# Patient Record
Sex: FEMALE | Race: WHITE | NOT HISPANIC OR LATINO | Employment: OTHER | URBAN - METROPOLITAN AREA
[De-identification: names, ages, dates, MRNs, and addresses within clinical notes are randomized per-mention and may not be internally consistent; named-entity substitution may affect disease eponyms.]

---

## 2017-01-04 ENCOUNTER — OFFICE VISIT (OUTPATIENT)
Dept: ENDOCRINOLOGY | Facility: CLINIC | Age: 77
End: 2017-01-04

## 2017-01-04 VITALS
DIASTOLIC BLOOD PRESSURE: 64 MMHG | HEART RATE: 79 BPM | WEIGHT: 172.4 LBS | BODY MASS INDEX: 27.71 KG/M2 | HEIGHT: 66 IN | OXYGEN SATURATION: 99 % | SYSTOLIC BLOOD PRESSURE: 118 MMHG

## 2017-01-04 DIAGNOSIS — E11.42 DIABETIC PERIPHERAL NEUROPATHY (HCC): ICD-10-CM

## 2017-01-04 DIAGNOSIS — E11.8 TYPE 2 DIABETES MELLITUS WITH COMPLICATION, WITH LONG-TERM CURRENT USE OF INSULIN (HCC): Primary | ICD-10-CM

## 2017-01-04 DIAGNOSIS — Z79.4 TYPE 2 DIABETES MELLITUS WITH COMPLICATION, WITH LONG-TERM CURRENT USE OF INSULIN (HCC): Primary | ICD-10-CM

## 2017-01-04 LAB — GLUCOSE BLDC GLUCOMTR-MCNC: 255 MG/DL (ref 70–130)

## 2017-01-04 PROCEDURE — 99214 OFFICE O/P EST MOD 30 MIN: CPT | Performed by: INTERNAL MEDICINE

## 2017-01-04 PROCEDURE — 82947 ASSAY GLUCOSE BLOOD QUANT: CPT | Performed by: INTERNAL MEDICINE

## 2017-01-04 NOTE — MR AVS SNAPSHOT
Narcisa SIDDIQI Yadiel   1/4/2017 10:45 AM   Office Visit    Dept Phone:  [phone]   Encounter #:  [id number]    Provider:  Jennifer Richards MD   Department:  [facility] INTERNAL MEDICINE AND ENDOCRINOLOGY                Your Full Care Plan              Your Updated Medication List          This list is accurate as of: 1/4/17 10:51 AM.  Always use your most recent med list.                ASPIRIN LOW DOSE 81 MG tablet   Generic drug:  aspirin       cefuroxime 250 MG tablet   Commonly known as:  CEFTIN   Take 1 tablet by mouth 2 (two) times a day.       cilostazol 100 MG tablet   Commonly known as:  PLETAL       clopidogrel 75 MG tablet   Commonly known as:  PLAVIX       CRESTOR 20 MG tablet   Generic drug:  rosuvastatin   TAKE ONE TABLET BY MOUTH ONCE A DAY       [facility] VITAMIN B-12 1000 MCG tablet   Generic drug:  cyanocobalamin       diazePAM 5 MG tablet   Commonly known as:  VALIUM       diphenoxylate-atropine 2.5-0.025 MG per tablet   Commonly known as:  LOMOTIL   Take 1 tablet by mouth Daily.       doxycycline 100 MG capsule   Commonly known as:  VIBRAMYCIN       gabapentin 600 MG tablet   Commonly known as:  NEURONTIN       glucose blood test strip   Commonly known as:  ACCU-CHEK JOVITA PLUS   1 each by Other route 3 (three) times a day.       HUMALOG KWIKPEN 100 UNIT/ML solution pen-injector   Generic drug:  Insulin Lispro       LEVEMIR FLEXTOUCH 100 UNIT/ML injection   Generic drug:  insulin detemir       meclizine 25 MG tablet   Commonly known as:  ANTIVERT   Take 1 tablet by mouth 3 (Three) Times a Day As Needed for dizziness.       metoprolol tartrate 25 MG tablet   Commonly known as:  LOPRESSOR       ondansetron ODT 8 MG disintegrating tablet   Commonly known as:  ZOFRAN-ODT   Take 1 tablet by mouth every 8 (eight) hours as needed for nausea.       pantoprazole 40 MG EC tablet   Commonly known as:  PROTONIX   Take 1 tablet by mouth 2 (Two) Times a Day.       saccharomyces boulardii 250 MG capsule   Commonly known as:  FLORASTOR   Take 1 capsule by mouth 2 (two) times a day.       sucralfate 1 GM/10ML suspension   Commonly known as:  CARAFATE   Take 10 mL by mouth every 6 (six) hours.       traZODone 50 MG tablet   Commonly known as:  DESYREL   Take 1 tablet by mouth every night.       valsartan 320 MG tablet   Commonly known as:  DIOVAN   Take 1 tablet by mouth daily.               We Performed the Following     POC Glucose Fingerstick       You Were Diagnosed With        Codes Comments    Type 2 diabetes mellitus with complication, with long-term current use of insulin    -  Primary ICD-10-CM: E11.8, Z79.4  ICD-9-CM: 250.90, V58.67       Instructions     None    Patient Instructions History      Upcoming Appointments     Visit Type Date Time Department    FOLLOW UP 2017 10:45 AM [facility]    FOLLOW UP 2017 10:30 AM [facility]    FOLLOW UP 3/14/2017  9:45 AM [facility]    FOLLOW UP 2017 10:00 AM [facility]    SUBSEQUENT MEDICARE WELLNESS 2017 11:00 AM [facility]      [employer] Signup     [employer] allows you to send messages to your doctor, view your test results, renew your prescriptions, schedule appointments, and more. To sign up, go to [employer] and click on the Sign Up Now link in the New User? box. Enter your [employer] Activation Code exactly as it appears below along with the last four digits of your Social Security Number and your Date of Birth () to complete the sign-up process. If you do not sign up before the expiration date, you must request a new code.    [employer] Activation Code: [email or web address]  Expires: 2017 10:51 AM    If you have questions, you can email [email or web address] or call [phone] to talk to our [employer] staff. Remember, [employer] is NOT to be used for urgent needs. For medical emergencies, dial 911.               Other Info from Your Visit           Your  "Appointments     Feb 02, 2017 10:30 AM EST   Follow Up with Mariia Del Real DO   [facility] INTERNAL MEDICINE AND ENDOCRINOLOGY [place] (--)    [address]  [facility] [address]   [phone]           Arrive 15 minutes prior to appointment.            Mar 14, 2017  9:45 AM EDT   Follow Up with Filemon Mayberry MD   [facility] CARDIOLOGY (--)    [address]  [facility] [address]   [phone]           Arrive 15 minutes prior to appointment.            Apr 26, 2017 10:00 AM EDT   Follow Up with Jennifer Richards MD   [facility] INTERNAL MEDICINE AND ENDOCRINOLOGY (--)    [address]  [facility] [address]   [phone]           Arrive 15 minutes prior to appointment.            Jul 05, 2017 11:00 AM EDT   Subsequent Medicare Wellness with Mariia Del Real DO   [facility] INTERNAL MEDICINE AND ENDOCRINOLOGY [place] (--)    [address]  [facility] [address]   [phone]              Allergies     Codeine        Reason for Visit     Diabetes Follow Up Former P sirena Holliday  BS flucuate up & down.  No meter or log book presented.  BS In Office is 255      Vital Signs     Blood Pressure Pulse Height Weight Oxygen Saturation Body Mass Index    118/64 79 66\" (167.6 cm) 172 lb 6.4 oz (78.2 kg) 99% 27.83 kg/m2    Smoking Status                   Never Smoker           Problems and Diagnoses Noted     Type 2 diabetes mellitus with complication, with long-term current use of insulin      Results     POC Glucose Fingerstick      Component Value Standard Range & Units    Glucose 255 70 - 130 mg/dL                    "

## 2017-01-04 NOTE — PROGRESS NOTES
Chief Complaint   Patient presents with   • Diabetes     Follow Up Former P tof Debo Holliday        HPI:   Narcisa Cotto is a 76 y.o.female who returns to Endocrine Clinic for f/u evaluation of Type 2 diabetes. Last clinic visit 8/30/2016 with Ms. DeboNHAN Zuleta who is no longer with our practice. Her history is as follows:    Interim Events: no new medical issues. Reports gabapentin not working well for her peripheral neuropathy. Higher doses cannot be used due to her CKD.    1) Type 2 diabetes with associated complications of peripheral neuropathy, CKD, PAD:  - diagnosed in 2000    Current DM Medications:  - Levemir 70 units qhs  - Humalog 20 units TID ac meals  - no correction factor    Glucometer/ BG log Review: pt forgot meter for review    DM Health Maintenance:  Ophtho: Last visit - due 2017, no retinopathy per pt  Podiatry: Last visit - no recent visit  Monofilament: has peripheral neuropathy, see below  Lipids: (11/2016) TChol 164, , HDL 35, LDL 93 - on crestor 20 mg daily  Urine Microalb/Cr ratio: CKD stage 4, on ace-I  TSH: 1.620 (11/2016)  Aspirin: 81 mg daily    Review of Systems   Constitutional: Positive for fatigue.        Weight stable   HENT: Negative.    Eyes: Negative.  Negative for visual disturbance.   Respiratory: Negative.    Cardiovascular: Positive for leg swelling. Negative for chest pain.   Gastrointestinal: Negative.  Negative for constipation and diarrhea.   Endocrine: Negative for cold intolerance, polydipsia and polyuria.   Genitourinary: Negative.  Enuresis: with physical exertion.   Musculoskeletal: Positive for arthralgias (knees & hands). Negative for back pain, joint swelling and myalgias.   Skin: Negative.  Negative for rash and wound.   Neurological: Positive for numbness (pain, Bilat  feet). Negative for headaches.   Hematological: Negative.    Psychiatric/Behavioral: Negative.      The following portions of the patient's history were reviewed and updated as  "appropriate: allergies, current medications, past family history, past medical history, past social history, past surgical history and problem list.    Visit Vitals   • /64   • Pulse 79   • Ht 66\" (167.6 cm)   • Wt 172 lb 6.4 oz (78.2 kg)   • SpO2 99%   • BMI 27.83 kg/m2     Physical Exam   Constitutional: She is oriented to person, place, and time. She appears well-developed. No distress.   HENT:   Head: Normocephalic.   Mouth/Throat: Oropharynx is clear and moist.   Eyes: Conjunctivae and EOM are normal. Pupils are equal, round, and reactive to light.   Neck: No tracheal deviation present. No thyromegaly present.   No palpable thyroid nodules     Cardiovascular: Normal rate, regular rhythm and normal heart sounds.    No murmur heard.  Pulmonary/Chest: Effort normal and breath sounds normal. No respiratory distress.   Abdominal: Soft. Bowel sounds are normal.   No scarring at insulin injection sites   Lymphadenopathy:     She has no cervical adenopathy.   Neurological: She is alert and oriented to person, place, and time. No cranial nerve deficit.   Skin: Skin is warm and dry. She is not diaphoretic. No erythema.   Psychiatric: She has a normal mood and affect. Her behavior is normal.   Vitals reviewed.    LABS/IMAGING:   Results for orders placed or performed in visit on 01/04/17   POC Glucose Fingerstick   Result Value Ref Range    Glucose 255 (A) 70 - 130 mg/dL     Lab Results   Component Value Date    HGBA1C 7.3 12/21/2016     ASSESSMENT/PLAN:  1)  Type 2 diabetes with associated complications of peripheral neuropathy, CKD, PAD: controlled  - discussed with patient that glycemic targets are generally set somewhat higher (eg, <8 percent) for older adult patients and those with comorbidities. Therefore a goal A1C% of 7-8% is reasonable for Ms. Cotto.  - no meter for review  - continue Levemir 70 units qhs  - continue Humalog 20 units TID ac meals  - start correction factor of 2 units:50>150  - written " instructions given to patient    2) diabetic peripheral neuropathy:  - Reports gabapentin not working well for her peripheral neuropathy. Higher doses cannot be used due to her CKD.  - will try Lyrica 75 mcg BID in place of gabapentin    RTC 3 months    Counseling was given to patient for the following topics: instructions for management and risks and benefits of treatment options . Total time of the encounter was 30 minutes and 25 minutes was spent counseling.

## 2017-01-06 PROBLEM — E11.42 DIABETIC PERIPHERAL NEUROPATHY (HCC): Status: ACTIVE | Noted: 2017-01-06

## 2017-01-06 RX ORDER — PREGABALIN 75 MG/1
75 CAPSULE ORAL 2 TIMES DAILY
Qty: 60 CAPSULE | Refills: 5 | Status: SHIPPED | OUTPATIENT
Start: 2017-01-06 | End: 2017-01-09 | Stop reason: SDUPTHER

## 2017-01-09 DIAGNOSIS — E11.42 DIABETIC PERIPHERAL NEUROPATHY (HCC): ICD-10-CM

## 2017-01-09 RX ORDER — PREGABALIN 75 MG/1
75 CAPSULE ORAL 2 TIMES DAILY
Qty: 60 CAPSULE | Refills: 5 | OUTPATIENT
Start: 2017-01-09 | End: 2017-04-26

## 2017-01-12 RX ORDER — BLOOD SUGAR DIAGNOSTIC
STRIP MISCELLANEOUS
Qty: 100 EACH | Refills: 4 | Status: SHIPPED | OUTPATIENT
Start: 2017-01-12 | End: 2017-03-14

## 2017-01-20 ENCOUNTER — TRANSCRIBE ORDERS (OUTPATIENT)
Dept: LAB | Facility: HOSPITAL | Age: 77
End: 2017-01-20

## 2017-01-26 ENCOUNTER — TRANSCRIBE ORDERS (OUTPATIENT)
Dept: ADMINISTRATIVE | Facility: HOSPITAL | Age: 77
End: 2017-01-26

## 2017-01-26 DIAGNOSIS — N18.4 CKD (CHRONIC KIDNEY DISEASE), STAGE IV (HCC): Primary | ICD-10-CM

## 2017-01-31 ENCOUNTER — HOSPITAL ENCOUNTER (OUTPATIENT)
Dept: CT IMAGING | Facility: HOSPITAL | Age: 77
Discharge: HOME | End: 2017-01-31
Attending: INTERNAL MEDICINE

## 2017-02-02 ENCOUNTER — OFFICE VISIT (OUTPATIENT)
Dept: INTERNAL MEDICINE | Facility: CLINIC | Age: 77
End: 2017-02-02

## 2017-02-02 VITALS
SYSTOLIC BLOOD PRESSURE: 150 MMHG | DIASTOLIC BLOOD PRESSURE: 70 MMHG | OXYGEN SATURATION: 98 % | BODY MASS INDEX: 28.34 KG/M2 | WEIGHT: 175.6 LBS | HEART RATE: 82 BPM

## 2017-02-02 DIAGNOSIS — R21 RASH: ICD-10-CM

## 2017-02-02 DIAGNOSIS — N18.30 CHRONIC KIDNEY DISEASE, STAGE 3 (MODERATE): ICD-10-CM

## 2017-02-02 DIAGNOSIS — G47.00 INSOMNIA, UNSPECIFIED TYPE: Primary | ICD-10-CM

## 2017-02-02 DIAGNOSIS — I10 ESSENTIAL HYPERTENSION: ICD-10-CM

## 2017-02-02 DIAGNOSIS — K21.9 GASTROESOPHAGEAL REFLUX DISEASE, ESOPHAGITIS PRESENCE NOT SPECIFIED: ICD-10-CM

## 2017-02-02 DIAGNOSIS — E78.5 HYPERLIPIDEMIA, UNSPECIFIED HYPERLIPIDEMIA TYPE: ICD-10-CM

## 2017-02-02 LAB
ALBUMIN SERPL-MCNC: 4.4 G/DL (ref 3.2–4.8)
ALBUMIN/GLOB SERPL: 1.6 G/DL (ref 1.5–2.5)
ALP SERPL-CCNC: 74 U/L (ref 25–100)
ALT SERPL W P-5'-P-CCNC: 15 U/L (ref 7–40)
ANION GAP SERPL CALCULATED.3IONS-SCNC: 8 MMOL/L (ref 3–11)
ARTICHOKE IGE QN: 166 MG/DL (ref 0–130)
AST SERPL-CCNC: 17 U/L (ref 0–33)
BILIRUB SERPL-MCNC: 0.3 MG/DL (ref 0.3–1.2)
BUN BLD-MCNC: 24 MG/DL (ref 9–23)
BUN/CREAT SERPL: 12 (ref 7–25)
CALCIUM SPEC-SCNC: 10.4 MG/DL (ref 8.7–10.4)
CHLORIDE SERPL-SCNC: 103 MMOL/L (ref 99–109)
CHOLEST SERPL-MCNC: 260 MG/DL (ref 0–200)
CO2 SERPL-SCNC: 28 MMOL/L (ref 20–31)
CREAT BLD-MCNC: 2 MG/DL (ref 0.6–1.3)
GFR SERPL CREATININE-BSD FRML MDRD: 24 ML/MIN/1.73
GLOBULIN UR ELPH-MCNC: 2.7 GM/DL
GLUCOSE BLD-MCNC: 196 MG/DL (ref 70–100)
HDLC SERPL-MCNC: 46 MG/DL (ref 40–60)
POTASSIUM BLD-SCNC: 4.5 MMOL/L (ref 3.5–5.5)
PROT SERPL-MCNC: 7.1 G/DL (ref 5.7–8.2)
SODIUM BLD-SCNC: 139 MMOL/L (ref 132–146)
TRIGL SERPL-MCNC: 195 MG/DL (ref 0–150)

## 2017-02-02 PROCEDURE — 99214 OFFICE O/P EST MOD 30 MIN: CPT | Performed by: INTERNAL MEDICINE

## 2017-02-02 PROCEDURE — 90471 IMMUNIZATION ADMIN: CPT | Performed by: INTERNAL MEDICINE

## 2017-02-02 PROCEDURE — 90662 IIV NO PRSV INCREASED AG IM: CPT | Performed by: INTERNAL MEDICINE

## 2017-02-02 PROCEDURE — 80053 COMPREHEN METABOLIC PANEL: CPT | Performed by: INTERNAL MEDICINE

## 2017-02-02 PROCEDURE — 90472 IMMUNIZATION ADMIN EACH ADD: CPT | Performed by: INTERNAL MEDICINE

## 2017-02-02 PROCEDURE — 80061 LIPID PANEL: CPT | Performed by: INTERNAL MEDICINE

## 2017-02-02 PROCEDURE — 90714 TD VACC NO PRESV 7 YRS+ IM: CPT | Performed by: INTERNAL MEDICINE

## 2017-02-02 RX ORDER — TRAZODONE HYDROCHLORIDE 50 MG/1
50 TABLET ORAL NIGHTLY
Qty: 30 TABLET | Refills: 0 | Status: SHIPPED | OUTPATIENT
Start: 2017-02-02 | End: 2018-06-04

## 2017-02-02 NOTE — PROGRESS NOTES
Subjective   Narcisa Cotto is a 76 y.o. female.   Chief Complaint   Patient presents with   • Chronic Kidney Disease   • Hypertension   • Hyperlipidemia   • VITAMIN B12 DEF       Chronic Kidney Disease   This is a chronic problem. The current episode started more than 1 year ago. Associated symptoms include a rash. Pertinent negatives include no abdominal pain, arthralgias, chest pain, chills, congestion, coughing, diaphoresis, fatigue, fever, headaches, myalgias, nausea, neck pain, numbness, sore throat, swollen glands, urinary symptoms, vertigo, visual change or vomiting. The treatment provided mild relief.   Hypertension   This is a chronic problem. The current episode started more than 1 year ago. The problem is controlled. Pertinent negatives include no chest pain, headaches, neck pain, palpitations or shortness of breath. Risk factors for coronary artery disease include diabetes mellitus and dyslipidemia.   Hyperlipidemia   This is a chronic problem. The current episode started more than 1 year ago. The problem is controlled. Pertinent negatives include no chest pain, myalgias or shortness of breath. Risk factors for coronary artery disease include diabetes mellitus, dyslipidemia and hypertension.   Heartburn   She reports no abdominal pain, no chest pain, no coughing, no nausea, no sore throat or no wheezing. This is a chronic problem. The current episode started more than 1 year ago. Pertinent negatives include no fatigue.   Insomnia   This is a chronic problem. The current episode started more than 1 year ago. Associated symptoms include a rash. Pertinent negatives include no abdominal pain, arthralgias, chest pain, chills, congestion, coughing, diaphoresis, fatigue, fever, headaches, myalgias, nausea, neck pain, numbness, sore throat, swollen glands, urinary symptoms, vertigo, visual change or vomiting. The treatment provided significant relief.    Rash on back x 3 months and getting worst. Itching  with it. No pain.  No new products. Used OTC cream withot relief.  The following portions of the patient's history were reviewed and updated as appropriate: allergies, current medications, past family history, past medical history, past social history, past surgical history and problem list.    Review of Systems   Constitutional: Negative for activity change, appetite change, chills, diaphoresis, fatigue, fever and unexpected weight change.   HENT: Negative for congestion, ear discharge, ear pain, mouth sores, nosebleeds, sinus pressure, sneezing and sore throat.    Eyes: Negative for pain, discharge and itching.   Respiratory: Negative for cough, chest tightness, shortness of breath and wheezing.    Cardiovascular: Negative for chest pain, palpitations and leg swelling.   Gastrointestinal: Negative for abdominal pain, constipation, diarrhea, nausea and vomiting.   Endocrine: Negative for cold intolerance, heat intolerance, polydipsia and polyphagia.   Genitourinary: Negative for dysuria, flank pain, frequency, hematuria and urgency.   Musculoskeletal: Negative for arthralgias, back pain, gait problem, myalgias, neck pain and neck stiffness.   Skin: Positive for rash. Negative for color change and pallor.   Neurological: Negative for vertigo, seizures, speech difficulty, numbness and headaches.   Psychiatric/Behavioral: Negative for agitation, confusion, decreased concentration and sleep disturbance. The patient has insomnia. The patient is not nervous/anxious.        Objective   Physical Exam   Constitutional: She is oriented to person, place, and time. She appears well-developed.   HENT:   Head: Normocephalic.   Right Ear: External ear normal.   Left Ear: External ear normal.   Nose: Nose normal.   Mouth/Throat: Oropharynx is clear and moist.   Eyes: Conjunctivae are normal. Pupils are equal, round, and reactive to light.   Neck: No JVD present. No thyromegaly present.   Cardiovascular: Normal rate, regular rhythm  and normal heart sounds.  Exam reveals no friction rub.    No murmur heard.  Pulmonary/Chest: Effort normal and breath sounds normal. No respiratory distress. She has no wheezes. She has no rales.   Abdominal: Soft. Bowel sounds are normal. She exhibits no distension. There is no tenderness. There is no guarding.   Musculoskeletal: She exhibits no edema or tenderness.   Lymphadenopathy:     She has no cervical adenopathy.   Neurological: She is alert and oriented to person, place, and time. She has normal reflexes. She displays normal reflexes. No cranial nerve deficit.   Skin: No rash noted.        Psychiatric: She has a normal mood and affect. Her behavior is normal.   Nursing note and vitals reviewed.      Assessment/Plan   Narcisa was seen today for chronic kidney disease, hypertension, hyperlipidemia and vitamin b12 def.    Diagnoses and all orders for this visit:    Insomnia, unspecified type  -     traZODone (DESYREL) 50 MG tablet; Take 1 tablet by mouth Every Night.  Stable with trazadone that was refilled today  Essential hypertension  -     Comprehensive Metabolic Panel  -     Lipid Panel  stable  Hyperlipidemia, unspecified hyperlipidemia type  -     Comprehensive Metabolic Panel  -     Lipid Panel  stable  Gastroesophageal reflux disease, esophagitis presence not specified  Stable. Recent change from protonix to zantac to protect her kidney  Chronic kidney disease, stage 3 (moderate)  Per pt last cr 1.9 at nephro office  Rash  -     Ambulatory Referral to Dermatology  Triamcinolone prescribed below.  Other orders  -     triamcinolone (KENALOG) 0.1 % ointment; Apply  topically 2 (Two) Times a Day.    Flu vac and td  given today. Td given for area on shoulder. Small scratch next to dry patch and not sure what came into contact with.

## 2017-02-21 RX ORDER — CILOSTAZOL 100 MG/1
TABLET ORAL
Qty: 60 TABLET | Refills: 1 | Status: SHIPPED | OUTPATIENT
Start: 2017-02-21 | End: 2017-03-14

## 2017-02-21 RX ORDER — GABAPENTIN 600 MG/1
TABLET ORAL
Qty: 90 TABLET | Refills: 0 | Status: SHIPPED | OUTPATIENT
Start: 2017-02-21 | End: 2017-04-03 | Stop reason: SDUPTHER

## 2017-02-21 RX ORDER — CLOPIDOGREL BISULFATE 75 MG/1
TABLET ORAL
Qty: 30 TABLET | Refills: 1 | Status: SHIPPED | OUTPATIENT
Start: 2017-02-21 | End: 2017-03-14 | Stop reason: SDUPTHER

## 2017-03-14 ENCOUNTER — OFFICE VISIT (OUTPATIENT)
Dept: CARDIOLOGY | Facility: CLINIC | Age: 77
End: 2017-03-14

## 2017-03-14 VITALS
BODY MASS INDEX: 28.99 KG/M2 | HEART RATE: 80 BPM | DIASTOLIC BLOOD PRESSURE: 60 MMHG | SYSTOLIC BLOOD PRESSURE: 124 MMHG | WEIGHT: 174 LBS | HEIGHT: 65 IN

## 2017-03-14 DIAGNOSIS — I10 ESSENTIAL HYPERTENSION: ICD-10-CM

## 2017-03-14 DIAGNOSIS — E78.5 HYPERLIPIDEMIA LDL GOAL <100: ICD-10-CM

## 2017-03-14 DIAGNOSIS — I73.9 PERIPHERAL ARTERIAL DISEASE (HCC): Primary | ICD-10-CM

## 2017-03-14 DIAGNOSIS — N18.4 CHRONIC KIDNEY DISEASE, STAGE IV (SEVERE) (HCC): ICD-10-CM

## 2017-03-14 DIAGNOSIS — R07.89 ATYPICAL CHEST PAIN: ICD-10-CM

## 2017-03-14 PROCEDURE — 99214 OFFICE O/P EST MOD 30 MIN: CPT | Performed by: INTERNAL MEDICINE

## 2017-03-14 RX ORDER — ROSUVASTATIN CALCIUM 20 MG/1
20 TABLET, COATED ORAL NIGHTLY
Qty: 90 TABLET | Refills: 3 | Status: SHIPPED | OUTPATIENT
Start: 2017-03-14 | End: 2017-10-12 | Stop reason: SDUPTHER

## 2017-03-14 RX ORDER — CLOPIDOGREL BISULFATE 75 MG/1
75 TABLET ORAL DAILY
Qty: 90 TABLET | Refills: 3 | Status: SHIPPED | OUTPATIENT
Start: 2017-03-14 | End: 2018-03-09

## 2017-03-14 NOTE — ASSESSMENT & PLAN NOTE
Hypertension is Controlled.  Continue current treatment regimen.  Blood pressure will be reassessed at the next regular appointment.

## 2017-03-14 NOTE — ASSESSMENT & PLAN NOTE
Patient has [place] class III claudication symptoms.  At this point, I think her symptoms are acceptable to her.  I would like to defer any further testing given her advanced chronic kidney disease.  I have offered her the opportunity to stop Pletal.  If her symptoms do not worsen, she may remain off of this.  She should remain on aspirin, clopidogrel, and statin therapy however.

## 2017-03-14 NOTE — PROGRESS NOTES
Encounter Date:03/14/2017    Patient ID: Narcisa Cotto is a 76 y.o. female who resides in [place], [place].    CC/Reason for visit:  Follow-up (htn)          Narcisa Cotto returns today as a follow up for peripheral arterial disease, hypertension and hyperlipidemia. Since last visit, patient has been feeling well from a cardiac standpoint. Patient notes a recent episode of chest pain that worsened to the point where she thought she was having a heart attack and was admitted to the hospital.  She was found to have gastroparesis, a urinary tract infection, and was dehydrated.  Since I saw her last, she also underwent a cholecystectomy and subsequently has been feeling much better with regards to chest pain.  She does K claudication symptoms with walking.  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  She states she feels as good as she's felt in 2 years.    Review of Systems   Constitution: Negative for weakness and malaise/fatigue.   Eyes: Negative for vision loss in left eye and vision loss in right eye.   Cardiovascular: Negative for chest pain, dyspnea on exertion, near-syncope, orthopnea, palpitations, paroxysmal nocturnal dyspnea and syncope.   Musculoskeletal: Negative for myalgias.   Neurological: Negative for brief paralysis, excessive daytime sleepiness, focal weakness, numbness and paresthesias.   All other systems reviewed and are negative.      The patient's past medical, social, and family history reviewed in the patient's electronic medical record.    Allergies  Codeine    Outpatient Prescriptions Marked as Taking for the 3/14/17 encounter (Office Visit) with Filemon Mayberry MD   Medication Sig Dispense Refill   • aspirin (ASPIRIN LOW DOSE) 81 MG tablet Take 1 tablet by mouth daily.     • clopidogrel (PLAVIX) 75 MG tablet Take 1 tablet by mouth Daily for 360 days. 90 tablet 3   • cyanocobalamin ([employer] VITAMIN B-12) 1000 MCG tablet Take 1 tablet by mouth daily.     •  "diphenoxylate-atropine (LOMOTIL) 2.5-0.025 MG per tablet Take 1 tablet by mouth Daily. 30 tablet 2   • gabapentin (NEURONTIN) 600 MG tablet TAKE ONE TABLET BY MOUTH THREE TIMES A DAY 90 tablet 0   • insulin detemir (LEVEMIR FLEXTOUCH) 100 UNIT/ML injection Inject 70 Units under the skin Every Night.     • Insulin Lispro, Human, (HUMALOG KWIKPEN) 100 UNIT/ML solution pen-injector Inject 20 Units under the skin 3 (Three) Times a Day.     • meclizine (ANTIVERT) 25 MG tablet Take 1 tablet by mouth 3 (Three) Times a Day As Needed for dizziness. 30 tablet 1   • metoprolol tartrate (LOPRESSOR) 25 MG tablet TAKE ONE TABLET BY MOUTH TWICE A DAY 60 tablet 1   • ondansetron ODT (ZOFRAN-ODT) 8 MG disintegrating tablet Take 1 tablet by mouth every 8 (eight) hours as needed for nausea. 30 tablet 0   • pantoprazole (PROTONIX) 40 MG EC tablet Take 1 tablet by mouth 2 (Two) Times a Day. 60 tablet 2   • pregabalin (LYRICA) 75 MG capsule Take 1 capsule by mouth 2 (Two) Times a Day. 60 capsule 5   • rosuvastatin (CRESTOR) 20 MG tablet Take 1 tablet by mouth Every Night for 360 days. 90 tablet 3   • saccharomyces boulardii (FLORASTOR) 250 MG capsule Take 1 capsule by mouth 2 (two) times a day. 20 capsule 0   • sucralfate (CARAFATE) 1 GM/10ML suspension Take 10 mL by mouth every 6 (six) hours. 120 g 0   • traZODone (DESYREL) 50 MG tablet Take 1 tablet by mouth Every Night. 30 tablet 0   • triamcinolone (KENALOG) 0.1 % ointment Apply  topically 2 (Two) Times a Day. 30 g 0   • valsartan (DIOVAN) 320 MG tablet Take 1 tablet by mouth daily. 30 tablet 3   • [DISCONTINUED] cilostazol (PLETAL) 100 MG tablet TAKE ONE TABLET BY MOUTH TWICE A DAY 60 tablet 1   • [DISCONTINUED] clopidogrel (PLAVIX) 75 MG tablet TAKE ONE TABLET BY MOUTH DAILY 30 tablet 1   • [DISCONTINUED] CRESTOR 20 MG tablet TAKE ONE TABLET BY MOUTH ONCE A DAY 30 tablet 5         Blood pressure 124/60, pulse 80, height 65\" (165.1 cm), weight 174 lb (78.9 kg).  Body mass index is " 28.96 kg/(m^2).    Physical Exam   Constitutional: She is oriented to person, place, and time. She appears well-developed and well-nourished.   HENT:   Head: Normocephalic and atraumatic.   Eyes: Pupils are equal, round, and reactive to light. No scleral icterus.   Neck: No JVD present. Carotid bruit is not present. No thyromegaly present.   Cardiovascular: Normal rate, regular rhythm, S1 normal and S2 normal.  Exam reveals no gallop.    No murmur heard.  Pulmonary/Chest: Effort normal and breath sounds normal.   Abdominal: Soft. There is no tenderness.   Neurological: She is alert and oriented to person, place, and time.   Skin: Skin is warm and dry. No cyanosis. Nails show no clubbing.   Psychiatric: She has a normal mood and affect. Her behavior is normal.       Data Review:     Lab Results   Component Value Date    CHOL 260 (H) 02/02/2017    TRIG 195 (H) 02/02/2017    HDL 46 02/02/2017    LDLDIRECT 166 (H) 02/02/2017    AST 17 02/02/2017    ALT 15 02/02/2017     Lab Results   Component Value Date    GLUCOSE 196 (H) 02/02/2017    BUN 24 (H) 02/02/2017    CREATININE 2.00 (H) 02/02/2017    EGFRIFNONA 24 (L) 02/02/2017    EGFRIFAFRI 26 (L) 11/04/2016    BCR 12.0 02/02/2017    K 4.5 02/02/2017    CO2 28.0 02/02/2017    CALCIUM 10.4 02/02/2017    PROTENTOTREF 6.5 11/04/2016    ALBUMIN 4.40 02/02/2017    LABIL2 1.6 02/02/2017    AST 17 02/02/2017    ALT 15 02/02/2017     Lab Results   Component Value Date    WBC 7.76 01/10/2017    HGB 10.7 (L) 01/10/2017    HCT 32.2 (L) 01/10/2017    MCV 86.3 01/10/2017     01/10/2017     Lab Results   Component Value Date    HGBA1C 7.3 12/21/2016     Lab Results   Component Value Date    TSH 1.620 11/02/2016       Procedures         Problem List Items Addressed This Visit        Cardiovascular and Mediastinum    Essential hypertension    Current Assessment & Plan     Hypertension is Controlled.  Continue current treatment regimen.  Blood pressure will be reassessed at the next  regular appointment.         Hyperlipidemia LDL goal <100    Overview     · High intensity statin therapy is recommended given the presence of peripheral arterial disease and diabetes         Current Assessment & Plan     · Continue Crestor         Relevant Medications    rosuvastatin (CRESTOR) 20 MG tablet    Peripheral arterial disease - Primary    Overview     · Bilateral [place] class III claudication symptoms.  · BONNIE (08/22/2013):  At rest right 0.98, left 0.85.  After 2 minutes of exercise right 0.51, left 0.68.  · Venous duplex LE (7/27/2016): All veins visiualized appear patent with distal augmentation bilaterally.         Current Assessment & Plan     Patient has [place] class III claudication symptoms.  At this point, I think her symptoms are acceptable to her.  I would like to defer any further testing given her advanced chronic kidney disease.  I have offered her the opportunity to stop Pletal.  If her symptoms do not worsen, she may remain off of this.  She should remain on aspirin, clopidogrel, and statin therapy however.         Relevant Medications    clopidogrel (PLAVIX) 75 MG tablet       Nervous and Auditory    Atypical chest pain    Overview     · Chest wall pain following left shingles infection.   · Pharmacologic MPS (11/02/2012): no ischemia or scar. LVEF 72%.   · Echocardiogram (06/13/2013): LVEF 55% to 60%. Trace AI. Mild LV diastolic dysfunction.  · Symptoms improved following cholecystectomy.            Genitourinary    Chronic kidney disease, stage IV (severe)               · Discontinue Pletal.  If claudication symptoms worsen, she may resume taking  · Patient counseled on repeated walking until leg discomfort  · Follow up in 12 months, or sooner as needed.    Filemon Mayberry MD  3/14/2017    Scribed for Filemon Mayberry MD by Chalo Osullivan. 3/14/2017  10:01 AM    I, Filemon Mayberry MD, personally performed the services described in this  documentation as scribed by the above named individual in my presence, and it is both accurate and complete.  3/14/2017  10:01 AM

## 2017-04-03 RX ORDER — GABAPENTIN 600 MG/1
TABLET ORAL
Qty: 90 TABLET | Refills: 2 | Status: SHIPPED | OUTPATIENT
Start: 2017-04-03 | End: 2017-04-26 | Stop reason: SDUPTHER

## 2017-04-03 RX ORDER — DIPHENOXYLATE HYDROCHLORIDE AND ATROPINE SULFATE 2.5; .025 MG/1; MG/1
TABLET ORAL
Qty: 30 TABLET | Refills: 1 | OUTPATIENT
Start: 2017-04-03

## 2017-04-03 RX ORDER — MECLIZINE HYDROCHLORIDE 25 MG/1
TABLET ORAL
Qty: 30 TABLET | Refills: 1 | Status: SHIPPED | OUTPATIENT
Start: 2017-04-03 | End: 2017-06-22 | Stop reason: SDUPTHER

## 2017-04-26 ENCOUNTER — OFFICE VISIT (OUTPATIENT)
Dept: ENDOCRINOLOGY | Facility: CLINIC | Age: 77
End: 2017-04-26

## 2017-04-26 VITALS
DIASTOLIC BLOOD PRESSURE: 64 MMHG | BODY MASS INDEX: 29.11 KG/M2 | OXYGEN SATURATION: 98 % | HEART RATE: 72 BPM | SYSTOLIC BLOOD PRESSURE: 120 MMHG | HEIGHT: 65 IN | WEIGHT: 174.7 LBS

## 2017-04-26 DIAGNOSIS — E11.42 DIABETIC PERIPHERAL NEUROPATHY (HCC): ICD-10-CM

## 2017-04-26 DIAGNOSIS — Z79.4 TYPE 2 DIABETES MELLITUS WITH COMPLICATION, WITH LONG-TERM CURRENT USE OF INSULIN (HCC): Primary | ICD-10-CM

## 2017-04-26 DIAGNOSIS — E11.8 TYPE 2 DIABETES MELLITUS WITH COMPLICATION, WITH LONG-TERM CURRENT USE OF INSULIN (HCC): Primary | ICD-10-CM

## 2017-04-26 LAB
GLUCOSE BLDC GLUCOMTR-MCNC: 168 MG/DL (ref 70–130)
HBA1C MFR BLD: 8 %

## 2017-04-26 PROCEDURE — 82947 ASSAY GLUCOSE BLOOD QUANT: CPT | Performed by: INTERNAL MEDICINE

## 2017-04-26 PROCEDURE — 83036 HEMOGLOBIN GLYCOSYLATED A1C: CPT | Performed by: INTERNAL MEDICINE

## 2017-04-26 PROCEDURE — 99213 OFFICE O/P EST LOW 20 MIN: CPT | Performed by: INTERNAL MEDICINE

## 2017-04-26 RX ORDER — GABAPENTIN 600 MG/1
600 TABLET ORAL 2 TIMES DAILY
Qty: 1 TABLET | Refills: 0 | COMMUNITY
Start: 2017-04-26 | End: 2017-08-29 | Stop reason: SDUPTHER

## 2017-05-01 ENCOUNTER — TELEPHONE (OUTPATIENT)
Dept: INTERNAL MEDICINE | Facility: CLINIC | Age: 77
End: 2017-05-01

## 2017-05-09 ENCOUNTER — TELEPHONE (OUTPATIENT)
Dept: ENDOCRINOLOGY | Facility: CLINIC | Age: 77
End: 2017-05-09

## 2017-06-22 RX ORDER — MECLIZINE HYDROCHLORIDE 25 MG/1
TABLET ORAL
Qty: 30 TABLET | Refills: 0 | Status: SHIPPED | OUTPATIENT
Start: 2017-06-22 | End: 2017-08-22 | Stop reason: SDUPTHER

## 2017-08-22 RX ORDER — MECLIZINE HYDROCHLORIDE 25 MG/1
TABLET ORAL
Qty: 30 TABLET | Refills: 0 | Status: SHIPPED | OUTPATIENT
Start: 2017-08-22 | End: 2017-10-09 | Stop reason: SDUPTHER

## 2017-08-22 RX ORDER — PANTOPRAZOLE SODIUM 40 MG/1
TABLET, DELAYED RELEASE ORAL
Qty: 60 TABLET | Refills: 0 | Status: SHIPPED | OUTPATIENT
Start: 2017-08-22 | End: 2017-10-12 | Stop reason: SDUPTHER

## 2017-08-25 DIAGNOSIS — E11.42 DIABETIC PERIPHERAL NEUROPATHY (HCC): ICD-10-CM

## 2017-08-28 RX ORDER — GABAPENTIN 600 MG/1
600 TABLET ORAL 2 TIMES DAILY
Qty: 1 TABLET | Refills: 0 | Status: CANCELLED | COMMUNITY
Start: 2017-08-28

## 2017-08-28 NOTE — TELEPHONE ENCOUNTER
Looked on last note from Susie and said pt was increased by PCP to tid, but pt says she only takes it bid

## 2017-08-29 ENCOUNTER — OFFICE VISIT (OUTPATIENT)
Dept: INTERNAL MEDICINE | Facility: CLINIC | Age: 77
End: 2017-08-29

## 2017-08-29 VITALS
BODY MASS INDEX: 28.99 KG/M2 | OXYGEN SATURATION: 99 % | HEART RATE: 76 BPM | DIASTOLIC BLOOD PRESSURE: 70 MMHG | HEIGHT: 65 IN | RESPIRATION RATE: 16 BRPM | WEIGHT: 174 LBS | SYSTOLIC BLOOD PRESSURE: 122 MMHG

## 2017-08-29 DIAGNOSIS — N39.0 ACUTE UTI: Primary | ICD-10-CM

## 2017-08-29 DIAGNOSIS — R53.1 WEAK: ICD-10-CM

## 2017-08-29 DIAGNOSIS — E11.42 DIABETIC PERIPHERAL NEUROPATHY (HCC): ICD-10-CM

## 2017-08-29 LAB
ALBUMIN SERPL-MCNC: 4.7 G/DL (ref 3.2–4.8)
ALBUMIN/GLOB SERPL: 1.6 G/DL (ref 1.5–2.5)
ALP SERPL-CCNC: 96 U/L (ref 25–100)
ALT SERPL-CCNC: 20 U/L (ref 7–40)
AST SERPL-CCNC: 18 U/L (ref 0–33)
BASOPHILS # BLD AUTO: 0.02 10*3/MM3 (ref 0–0.2)
BASOPHILS NFR BLD AUTO: 0.3 % (ref 0–1)
BILIRUB BLD-MCNC: ABNORMAL MG/DL
BILIRUB SERPL-MCNC: 0.6 MG/DL (ref 0.3–1.2)
BUN SERPL-MCNC: 41 MG/DL (ref 9–23)
BUN/CREAT SERPL: 18.6 (ref 7–25)
CALCIUM SERPL-MCNC: 10 MG/DL (ref 8.7–10.4)
CHLORIDE SERPL-SCNC: 104 MMOL/L (ref 99–109)
CLARITY, POC: CLEAR
CO2 SERPL-SCNC: 29 MMOL/L (ref 20–31)
COLOR UR: ABNORMAL
CREAT SERPL-MCNC: 2.2 MG/DL (ref 0.6–1.3)
EOSINOPHIL # BLD AUTO: 0.09 10*3/MM3 (ref 0–0.3)
EOSINOPHIL NFR BLD AUTO: 1.2 % (ref 0–3)
ERYTHROCYTE [DISTWIDTH] IN BLOOD BY AUTOMATED COUNT: 14.6 % (ref 11.3–14.5)
GLOBULIN SER CALC-MCNC: 2.9 GM/DL
GLUCOSE SERPL-MCNC: 192 MG/DL (ref 70–100)
GLUCOSE UR STRIP-MCNC: NEGATIVE MG/DL
HCT VFR BLD AUTO: 40 % (ref 34.5–44)
HGB BLD-MCNC: 12.7 G/DL (ref 11.5–15.5)
IMM GRANULOCYTES # BLD: 0.02 10*3/MM3 (ref 0–0.03)
IMM GRANULOCYTES NFR BLD: 0.3 % (ref 0–0.6)
KETONES UR QL: NEGATIVE
LEUKOCYTE EST, POC: ABNORMAL
LYMPHOCYTES # BLD AUTO: 0.7 10*3/MM3 (ref 0.6–4.8)
LYMPHOCYTES NFR BLD AUTO: 9 % (ref 24–44)
MCH RBC QN AUTO: 27.1 PG (ref 27–31)
MCHC RBC AUTO-ENTMCNC: 31.8 G/DL (ref 32–36)
MCV RBC AUTO: 85.5 FL (ref 80–99)
MONOCYTES # BLD AUTO: 0.38 10*3/MM3 (ref 0–1)
MONOCYTES NFR BLD AUTO: 4.9 % (ref 0–12)
NEUTROPHILS # BLD AUTO: 6.54 10*3/MM3 (ref 1.5–8.3)
NEUTROPHILS NFR BLD AUTO: 84.3 % (ref 41–71)
NITRITE UR-MCNC: NEGATIVE MG/ML
PH UR: 5 [PH] (ref 5–8)
PLATELET # BLD AUTO: 194 10*3/MM3 (ref 150–450)
POTASSIUM SERPL-SCNC: 5.1 MMOL/L (ref 3.5–5.5)
PROT SERPL-MCNC: 7.6 G/DL (ref 5.7–8.2)
PROT UR STRIP-MCNC: ABNORMAL MG/DL
RBC # BLD AUTO: 4.68 10*6/MM3 (ref 3.89–5.14)
RBC # UR STRIP: NEGATIVE /UL
SODIUM SERPL-SCNC: 141 MMOL/L (ref 132–146)
SP GR UR: 1.02 (ref 1–1.03)
TSH SERPL DL<=0.005 MIU/L-ACNC: 1.72 MIU/ML (ref 0.35–5.35)
UROBILINOGEN UR QL: NORMAL
WBC # BLD AUTO: 7.75 10*3/MM3 (ref 3.5–10.8)

## 2017-08-29 PROCEDURE — 81003 URINALYSIS AUTO W/O SCOPE: CPT | Performed by: INTERNAL MEDICINE

## 2017-08-29 PROCEDURE — 99214 OFFICE O/P EST MOD 30 MIN: CPT | Performed by: INTERNAL MEDICINE

## 2017-08-29 RX ORDER — NITROFURANTOIN 25; 75 MG/1; MG/1
100 CAPSULE ORAL 2 TIMES DAILY
Qty: 14 CAPSULE | Refills: 0 | Status: SHIPPED | OUTPATIENT
Start: 2017-08-29 | End: 2017-10-09

## 2017-08-29 RX ORDER — ONDANSETRON 8 MG/1
8 TABLET, ORALLY DISINTEGRATING ORAL EVERY 8 HOURS PRN
Qty: 30 TABLET | Refills: 0 | Status: SHIPPED | OUTPATIENT
Start: 2017-08-29 | End: 2018-03-14 | Stop reason: SDUPTHER

## 2017-08-29 RX ORDER — AMLODIPINE BESYLATE 2.5 MG/1
2.5 TABLET ORAL DAILY
COMMUNITY
End: 2018-05-01 | Stop reason: DRUGHIGH

## 2017-08-29 RX ORDER — RANITIDINE HCL 75 MG
75 TABLET ORAL 2 TIMES DAILY
COMMUNITY
End: 2018-11-08

## 2017-08-29 RX ORDER — GABAPENTIN 600 MG/1
600 TABLET ORAL 2 TIMES DAILY
Qty: 60 TABLET | Refills: 0 | Status: SHIPPED | OUTPATIENT
Start: 2017-08-29 | End: 2017-09-26 | Stop reason: SDUPTHER

## 2017-08-29 NOTE — TELEPHONE ENCOUNTER
Patient has an appointment tomorrow.  Per Dr. Richards  SHE WILL fill all RX when patient is here and can fill out proper paperwork, and does NOT have to make 2 trips to  controlled.

## 2017-08-29 NOTE — PROGRESS NOTES
Subjective   Narcisa Cotto is a 77 y.o. female.   Chief Complaint   Patient presents with   • Fatigue     Pt has been feeling weak for 1 week, getting worse every day. Feeling dehydrated.       History of Present Illness   Started feeling nauseated, weak, and fatigue since last Thursday.  No burning with urination. Some urinary frequency. No diarrhea. No CP. No blood in stool.  When goes to stand up feels weak.    Blood sugar this am 167. Her BS last night was 170 and they held her Lantus. Having night sweats.  The following portions of the patient's history were reviewed and updated as appropriate: allergies, current medications, past family history, past medical history, past social history, past surgical history and problem list.    Review of Systems   Constitutional: Positive for fatigue. Negative for activity change, appetite change, chills, diaphoresis, fever and unexpected weight change.   HENT: Negative for congestion, ear discharge, ear pain, mouth sores, nosebleeds, sinus pressure, sneezing and sore throat.    Eyes: Negative for pain, discharge and itching.   Respiratory: Negative for cough, chest tightness, shortness of breath and wheezing.    Cardiovascular: Negative for chest pain, palpitations and leg swelling.   Gastrointestinal: Positive for nausea. Negative for abdominal pain, constipation, diarrhea and vomiting.   Endocrine: Negative for cold intolerance, heat intolerance, polydipsia and polyphagia.   Genitourinary: Positive for frequency. Negative for dysuria, flank pain, hematuria and urgency.   Musculoskeletal: Negative for arthralgias, back pain, gait problem, myalgias, neck pain and neck stiffness.   Skin: Negative for color change, pallor and rash.   Neurological: Positive for weakness. Negative for seizures, speech difficulty, numbness and headaches.   Psychiatric/Behavioral: Negative for agitation, confusion, decreased concentration and sleep disturbance. The patient is not  "nervous/anxious.      /70  Pulse 76  Resp 16  Ht 65\" (165.1 cm)  Wt 174 lb (78.9 kg)  SpO2 99%  BMI 28.96 kg/m2    Objective   Physical Exam   Constitutional: She is oriented to person, place, and time.   obese   HENT:   Head: Normocephalic.   Right Ear: External ear normal.   Left Ear: External ear normal.   Nose: Nose normal.   Mouth/Throat: Oropharynx is clear and moist.   Eyes: Conjunctivae are normal. Pupils are equal, round, and reactive to light.   Neck: No JVD present. No thyromegaly present.   Cardiovascular: Normal rate, regular rhythm and normal heart sounds.  Exam reveals no friction rub.    No murmur heard.  Pulmonary/Chest: Effort normal and breath sounds normal. No respiratory distress. She has no wheezes. She has no rales.   Abdominal: Soft. Bowel sounds are normal. She exhibits no distension. There is no tenderness. There is no guarding.   Musculoskeletal: She exhibits no edema or tenderness.   Lymphadenopathy:     She has no cervical adenopathy.   Neurological: She is oriented to person, place, and time. She displays normal reflexes. No cranial nerve deficit.   Skin: No rash noted.   Psychiatric: Her behavior is normal.   Nursing note and vitals reviewed.      Assessment/Plan   Narcisa was seen today for fatigue.    Diagnoses and all orders for this visit:    Acute UTI  -     POCT urinalysis dipstick, automated  -     Urine Culture  -     Comprehensive Metabolic Panel  -     CBC & Differential  -     TSH  -     nitrofurantoin, macrocrystal-monohydrate, (MACROBID) 100 MG capsule; Take 1 capsule by mouth 2 (Two) Times a Day.  Bactrim not used secondary to ckd stage iv related to her uncontrolled diabetes  Weak  -     Comprehensive Metabolic Panel  -     CBC & Differential  -     TSH  50% of visit spent counseling  Keep f/u. If sxs worsen or no improvement in 48 hrs to the ER  Refilled gabapentin because she is completely out. Sees Dr. Richards tomorrow and will need refills from her. I did " write a 1 month supply for today only.  The patient has read and signed the [facility] Controlled Substance Contract.  I will continue to see patient for regular follow up appointments.  They are well controlled on their medication.  RADHA is updated every 3 months. The patient is aware of the potential for addiction and dependence.    The patient has read and signed the [facility] Controlled Substance Contract.  I will continue to see patient for regular follow up appointments.  They are well controlled on their medication.  RADHA is updated every 3 months. The patient is aware of the potential for addiction. The patient has read and signed the [facility] Controlled Substance Contract.  I will continue to see patient for regular follow up appointments.  They are well controlled on their medication.  RADHA is updated every 3 months. The patient is aware of the potential for addiction and dependence.on and dependence.

## 2017-08-30 ENCOUNTER — TELEPHONE (OUTPATIENT)
Dept: INTERNAL MEDICINE | Facility: CLINIC | Age: 77
End: 2017-08-30

## 2017-08-31 ENCOUNTER — TELEPHONE (OUTPATIENT)
Dept: INTERNAL MEDICINE | Facility: CLINIC | Age: 77
End: 2017-08-31

## 2017-08-31 LAB
BACTERIA UR CULT: NORMAL
BACTERIA UR CULT: NORMAL

## 2017-09-07 ENCOUNTER — TELEPHONE (OUTPATIENT)
Dept: INTERNAL MEDICINE | Facility: CLINIC | Age: 77
End: 2017-09-07

## 2017-09-07 NOTE — TELEPHONE ENCOUNTER
DA THE DAUGHTER OF JOHN PAUL CALLED AND SAID HER MOM STILL ISNT FEELING MUCH BETTER FROM UTI.  DA IS WONDERING IF SHE NEEDS ADDITIONAL ANTIBIOTIC.  MRS GARCIA USES [facility] AT [facility]

## 2017-09-22 ENCOUNTER — TELEPHONE (OUTPATIENT)
Dept: INTERNAL MEDICINE | Facility: CLINIC | Age: 77
End: 2017-09-22

## 2017-09-26 DIAGNOSIS — E11.42 DIABETIC PERIPHERAL NEUROPATHY (HCC): ICD-10-CM

## 2017-09-27 DIAGNOSIS — E11.42 DIABETIC PERIPHERAL NEUROPATHY (HCC): ICD-10-CM

## 2017-09-27 RX ORDER — GABAPENTIN 600 MG/1
600 TABLET ORAL 2 TIMES DAILY
Qty: 60 TABLET | Refills: 5 | OUTPATIENT
Start: 2017-09-27 | End: 2018-04-13 | Stop reason: SDUPTHER

## 2017-09-27 RX ORDER — GABAPENTIN 600 MG/1
600 TABLET ORAL 2 TIMES DAILY
Qty: 60 TABLET | Refills: 5 | Status: SHIPPED | OUTPATIENT
Start: 2017-09-27 | End: 2017-09-27 | Stop reason: SDUPTHER

## 2017-10-09 ENCOUNTER — OFFICE VISIT (OUTPATIENT)
Dept: INTERNAL MEDICINE | Facility: CLINIC | Age: 77
End: 2017-10-09

## 2017-10-09 VITALS
BODY MASS INDEX: 29.05 KG/M2 | DIASTOLIC BLOOD PRESSURE: 80 MMHG | WEIGHT: 174.6 LBS | OXYGEN SATURATION: 98 % | HEART RATE: 71 BPM | SYSTOLIC BLOOD PRESSURE: 120 MMHG

## 2017-10-09 DIAGNOSIS — Z12.83 SKIN EXAM, SCREENING FOR CANCER: Primary | ICD-10-CM

## 2017-10-09 PROCEDURE — 99212 OFFICE O/P EST SF 10 MIN: CPT | Performed by: INTERNAL MEDICINE

## 2017-10-09 RX ORDER — MECLIZINE HYDROCHLORIDE 25 MG/1
25 TABLET ORAL 3 TIMES DAILY PRN
Qty: 30 TABLET | Refills: 0 | Status: SHIPPED | OUTPATIENT
Start: 2017-10-09 | End: 2017-11-10 | Stop reason: SDUPTHER

## 2017-10-09 NOTE — PROGRESS NOTES
Subjective   Narcisa Cotto is a 77 y.o. female.     Chief Complaint   Patient presents with   • forms       History of Present Illness   Here for me  To complete form for her to have relative live with her to help her out .  Wants to see derm for full skin eval.  The following portions of the patient's history were reviewed and updated as appropriate: allergies, current medications, past family history, past medical history, past social history, past surgical history and problem list.    Review of Systems   Constitutional: Negative for activity change, appetite change, chills, diaphoresis, fatigue, fever and unexpected weight change.   HENT: Negative for congestion, ear discharge, ear pain, mouth sores, nosebleeds, sinus pressure, sneezing and sore throat.    Eyes: Negative for pain, discharge and itching.   Respiratory: Negative for cough, chest tightness, shortness of breath and wheezing.    Cardiovascular: Negative for chest pain, palpitations and leg swelling.   Gastrointestinal: Negative for abdominal pain, constipation, diarrhea, nausea and vomiting.   Endocrine: Negative for cold intolerance, heat intolerance, polydipsia and polyphagia.   Genitourinary: Negative for dysuria, flank pain, frequency, hematuria and urgency.   Musculoskeletal: Negative for arthralgias, back pain, gait problem, myalgias, neck pain and neck stiffness.   Skin: Negative for color change, pallor and rash.   Neurological: Negative for seizures, speech difficulty, numbness and headaches.   Psychiatric/Behavioral: Negative for agitation, confusion, decreased concentration and sleep disturbance. The patient is not nervous/anxious.      /80  Pulse 71  Wt 174 lb 9.6 oz (79.2 kg)  SpO2 98%  BMI 29.05 kg/m2    Objective   Physical Exam   Constitutional: She is oriented to person, place, and time. She appears well-developed.   Neck: No JVD present. No thyromegaly present.   Cardiovascular: Normal rate, regular rhythm and normal  heart sounds.  Exam reveals no friction rub.    No murmur heard.  Pulmonary/Chest: Effort normal and breath sounds normal. No respiratory distress. She has no wheezes. She has no rales.   Abdominal: Soft. Bowel sounds are normal. She exhibits no distension. There is no tenderness. There is no guarding.   Musculoskeletal: She exhibits no edema or tenderness.   Lymphadenopathy:     She has no cervical adenopathy.   Neurological: She is oriented to person, place, and time. She displays normal reflexes. No cranial nerve deficit.   Skin: No rash noted.   Nursing note and vitals reviewed.      Assessment/Plan   Narcisa was seen today for forms.    Diagnoses and all orders for this visit:    Skin exam, screening for cancer  -     Ambulatory Referral to Dermatology  Wants to see derm for full skin eval  Other orders  -     meclizine (ANTIVERT) 25 MG tablet; Take 1 tablet by mouth 3 (Three) Times a Day As Needed for dizziness.      Form   completed in office today.

## 2017-10-12 DIAGNOSIS — E78.5 HYPERLIPIDEMIA LDL GOAL <100: ICD-10-CM

## 2017-10-12 RX ORDER — ROSUVASTATIN CALCIUM 20 MG/1
TABLET, COATED ORAL
Qty: 30 TABLET | Refills: 4 | Status: SHIPPED | OUTPATIENT
Start: 2017-10-12 | End: 2018-07-10 | Stop reason: SDUPTHER

## 2017-10-12 RX ORDER — PANTOPRAZOLE SODIUM 40 MG/1
TABLET, DELAYED RELEASE ORAL
Qty: 60 TABLET | Refills: 0 | Status: SHIPPED | OUTPATIENT
Start: 2017-10-12 | End: 2017-11-10 | Stop reason: SDUPTHER

## 2017-10-13 ENCOUNTER — TELEPHONE (OUTPATIENT)
Dept: INTERNAL MEDICINE | Facility: CLINIC | Age: 77
End: 2017-10-13

## 2017-10-13 DIAGNOSIS — Z79.4 TYPE 2 DIABETES MELLITUS WITH COMPLICATION, WITH LONG-TERM CURRENT USE OF INSULIN (HCC): Primary | ICD-10-CM

## 2017-10-13 DIAGNOSIS — E11.8 TYPE 2 DIABETES MELLITUS WITH COMPLICATION, WITH LONG-TERM CURRENT USE OF INSULIN (HCC): Primary | ICD-10-CM

## 2017-10-13 NOTE — TELEPHONE ENCOUNTER
PT CALLED WANTING TO DISCUSS HER DIABETIC STRIPS AND METER.     PLEASE CONTACT PT  BACK -[phone]

## 2017-11-10 RX ORDER — PANTOPRAZOLE SODIUM 40 MG/1
TABLET, DELAYED RELEASE ORAL
Qty: 60 TABLET | Refills: 0 | Status: SHIPPED | OUTPATIENT
Start: 2017-11-10 | End: 2018-03-14 | Stop reason: SDUPTHER

## 2017-11-10 RX ORDER — MECLIZINE HYDROCHLORIDE 25 MG/1
TABLET ORAL
Qty: 30 TABLET | Refills: 0 | Status: SHIPPED | OUTPATIENT
Start: 2017-11-10 | End: 2017-12-27 | Stop reason: SDUPTHER

## 2017-11-28 ENCOUNTER — OFFICE VISIT (OUTPATIENT)
Dept: INTERNAL MEDICINE | Facility: CLINIC | Age: 77
End: 2017-11-28

## 2017-11-28 VITALS
HEART RATE: 75 BPM | DIASTOLIC BLOOD PRESSURE: 78 MMHG | WEIGHT: 172 LBS | BODY MASS INDEX: 28.66 KG/M2 | OXYGEN SATURATION: 98 % | HEIGHT: 65 IN | SYSTOLIC BLOOD PRESSURE: 124 MMHG

## 2017-11-28 DIAGNOSIS — N39.0 ACUTE UTI: Primary | ICD-10-CM

## 2017-11-28 DIAGNOSIS — J40 BRONCHITIS: ICD-10-CM

## 2017-11-28 PROCEDURE — 99213 OFFICE O/P EST LOW 20 MIN: CPT | Performed by: NURSE PRACTITIONER

## 2017-11-28 RX ORDER — NITROFURANTOIN 25; 75 MG/1; MG/1
100 CAPSULE ORAL 2 TIMES DAILY
Qty: 14 CAPSULE | Refills: 0 | Status: SHIPPED | OUTPATIENT
Start: 2017-11-28 | End: 2017-12-05

## 2017-11-28 RX ORDER — BENZONATATE 200 MG/1
200 CAPSULE ORAL 3 TIMES DAILY PRN
Qty: 15 CAPSULE | Refills: 0 | Status: SHIPPED | OUTPATIENT
Start: 2017-11-28 | End: 2018-01-19

## 2017-11-28 NOTE — PROGRESS NOTES
Chief Complaint   Patient presents with   • Urinary Tract Infection     Burning and itching, urgency.    • Cough     pt states that she has been fighting a could for a couple of weeks and when she coughs it hurts her ribs.        HPI  Narcisa Cotto is a 77 y.o. female who presents certain that has UTI, has had burning with urination and some itching for about 2 weeks, urgency.  Has been pre-occupied with both twin daughters in hospital (one had stroke, other MI with stent placement)  Also has had persistent cough, + occasional wheezing, and sometimes short of air with exertion for 2 weeks.Feels like it is getting a little better.  Blood sugars have been a little high      PMSFH  The following portions of the patient's history were reviewed and updated as appropriate: allergies, current medications, past family history, past medical history, past social history, past surgical history and problem list.    Past Medical History:   Diagnosis Date   • Arthritis    • Back pain    • Chest pain    • Chronic diastolic congestive heart failure    • Chronic kidney disease, stage III (moderate)    • Diabetes mellitus    • Dyslipidemia    • Esophagus disorder 2016   • GERD (gastroesophageal reflux disease)    • History of cataract    • History of hypercalcemia    • History of hyperparathyroidism    • History of hypoparathyroidism    • Hyperlipidemia    • Hypertension    • Lower extremity edema    • Peripheral arterial disease    • Postherpetic neuralgia    • Venous insufficiency      Past Surgical History:   Procedure Laterality Date   • CATARACT EXTRACTION     • CHOLECYSTECTOMY     • HYSTERECTOMY     • OTHER SURGICAL HISTORY      Hospitalized July 2014 for bladder obstruction status post stent placement and removal by Dr. Hi.   • TONSILLECTOMY       Patient Active Problem List    Diagnosis   • Diabetic peripheral neuropathy [E11.42]   • Sleep apnea [G47.30]   • Atypical chest pain [R07.89]     Overview Note:     · Chest  wall pain following left shingles infection.   · Pharmacologic MPS (11/02/2012): no ischemia or scar. LVEF 72%.   · Echocardiogram (06/13/2013): LVEF 55% to 60%. Trace AI. Mild LV diastolic dysfunction.  · Symptoms improved following cholecystectomy.     • Shortness of breath [R06.02]     Overview Note:     · Echo (8/31/2016): EF > 70%. Moderate thickening of the noncoronary cusp of the aortic valve.     • Peripheral arterial disease [I73.9]     Overview Note:     · Bilateral Carter class III claudication symptoms.  · BONNIE (08/22/2013):  At rest right 0.98, left 0.85.  After 2 minutes of exercise right 0.51, left 0.68.  · Venous duplex LE (7/27/2016): All veins visiualized appear patent with distal augmentation bilaterally.     • Lower extremity edema [R60.0]   • Venous insufficiency [I87.2]   • Type 2 diabetes mellitus with complication, with long-term current use of insulin [E11.8, Z79.4]   • Diabetic neuropathy [E11.40]   • Mild obesity [E66.9]   • Anemia [D64.9]   • Gait instability [R26.81]   • Osteoarthritis [M19.90]   • Vitamin B12 deficiency [E53.8]   • Postherpetic neuralgia [B02.29]   • Diabetic gastroparesis [E11.43, K31.84]   • Gastroesophageal reflux disease [K21.9]     Overview Note:     Impression: 12/16/2015 - stable  Impression: 06/11/2015 - stable  Impression: 03/04/2014 - stable;      • Chronic kidney disease, stage IV (severe) [N18.4]   • Hyperlipidemia LDL goal <100 [E78.5]     Overview Note:     · High intensity statin therapy is recommended given the presence of peripheral arterial disease and diabetes     • Essential hypertension [I10]   • Vitamin D deficiency [E55.9]     Overview Note:     Impression: 06/11/2015 - check levle;        Social History   Substance Use Topics   • Smoking status: Never Smoker   • Smokeless tobacco: Never Used   • Alcohol use No     Current Outpatient Prescriptions on File Prior to Visit   Medication Sig Dispense Refill   • ACCU-CHEK JOVITA test strip 1 each by  Other route 4 (Four) Times a Day Before Meals & at Bedtime. 150 each 5   • amLODIPine (NORVASC) 2.5 MG tablet Take 2.5 mg by mouth Daily.     • aspirin (ASPIRIN LOW DOSE) 81 MG tablet Take 1 tablet by mouth daily.     • clopidogrel (PLAVIX) 75 MG tablet Take 1 tablet by mouth Daily for 360 days. 90 tablet 3   • cyanocobalamin ([employer] VITAMIN B-12) 1000 MCG tablet Take 1 tablet by mouth daily.     • diphenoxylate-atropine (LOMOTIL) 2.5-0.025 MG per tablet Take 1 tablet by mouth Daily. 30 tablet 2   • gabapentin (NEURONTIN) 600 MG tablet Take 1 tablet by mouth 2 (Two) Times a Day. 60 tablet 5   • Insulin Glargine (TOUJEO SOLOSTAR) 300 UNIT/ML solution pen-injector Inject 70 Units under the skin every night at bedtime. 6 pen 11   • Insulin Lispro, Human, (HUMALOG KWIKPEN) 100 UNIT/ML solution pen-injector Inject 20 Units under the skin 3 (Three) Times a Day.     • meclizine (ANTIVERT) 25 MG tablet TAKE ONE TABLET BY MOUTH THREE TIMES A DAY AS NEEDED FOR DIZZINESS . 30 tablet 0   • metoprolol tartrate (LOPRESSOR) 25 MG tablet TAKE ONE TABLET BY MOUTH TWICE A DAY 60 tablet 11   • ondansetron ODT (ZOFRAN-ODT) 8 MG disintegrating tablet Take 1 tablet by mouth Every 8 (Eight) Hours As Needed for Nausea. 30 tablet 0   • pantoprazole (PROTONIX) 40 MG EC tablet TAKE ONE TABLET BY MOUTH TWICE A DAY **MUST CALL MD FOR APPOINTMENT FOR MORE REFILLS 60 tablet 0   • raNITIdine (ZANTAC) 75 MG tablet Take 75 mg by mouth 2 (Two) Times a Day.     • rosuvastatin (CRESTOR) 20 MG tablet TAKE ONE TABLET BY MOUTH DAILY 30 tablet 4   • traZODone (DESYREL) 50 MG tablet Take 1 tablet by mouth Every Night. 30 tablet 0     No current facility-administered medications on file prior to visit.          Review of Systems   Constitutional: Positive for fatigue. Negative for fever.   HENT: Negative for congestion, sinus pain and sore throat.    Respiratory: Positive for cough, shortness of breath (with exertion) and wheezing.    Gastrointestinal: Positive  "for diarrhea (this morning.) and vomiting (one time, 2 days ago).   Genitourinary: Positive for dysuria, frequency and urgency. Negative for vaginal discharge.   Neurological: Positive for headaches.           Objective   Blood pressure 124/78, pulse 75, height 65\" (165.1 cm), weight 172 lb (78 kg), SpO2 98 %.  Body mass index is 28.62 kg/(m^2).      Physical Exam   Constitutional: She is oriented to person, place, and time. She appears well-developed and well-nourished. She does not appear ill. No distress.   HENT:   Head: Normocephalic and atraumatic.   Right Ear: Tympanic membrane normal.   Left Ear: Tympanic membrane normal.   Mouth/Throat: Oropharynx is clear and moist and mucous membranes are normal.   Neck: Normal range of motion. Neck supple.   Cardiovascular: Normal rate, regular rhythm and normal heart sounds.    Pulmonary/Chest: Effort normal and breath sounds normal.   Abdominal: Soft. She exhibits no distension and no mass. There is tenderness in the suprapubic area. There is no guarding and no CVA tenderness.   Lymphadenopathy:     She has no cervical adenopathy.   Neurological: She is alert and oriented to person, place, and time.   Skin: Skin is warm and dry. No rash noted.   Psychiatric: She has a normal mood and affect. Her speech is normal and behavior is normal. Judgment and thought content normal. Cognition and memory are normal.             ASSESSMENT/PLAN  1. Acute UTI     Urine sample unable to be obtained- patient missed hat.    - nitrofurantoin, macrocrystal-monohydrate, (MACROBID) 100 MG capsule; Take 1 capsule by mouth 2 (Two) Times a Day for 7 days.  Dispense: 14 capsule; Refill: 0    2. Bronchitis    - benzonatate (TESSALON) 200 MG capsule; Take 1 capsule by mouth 3 (Three) Times a Day As Needed for Cough.  Dispense: 15 capsule; Refill: 0        Plan of care reviewed with patient at the conclusion of today's visit. Education was provided in regards to diagnosis, management and any " prescribed or recommended OTC medications.  Patient verbalizes Understanding of and agreement with management plan.      FOLLOW-UP  Prn or as scheduled    Future Appointments  Date Time Provider Department Center   1/15/2018 12:00 PM MD GALEN [facility] None   3/20/2018 10:00 AM NHAN Ochoa [facility] None   3/28/2018 10:00 AM MD GALEN [facility] None         Electronically signed by:  NHAN Hill  11/28/2017

## 2017-12-27 RX ORDER — MECLIZINE HYDROCHLORIDE 25 MG/1
TABLET ORAL
Qty: 30 TABLET | Refills: 0 | Status: SHIPPED | OUTPATIENT
Start: 2017-12-27 | End: 2018-02-26 | Stop reason: SDUPTHER

## 2018-01-15 ENCOUNTER — OFFICE VISIT (OUTPATIENT)
Dept: ENDOCRINOLOGY | Facility: CLINIC | Age: 78
End: 2018-01-15

## 2018-01-15 VITALS
WEIGHT: 175.4 LBS | BODY MASS INDEX: 29.22 KG/M2 | OXYGEN SATURATION: 93 % | HEART RATE: 68 BPM | HEIGHT: 65 IN | SYSTOLIC BLOOD PRESSURE: 130 MMHG | DIASTOLIC BLOOD PRESSURE: 80 MMHG

## 2018-01-15 DIAGNOSIS — IMO0002 UNCONTROLLED TYPE 2 DIABETES MELLITUS WITH COMPLICATION, WITH LONG-TERM CURRENT USE OF INSULIN: Primary | ICD-10-CM

## 2018-01-15 LAB
GLUCOSE BLDC GLUCOMTR-MCNC: 172 MG/DL (ref 70–130)
HBA1C MFR BLD: 8.9 %

## 2018-01-15 PROCEDURE — 83036 HEMOGLOBIN GLYCOSYLATED A1C: CPT | Performed by: INTERNAL MEDICINE

## 2018-01-15 PROCEDURE — 82947 ASSAY GLUCOSE BLOOD QUANT: CPT | Performed by: INTERNAL MEDICINE

## 2018-01-15 PROCEDURE — 99214 OFFICE O/P EST MOD 30 MIN: CPT | Performed by: INTERNAL MEDICINE

## 2018-01-15 RX ORDER — CONTAINER,EMPTY
1 EACH MISCELLANEOUS DAILY
Qty: 1 EACH | Refills: 0 | Status: SHIPPED | OUTPATIENT
Start: 2018-01-15 | End: 2019-06-21

## 2018-01-15 RX ORDER — NAPROXEN SODIUM 220 MG
1 TABLET ORAL 2 TIMES DAILY WITH MEALS
Qty: 200 EACH | Refills: 3 | Status: SHIPPED | OUTPATIENT
Start: 2018-01-15 | End: 2018-06-05 | Stop reason: SDUPTHER

## 2018-01-15 NOTE — PROGRESS NOTES
Chief Complaint   Patient presents with   • Diabetes     F/u for type 2 diabetes, testing 3x qd. C/o fluctuations in bs readings       HPI:   Narcisa Cotto is a 77 y.o.female who [facility] for f/u evaluation of Type 2 diabetes. Last clinic visit 04/26/2017. Her history is as follows:    Interim Events:  - pt's  passed away recently. Pt reports not taking care of her diabetes due to grief. Missed her 08/2017, 11/2017 appts  - reports more food excursions  - Reports her insulin is too expensive and she cannot afford, especially when  There is gap in coverage    1) Type 2 diabetes with associated complications of peripheral neuropathy, CKD, PAD:  - diagnosed in 2000    Current DM Medications:  - Levemir 70 units qhs  - Humalog 20 units TID ac meals  - Humalog correction factor: 2 units : 50 > 150    Glucometer/ BG log Review: Mainly only checking in the morning  Majority of AM BGs <150  Few midday BGs in the 200's - 300's    DM Health Maintenance:  Ophtho: Last visit - (3/2017), With Dr. Chicas, [facility], no retinopathy per pt  Podiatry: Last visit - no recent visit  Monofilament / foot exam: due  Lipids: (01/2018) TChol 155, , HDL 36, LDL 74 - on crestor 20 mg daily  Urine Microalb/Cr ratio: CKD stage 4, on ace-I  TSH: 1.718 (09/2017)  Aspirin: 81 mg daily    2) diabetic peripheral neuropathy:  - pt did not start Lyrica due to cost  - On Gabapentin 600 mg BID - renal dosing    Review of Systems   Constitutional: Positive for fatigue.        Weight stable   HENT: Negative.    Eyes: Negative.  Negative for visual disturbance.   Respiratory: Negative.    Cardiovascular: Positive for leg swelling. Negative for chest pain.   Gastrointestinal: Negative.  Negative for constipation and diarrhea.   Endocrine: Negative for cold intolerance, polydipsia and polyuria.        See HPI   Genitourinary:        Stress urinary incontinence   Musculoskeletal: Positive for arthralgias (knees  "& hands). Negative for back pain, joint swelling and myalgias.   Skin: Negative.  Negative for rash and wound.   Neurological: Positive for numbness (pain, Bilat  feet). Negative for headaches.   Hematological: Negative.    Psychiatric/Behavioral: Negative.      The following portions of the patient's history were reviewed and updated as appropriate: allergies, current medications, past family history, past medical history, past social history, past surgical history and problem list.    /80  Pulse 68  Ht 165.1 cm (65\")  Wt 79.6 kg (175 lb 6.4 oz)  SpO2 93%  BMI 29.19 kg/m2  Physical Exam   Constitutional: She is oriented to person, place, and time. She appears well-developed. No distress.   HENT:   Head: Normocephalic.   Mouth/Throat: Oropharynx is clear and moist.   Eyes: Conjunctivae and EOM are normal. Pupils are equal, round, and reactive to light.   Neck: No tracheal deviation present. No thyromegaly present.   No palpable thyroid nodules     Cardiovascular: Normal rate, regular rhythm and normal heart sounds.    No murmur heard.  Pulmonary/Chest: Effort normal and breath sounds normal. No respiratory distress.   Abdominal: Soft. Bowel sounds are normal.   No scarring at insulin injection sites   Lymphadenopathy:     She has no cervical adenopathy.   Neurological: She is alert and oriented to person, place, and time. No cranial nerve deficit.   Skin: Skin is warm and dry. She is not diaphoretic. No erythema.   Psychiatric: She has a normal mood and affect. Her behavior is normal.   Vitals reviewed.    LABS/IMAGING:   Results for orders placed or performed in visit on 01/15/18   POC Glucose Fingerstick   Result Value Ref Range    Glucose 172 (A) 70 - 130 mg/dL   POC Glycosylated Hemoglobin (Hb A1C)   Result Value Ref Range    Hemoglobin A1C 8.9 %     Lab Results   Component Value Date    HGBA1C 8.9 01/15/2018     Lab Results   Component Value Date    GLUCOSE 196 (H) 02/02/2017    BUN 26 (H) 01/19/2018 "    CREATININE 1.60 (H) 01/19/2018    EGFRIFNONA 31 (L) 01/19/2018    EGFRIFAFRI 38 (L) 01/19/2018    BCR 16.3 01/19/2018    K 4.7 01/19/2018    CO2 26.0 01/19/2018    CALCIUM 9.6 01/19/2018    PROTENTOTREF 7.1 01/19/2018    ALBUMIN 4.50 01/19/2018    LABIL2 1.7 01/19/2018    AST 22 01/19/2018    ALT 25 01/19/2018     Lab Results   Component Value Date    WBC 7.75 08/29/2017    HGB 12.7 08/29/2017    HCT 40.0 08/29/2017    MCV 85.5 08/29/2017     08/29/2017       ASSESSMENT/PLAN:  1)  Type 2 diabetes with associated complications of peripheral neuropathy, CKD, PAD: uncontrolled, slight hyperglycemia   - discussed with patient that glycemic targets are generally set somewhat higher (eg, <8 percent) for older adult patients and those with comorbidities. Therefore a goal A1C% of 7-8% is reasonable for Ms. Cotto.  - cost of her current analog insulins is not affordable  - discussed option of OTC Novolin 70/30 which she can buy at [employer]. Pt would like to try this regimen  - Start Novolin 70/30, 70 units AC BID. (advised pt to take as 35  + 35 units for better absorption)   - written instructions reviewed with patient. Pt to call me with BGs in 2 weeks    2) diabetic peripheral neuropathy  - pt on 600 mg BID: renal dosing    RTC 3-4 months    .Counseling was given to patient for the following topics:  instructions for management and risks and benefits of treatment options, see details in assessment/plan. Total face to face time of the encounter was 30 minutes and 25 minutes was spent counseling.

## 2018-01-19 ENCOUNTER — OFFICE VISIT (OUTPATIENT)
Dept: INTERNAL MEDICINE | Facility: CLINIC | Age: 78
End: 2018-01-19

## 2018-01-19 VITALS
OXYGEN SATURATION: 99 % | WEIGHT: 175 LBS | HEART RATE: 75 BPM | DIASTOLIC BLOOD PRESSURE: 70 MMHG | BODY MASS INDEX: 29.16 KG/M2 | SYSTOLIC BLOOD PRESSURE: 126 MMHG | HEIGHT: 65 IN

## 2018-01-19 DIAGNOSIS — N18.4 CHRONIC KIDNEY DISEASE, STAGE IV (SEVERE) (HCC): ICD-10-CM

## 2018-01-19 DIAGNOSIS — E78.5 HYPERLIPIDEMIA LDL GOAL <100: Primary | ICD-10-CM

## 2018-01-19 DIAGNOSIS — K21.9 GASTROESOPHAGEAL REFLUX DISEASE WITHOUT ESOPHAGITIS: ICD-10-CM

## 2018-01-19 DIAGNOSIS — I10 ESSENTIAL HYPERTENSION: ICD-10-CM

## 2018-01-19 DIAGNOSIS — N39.0 ACUTE UTI: ICD-10-CM

## 2018-01-19 LAB
BILIRUB BLD-MCNC: NEGATIVE MG/DL
CLARITY, POC: CLEAR
COLOR UR: ABNORMAL
GLUCOSE UR STRIP-MCNC: ABNORMAL MG/DL
KETONES UR QL: NEGATIVE
LEUKOCYTE EST, POC: ABNORMAL
NITRITE UR-MCNC: NEGATIVE MG/ML
PH UR: 5 [PH] (ref 5–8)
POC CREATININE URINE: ABNORMAL
POC MICROALBUMIN URINE: ABNORMAL
PROT UR STRIP-MCNC: ABNORMAL MG/DL
RBC # UR STRIP: NEGATIVE /UL
SP GR UR: 1.02 (ref 1–1.03)
UROBILINOGEN UR QL: NORMAL

## 2018-01-19 PROCEDURE — 90662 IIV NO PRSV INCREASED AG IM: CPT | Performed by: INTERNAL MEDICINE

## 2018-01-19 PROCEDURE — G0008 ADMIN INFLUENZA VIRUS VAC: HCPCS | Performed by: INTERNAL MEDICINE

## 2018-01-19 PROCEDURE — 90732 PPSV23 VACC 2 YRS+ SUBQ/IM: CPT | Performed by: INTERNAL MEDICINE

## 2018-01-19 PROCEDURE — 99214 OFFICE O/P EST MOD 30 MIN: CPT | Performed by: INTERNAL MEDICINE

## 2018-01-19 PROCEDURE — G0009 ADMIN PNEUMOCOCCAL VACCINE: HCPCS | Performed by: INTERNAL MEDICINE

## 2018-01-19 PROCEDURE — 82570 ASSAY OF URINE CREATININE: CPT | Performed by: INTERNAL MEDICINE

## 2018-01-19 PROCEDURE — 82044 UR ALBUMIN SEMIQUANTITATIVE: CPT | Performed by: INTERNAL MEDICINE

## 2018-01-19 PROCEDURE — 81003 URINALYSIS AUTO W/O SCOPE: CPT | Performed by: INTERNAL MEDICINE

## 2018-01-19 RX ORDER — NITROFURANTOIN 25; 75 MG/1; MG/1
100 CAPSULE ORAL EVERY 12 HOURS SCHEDULED
Qty: 14 CAPSULE | Refills: 0 | Status: SHIPPED | OUTPATIENT
Start: 2018-01-19 | End: 2018-03-14

## 2018-01-19 NOTE — PROGRESS NOTES
Subjective   Narcisa Cotto is a 77 y.o. female.   Chief Complaint   Patient presents with   • Difficulty Urinating     Itching, and irritation. sx started 1 week ago.        Difficulty Urinating   This is a new problem. The current episode started in the past 7 days. Pertinent negatives include no abdominal pain, arthralgias, chest pain, chills, congestion, coughing, diaphoresis, fatigue, fever, headaches, myalgias, nausea, neck pain, numbness, rash, sore throat or vomiting.   Hyperlipidemia   This is a chronic problem. The current episode started more than 1 year ago. Pertinent negatives include no chest pain, myalgias or shortness of breath.   Hypertension   This is a chronic problem. The current episode started more than 1 year ago. Pertinent negatives include no chest pain, headaches, neck pain, palpitations or shortness of breath.   Heartburn   She reports no abdominal pain, no chest pain, no coughing, no nausea, no sore throat or no wheezing. This is a chronic problem. The current episode started more than 1 year ago. Pertinent negatives include no fatigue.        The following portions of the patient's history were reviewed and updated as appropriate: allergies, current medications, past family history, past medical history, past social history, past surgical history and problem list.    Review of Systems   Constitutional: Negative for activity change, appetite change, chills, diaphoresis, fatigue, fever and unexpected weight change.   HENT: Negative for congestion, ear discharge, ear pain, mouth sores, nosebleeds, sinus pressure, sneezing and sore throat.    Eyes: Negative for pain, discharge and itching.   Respiratory: Negative for cough, chest tightness, shortness of breath and wheezing.    Cardiovascular: Negative for chest pain, palpitations and leg swelling.   Gastrointestinal: Negative for abdominal pain, constipation, diarrhea, nausea and vomiting.   Endocrine: Negative for cold intolerance, heat  "intolerance, polydipsia and polyphagia.   Genitourinary: Positive for difficulty urinating. Negative for dysuria, flank pain, frequency, hematuria and urgency.   Musculoskeletal: Negative for arthralgias, back pain, gait problem, myalgias, neck pain and neck stiffness.   Skin: Negative for color change, pallor and rash.   Neurological: Negative for seizures, speech difficulty, numbness and headaches.   Psychiatric/Behavioral: Negative for agitation, confusion, decreased concentration and sleep disturbance. The patient is not nervous/anxious.      /70  Pulse 75  Ht 165.1 cm (65\")  Wt 79.4 kg (175 lb)  SpO2 99%  BMI 29.12 kg/m2    Objective   Physical Exam   Constitutional: She is oriented to person, place, and time. She appears well-developed.   HENT:   Head: Normocephalic.   Right Ear: External ear normal.   Left Ear: External ear normal.   Nose: Nose normal.   Mouth/Throat: Oropharynx is clear and moist.   Eyes: Conjunctivae are normal. Pupils are equal, round, and reactive to light.   Neck: No JVD present. No thyromegaly present.   Cardiovascular: Normal rate, regular rhythm and normal heart sounds.  Exam reveals no friction rub.    No murmur heard.  Pulmonary/Chest: Effort normal and breath sounds normal. No respiratory distress. She has no wheezes. She has no rales.   Abdominal: Soft. Bowel sounds are normal. She exhibits no distension. There is no tenderness. There is no guarding.   Musculoskeletal: She exhibits no edema or tenderness.   Lymphadenopathy:     She has no cervical adenopathy.   Neurological: She is oriented to person, place, and time. She displays normal reflexes. No cranial nerve deficit.   Skin: No rash noted.   Psychiatric: Her behavior is normal.   Nursing note and vitals reviewed.      Assessment/Plan   Narcisa was seen today for difficulty urinating.    Diagnoses and all orders for this visit:    Hyperlipidemia LDL goal <100  -     Comprehensive Metabolic Panel  -     Lipid " Panel    Essential hypertension  -     Comprehensive Metabolic Panel  -     Lipid Panel  -     POC Microalbumin    Gastroesophageal reflux disease without esophagitis  stable  Chronic kidney disease, stage IV (severe)  Following with neph  Acute UTI  -     Urine Culture - Urine, Urine, Clean Catch  -     nitrofurantoin, macrocrystal-monohydrate, (MACROBID) 100 MG capsule; Take 1 capsule by mouth Every 12 (Twelve) Hours.    would avoid Bactrim secondary to he kidney function    Flu vac today.

## 2018-01-21 LAB
ALBUMIN SERPL-MCNC: 4.5 G/DL (ref 3.2–4.8)
ALBUMIN/GLOB SERPL: 1.7 G/DL (ref 1.5–2.5)
ALP SERPL-CCNC: 84 U/L (ref 25–100)
ALT SERPL-CCNC: 25 U/L (ref 7–40)
AST SERPL-CCNC: 22 U/L (ref 0–33)
BACTERIA UR CULT: NORMAL
BACTERIA UR CULT: NORMAL
BILIRUB SERPL-MCNC: 0.3 MG/DL (ref 0.3–1.2)
BUN SERPL-MCNC: 26 MG/DL (ref 9–23)
BUN/CREAT SERPL: 16.3 (ref 7–25)
CALCIUM SERPL-MCNC: 9.6 MG/DL (ref 8.7–10.4)
CHLORIDE SERPL-SCNC: 101 MMOL/L (ref 99–109)
CHOLEST SERPL-MCNC: 155 MG/DL (ref 0–200)
CO2 SERPL-SCNC: 26 MMOL/L (ref 20–31)
CREAT SERPL-MCNC: 1.6 MG/DL (ref 0.6–1.3)
GLOBULIN SER CALC-MCNC: 2.6 GM/DL
GLUCOSE SERPL-MCNC: 189 MG/DL (ref 70–100)
HDLC SERPL-MCNC: 36 MG/DL (ref 40–60)
LDLC SERPL CALC-MCNC: 74 MG/DL (ref 0–100)
POTASSIUM SERPL-SCNC: 4.7 MMOL/L (ref 3.5–5.5)
PROT SERPL-MCNC: 7.1 G/DL (ref 5.7–8.2)
SODIUM SERPL-SCNC: 136 MMOL/L (ref 132–146)
TRIGL SERPL-MCNC: 226 MG/DL (ref 0–150)
VLDLC SERPL CALC-MCNC: 45.2 MG/DL

## 2018-02-05 ENCOUNTER — TELEPHONE (OUTPATIENT)
Dept: INTERNAL MEDICINE | Facility: CLINIC | Age: 78
End: 2018-02-05

## 2018-02-05 NOTE — TELEPHONE ENCOUNTER
Pt was seen by  on 1/19 for difficulty urinating and having itching and irritation, she was put on macrobid, please advise

## 2018-02-05 NOTE — TELEPHONE ENCOUNTER
PATIENT CALLED TO REPORT THAT THE MEDICINE PRESCRIBED FOR ITCHING IS NOT WORKING.    CALL BACK [phone]

## 2018-02-06 ENCOUNTER — OFFICE VISIT (OUTPATIENT)
Dept: INTERNAL MEDICINE | Facility: CLINIC | Age: 78
End: 2018-02-06

## 2018-02-06 VITALS
OXYGEN SATURATION: 98 % | DIASTOLIC BLOOD PRESSURE: 62 MMHG | BODY MASS INDEX: 29.62 KG/M2 | SYSTOLIC BLOOD PRESSURE: 130 MMHG | WEIGHT: 178 LBS | HEART RATE: 73 BPM

## 2018-02-06 DIAGNOSIS — N76.0 ACUTE VAGINITIS: Primary | ICD-10-CM

## 2018-02-06 PROCEDURE — 99213 OFFICE O/P EST LOW 20 MIN: CPT | Performed by: PHYSICIAN ASSISTANT

## 2018-02-06 NOTE — PROGRESS NOTES
"Chief Complaint   Patient presents with   • Vaginal itching on and off for several months       Subjective   Narcisa Cotto is a 77 y.o. female.       History of Present Illness     Pt has been seen twice in the last month for vaginal itching and burning. Was treated with macrobid twice. Recent urine culture grew urogenital michael. Feels like a yeast infection. No burning with urination, no hematuria. Completed 2 rounds of macrobid without difficulty.        Current Outpatient Prescriptions:   •  ACCU-CHEK JOVITA test strip, 1 each by Other route 4 (Four) Times a Day Before Meals & at Bedtime., Disp: 150 each, Rfl: 5  •  amLODIPine (NORVASC) 2.5 MG tablet, Take 2.5 mg by mouth Daily., Disp: , Rfl:   •  aspirin (ASPIRIN LOW DOSE) 81 MG tablet, Take 1 tablet by mouth daily., Disp: , Rfl:   •  clopidogrel (PLAVIX) 75 MG tablet, Take 1 tablet by mouth Daily for 360 days., Disp: 90 tablet, Rfl: 3  •  cyanocobalamin ([employer]S VITAMIN B-12) 1000 MCG tablet, Take 1 tablet by mouth daily., Disp: , Rfl:   •  diphenoxylate-atropine (LOMOTIL) 2.5-0.025 MG per tablet, Take 1 tablet by mouth Daily., Disp: 30 tablet, Rfl: 2  •  gabapentin (NEURONTIN) 600 MG tablet, Take 1 tablet by mouth 2 (Two) Times a Day., Disp: 60 tablet, Rfl: 5  •  insulin NPH-insulin regular (NOVOLIN 70/30) (70-30) 100 UNIT/ML injection, 70 units before breakfast and 70 units before supper, Disp: 40 mL, Rfl: 11  •  Insulin Syringe 30G X 5/16\" 1 ML misc, 1 each 2 (Two) Times a Day With Meals., Disp: 200 each, Rfl: 3  •  meclizine (ANTIVERT) 25 MG tablet, TAKE ONE TABLET BY MOUTH THREE TIMES A DAY AS NEEDED FOR DIZZINESS ., Disp: 30 tablet, Rfl: 0  •  metoprolol tartrate (LOPRESSOR) 25 MG tablet, TAKE ONE TABLET BY MOUTH TWICE A DAY, Disp: 60 tablet, Rfl: 11  •  nitrofurantoin, macrocrystal-monohydrate, (MACROBID) 100 MG capsule, Take 1 capsule by mouth Every 12 (Twelve) Hours., Disp: 14 capsule, Rfl: 0  •  ondansetron ODT (ZOFRAN-ODT) 8 MG disintegrating tablet, " Take 1 tablet by mouth Every 8 (Eight) Hours As Needed for Nausea., Disp: 30 tablet, Rfl: 0  •  pantoprazole (PROTONIX) 40 MG EC tablet, TAKE ONE TABLET BY MOUTH TWICE A DAY **MUST CALL MD FOR APPOINTMENT FOR MORE REFILLS, Disp: 60 tablet, Rfl: 0  •  raNITIdine (ZANTAC) 75 MG tablet, Take 75 mg by mouth 2 (Two) Times a Day., Disp: , Rfl:   •  rosuvastatin (CRESTOR) 20 MG tablet, TAKE ONE TABLET BY MOUTH DAILY, Disp: 30 tablet, Rfl: 4  •  sharps container, 1 each Daily., Disp: 1 each, Rfl: 0  •  traZODone (DESYREL) 50 MG tablet, Take 1 tablet by mouth Every Night., Disp: 30 tablet, Rfl: 0  •  terconazole (TERAZOL 3) 0.8 % vaginal cream, Insert 1 applicator into the vagina Every Night for 3 days., Disp: 20 g, Rfl: 0     PMFSH  The following portions of the patient's history were reviewed and updated as appropriate: allergies, current medications, past family history, past medical history, past social history, past surgical history and problem list.    Review of Systems   Constitutional: Negative for unexpected weight change.   HENT: Negative.    Eyes: Negative for pain and visual disturbance.   Respiratory: Negative for chest tightness and shortness of breath.    Cardiovascular: Negative for chest pain and palpitations.   Gastrointestinal: Negative for abdominal pain and blood in stool.   Endocrine: Negative.    Genitourinary: Positive for vaginal pain. Negative for dyspareunia, dysuria, frequency, hematuria, menstrual problem, pelvic pain and vaginal discharge.   Musculoskeletal: Negative for joint swelling.   Skin: Negative for color change, rash and wound.   Allergic/Immunologic: Negative.    Neurological: Negative for dizziness and syncope.   Hematological: Negative for adenopathy.   Psychiatric/Behavioral: Negative.    All other systems reviewed and are negative.      Objective   /62  Pulse 73  Wt 80.7 kg (178 lb)  SpO2 98%  BMI 29.62 kg/m2    Physical Exam   Constitutional: She is oriented to person,  place, and time. She appears well-developed and well-nourished.   HENT:   Head: Normocephalic and atraumatic.   Right Ear: External ear normal.   Left Ear: External ear normal.   Eyes: Conjunctivae are normal.   Cardiovascular: Normal rate.    Pulmonary/Chest: Effort normal.   Genitourinary: There is no rash, tenderness, lesion or injury on the right labia. There is no rash, tenderness, lesion or injury on the left labia. There is erythema in the vagina. No signs of injury around the vagina. No vaginal discharge found.   Neurological: She is alert and oriented to person, place, and time.   Skin: Skin is warm and dry.   Psychiatric: She has a normal mood and affect. Her behavior is normal. Judgment and thought content normal.            ASSESSMENT/PLAN    Problem List Items Addressed This Visit     None      Visit Diagnoses     Acute vaginitis    -  Primary    Send vaginosis panel. Treat for vaginal candidiasis with terconazole d/t decreased CrCl. Further recs based on results. Discussed vaginal estrogen if negative.    Relevant Medications    terconazole (TERAZOL 3) 0.8 % vaginal cream    Other Relevant Orders    NuSwab VG+ - Swab, Vagina               Return if symptoms worsen or fail to improve.

## 2018-02-10 LAB
A VAGINAE DNA VAG QL NAA+PROBE: ABNORMAL SCORE
BVAB2 DNA VAG QL NAA+PROBE: ABNORMAL SCORE
C ALBICANS DNA VAG QL NAA+PROBE: NEGATIVE
C GLABRATA DNA VAG QL NAA+PROBE: POSITIVE
C TRACH RRNA SPEC QL NAA+PROBE: NEGATIVE
MEGA1 DNA VAG QL NAA+PROBE: ABNORMAL SCORE
N GONORRHOEA RRNA SPEC QL NAA+PROBE: NEGATIVE
T VAGINALIS RRNA SPEC QL NAA+PROBE: NEGATIVE

## 2018-02-12 ENCOUNTER — TELEPHONE (OUTPATIENT)
Dept: INTERNAL MEDICINE | Facility: CLINIC | Age: 78
End: 2018-02-12

## 2018-02-12 DIAGNOSIS — N76.0 BACTERIAL VAGINOSIS: Primary | ICD-10-CM

## 2018-02-12 DIAGNOSIS — B96.89 BACTERIAL VAGINOSIS: Primary | ICD-10-CM

## 2018-02-12 RX ORDER — METRONIDAZOLE 7.5 MG/G
GEL VAGINAL
Qty: 70 G | Refills: 0 | Status: SHIPPED | OUTPATIENT
Start: 2018-02-12 | End: 2018-02-17

## 2018-02-12 NOTE — PROGRESS NOTES
Please let her know that her vaginosis panel was positive for yeast and borderline for bacterial vaginosis. If her symptoms have not improved with treatment of the yeast, I can send something in to treat the bacterial vaginosis as well. If symptoms have resolved, no need to treat d/t borderline level of test.

## 2018-02-12 NOTE — TELEPHONE ENCOUNTER
----- Message from Italia ASHBY MA sent at 2/12/2018  1:47 PM EST -----  Called and informed pt, per pt she is still symptomatic and would like med sent in to her pharmacy.

## 2018-02-12 NOTE — TELEPHONE ENCOUNTER
Pt still symptomatic after treatment of yeast infection. Will treat for bacterial vaginosis as well based on indeterminate results of vaginitis panel. She should call back if symptoms continue.

## 2018-02-26 RX ORDER — MECLIZINE HYDROCHLORIDE 25 MG/1
TABLET ORAL
Qty: 30 TABLET | Refills: 0 | Status: SHIPPED | OUTPATIENT
Start: 2018-02-26 | End: 2018-05-21 | Stop reason: SDUPTHER

## 2018-03-02 ENCOUNTER — TELEPHONE (OUTPATIENT)
Dept: INTERNAL MEDICINE | Facility: CLINIC | Age: 78
End: 2018-03-02

## 2018-03-02 NOTE — TELEPHONE ENCOUNTER
PT SAW RANGEL 02/12/2018 FOR A UTI. THE MEDICATION SHE PRESCRIBED HAS NOT WORKED. PT HAS REQUESTED A STRONGER ANTIBIOTIC.

## 2018-03-14 ENCOUNTER — OFFICE VISIT (OUTPATIENT)
Dept: INTERNAL MEDICINE | Facility: CLINIC | Age: 78
End: 2018-03-14

## 2018-03-14 VITALS
WEIGHT: 176.7 LBS | OXYGEN SATURATION: 98 % | HEART RATE: 81 BPM | DIASTOLIC BLOOD PRESSURE: 80 MMHG | SYSTOLIC BLOOD PRESSURE: 150 MMHG | BODY MASS INDEX: 29.4 KG/M2

## 2018-03-14 DIAGNOSIS — R30.0 DYSURIA: Primary | ICD-10-CM

## 2018-03-14 DIAGNOSIS — Z12.4 PAP SMEAR FOR CERVICAL CANCER SCREENING: ICD-10-CM

## 2018-03-14 DIAGNOSIS — B37.31 VAGINAL CANDIDA: ICD-10-CM

## 2018-03-14 PROCEDURE — 99214 OFFICE O/P EST MOD 30 MIN: CPT | Performed by: INTERNAL MEDICINE

## 2018-03-14 RX ORDER — ONDANSETRON 8 MG/1
8 TABLET, ORALLY DISINTEGRATING ORAL EVERY 8 HOURS PRN
Qty: 30 TABLET | Refills: 0 | Status: SHIPPED | OUTPATIENT
Start: 2018-03-14 | End: 2018-09-12 | Stop reason: SDUPTHER

## 2018-03-14 RX ORDER — PANTOPRAZOLE SODIUM 40 MG/1
40 TABLET, DELAYED RELEASE ORAL 2 TIMES DAILY
Qty: 60 TABLET | Refills: 3 | Status: SHIPPED | OUTPATIENT
Start: 2018-03-14 | End: 2018-05-21 | Stop reason: SDUPTHER

## 2018-03-14 NOTE — PROGRESS NOTES
Subjective   Narcisa Cotto is a 77 y.o. female.     History of Present Illness   Seen in ER in  [place] last Thurs for burning with urination and vaginally itching. WBC normal at 5. UA showed 500 leuk no nitrates, but no culture sent.  Cr was 1.6. They gave her diflucan dose unknown for 1 day with 1 refill. Took 1 dose and did not help.  Prior had been seen by our PA Kati Escobar on 2/6/2018 for month of vaginal itching. Given Terazol 0.8% x 3 days. Vaginal panel sent and showed Candidia Glabrata and borderline BV. She added metronidazole gel on 2/12.   Here today because itching not getting any better.     The following portions of the patient's history were reviewed and updated as appropriate: allergies, current medications, past family history, past medical history, past social history, past surgical history and problem list.    Review of Systems   Constitutional: Negative for activity change, appetite change, chills, diaphoresis, fatigue, fever and unexpected weight change.   HENT: Negative for congestion, ear discharge, ear pain, mouth sores, nosebleeds, sinus pressure, sneezing and sore throat.    Eyes: Negative for pain, discharge and itching.   Respiratory: Negative for cough, chest tightness, shortness of breath and wheezing.    Cardiovascular: Negative for chest pain, palpitations and leg swelling.   Gastrointestinal: Negative for abdominal pain, constipation, diarrhea, nausea and vomiting.   Endocrine: Negative for cold intolerance, heat intolerance, polydipsia and polyphagia.   Genitourinary: Positive for dysuria and vaginal discharge. Negative for flank pain, frequency, hematuria and urgency.        Vaginal itching   Musculoskeletal: Negative for arthralgias, back pain, gait problem, myalgias, neck pain and neck stiffness.   Skin: Negative for color change, pallor and rash.   Neurological: Negative for seizures, speech difficulty, numbness and headaches.   Psychiatric/Behavioral: Negative for  agitation, confusion, decreased concentration and sleep disturbance. The patient is not nervous/anxious.      /80   Pulse 81   Wt 80.2 kg (176 lb 11.2 oz)   SpO2 98%   BMI 29.40 kg/m²     Objective   Physical Exam   Constitutional: She appears well-developed.   HENT:   Head: Normocephalic.   Right Ear: External ear normal.   Left Ear: External ear normal.   Nose: Nose normal.   Mouth/Throat: Oropharynx is clear and moist.   Eyes: Conjunctivae are normal. Pupils are equal, round, and reactive to light.   Neck: No JVD present. No thyromegaly present.   Cardiovascular: Normal rate, regular rhythm and normal heart sounds.  Exam reveals no friction rub.    No murmur heard.  Pulmonary/Chest: Effort normal and breath sounds normal. No respiratory distress. She has no wheezes. She has no rales.   Abdominal: Soft. Bowel sounds are normal. She exhibits no distension. There is no tenderness. There is no guarding.   Genitourinary: Vaginal discharge (small amount white discharge) found.   Musculoskeletal: She exhibits no edema or tenderness.   Lymphadenopathy:     She has no cervical adenopathy.   Neurological: She displays normal reflexes. No cranial nerve deficit.   Skin: No rash noted.   Psychiatric: Her behavior is normal.   Nursing note and vitals reviewed.      Assessment/Plan   Diagnoses and all orders for this visit:    Dysuria  -     Urine Culture - Urine, Urine, Clean Catch    Vaginal candida  -     Ambulatory Referral to Infectious Disease  -     Blood Culture - Blood,  -     Blood Culture - Blood,  Sent in Terazol 7 intravaginally x 7 days.  Pap smear for cervical cancer screening  Awaiting pap. Nuswab done today  Other orders  -     pantoprazole (PROTONIX) 40 MG EC tablet; Take 1 tablet by mouth 2 (Two) Times a Day.  -     terconazole (TERAZOL 7) 0.4 % vaginal cream; Insert 5g intravaginally daily for 7 days  -     ondansetron ODT (ZOFRAN-ODT) 8 MG disintegrating tablet; Take 1 tablet by mouth Every 8  (Eight) Hours As Needed for Nausea.    50% of visit spent counseling

## 2018-03-16 ENCOUNTER — TELEPHONE (OUTPATIENT)
Dept: INTERNAL MEDICINE | Facility: CLINIC | Age: 78
End: 2018-03-16

## 2018-03-16 LAB
A VAGINAE DNA VAG QL NAA+PROBE: NORMAL SCORE
BVAB2 DNA VAG QL NAA+PROBE: NORMAL SCORE
C ALBICANS DNA VAG QL NAA+PROBE: NEGATIVE
C GLABRATA DNA VAG QL NAA+PROBE: NEGATIVE
C TRACH RRNA SPEC QL NAA+PROBE: NEGATIVE
MEGA1 DNA VAG QL NAA+PROBE: NORMAL SCORE
N GONORRHOEA RRNA SPEC QL NAA+PROBE: NEGATIVE
T VAGINALIS RRNA SPEC QL NAA+PROBE: NEGATIVE

## 2018-03-16 NOTE — TELEPHONE ENCOUNTER
Sharon calling to check on the results of the labs her mother had done to check for a uti.  She is just concerned heading into the weekend and not having any medication given to treat her mom.  Please call Shanna back at [phone]

## 2018-03-18 LAB
BACTERIA UR CULT: ABNORMAL
OTHER ANTIBIOTIC SUSC ISLT: ABNORMAL

## 2018-03-19 ENCOUNTER — TELEPHONE (OUTPATIENT)
Dept: INTERNAL MEDICINE | Facility: CLINIC | Age: 78
End: 2018-03-19

## 2018-03-19 RX ORDER — NITROFURANTOIN 25; 75 MG/1; MG/1
100 CAPSULE ORAL EVERY 12 HOURS SCHEDULED
Qty: 7 CAPSULE | Refills: 0 | Status: SHIPPED | OUTPATIENT
Start: 2018-03-19 | End: 2018-03-26

## 2018-03-19 NOTE — TELEPHONE ENCOUNTER
Spoke with pt and let her know her urine was growing out e coli and she needs to start on macrobid and stop the yeast cream

## 2018-03-19 NOTE — TELEPHONE ENCOUNTER
----- Message from Mariia Del Real DO sent at 3/19/2018  7:53 AM EDT -----    Call her daughter. There are some E coli growing. Start Macrobid  100mg po q 12 x 7 days. Start yeast cream Terazol.  Yeast is negative.

## 2018-03-20 ENCOUNTER — TELEPHONE (OUTPATIENT)
Dept: INTERNAL MEDICINE | Facility: CLINIC | Age: 78
End: 2018-03-20

## 2018-03-20 LAB
BACTERIA BLD CULT: NORMAL
BACTERIA BLD CULT: NORMAL

## 2018-03-20 NOTE — TELEPHONE ENCOUNTER
PLEASE CALL DA  [phone]  SHE WANTED TO KNOW IF SHE CAN MAKE THE APPT WITH INFECTIOUS DISEASE NOW   THAT SHE WAS DIAGNOSED WITH E COLI NOT SURE IF SHE NEEDS TO HAVE A NEW ORDER FOR THIS  PLEASE CALL DA

## 2018-03-20 NOTE — TELEPHONE ENCOUNTER
Let Shanna know that the yeast was cured and bacteria was a small amount and should be taken care of by the antibiotic

## 2018-03-20 NOTE — TELEPHONE ENCOUNTER
She was referred to ID for vaginal candida, they sent back that they do not treat for this and that she needs to go to gyn, but yeast was negative and she grew out ecohollis, please advise

## 2018-03-20 NOTE — TELEPHONE ENCOUNTER
Let her know that yeast was cured. Bacteria is small amount and should respond to the med. Are her blood culture back.

## 2018-03-27 ENCOUNTER — OFFICE VISIT (OUTPATIENT)
Dept: CARDIOLOGY | Facility: CLINIC | Age: 78
End: 2018-03-27

## 2018-03-27 ENCOUNTER — TELEPHONE (OUTPATIENT)
Dept: INTERNAL MEDICINE | Facility: CLINIC | Age: 78
End: 2018-03-27

## 2018-03-27 VITALS
HEIGHT: 66 IN | HEART RATE: 77 BPM | DIASTOLIC BLOOD PRESSURE: 70 MMHG | WEIGHT: 170 LBS | BODY MASS INDEX: 27.32 KG/M2 | SYSTOLIC BLOOD PRESSURE: 148 MMHG

## 2018-03-27 DIAGNOSIS — IMO0002 UNCONTROLLED TYPE 2 DIABETES MELLITUS WITH COMPLICATION, WITH LONG-TERM CURRENT USE OF INSULIN: ICD-10-CM

## 2018-03-27 DIAGNOSIS — I10 ESSENTIAL HYPERTENSION: ICD-10-CM

## 2018-03-27 DIAGNOSIS — I20.0 UNSTABLE ANGINA (HCC): Primary | ICD-10-CM

## 2018-03-27 DIAGNOSIS — E78.5 HYPERLIPIDEMIA LDL GOAL <100: ICD-10-CM

## 2018-03-27 DIAGNOSIS — I73.9 PERIPHERAL ARTERIAL DISEASE (HCC): ICD-10-CM

## 2018-03-27 PROBLEM — I20.9 ANGINA PECTORIS (HCC): Status: ACTIVE | Noted: 2018-03-27

## 2018-03-27 PROCEDURE — 99214 OFFICE O/P EST MOD 30 MIN: CPT | Performed by: INTERNAL MEDICINE

## 2018-03-27 NOTE — PROGRESS NOTES
Encounter Date:03/27/2018    Patient ID: Narcisa Cotto is a 77 y.o. female who resides in [place], [place].    CC/Reason for visit:  Hyperlipidemia LDL goal <100            Narcisa Cotto returns to my office today in follow up of her hypertension, peripheral arterial disease, and hyperlipidemia. She patient reports having midsternal chest discomfort. This discomfort is been ongoing for about a year but over the last several weeks has been more prominent and frequent. She originally attributed it to depression after losing her  in June. However, over the last several weeks she questions whether this may be cardiac in nature. It is associated with shortness of breath. It is nearly constant in nature presently. Her ambulation is limited by back and leg pain. She states that if she walks from our office to the parking garage she develops severe bilateral leg pain with the right equal to the left. Her diabetes remains uncontrolled, but improved from historic standards. She still [facility] regarding her chronic kidney disease.    Review of Systems   Constitution: Positive for weight gain. Negative for weakness and malaise/fatigue.   HENT: Positive for ear pain.    Eyes: Positive for blurred vision. Negative for vision loss in left eye and vision loss in right eye.   Cardiovascular: Positive for chest pain. Negative for dyspnea on exertion, near-syncope, orthopnea, palpitations, paroxysmal nocturnal dyspnea and syncope.   Respiratory: Positive for snoring.    Skin: Positive for dry skin.   Musculoskeletal: Positive for back pain and muscle cramps. Negative for myalgias.   Genitourinary: Positive for bladder incontinence.   Neurological: Positive for loss of balance. Negative for brief paralysis, excessive daytime sleepiness, focal weakness, numbness and paresthesias.   All other systems reviewed and are negative.      The patient's past medical, social, family history and ROS reviewed in the  "patient's electronic medical record.    Allergies  Codeine    Outpatient Prescriptions Marked as Taking for the 3/27/18 encounter (Office Visit) with NHAN Ochoa   Medication Sig Dispense Refill   • ACCU-CHEK JOVITA test strip 1 each by Other route 4 (Four) Times a Day Before Meals & at Bedtime. 150 each 5   • amLODIPine (NORVASC) 2.5 MG tablet Take 2.5 mg by mouth Daily.     • aspirin (ASPIRIN LOW DOSE) 81 MG tablet Take 1 tablet by mouth daily.     • cyanocobalamin ([employer] VITAMIN B-12) 1000 MCG tablet Take 1 tablet by mouth daily.     • diphenoxylate-atropine (LOMOTIL) 2.5-0.025 MG per tablet Take 1 tablet by mouth Daily. 30 tablet 2   • gabapentin (NEURONTIN) 600 MG tablet Take 1 tablet by mouth 2 (Two) Times a Day. 60 tablet 5   • insulin NPH-insulin regular (NOVOLIN 70/30) (70-30) 100 UNIT/ML injection 70 units before breakfast and 70 units before supper 40 mL 11   • Insulin Syringe 30G X 5/16\" 1 ML misc 1 each 2 (Two) Times a Day With Meals. 200 each 3   • meclizine (ANTIVERT) 25 MG tablet TAKE ONE TABLET BY MOUTH THREE TIMES A DAY AS NEEDED FOR DIZZINESS . 30 tablet 0   • metoprolol tartrate (LOPRESSOR) 25 MG tablet TAKE ONE TABLET BY MOUTH TWICE A DAY 60 tablet 11   • ondansetron ODT (ZOFRAN-ODT) 8 MG disintegrating tablet Take 1 tablet by mouth Every 8 (Eight) Hours As Needed for Nausea. 30 tablet 0   • pantoprazole (PROTONIX) 40 MG EC tablet Take 1 tablet by mouth 2 (Two) Times a Day. 60 tablet 3   • raNITIdine (ZANTAC) 75 MG tablet Take 75 mg by mouth 2 (Two) Times a Day.     • rosuvastatin (CRESTOR) 20 MG tablet TAKE ONE TABLET BY MOUTH DAILY 30 tablet 4   • sharps container 1 each Daily. 1 each 0   • terconazole (TERAZOL 7) 0.4 % vaginal cream Insert 5g intravaginally daily for 7 days 45 g 0   • traZODone (DESYREL) 50 MG tablet Take 1 tablet by mouth Every Night. 30 tablet 0         Blood pressure 148/70, pulse 77, height 167.6 cm (66\"), weight 77.1 kg (170 lb).  Body mass index is 27.44 " kg/m².    Physical Exam   Constitutional: She is oriented to person, place, and time. She appears well-developed and well-nourished.   HENT:   Head: Normocephalic and atraumatic.   Eyes: Pupils are equal, round, and reactive to light. No scleral icterus.   Neck: No JVD present. Carotid bruit is not present. No thyromegaly present.   Cardiovascular: Normal rate, regular rhythm, S1 normal and S2 normal.  Exam reveals no gallop.    No murmur heard.  Pulmonary/Chest: Effort normal and breath sounds normal.   Abdominal: Soft. There is no hepatosplenomegaly. There is no tenderness.   Neurological: She is alert and oriented to person, place, and time.   Skin: Skin is warm and dry. No cyanosis. Nails show no clubbing.   Psychiatric: She has a normal mood and affect. Her behavior is normal.       Data Review:     Lab Results   Component Value Date    CHOL 260 (H) 02/02/2017    TRIG 226 (H) 01/19/2018    HDL 36 (L) 01/19/2018    AST 22 01/19/2018    ALT 25 01/19/2018     Lab Results   Component Value Date    GLUCOSE 196 (H) 02/02/2017    BUN 26 (H) 01/19/2018    CREATININE 1.60 (H) 01/19/2018    EGFRIFNONA 31 (L) 01/19/2018    EGFRIFAFRI 38 (L) 01/19/2018    BCR 16.3 01/19/2018    K 4.7 01/19/2018    CO2 26.0 01/19/2018    CALCIUM 9.6 01/19/2018    PROTENTOTREF 7.1 01/19/2018    ALBUMIN 4.50 01/19/2018    LABIL2 1.7 01/19/2018    AST 22 01/19/2018    ALT 25 01/19/2018     Lab Results   Component Value Date    WBC 7.75 08/29/2017    HGB 12.7 08/29/2017    HCT 40.0 08/29/2017    MCV 85.5 08/29/2017     08/29/2017     Lab Results   Component Value Date    TSH 1.718 08/29/2017     Procedures       Problem List Items Addressed This Visit        Cardiovascular and Mediastinum    Unstable angina - Primary    Relevant Orders    Adult Transthoracic Echo Complete W/ Cont if Necessary Per Protocol    Stress Test With Myocardial Perfusion One Day    Essential hypertension    Hyperlipidemia LDL goal <100    Overview     · High  intensity statin therapy is recommended given the presence of peripheral arterial disease and diabetes         Peripheral arterial disease    Overview     · Bilateral [place] class III claudication symptoms.  · BONNIE (08/22/2013):  At rest right 0.98, left 0.85.  After 2 minutes of exercise right 0.51, left 0.68.  · Venous duplex LE (7/27/2016): All veins visiualized appear patent with distal augmentation bilaterally.         Relevant Orders    Doppler Ankle Brachial Index Multi Level CAR       Endocrine    Uncontrolled type 2 diabetes mellitus with complication, with long-term current use of insulin      Other Visit Diagnoses    None.       77-year-old female with multiple risk factors for obstructive coronary artery disease who presents with symptoms concerning for unstable angina. The patient has chronic kidney disease complicating matters. I recommend the patient undergo pharmacologic nuclear stress testing and an echocardiogram in the next several weeks. I also recommend exercise BONNIE testing for her claudication symptoms. We will follow-up with her after testing to discuss results.       · Echo  · Pharmacologic nuclear stress test  · Exercise ABIs    Filemon Mayberry IV, MD  3/27/2018

## 2018-04-09 ENCOUNTER — TELEPHONE (OUTPATIENT)
Dept: INTERNAL MEDICINE | Facility: CLINIC | Age: 78
End: 2018-04-09

## 2018-04-09 NOTE — TELEPHONE ENCOUNTER
RENETTA,  MS GARCIA CALLED TODAY AND WANTED TO LET YOU KNOW THAT SHE IS STILL EXPERIENCING VAGINAL ITCHING CONTINUING FROM SEVERAL WEEKS AGO.    CALL BACK [phone]

## 2018-04-13 DIAGNOSIS — E11.42 DIABETIC PERIPHERAL NEUROPATHY (HCC): ICD-10-CM

## 2018-04-14 RX ORDER — GABAPENTIN 600 MG/1
600 TABLET ORAL 2 TIMES DAILY
Qty: 60 TABLET | Refills: 5 | Status: SHIPPED | OUTPATIENT
Start: 2018-04-14 | End: 2018-10-10 | Stop reason: SDUPTHER

## 2018-04-14 NOTE — TELEPHONE ENCOUNTER
Okay to fill 600 mg BID, disp 60, refill 5. Please print out CSA and Cesar and put in my folder to sign. Thanks! MD

## 2018-04-16 NOTE — TELEPHONE ENCOUNTER
Cesar updated and LILIA prepared for pt to sign, placed both in your folder, don't have the green script.

## 2018-04-23 ENCOUNTER — HOSPITAL ENCOUNTER (OUTPATIENT)
Dept: CARDIOLOGY | Facility: HOSPITAL | Age: 78
Discharge: HOME | End: 2018-04-23
Attending: INTERNAL MEDICINE

## 2018-04-23 ENCOUNTER — HOSPITAL ENCOUNTER (OUTPATIENT)
Dept: CARDIOLOGY | Facility: HOSPITAL | Age: 78
Discharge: HOME | End: 2018-04-23
Attending: INTERNAL MEDICINE | Admitting: INTERNAL MEDICINE

## 2018-04-23 DIAGNOSIS — I20.0 UNSTABLE ANGINA (HCC): ICD-10-CM

## 2018-04-23 DIAGNOSIS — I73.9 PERIPHERAL ARTERIAL DISEASE (HCC): ICD-10-CM

## 2018-04-23 LAB
BH CV ECHO MEAS - AO ROOT AREA (BSA CORRECTED): 1.4
BH CV ECHO MEAS - AO ROOT AREA: 5.2 CM^2
BH CV ECHO MEAS - AO ROOT DIAM: 2.6 CM
BH CV ECHO MEAS - BSA(HAYCOCK): 1.9 M^2
BH CV ECHO MEAS - BSA: 1.9 M^2
BH CV ECHO MEAS - BZI_BMI: 27.4 KILOGRAMS/M^2
BH CV ECHO MEAS - BZI_METRIC_HEIGHT: 167.6 CM
BH CV ECHO MEAS - BZI_METRIC_WEIGHT: 77.1 KG
BH CV ECHO MEAS - CONTRAST EF (2CH): 45.9 ML/M^2
BH CV ECHO MEAS - CONTRAST EF 4CH: 61.1 ML/M^2
BH CV ECHO MEAS - EDV(CUBED): 52.1 ML
BH CV ECHO MEAS - EDV(MOD-SP2): 61 ML
BH CV ECHO MEAS - EDV(MOD-SP4): 72 ML
BH CV ECHO MEAS - EDV(TEICH): 59.4 ML
BH CV ECHO MEAS - EF(CUBED): 73.7 %
BH CV ECHO MEAS - EF(MOD-SP2): 45.9 %
BH CV ECHO MEAS - EF(MOD-SP4): 61.1 %
BH CV ECHO MEAS - EF(TEICH): 66.4 %
BH CV ECHO MEAS - ESV(CUBED): 13.7 ML
BH CV ECHO MEAS - ESV(MOD-SP2): 33 ML
BH CV ECHO MEAS - ESV(MOD-SP4): 28 ML
BH CV ECHO MEAS - ESV(TEICH): 20 ML
BH CV ECHO MEAS - FS: 36 %
BH CV ECHO MEAS - IVS/LVPW: 0.98
BH CV ECHO MEAS - IVSD: 1.3 CM
BH CV ECHO MEAS - LA DIMENSION: 3 CM
BH CV ECHO MEAS - LA/AO: 1.2
BH CV ECHO MEAS - LAT PEAK E' VEL: 5.6 CM/SEC
BH CV ECHO MEAS - LV DIASTOLIC VOL/BSA (35-75): 38.6 ML/M^2
BH CV ECHO MEAS - LV MASS(C)D: 163.7 GRAMS
BH CV ECHO MEAS - LV MASS(C)DI: 87.7 GRAMS/M^2
BH CV ECHO MEAS - LV MAX PG: 4.1 MMHG
BH CV ECHO MEAS - LV MEAN PG: 2 MMHG
BH CV ECHO MEAS - LV SYSTOLIC VOL/BSA (12-30): 15 ML/M^2
BH CV ECHO MEAS - LV V1 MAX: 101.2 CM/SEC
BH CV ECHO MEAS - LV V1 MEAN: 65.9 CM/SEC
BH CV ECHO MEAS - LV V1 VTI: 24.5 CM
BH CV ECHO MEAS - LVIDD: 3.7 CM
BH CV ECHO MEAS - LVIDS: 2.4 CM
BH CV ECHO MEAS - LVLD AP2: 6.8 CM
BH CV ECHO MEAS - LVLD AP4: 7.9 CM
BH CV ECHO MEAS - LVLS AP2: 6.3 CM
BH CV ECHO MEAS - LVLS AP4: 6.4 CM
BH CV ECHO MEAS - LVOT AREA (M): 2.5 CM^2
BH CV ECHO MEAS - LVOT AREA: 2.5 CM^2
BH CV ECHO MEAS - LVOT DIAM: 1.8 CM
BH CV ECHO MEAS - LVPWD: 1.3 CM
BH CV ECHO MEAS - MED PEAK E' VEL: 4.97 CM/SEC
BH CV ECHO MEAS - MV A MAX VEL: 159.4 CM/SEC
BH CV ECHO MEAS - MV E MAX VEL: 94.3 CM/SEC
BH CV ECHO MEAS - MV E/A: 0.59
BH CV ECHO MEAS - PA ACC SLOPE: 869.1 CM/SEC^2
BH CV ECHO MEAS - PA ACC TIME: 0.11 SEC
BH CV ECHO MEAS - PA PR(ACCEL): 31.5 MMHG
BH CV ECHO MEAS - RVDD: 1.8 CM
BH CV ECHO MEAS - SI(CUBED): 20.6 ML/M^2
BH CV ECHO MEAS - SI(LVOT): 33 ML/M^2
BH CV ECHO MEAS - SI(MOD-SP2): 15 ML/M^2
BH CV ECHO MEAS - SI(MOD-SP4): 23.6 ML/M^2
BH CV ECHO MEAS - SI(TEICH): 21.1 ML/M^2
BH CV ECHO MEAS - SV(CUBED): 38.4 ML
BH CV ECHO MEAS - SV(LVOT): 61.7 ML
BH CV ECHO MEAS - SV(MOD-SP2): 28 ML
BH CV ECHO MEAS - SV(MOD-SP4): 44 ML
BH CV ECHO MEAS - SV(TEICH): 39.4 ML
BH CV ECHO MEAS - TAPSE (>1.6): 1.7 CM2
BH CV LOWER ARTERIAL LEFT ABI RATIO: 1.08
BH CV LOWER ARTERIAL LEFT DORSALIS PEDIS SYS MAX: 190 MMHG
BH CV LOWER ARTERIAL LEFT POST TIBIAL SYS MAX: 123 MMHG
BH CV LOWER ARTERIAL LEFT TBI RATIO: 0.78
BH CV LOWER ARTERIAL RIGHT ABI RATIO: 0.93
BH CV LOWER ARTERIAL RIGHT DORSALIS PEDIS SYS MAX: 138 MMHG
BH CV LOWER ARTERIAL RIGHT POST TIBIAL SYS MAX: 164 MMHG
BH CV LOWER ARTERIAL RIGHT TBI RATIO: 0.59
BH CV STRESS BP STAGE 2: NORMAL
BH CV STRESS BP STAGE 4: NORMAL
BH CV STRESS COMMENTS STAGE 1: NORMAL
BH CV STRESS DOSE REGADENOSON STAGE 1: 0.4
BH CV STRESS DURATION MIN STAGE 1: 0
BH CV STRESS DURATION MIN STAGE 2: 1
BH CV STRESS DURATION MIN STAGE 3: 1
BH CV STRESS DURATION MIN STAGE 4: 1
BH CV STRESS DURATION SEC STAGE 1: 10
BH CV STRESS DURATION SEC STAGE 2: 0
BH CV STRESS HR STAGE 1: 86
BH CV STRESS HR STAGE 2: 89
BH CV STRESS HR STAGE 3: 88
BH CV STRESS HR STAGE 4: 86
BH CV STRESS PROTOCOL 1: NORMAL
BH CV STRESS RECOVERY BP: NORMAL MMHG
BH CV STRESS RECOVERY HR: 85 BPM
BH CV STRESS STAGE 1: 1
BH CV STRESS STAGE 2: 2
BH CV STRESS STAGE 3: 3
BH CV STRESS STAGE 4: 4
BH CV VAS BP LEFT ARM: NORMAL MMHG
BH CV XLRA - RV BASE: 2.8 CM
BH CV XLRA - RV LENGTH: 6.6 CM
BH CV XLRA - RV MID: 2.6 CM
BH CV XLRA - TDI S': 12.5 CM/SEC
LV EF 2D ECHO EST: 60 %
LV EF NUC BP: 0 %
MAXIMAL PREDICTED HEART RATE: 143 BPM
MAXIMAL PREDICTED HEART RATE: 143 BPM
PERCENT MAX PREDICTED HR: 65.03 %
STRESS BASELINE BP: NORMAL MMHG
STRESS BASELINE HR: 92 BPM
STRESS PERCENT HR: 77 %
STRESS POST PEAK BP: NORMAL MMHG
STRESS POST PEAK HR: 93 BPM
STRESS TARGET HR: 122 BPM
STRESS TARGET HR: 122 BPM
UPPER ARTERIAL LEFT ARM BRACHIAL SYS MAX: 173 MMHG
UPPER ARTERIAL RIGHT ARM BRACHIAL SYS MAX: 176 MMHG

## 2018-04-23 PROCEDURE — 93923 UPR/LXTR ART STDY 3+ LVLS: CPT | Performed by: INTERNAL MEDICINE

## 2018-04-23 PROCEDURE — 93017 CV STRESS TEST TRACING ONLY: CPT

## 2018-04-23 PROCEDURE — 93306 TTE W/DOPPLER COMPLETE: CPT | Performed by: INTERNAL MEDICINE

## 2018-04-23 PROCEDURE — 0 TECHNETIUM SESTAMIBI: Performed by: INTERNAL MEDICINE

## 2018-04-23 PROCEDURE — 25010000002 REGADENOSON 0.4 MG/5ML SOLUTION: Performed by: INTERNAL MEDICINE

## 2018-04-23 PROCEDURE — 93923 UPR/LXTR ART STDY 3+ LVLS: CPT

## 2018-04-23 PROCEDURE — 78452 HT MUSCLE IMAGE SPECT MULT: CPT | Performed by: INTERNAL MEDICINE

## 2018-04-23 PROCEDURE — 93306 TTE W/DOPPLER COMPLETE: CPT

## 2018-04-23 PROCEDURE — A9500 TC99M SESTAMIBI: HCPCS | Performed by: INTERNAL MEDICINE

## 2018-04-23 PROCEDURE — 78452 HT MUSCLE IMAGE SPECT MULT: CPT

## 2018-04-23 PROCEDURE — 93018 CV STRESS TEST I&R ONLY: CPT | Performed by: INTERNAL MEDICINE

## 2018-04-23 RX ADMIN — REGADENOSON 0.4 MG: 0.08 INJECTION, SOLUTION INTRAVENOUS at 13:09

## 2018-04-23 RX ADMIN — TECHNETIUM TC 99M SESTAMIBI 1 DOSE: 1 INJECTION INTRAVENOUS at 11:30

## 2018-04-23 RX ADMIN — TECHNETIUM TC 99M SESTAMIBI 1 DOSE: 1 INJECTION INTRAVENOUS at 13:05

## 2018-04-24 NOTE — PROGRESS NOTES
Can you let her know that her testing all looked pretty good.  Normal heart function.  No evidence of severe blockages in legs or heart.

## 2018-05-01 ENCOUNTER — OFFICE VISIT (OUTPATIENT)
Dept: CARDIOLOGY | Facility: CLINIC | Age: 78
End: 2018-05-01

## 2018-05-01 VITALS
HEART RATE: 76 BPM | SYSTOLIC BLOOD PRESSURE: 150 MMHG | HEIGHT: 66 IN | DIASTOLIC BLOOD PRESSURE: 66 MMHG | BODY MASS INDEX: 28.61 KG/M2 | WEIGHT: 178 LBS

## 2018-05-01 DIAGNOSIS — R06.02 SHORTNESS OF BREATH: ICD-10-CM

## 2018-05-01 DIAGNOSIS — I10 ESSENTIAL HYPERTENSION: Primary | ICD-10-CM

## 2018-05-01 DIAGNOSIS — R07.89 ATYPICAL CHEST PAIN: ICD-10-CM

## 2018-05-01 DIAGNOSIS — I73.9 PERIPHERAL ARTERIAL DISEASE (HCC): ICD-10-CM

## 2018-05-01 DIAGNOSIS — E78.5 HYPERLIPIDEMIA LDL GOAL <100: Chronic | ICD-10-CM

## 2018-05-01 PROCEDURE — 99214 OFFICE O/P EST MOD 30 MIN: CPT | Performed by: NURSE PRACTITIONER

## 2018-05-01 RX ORDER — AMLODIPINE BESYLATE 5 MG/1
5 TABLET ORAL DAILY
Qty: 30 TABLET | Refills: 11 | Status: SHIPPED | OUTPATIENT
Start: 2018-05-01 | End: 2018-07-10 | Stop reason: SDUPTHER

## 2018-05-01 NOTE — PROGRESS NOTES
Encounter Date:05/01/2018    Patient ID: Narcisa Cotto is a 77 y.o. female who resides in [place], [place]    CC/Reason for visit:  Hypertension            Narcisa Cotto returns today for follow-up for hypertension, peripheral arterial disease, hyperlipidemia and atypical chest pain.  At her last visit the patient had reports of intermittent chest discomfort that was not necessarily associated with exertional activities.  She underwent an echocardiogram and myocardial perfusion study.  Her echo showed normal valvular structure and a normal LVEF.  Her stress test showed no evidence of ischemia but there was transient dilation of her left ventricle that could have suggested multivessel disease.  The patient reports since her last visit her chest pain symptoms have actually subsided.  Her breathing has also improved.  She still has complaints of bilateral leg pain particularly with walking however her recent BONNIE studies did not suggest any peripheral arterial disease.  She is taking daily statin therapy and tolerating without any reports myalgias.  She is on a low-dose 81 mg daily aspirin.  Her blood pressure is marginally elevated at today's visit with a systolic of 150.  Actually over the last 2 visits her systolic has been marginally elevated in the 140s to 150 range.    Review of Systems   Constitution: Positive for weakness.   Eyes: Positive for blurred vision.   Endocrine: Positive for polyuria.   Skin: Positive for dry skin and itching.   Musculoskeletal: Positive for back pain and muscle cramps.   Gastrointestinal: Positive for flatus.   Genitourinary: Positive for bladder incontinence.       The patient's past medical, social, family history and ROS reviewed in the patient's electronic medical record.    Allergies  Codeine    Outpatient Prescriptions Marked as Taking for the 5/1/18 encounter (Office Visit) with NHAN Ochoa   Medication Sig Dispense Refill   • ACCU-CHEK JOVITA test strip 1  "each by Other route 4 (Four) Times a Day Before Meals & at Bedtime. 150 each 5   • aspirin (ASPIRIN LOW DOSE) 81 MG tablet Take 1 tablet by mouth daily.     • cyanocobalamin ([employer] VITAMIN B-12) 1000 MCG tablet Take 1 tablet by mouth daily.     • diphenoxylate-atropine (LOMOTIL) 2.5-0.025 MG per tablet Take 1 tablet by mouth Daily. 30 tablet 2   • gabapentin (NEURONTIN) 600 MG tablet Take 1 tablet by mouth 2 (Two) Times a Day. 60 tablet 5   • insulin NPH-insulin regular (NOVOLIN 70/30) (70-30) 100 UNIT/ML injection 70 units before breakfast and 70 units before supper 40 mL 11   • Insulin Syringe 30G X 5/16\" 1 ML misc 1 each 2 (Two) Times a Day With Meals. 200 each 3   • meclizine (ANTIVERT) 25 MG tablet TAKE ONE TABLET BY MOUTH THREE TIMES A DAY AS NEEDED FOR DIZZINESS . 30 tablet 0   • metoprolol tartrate (LOPRESSOR) 25 MG tablet TAKE ONE TABLET BY MOUTH TWICE A DAY 60 tablet 11   • ondansetron ODT (ZOFRAN-ODT) 8 MG disintegrating tablet Take 1 tablet by mouth Every 8 (Eight) Hours As Needed for Nausea. 30 tablet 0   • pantoprazole (PROTONIX) 40 MG EC tablet Take 1 tablet by mouth 2 (Two) Times a Day. 60 tablet 3   • raNITIdine (ZANTAC) 75 MG tablet Take 75 mg by mouth 2 (Two) Times a Day.     • rosuvastatin (CRESTOR) 20 MG tablet TAKE ONE TABLET BY MOUTH DAILY 30 tablet 4   • sharps container 1 each Daily. 1 each 0   • terconazole (TERAZOL 7) 0.4 % vaginal cream Insert 5g intravaginally daily for 7 days 45 g 0   • traZODone (DESYREL) 50 MG tablet Take 1 tablet by mouth Every Night. 30 tablet 0   • [DISCONTINUED] amLODIPine (NORVASC) 2.5 MG tablet Take 2.5 mg by mouth Daily.           Blood pressure 150/66, pulse 76, height 167.6 cm (66\"), weight 80.7 kg (178 lb).  Body mass index is 28.73 kg/m².    Physical Exam   Constitutional: She is oriented to person, place, and time. She appears well-developed and well-nourished.   HENT:   Head: Normocephalic and atraumatic.   Eyes: Pupils are equal, round, and reactive to " light. No scleral icterus.   Neck: No JVD present. Carotid bruit is not present. No thyromegaly present.   Cardiovascular: Normal rate, regular rhythm, S1 normal and S2 normal.  Exam reveals no gallop.    No murmur heard.  Pulmonary/Chest: Effort normal and breath sounds normal.   Abdominal: Soft. There is no hepatosplenomegaly. There is no tenderness.   Neurological: She is alert and oriented to person, place, and time.   Skin: Skin is warm and dry. No cyanosis. Nails show no clubbing.   Psychiatric: She has a normal mood and affect. Her behavior is normal.       Data Review:   Procedures       Problem List Items Addressed This Visit        Cardiovascular and Mediastinum    Hyperlipidemia LDL goal <100 (Chronic)    Overview     · High intensity statin therapy is recommended given the presence of peripheral arterial disease and diabetes         Current Assessment & Plan     · Continue Crestor 20 mg daily.         Essential hypertension - Primary    Current Assessment & Plan     · Hypertension is controlled  · Continue amlodipine 2.5 mg daily  · Continue metoprolol tartrate 25 mg twice a day         Relevant Medications    amLODIPine (NORVASC) 5 MG tablet    Peripheral arterial disease    Overview     · Bilateral [place] class III claudication symptoms.  · BONNIE (08/22/2013):  At rest right 0.98, left 0.85.  After 2 minutes of exercise right 0.51, left 0.68.  · Venous duplex LE (7/27/2016): All veins visiualized appear patent with distal augmentation bilaterally.  · BONNIE Study (04/23/2018): study normalNormal left BONNIE at 1.08. Low normal right BONNIE at 0.93         Current Assessment & Plan     · Continue aspirin 81 mg daily  · BONNIE studies within normal limits            Respiratory    Shortness of breath    Overview     · Echo (8/31/2016): EF > 70%. Moderate thickening of the noncoronary cusp of the aortic valve.  · Echo (04/23/2018): LVEF = 60%. Grade I diastolic dysfunction.   Cardiac valves are functionally  normal.            Nervous and Auditory    Atypical chest pain    Overview     · Echo (8/31/2016): EF > 70%. Moderate thickening of the noncoronary cusp of the aortic valve.  · Echo (04/23/2018): LVEF = 60%. Grade I diastolic dysfunction.   Cardiac valves are functionally normal.  · MPS (04/23/2018): No focal areas of ischemia or infarct. There is quantitative transient ischemic dilatation of the left ventricle which raises the possibility of multivessel ischemia. Coronary artery calcification noted on CT attenuation correction images. No EF calculated due to patient having a TENS unit.         Current Assessment & Plan     · Continue aspirin 81 mg daily  · Continue metoprolol titrate 25 mg twice a day  · Increase amlodipine 5 mg daily  · Follow-up in 2 months for reassessment of symptoms.  If still experiencing anginal symptoms consider proceeding with cardiac catheterization           Other Visit Diagnoses    None.         The patient's chest pain symptoms seem to have subsided in her breathing is improved.  I we will increase her amlodipine to 5 mg daily for better blood pressure control.  Her bilateral leg discomfort could be a peripheral neuropathy since her BONNIE studies were within normal limits.  We will continue her current medications and schedule her to come back in 2 months for re-evaluation of her symptoms.  If the patient begins to experience further chest pain symptoms could consider proceeding with a cardiac catheterization.     · Increase amlodipine 5 mg daily for better blood pressure control  · Follow-up PCP for bilateral leg discomfort as could be peripheral neuropathy.  No peripheral arterial disease with a normal ABIs  · Follow-up in 2 months for reassessment of symptoms.  If patient experiences ongoing chest pain could consider proceeding with cardiac catheterization.    Ruth Parikh, APRN  5/1/2018

## 2018-05-01 NOTE — ASSESSMENT & PLAN NOTE
· Continue aspirin 81 mg daily  · Continue metoprolol titrate 25 mg twice a day  · Increase amlodipine 5 mg daily  · Follow-up in 2 months for reassessment of symptoms.  If still experiencing anginal symptoms consider proceeding with cardiac catheterization

## 2018-05-01 NOTE — ASSESSMENT & PLAN NOTE
· Hypertension is controlled  · Continue amlodipine 2.5 mg daily  · Continue metoprolol tartrate 25 mg twice a day

## 2018-05-21 ENCOUNTER — OFFICE VISIT (OUTPATIENT)
Dept: INTERNAL MEDICINE | Facility: CLINIC | Age: 78
End: 2018-05-21

## 2018-05-21 VITALS
OXYGEN SATURATION: 97 % | BODY MASS INDEX: 28.75 KG/M2 | DIASTOLIC BLOOD PRESSURE: 80 MMHG | SYSTOLIC BLOOD PRESSURE: 140 MMHG | HEART RATE: 85 BPM | WEIGHT: 178.1 LBS

## 2018-05-21 DIAGNOSIS — I10 ESSENTIAL HYPERTENSION: Primary | ICD-10-CM

## 2018-05-21 DIAGNOSIS — N18.30 STAGE 3 CHRONIC KIDNEY DISEASE (HCC): ICD-10-CM

## 2018-05-21 DIAGNOSIS — G47.09 OTHER INSOMNIA: ICD-10-CM

## 2018-05-21 DIAGNOSIS — E55.9 VITAMIN D DEFICIENCY: ICD-10-CM

## 2018-05-21 DIAGNOSIS — E78.5 HYPERLIPIDEMIA LDL GOAL <100: Chronic | ICD-10-CM

## 2018-05-21 LAB
ALBUMIN SERPL-MCNC: 4.4 G/DL (ref 3.2–4.8)
ALBUMIN/GLOB SERPL: 1.6 G/DL (ref 1.5–2.5)
ALP SERPL-CCNC: 99 U/L (ref 25–100)
ALT SERPL-CCNC: 23 U/L (ref 7–40)
AST SERPL-CCNC: 15 U/L (ref 0–33)
BILIRUB SERPL-MCNC: 0.4 MG/DL (ref 0.3–1.2)
BUN SERPL-MCNC: 25 MG/DL (ref 9–23)
BUN/CREAT SERPL: 14.7 (ref 7–25)
CALCIUM SERPL-MCNC: 9.5 MG/DL (ref 8.7–10.4)
CHLORIDE SERPL-SCNC: 99 MMOL/L (ref 99–109)
CHOLEST SERPL-MCNC: 146 MG/DL (ref 0–200)
CO2 SERPL-SCNC: 26 MMOL/L (ref 20–31)
CREAT SERPL-MCNC: 1.7 MG/DL (ref 0.6–1.3)
GFR SERPLBLD CREATININE-BSD FMLA CKD-EPI: 29 ML/MIN/1.73
GFR SERPLBLD CREATININE-BSD FMLA CKD-EPI: 35 ML/MIN/1.73
GLOBULIN SER CALC-MCNC: 2.7 GM/DL
GLUCOSE SERPL-MCNC: 309 MG/DL (ref 70–100)
HDLC SERPL-MCNC: 37 MG/DL (ref 40–60)
LDLC SERPL CALC-MCNC: 60 MG/DL (ref 0–100)
POTASSIUM SERPL-SCNC: 5 MMOL/L (ref 3.5–5.5)
PROT SERPL-MCNC: 7.1 G/DL (ref 5.7–8.2)
SODIUM SERPL-SCNC: 135 MMOL/L (ref 132–146)
TRIGL SERPL-MCNC: 244 MG/DL (ref 0–150)
VLDLC SERPL CALC-MCNC: 48.8 MG/DL

## 2018-05-21 PROCEDURE — 99214 OFFICE O/P EST MOD 30 MIN: CPT | Performed by: INTERNAL MEDICINE

## 2018-05-21 RX ORDER — PANTOPRAZOLE SODIUM 40 MG/1
40 TABLET, DELAYED RELEASE ORAL 2 TIMES DAILY
Qty: 60 TABLET | Refills: 3 | Status: SHIPPED | OUTPATIENT
Start: 2018-05-21 | End: 2018-10-05 | Stop reason: SDUPTHER

## 2018-05-21 RX ORDER — MECLIZINE HYDROCHLORIDE 25 MG/1
25 TABLET ORAL 3 TIMES DAILY PRN
Qty: 30 TABLET | Refills: 0 | Status: SHIPPED | OUTPATIENT
Start: 2018-05-21 | End: 2018-07-16 | Stop reason: SDUPTHER

## 2018-05-21 NOTE — PROGRESS NOTES
Subjective   Narcisa Cotto is a 77 y.o. female.   Chief Complaint   Patient presents with   • Chronic Kidney Disease   • Heartburn   • Hyperlipidemia   • Hypertension       Chronic Kidney Disease   This is a chronic problem. The current episode started more than 1 year ago. Associated symptoms include a rash. Pertinent negatives include no abdominal pain, arthralgias, chest pain, chills, congestion, coughing, diaphoresis, fatigue, fever, headaches, myalgias, nausea, neck pain, numbness, sore throat, swollen glands, urinary symptoms, vertigo, visual change or vomiting. The treatment provided mild relief.   Hypertension   This is a chronic problem. The current episode started more than 1 year ago. The problem is controlled. Pertinent negatives include no chest pain, headaches, neck pain, palpitations or shortness of breath. Risk factors for coronary artery disease include diabetes mellitus and dyslipidemia.   Hyperlipidemia   This is a chronic problem. The current episode started more than 1 year ago. The problem is controlled. Pertinent negatives include no chest pain, myalgias or shortness of breath. Risk factors for coronary artery disease include diabetes mellitus, dyslipidemia and hypertension.   Heartburn   She reports no abdominal pain, no chest pain, no coughing, no nausea, no sore throat or no wheezing. This is a chronic problem. The current episode started more than 1 year ago. Pertinent negatives include no fatigue.   Insomnia   This is a chronic problem. The current episode started more than 1 year ago. Associated symptoms include a rash. Pertinent negatives include no abdominal pain, arthralgias, chest pain, chills, congestion, coughing, diaphoresis, fatigue, fever, headaches, myalgias, nausea, neck pain, numbness, sore throat, swollen glands, urinary symptoms, vertigo, visual change or vomiting. The treatment provided significant relief.    Rash on back x 3 months and getting worst. Itching with it.  No pain.  No new products. Used OTC cream withot relief.  The following portions of the patient's history were reviewed and updated as appropriate: allergies, current medications, past family history, past medical history, past social history, past surgical history and problem list.    Review of Systems   Constitutional: Negative for activity change, appetite change, chills, diaphoresis, fatigue, fever and unexpected weight change.   HENT: Negative for congestion, ear discharge, ear pain, mouth sores, nosebleeds, sinus pressure, sneezing and sore throat.    Eyes: Negative for pain, discharge and itching.   Respiratory: Negative for cough, chest tightness, shortness of breath and wheezing.    Cardiovascular: Negative for chest pain, palpitations and leg swelling.   Gastrointestinal: Negative for abdominal pain, constipation, diarrhea, nausea and vomiting.   Endocrine: Negative for cold intolerance, heat intolerance, polydipsia and polyphagia.   Genitourinary: Negative for dysuria, flank pain, frequency, hematuria and urgency.   Musculoskeletal: Negative for arthralgias, back pain, gait problem, myalgias, neck pain and neck stiffness.   Skin: Positive for rash. Negative for color change and pallor.   Neurological: Negative for vertigo, seizures, speech difficulty, numbness and headaches.   Psychiatric/Behavioral: Negative for agitation, confusion, decreased concentration and sleep disturbance. The patient has insomnia. The patient is not nervous/anxious.        Objective   Physical Exam   Constitutional: She is oriented to person, place, and time. She appears well-developed.   HENT:   Head: Normocephalic.   Right Ear: External ear normal.   Left Ear: External ear normal.   Nose: Nose normal.   Mouth/Throat: Oropharynx is clear and moist.   Eyes: Conjunctivae are normal. Pupils are equal, round, and reactive to light.   Neck: No JVD present. No thyromegaly present.   Cardiovascular: Normal rate, regular rhythm and  normal heart sounds.  Exam reveals no friction rub.    No murmur heard.  Pulmonary/Chest: Effort normal and breath sounds normal. No respiratory distress. She has no wheezes. She has no rales.   Abdominal: Soft. Bowel sounds are normal. She exhibits no distension. There is no tenderness. There is no guarding.   Musculoskeletal: She exhibits no edema or tenderness.   Lymphadenopathy:     She has no cervical adenopathy.   Neurological: She is alert and oriented to person, place, and time. She has normal reflexes. She displays normal reflexes. No cranial nerve deficit.   Skin: No rash noted.        Psychiatric: She has a normal mood and affect. Her behavior is normal.   Nursing note and vitals reviewed.      Assessment/Plan   Narcisa was seen today for chronic kidney disease, hypertension, hyperlipidemia and vitamin b12 def.    Diagnoses and all orders for this visit:    Insomnia, unspecified type  -     traZODone (DESYREL) 50 MG tablet; Take 1 tablet by mouth Every Night.  Stable with trazadone that was refilled today  Essential hypertension  -     Comprehensive Metabolic Panel  -     Lipid Panel  stable  Hyperlipidemia, unspecified hyperlipidemia type  -     Comprehensive Metabolic Panel  -     Lipid Panel  stable  Gastroesophageal reflux disease, esophagitis presence not specified  Stable. Recent change from protonix to zantac to protect her kidney  Chronic kidney disease, stage 3 (moderate)  Labs today. Still following with nephro.

## 2018-06-04 ENCOUNTER — TELEPHONE (OUTPATIENT)
Dept: INTERNAL MEDICINE | Facility: CLINIC | Age: 78
End: 2018-06-04

## 2018-06-04 ENCOUNTER — OFFICE VISIT (OUTPATIENT)
Dept: ENDOCRINOLOGY | Facility: CLINIC | Age: 78
End: 2018-06-04

## 2018-06-04 VITALS
OXYGEN SATURATION: 100 % | DIASTOLIC BLOOD PRESSURE: 62 MMHG | WEIGHT: 173.9 LBS | HEIGHT: 66 IN | SYSTOLIC BLOOD PRESSURE: 130 MMHG | HEART RATE: 70 BPM | BODY MASS INDEX: 27.95 KG/M2

## 2018-06-04 DIAGNOSIS — IMO0002 UNCONTROLLED TYPE 2 DIABETES MELLITUS WITH COMPLICATION, WITH LONG-TERM CURRENT USE OF INSULIN: Primary | ICD-10-CM

## 2018-06-04 DIAGNOSIS — E11.42 DIABETIC PERIPHERAL NEUROPATHY (HCC): ICD-10-CM

## 2018-06-04 LAB
GLUCOSE BLDC GLUCOMTR-MCNC: 133 MG/DL (ref 70–130)
HBA1C MFR BLD: 9.9 %

## 2018-06-04 PROCEDURE — 83036 HEMOGLOBIN GLYCOSYLATED A1C: CPT | Performed by: INTERNAL MEDICINE

## 2018-06-04 PROCEDURE — 99214 OFFICE O/P EST MOD 30 MIN: CPT | Performed by: INTERNAL MEDICINE

## 2018-06-04 PROCEDURE — 82947 ASSAY GLUCOSE BLOOD QUANT: CPT | Performed by: INTERNAL MEDICINE

## 2018-06-04 RX ORDER — CLOPIDOGREL BISULFATE 75 MG/1
75 TABLET ORAL DAILY
COMMUNITY
End: 2018-07-10 | Stop reason: SDUPTHER

## 2018-06-04 NOTE — PROGRESS NOTES
Chief Complaint   Patient presents with   • Type 2 DM     follow-up       HPI:   Narcisa Cotto is a 77 y.o.female who [facility] f/u evaluation of Type 2 diabetes. Last clinic visit 01/15/2018. Her history is as follows:    Interim Events:  - able to afford premixed insulin  - now living with her daughter  - forgot her meter today  - reports forgetting her second dose of insulin often    1) Type 2 diabetes with associated complications of peripheral neuropathy, CKD, PAD, retinopathy:  - diagnosed in 2000  - was on basal-bolus regimen with Levemir and Humalog. Changed to Novolin 70/30 in (01/2018) due to high cost of the analogue insulins.     Current DM Medications:  - Novolin 70/30: 70 units AC BID: - reports forgetting her second dose of insulin often    Glucometer/ BG log Review:   - forgot her meter today    DM Health Maintenance:  Ophtho: Last visit - (5/2018), With Dr. Chicas, [facility], + retinopathy, getting injections both eyes  Podiatry: Last visit - no recent visit  Monofilament / foot exam: (06/2018)  Lipids: (05/2018) TChol 146, , HDL 37, LDL 60 - on crestor 20 mg daily  Urine Microalb/Cr ratio: CKD stage 4, on ace-I  TSH: 1.718 (08/2017)  Aspirin: 81 mg daily    2) diabetic peripheral neuropathy:  - pt did not start Lyrica due to cost  - On Gabapentin 600 mg BID - renal dosing    Review of Systems   Constitutional: Positive for fatigue.        Weight stable   HENT: Negative.    Eyes: Negative.  Negative for visual disturbance.   Respiratory: Negative.    Cardiovascular: Positive for leg swelling. Negative for chest pain.   Gastrointestinal: Negative.  Negative for constipation and diarrhea.   Endocrine: Negative for cold intolerance, polydipsia and polyuria.        See HPI   Genitourinary:        Stress urinary incontinence   Musculoskeletal: Positive for arthralgias (knees & hands). Negative for back pain, joint swelling and myalgias.   Skin: Negative.  Negative  "for rash and wound.   Neurological: Positive for numbness (pain, Bilat  feet). Negative for headaches.   Hematological: Negative.    Psychiatric/Behavioral: Negative.      The following portions of the patient's history were reviewed and updated as appropriate: allergies, current medications, past family history, past medical history, past social history, past surgical history and problem list.    /62   Pulse 70   Ht 167.6 cm (66\")   Wt 78.9 kg (173 lb 14.4 oz)   SpO2 100%   BMI 28.07 kg/m²   Physical Exam   Constitutional: She is oriented to person, place, and time. She appears well-developed. No distress.   HENT:   Head: Normocephalic.   Mouth/Throat: Oropharynx is clear and moist.   Eyes: Conjunctivae and EOM are normal. Pupils are equal, round, and reactive to light.   Neck: No tracheal deviation present. No thyromegaly present.   No palpable thyroid nodules     Cardiovascular: Normal rate, regular rhythm and normal heart sounds.    No murmur heard.  Pulmonary/Chest: Effort normal and breath sounds normal. No respiratory distress.   Abdominal: Soft. Bowel sounds are normal.   No scarring at insulin injection sites   Lymphadenopathy:     She has no cervical adenopathy.   Neurological: She is alert and oriented to person, place, and time. No cranial nerve deficit.   Skin: Skin is warm and dry. She is not diaphoretic. No erythema.   Psychiatric: She has a normal mood and affect. Her behavior is normal.   Vitals reviewed.    LABS/IMAGING:   Results for orders placed or performed in visit on 06/04/18   POC Glycosylated Hemoglobin (Hb A1C)   Result Value Ref Range    Hemoglobin A1C 9.9 %   POC Glucose Fingerstick   Result Value Ref Range    Glucose 133 (A) 70 - 130 mg/dL     Lab Results   Component Value Date    GLUCOSE 196 (H) 02/02/2017    BUN 25 (H) 05/21/2018    CREATININE 1.70 (H) 05/21/2018    EGFRIFNONA 29 (L) 05/21/2018    EGFRIFAFRI 35 (L) 05/21/2018    BCR 14.7 05/21/2018    K 5.0 05/21/2018    " CO2 26.0 05/21/2018    CALCIUM 9.5 05/21/2018    PROTENTOTREF 7.1 05/21/2018    ALBUMIN 4.40 05/21/2018    LABIL2 1.6 05/21/2018    AST 15 05/21/2018    ALT 23 05/21/2018     Lab Results   Component Value Date    WBC 7.75 08/29/2017    HGB 12.7 08/29/2017    HCT 40.0 08/29/2017    MCV 85.5 08/29/2017     08/29/2017       ASSESSMENT/PLAN:  1)  Type 2 diabetes with associated complications of peripheral neuropathy, CKD, PAD, retinopathy: uncontrolled, A1C% 9.9 today. Pt reports missing her evening dose of insulin often contributing to he hyperglycemia. Also no FSBG data today to safely adjust her insulin doses.    - discussed with patient that glycemic targets are generally set somewhat higher (eg, <8 percent) for older adult patients and those with comorbidities. Therefore a goal A1C% of 7-8% is reasonable for Ms. Cotto.    - Continue Novolin 70/30, 70 units AC BID. (advised pt to take as 35  + 35 units for better absorption)   - written instructions reviewed with patient. Pt to call me with BGs in 2 weeks    2) diabetic peripheral neuropathy  - pt on 600 mg BID: renal dosing, refilled Rx today    RTC 3 months    .Counseling was given to patient for the following topics:  instructions for management and importance of treatment compliance, see details in assessment/plan. Total face to face time of the encounter was 30 minutes and 25 minutes was spent counseling.

## 2018-06-04 NOTE — TELEPHONE ENCOUNTER
DR. HUDDLESTON WE NEED YOU TO ADVISE US ON WHERE YOU WANT US TO SCHEDULE THIS APPOINTMENT WITH YOU IN 3 MONTHS. YOU ARE COMPLETELY BOOKED UP IN 3 MONTHS. THE CHECK OUT NOTE SAID TO OVER BOOK. WE NEED TO SEE WHERE YOU WANT US TO OVER BOOK THIS PATIENT AT ON YOUR SCHEDULE.  SHE NEEDS THE TIME TO BE AROUND 10AM -10:30. PATIENTS NUMBER [id number]

## 2018-06-05 DIAGNOSIS — IMO0002 UNCONTROLLED TYPE 2 DIABETES MELLITUS WITH COMPLICATION, WITH LONG-TERM CURRENT USE OF INSULIN: ICD-10-CM

## 2018-06-05 RX ORDER — NAPROXEN SODIUM 220 MG
1 TABLET ORAL 2 TIMES DAILY WITH MEALS
Qty: 200 EACH | Refills: 3 | Status: SHIPPED | OUTPATIENT
Start: 2018-06-05 | End: 2018-09-19 | Stop reason: SDUPTHER

## 2018-07-10 ENCOUNTER — OFFICE VISIT (OUTPATIENT)
Dept: CARDIOLOGY | Facility: CLINIC | Age: 78
End: 2018-07-10

## 2018-07-10 VITALS
WEIGHT: 181.4 LBS | BODY MASS INDEX: 29.15 KG/M2 | SYSTOLIC BLOOD PRESSURE: 120 MMHG | DIASTOLIC BLOOD PRESSURE: 64 MMHG | HEART RATE: 58 BPM | HEIGHT: 66 IN

## 2018-07-10 DIAGNOSIS — I10 ESSENTIAL HYPERTENSION: ICD-10-CM

## 2018-07-10 DIAGNOSIS — I25.119 CORONARY ARTERY DISEASE INVOLVING NATIVE CORONARY ARTERY OF NATIVE HEART WITH ANGINA PECTORIS (HCC): Primary | ICD-10-CM

## 2018-07-10 DIAGNOSIS — E78.5 HYPERLIPIDEMIA LDL GOAL <70: ICD-10-CM

## 2018-07-10 DIAGNOSIS — I73.9 PERIPHERAL ARTERIAL DISEASE (HCC): ICD-10-CM

## 2018-07-10 PROCEDURE — 99214 OFFICE O/P EST MOD 30 MIN: CPT | Performed by: INTERNAL MEDICINE

## 2018-07-10 RX ORDER — ROSUVASTATIN CALCIUM 20 MG/1
20 TABLET, COATED ORAL DAILY
Qty: 90 TABLET | Refills: 3
Start: 2018-07-10 | End: 2018-07-30 | Stop reason: SDUPTHER

## 2018-07-10 RX ORDER — CLOPIDOGREL BISULFATE 75 MG/1
75 TABLET ORAL DAILY
Qty: 90 TABLET | Refills: 3
Start: 2018-07-10 | End: 2018-07-30 | Stop reason: SDUPTHER

## 2018-07-10 RX ORDER — AMLODIPINE BESYLATE 5 MG/1
5 TABLET ORAL DAILY
Qty: 90 TABLET | Refills: 3
Start: 2018-07-10 | End: 2018-09-24 | Stop reason: SDUPTHER

## 2018-07-10 RX ORDER — MELATONIN
1000 DAILY
COMMUNITY

## 2018-07-10 NOTE — PROGRESS NOTES
Encounter Date:07/10/2018    Patient ID: Narcisa Cotto is a 78 y.o. female who resides in [place], [place].    CC/Reason for visit:  Unstable angina; Peripheral arterial disease; and Hypertension            Narcisa Cotto returns to my office today in follow up of her coronary artery disease, peripheral arterial disease, and cardiac risk factors. She denies chest pain or discomfort. She states that she has been struggling with a non-productive cough. Her main issue currently is orthopedic issues with her back.    Review of Systems   Constitution: Positive for malaise/fatigue. Negative for weakness.   Eyes: Positive for blurred vision. Negative for vision loss in left eye and vision loss in right eye.   Cardiovascular: Negative for chest pain, dyspnea on exertion, near-syncope, orthopnea, palpitations, paroxysmal nocturnal dyspnea and syncope.   Respiratory: Positive for cough and shortness of breath.    Skin: Positive for dry skin and itching.   Musculoskeletal: Positive for back pain. Negative for myalgias.   Gastrointestinal: Positive for heartburn.   Genitourinary: Positive for bladder incontinence.   Neurological: Positive for numbness. Negative for brief paralysis, excessive daytime sleepiness, focal weakness and paresthesias.   All other systems reviewed and are negative.      The patient's past medical, social, family history and ROS reviewed in the patient's electronic medical record.    Allergies  Codeine    Outpatient Prescriptions Marked as Taking for the 7/10/18 encounter (Office Visit) with Filemon Mayberry IV, MD   Medication Sig Dispense Refill   • ACCU-CHEK JOVITA test strip 1 each by Other route 4 (Four) Times a Day Before Meals & at Bedtime. 150 each 5   • amLODIPine (NORVASC) 5 MG tablet Take 1 tablet by mouth Daily for 360 days. 90 tablet 3   • aspirin (ASPIRIN LOW DOSE) 81 MG tablet Take 1 tablet by mouth Daily for 360 days. 90 tablet 3   • cholecalciferol (VITAMIN D3) 1000 units  "tablet Take 1,000 Units by mouth Daily.     • clopidogrel (PLAVIX) 75 MG tablet Take 1 tablet by mouth Daily for 360 days. 90 tablet 3   • cyanocobalamin ([employer] VITAMIN B-12) 1000 MCG tablet Take 1 tablet by mouth daily.     • diphenoxylate-atropine (LOMOTIL) 2.5-0.025 MG per tablet Take 1 tablet by mouth Daily. 30 tablet 2   • gabapentin (NEURONTIN) 600 MG tablet Take 1 tablet by mouth 2 (Two) Times a Day. 60 tablet 5   • insulin NPH-insulin regular (NOVOLIN 70/30) (70-30) 100 UNIT/ML injection 70 units before breakfast and 70 units before supper 40 mL 11   • Insulin Syringe 30G X 5/16\" 1 ML misc 1 each 2 (Two) Times a Day With Meals. 200 each 3   • meclizine (ANTIVERT) 25 MG tablet Take 1 tablet by mouth 3 (Three) Times a Day As Needed for dizziness. 30 tablet 0   • metoprolol tartrate (LOPRESSOR) 25 MG tablet Take 1 tablet by mouth 2 (Two) Times a Day for 360 days. 180 tablet 3   • ondansetron ODT (ZOFRAN-ODT) 8 MG disintegrating tablet Take 1 tablet by mouth Every 8 (Eight) Hours As Needed for Nausea. 30 tablet 0   • pantoprazole (PROTONIX) 40 MG EC tablet Take 1 tablet by mouth 2 (Two) Times a Day. 60 tablet 3   • raNITIdine (ZANTAC) 75 MG tablet Take 75 mg by mouth 2 (Two) Times a Day.     • rosuvastatin (CRESTOR) 20 MG tablet Take 1 tablet by mouth Daily for 360 days. 90 tablet 3   • sharps container 1 each Daily. 1 each 0   • [DISCONTINUED] amLODIPine (NORVASC) 5 MG tablet Take 1 tablet by mouth Daily. 30 tablet 11   • [DISCONTINUED] aspirin (ASPIRIN LOW DOSE) 81 MG tablet Take 1 tablet by mouth daily.     • [DISCONTINUED] clopidogrel (PLAVIX) 75 MG tablet Take 75 mg by mouth Daily.     • [DISCONTINUED] metoprolol tartrate (LOPRESSOR) 25 MG tablet TAKE ONE TABLET BY MOUTH TWICE A DAY 60 tablet 11   • [DISCONTINUED] rosuvastatin (CRESTOR) 20 MG tablet TAKE ONE TABLET BY MOUTH DAILY 30 tablet 4         Blood pressure 120/64, pulse 58, height 167.6 cm (66\"), weight 82.3 kg (181 lb 6.4 oz).  Body mass index is " 29.28 kg/m².  There were no vitals filed for this visit.    Physical Exam   Constitutional: She is oriented to person, place, and time. She appears well-developed and well-nourished.   HENT:   Head: Normocephalic and atraumatic.   Eyes: Pupils are equal, round, and reactive to light. No scleral icterus.   Neck: No JVD present. Carotid bruit is not present. No thyromegaly present.   Cardiovascular: Normal rate, regular rhythm, S1 normal and S2 normal.  Exam reveals no gallop.    No murmur heard.  Pulmonary/Chest: Effort normal and breath sounds normal.   Abdominal: Soft. There is no hepatosplenomegaly. There is no tenderness.   Neurological: She is alert and oriented to person, place, and time.   Skin: Skin is warm and dry. No cyanosis. Nails show no clubbing.   Psychiatric: She has a normal mood and affect. Her behavior is normal.       Data Review:     Procedures    Lab Results   Component Value Date    CHOL 260 (H) 02/02/2017    TRIG 244 (H) 05/21/2018    HDL 37 (L) 05/21/2018    LDL 60 05/21/2018    AST 15 05/21/2018    ALT 23 05/21/2018     Lab Results   Component Value Date    HGBA1C 9.9 06/04/2018     Lab Results   Component Value Date    GLUCOSE 196 (H) 02/02/2017    BUN 25 (H) 05/21/2018    CREATININE 1.70 (H) 05/21/2018    EGFRIFNONA 29 (L) 05/21/2018    EGFRIFAFRI 35 (L) 05/21/2018    BCR 14.7 05/21/2018    K 5.0 05/21/2018    CO2 26.0 05/21/2018    CALCIUM 9.5 05/21/2018    PROTENTOTREF 7.1 05/21/2018    ALBUMIN 4.40 05/21/2018    LABIL2 1.6 05/21/2018    AST 15 05/21/2018    ALT 23 05/21/2018            Problem List Items Addressed This Visit        Cardiovascular and Mediastinum    Coronary artery disease involving native coronary artery of native heart with angina pectoris (CMS/HCC) - Primary    Overview     · Echo (8/31/2016): EF > 70%. Moderate thickening of the noncoronary cusp of the aortic valve.  · Echo (04/23/2018): LVEF 60%. Grade I diastolic dysfunction.   Cardiac valves are functionally  normal.  · Nuclear stress test (04/23/2018): No focal areas of ischemia or infarct. TID present. Coronary artery calcification noted on CT attenuation correction images.          Current Assessment & Plan     · Presently asymptomatic for angina  · Defer cardiac catheterization at this time  · Continue low-dose aspirin, beta blocker, high intensity statin therapy         Relevant Medications    metoprolol tartrate (LOPRESSOR) 25 MG tablet    clopidogrel (PLAVIX) 75 MG tablet    aspirin (ASPIRIN LOW DOSE) 81 MG tablet    amLODIPine (NORVASC) 5 MG tablet    Essential hypertension    Current Assessment & Plan     · Controlled  · Continue present medical therapy         Relevant Medications    metoprolol tartrate (LOPRESSOR) 25 MG tablet    amLODIPine (NORVASC) 5 MG tablet    Hyperlipidemia LDL goal <70    Overview     · High intensity statin therapy is recommended given the presence of peripheral arterial disease and diabetes         Current Assessment & Plan     · Continue Crestor 20 mg daily.         Relevant Medications    rosuvastatin (CRESTOR) 20 MG tablet    Peripheral arterial disease (CMS/[facility])    Overview     · BONNIE (08/22/2013):  At rest right 0.98, left 0.85.  After 2 minutes of exercise right 0.51, left 0.68.  · Venous duplex LE (7/27/2016): All veins visiualized appear patent with distal augmentation bilaterally.  · BONNIE (04/23/2018): study normal Normal left BONNIE at 1.08. Low normal right BONNIE at 0.93         Relevant Medications    clopidogrel (PLAVIX) 75 MG tablet               · Continue present medical therapy  · Defer cardiac catheterization for now given asymptomatic status  · Follow-up with me in 6 months    Filemon Mayberry IV, MD  7/10/2018     Scribed for Filemon Mayberry IV, MD by Chalo Osullivan. 7/10/2018  1:01 PM

## 2018-07-10 NOTE — ASSESSMENT & PLAN NOTE
· Presently asymptomatic for angina  · Defer cardiac catheterization at this time  · Continue low-dose aspirin, beta blocker, high intensity statin therapy

## 2018-07-16 RX ORDER — MECLIZINE HYDROCHLORIDE 25 MG/1
TABLET ORAL
Qty: 30 TABLET | Refills: 0 | Status: SHIPPED | OUTPATIENT
Start: 2018-07-16 | End: 2018-09-12 | Stop reason: SDUPTHER

## 2018-07-30 DIAGNOSIS — E78.5 HYPERLIPIDEMIA LDL GOAL <70: ICD-10-CM

## 2018-07-30 DIAGNOSIS — I25.119 CORONARY ARTERY DISEASE INVOLVING NATIVE CORONARY ARTERY OF NATIVE HEART WITH ANGINA PECTORIS (HCC): ICD-10-CM

## 2018-07-30 DIAGNOSIS — I10 ESSENTIAL HYPERTENSION: ICD-10-CM

## 2018-07-30 DIAGNOSIS — I73.9 PERIPHERAL ARTERIAL DISEASE (HCC): ICD-10-CM

## 2018-07-30 RX ORDER — ROSUVASTATIN CALCIUM 20 MG/1
20 TABLET, COATED ORAL DAILY
Qty: 90 TABLET | Refills: 3 | Status: SHIPPED | OUTPATIENT
Start: 2018-07-30 | End: 2018-09-24 | Stop reason: SDUPTHER

## 2018-07-30 RX ORDER — CLOPIDOGREL BISULFATE 75 MG/1
75 TABLET ORAL DAILY
Qty: 90 TABLET | Refills: 3
Start: 2018-07-30 | End: 2018-09-20 | Stop reason: SDUPTHER

## 2018-08-28 ENCOUNTER — TRANSCRIBE ORDERS (OUTPATIENT)
Dept: ADMINISTRATIVE | Facility: HOSPITAL | Age: 78
End: 2018-08-28

## 2018-08-28 DIAGNOSIS — M54.5 LOW BACK PAIN, UNSPECIFIED BACK PAIN LATERALITY, UNSPECIFIED CHRONICITY, WITH SCIATICA PRESENCE UNSPECIFIED: Primary | ICD-10-CM

## 2018-09-06 ENCOUNTER — HOSPITAL ENCOUNTER (OUTPATIENT)
Dept: GENERAL RADIOLOGY | Facility: HOSPITAL | Age: 78
Discharge: HOME | End: 2018-09-06
Attending: ORTHOPAEDIC SURGERY | Admitting: ORTHOPAEDIC SURGERY

## 2018-09-06 ENCOUNTER — HOSPITAL ENCOUNTER (OUTPATIENT)
Dept: CT IMAGING | Facility: HOSPITAL | Age: 78
Discharge: HOME | End: 2018-09-06

## 2018-09-06 VITALS
BODY MASS INDEX: 28.93 KG/M2 | RESPIRATION RATE: 18 BRPM | OXYGEN SATURATION: 96 % | TEMPERATURE: 97.6 F | HEART RATE: 73 BPM | DIASTOLIC BLOOD PRESSURE: 67 MMHG | HEIGHT: 66 IN | SYSTOLIC BLOOD PRESSURE: 146 MMHG | WEIGHT: 180 LBS

## 2018-09-06 DIAGNOSIS — M54.5 LOW BACK PAIN, UNSPECIFIED BACK PAIN LATERALITY, UNSPECIFIED CHRONICITY, WITH SCIATICA PRESENCE UNSPECIFIED: ICD-10-CM

## 2018-09-06 LAB — GLUCOSE BLDC GLUCOMTR-MCNC: 78 MG/DL (ref 70–130)

## 2018-09-06 PROCEDURE — 72240 MYELOGRAPHY NECK SPINE: CPT

## 2018-09-06 PROCEDURE — 72132 CT LUMBAR SPINE W/DYE: CPT

## 2018-09-06 PROCEDURE — 62304 MYELOGRAPHY LUMBAR INJECTION: CPT

## 2018-09-06 PROCEDURE — 0 IOPAMIDOL 41 % SOLUTION: Performed by: ORTHOPAEDIC SURGERY

## 2018-09-06 PROCEDURE — 82962 GLUCOSE BLOOD TEST: CPT

## 2018-09-06 RX ORDER — LIDOCAINE HYDROCHLORIDE 10 MG/ML
10 INJECTION, SOLUTION INFILTRATION; PERINEURAL ONCE
Status: COMPLETED | OUTPATIENT
Start: 2018-09-06 | End: 2018-09-06

## 2018-09-06 RX ADMIN — LIDOCAINE HYDROCHLORIDE 10 ML: 10 INJECTION, SOLUTION INFILTRATION; PERINEURAL at 11:12

## 2018-09-06 RX ADMIN — IOPAMIDOL 20 ML: 408 INJECTION, SOLUTION INTRATHECAL at 11:12

## 2018-09-06 NOTE — NURSING NOTE
Provided printed and oral discharge instructions. Verbalizes understanding. Discharged per wheelchair to front entrance with friend driving.

## 2018-09-06 NOTE — NURSING NOTE
Returned to room. Tolerated procedure well. No complaints. HOB elev, provided lunch tray and liquids.

## 2018-09-06 NOTE — DISCHARGE INSTR - ACTIVITY
Rest quietly at home today. You may lie in bed, on a couch, or in a recliner in the reclined position.  Tomorrow, you may resume light activity as tolerated, shower, and remove the band aid.

## 2018-09-06 NOTE — POST-PROCEDURE NOTE
Radiology Procedure    Pre-procedure: procedure, risks discussed with patient. Patient indicated understanding and consented to procedure     Procedure Performed: lumbar myelogram     IV Sedation and/or Anesthesia:  No    Complications: none    Preliminary Findings: pending    Specimen Removed: none    Estimated Blood Loss:  0ml    Post-Procedure Diagnosis: pending    Post-Procedure Plan: ct L spine, encourage fluids, bed rest x 2 hours    Standard Discharge Instructions Given:yes     HEMALATHA Braga  09/06/18  11:09 AM

## 2018-09-07 ENCOUNTER — TELEPHONE (OUTPATIENT)
Dept: INFUSION THERAPY | Facility: HOSPITAL | Age: 78
End: 2018-09-07

## 2018-09-12 RX ORDER — MECLIZINE HYDROCHLORIDE 25 MG/1
TABLET ORAL
Qty: 30 TABLET | Refills: 0 | Status: SHIPPED | OUTPATIENT
Start: 2018-09-12 | End: 2018-10-05 | Stop reason: SDUPTHER

## 2018-09-12 RX ORDER — ONDANSETRON 8 MG/1
TABLET, ORALLY DISINTEGRATING ORAL
Qty: 30 TABLET | Refills: 0 | Status: SHIPPED | OUTPATIENT
Start: 2018-09-12 | End: 2018-10-05 | Stop reason: SDUPTHER

## 2018-09-19 ENCOUNTER — TELEPHONE (OUTPATIENT)
Dept: CARDIOLOGY | Facility: CLINIC | Age: 78
End: 2018-09-19

## 2018-09-19 ENCOUNTER — TELEPHONE (OUTPATIENT)
Dept: INTERNAL MEDICINE | Facility: CLINIC | Age: 78
End: 2018-09-19

## 2018-09-19 ENCOUNTER — OFFICE VISIT (OUTPATIENT)
Dept: ENDOCRINOLOGY | Facility: CLINIC | Age: 78
End: 2018-09-19

## 2018-09-19 ENCOUNTER — OFFICE VISIT (OUTPATIENT)
Dept: INTERNAL MEDICINE | Facility: CLINIC | Age: 78
End: 2018-09-19

## 2018-09-19 VITALS
BODY MASS INDEX: 28.77 KG/M2 | SYSTOLIC BLOOD PRESSURE: 126 MMHG | HEIGHT: 66 IN | HEART RATE: 66 BPM | OXYGEN SATURATION: 96 % | DIASTOLIC BLOOD PRESSURE: 68 MMHG | WEIGHT: 179 LBS

## 2018-09-19 VITALS
HEART RATE: 66 BPM | SYSTOLIC BLOOD PRESSURE: 126 MMHG | BODY MASS INDEX: 28.77 KG/M2 | DIASTOLIC BLOOD PRESSURE: 68 MMHG | OXYGEN SATURATION: 96 % | HEIGHT: 66 IN | WEIGHT: 179 LBS

## 2018-09-19 DIAGNOSIS — R35.0 URINARY FREQUENCY: ICD-10-CM

## 2018-09-19 DIAGNOSIS — E78.5 HYPERLIPIDEMIA LDL GOAL <70: ICD-10-CM

## 2018-09-19 DIAGNOSIS — M48.061 SPINAL STENOSIS OF LUMBAR REGION, UNSPECIFIED WHETHER NEUROGENIC CLAUDICATION PRESENT: ICD-10-CM

## 2018-09-19 DIAGNOSIS — I73.9 PERIPHERAL ARTERIAL DISEASE (HCC): ICD-10-CM

## 2018-09-19 DIAGNOSIS — I10 ESSENTIAL HYPERTENSION: Primary | ICD-10-CM

## 2018-09-19 DIAGNOSIS — N39.0 ACUTE UTI: ICD-10-CM

## 2018-09-19 DIAGNOSIS — N30.00 ACUTE CYSTITIS WITHOUT HEMATURIA: ICD-10-CM

## 2018-09-19 DIAGNOSIS — F51.01 PRIMARY INSOMNIA: ICD-10-CM

## 2018-09-19 DIAGNOSIS — K21.9 GASTROESOPHAGEAL REFLUX DISEASE, ESOPHAGITIS PRESENCE NOT SPECIFIED: ICD-10-CM

## 2018-09-19 DIAGNOSIS — I25.119 CORONARY ARTERY DISEASE INVOLVING NATIVE CORONARY ARTERY OF NATIVE HEART WITH ANGINA PECTORIS (HCC): ICD-10-CM

## 2018-09-19 DIAGNOSIS — IMO0002 UNCONTROLLED TYPE 2 DIABETES MELLITUS WITH COMPLICATION, WITH LONG-TERM CURRENT USE OF INSULIN: Primary | ICD-10-CM

## 2018-09-19 DIAGNOSIS — N18.30 STAGE 3 CHRONIC KIDNEY DISEASE (HCC): ICD-10-CM

## 2018-09-19 LAB
ALBUMIN SERPL-MCNC: 4.66 G/DL (ref 3.2–4.8)
ALBUMIN/GLOB SERPL: 2.1 G/DL (ref 1.5–2.5)
ALP SERPL-CCNC: 80 U/L (ref 25–100)
ALT SERPL-CCNC: 27 U/L (ref 7–40)
AST SERPL-CCNC: 20 U/L (ref 0–33)
BILIRUB BLD-MCNC: ABNORMAL MG/DL
BILIRUB SERPL-MCNC: 0.3 MG/DL (ref 0.3–1.2)
BUN SERPL-MCNC: 26 MG/DL (ref 9–23)
BUN/CREAT SERPL: 13.7 (ref 7–25)
CALCIUM SERPL-MCNC: 10.2 MG/DL (ref 8.7–10.4)
CHLORIDE SERPL-SCNC: 104 MMOL/L (ref 99–109)
CHOLEST SERPL-MCNC: 147 MG/DL (ref 0–200)
CLARITY, POC: CLEAR
CO2 SERPL-SCNC: 28 MMOL/L (ref 20–31)
COLOR UR: YELLOW
CREAT SERPL-MCNC: 1.9 MG/DL (ref 0.6–1.3)
GLOBULIN SER CALC-MCNC: 2.2 GM/DL
GLUCOSE BLDC GLUCOMTR-MCNC: 50 MG/DL (ref 70–130)
GLUCOSE BLDC GLUCOMTR-MCNC: 69 MG/DL (ref 70–130)
GLUCOSE BLDC GLUCOMTR-MCNC: 93 MG/DL (ref 70–130)
GLUCOSE SERPL-MCNC: 130 MG/DL (ref 70–100)
GLUCOSE UR STRIP-MCNC: NEGATIVE MG/DL
HBA1C MFR BLD: 9.5 %
HDLC SERPL-MCNC: 40 MG/DL (ref 40–60)
KETONES UR QL: NEGATIVE
LDLC SERPL CALC-MCNC: 60 MG/DL (ref 0–100)
LEUKOCYTE EST, POC: ABNORMAL
NITRITE UR-MCNC: NEGATIVE MG/ML
PH UR: 5 [PH] (ref 5–8)
POTASSIUM SERPL-SCNC: 4.4 MMOL/L (ref 3.5–5.5)
PROT SERPL-MCNC: 6.9 G/DL (ref 5.7–8.2)
PROT UR STRIP-MCNC: ABNORMAL MG/DL
RBC # UR STRIP: NEGATIVE /UL
SODIUM SERPL-SCNC: 143 MMOL/L (ref 132–146)
SP GR UR: 1.01 (ref 1–1.03)
TRIGL SERPL-MCNC: 233 MG/DL (ref 0–150)
UROBILINOGEN UR QL: ABNORMAL
VLDLC SERPL CALC-MCNC: 46.6 MG/DL

## 2018-09-19 PROCEDURE — 81002 URINALYSIS NONAUTO W/O SCOPE: CPT | Performed by: INTERNAL MEDICINE

## 2018-09-19 PROCEDURE — 83036 HEMOGLOBIN GLYCOSYLATED A1C: CPT | Performed by: INTERNAL MEDICINE

## 2018-09-19 PROCEDURE — 99214 OFFICE O/P EST MOD 30 MIN: CPT | Performed by: INTERNAL MEDICINE

## 2018-09-19 PROCEDURE — 82947 ASSAY GLUCOSE BLOOD QUANT: CPT | Performed by: INTERNAL MEDICINE

## 2018-09-19 RX ORDER — NITROFURANTOIN 25; 75 MG/1; MG/1
100 CAPSULE ORAL 2 TIMES DAILY
Qty: 14 CAPSULE | Refills: 0 | Status: SHIPPED | OUTPATIENT
Start: 2018-09-19 | End: 2018-11-08

## 2018-09-19 RX ORDER — NAPROXEN SODIUM 220 MG
1 TABLET ORAL
Qty: 300 EACH | Refills: 3 | Status: SHIPPED | OUTPATIENT
Start: 2018-09-19 | End: 2019-03-13 | Stop reason: SDUPTHER

## 2018-09-19 NOTE — PROGRESS NOTES
.gary  Subjective   Narcisa Cotto is a 78 y.o. female.   Chief Complaint   Patient presents with   • Hypertension     4 mo f/u       Chronic Kidney Disease   This is a chronic problem. The current episode started more than 1 year ago. Associated symptoms include fatigue. Pertinent negatives include no abdominal pain, arthralgias, chest pain, chills, congestion, coughing, diaphoresis, fever, headaches, myalgias, nausea, neck pain, numbness, rash, sore throat, swollen glands, urinary symptoms, vertigo, visual change or vomiting. The treatment provided mild relief.   Heartburn   She reports no abdominal pain, no chest pain, no coughing, no nausea, no sore throat or no wheezing. This is a chronic problem. The current episode started more than 1 year ago. Associated symptoms include fatigue.   Hyperlipidemia   This is a chronic problem. The current episode started more than 1 year ago. The problem is controlled. Pertinent negatives include no chest pain, myalgias or shortness of breath. Risk factors for coronary artery disease include diabetes mellitus, dyslipidemia and hypertension.   Hypertension   This is a chronic problem. The current episode started more than 1 year ago. The problem is controlled. Pertinent negatives include no chest pain, headaches, neck pain, palpitations or shortness of breath. Risk factors for coronary artery disease include diabetes mellitus and dyslipidemia.   Insomnia   This is a chronic problem. The current episode started more than 1 year ago. Associated symptoms include fatigue. Pertinent negatives include no abdominal pain, arthralgias, chest pain, chills, congestion, coughing, diaphoresis, fever, headaches, myalgias, nausea, neck pain, numbness, rash, sore throat, swollen glands, urinary symptoms, vertigo, visual change or vomiting. The treatment provided significant relief.    Painful ruination x 2 days. No fever or chills. No flank pain.  The following portions of the patient's  "history were reviewed and updated as appropriate: allergies, current medications, past family history, past medical history, past social history, past surgical history and problem list.    Review of Systems   Constitutional: Positive for activity change and fatigue. Negative for appetite change, chills, diaphoresis, fever and unexpected weight change.   HENT: Negative for congestion, ear discharge, ear pain, mouth sores, nosebleeds, sinus pressure, sneezing and sore throat.    Eyes: Negative for pain, discharge and itching.   Respiratory: Negative for cough, chest tightness, shortness of breath and wheezing.    Cardiovascular: Negative for chest pain, palpitations and leg swelling.   Gastrointestinal: Negative for abdominal pain, constipation, diarrhea, nausea and vomiting.   Endocrine: Negative for cold intolerance, heat intolerance, polydipsia and polyphagia.   Genitourinary: Positive for dysuria. Negative for flank pain, frequency, hematuria and urgency.   Musculoskeletal: Positive for back pain. Negative for arthralgias, gait problem, myalgias, neck pain and neck stiffness.   Skin: Negative for color change, pallor and rash.   Neurological: Negative for vertigo, seizures, speech difficulty, numbness and headaches.   Psychiatric/Behavioral: Negative for agitation, confusion, decreased concentration and sleep disturbance. The patient has insomnia. The patient is not nervous/anxious.      /68   Pulse 66   Ht 167.6 cm (65.98\")   Wt 81.2 kg (179 lb)   SpO2 96%   BMI 28.91 kg/m²     Objective   Physical Exam   Constitutional: She is oriented to person, place, and time. She appears well-developed.   HENT:   Head: Normocephalic.   Right Ear: External ear normal.   Left Ear: External ear normal.   Nose: Nose normal.   Mouth/Throat: Oropharynx is clear and moist.   Eyes: Pupils are equal, round, and reactive to light. Conjunctivae are normal.   Neck: No JVD present. No thyromegaly present.   Cardiovascular: " Normal rate, regular rhythm and normal heart sounds.  Exam reveals no friction rub.    No murmur heard.  Pulmonary/Chest: Effort normal and breath sounds normal. No respiratory distress. She has no wheezes. She has no rales.   Abdominal: Soft. Bowel sounds are normal. She exhibits no distension. There is no tenderness. There is no guarding.   Musculoskeletal: She exhibits no edema or tenderness.   Lymphadenopathy:     She has no cervical adenopathy.   Neurological: She is alert and oriented to person, place, and time. She has normal reflexes. She displays normal reflexes. No cranial nerve deficit.   Skin: No rash noted.   Psychiatric: She has a normal mood and affect. Her behavior is normal.   Nursing note and vitals reviewed.      Assessment/Plan   Narcisa was seen today for chronic kidney disease, hypertension, hyperlipidemia and vitamin b12 def.    Diagnoses and all orders for this visit:    Insomnia, unspecified type  -     traZODone (DESYREL) 50 MG tablet; Take 1 tablet by mouth Every Night.  Stable with trazadone that was refilled today  Essential hypertension  -     Comprehensive Metabolic Panel  -     Lipid Panel  stable  Hyperlipidemia, unspecified hyperlipidemia type  -     Comprehensive Metabolic Panel  -     Lipid Panel  stable  Gastroesophageal reflux disease, esophagitis presence not specified  Stable. Recent change from protonix to zantac to protect her kidney  Chronic kidney disease, stage 3 (moderate)  Labs today. Still following with nephro.      Spinal Stenosis  Need cardiol for preop with Dr. Mayberry

## 2018-09-19 NOTE — TELEPHONE ENCOUNTER
Called pt to let her know UA was positive for a UTI. Medication has been sent to the for UTI please advise pt to pick-up medication. Pt didn't answer lvm.

## 2018-09-19 NOTE — TELEPHONE ENCOUNTER
Patient requesting cardiac clearance for upcoming back surgery with Dr. Hylton. Last seen 7/10/2018. OK to clear and hold Plavix 5 days?

## 2018-09-20 DIAGNOSIS — I25.119 CORONARY ARTERY DISEASE INVOLVING NATIVE CORONARY ARTERY OF NATIVE HEART WITH ANGINA PECTORIS (HCC): ICD-10-CM

## 2018-09-20 DIAGNOSIS — I73.9 PERIPHERAL ARTERIAL DISEASE (HCC): ICD-10-CM

## 2018-09-20 RX ORDER — CLOPIDOGREL BISULFATE 75 MG/1
75 TABLET ORAL DAILY
Qty: 90 TABLET | Refills: 3 | Status: SHIPPED | OUTPATIENT
Start: 2018-09-20 | End: 2018-09-24 | Stop reason: SDUPTHER

## 2018-09-24 DIAGNOSIS — I25.119 CORONARY ARTERY DISEASE INVOLVING NATIVE CORONARY ARTERY OF NATIVE HEART WITH ANGINA PECTORIS (HCC): ICD-10-CM

## 2018-09-24 DIAGNOSIS — I73.9 PERIPHERAL ARTERIAL DISEASE (HCC): ICD-10-CM

## 2018-09-24 DIAGNOSIS — E78.5 HYPERLIPIDEMIA LDL GOAL <70: ICD-10-CM

## 2018-09-24 DIAGNOSIS — I10 ESSENTIAL HYPERTENSION: ICD-10-CM

## 2018-09-24 LAB
BACTERIA UR CULT: ABNORMAL
BACTERIA UR CULT: ABNORMAL
OTHER ANTIBIOTIC SUSC ISLT: ABNORMAL

## 2018-09-24 RX ORDER — CLOPIDOGREL BISULFATE 75 MG/1
75 TABLET ORAL DAILY
Qty: 90 TABLET | Refills: 3 | Status: SHIPPED | OUTPATIENT
Start: 2018-09-24 | End: 2018-10-03 | Stop reason: SDUPTHER

## 2018-09-24 RX ORDER — ROSUVASTATIN CALCIUM 20 MG/1
20 TABLET, COATED ORAL DAILY
Qty: 90 TABLET | Refills: 3 | Status: SHIPPED | OUTPATIENT
Start: 2018-09-24 | End: 2018-10-11 | Stop reason: SDUPTHER

## 2018-09-24 RX ORDER — AMLODIPINE BESYLATE 5 MG/1
5 TABLET ORAL DAILY
Qty: 90 TABLET | Refills: 3 | Status: SHIPPED | OUTPATIENT
Start: 2018-09-24 | End: 2018-10-03 | Stop reason: SDUPTHER

## 2018-09-26 ENCOUNTER — TELEPHONE (OUTPATIENT)
Dept: INTERNAL MEDICINE | Facility: CLINIC | Age: 78
End: 2018-09-26

## 2018-09-26 NOTE — TELEPHONE ENCOUNTER
PATIENT SAW DR. BRENNAN ON 9/21/18. SHE IS HAVING BACK SURGERY AND THEY ARE NEEDING A LETTER TO CLEAR PATIENT FOR SURGERY. PATIENT WOULD LIKE US TO FAX LETTER TO DR. MEJIA'S OFFICE FAX NUMBER [phone] OFFICE NUMBER [phone]. PATIENTS NUMBER [phone]

## 2018-09-26 NOTE — TELEPHONE ENCOUNTER
Called and informed pt, pt will come in on Friday at 12 ok per [place] can you pls put pt on schedule.

## 2018-09-26 NOTE — TELEPHONE ENCOUNTER
Called and informed pt, per pt she already saw cardiology last week for the preop and they sent their clearance letter in but they are still needing a clearance letter from pt's pcp.

## 2018-09-28 ENCOUNTER — OFFICE VISIT (OUTPATIENT)
Dept: INTERNAL MEDICINE | Facility: CLINIC | Age: 78
End: 2018-09-28

## 2018-09-28 VITALS
HEIGHT: 66 IN | DIASTOLIC BLOOD PRESSURE: 72 MMHG | HEART RATE: 69 BPM | BODY MASS INDEX: 28.77 KG/M2 | SYSTOLIC BLOOD PRESSURE: 128 MMHG | WEIGHT: 179 LBS | OXYGEN SATURATION: 96 %

## 2018-09-28 DIAGNOSIS — IMO0002 UNCONTROLLED TYPE 2 DIABETES MELLITUS WITH COMPLICATION, WITH LONG-TERM CURRENT USE OF INSULIN: ICD-10-CM

## 2018-09-28 DIAGNOSIS — E78.5 HYPERLIPIDEMIA LDL GOAL <70: ICD-10-CM

## 2018-09-28 DIAGNOSIS — N18.30 STAGE 3 CHRONIC KIDNEY DISEASE (HCC): ICD-10-CM

## 2018-09-28 DIAGNOSIS — I10 ESSENTIAL HYPERTENSION: ICD-10-CM

## 2018-09-28 DIAGNOSIS — K21.9 GASTROESOPHAGEAL REFLUX DISEASE, ESOPHAGITIS PRESENCE NOT SPECIFIED: ICD-10-CM

## 2018-09-28 DIAGNOSIS — I25.119 CORONARY ARTERY DISEASE INVOLVING NATIVE CORONARY ARTERY OF NATIVE HEART WITH ANGINA PECTORIS (HCC): Primary | ICD-10-CM

## 2018-09-28 PROCEDURE — 99214 OFFICE O/P EST MOD 30 MIN: CPT | Performed by: INTERNAL MEDICINE

## 2018-10-03 DIAGNOSIS — I25.119 CORONARY ARTERY DISEASE INVOLVING NATIVE CORONARY ARTERY OF NATIVE HEART WITH ANGINA PECTORIS (HCC): ICD-10-CM

## 2018-10-03 DIAGNOSIS — I73.9 PERIPHERAL ARTERIAL DISEASE (HCC): ICD-10-CM

## 2018-10-03 DIAGNOSIS — I10 ESSENTIAL HYPERTENSION: ICD-10-CM

## 2018-10-03 RX ORDER — CLOPIDOGREL BISULFATE 75 MG/1
75 TABLET ORAL DAILY
Qty: 90 TABLET | Refills: 3 | Status: ON HOLD | OUTPATIENT
Start: 2018-10-03 | End: 2018-11-17 | Stop reason: SDUPTHER

## 2018-10-03 RX ORDER — BLOOD-GLUCOSE METER
EACH MISCELLANEOUS
Refills: 0 | COMMUNITY
Start: 2018-09-20 | End: 2019-06-21

## 2018-10-03 RX ORDER — AMLODIPINE BESYLATE 5 MG/1
5 TABLET ORAL DAILY
Qty: 90 TABLET | Refills: 3 | Status: SHIPPED | OUTPATIENT
Start: 2018-10-03 | End: 2019-03-29 | Stop reason: SDUPTHER

## 2018-10-05 RX ORDER — MECLIZINE HYDROCHLORIDE 25 MG/1
25 TABLET ORAL 3 TIMES DAILY PRN
Qty: 30 TABLET | Refills: 0 | Status: SHIPPED | OUTPATIENT
Start: 2018-10-05 | End: 2018-10-10 | Stop reason: SDUPTHER

## 2018-10-05 RX ORDER — PANTOPRAZOLE SODIUM 40 MG/1
40 TABLET, DELAYED RELEASE ORAL 2 TIMES DAILY
Qty: 60 TABLET | Refills: 3 | Status: SHIPPED | OUTPATIENT
Start: 2018-10-05 | End: 2018-10-10 | Stop reason: SDUPTHER

## 2018-10-05 RX ORDER — ONDANSETRON 8 MG/1
8 TABLET, ORALLY DISINTEGRATING ORAL EVERY 8 HOURS PRN
Qty: 30 TABLET | Refills: 0 | Status: SHIPPED | OUTPATIENT
Start: 2018-10-05 | End: 2018-10-10 | Stop reason: SDUPTHER

## 2018-10-10 ENCOUNTER — TELEPHONE (OUTPATIENT)
Dept: INTERNAL MEDICINE | Facility: CLINIC | Age: 78
End: 2018-10-10

## 2018-10-10 DIAGNOSIS — IMO0002 UNCONTROLLED TYPE 2 DIABETES MELLITUS WITH COMPLICATION, WITH LONG-TERM CURRENT USE OF INSULIN: Primary | ICD-10-CM

## 2018-10-10 DIAGNOSIS — E11.42 DIABETIC PERIPHERAL NEUROPATHY (HCC): ICD-10-CM

## 2018-10-10 RX ORDER — PANTOPRAZOLE SODIUM 40 MG/1
40 TABLET, DELAYED RELEASE ORAL 2 TIMES DAILY
Qty: 180 TABLET | Refills: 0 | Status: SHIPPED | OUTPATIENT
Start: 2018-10-10 | End: 2018-10-12 | Stop reason: SDUPTHER

## 2018-10-10 RX ORDER — GABAPENTIN 600 MG/1
600 TABLET ORAL 2 TIMES DAILY
Qty: 180 TABLET | Refills: 1 | Status: SHIPPED | OUTPATIENT
Start: 2018-10-10 | End: 2019-03-13 | Stop reason: SDUPTHER

## 2018-10-10 RX ORDER — ONDANSETRON 8 MG/1
8 TABLET, ORALLY DISINTEGRATING ORAL EVERY 8 HOURS PRN
Qty: 90 TABLET | Refills: 0 | Status: SHIPPED | OUTPATIENT
Start: 2018-10-10 | End: 2018-10-12 | Stop reason: SDUPTHER

## 2018-10-10 RX ORDER — MECLIZINE HYDROCHLORIDE 25 MG/1
25 TABLET ORAL 3 TIMES DAILY PRN
Qty: 90 TABLET | Refills: 0 | Status: SHIPPED | OUTPATIENT
Start: 2018-10-10 | End: 2018-10-12 | Stop reason: SDUPTHER

## 2018-10-10 NOTE — TELEPHONE ENCOUNTER
ANDREW WITH [facility] CALLED... PATIENTS MEDICATIONS: PANTOPRAZOLE, MECLIZINE, AND ONDANSETRON NEED TO BE SENT TO [facility]. IT IS LESS COSTLY FOR THE PATIENT FOR MEDICATIONS TO BE SENT HERE THAN [facility]. PLEASE RE-SEND MEDICATIONS TO CORRECT PHARMACY. THANKS.

## 2018-10-11 ENCOUNTER — TELEPHONE (OUTPATIENT)
Dept: INTERNAL MEDICINE | Facility: CLINIC | Age: 78
End: 2018-10-11

## 2018-10-11 DIAGNOSIS — E78.5 HYPERLIPIDEMIA LDL GOAL <70: ICD-10-CM

## 2018-10-11 RX ORDER — ROSUVASTATIN CALCIUM 20 MG/1
20 TABLET, COATED ORAL DAILY
Qty: 90 TABLET | Refills: 3 | Status: SHIPPED | OUTPATIENT
Start: 2018-10-11 | End: 2019-03-29 | Stop reason: SDUPTHER

## 2018-10-11 NOTE — TELEPHONE ENCOUNTER
PTS DAUGHTER CALLED TO SEE IF DR HUDDLESTON HAS WRITTEN THE LETTER FOR DR BELL TO CLEAR HER SURGERY    PLEASE CALL DAUGHTER BACK [phone]

## 2018-10-12 DIAGNOSIS — IMO0002 UNCONTROLLED TYPE 2 DIABETES MELLITUS WITH COMPLICATION, WITH LONG-TERM CURRENT USE OF INSULIN: Primary | ICD-10-CM

## 2018-10-12 RX ORDER — LANCETS
1 EACH MISCELLANEOUS
Qty: 300 EACH | Refills: 3 | Status: SHIPPED | OUTPATIENT
Start: 2018-10-12 | End: 2019-06-21

## 2018-10-12 RX ORDER — MECLIZINE HYDROCHLORIDE 25 MG/1
25 TABLET ORAL 3 TIMES DAILY PRN
Qty: 270 TABLET | Refills: 1 | Status: SHIPPED | OUTPATIENT
Start: 2018-10-12 | End: 2019-03-29 | Stop reason: SDUPTHER

## 2018-10-12 RX ORDER — ONDANSETRON 8 MG/1
8 TABLET, ORALLY DISINTEGRATING ORAL EVERY 8 HOURS PRN
Qty: 180 TABLET | Refills: 1 | Status: SHIPPED | OUTPATIENT
Start: 2018-10-12 | End: 2019-11-06 | Stop reason: SDUPTHER

## 2018-10-12 RX ORDER — BLOOD GLUCOSE CONTROL HIGH,LOW
1 EACH MISCELLANEOUS TAKE AS DIRECTED
Qty: 1 EACH | Refills: 3 | Status: SHIPPED | OUTPATIENT
Start: 2018-10-12 | End: 2019-06-21

## 2018-10-12 RX ORDER — PANTOPRAZOLE SODIUM 40 MG/1
40 TABLET, DELAYED RELEASE ORAL 2 TIMES DAILY
Qty: 180 TABLET | Refills: 1 | Status: SHIPPED | OUTPATIENT
Start: 2018-10-12 | End: 2019-03-29 | Stop reason: SDUPTHER

## 2018-10-12 NOTE — TELEPHONE ENCOUNTER
Ran Chiang,     I spoke to patient's daughter. Pt's surgeon is waiting on a clearance letter from her PCP. They have clearance letter from cardiology. I addended my clinic note discussing her BG being at goal now prior to surgery.    Thanks,   Jennifer

## 2018-11-08 ENCOUNTER — APPOINTMENT (OUTPATIENT)
Dept: PREADMISSION TESTING | Facility: HOSPITAL | Age: 78
End: 2018-11-08

## 2018-11-08 VITALS — WEIGHT: 180.78 LBS | BODY MASS INDEX: 29.05 KG/M2 | HEIGHT: 66 IN

## 2018-11-08 LAB
DEPRECATED RDW RBC AUTO: 47.5 FL (ref 37–54)
ERYTHROCYTE [DISTWIDTH] IN BLOOD BY AUTOMATED COUNT: 14.8 % (ref 11.3–14.5)
HCT VFR BLD AUTO: 38.8 % (ref 34.5–44)
HGB BLD-MCNC: 12.3 G/DL (ref 11.5–15.5)
MCH RBC QN AUTO: 27.7 PG (ref 27–31)
MCHC RBC AUTO-ENTMCNC: 31.7 G/DL (ref 32–36)
MCV RBC AUTO: 87.4 FL (ref 80–99)
PLATELET # BLD AUTO: 188 10*3/MM3 (ref 150–450)
PMV BLD AUTO: 10.3 FL (ref 6–12)
POTASSIUM BLD-SCNC: 4.6 MMOL/L (ref 3.5–5.5)
RBC # BLD AUTO: 4.44 10*6/MM3 (ref 3.89–5.14)
WBC NRBC COR # BLD: 6.84 10*3/MM3 (ref 3.5–10.8)

## 2018-11-08 PROCEDURE — 36415 COLL VENOUS BLD VENIPUNCTURE: CPT

## 2018-11-08 PROCEDURE — 93010 ELECTROCARDIOGRAM REPORT: CPT | Performed by: INTERNAL MEDICINE

## 2018-11-08 PROCEDURE — 85027 COMPLETE CBC AUTOMATED: CPT | Performed by: ANESTHESIOLOGY

## 2018-11-08 PROCEDURE — 93005 ELECTROCARDIOGRAM TRACING: CPT

## 2018-11-08 PROCEDURE — 84132 ASSAY OF SERUM POTASSIUM: CPT | Performed by: ANESTHESIOLOGY

## 2018-11-08 NOTE — PAT
Patient to apply Chlorhexadine wipes  to surgical area (as instructed) the night before procedure and the AM of procedure. Wipes provided.    Patient instructed to drink 20 ounces (or until full) of Gatorade or 20 ounces of G2 (if diabetic) and complete 3 hours before your surgery start time. (NO RED Gatorade or G2)    Patient verbalized understanding.    Cardiac Clearance per Dr. Mayberry on chart.

## 2018-11-08 NOTE — DISCHARGE INSTRUCTIONS

## 2018-11-15 ENCOUNTER — ANESTHESIA (OUTPATIENT)
Dept: PERIOP | Facility: HOSPITAL | Age: 78
End: 2018-11-15

## 2018-11-15 ENCOUNTER — APPOINTMENT (OUTPATIENT)
Dept: GENERAL RADIOLOGY | Facility: HOSPITAL | Age: 78
End: 2018-11-15

## 2018-11-15 ENCOUNTER — HOSPITAL ENCOUNTER (OUTPATIENT)
Facility: HOSPITAL | Age: 78
Discharge: HOME | End: 2018-11-17
Attending: ORTHOPAEDIC SURGERY | Admitting: ORTHOPAEDIC SURGERY

## 2018-11-15 ENCOUNTER — ANESTHESIA EVENT (OUTPATIENT)
Dept: PERIOP | Facility: HOSPITAL | Age: 78
End: 2018-11-15

## 2018-11-15 DIAGNOSIS — I73.9 PERIPHERAL ARTERIAL DISEASE (HCC): ICD-10-CM

## 2018-11-15 DIAGNOSIS — Z74.09 IMPAIRED FUNCTIONAL MOBILITY, BALANCE, GAIT, AND ENDURANCE: Primary | ICD-10-CM

## 2018-11-15 DIAGNOSIS — I25.119 CORONARY ARTERY DISEASE INVOLVING NATIVE CORONARY ARTERY OF NATIVE HEART WITH ANGINA PECTORIS (HCC): ICD-10-CM

## 2018-11-15 PROBLEM — M48.061 LUMBAR STENOSIS: Status: ACTIVE | Noted: 2018-11-15

## 2018-11-15 PROBLEM — Z98.890 S/P LAMINECTOMY: Status: ACTIVE | Noted: 2018-11-15

## 2018-11-15 LAB
GLUCOSE BLDC GLUCOMTR-MCNC: 120 MG/DL (ref 70–130)
GLUCOSE BLDC GLUCOMTR-MCNC: 189 MG/DL (ref 70–130)
GLUCOSE BLDC GLUCOMTR-MCNC: 219 MG/DL (ref 70–130)
GLUCOSE BLDC GLUCOMTR-MCNC: 426 MG/DL (ref 70–130)
GLUCOSE BLDC GLUCOMTR-MCNC: 454 MG/DL (ref 70–130)
POTASSIUM BLD-SCNC: 4.2 MMOL/L (ref 3.5–5.5)

## 2018-11-15 PROCEDURE — 25010000002 FENTANYL CITRATE (PF) 100 MCG/2ML SOLUTION: Performed by: NURSE ANESTHETIST, CERTIFIED REGISTERED

## 2018-11-15 PROCEDURE — 72020 X-RAY EXAM OF SPINE 1 VIEW: CPT

## 2018-11-15 PROCEDURE — 63710000001 INSULIN DETEMIR PER 5 UNITS: Performed by: NURSE PRACTITIONER

## 2018-11-15 PROCEDURE — 25010000003 CEFAZOLIN IN DEXTROSE 2-4 GM/100ML-% SOLUTION: Performed by: ORTHOPAEDIC SURGERY

## 2018-11-15 PROCEDURE — 25810000003 SODIUM CHLORIDE 0.9 % WITH KCL 20 MEQ 20-0.9 MEQ/L-% SOLUTION: Performed by: ORTHOPAEDIC SURGERY

## 2018-11-15 PROCEDURE — 25010000002 DEXAMETHASONE PER 1 MG: Performed by: NURSE ANESTHETIST, CERTIFIED REGISTERED

## 2018-11-15 PROCEDURE — 97116 GAIT TRAINING THERAPY: CPT

## 2018-11-15 PROCEDURE — 84132 ASSAY OF SERUM POTASSIUM: CPT | Performed by: ORTHOPAEDIC SURGERY

## 2018-11-15 PROCEDURE — 63710000001 INSULIN LISPRO (HUMAN) PER 5 UNITS: Performed by: NURSE PRACTITIONER

## 2018-11-15 PROCEDURE — G8979 MOBILITY GOAL STATUS: HCPCS

## 2018-11-15 PROCEDURE — G8978 MOBILITY CURRENT STATUS: HCPCS

## 2018-11-15 PROCEDURE — 63710000001 INSULIN LISPRO (HUMAN) PER 5 UNITS

## 2018-11-15 PROCEDURE — 97161 PT EVAL LOW COMPLEX 20 MIN: CPT

## 2018-11-15 PROCEDURE — 82962 GLUCOSE BLOOD TEST: CPT

## 2018-11-15 PROCEDURE — 25010000002 PROPOFOL 1000 MG/ML EMULSION: Performed by: NURSE ANESTHETIST, CERTIFIED REGISTERED

## 2018-11-15 PROCEDURE — 63710000001 INSULIN DETEMIR PER 5 UNITS: Performed by: INTERNAL MEDICINE

## 2018-11-15 PROCEDURE — 25010000002 PROPOFOL 10 MG/ML EMULSION: Performed by: NURSE ANESTHETIST, CERTIFIED REGISTERED

## 2018-11-15 RX ORDER — MAGNESIUM HYDROXIDE 1200 MG/15ML
LIQUID ORAL AS NEEDED
Status: DISCONTINUED | OUTPATIENT
Start: 2018-11-15 | End: 2018-11-15 | Stop reason: HOSPADM

## 2018-11-15 RX ORDER — SODIUM CHLORIDE 0.9 % (FLUSH) 0.9 %
3 SYRINGE (ML) INJECTION EVERY 12 HOURS SCHEDULED
Status: DISCONTINUED | OUTPATIENT
Start: 2018-11-15 | End: 2018-11-15 | Stop reason: HOSPADM

## 2018-11-15 RX ORDER — SODIUM CHLORIDE, SODIUM LACTATE, POTASSIUM CHLORIDE, CALCIUM CHLORIDE 600; 310; 30; 20 MG/100ML; MG/100ML; MG/100ML; MG/100ML
9 INJECTION, SOLUTION INTRAVENOUS CONTINUOUS
Status: DISCONTINUED | OUTPATIENT
Start: 2018-11-15 | End: 2018-11-17 | Stop reason: HOSPADM

## 2018-11-15 RX ORDER — DEXAMETHASONE SODIUM PHOSPHATE 4 MG/ML
INJECTION, SOLUTION INTRA-ARTICULAR; INTRALESIONAL; INTRAMUSCULAR; INTRAVENOUS; SOFT TISSUE AS NEEDED
Status: DISCONTINUED | OUTPATIENT
Start: 2018-11-15 | End: 2018-11-15 | Stop reason: SURG

## 2018-11-15 RX ORDER — MORPHINE SULFATE 2 MG/ML
1 INJECTION, SOLUTION INTRAMUSCULAR; INTRAVENOUS EVERY 4 HOURS PRN
Status: DISCONTINUED | OUTPATIENT
Start: 2018-11-15 | End: 2018-11-17 | Stop reason: HOSPADM

## 2018-11-15 RX ORDER — GABAPENTIN 300 MG/1
600 CAPSULE ORAL EVERY 12 HOURS SCHEDULED
Status: DISCONTINUED | OUTPATIENT
Start: 2018-11-15 | End: 2018-11-17 | Stop reason: HOSPADM

## 2018-11-15 RX ORDER — CEFAZOLIN SODIUM 2 G/100ML
2 INJECTION, SOLUTION INTRAVENOUS EVERY 8 HOURS
Status: COMPLETED | OUTPATIENT
Start: 2018-11-15 | End: 2018-11-16

## 2018-11-15 RX ORDER — HYDROMORPHONE HCL 110MG/55ML
2 PATIENT CONTROLLED ANALGESIA SYRINGE INTRAVENOUS EVERY 4 HOURS PRN
Status: DISCONTINUED | OUTPATIENT
Start: 2018-11-15 | End: 2018-11-17 | Stop reason: HOSPADM

## 2018-11-15 RX ORDER — BISACODYL 10 MG
10 SUPPOSITORY, RECTAL RECTAL DAILY PRN
Status: DISCONTINUED | OUTPATIENT
Start: 2018-11-15 | End: 2018-11-17 | Stop reason: HOSPADM

## 2018-11-15 RX ORDER — PREGABALIN 75 MG/1
75 CAPSULE ORAL ONCE
Status: COMPLETED | OUTPATIENT
Start: 2018-11-15 | End: 2018-11-15

## 2018-11-15 RX ORDER — SODIUM CHLORIDE 0.9 % (FLUSH) 0.9 %
3 SYRINGE (ML) INJECTION EVERY 12 HOURS SCHEDULED
Status: DISCONTINUED | OUTPATIENT
Start: 2018-11-15 | End: 2018-11-17 | Stop reason: HOSPADM

## 2018-11-15 RX ORDER — FAMOTIDINE 10 MG/ML
20 INJECTION, SOLUTION INTRAVENOUS ONCE
Status: DISCONTINUED | OUTPATIENT
Start: 2018-11-15 | End: 2018-11-15

## 2018-11-15 RX ORDER — FAMOTIDINE 20 MG/1
20 TABLET, FILM COATED ORAL ONCE
Status: COMPLETED | OUTPATIENT
Start: 2018-11-15 | End: 2018-11-15

## 2018-11-15 RX ORDER — PROPOFOL 10 MG/ML
VIAL (ML) INTRAVENOUS AS NEEDED
Status: DISCONTINUED | OUTPATIENT
Start: 2018-11-15 | End: 2018-11-15 | Stop reason: SURG

## 2018-11-15 RX ORDER — FENTANYL CITRATE 50 UG/ML
25 INJECTION, SOLUTION INTRAMUSCULAR; INTRAVENOUS
Status: DISCONTINUED | OUTPATIENT
Start: 2018-11-15 | End: 2018-11-15 | Stop reason: HOSPADM

## 2018-11-15 RX ORDER — DEXTROSE MONOHYDRATE 25 G/50ML
25 INJECTION, SOLUTION INTRAVENOUS
Status: DISCONTINUED | OUTPATIENT
Start: 2018-11-15 | End: 2018-11-17 | Stop reason: HOSPADM

## 2018-11-15 RX ORDER — ROSUVASTATIN CALCIUM 20 MG/1
20 TABLET, COATED ORAL DAILY
Status: DISCONTINUED | OUTPATIENT
Start: 2018-11-15 | End: 2018-11-17 | Stop reason: HOSPADM

## 2018-11-15 RX ORDER — SODIUM CHLORIDE AND POTASSIUM CHLORIDE 150; 900 MG/100ML; MG/100ML
100 INJECTION, SOLUTION INTRAVENOUS CONTINUOUS
Status: DISCONTINUED | OUTPATIENT
Start: 2018-11-15 | End: 2018-11-17 | Stop reason: HOSPADM

## 2018-11-15 RX ORDER — ONDANSETRON 2 MG/ML
4 INJECTION INTRAMUSCULAR; INTRAVENOUS EVERY 6 HOURS PRN
Status: DISCONTINUED | OUTPATIENT
Start: 2018-11-15 | End: 2018-11-17 | Stop reason: HOSPADM

## 2018-11-15 RX ORDER — FAMOTIDINE 10 MG/ML
20 INJECTION, SOLUTION INTRAVENOUS EVERY 12 HOURS SCHEDULED
Status: DISCONTINUED | OUTPATIENT
Start: 2018-11-15 | End: 2018-11-17 | Stop reason: HOSPADM

## 2018-11-15 RX ORDER — SODIUM CHLORIDE 0.9 % (FLUSH) 0.9 %
3-10 SYRINGE (ML) INJECTION AS NEEDED
Status: DISCONTINUED | OUTPATIENT
Start: 2018-11-15 | End: 2018-11-17 | Stop reason: HOSPADM

## 2018-11-15 RX ORDER — ACETAMINOPHEN 325 MG/1
650 TABLET ORAL EVERY 4 HOURS PRN
Status: DISCONTINUED | OUTPATIENT
Start: 2018-11-15 | End: 2018-11-17 | Stop reason: HOSPADM

## 2018-11-15 RX ORDER — ZOLPIDEM TARTRATE 5 MG/1
5 TABLET ORAL NIGHTLY PRN
Status: DISCONTINUED | OUTPATIENT
Start: 2018-11-15 | End: 2018-11-17 | Stop reason: HOSPADM

## 2018-11-15 RX ORDER — LABETALOL HYDROCHLORIDE 5 MG/ML
10 INJECTION, SOLUTION INTRAVENOUS EVERY 4 HOURS PRN
Status: DISCONTINUED | OUTPATIENT
Start: 2018-11-15 | End: 2018-11-17 | Stop reason: HOSPADM

## 2018-11-15 RX ORDER — FAMOTIDINE 20 MG/1
20 TABLET, FILM COATED ORAL EVERY 12 HOURS SCHEDULED
Status: DISCONTINUED | OUTPATIENT
Start: 2018-11-15 | End: 2018-11-17 | Stop reason: HOSPADM

## 2018-11-15 RX ORDER — SODIUM CHLORIDE 0.9 % (FLUSH) 0.9 %
3-10 SYRINGE (ML) INJECTION AS NEEDED
Status: DISCONTINUED | OUTPATIENT
Start: 2018-11-15 | End: 2018-11-15 | Stop reason: HOSPADM

## 2018-11-15 RX ORDER — BUPIVACAINE HYDROCHLORIDE AND EPINEPHRINE 2.5; 5 MG/ML; UG/ML
INJECTION, SOLUTION EPIDURAL; INFILTRATION; INTRACAUDAL; PERINEURAL AS NEEDED
Status: DISCONTINUED | OUTPATIENT
Start: 2018-11-15 | End: 2018-11-15 | Stop reason: HOSPADM

## 2018-11-15 RX ORDER — CELECOXIB 200 MG/1
200 CAPSULE ORAL ONCE
Status: COMPLETED | OUTPATIENT
Start: 2018-11-15 | End: 2018-11-15

## 2018-11-15 RX ORDER — LIDOCAINE HYDROCHLORIDE 10 MG/ML
0.5 INJECTION, SOLUTION EPIDURAL; INFILTRATION; INTRACAUDAL; PERINEURAL ONCE AS NEEDED
Status: COMPLETED | OUTPATIENT
Start: 2018-11-15 | End: 2018-11-15

## 2018-11-15 RX ORDER — BISACODYL 5 MG/1
5 TABLET, DELAYED RELEASE ORAL DAILY PRN
Status: DISCONTINUED | OUTPATIENT
Start: 2018-11-15 | End: 2018-11-17 | Stop reason: HOSPADM

## 2018-11-15 RX ORDER — LIDOCAINE HYDROCHLORIDE 10 MG/ML
INJECTION, SOLUTION INFILTRATION; PERINEURAL AS NEEDED
Status: DISCONTINUED | OUTPATIENT
Start: 2018-11-15 | End: 2018-11-15 | Stop reason: SURG

## 2018-11-15 RX ORDER — OXYCODONE HCL 10 MG/1
10 TABLET, FILM COATED, EXTENDED RELEASE ORAL ONCE
Status: COMPLETED | OUTPATIENT
Start: 2018-11-15 | End: 2018-11-15

## 2018-11-15 RX ORDER — ACETAMINOPHEN 500 MG
1000 TABLET ORAL ONCE
Status: COMPLETED | OUTPATIENT
Start: 2018-11-15 | End: 2018-11-15

## 2018-11-15 RX ORDER — NICOTINE POLACRILEX 4 MG
15 LOZENGE BUCCAL
Status: DISCONTINUED | OUTPATIENT
Start: 2018-11-15 | End: 2018-11-17 | Stop reason: HOSPADM

## 2018-11-15 RX ORDER — CEFAZOLIN SODIUM 2 G/100ML
2 INJECTION, SOLUTION INTRAVENOUS ONCE
Status: COMPLETED | OUTPATIENT
Start: 2018-11-15 | End: 2018-11-15

## 2018-11-15 RX ORDER — NALOXONE HCL 0.4 MG/ML
0.4 VIAL (ML) INJECTION
Status: DISCONTINUED | OUTPATIENT
Start: 2018-11-15 | End: 2018-11-17 | Stop reason: HOSPADM

## 2018-11-15 RX ORDER — PANTOPRAZOLE SODIUM 40 MG/1
40 TABLET, DELAYED RELEASE ORAL 2 TIMES DAILY
Status: DISCONTINUED | OUTPATIENT
Start: 2018-11-15 | End: 2018-11-17 | Stop reason: HOSPADM

## 2018-11-15 RX ORDER — HYDROCODONE BITARTRATE AND ACETAMINOPHEN 7.5; 325 MG/1; MG/1
1 TABLET ORAL EVERY 4 HOURS PRN
Status: DISCONTINUED | OUTPATIENT
Start: 2018-11-15 | End: 2018-11-17 | Stop reason: HOSPADM

## 2018-11-15 RX ORDER — MECLIZINE HYDROCHLORIDE 25 MG/1
25 TABLET ORAL 3 TIMES DAILY PRN
Status: DISCONTINUED | OUTPATIENT
Start: 2018-11-15 | End: 2018-11-17 | Stop reason: HOSPADM

## 2018-11-15 RX ORDER — ATRACURIUM BESYLATE 10 MG/ML
INJECTION, SOLUTION INTRAVENOUS AS NEEDED
Status: DISCONTINUED | OUTPATIENT
Start: 2018-11-15 | End: 2018-11-15 | Stop reason: SURG

## 2018-11-15 RX ORDER — SODIUM CHLORIDE 9 MG/ML
INJECTION, SOLUTION INTRAVENOUS AS NEEDED
Status: DISCONTINUED | OUTPATIENT
Start: 2018-11-15 | End: 2018-11-15 | Stop reason: HOSPADM

## 2018-11-15 RX ORDER — AMLODIPINE BESYLATE 5 MG/1
5 TABLET ORAL DAILY
Status: DISCONTINUED | OUTPATIENT
Start: 2018-11-15 | End: 2018-11-17 | Stop reason: HOSPADM

## 2018-11-15 RX ADMIN — FAMOTIDINE 20 MG: 20 TABLET ORAL at 21:10

## 2018-11-15 RX ADMIN — MUPIROCIN 1 APPLICATION: 20 OINTMENT TOPICAL at 09:18

## 2018-11-15 RX ADMIN — SODIUM CHLORIDE, POTASSIUM CHLORIDE, SODIUM LACTATE AND CALCIUM CHLORIDE 9 ML/HR: 600; 310; 30; 20 INJECTION, SOLUTION INTRAVENOUS at 09:10

## 2018-11-15 RX ADMIN — INSULIN LISPRO 8 UNITS: 100 INJECTION, SOLUTION INTRAVENOUS; SUBCUTANEOUS at 17:57

## 2018-11-15 RX ADMIN — PREGABALIN 75 MG: 75 CAPSULE ORAL at 09:19

## 2018-11-15 RX ADMIN — EPHEDRINE SULFATE 10 MG: 50 INJECTION INTRAMUSCULAR; INTRAVENOUS; SUBCUTANEOUS at 10:55

## 2018-11-15 RX ADMIN — FENTANYL CITRATE 25 MCG: 50 INJECTION, SOLUTION INTRAMUSCULAR; INTRAVENOUS at 13:00

## 2018-11-15 RX ADMIN — ZOLPIDEM TARTRATE 5 MG: 5 TABLET ORAL at 23:44

## 2018-11-15 RX ADMIN — HYDROCODONE BITARTRATE AND ACETAMINOPHEN 1 TABLET: 7.5; 325 TABLET ORAL at 21:11

## 2018-11-15 RX ADMIN — INSULIN DETEMIR 15 UNITS: 100 INJECTION, SOLUTION SUBCUTANEOUS at 23:30

## 2018-11-15 RX ADMIN — FENTANYL CITRATE 25 MCG: 50 INJECTION, SOLUTION INTRAMUSCULAR; INTRAVENOUS at 12:50

## 2018-11-15 RX ADMIN — ROSUVASTATIN CALCIUM 20 MG: 20 TABLET, FILM COATED ORAL at 15:54

## 2018-11-15 RX ADMIN — CEFAZOLIN SODIUM 2 G: 2 INJECTION, SOLUTION INTRAVENOUS at 17:56

## 2018-11-15 RX ADMIN — EPHEDRINE SULFATE 5 MG: 50 INJECTION INTRAMUSCULAR; INTRAVENOUS; SUBCUTANEOUS at 11:25

## 2018-11-15 RX ADMIN — INSULIN LISPRO 24 UNITS: 100 INJECTION, SOLUTION INTRAVENOUS; SUBCUTANEOUS at 21:18

## 2018-11-15 RX ADMIN — CEFAZOLIN SODIUM 2 G: 2 INJECTION, SOLUTION INTRAVENOUS at 10:08

## 2018-11-15 RX ADMIN — PANTOPRAZOLE SODIUM 40 MG: 40 TABLET, DELAYED RELEASE ORAL at 21:10

## 2018-11-15 RX ADMIN — GABAPENTIN 600 MG: 300 CAPSULE ORAL at 21:33

## 2018-11-15 RX ADMIN — CELECOXIB 200 MG: 200 CAPSULE ORAL at 09:19

## 2018-11-15 RX ADMIN — ACETAMINOPHEN 1000 MG: 500 TABLET, FILM COATED ORAL at 09:18

## 2018-11-15 RX ADMIN — PROPOFOL 25 MCG/KG/MIN: 10 INJECTION, EMULSION INTRAVENOUS at 10:20

## 2018-11-15 RX ADMIN — FENTANYL CITRATE 25 MCG: 50 INJECTION, SOLUTION INTRAMUSCULAR; INTRAVENOUS at 12:45

## 2018-11-15 RX ADMIN — AMLODIPINE BESYLATE 5 MG: 5 TABLET ORAL at 15:54

## 2018-11-15 RX ADMIN — POTASSIUM CHLORIDE AND SODIUM CHLORIDE 100 ML/HR: 900; 150 INJECTION, SOLUTION INTRAVENOUS at 13:33

## 2018-11-15 RX ADMIN — FAMOTIDINE 20 MG: 20 TABLET ORAL at 09:18

## 2018-11-15 RX ADMIN — DEXAMETHASONE SODIUM PHOSPHATE 4 MG: 4 INJECTION, SOLUTION INTRAMUSCULAR; INTRAVENOUS at 10:19

## 2018-11-15 RX ADMIN — LIDOCAINE HYDROCHLORIDE 50 MG: 10 INJECTION, SOLUTION INFILTRATION; PERINEURAL at 10:13

## 2018-11-15 RX ADMIN — FENTANYL CITRATE 25 MCG: 50 INJECTION, SOLUTION INTRAMUSCULAR; INTRAVENOUS at 12:40

## 2018-11-15 RX ADMIN — EPHEDRINE SULFATE 5 MG: 50 INJECTION INTRAMUSCULAR; INTRAVENOUS; SUBCUTANEOUS at 10:51

## 2018-11-15 RX ADMIN — ATRACURIUM BESYLATE 50 MG: 10 INJECTION, SOLUTION INTRAVENOUS at 10:13

## 2018-11-15 RX ADMIN — INSULIN LISPRO 5 UNITS: 100 INJECTION, SOLUTION INTRAVENOUS; SUBCUTANEOUS at 17:56

## 2018-11-15 RX ADMIN — LIDOCAINE HYDROCHLORIDE 0.2 ML: 10 INJECTION, SOLUTION EPIDURAL; INFILTRATION; INTRACAUDAL; PERINEURAL at 09:10

## 2018-11-15 RX ADMIN — FENTANYL CITRATE 25 MCG: 50 INJECTION, SOLUTION INTRAMUSCULAR; INTRAVENOUS at 13:10

## 2018-11-15 RX ADMIN — METOPROLOL TARTRATE 25 MG: 25 TABLET ORAL at 21:10

## 2018-11-15 RX ADMIN — Medication 3 ML: at 21:18

## 2018-11-15 RX ADMIN — INSULIN DETEMIR 35 UNITS: 100 INJECTION, SOLUTION SUBCUTANEOUS at 21:19

## 2018-11-15 RX ADMIN — PROPOFOL 160 MG: 10 INJECTION, EMULSION INTRAVENOUS at 10:13

## 2018-11-15 RX ADMIN — OXYCODONE HYDROCHLORIDE 10 MG: 10 TABLET, FILM COATED, EXTENDED RELEASE ORAL at 09:18

## 2018-11-15 NOTE — H&P
Patient Name: Narcisa Cotto  MRN: [id number]  : 1940  DOS: 11/15/2018    Attending: Zachary Key MD    Primary Care Provider: Mariia Del Real DO      Chief complaint:  Low back pain    Subjective   Patient is a 78 y.o. female presented for decompressive laminectomy, medial facetectomy and foraminotomies L2-L3 and L3-L4 by Dr. Key under GA. She tolerated surgery well and is admitted for further medical management. Her back has been painful for about 10 years. She had intermittent numbness of her RLE. She uses a cane for ambulation. She has had recent falls. She is incontinent at baseline.    When seen postop she is doing well. Her pain is well controlled. She denies nausea, shortness of breath or chest pain. No hx of DVT or PE.    ( Above is noted/ agree. Doing well after surgery. Much improved pain from preop. She has since ambulated 240 ft with PT. No n/vom/sob. She had significant functional limitation prior to surgery. )wy      Allergies:  Allergies   Allergen Reactions   • Codeine Nausea And Vomiting       Meds:  Medications Prior to Admission   Medication Sig Dispense Refill Last Dose   • amLODIPine (NORVASC) 5 MG tablet Take 1 tablet by mouth Daily for 360 days. 90 tablet 3 2018 at 2100   • cholecalciferol (VITAMIN D3) 1000 units tablet Take 1,000 Units by mouth Daily.   2018 at 2100   • cyanocobalamin ([employer] VITAMIN B-12) 1000 MCG tablet Take 1 tablet by mouth daily.   Past Month at Unknown time   • gabapentin (NEURONTIN) 600 MG tablet Take 1 tablet by mouth 2 (Two) Times a Day. 180 tablet 1 2018 at 2100   • insulin NPH-insulin regular (NOVOLIN 70/30) (70-30) 100 UNIT/ML injection 40 units before breakfast, 40 units before lunch, and 60 units before supper (Patient taking differently: Inject 70 Units under the skin into the appropriate area as directed 2 (Two) Times a Day With Meals. 70u before breakfast and 70u before supper) 40 mL 11 2018 at 2100   • meclizine  "(ANTIVERT) 25 MG tablet Take 1 tablet by mouth 3 (Three) Times a Day As Needed (3 times). 270 tablet 1 11/14/2018 at 2100   • metoprolol tartrate (LOPRESSOR) 25 MG tablet Take 1 tablet by mouth 2 (Two) Times a Day for 360 days. 180 tablet 3 11/14/2018 at 2100   • pantoprazole (PROTONIX) 40 MG EC tablet Take 1 tablet by mouth 2 (Two) Times a Day. 180 tablet 1 11/14/2018 at 2100   • rosuvastatin (CRESTOR) 20 MG tablet Take 1 tablet by mouth Daily for 360 days. 90 tablet 3 11/14/2018 at 2100   • ACCU-CHEK JOVITA test strip 1 each by Other route 4 (Four) Times a Day Before Meals & at Bedtime. 150 each 11 Taking   • ACCU-CHEK SOFTCLIX LANCETS lancets 1 each by Other route 3 (Three) Times a Day Before Meals. Use as instructed 300 each 3    • aspirin (ASPIRIN LOW DOSE) 81 MG tablet Take 1 tablet by mouth Daily for 360 days. 90 tablet 3 11/6/2018   • B-D INS SYR ULTRAFINE 1CC/31G 31G X 5/16\" 1 ML misc   0    • Blood Glucose Calibration (ACCU-CHEK JOVITA) solution 1 each by In Vitro route Take As Directed. 1 each 3    • Blood Glucose Monitoring Suppl (ACCU-CHEK JOVITA PLUS) w/Device kit   0    • Blood Glucose Monitoring Suppl (ACCU-CHEK JOVITA) device 1 each by Other route Take As Directed. Use as instructed 1 each 0 Taking   • clopidogrel (PLAVIX) 75 MG tablet Take 1 tablet by mouth Daily for 360 days. 90 tablet 3 11/6/2018   • Cyanocobalamin (B-12 PO) Take 500 mcg by mouth Daily.      • diphenoxylate-atropine (LOMOTIL) 2.5-0.025 MG per tablet Take 1 tablet by mouth Daily. (Patient taking differently: Take 1 tablet by mouth Daily As Needed for Diarrhea.) 30 tablet 2 More than a month at Unknown time   • Insulin Syringe 30G X 5/16\" 1 ML misc 1 each 3 (Three) Times a Day Before Meals. 300 each 3 Taking   • ondansetron ODT (ZOFRAN-ODT) 8 MG disintegrating tablet Take 1 tablet by mouth Every 8 (Eight) Hours As Needed for Nausea or Vomiting. 180 tablet 1 More than a month at Unknown time   • sharps container 1 each Daily. 1 each 0 " Taking       History:   Past Medical History:   Diagnosis Date   • Arthritis    • Back pain    • Chest pain    • Chronic diastolic congestive heart failure (CMS/HCC)    • Chronic kidney disease, stage III (moderate) (CMS/HCC)    • Coronary artery disease involving native coronary artery of native heart with angina pectoris (CMS/HCC)    • Diabetes mellitus (CMS/HCC)    • Dyslipidemia    • Esophagus disorder 2016   • GERD (gastroesophageal reflux disease)    • History of cataract    • History of hypercalcemia    • History of hyperparathyroidism    • Hyperlipidemia    • Hypertension    • Lower extremity edema    • Peripheral arterial disease (CMS/[facility])    • Postherpetic neuralgia    • Venous insufficiency      Past Surgical History:   Procedure Laterality Date   • CATARACT EXTRACTION     • CHOLECYSTECTOMY     • COLONOSCOPY     • ENDOSCOPY     • EYE SURGERY Bilateral     cataract    • HYSTERECTOMY     • OTHER SURGICAL HISTORY      Hospitalized July 2014 for bladder obstruction status post stent placement and removal by Dr. Hi.   • PARATHYROIDECTOMY     • SPINAL CORD STIMULATOR IMPLANT     • TONSILLECTOMY       Family History   Problem Relation Age of Onset   • Kidney disease Other    • Kidney disease Mother    • Diabetes Father      Social History     Tobacco Use   • Smoking status: Never Smoker   • Smokeless tobacco: Never Used   Substance Use Topics   • Alcohol use: No   • Drug use: No   She lives with her daughter. She has 4 children.     Review of Systems  All systems were reviewed and negative except for:  Respiratory: positive for  shortness of air  Gastrointestinal: postitive for  constipation and diarrhea    Vital Signs  Visit Vitals  /67 (BP Location: Left arm, Patient Position: Lying)   Pulse 79   Temp 97.5 °F (36.4 °C) (Oral)   Resp 17   SpO2 99%       Physical Exam:    General Appearance:    Alert, cooperative, in no acute distress   Head:    Normocephalic, without obvious abnormality, atraumatic    Eyes:            Lids and lashes normal, conjunctivae and sclerae normal, no   icterus, no pallor, corneas clear, PERRLA   Ears:    Ears appear intact with no abnormalities noted   Back:   Surgical dressing CDI. HV present   Lungs:     Clear to auscultation,respirations regular, even and                   unlabored    Heart:    Regular rhythm and normal rate, normal S1 and S2, no            murmur, no gallop, no rub, no click   Abdomen:     Normal bowel sounds, no masses, no organomegaly, soft        non-tender, non-distended, no guarding, no rebound                 tenderness   Genitalia:    Deferred   Extremities:   Moves all extremities well, no edema, no cyanosis, no              redness   Pulses:   Pulses palpable and equal bilaterally   Skin:   No bleeding, bruising or rash   Neurologic:   Cranial nerves 2 - 12 grossly intact, sensation intact. Flexion and dorsiflexion intact bilateral feet.        I reviewed the patient's new clinical results.             Invalid input(s): NEUTOPHILPCT,  EOSPCT  Results from last 7 days   Lab Units  11/15/18   0927   POTASSIUM mmol/L  4.2     Lab Results   Component Value Date    HGBA1C 9.5 09/19/2018     Results for JOHN PAUL GARCIA (MRN [id number]) as of 11/15/2018 13:47   Ref. Range 11/15/2018 12:31   Glucose Latest Ref Range: 70 - 130 mg/dL 189 (H)     Assessment and Plan:     S/P laminectomy    Lumbar stenosis    Stage 3 chronic kidney disease (CMS/HCC)    Hyperlipidemia LDL goal <70    Essential hypertension    Uncontrolled type 2 diabetes mellitus with complication, with long-term current use of insulin (CMS/[facility])    Coronary artery disease involving native coronary artery of native heart with angina pectoris (CMS/[facility])      Plan  1. PT- ambulate  2. Pain control-prns   3. IS-encourage  4. DVT proph- [facility]  5. Bowel regimen  6. Resume home medications as appropriate  7. Monitor post-op labs  8. DC planning for home    HTN, HLD, CAD  - Continue home norvasc, statin  and lopressor  - Resume plavix when ok with Dr. Key  - Monitor BP   - Holding parameters for BP meds  - Labetalol PRN for SBP>170    DM  - hgb A1c on 9/19/18 9.5  - Changed to Levemir BID, added mealtime bolus  - Accuchecks ACHS with high dose SSI  - DM educator    CKD  - BMP in AM      NHAN Han  11/15/18  3:16 PM     I have personally performed the evaluation on this patient. My history is consistent  with HPI obtained. My exam findings are listed above. I have personally reviewed and discussed the above formulated treatment plan with NHAN.

## 2018-11-15 NOTE — PLAN OF CARE
Problem: Patient Care Overview  Goal: Plan of Care Review  Outcome: Ongoing (interventions implemented as appropriate)   11/15/18 6467   OTHER   Outcome Summary Pt ambulated 240 feet with CGA and RW, limited by fatigue and SOA. PT will follow to increase strength and progress functional mobility with back precautions. Plan is home with HHPT at discharge   Coping/Psychosocial   Plan of Care Reviewed With patient;daughter

## 2018-11-15 NOTE — H&P
Pre-Op H&P  Narcisa Cotto  [id number]  1940    Chief complaint: Low back pain into BLE    HPI:    Patient is a 78 y.o.female who presents with lumbar spinal stenosis and here today for lumbar laminectomy     Review of Systems:  General ROS: negative for chills, fever or skin lesions;  No changes since last office visit.  +IM clearance  · Cardiovascular ROS: no chest pain or dyspnea on exertion.  +cardiac clearance.    · 4/23/18 TTE Left ventricular systolic function is normal. Estimated EF = 60%.  · Left ventricular diastolic dysfunction (grade I) consistent with impaired relaxation.  The cardiac valves are functionally normal.  Respiratory ROS: no cough, shortness of breath, or wheezing    Allergies:   Allergies   Allergen Reactions   • Codeine Nausea And Vomiting       Home Meds:    No current facility-administered medications on file prior to encounter.      Current Outpatient Medications on File Prior to Encounter   Medication Sig Dispense Refill   • amLODIPine (NORVASC) 5 MG tablet Take 1 tablet by mouth Daily for 360 days. 90 tablet 3   • cholecalciferol (VITAMIN D3) 1000 units tablet Take 1,000 Units by mouth Daily.     • cyanocobalamin ([employer] VITAMIN B-12) 1000 MCG tablet Take 1 tablet by mouth daily.     • gabapentin (NEURONTIN) 600 MG tablet Take 1 tablet by mouth 2 (Two) Times a Day. 180 tablet 1   • insulin NPH-insulin regular (NOVOLIN 70/30) (70-30) 100 UNIT/ML injection 40 units before breakfast, 40 units before lunch, and 60 units before supper (Patient taking differently: Inject 70 Units under the skin into the appropriate area as directed 2 (Two) Times a Day With Meals. 70u before breakfast and 70u before supper) 40 mL 11   • meclizine (ANTIVERT) 25 MG tablet Take 1 tablet by mouth 3 (Three) Times a Day As Needed (3 times). 270 tablet 1   • metoprolol tartrate (LOPRESSOR) 25 MG tablet Take 1 tablet by mouth 2 (Two) Times a Day for 360 days. 180 tablet 3   • pantoprazole (PROTONIX) 40 MG EC  "tablet Take 1 tablet by mouth 2 (Two) Times a Day. 180 tablet 1   • rosuvastatin (CRESTOR) 20 MG tablet Take 1 tablet by mouth Daily for 360 days. 90 tablet 3   • ACCU-CHEK JOVITA test strip 1 each by Other route 4 (Four) Times a Day Before Meals & at Bedtime. 150 each 11   • ACCU-CHEK SOFTCLIX LANCETS lancets 1 each by Other route 3 (Three) Times a Day Before Meals. Use as instructed 300 each 3   • aspirin (ASPIRIN LOW DOSE) 81 MG tablet Take 1 tablet by mouth Daily for 360 days. 90 tablet 3   • B-D INS SYR ULTRAFINE 1CC/31G 31G X 5/16\" 1 ML misc   0   • Blood Glucose Calibration (ACCU-CHEK JOVITA) solution 1 each by In Vitro route Take As Directed. 1 each 3   • Blood Glucose Monitoring Suppl (ACCU-CHEK JOVITA PLUS) w/Device kit   0   • Blood Glucose Monitoring Suppl (ACCU-CHEK JOVITA) device 1 each by Other route Take As Directed. Use as instructed 1 each 0   • clopidogrel (PLAVIX) 75 MG tablet Take 1 tablet by mouth Daily for 360 days. 90 tablet 3   • diphenoxylate-atropine (LOMOTIL) 2.5-0.025 MG per tablet Take 1 tablet by mouth Daily. (Patient taking differently: Take 1 tablet by mouth Daily As Needed for Diarrhea.) 30 tablet 2   • Insulin Syringe 30G X 5/16\" 1 ML misc 1 each 3 (Three) Times a Day Before Meals. 300 each 3   • ondansetron ODT (ZOFRAN-ODT) 8 MG disintegrating tablet Take 1 tablet by mouth Every 8 (Eight) Hours As Needed for Nausea or Vomiting. 180 tablet 1   • sharps container 1 each Daily. 1 each 0       PMH:   Past Medical History:   Diagnosis Date   • Arthritis    • Back pain    • Chest pain    • Chronic diastolic congestive heart failure (CMS/HCC)    • Chronic kidney disease, stage III (moderate) (CMS/HCC)    • Coronary artery disease involving native coronary artery of native heart with angina pectoris (CMS/HCC)    • Diabetes mellitus (CMS/HCC)    • Dyslipidemia    • Esophagus disorder 2016   • GERD (gastroesophageal reflux disease)    • History of cataract    • History of hypercalcemia    • " History of hyperparathyroidism    • Hyperlipidemia    • Hypertension    • Lower extremity edema    • Peripheral arterial disease (CMS/HCC)    • Postherpetic neuralgia    • Venous insufficiency      PSH:    Past Surgical History:   Procedure Laterality Date   • CATARACT EXTRACTION     • CHOLECYSTECTOMY     • COLONOSCOPY     • ENDOSCOPY     • EYE SURGERY Bilateral     cataract    • HYSTERECTOMY     • OTHER SURGICAL HISTORY      Hospitalized July 2014 for bladder obstruction status post stent placement and removal by Dr. Hi.   • PARATHYROIDECTOMY     • SPINAL CORD STIMULATOR IMPLANT     • TONSILLECTOMY       Social History:   Tobacco:   Social History     Tobacco Use   Smoking Status Never Smoker   Smokeless Tobacco Never Used      Alcohol:     Social History     Substance and Sexual Activity   Alcohol Use No       Vitals:           /76 (BP Location: Right arm, Patient Position: Lying)   Pulse 65   Temp 97.1 °F (36.2 °C) (Temporal)   Resp 18   SpO2 97%     Physical Exam:  General Appearance:    Alert, cooperative, no distress, appears stated age   Head:    Normocephalic, without obvious abnormality, atraumatic   Lungs:     Clear to auscultation bilaterally, respirations unlabored    Heart:   Regular rate and rhythm, S1 and S2 normal, no murmur, rub    or gallop    Abdomen:    Soft, non-tender.  +bowel sounds   Breast Exam:    deferred   Genitalia:    deferred   Extremities:   Extremities normal, atraumatic, no cyanosis or edema   Skin:   Skin color, texture, turgor normal, no rashes or lesions   Neurologic:   Grossly intact   Results Review  I reviewed the patient's new clinical results.    Cancer Staging (if applicable)  Cancer Patient: __ yes _x_no __unknown; If yes, clinical stage T:__ N:__M:__, stage group or __N/A    Impression: Lumbar spinal stenosis    Plan: Lumbar laminectomy    Jenna Dawkins, APRN   11/15/2018   9:20 AM

## 2018-11-15 NOTE — PLAN OF CARE
Problem: Patient Care Overview  Goal: Plan of Care Review  Outcome: Ongoing (interventions implemented as appropriate)   11/15/18 3009   OTHER   Outcome Summary Patient doing well post-op. Pain is controlled. VSS. Hemovac CDI. Patient ambulated well with therapy. Incontinent of urine, depends in place. Tolerating diet. Will continue to monitor.   Coping/Psychosocial   Plan of Care Reviewed With patient   Plan of Care Review   Progress improving

## 2018-11-15 NOTE — ANESTHESIA POSTPROCEDURE EVALUATION
Patient: Narcisa Cotto    Procedure Summary     Date:  11/15/18 Room / Location:   [facility]    Anesthesia Start:  1008 Anesthesia Stop:  1215    Procedure:  LUMBAR LAMINECTOMY L2-3 L 3-4, (N/A Spine Lumbar) Diagnosis:      Surgeon:  Zachary Key MD Provider:  Alex Castellano MD    Anesthesia Type:  general ASA Status:  3          Anesthesia Type: general  Last vitals  BP   139/68 (11/15/18 1214)   Temp   97.7 °F (36.5 °C) (11/15/18 1214)   Pulse   78 (11/15/18 1214)   Resp   14 (11/15/18 1214)     SpO2   100 % (11/15/18 1214)     Post Anesthesia Care and Evaluation    Patient location during evaluation: PACU  Patient participation: complete - patient cannot participate  Level of consciousness: sleepy but conscious  Pain score: 0  Pain management: adequate  Airway patency: patent  Anesthetic complications: No anesthetic complications  PONV Status: none  Cardiovascular status: hemodynamically stable and acceptable  Respiratory status: nonlabored ventilation, acceptable and nasal cannula  Hydration status: acceptable    Comments: Pt transported to PACU with O2 via nasal cannula at 4 L/MIN. Vital signs stable.  Pt shows no signs of distress.  Report given to PACU RN. /68 (BP Location: Left arm)   Pulse 78   Temp 97.7 °F (36.5 °C) (Temporal)   Resp 14   SpO2 100%

## 2018-11-15 NOTE — ANESTHESIA PREPROCEDURE EVALUATION
Anesthesia Evaluation     Patient summary reviewed and Nursing notes reviewed   NPO Solid Status: > 8 hours  NPO Liquid Status: > 8 hours           Airway   Mallampati: III  TM distance: <3 FB  Neck ROM: limited  Possible difficult intubation  Dental    (+) partials    Pulmonary - normal exam   Cardiovascular   Exercise tolerance: good (4-7 METS)    Rhythm: regular  Rate: normal        Neuro/Psych  GI/Hepatic/Renal/Endo      Musculoskeletal     Abdominal    Substance History      OB/GYN          Other                      Anesthesia Plan    ASA 3     general     intravenous induction   Anesthetic plan, all risks, benefits, and alternatives have been provided, discussed and informed consent has been obtained with: patient.

## 2018-11-15 NOTE — ANESTHESIA PROCEDURE NOTES
ANESTHESIA INTUBATION  Urgency: elective    Date/Time: 11/15/2018 10:15 AM  Airway not difficult    General Information and Staff    Patient location during procedure: OR  CRNA: Rosario Young CRNA    Indications and Patient Condition  Indications for airway management: airway protection    Preoxygenated: yes  MILS not maintained throughout  Mask difficulty assessment: 1 - vent by mask    Final Airway Details  Final airway type: endotracheal airway      Successful airway: ETT  Cuffed: yes   Successful intubation technique: direct laryngoscopy  Endotracheal tube insertion site: oral  Blade: Nikos  Blade size: 3  ETT size (mm): 7.0  Cormack-Lehane Classification: grade IIa - partial view of glottis  Placement verified by: chest auscultation and capnometry   Measured from: teeth  ETT to teeth (cm): 21  Number of attempts at approach: 1    Additional Comments  Smooth IV induction.  Atraumatic ET intubation.  Dentition unchanged.  Negative epigastric sounds, Breath sound equal bilaterally with symmetric chest rise and fall

## 2018-11-15 NOTE — OP NOTE
PREOPERATIVE DIAGNOSIS: Lumbar spinal stenosis L2-L3 and L3-L4.    POSTOPERATIVE DIAGNOSIS: Lumbar spinal stenosis L2-L3 and L3-L4.    PROCEDURES PERFORMED: Decompressive laminectomy, medial facetectomy and foraminotomies L2-L3 and L3-L4.     SURGEON: Zachary Key MD    ASSISTANT: Glen Rome PA-C    ANESTHESIA: General.    DESCRIPTION OF PROCEDURE: The patient was given a preoperative dose of intravenous Ancef. She was given a general anesthetic. She was placed in the prone position on the Dominick frame. The back was prepped and draped sterilely. A midline incision was made. The fascia was split. The paraspinal musculature was elevated. I did an intraoperative x-ray to identify L2-L3 and L3-L4.     I started at L3-L4. A midline laminectomy was performed of L3. There was a severe central stenosis. There was evidence of significant previous steroid injections and there were marked deposits of steroids at L3-L4. A flavectomy was performed. There were patches where the ligamentum flavum was adherent to the dura. Bilateral medial facetectomies were performed at L3-L4. The majority of the facets were preserved. Foraminotomies were performed. The neural elements at L3-L4 appeared decompressed.     I moved cephalad to L2-L3. Again a midline laminectomy was performed at L2. There was a moderate stenosis at L2-L3. A flavectomy was performed. Bilateral medial facetectomies were performed. The majority of the facets were preserved. Foraminotomies were performed. The neural elements at L2-L3 appeared decompressed.     Cut bony surfaces were bone waxed liberally to stop bone bleeding. The wound was copiously irrigated. Exposed dura was covered with a thin sheet of thrombin-soaked Gelfoam. A deep Hemovac drain was placed. The wound appeared dry at completion.     The wound was closed in layers using Vicryl. A sterile dressing was applied. The patient was awakened and transported to recovery room in stable condition.

## 2018-11-15 NOTE — THERAPY EVALUATION
Acute Care - Physical Therapy Initial Evaluation  [facility]     Patient Name: Narcisa Cotto  : 1940  MRN: [id number]  Today's Date: 11/15/2018   Onset of Illness/Injury or Date of Surgery: 11/15/18  Date of Referral to PT: 11/15/18  Referring Physician: MD Shahid      Admit Date: 11/15/2018    Visit Dx:     ICD-10-CM ICD-9-CM   1. Impaired functional mobility, balance, gait, and endurance Z74.09 V49.89     Patient Active Problem List   Diagnosis   • Gastroesophageal reflux disease   • Stage 3 chronic kidney disease (CMS/HCC)   • Hyperlipidemia LDL goal <70   • Essential hypertension   • Vitamin D deficiency   • Diabetic gastroparesis (CMS/HCC)   • Anemia   • Gait instability   • Osteoarthritis   • Vitamin B12 deficiency   • Postherpetic neuralgia   • Peripheral arterial disease (CMS/HCC)   • Lower extremity edema   • Venous insufficiency   • Uncontrolled type 2 diabetes mellitus with complication, with long-term current use of insulin (CMS/HCC)   • Sleep apnea   • Diabetic peripheral neuropathy (CMS/[facility])   • Coronary artery disease involving native coronary artery of native heart with angina pectoris (CMS/HCC)   • Lumbar stenosis   • S/P laminectomy     Past Medical History:   Diagnosis Date   • Arthritis    • Back pain    • Chest pain    • Chronic diastolic congestive heart failure (CMS/HCC)    • Chronic kidney disease, stage III (moderate) (CMS/HCC)    • Coronary artery disease involving native coronary artery of native heart with angina pectoris (CMS/HCC)    • Diabetes mellitus (CMS/HCC)    • Dyslipidemia    • Esophagus disorder 2016   • GERD (gastroesophageal reflux disease)    • History of cataract    • History of hypercalcemia    • History of hyperparathyroidism    • Hyperlipidemia    • Hypertension    • Lower extremity edema    • Peripheral arterial disease (CMS/HCC)    • Postherpetic neuralgia    • Venous insufficiency      Past Surgical History:   Procedure Laterality Date   • CATARACT  EXTRACTION     • CHOLECYSTECTOMY     • COLONOSCOPY     • ENDOSCOPY     • EYE SURGERY Bilateral     cataract    • HYSTERECTOMY     • OTHER SURGICAL HISTORY      Hospitalized July 2014 for bladder obstruction status post stent placement and removal by Dr. Hi.   • PARATHYROIDECTOMY     • SPINAL CORD STIMULATOR IMPLANT     • TONSILLECTOMY          PT ASSESSMENT (last 12 hours)      Physical Therapy Evaluation     Row Name 11/15/18 1714          PT Evaluation Time/Intention    Subjective Information  complains of;pain  -MJ     Document Type  evaluation  -MJ     Mode of Treatment  physical therapy  -MJ     Patient Effort  excellent  -MJ     Symptoms Noted During/After Treatment  other (see comments) pain improved  -MJ     Row Name 11/15/18 2867          General Information    Patient Profile Reviewed?  yes  -MJ     Onset of Illness/Injury or Date of Surgery  11/15/18  -     Referring Physician  MD Shahid  -     Patient Observations  alert;cooperative;agree to therapy  -MJ     General Observations of Patient  Pt supine in bed, hemovac, IV, SCDs. Dtr present  -MJ     Prior Level of Function  min assist:;all household mobility;community mobility;gait;transfer;bed mobility;cooking;cleaning;driving;shopping [employer] w/ activity, short gait distances, occ use of cane  -MJ     Equipment Currently Used at Home  walker, standard;cane, straight;shower chair;commode, bedside  -MJ     Pertinent History of Current Functional Problem  Pt presents for surgical management of back pain and dysfunction with pain into R LE that failed to improve with conservative management  -MJ     Existing Precautions/Restrictions  fall;spinal;other (see comments) hemovac  -MJ     Risks Reviewed  patient and family:;LOB;increased discomfort  -MJ     Benefits Reviewed  patient and family:;improve function;increase independence;increase strength;increase balance;decrease pain  -MJ     Barriers to Rehab  previous functional deficit  -MJ     Row  Name 11/15/18 1714          Relationship/Environment    Lives With  child(laura), adult  -MJ     Family Caregiver if Needed  child(laura), adult  -MJ     Concerns About Impact on Relationships  Dtr to assist at discharge  -MJ     Row Name 11/15/18 1714          Resource/Environmental Concerns    Current Living Arrangements  home/apartment/condo  -MJ     Row Name 11/15/18 1714          Home Main Entrance    Number of Stairs, Main Entrance  one  -MJ     Row Name 11/15/18 1714          Cognitive Assessment/Intervention- PT/OT    Orientation Status (Cognition)  oriented x 4  -MJ     Follows Commands (Cognition)  WFL  -MJ     Row Name 11/15/18 1714          Bed Mobility Assessment/Treatment    Bed Mobility Assessment/Treatment  supine-sit;sit-supine  -     Supine-Sit [place] (Bed Mobility)  supervision;verbal cues  -     Sit-Supine [place] (Bed Mobility)  minimum assist (75% patient effort);verbal cues  -MJ     Bed Mobility, Safety Issues  decreased use of legs for bridging/pushing  -     Assistive Device (Bed Mobility)  bed rails;head of bed elevated  -     Comment (Bed Mobility)  Pt performed log roll into/out of bed, verbal cues for sequencing. Assist with legs for sit to sidelying. Pt reports mild dizziness upon sitting, improved with rest   -     Row Name 11/15/18 1714          Transfer Assessment/Treatment    Transfer Assessment/Treatment  sit-stand transfer;stand-sit transfer  -     Comment (Transfers)  Verbal cues to push up from bed with UEs to stand and to reach back for bed to lower into sitting  -     Sit-Stand [place] (Transfers)  contact guard;verbal cues  -MJ     Stand-Sit [place] (Transfers)  contact guard;verbal cues  -     Row Name 11/15/18 1714          Sit-Stand Transfer    Assistive Device (Sit-Stand Transfers)  walker, front-wheeled  -MJ     Row Name 11/15/18 1714          Stand-Sit Transfer    Assistive Device (Stand-Sit Transfers)  walker, front-wheeled  -     Row  Name 11/15/18 1714          Gait/Stairs Assessment/Training    26537 - Gait Training Minutes   10  -MJ     [place] Level (Gait)  contact guard;verbal cues  -MJ     Assistive Device (Gait)  walker, front-wheeled  -MJ     Distance in Feet (Gait)  240  -MJ     Pattern (Gait)  step-through  -MJ     Deviations/Abnormal Patterns (Gait)  bilateral deviations;jose l decreased;stride length decreased  -MJ     Bilateral Gait Deviations  heel strike decreased  -MJ     Comment (Gait/Stairs)  Pt demo step through gait pattern at slow pace. Verbal cues to push RW along continuously and to increase LE weight bearing, improvement noted. SOA noted with increased distance, pt reports is baseline and refused rest break. Gait limited by fatigue  -MJ     Row Name 11/15/18 1714          General ROM    GENERAL ROM COMMENTS  No ROM deficits B LE  -MJ     Row Name 11/15/18 1714          MMT (Manual Muscle Testing)    General MMT Comments  B LE grossly 4/5  -MJ     Row Name 11/15/18 1714          Sensory Assessment/Intervention    Sensory General Assessment  no sensation deficits identified denies numbness/tingling; can actively DF  -MJ     Row Name 11/15/18 1714          Pain Assessment    Additional Documentation  Pain Scale: Numbers Pre/Post-Treatment (Group)  -MJ     Row Name 11/15/18 1714          Pain Scale: Numbers Pre/Post-Treatment    Pain Scale: Numbers, Pretreatment  3/10  -MJ     Pain Scale: Numbers, Post-Treatment  0/10 - no pain  -MJ     Pain Location - Orientation  lower  -MJ     Pain Location  back  -MJ     Pain Intervention(s)  Repositioned;Ambulation/increased activity  -MJ     Row Name             Wound 11/15/18 1055 Other (See comments) back incision    Wound - Properties Group Date first assessed: 11/15/18  -KS Time first assessed: 1055  -KS Side: Other (See comments)  -KS Location: back  -KS Type: incision  -KS    Row Name 11/15/18 1714          Plan of Care Review    Plan of Care Reviewed With  patient;daughter   -     Row Name 11/15/18 1714          Physical Therapy Clinical Impression    Date of Referral to PT  11/15/18  -     PT Diagnosis (PT Clinical Impression)  s/p L2-3, L4-5 laminectomy  -MJ     Patient/Family Goals Statement (PT Clinical Impression)  to return home  -     Criteria for Skilled Interventions Met (PT Clinical Impression)  yes;treatment indicated  -     Care Plan Review (PT)  evaluation/treatment results reviewed;care plan/treatment goals reviewed;risks/benefits reviewed;patient/other agree to care plan  -     Care Plan Review, Other Participant (PT Clinical Impression)  daughter  -MJ     Row Name 11/15/18 1714          Vital Signs    Pre SpO2 (%)  96  -MJ     O2 Delivery Pre Treatment  room air  -MJ     Post SpO2 (%)  95  -MJ     O2 Delivery Post Treatment  room air  -MJ     Pre Patient Position  Supine  -MJ     Post Patient Position  Supine  -MJ     Row Name 11/15/18 1714          Physical Therapy Goals    Bed Mobility Goal Selection (PT)  bed mobility, PT goal 1  -MJ     Transfer Goal Selection (PT)  transfer, PT goal 1  -MJ     Gait Training Goal Selection (PT)  gait training, PT goal 1  -MJ     Stairs Goal Selection (PT)  stairs, PT goal 1  -MJ     Additional Documentation  Stairs Goal Selection (PT) (Row)  -     Row Name 11/15/18 1714          Bed Mobility Goal 1 (PT)    Activity/Assistive Device (Bed Mobility Goal 1, PT)  sit to supine/supine to sit via log roll  -MJ     [place] Level/Cues Needed (Bed Mobility Goal 1, PT)  conditional independence  -MJ     Time Frame (Bed Mobility Goal 1, PT)  5 days;long term goal (LTG)  -     Progress/Outcomes (Bed Mobility Goal 1, PT)  goal ongoing  -     Row Name 11/15/18 1714          Transfer Goal 1 (PT)    Activity/Assistive Device (Transfer Goal 1, PT)  sit-to-stand/stand-to-sit;walker, rolling  -MJ     [place] Level/Cues Needed (Transfer Goal 1, PT)  conditional independence  -MJ     Time Frame (Transfer Goal 1, PT)  5 days;long  term goal (LTG)  -MJ     Progress/Outcome (Transfer Goal 1, PT)  goal ongoing  -     Row Name 11/15/18 1714          Gait Training Goal 1 (PT)    Activity/Assistive Device (Gait Training Goal 1, PT)  gait (walking locomotion);walker, rolling  -MJ     [place] Level (Gait Training Goal 1, PT)  conditional independence  -MJ     Distance (Gait Goal 1, PT)  500 feet  -MJ     Time Frame (Gait Training Goal 1, PT)  5 days;long term goal (LTG)  -MJ     Progress/Outcome (Gait Training Goal 1, PT)  goal ongoing  -     Row Name 11/15/18 1714          Stairs Goal 1 (PT)    Activity/Assistive Device (Stairs Goal 1, PT)  ascending stairs;descending stairs;walker, rolling  -MJ     [place] Level/Cues Needed (Stairs Goal 1, PT)  contact guard assist  -MJ     Number of Stairs (Stairs Goal 1, PT)  1  -MJ     Time Frame (Stairs Goal 1, PT)  5 days;long term goal (LTG)  -MJ     Progress/Outcome (Stairs Goal 1, PT)  goal ongoing  -     Row Name 11/15/18 1714          Positioning and Restraints    Pre-Treatment Position  in bed  -     Post Treatment Position  bed  -MJ     In Bed  notified nsg;supine;call light within reach;encouraged to call for assist;with family/caregiver;SCD pump applied  -     Row Name 11/15/18 1714          Living Environment    Home Accessibility  stairs to enter home;tub/shower is not walk in  -MJ       User Key  (r) = Recorded By, (t) = Taken By, (c) = Cosigned By    Initials Name Provider Type    Abbey Bhandari RN Registered Nurse    Christian Herring, PT Physical Therapist        Physical Therapy Education     Title: PT OT SLP Therapies (Active)     Topic: Physical Therapy (Active)     Point: Mobility training (Done)     Learning Progress Summary           Patient Acceptance, E,D, VU,NR by  at 11/15/2018  5:14 PM    Comment:  Edu re: back precautions, log roll, gait mechanics, benefits of mobility   Family Acceptance, E,D, VU,NR by  at 11/15/2018  5:14 PM    Comment:  Edu re: back  precautions, log roll, gait mechanics, benefits of mobility                   Point: Body mechanics (Done)     Learning Progress Summary           Patient Acceptance, E,D, VU,NR by  at 11/15/2018  5:14 PM    Comment:  Edu re: back precautions, log roll, gait mechanics, benefits of mobility   Family Acceptance, E,D, VU,NR by  at 11/15/2018  5:14 PM    Comment:  Edu re: back precautions, log roll, gait mechanics, benefits of mobility                   Point: Precautions (Done)     Learning Progress Summary           Patient Acceptance, E,D, VU,NR by  at 11/15/2018  5:14 PM    Comment:  Edu re: back precautions, log roll, gait mechanics, benefits of mobility   Family Acceptance, E,D, VU,NR by  at 11/15/2018  5:14 PM    Comment:  Edu re: back precautions, log roll, gait mechanics, benefits of mobility                               User Key     Initials Effective Dates Name Provider Type Discipline     04/03/18 -  Christian Fleming, PT Physical Therapist PT              PT Recommendation and Plan  Anticipated Discharge Disposition (PT): home with assist, home with home health  Planned Therapy Interventions (PT Eval): balance training, bed mobility training, gait training, home exercise program, patient/family education, stair training, strengthening, transfer training  Therapy Frequency (PT Clinical Impression): daily  Outcome Summary/Treatment Plan (PT)  Anticipated Equipment Needs at Discharge (PT): front wheeled walker(dtr reports may be able to borrow from her work)  Anticipated Discharge Disposition (PT): home with assist, home with home health  Plan of Care Reviewed With: patient, daughter  Outcome Summary: Pt ambulated 240 feet with CGA and RW, limited by fatigue and SOA. PT will follow to increase strength and progress functional mobility with back precautions. Plan is home with HHPT at discharge  Outcome Measures     Row Name 11/15/18 7721             How much help from another person do you currently  need...    Turning from your back to your side while in flat bed without using bedrails?  3  -MJ      Moving from lying on back to sitting on the side of a flat bed without bedrails?  3  -MJ      Moving to and from a bed to a chair (including a wheelchair)?  3  -MJ      Standing up from a chair using your arms (e.g., wheelchair, bedside chair)?  3  -MJ      Climbing 3-5 steps with a railing?  3  -MJ      To walk in hospital room?  3  -MJ      AM-PAC 6 Clicks Score  18  -MJ         Functional Assessment    Outcome Measure Options  AM-PAC 6 Clicks Basic Mobility (PT)  -MJ        User Key  (r) = Recorded By, (t) = Taken By, (c) = Cosigned By    Initials Name Provider Type    Christian Herring, PT Physical Therapist         Time Calculation:   PT Charges     Row Name 11/15/18 1714             Time Calculation    Start Time  1714  -MJ      PT Received On  11/15/18  -MJ      PT Goal Re-Cert Due Date  11/25/18  -         Time Calculation- PT    Total Timed Code Minutes- PT  10 minute(s)  -MJ         Timed Charges    20276 - Gait Training Minutes   10  -MJ        User Key  (r) = Recorded By, (t) = Taken By, (c) = Cosigned By    Initials Name Provider Type    Christian Herring, PT Physical Therapist        Therapy Suggested Charges     Code   Minutes Charges    30615 (CPT®) Hc Pt Neuromusc Re Education Ea 15 Min      45528 (CPT®) Hc Pt Ther Proc Ea 15 Min      83150 (CPT®) Hc Gait Training Ea 15 Min 10 1    57932 (CPT®) Hc Pt Therapeutic Act Ea 15 Min      30952 (CPT®) Hc Pt Manual Therapy Ea 15 Min      89616 (CPT®) Hc Pt Iontophoresis Ea 15 Min      36338 (CPT®) Hc Pt Elec Stim Ea-Per 15 Min      44899 (CPT®) Hc Pt Ultrasound Ea 15 Min      10411 (CPT®) Hc Pt Self Care/Mgmt/Train Ea 15 Min      66078 (CPT®) Hc Pt Prosthetic (S) Train Initial Encounter, Each 15 Min      84634 (CPT®) Hc Pt Orthotic(S)/Prosthetic(S) Encounter, Each 15 Min      69623 (CPT®) Hc Orthotic(S) Mgmt/Train Initial Encounter, Each 15min      Total  10 1         Therapy Charges for Today     Code Description Service Date Service Provider Modifiers Qty    38540709766 HC PT EVAL LOW COMPLEXITY 2 11/15/2018 Christian Fleming, PT GP 1    18990039586 HC GAIT TRAINING EA 15 MIN 11/15/2018 Christian Fleming, PT GP 1    02888296808 HC PT MOBILITY CURRENT 11/15/2018 Christian Fleming, PT GP, CK 1    37326372426 HC PT MOBILITY PROJECTED 11/15/2018 Christian Fleming, PT GP, CI 1          PT G-Codes  PT Professional Judgement Used?: Yes  Outcome Measure Options: AM-PAC 6 Clicks Basic Mobility (PT)  AM-PAC 6 Clicks Score: 18  Functional Limitation: Mobility: Walking and moving around  Mobility: Walking and Moving Around Current Status (): At least 40 percent but less than 60 percent impaired, limited or restricted  Mobility: Walking and Moving Around Goal Status (): At least 1 percent but less than 20 percent impaired, limited or restricted      Christian Fleming, LUIS  11/15/2018

## 2018-11-16 PROBLEM — D72.829 LEUKOCYTOSIS: Status: ACTIVE | Noted: 2018-11-16

## 2018-11-16 PROBLEM — D62 ACUTE BLOOD LOSS ANEMIA: Status: ACTIVE | Noted: 2018-11-16

## 2018-11-16 PROBLEM — N28.9 RENAL INSUFFICIENCY: Status: ACTIVE | Noted: 2018-11-16

## 2018-11-16 LAB
ANION GAP SERPL CALCULATED.3IONS-SCNC: 7 MMOL/L (ref 3–11)
BUN BLD-MCNC: 31 MG/DL (ref 9–23)
BUN/CREAT SERPL: 17.3 (ref 7–25)
CALCIUM SPEC-SCNC: 8.2 MG/DL (ref 8.7–10.4)
CHLORIDE SERPL-SCNC: 106 MMOL/L (ref 99–109)
CO2 SERPL-SCNC: 20 MMOL/L (ref 20–31)
CREAT BLD-MCNC: 1.79 MG/DL (ref 0.6–1.3)
DEPRECATED RDW RBC AUTO: 47.1 FL (ref 37–54)
ERYTHROCYTE [DISTWIDTH] IN BLOOD BY AUTOMATED COUNT: 15.1 % (ref 11.3–14.5)
GFR SERPL CREATININE-BSD FRML MDRD: 27 ML/MIN/1.73
GLUCOSE BLD-MCNC: 322 MG/DL (ref 70–100)
GLUCOSE BLDC GLUCOMTR-MCNC: 106 MG/DL (ref 70–130)
GLUCOSE BLDC GLUCOMTR-MCNC: 109 MG/DL (ref 70–130)
GLUCOSE BLDC GLUCOMTR-MCNC: 110 MG/DL (ref 70–130)
GLUCOSE BLDC GLUCOMTR-MCNC: 118 MG/DL (ref 70–130)
GLUCOSE BLDC GLUCOMTR-MCNC: 119 MG/DL (ref 70–130)
GLUCOSE BLDC GLUCOMTR-MCNC: 135 MG/DL (ref 70–130)
GLUCOSE BLDC GLUCOMTR-MCNC: 151 MG/DL (ref 70–130)
GLUCOSE BLDC GLUCOMTR-MCNC: 178 MG/DL (ref 70–130)
GLUCOSE BLDC GLUCOMTR-MCNC: 233 MG/DL (ref 70–130)
GLUCOSE BLDC GLUCOMTR-MCNC: 292 MG/DL (ref 70–130)
GLUCOSE BLDC GLUCOMTR-MCNC: 293 MG/DL (ref 70–130)
GLUCOSE BLDC GLUCOMTR-MCNC: 321 MG/DL (ref 70–130)
GLUCOSE BLDC GLUCOMTR-MCNC: 322 MG/DL (ref 70–130)
GLUCOSE BLDC GLUCOMTR-MCNC: 325 MG/DL (ref 70–130)
GLUCOSE BLDC GLUCOMTR-MCNC: 331 MG/DL (ref 70–130)
GLUCOSE BLDC GLUCOMTR-MCNC: 332 MG/DL (ref 70–130)
GLUCOSE BLDC GLUCOMTR-MCNC: 337 MG/DL (ref 70–130)
GLUCOSE BLDC GLUCOMTR-MCNC: 339 MG/DL (ref 70–130)
GLUCOSE BLDC GLUCOMTR-MCNC: 352 MG/DL (ref 70–130)
GLUCOSE BLDC GLUCOMTR-MCNC: 96 MG/DL (ref 70–130)
HCT VFR BLD AUTO: 29.6 % (ref 34.5–44)
HGB BLD-MCNC: 9.5 G/DL (ref 11.5–15.5)
MCH RBC QN AUTO: 27.5 PG (ref 27–31)
MCHC RBC AUTO-ENTMCNC: 32.1 G/DL (ref 32–36)
MCV RBC AUTO: 85.5 FL (ref 80–99)
PLATELET # BLD AUTO: 183 10*3/MM3 (ref 150–450)
PMV BLD AUTO: 10.5 FL (ref 6–12)
POTASSIUM BLD-SCNC: 5.3 MMOL/L (ref 3.5–5.5)
RBC # BLD AUTO: 3.46 10*6/MM3 (ref 3.89–5.14)
SODIUM BLD-SCNC: 133 MMOL/L (ref 132–146)
WBC NRBC COR # BLD: 11.16 10*3/MM3 (ref 3.5–10.8)

## 2018-11-16 PROCEDURE — 25010000003 CEFAZOLIN IN DEXTROSE 2-4 GM/100ML-% SOLUTION: Performed by: ORTHOPAEDIC SURGERY

## 2018-11-16 PROCEDURE — 25010000002 MORPHINE SULFATE (PF) 2 MG/ML SOLUTION: Performed by: ORTHOPAEDIC SURGERY

## 2018-11-16 PROCEDURE — 80048 BASIC METABOLIC PNL TOTAL CA: CPT | Performed by: NURSE PRACTITIONER

## 2018-11-16 PROCEDURE — 82962 GLUCOSE BLOOD TEST: CPT

## 2018-11-16 PROCEDURE — 97116 GAIT TRAINING THERAPY: CPT

## 2018-11-16 PROCEDURE — 85027 COMPLETE CBC AUTOMATED: CPT | Performed by: NURSE PRACTITIONER

## 2018-11-16 PROCEDURE — G0108 DIAB MANAGE TRN  PER INDIV: HCPCS

## 2018-11-16 PROCEDURE — 25810000003 SODIUM CHLORIDE 0.9 % WITH KCL 20 MEQ 20-0.9 MEQ/L-% SOLUTION: Performed by: ORTHOPAEDIC SURGERY

## 2018-11-16 RX ORDER — DEXTROSE MONOHYDRATE 25 G/50ML
25-50 INJECTION, SOLUTION INTRAVENOUS
Status: DISCONTINUED | OUTPATIENT
Start: 2018-11-16 | End: 2018-11-17 | Stop reason: HOSPADM

## 2018-11-16 RX ADMIN — CEFAZOLIN SODIUM 2 G: 2 INJECTION, SOLUTION INTRAVENOUS at 02:17

## 2018-11-16 RX ADMIN — PANTOPRAZOLE SODIUM 40 MG: 40 TABLET, DELAYED RELEASE ORAL at 08:21

## 2018-11-16 RX ADMIN — HYDROCODONE BITARTRATE AND ACETAMINOPHEN 1 TABLET: 7.5; 325 TABLET ORAL at 08:20

## 2018-11-16 RX ADMIN — GABAPENTIN 600 MG: 300 CAPSULE ORAL at 21:21

## 2018-11-16 RX ADMIN — FAMOTIDINE 20 MG: 20 TABLET ORAL at 08:21

## 2018-11-16 RX ADMIN — FAMOTIDINE 20 MG: 20 TABLET ORAL at 21:22

## 2018-11-16 RX ADMIN — SODIUM CHLORIDE 1 UNITS/HR: 9 INJECTION, SOLUTION INTRAVENOUS at 07:47

## 2018-11-16 RX ADMIN — AMLODIPINE BESYLATE 5 MG: 5 TABLET ORAL at 08:20

## 2018-11-16 RX ADMIN — METOPROLOL TARTRATE 25 MG: 25 TABLET ORAL at 08:21

## 2018-11-16 RX ADMIN — MORPHINE SULFATE 1 MG: 2 INJECTION, SOLUTION INTRAMUSCULAR; INTRAVENOUS at 00:26

## 2018-11-16 RX ADMIN — SODIUM CHLORIDE 20 UNITS/HR: 9 INJECTION, SOLUTION INTRAVENOUS at 16:46

## 2018-11-16 RX ADMIN — POTASSIUM CHLORIDE AND SODIUM CHLORIDE 100 ML/HR: 900; 150 INJECTION, SOLUTION INTRAVENOUS at 02:24

## 2018-11-16 RX ADMIN — PANTOPRAZOLE SODIUM 40 MG: 40 TABLET, DELAYED RELEASE ORAL at 21:21

## 2018-11-16 RX ADMIN — GABAPENTIN 600 MG: 300 CAPSULE ORAL at 08:20

## 2018-11-16 RX ADMIN — METOPROLOL TARTRATE 25 MG: 25 TABLET ORAL at 21:24

## 2018-11-16 RX ADMIN — ROSUVASTATIN CALCIUM 20 MG: 20 TABLET, FILM COATED ORAL at 08:20

## 2018-11-16 NOTE — THERAPY TREATMENT NOTE
Acute Care - Physical Therapy Treatment Note  [facility]     Patient Name: Narcisa Cotto  : 1940  MRN: [id number]  Today's Date: 2018  Onset of Illness/Injury or Date of Surgery: 11/15/18  Date of Referral to PT: 11/15/18  Referring Physician: MD Shahid    Admit Date: 11/15/2018    Visit Dx:    ICD-10-CM ICD-9-CM   1. Impaired functional mobility, balance, gait, and endurance Z74.09 V49.89     Patient Active Problem List   Diagnosis   • Gastroesophageal reflux disease   • Stage 3 chronic kidney disease (CMS/HCC)   • Hyperlipidemia LDL goal <70   • Essential hypertension   • Vitamin D deficiency   • Diabetic gastroparesis (CMS/HCC)   • Anemia   • Gait instability   • Osteoarthritis   • Vitamin B12 deficiency   • Postherpetic neuralgia   • Peripheral arterial disease (CMS/HCC)   • Lower extremity edema   • Venous insufficiency   • Uncontrolled type 2 diabetes mellitus with complication, with long-term current use of insulin (CMS/HCC)   • Sleep apnea   • Diabetic peripheral neuropathy (CMS/[facility])   • Coronary artery disease involving native coronary artery of native heart with angina pectoris (CMS/[facility])   • Lumbar stenosis   • S/P laminectomy       Therapy Treatment    Rehabilitation Treatment Summary     Row Name 18 0929             Treatment Time/Intention    Discipline  physical therapist  -LR      Document Type  therapy note (daily note)  -LR      Subjective Information  no complaints denies pain at rest  -LR      Mode of Treatment  physical therapy;individual therapy  -LR      Patient/Family Observations  Patient supine in bed upon arrival. IV, hemovac. Daughter present.   -LR      Care Plan Review  care plan/treatment goals reviewed;risks/benefits reviewed;current/potential barriers reviewed;patient/other agree to care plan  -LR      Care Plan Review, Other Participant(s)  daughter  -LR      Patient Effort  excellent  -LR      Existing Precautions/Restrictions  fall;spinal;other (see  comments) hemovac  -LR      Treatment Considerations/Comments  Sukhwinder brief prior to mobility.   -LR      Recorded by [LR] Krystal Schmid, PT 11/16/18 0955      Row Name 11/16/18 0929             Cognitive Assessment/Intervention- PT/OT    Orientation Status (Cognition)  oriented x 4  -LR      Follows Commands (Cognition)  WFL;follows one step commands;over 90% accuracy;verbal cues/prompting required;repetition of directions required  -LR      Recorded by [LR] Krystal Schmid, PT 11/16/18 0955      Row Name 11/16/18 0929             Safety Issues, Functional Mobility    Safety Issues Affecting Function (Mobility)  safety precaution awareness;safety precautions follow-through/compliance  -LR      Recorded by [LR] Krystal Schmid, PT 11/16/18 0955      Row Name 11/16/18 0929             Bed Mobility Assessment/Treatment    Supine-Sit Dare (Bed Mobility)  verbal cues;supervision  -LR      Sit-Supine Dare (Bed Mobility)  not tested [facility] at end of treatment.   -LR      Bed Mobility, Safety Issues  decreased use of legs for bridging/pushing  -LR      Assistive Device (Bed Mobility)  head of bed elevated;bed rails  -LR      Comment (Bed Mobility)  Verbal cues to flex knees and roll hips and shoulders at same time onto R side. Cues to drop LEs off EOB and to push up from bed to raise trunk into sitting. Denied dizziness upon sitting up.   -LR      Recorded by [LR] Krystal Schmid, PT 11/16/18 0955      Row Name 11/16/18 0929             Transfer Assessment/Treatment    Transfer Assessment/Treatment  sit-stand transfer;stand-sit transfer  -LR      Comment (Transfers)  Verbal cues to push up from bed to stand and to reach back for chair to lower into sitting. Patient held onto RW when she sat despite cues. Verbal cues to limit trunk flexion during t/f.   -LR      Recorded by [LR] Krystal Schmid, PT 11/16/18 0955      Row Name 11/16/18 0929             Sit-Stand Transfer     Sit-Stand Davie (Transfers)  verbal cues;contact guard  -LR      Assistive Device (Sit-Stand Transfers)  walker, front-wheeled  -LR      Recorded by [LR] Krystal Schmid, PT 11/16/18 0955      Row Name 11/16/18 0929             Stand-Sit Transfer    Stand-Sit Davie (Transfers)  verbal cues;contact guard  -LR      Assistive Device (Stand-Sit Transfers)  walker, front-wheeled  -LR      Recorded by [LR] Krystal Schmid, PT 11/16/18 0955      Row Name 11/16/18 0929             Gait/Stairs Assessment/Training    76642 - Gait Training Minutes   11  -LR      Davie Level (Gait)  verbal cues;contact guard  -LR      Assistive Device (Gait)  walker, front-wheeled  -LR      Distance in Feet (Gait)  350  -LR      Pattern (Gait)  step-through  -LR      Deviations/Abnormal Patterns (Gait)  bilateral deviations;jose l decreased;gait speed decreased;stride length decreased  -LR      Bilateral Gait Deviations  forward flexed posture;heel strike decreased  -LR      Comment (Gait/Stairs)  Patient ambulated with step through gait pattern at slow pace. Verbal cues to keep RW closer, for increased LE weight bearing, decreased UE weight bearing, and upright posture. Improved with cues for correction. Denied leg pain with mobility. Gait limited by fatigue.   -LR      Recorded by [LR] Krystal Schmid, PT 11/16/18 0955      Row Name 11/16/18 0929             Motor Skills Assessment/Interventions    Additional Documentation  Therapeutic Exercise Interventions (Group);Therapeutic Exercise (Group)  -LR      Recorded by [LR] Krystal Schmid, PT 11/16/18 0955      Row Name 11/16/18 0929             Therapeutic Exercise    36133 - PT Therapeutic Exercise Minutes  6  -LR      Recorded by [LR] Krystal Schmid, PT 11/16/18 0955      Row Name 11/16/18 0929             Therapeutic Exercise    Lower Extremity (Therapeutic Exercise)  gluteal sets;heel slides, bilateral;quad sets, bilateral  -LR       Lower Extremity Range of Motion (Therapeutic Exercise)  hip internal/external rotation, bilateral;ankle dorsiflexion/plantar flexion, bilateral  -LR      Core Strength (Therapeutic Exercise)  abdominal bracing ab sets  -LR      Exercise Type (Therapeutic Exercise)  AROM (active range of motion);isometric contraction, static;isotonic contraction, concentric  -LR      Position (Therapeutic Exercise)  seated  -LR      Sets/Reps (Therapeutic Exercise)  x10 reps each  -LR      Comment (Therapeutic Exercise)  cues for technique; no assist required.   -LR      Recorded by [LR] Krystal Schmid, PT 11/16/18 0955      Row Name 11/16/18 0929             Positioning and Restraints    Pre-Treatment Position  in bed  -LR      Post Treatment Position  chair  -LR      In Chair  notified nsg;reclined;sitting;call light within reach;encouraged to call for assist;with family/caregiver;with nsg;legs elevated  -LR      Recorded by [LR] Krystal Schmid, PT 11/16/18 0955      Row Name 11/16/18 0929             Pain Assessment    Additional Documentation  Pain Scale: Numbers Pre/Post-Treatment (Group)  -LR      Recorded by [LR] Krystal Schmid, PT 11/16/18 0955      Row Name 11/16/18 0929             Pain Scale: Numbers Pre/Post-Treatment    Pain Scale: Numbers, Pretreatment  0/10 - no pain  -LR      Pain Scale: Numbers, Post-Treatment  6/10  -LR      Pain Location - Orientation  lower  -LR      Pain Location  back  -LR      Pain Intervention(s)  Repositioned;Ambulation/increased activity  -LR      Recorded by [LR] Krystal Schmid, PT 11/16/18 0955      Row Name                Wound 11/15/18 1055 Other (See comments) back incision    Wound - Properties Group Date first assessed: 11/15/18 [KS] Time first assessed: 1055 [KS] Side: Other (See comments) [KS] Location: back [KS] Type: incision [KS] Recorded by:  [KS] Abbey Guzman RN 11/15/18 1055    Row Name 11/16/18 0929             Coping    Observed Emotional  State  accepting;cooperative  -LR      Verbalized Emotional State  acceptance  -LR      Recorded by [LR] SchmidKrystal tanghryn, PT 11/16/18 0955      Row Name 11/16/18 0929             Plan of Care Review    Plan of Care Reviewed With  patient;daughter  -LR      Recorded by [LR] SchmidKrystal tanghryn, PT 11/16/18 0955      Row Name 11/16/18 0929             Outcome Summary/Treatment Plan (PT)    Daily Summary of Progress (PT)  progress toward functional goals as expected  -LR      Recorded by [LR] SchmidKrystal, PT 11/16/18 0955        User Key  (r) = Recorded By, (t) = Taken By, (c) = Cosigned By    Initials Name Effective Dates Discipline    LR Schmid Krystalsteff Lujan, PT 06/19/15 -  PT    Abbey Bhandari, RN 06/16/16 -  Nurse          Wound 11/15/18 1055 Other (See comments) back incision (Active)   Dressing Appearance dry;intact;no drainage 11/15/2018  8:00 PM   Closure Adhesive bandage 11/15/2018  8:00 PM   Drainage Amount none 11/15/2018  8:00 PM   Dressing Care, Wound gauze 11/15/2018  3:00 PM           Physical Therapy Education     Title: PT OT SLP Therapies (Done)     Topic: Physical Therapy (Done)     Point: Mobility training (Done)     Learning Progress Summary           Patient Acceptance, E,D, VU,NR by GILES at 11/16/2018  9:29 AM    Comment:  Educated on correct log rolling technique, spinal precautions, correct t/f technique, correct gait mechanics, and HEP.    Acceptance, E,D, VU,NR by LUIS at 11/15/2018  5:14 PM    Comment:  Edu re: back precautions, log roll, gait mechanics, benefits of mobility   Family Acceptance, E,D, VU,NR by GILES at 11/16/2018  9:29 AM    Comment:  Educated on correct log rolling technique, spinal precautions, correct t/f technique, correct gait mechanics, and HEP.    Acceptance, E,D, VU,NR by LUIS at 11/15/2018  5:14 PM    Comment:  Edu re: back precautions, log roll, gait mechanics, benefits of mobility                   Point: Home exercise program (Done)     Learning  Progress Summary           Patient Acceptance, E,D, VU,NR by LR at 11/16/2018  9:29 AM    Comment:  Educated on correct log rolling technique, spinal precautions, correct t/f technique, correct gait mechanics, and HEP.   Family Acceptance, E,D, VU,NR by LR at 11/16/2018  9:29 AM    Comment:  Educated on correct log rolling technique, spinal precautions, correct t/f technique, correct gait mechanics, and HEP.                   Point: Body mechanics (Done)     Learning Progress Summary           Patient Acceptance, E,D, VU,NR by LR at 11/16/2018  9:29 AM    Comment:  Educated on correct log rolling technique, spinal precautions, correct t/f technique, correct gait mechanics, and HEP.    Acceptance, E,D, VU,NR by MJ at 11/15/2018  5:14 PM    Comment:  Edu re: back precautions, log roll, gait mechanics, benefits of mobility   Family Acceptance, E,D, VU,NR by LR at 11/16/2018  9:29 AM    Comment:  Educated on correct log rolling technique, spinal precautions, correct t/f technique, correct gait mechanics, and HEP.    Acceptance, E,D, VU,NR by LUIS at 11/15/2018  5:14 PM    Comment:  Edu re: back precautions, log roll, gait mechanics, benefits of mobility                   Point: Precautions (Done)     Learning Progress Summary           Patient Acceptance, E,D, VU,NR by LR at 11/16/2018  9:29 AM    Comment:  Educated on correct log rolling technique, spinal precautions, correct t/f technique, correct gait mechanics, and HEP.    Acceptance, E,D, VU,NR by LUIS at 11/15/2018  5:14 PM    Comment:  Edu re: back precautions, log roll, gait mechanics, benefits of mobility   Family Acceptance, E,D, VU,NR by LR at 11/16/2018  9:29 AM    Comment:  Educated on correct log rolling technique, spinal precautions, correct t/f technique, correct gait mechanics, and HEP.    Acceptance, E,D, VU,NR by LUIS at 11/15/2018  5:14 PM    Comment:  Edu re: back precautions, log roll, gait mechanics, benefits of mobility                                User Key     Initials Effective Dates Name Provider Type Discipline    LR 06/19/15 -  Krystal Schmid, PT Physical Therapist PT     04/03/18 -  Christian Fleming, PT Physical Therapist PT                PT Recommendation and Plan     Outcome Summary/Treatment Plan (PT)  Daily Summary of Progress (PT): progress toward functional goals as expected  Plan of Care Reviewed With: patient, daughter  Progress: improving  Outcome Summary: Patient ambulated 350 feet with RW, limited by fatigue. CGA for all mobility. Initiated HEP today. Will continue to progress mobility training and strengthening as able.   Outcome Measures     Row Name 11/16/18 0929 11/15/18 1714          How much help from another person do you currently need...    Turning from your back to your side while in flat bed without using bedrails?  3  -LR  3  -MJ     Moving from lying on back to sitting on the side of a flat bed without bedrails?  3  -LR  3  -MJ     Moving to and from a bed to a chair (including a wheelchair)?  3  -LR  3  -MJ     Standing up from a chair using your arms (e.g., wheelchair, bedside chair)?  3  -LR  3  -MJ     Climbing 3-5 steps with a railing?  3  -LR  3  -MJ     To walk in hospital room?  3  -LR  3  -MJ     AM-PAC 6 Clicks Score  18  -LR  18  -MJ        Functional Assessment    Outcome Measure Options  AM-PAC 6 Clicks Basic Mobility (PT)  -LR  AM-PAC 6 Clicks Basic Mobility (PT)  -MJ       User Key  (r) = Recorded By, (t) = Taken By, (c) = Cosigned By    Initials Name Provider Type    LR Krystal Schmid, PT Physical Therapist     Christian Fleming, PT Physical Therapist         Time Calculation:   PT Charges     Row Name 11/16/18 0929             Time Calculation    Start Time  0929  -LR      PT Received On  11/16/18  -LR      PT Goal Re-Cert Due Date  11/25/18  -LR         Time Calculation- PT    Total Timed Code Minutes- PT  17 minute(s)  -LR         Timed Charges    30770 - PT Therapeutic Exercise Minutes  6  -LR       65722 - Gait Training Minutes   11  -LR        User Key  (r) = Recorded By, (t) = Taken By, (c) = Cosigned By    Initials Name Provider Type    LR Krystal Schmid, PT Physical Therapist        Therapy Suggested Charges     Code   Minutes Charges    94179 (CPT®) Hc Pt Neuromusc Re Education Ea 15 Min      21181 (CPT®) Hc Pt Ther Proc Ea 15 Min 6     84590 (CPT®) Hc Gait Training Ea 15 Min 11 1    01842 (CPT®) Hc Pt Therapeutic Act Ea 15 Min      59523 (CPT®) Hc Pt Manual Therapy Ea 15 Min      71832 (CPT®) Hc Pt Iontophoresis Ea 15 Min      87184 (CPT®) Hc Pt Elec Stim Ea-Per 15 Min      29424 (CPT®) Hc Pt Ultrasound Ea 15 Min      38369 (CPT®) Hc Pt Self Care/Mgmt/Train Ea 15 Min      52360 (CPT®) Hc Pt Prosthetic (S) Train Initial Encounter, Each 15 Min      63502 (CPT®) Hc Pt Orthotic(S)/Prosthetic(S) Encounter, Each 15 Min      04154 (CPT®) Hc Orthotic(S) Mgmt/Train Initial Encounter, Each 15min      Total  17 1        Therapy Charges for Today     Code Description Service Date Service Provider Modifiers Qty    41058698052 HC GAIT TRAINING EA 15 MIN 11/16/2018 Krystal Schmid, PT GP 1          PT G-Codes  PT Professional Judgement Used?: Yes  Outcome Measure Options: AM-PAC 6 Clicks Basic Mobility (PT)  AM-PAC 6 Clicks Score: 18  Functional Limitation: Mobility: Walking and moving around  Mobility: Walking and Moving Around Current Status (): At least 40 percent but less than 60 percent impaired, limited or restricted  Mobility: Walking and Moving Around Goal Status (): At least 1 percent but less than 20 percent impaired, limited or restricted    Krystal Schmid, PT  11/16/2018

## 2018-11-16 NOTE — PROGRESS NOTES
"IM progress note      Narcisa Cotto  [id number]  1940     LOS: 0 days     Attending: Zachary Key MD    Primary Care Provider: Mariia Del Real DO      Chief Complaint/Reason for visit:  Low back pain    Subjective   Doing well. Good pain control. Ambulating. Voiding. Denies f/c/n/v/sob/cp.  ( doing excellent with ambulation, BG trending down on insulin gtt. )wy    ROS negative otherwise. Good pain control.wy  Objective     Vital Signs  Visit Vitals  /62 (BP Location: Left arm, Patient Position: Sitting)   Pulse 75   Temp 98 °F (36.7 °C) (Oral)   Resp 16   Ht 167.6 cm (65.98\")   Wt 82 kg (180 lb 12.4 oz)   SpO2 95%   BMI 29.19 kg/m²     Temp (24hrs), Av.5 °F (36.4 °C), Min:96.4 °F (35.8 °C), Max:98 °F (36.7 °C)      Nutrition: PO    Respiratory: RA    Physical Therapy: Patient ambulated 350 feet with RW, limited by fatigue. CGA for all mobility. Initiated HEP today. Will continue to progress mobility training and strengthening as able.     Physical Exam:     General Appearance:    Alert, cooperative, in no acute distress   Head:    Normocephalic, without obvious abnormality, atraumatic    Lungs:     Normal effort, symmetric chest rise, no crepitus, clear to      auscultation bilaterally             Heart:    Regular rhythm and normal rate, normal S1 and S2   Abdomen:     Normal bowel sounds, no masses, no organomegaly, soft        non-tender, non-distended, no guarding, no rebound                tenderness   Extremities:   No clubbing, cyanosis or edema.  No deformities.    Pulses:   Pulses palpable and equal bilaterally   Skin:   No bleeding, bruising or rash. Back dressing CDI. HV present   Neurologic:   Moves all extremities with no obvious focal motor deficit.  Cranial nerves 2 - 12 grossly intact. Flexion and dorsiflexion intact bilateral feet.       Results Review:     I reviewed the patient's new clinical results.   Results from last 7 days   Lab Units  18   0440   WBC 10*3/mm3 "  11.16*   HEMOGLOBIN g/dL  9.5*   HEMATOCRIT %  29.6*   PLATELETS 10*3/mm3  183     Results from last 7 days   Lab Units  11/16/18   0440  11/15/18   0927   SODIUM mmol/L  133   --    POTASSIUM mmol/L  5.3  4.2   CHLORIDE mmol/L  106   --    CO2 mmol/L  20.0   --    BUN mg/dL  31*   --    CREATININE mg/dL  1.79*   --    CALCIUM mg/dL  8.2*   --    GLUCOSE mg/dL  322*   --      Results for JOHN PAUL GARCIA (MRN [id number]) as of 11/16/2018 13:34   Ref. Range 11/15/2018 21:19 11/15/2018 21:20 11/16/2018 02:47 11/16/2018 03:14 11/16/2018 04:39 11/16/2018 04:40 11/16/2018 05:45 11/16/2018 07:24 11/16/2018 08:38 11/16/2018 09:36 11/16/2018 10:32 11/16/2018 11:45 11/16/2018 12:47   Glucose Latest Ref Range: 70 - 130 mg/dL 454 (C) 426 (H) 332 (H) 322 (H) 321 (H) 322 (H) 339 (H) 331 (H) 352 (H) 337 (H) 325 (H) 293 (H) 292 (H)     Results for JOHN PAUL GARCIA (MRN [id number]) as of 11/16/2018 16:11   Ref. Range 11/16/2018 12:47 11/16/2018 13:54 11/16/2018 14:48 11/16/2018 15:37   Glucose Latest Ref Range: 70 - 130 mg/dL 292 (H) 233 (H) 178 (H) 151 (H)       I reviewed the patient's new imaging including images and reports.    All medications reviewed.     amLODIPine 5 mg Oral Daily   famotidine 20 mg Intravenous Q12H   Or      famotidine 20 mg Oral Q12H   gabapentin 600 mg Oral Q12H   insulin detemir 35 Units Subcutaneous Q12H   insulin lispro 0-24 Units Subcutaneous 4x Daily With Meals & Nightly   insulin lispro 10 Units Subcutaneous TID With Meals   metoprolol tartrate 25 mg Oral BID   pantoprazole 40 mg Oral BID   rosuvastatin 20 mg Oral Daily   sodium chloride 3 mL Intravenous Q12H       Assessment/Plan     S/P laminectomy    Lumbar stenosis    Stage 3 chronic kidney disease (CMS/[facility])    Hyperlipidemia LDL goal <70    Essential hypertension    Uncontrolled type 2 diabetes mellitus with complication, with long-term current use of insulin (CMS/[facility])    Coronary artery disease involving native coronary artery of native  heart with angina pectoris (CMS/HCC)    Acute blood loss anemia, mild, asymptomatic    Leukocytosis, mild, likely reactive      Plan  1. PT- ambulate  2. Pain control-prns   3. IS-encouraged  4. DVT proph- [facility]  5. Bowel regimen  6. Monitor post-op labs  7. DC planning for home likely tomorrow    HTN, HLD, CAD  - Continue home norvasc, statin and lopressor  - Resume plavix when ok with Dr. Key  - Monitor BP   - Holding parameters for BP meds  - Labetalol PRN for SBP>170     DM  - hgb A1c on 9/19/18 9.5  - insulin gtt started during the night. Will transition off in AM  - DM educator     CKD-stable  - BMP in AM  - baseline Cr 9/19/18 1.9      NHAN Han  11/16/18  1:36 PM     I have personally performed the evaluation on this patient. My history is consistent  with HPI obtained. My exam findings are listed above. I have personally reviewed and discussed the above formulated treatment plan with pt and HOLLY.Wendi

## 2018-11-16 NOTE — PROGRESS NOTES
Discharge Planning Assessment  [facility]     Patient Name: Narcisa Cotto  MRN: [id number]  Today's Date: 11/16/2018    Admit Date: 11/15/2018    Discharge Needs Assessment     Row Name 11/16/18 1227       Living Environment    Lives With  child(laura), adult    Name(s) of Who Lives With Patient  Daughter: Shanna    Current Living Arrangements  home/apartment/condo    Family Caregiver if Needed  child(laura), adult    Quality of Family Relationships  helpful;involved;supportive    Able to Return to Prior Arrangements  yes    Living Arrangement Comments  Ms. Cotto lives with her daughter in a one story home, one step to enter, in [facility].  Shanna stated that she is available to assist her Mother at home as needed.       Resource/Environmental Concerns    Transportation Concerns  car, none       Transition Planning    Patient/Family Anticipates Transition to  home with family    Transportation Anticipated  family or friend will provide       Discharge Needs Assessment    Equipment Currently Used at Home  walker, rolling;cane, straight;grab bar    Equipment Needed After Discharge  none    Outpatient/Agency/Support Group Needs  homecare agency    Discharge Facility/Level of Care Needs  home with home health    Offered/Gave Vendor List  yes    Patient's Choice of Community Agency(s)  [facility]        Discharge Plan     Row Name 11/16/18 8103       Plan    Plan  Home with family assistance and [facility] home health    Patient/Family in Agreement with Plan  yes    Plan Comments  Met with Ms. Cotto and her daughter, Shanna, at the bedside for discharge planning.  Ms. Cotto is ambulating with a rolling walker.  Denies any DME needs at home.  She will have sufficient assistance from her daughter and other family members that live in the area.  Discussed physical therapy and Ms. Cotto requested [facility].  Referred patient to Francia gutiérrez [facility] and faxed orders to her.  Kev called [facility]  and said that could not accept patient as they do not have staffing.  Patient was agreeable to [facility] and referral made to [facility].  No other needs identified.  Shanna will be transporting her Mother home when discharged.  CM will continue to follow.    Final Discharge Disposition Code  06 - home with home health care        Destination      No service coordination in this encounter.      Durable Medical Equipment      No service coordination in this encounter.      Dialysis/Infusion      No service coordination in this encounter.      Home Medical Care      No service coordination in this encounter.      Community Resources      No service coordination in this encounter.        Expected Discharge Date and Time     Expected Discharge Date Expected Discharge Time    Nov 17, 2018         Demographic Summary     Row Name 11/16/18 1226       General Information    Admission Type  observation    Arrived From  home    Reason for Consult  discharge planning    General Information Comments  PCP:  Mariia De lReal       Contact Information    Permission Granted to Share Info With      Contact Information Comments  Daughter:  Shanna Loja,  [phone]        Functional Status     Row Name 11/16/18 1226       Functional Status    Usual Activity Tolerance  good    Current Activity Tolerance  -- See PT notes       Functional Status, IADL    Medications  independent    Meal Preparation  independent    Housekeeping  independent    Laundry  independent    Shopping  independent       Mental Status    General Appearance WDL  WDL        Psychosocial    No documentation.       Abuse/Neglect    No documentation.       Legal     Row Name 11/16/18 1227       Financial/Legal    Who Manages Finances if Patient Unable  No advance directives.    Finance Comments  Ms. Cotto has prescription drug coverage with [employer].  Verified insurance with patient.        Substance Abuse    No documentation.       Patient  Forms    No documentation.           Ana Gunn

## 2018-11-16 NOTE — CONSULTS
"Diabetes Education  Assessment/Teaching    Patient Name:  Narcisa Cotto  YOB: 1940  MRN: [id number]  Admit Date:  11/15/2018      Assessment Date:  11/16/2018    Most Recent Value   General Information    Referral From:  A1c   Height  167.6 cm (65.98\")   Weight  82 kg (180 lb 12.4 oz)   Pregnancy Assessment   Diabetes History   What type of diabetes do you have?  Type 2   Length of Diabetes Diagnosis  10 + years   Current DM knowledge  good   Have you had diabetes education/teaching in the past?  yes   Do you test your blood sugar at home?  yes   Frequency of checks  twice daily    Have you had low blood sugar? (<70mg/dl)  yes   How often do you have low blood sugar?  occasionally   Have you had high blood sugar? (>140mg/dl)  yes   How often do you have high blood sugar?  rare   Education Preferences   Nutrition Information   Assessment Topics   DM Goals            Most Recent Value   DM Education Needs   Meter  Has own   Frequency of Testing  2 times a day          Pt and daughter granted permission to be seen. States she has been told current blood sugars are currently elevated due to steroids given during surgery. States at home glucoses are typically  in the mornings. Recommended guidelines per ADA discussed and reviewed current A1C and its significance.  Pt is followed by endocrinologist and states with recent elevation, insulin changes were made and pt is \"doing better\". Pt given the [facility] \"what is diabetes\" education handouts, what's my A1C and a blood glucose goal sheet. Pt knowledgeable about diabetes management. We discussed the importance of strict glucose control after surgery to prevent complications such as slow healing and risk for infection. Contact information provided and encouraged to call with any questions or concerns.       Electronically signed by:  Chary Dunn RN  11/16/18 11:34 AM  "

## 2018-11-16 NOTE — PLAN OF CARE
Problem: Patient Care Overview  Goal: Plan of Care Review  Outcome: Ongoing (interventions implemented as appropriate)   11/16/18 2400   OTHER   Outcome Summary Patient ambulated 350 feet with RW, limited by fatigue. CGA for all mobility. Initiated HEP today. Will continue to progress mobility training and strengthening as able.    Coping/Psychosocial   Plan of Care Reviewed With patient;daughter   Plan of Care Review   Progress improving

## 2018-11-16 NOTE — DISCHARGE PLACEMENT REQUEST
"Narcisa Garcia (78 y.o. Female)   Home Health referral.  From Ana Gunn RN BSN, Case Management,  [phone]    Date of Birth Social Security Number Address Home Phone MRN    1940  [address]  [facility] [address] [phone] [id number]    [employer] Marital Status          [place]        Admission Date Admission Type Admitting Provider Attending Provider Department, Room/Bed    11/15/18 Elective Zachary Key MD Vaughan, John J, MD [facility] 3G, S364/1    Discharge Date Discharge Disposition Discharge Destination                       Attending Provider:  Zachary Key MD    Allergies:  Codeine    Isolation:  None   Infection:  None   Code Status:  CPR    Ht:  167.6 cm (65.98\")   Wt:  82 kg (180 lb 12.4 oz)    Admission Cmt:  None   Principal Problem:  S/P laminectomy [Z98.890]                 Active Insurance as of 11/15/2018     Primary Coverage     Payor Plan Insurance Group Employer/Plan Group    HUMANA MEDICARE REPLACEMENT HUMANA MEDICARE REPL [id number]     Payor Plan Address Payor Plan Phone Number Payor Plan Fax Number Effective Dates    [address] [address] [phone]  2018 - None Entered    [facility] [id number]       Subscriber Name Subscriber Birth Date Member ID       NARCSIA GARCIA 1940 [id number]                 Emergency Contacts      (Rel.) Home Phone Work Phone Mobile Phone    Shanna Loja (Daughter) [phone] -- --        [address] [address]  Phone:  [phone]  Fax:          Patient:     Narcisa Garcia MRN:  [id number]   [address]  [facility] [address] :  1940  SSN:    Phone: [phone] Sex:  F      INSURANCE PAYOR PLAN GROUP # SUBSCRIBER ID   Primary:    HUMANA MEDICARE REPLACEMENT [id number]   Admitting Diagnosis: Lumbar stenosis [M48.061]  Order Date:  Nov 15, 2018         Consult to Case Management Social " Services       (Order ID: [id number])     Diagnosis:         Priority:  Routine Expected Date:   Expiration Date:        Interval:   Count:    Reason for Consult? Home PT     Specimen Type:   Specimen Source:   Specimen Taken Date:   Specimen Taken Time:                   Authorizing Provider:Zachary Key MD  Authorizing Provider's NPI: [id number]  Order Entered By: Zachary Key MD 11/15/2018 12:01 PM     Electronically signed by: Zachary Key MD 11/15/2018 12:01 PM                 History & Physical      Kaykay Cai MD at 11/15/2018  1:37 PM          Patient Name: Narcisa Cotto  MRN: [id number]  : 1940  DOS: 11/15/2018    Attending: Zachary Key MD    Primary Care Provider: Mariia Del Real DO      Chief complaint:  Low back pain    Subjective   Patient is a 78 y.o. female presented for decompressive laminectomy, medial facetectomy and foraminotomies L2-L3 and L3-L4 by Dr. Key under GA. She tolerated surgery well and is admitted for further medical management. Her back has been painful for about 10 years. She had intermittent numbness of her RLE. She uses a cane for ambulation. She has had recent falls. She is incontinent at baseline.    When seen postop she is doing well. Her pain is well controlled. She denies nausea, shortness of breath or chest pain. No hx of DVT or PE.    ( Above is noted/ agree. Doing well after surgery. Much improved pain from preop. She has since ambulated 240 ft with PT. No n/vom/sob. She had significant functional limitation prior to surgery. )wy      Allergies:  Allergies   Allergen Reactions   • Codeine Nausea And Vomiting       Meds:  Medications Prior to Admission   Medication Sig Dispense Refill Last Dose   • amLODIPine (NORVASC) 5 MG tablet Take 1 tablet by mouth Daily for 360 days. 90 tablet 3 2018 at 2100   • cholecalciferol (VITAMIN D3) 1000 units tablet Take 1,000 Units by mouth Daily.   2018 at 2100   • cyanocobalamin ([facility]  "VITAMIN B-12) 1000 MCG tablet Take 1 tablet by mouth daily.   Past Month at Unknown time   • gabapentin (NEURONTIN) 600 MG tablet Take 1 tablet by mouth 2 (Two) Times a Day. 180 tablet 1 11/14/2018 at 2100   • insulin NPH-insulin regular (NOVOLIN 70/30) (70-30) 100 UNIT/ML injection 40 units before breakfast, 40 units before lunch, and 60 units before supper (Patient taking differently: Inject 70 Units under the skin into the appropriate area as directed 2 (Two) Times a Day With Meals. 70u before breakfast and 70u before supper) 40 mL 11 11/14/2018 at 2100   • meclizine (ANTIVERT) 25 MG tablet Take 1 tablet by mouth 3 (Three) Times a Day As Needed (3 times). 270 tablet 1 11/14/2018 at 2100   • metoprolol tartrate (LOPRESSOR) 25 MG tablet Take 1 tablet by mouth 2 (Two) Times a Day for 360 days. 180 tablet 3 11/14/2018 at 2100   • pantoprazole (PROTONIX) 40 MG EC tablet Take 1 tablet by mouth 2 (Two) Times a Day. 180 tablet 1 11/14/2018 at 2100   • rosuvastatin (CRESTOR) 20 MG tablet Take 1 tablet by mouth Daily for 360 days. 90 tablet 3 11/14/2018 at 2100   • ACCU-CHEK JOVITA test strip 1 each by Other route 4 (Four) Times a Day Before Meals & at Bedtime. 150 each 11 Taking   • ACCU-CHEK SOFTCLIX LANCETS lancets 1 each by Other route 3 (Three) Times a Day Before Meals. Use as instructed 300 each 3    • aspirin (ASPIRIN LOW DOSE) 81 MG tablet Take 1 tablet by mouth Daily for 360 days. 90 tablet 3 11/6/2018   • B-D INS SYR ULTRAFINE 1CC/31G 31G X 5/16\" 1 ML misc   0    • Blood Glucose Calibration (ACCU-CHEK JOVITA) solution 1 each by In Vitro route Take As Directed. 1 each 3    • Blood Glucose Monitoring Suppl (ACCU-CHEK JOVITA PLUS) w/Device kit   0    • Blood Glucose Monitoring Suppl (ACCU-CHEK JOVITA) device 1 each by Other route Take As Directed. Use as instructed 1 each 0 Taking   • clopidogrel (PLAVIX) 75 MG tablet Take 1 tablet by mouth Daily for 360 days. 90 tablet 3 11/6/2018   • Cyanocobalamin (B-12 PO) Take 500 " "mcg by mouth Daily.      • diphenoxylate-atropine (LOMOTIL) 2.5-0.025 MG per tablet Take 1 tablet by mouth Daily. (Patient taking differently: Take 1 tablet by mouth Daily As Needed for Diarrhea.) 30 tablet 2 More than a month at Unknown time   • Insulin Syringe 30G X 5/16\" 1 ML misc 1 each 3 (Three) Times a Day Before Meals. 300 each 3 Taking   • ondansetron ODT (ZOFRAN-ODT) 8 MG disintegrating tablet Take 1 tablet by mouth Every 8 (Eight) Hours As Needed for Nausea or Vomiting. 180 tablet 1 More than a month at Unknown time   • sharps container 1 each Daily. 1 each 0 Taking       History:   Past Medical History:   Diagnosis Date   • Arthritis    • Back pain    • Chest pain    • Chronic diastolic congestive heart failure (CMS/HCC)    • Chronic kidney disease, stage III (moderate) (CMS/HCC)    • Coronary artery disease involving native coronary artery of native heart with angina pectoris (CMS/HCC)    • Diabetes mellitus (CMS/HCC)    • Dyslipidemia    • Esophagus disorder 2016   • GERD (gastroesophageal reflux disease)    • History of cataract    • History of hypercalcemia    • History of hyperparathyroidism    • Hyperlipidemia    • Hypertension    • Lower extremity edema    • Peripheral arterial disease (CMS/HCC)    • Postherpetic neuralgia    • Venous insufficiency      Past Surgical History:   Procedure Laterality Date   • CATARACT EXTRACTION     • CHOLECYSTECTOMY     • COLONOSCOPY     • ENDOSCOPY     • EYE SURGERY Bilateral     cataract    • HYSTERECTOMY     • OTHER SURGICAL HISTORY      Hospitalized July 2014 for bladder obstruction status post stent placement and removal by Dr. Hi.   • PARATHYROIDECTOMY     • SPINAL CORD STIMULATOR IMPLANT     • TONSILLECTOMY       Family History   Problem Relation Age of Onset   • Kidney disease Other    • Kidney disease Mother    • Diabetes Father      Social History     Tobacco Use   • Smoking status: Never Smoker   • Smokeless tobacco: Never Used   Substance Use Topics "   • Alcohol use: No   • Drug use: No   She lives with her daughter. She has 4 children.     Review of Systems  All systems were reviewed and negative except for:  Respiratory: positive for  shortness of air  Gastrointestinal: postitive for  constipation and diarrhea    Vital Signs  Visit Vitals  /67 (BP Location: Left arm, Patient Position: Lying)   Pulse 79   Temp 97.5 °F (36.4 °C) (Oral)   Resp 17   SpO2 99%       Physical Exam:    General Appearance:    Alert, cooperative, in no acute distress   Head:    Normocephalic, without obvious abnormality, atraumatic   Eyes:            Lids and lashes normal, conjunctivae and sclerae normal, no   icterus, no pallor, corneas clear, PERRLA   Ears:    Ears appear intact with no abnormalities noted   Back:   Surgical dressing CDI. HV present   Lungs:     Clear to auscultation,respirations regular, even and                   unlabored    Heart:    Regular rhythm and normal rate, normal S1 and S2, no            murmur, no gallop, no rub, no click   Abdomen:     Normal bowel sounds, no masses, no organomegaly, soft        non-tender, non-distended, no guarding, no rebound                 tenderness   Genitalia:    Deferred   Extremities:   Moves all extremities well, no edema, no cyanosis, no              redness   Pulses:   Pulses palpable and equal bilaterally   Skin:   No bleeding, bruising or rash   Neurologic:   Cranial nerves 2 - 12 grossly intact, sensation intact. Flexion and dorsiflexion intact bilateral feet.        I reviewed the patient's new clinical results.             Invalid input(s): NEUTOPHILPCT,  EOSPCT  Results from last 7 days   Lab Units  11/15/18   0927   POTASSIUM mmol/L  4.2     Lab Results   Component Value Date    HGBA1C 9.5 09/19/2018     Results for JOHN PAUL GARCIA (MRN [id number]) as of 11/15/2018 13:47   Ref. Range 11/15/2018 12:31   Glucose Latest Ref Range: 70 - 130 mg/dL 189 (H)     Assessment and Plan:     S/P laminectomy    Lumbar  stenosis    Stage 3 chronic kidney disease (CMS/[facility])    Hyperlipidemia LDL goal <70    Essential hypertension    Uncontrolled type 2 diabetes mellitus with complication, with long-term current use of insulin (CMS/[facility])    Coronary artery disease involving native coronary artery of native heart with angina pectoris (CMS/[facility])      Plan  1. PT- ambulate  2. Pain control-prns   3. IS-encourage  4. DVT proph- [facility]  5. Bowel regimen  6. Resume home medications as appropriate  7. Monitor post-op labs  8. DC planning for home    HTN, HLD, CAD  - Continue home norvasc, statin and lopressor  - Resume plavix when ok with Dr. Key  - Monitor BP   - Holding parameters for BP meds  - Labetalol PRN for SBP>170    DM  - hgb A1c on 9/19/18 9.5  - Changed to Levemir BID, added mealtime bolus  - Accuchecks ACHS with high dose SSI  - DM educator    CKD  - BMP in AM      NHAN Han  11/15/18  3:16 PM     I have personally performed the evaluation on this patient. My history is consistent  with HPI obtained. My exam findings are listed above. I have personally reviewed and discussed the above formulated treatment plan with NHAN.      Electronically signed by Kaykay Cai MD at 11/15/2018  7:01 PM     Jenna Dawkins APRN at 11/15/2018  9:20 AM          Pre-Op H&P  Narcisa Cotto  [id number]  1940    Chief complaint: Low back pain into BLE    HPI:    Patient is a 78 y.o.female who presents with lumbar spinal stenosis and here today for lumbar laminectomy     Review of Systems:  General ROS: negative for chills, fever or skin lesions;  No changes since last office visit.  +IM clearance  · Cardiovascular ROS: no chest pain or dyspnea on exertion.  +cardiac clearance.    · 4/23/18 TTE Left ventricular systolic function is normal. Estimated EF = 60%.  · Left ventricular diastolic dysfunction (grade I) consistent with impaired relaxation.  The cardiac valves are functionally normal.  Respiratory ROS: no cough,  "shortness of breath, or wheezing    Allergies:   Allergies   Allergen Reactions   • Codeine Nausea And Vomiting       Home Meds:    No current facility-administered medications on file prior to encounter.      Current Outpatient Medications on File Prior to Encounter   Medication Sig Dispense Refill   • amLODIPine (NORVASC) 5 MG tablet Take 1 tablet by mouth Daily for 360 days. 90 tablet 3   • cholecalciferol (VITAMIN D3) 1000 units tablet Take 1,000 Units by mouth Daily.     • cyanocobalamin ([facility] VITAMIN B-12) 1000 MCG tablet Take 1 tablet by mouth daily.     • gabapentin (NEURONTIN) 600 MG tablet Take 1 tablet by mouth 2 (Two) Times a Day. 180 tablet 1   • insulin NPH-insulin regular (NOVOLIN 70/30) (70-30) 100 UNIT/ML injection 40 units before breakfast, 40 units before lunch, and 60 units before supper (Patient taking differently: Inject 70 Units under the skin into the appropriate area as directed 2 (Two) Times a Day With Meals. 70u before breakfast and 70u before supper) 40 mL 11   • meclizine (ANTIVERT) 25 MG tablet Take 1 tablet by mouth 3 (Three) Times a Day As Needed (3 times). 270 tablet 1   • metoprolol tartrate (LOPRESSOR) 25 MG tablet Take 1 tablet by mouth 2 (Two) Times a Day for 360 days. 180 tablet 3   • pantoprazole (PROTONIX) 40 MG EC tablet Take 1 tablet by mouth 2 (Two) Times a Day. 180 tablet 1   • rosuvastatin (CRESTOR) 20 MG tablet Take 1 tablet by mouth Daily for 360 days. 90 tablet 3   • ACCU-CHEK JOVITA test strip 1 each by Other route 4 (Four) Times a Day Before Meals & at Bedtime. 150 each 11   • ACCU-CHEK SOFTCLIX LANCETS lancets 1 each by Other route 3 (Three) Times a Day Before Meals. Use as instructed 300 each 3   • aspirin (ASPIRIN LOW DOSE) 81 MG tablet Take 1 tablet by mouth Daily for 360 days. 90 tablet 3   • B-D INS SYR ULTRAFINE 1CC/31G 31G X 5/16\" 1 ML misc   0   • Blood Glucose Calibration (ACCU-CHEK JOVITA) solution 1 each by In Vitro route Take As Directed. 1 each 3   • Blood " "Glucose Monitoring Suppl (ACCU-CHEK JOVITA PLUS) w/Device kit   0   • Blood Glucose Monitoring Suppl (ACCU-CHEK JOVITA) device 1 each by Other route Take As Directed. Use as instructed 1 each 0   • clopidogrel (PLAVIX) 75 MG tablet Take 1 tablet by mouth Daily for 360 days. 90 tablet 3   • diphenoxylate-atropine (LOMOTIL) 2.5-0.025 MG per tablet Take 1 tablet by mouth Daily. (Patient taking differently: Take 1 tablet by mouth Daily As Needed for Diarrhea.) 30 tablet 2   • Insulin Syringe 30G X 5/16\" 1 ML misc 1 each 3 (Three) Times a Day Before Meals. 300 each 3   • ondansetron ODT (ZOFRAN-ODT) 8 MG disintegrating tablet Take 1 tablet by mouth Every 8 (Eight) Hours As Needed for Nausea or Vomiting. 180 tablet 1   • sharps container 1 each Daily. 1 each 0       PMH:   Past Medical History:   Diagnosis Date   • Arthritis    • Back pain    • Chest pain    • Chronic diastolic congestive heart failure (CMS/HCC)    • Chronic kidney disease, stage III (moderate) (CMS/HCC)    • Coronary artery disease involving native coronary artery of native heart with angina pectoris (CMS/HCC)    • Diabetes mellitus (CMS/HCC)    • Dyslipidemia    • Esophagus disorder 2016   • GERD (gastroesophageal reflux disease)    • History of cataract    • History of hypercalcemia    • History of hyperparathyroidism    • Hyperlipidemia    • Hypertension    • Lower extremity edema    • Peripheral arterial disease (CMS/HCC)    • Postherpetic neuralgia    • Venous insufficiency      PSH:    Past Surgical History:   Procedure Laterality Date   • CATARACT EXTRACTION     • CHOLECYSTECTOMY     • COLONOSCOPY     • ENDOSCOPY     • EYE SURGERY Bilateral     cataract    • HYSTERECTOMY     • OTHER SURGICAL HISTORY      Hospitalized July 2014 for bladder obstruction status post stent placement and removal by Dr. Hi.   • PARATHYROIDECTOMY     • SPINAL CORD STIMULATOR IMPLANT     • TONSILLECTOMY       Social History:   Tobacco:   Social History     Tobacco Use "   Smoking Status Never Smoker   Smokeless Tobacco Never Used      Alcohol:     Social History     Substance and Sexual Activity   Alcohol Use No       Vitals:           /76 (BP Location: Right arm, Patient Position: Lying)   Pulse 65   Temp 97.1 °F (36.2 °C) (Temporal)   Resp 18   SpO2 97%     Physical Exam:  General Appearance:    Alert, cooperative, no distress, appears stated age   Head:    Normocephalic, without obvious abnormality, atraumatic   Lungs:     Clear to auscultation bilaterally, respirations unlabored    Heart:   Regular rate and rhythm, S1 and S2 normal, no murmur, rub    or gallop    Abdomen:    Soft, non-tender.  +bowel sounds   Breast Exam:    deferred   Genitalia:    deferred   Extremities:   Extremities normal, atraumatic, no cyanosis or edema   Skin:   Skin color, texture, turgor normal, no rashes or lesions   Neurologic:   Grossly intact   Results Review  I reviewed the patient's new clinical results.    Cancer Staging (if applicable)  Cancer Patient: __ yes _x_no __unknown; If yes, clinical stage T:__ N:__M:__, stage group or __N/A    Impression: Lumbar spinal stenosis    Plan: Lumbar laminectomy    NHAN Umanzor   11/15/2018   9:20 AM    Electronically signed by Jenna Dawkins APRN at 11/15/2018  9:26 AM       Hospital Medications (active)       Dose Frequency Start End    acetaminophen (TYLENOL) tablet 650 mg 650 mg Every 4 Hours PRN 11/15/2018     Sig - Route: Take 2 tablets by mouth Every 4 (Four) Hours As Needed for Mild Pain . - Oral    amLODIPine (NORVASC) tablet 5 mg 5 mg Daily 11/15/2018 9/28/2019    Sig - Route: Take 1 tablet by mouth Daily. - Oral    bisacodyl (DULCOLAX) EC tablet 5 mg 5 mg Daily PRN 11/15/2018     Sig - Route: Take 1 tablet by mouth Daily As Needed for Constipation. - Oral    bisacodyl (DULCOLAX) suppository 10 mg 10 mg Daily PRN 11/15/2018     Sig - Route: Insert 1 suppository into the rectum Daily As Needed for Constipation. - Rectal     "ceFAZolin in dextrose (ANCEF) IVPB solution 2 g 2 g Every 8 Hours 11/15/2018 11/16/2018    Sig - Route: Infuse 100 mL into a venous catheter Every 8 (Eight) Hours. - Intravenous    dextrose (D50W) 25 g/ 50mL Intravenous Solution 25 g 25 g Every 15 Minutes PRN 11/15/2018     Sig - Route: Infuse 50 mL into a venous catheter Every 15 (Fifteen) Minutes As Needed for Low Blood Sugar (Blood Sugar Less Than 70). - Intravenous    dextrose (D50W) 25 g/ 50mL Intravenous Solution 25-50 mL 25-50 mL Every 30 Minutes PRN 11/16/2018     Sig - Route: Infuse 25-50 mL into a venous catheter Every 30 (Thirty) Minutes As Needed for Low Blood Sugar (Blood Glucose <70 mg/dL; Per Insulin Infusion Protocol). - Intravenous    dextrose (GLUTOSE) oral gel 15 g 15 g Every 15 Minutes PRN 11/15/2018     Sig - Route: Take 15 g by mouth Every 15 (Fifteen) Minutes As Needed for Low Blood Sugar (Blood sugar less than 70). - Oral    famotidine (PEPCID) injection 20 mg 20 mg Every 12 Hours Scheduled 11/15/2018     Sig - Route: Infuse 2 mL into a venous catheter Every 12 (Twelve) Hours. - Intravenous    Linked Group 1:  \"Or\" Linked Group Details        famotidine (PEPCID) tablet 20 mg 20 mg Every 12 Hours Scheduled 11/15/2018     Sig - Route: Take 1 tablet by mouth Every 12 (Twelve) Hours. - Oral    Linked Group 1:  \"Or\" Linked Group Details        gabapentin (NEURONTIN) capsule 600 mg 600 mg Every 12 Hours Scheduled 11/15/2018     Sig - Route: Take 2 capsules by mouth Every 12 (Twelve) Hours. - Oral    glucagon (human recombinant) (GLUCAGEN DIAGNOSTIC) injection 1 mg 1 mg As Needed 11/15/2018     Sig - Route: Inject 1 mg under the skin into the appropriate area as directed As Needed (Blood Glucose Less Than 70). - Subcutaneous    HYDROcodone-acetaminophen (NORCO) 7.5-325 MG per tablet 1 tablet 1 tablet Every 4 Hours PRN 11/15/2018 11/25/2018    Sig - Route: Take 1 tablet by mouth Every 4 (Four) Hours As Needed for Moderate Pain . - Oral    " "HYDROmorphone (DILAUDID) injection 2 mg 2 mg Every 4 Hours PRN 11/15/2018 11/25/2018    Sig - Route: Inject 1 mL into the appropriate muscle as directed by prescriber Every 4 (Four) Hours As Needed for Severe Pain . - Intramuscular    Linked Group 2:  \"And\" Linked Group Details        insulin detemir (LEVEMIR) injection 15 Units 15 Units Once 11/15/2018 11/15/2018    Sig - Route: Inject 15 Units under the skin into the appropriate area as directed 1 (One) Time. - Subcutaneous    insulin detemir (LEVEMIR) injection 35 Units 35 Units Every 12 Hours Scheduled 11/15/2018     Sig - Route: Inject 35 Units under the skin into the appropriate area as directed Every 12 (Twelve) Hours. - Subcutaneous    insulin lispro (humaLOG) injection 0-24 Units 0-24 Units 4 Times Daily With Meals & Nightly 11/15/2018     Sig - Route: Inject 0-24 Units under the skin into the appropriate area as directed 4 (Four) Times a Day With Meals & at Bedtime. - Subcutaneous    insulin lispro (humaLOG) injection 10 Units 10 Units 3 Times Daily With Meals 11/16/2018     Sig - Route: Inject 10 Units under the skin into the appropriate area as directed 3 (Three) Times a Day With Meals. - Subcutaneous    insulin regular (HumuLIN R,NovoLIN R) 100 Units in sodium chloride 0.9 % 100 mL (1 Units/mL) infusion 1-20 Units/hr Titrated 11/16/2018     Sig - Route: Infuse 1-20 Units/hr into a venous catheter Dose Adjusted By Provider As Needed. - Intravenous    labetalol (NORMODYNE,TRANDATE) injection 10 mg 10 mg Every 4 Hours PRN 11/15/2018     Sig - Route: Infuse 2 mL into a venous catheter Every 4 (Four) Hours As Needed for High Blood Pressure (SBP>170). - Intravenous    lactated ringers infusion 9 mL/hr Continuous 11/15/2018     Sig - Route: Infuse 9 mL/hr into a venous catheter Continuous. - Intravenous    meclizine (ANTIVERT) tablet 25 mg 25 mg 3 Times Daily PRN 11/15/2018     Sig - Route: Take 1 tablet by mouth 3 (Three) Times a Day As Needed (3 times). - " "Oral    metoprolol tartrate (LOPRESSOR) tablet 25 mg 25 mg 2 Times Daily 11/15/2018 9/28/2019    Sig - Route: Take 1 tablet by mouth 2 (Two) Times a Day. - Oral    Morphine sulfate (PF) injection 1 mg 1 mg Every 4 Hours PRN 11/15/2018 11/25/2018    Sig - Route: Infuse 0.5 mL into a venous catheter Every 4 (Four) Hours As Needed for Moderate Pain . - Intravenous    Linked Group 3:  \"And\" Linked Group Details        naloxone (NARCAN) injection 0.4 mg 0.4 mg Every 5 Minutes PRN 11/15/2018     Sig - Route: Infuse 1 mL into a venous catheter Every 5 (Five) Minutes As Needed for Respiratory Depression. - Intravenous    Linked Group 3:  \"And\" Linked Group Details        naloxone (NARCAN) injection 0.4 mg 0.4 mg Every 5 Minutes PRN 11/15/2018     Sig - Route: Infuse 1 mL into a venous catheter Every 5 (Five) Minutes As Needed for Respiratory Depression. - Intravenous    Linked Group 2:  \"And\" Linked Group Details        ondansetron (ZOFRAN) injection 4 mg 4 mg Every 6 Hours PRN 11/15/2018     Sig - Route: Infuse 2 mL into a venous catheter Every 6 (Six) Hours As Needed for Nausea or Vomiting. - Intravenous    ondansetron (ZOFRAN) injection 4 mg 4 mg Every 6 Hours PRN 11/15/2018     Sig - Route: Infuse 2 mL into a venous catheter Every 6 (Six) Hours As Needed for Nausea or Vomiting. - Intravenous    pantoprazole (PROTONIX) EC tablet 40 mg 40 mg 2 Times Daily 11/15/2018     Sig - Route: Take 1 tablet by mouth 2 (Two) Times a Day. - Oral    rosuvastatin (CRESTOR) tablet 20 mg 20 mg Daily 11/15/2018 10/6/2019    Sig - Route: Take 1 tablet by mouth Daily. - Oral    sodium chloride 0.9 % bolus 500 mL 500 mL 3 Times Daily PRN 11/15/2018     Sig - Route: Infuse 500 mL into a venous catheter 3 (Three) Times a Day As Needed (for SBP less than 90). - Intravenous    sodium chloride 0.9 % flush 3 mL 3 mL Every 12 Hours Scheduled 11/15/2018     Sig - Route: Infuse 3 mL into a venous catheter Every 12 (Twelve) Hours. - Intravenous    " sodium chloride 0.9 % flush 3-10 mL 3-10 mL As Needed 11/15/2018     Sig - Route: Infuse 3-10 mL into a venous catheter As Needed for Line Care. - Intravenous    sodium chloride 0.9 % with KCl 20 mEq/L infusion 100 mL/hr Continuous 11/15/2018     Sig - Route: Infuse 100 mL/hr into a venous catheter Continuous. - Intravenous    zolpidem (AMBIEN) tablet 5 mg 5 mg Nightly PRN 11/15/2018 11/25/2018    Sig - Route: Take 1 tablet by mouth At Night As Needed for Sleep. - Oral    fentaNYL citrate (PF) (SUBLIMAZE) injection 25 mcg (Discontinued) 25 mcg Every 5 Minutes PRN 11/15/2018 11/15/2018    Sig - Route: Infuse 0.5 mL into a venous catheter Every 5 (Five) Minutes As Needed for Moderate Pain . - Intravenous    Reason for Discontinue: Patient Transfer    insulin detemir (LEVEMIR) injection 50 Units (Discontinued) 50 Units Nightly 11/15/2018 11/15/2018    Sig - Route: Inject 50 Units under the skin into the appropriate area as directed Every Night. - Subcutaneous    insulin lispro (humaLOG) injection 0-9 Units (Discontinued) 0-9 Units 4 Times Daily With Meals & Nightly 11/15/2018 11/15/2018    Sig - Route: Inject 0-9 Units under the skin into the appropriate area as directed 4 (Four) Times a Day With Meals & at Bedtime. - Subcutaneous    Reason for Discontinue: Patient Transfer    insulin lispro (humaLOG) injection 5 Units (Discontinued) 5 Units 3 Times Daily With Meals 11/15/2018 11/15/2018    Sig - Route: Inject 5 Units under the skin into the appropriate area as directed 3 (Three) Times a Day With Meals. - Subcutaneous    lactated ringers bolus 500 mL (Discontinued) 500 mL Once As Needed 11/15/2018 11/15/2018    Sig - Route: Infuse 500 mL into a venous catheter 1 (One) Time As Needed (for hypovolemia, call anesthesiologist before initiating). - Intravenous    Reason for Discontinue: Patient Transfer    sodium chloride 0.9 % flush 3 mL (Discontinued) 3 mL Every 12 Hours Scheduled 11/15/2018 11/15/2018    Sig - Route:  "Infuse 3 mL into a venous catheter Every 12 (Twelve) Hours. - Intravenous    Reason for Discontinue: Patient Transfer    sodium chloride 0.9 % flush 3-10 mL (Discontinued) 3-10 mL As Needed 11/15/2018 11/15/2018    Sig - Route: Infuse 3-10 mL into a venous catheter As Needed for Line Care. - Intravenous    Reason for Discontinue: Patient Transfer          Operative/Procedure Notes (most recent note)     No notes of this type exist for this encounter.           Physician Progress Notes (most recent note)      Zachary Key MD at 11/16/2018 10:51 AM          Ortho Spine Progress Note     LOS: 0 days   Patient Care Team:  Mariia Del Real DO as PCP - General  Mariia Del Real DO as PCP - Family Medicine  Filemon Mayberry IV, MD as Cardiologist (Cardiology)  Jennifer Richards MD as Consulting Physician (Endocrinology)    Subjective: Back doing fine.  No leg pain.  Main current problem is blood sugars are elevated.  Currently being treated by Dr. AMBRIZ/Nicole.    Objective:   Vital Signs:  /56 (BP Location: Left arm, Patient Position: Lying)   Pulse 75   Temp 96.4 °F (35.8 °C) (Temporal)   Resp 16   Ht 167.6 cm (65.98\")   Wt 82 kg (180 lb 12.4 oz)   SpO2 95%   BMI 29.19 kg/m²      Labs:  Lab Results (last 24 hours)     Procedure Component Value Units Date/Time    POC Glucose Once [[id number]]  (Abnormal) Collected:  11/16/18 0936    Specimen:  Blood Updated:  11/16/18 0955     Glucose 337 mg/dL     POC Glucose Once [[id number]]  (Abnormal) Collected:  11/16/18 0838    Specimen:  Blood Updated:  11/16/18 0859     Glucose 352 mg/dL     POC Glucose Once [[id number]]  (Abnormal) Collected:  11/16/18 0724    Specimen:  Blood Updated:  11/16/18 0733     Glucose 331 mg/dL     POC Glucose Once [[id number]]  (Abnormal) Collected:  11/16/18 0545    Specimen:  Blood Updated:  11/16/18 0548     Glucose 339 mg/dL     Basic Metabolic Panel [[id number]]  (Abnormal) Collected:  11/16/18 0440    Specimen:  Blood " Updated:  11/16/18 0535     Glucose 322 mg/dL      BUN 31 mg/dL      Creatinine 1.79 mg/dL      Sodium 133 mmol/L      Potassium 5.3 mmol/L      Chloride 106 mmol/L      CO2 20.0 mmol/L      Calcium 8.2 mg/dL      eGFR Non African Amer 27 mL/min/1.73      BUN/Creatinine Ratio 17.3     Anion Gap 7.0 mmol/L     Narrative:       [employer] Guidelines    Stage     Description        GFR  1         Normal or High     90+  2         Mild decrease      60-89  3         Moderate decrease  30-59  4         Severe decrease    15-29  5         Kidney failure     <15    The MDRD GFR formula is only valid for adults with stable renal function between ages 18 and 70.    CBC (No Diff) [[id number]]  (Abnormal) Collected:  11/16/18 0440    Specimen:  Blood Updated:  11/16/18 0526     WBC 11.16 10*3/mm3      RBC 3.46 10*6/mm3      Hemoglobin 9.5 g/dL      Hematocrit 29.6 %      MCV 85.5 fL      MCH 27.5 pg      MCHC 32.1 g/dL      RDW 15.1 %      RDW-SD 47.1 fl      MPV 10.5 fL      Platelets 183 10*3/mm3     POC Glucose Once [[id number]]  (Abnormal) Collected:  11/16/18 0439    Specimen:  Blood Updated:  11/16/18 0442     Glucose 321 mg/dL     POC Glucose Once [[id number]]  (Abnormal) Collected:  11/16/18 0314    Specimen:  Blood Updated:  11/16/18 0320     Glucose 322 mg/dL     POC Glucose Once [[id number]]  (Abnormal) Collected:  11/16/18 0247    Specimen:  Blood Updated:  11/16/18 0300     Glucose 332 mg/dL     POC Glucose Once [[id number]]  (Abnormal) Collected:  11/15/18 2120    Specimen:  Blood Updated:  11/15/18 2123     Glucose 426 mg/dL     POC Glucose Once [[id number]]  (Abnormal) Collected:  11/15/18 2119    Specimen:  Blood Updated:  11/15/18 2123     Glucose 454 mg/dL     POC Glucose Once [[id number]]  (Abnormal) Collected:  11/15/18 1514    Specimen:  Blood Updated:  11/15/18 1526     Glucose 219 mg/dL     POC Glucose Once [[id number]]  (Abnormal) Collected:  11/15/18 1231    Specimen:  Blood Updated:  11/15/18  1233     Glucose 189 mg/dL           Sitting up in chair.  Awake, alert, oriented.  Motor intact lower extremities.    Assessment/Plan: It sounds like patient will be here another day to get her blood sugars under better control.  Okay to pull Hemovac Saturday morning.  Anticipate discharge Saturday if okay with Dr Thomason.  I have left a prescription for Percocet 5.0, #45, for home use.  I have discussed follow-up care and restrictions with her.  I will see her back in 3-4 weeks.    Zachary Key MD  18  10:51 AM        Electronically signed by Zachary Key MD at 2018 10:53 AM       Consult Notes (most recent note)     No notes of this type exist for this encounter.           Physical Therapy Notes (most recent note)      Krystal Schmid, PT at 2018  9:57 AM  Version 1 of 1         Acute Care - Physical Therapy Treatment Note  [facility]     Patient Name: Narcisa Cotto  : 1940  MRN: [id number]  Today's Date: 2018  Onset of Illness/Injury or Date of Surgery: 11/15/18  Date of Referral to PT: 11/15/18  Referring Physician: MD Shahid    Admit Date: 11/15/2018    Visit Dx:    ICD-10-CM ICD-9-CM   1. Impaired functional mobility, balance, gait, and endurance Z74.09 V49.89     Patient Active Problem List   Diagnosis   • Gastroesophageal reflux disease   • Stage 3 chronic kidney disease (CMS/HCC)   • Hyperlipidemia LDL goal <70   • Essential hypertension   • Vitamin D deficiency   • Diabetic gastroparesis (CMS/HCC)   • Anemia   • Gait instability   • Osteoarthritis   • Vitamin B12 deficiency   • Postherpetic neuralgia   • Peripheral arterial disease (CMS/HCC)   • Lower extremity edema   • Venous insufficiency   • Uncontrolled type 2 diabetes mellitus with complication, with long-term current use of insulin (CMS/HCC)   • Sleep apnea   • Diabetic peripheral neuropathy (CMS/HCC)   • Coronary artery disease involving native coronary artery of native heart with angina  pectoris (CMS/HCC)   • Lumbar stenosis   • S/P laminectomy       Therapy Treatment    Rehabilitation Treatment Summary     Row Name 11/16/18 0929             Treatment Time/Intention    Discipline  physical therapist  -LR      Document Type  therapy note (daily note)  -LR      Subjective Information  no complaints denies pain at rest  -LR      Mode of Treatment  physical therapy;individual therapy  -LR      Patient/Family Observations  Patient supine in bed upon arrival. IV, hemovac. Daughter present.   -LR      Care Plan Review  care plan/treatment goals reviewed;risks/benefits reviewed;current/potential barriers reviewed;patient/other agree to care plan  -LR      Care Plan Review, Other Participant(s)  daughter  -LR      Patient Effort  excellent  -LR      Existing Precautions/Restrictions  fall;spinal;other (see comments) hemovac  -LR      Treatment Considerations/Comments  S[place] brief prior to mobility.   -LR      Recorded by [LR] Krystal Schmid, PT 11/16/18 0955      Row Name 11/16/18 0929             Cognitive Assessment/Intervention- PT/OT    Orientation Status (Cognition)  oriented x 4  -LR      Follows Commands (Cognition)  WFL;follows one step commands;over 90% accuracy;verbal cues/prompting required;repetition of directions required  -LR      Recorded by [LR] Krystal Schmid, PT 11/16/18 0955      Row Name 11/16/18 0929             Safety Issues, Functional Mobility    Safety Issues Affecting Function (Mobility)  safety precaution awareness;safety precautions follow-through/compliance  -LR      Recorded by [LR] Krystal Schmid, PT 11/16/18 0955      Row Name 11/16/18 0929             Bed Mobility Assessment/Treatment    Supine-Sit [place] (Bed Mobility)  verbal cues;supervision  -LR      Sit-Supine [place] (Bed Mobility)  not tested UI at end of treatment.   -LR      Bed Mobility, Safety Issues  decreased use of legs for bridging/pushing  -LR      Assistive Device (Bed  Mobility)  head of bed elevated;bed rails  -LR      Comment (Bed Mobility)  Verbal cues to flex knees and roll hips and shoulders at same time onto R side. Cues to drop LEs off EOB and to push up from bed to raise trunk into sitting. Denied dizziness upon sitting up.   -LR      Recorded by [LR] Krystal Schmid, PT 11/16/18 0955      Row Name 11/16/18 0929             Transfer Assessment/Treatment    Transfer Assessment/Treatment  sit-stand transfer;stand-sit transfer  -LR      Comment (Transfers)  Verbal cues to push up from bed to stand and to reach back for chair to lower into sitting. Patient held onto RW when she sat despite cues. Verbal cues to limit trunk flexion during t/f.   -LR      Recorded by [LR] Krystal Schmid, PT 11/16/18 0955      Row Name 11/16/18 0929             Sit-Stand Transfer    Sit-Stand [place] (Transfers)  verbal cues;contact guard  -LR      Assistive Device (Sit-Stand Transfers)  walker, front-wheeled  -LR      Recorded by [LR] Krystal Schmid, PT 11/16/18 0955      Row Name 11/16/18 0929             Stand-Sit Transfer    Stand-Sit [place] (Transfers)  verbal cues;contact guard  -LR      Assistive Device (Stand-Sit Transfers)  walker, front-wheeled  -LR      Recorded by [LR] Krystal Schmid, PT 11/16/18 0955      Row Name 11/16/18 0929             Gait/Stairs Assessment/Training    87322 - Gait Training Minutes   11  -LR      [place] Level (Gait)  verbal cues;contact guard  -LR      Assistive Device (Gait)  walker, front-wheeled  -LR      Distance in Feet (Gait)  350  -LR      Pattern (Gait)  step-through  -LR      Deviations/Abnormal Patterns (Gait)  bilateral deviations;jose l decreased;gait speed decreased;stride length decreased  -LR      Bilateral Gait Deviations  forward flexed posture;heel strike decreased  -LR      Comment (Gait/Stairs)  Patient ambulated with step through gait pattern at slow pace. Verbal cues to keep RW closer, for  increased LE weight bearing, decreased UE weight bearing, and upright posture. Improved with cues for correction. Denied leg pain with mobility. Gait limited by fatigue.   -LR      Recorded by [LR] Krystal Schimd, PT 11/16/18 0955      Row Name 11/16/18 0929             Motor Skills Assessment/Interventions    Additional Documentation  Therapeutic Exercise Interventions (Group);Therapeutic Exercise (Group)  -LR      Recorded by [LR] Krystal Schmid, PT 11/16/18 0955      Row Name 11/16/18 0929             Therapeutic Exercise    58809 - PT Therapeutic Exercise Minutes  6  -LR      Recorded by [LR] Krystal Schmid, PT 11/16/18 0955      Row Name 11/16/18 0929             Therapeutic Exercise    Lower Extremity (Therapeutic Exercise)  gluteal sets;heel slides, bilateral;quad sets, bilateral  -LR      Lower Extremity Range of Motion (Therapeutic Exercise)  hip internal/external rotation, bilateral;ankle dorsiflexion/plantar flexion, bilateral  -LR      Core Strength (Therapeutic Exercise)  abdominal bracing ab sets  -LR      Exercise Type (Therapeutic Exercise)  AROM (active range of motion);isometric contraction, static;isotonic contraction, concentric  -LR      Position (Therapeutic Exercise)  seated  -LR      Sets/Reps (Therapeutic Exercise)  x10 reps each  -LR      Comment (Therapeutic Exercise)  cues for technique; no assist required.   -LR      Recorded by [LR] Krystal Schmid, PT 11/16/18 0955      Row Name 11/16/18 0929             Positioning and Restraints    Pre-Treatment Position  in bed  -LR      Post Treatment Position  chair  -LR      In Chair  notified nsg;reclined;sitting;call light within reach;encouraged to call for assist;with family/caregiver;with nsg;legs elevated  -LR      Recorded by [LR] Krystal Schmid, PT 11/16/18 0955      Row Name 11/16/18 0929             Pain Assessment    Additional Documentation  Pain Scale: Numbers Pre/Post-Treatment (Group)  -LR       Recorded by [LR] Krystal Schmid, PT 11/16/18 0955      Row Name 11/16/18 0929             Pain Scale: Numbers Pre/Post-Treatment    Pain Scale: Numbers, Pretreatment  0/10 - no pain  -LR      Pain Scale: Numbers, Post-Treatment  6/10  -LR      Pain Location - Orientation  lower  -LR      Pain Location  back  -LR      Pain Intervention(s)  Repositioned;Ambulation/increased activity  -LR      Recorded by [LR] Krystal Schmid, PT 11/16/18 0955      Row Name                Wound 11/15/18 1055 Other (See comments) back incision    Wound - Properties Group Date first assessed: 11/15/18 [KS] Time first assessed: 1055 [KS] Side: Other (See comments) [KS] Location: back [KS] Type: incision [KS] Recorded by:  [KS] Abbey Guzman RN 11/15/18 1055    Row Name 11/16/18 0929             Coping    Observed Emotional State  accepting;cooperative  -LR      Verbalized Emotional State  acceptance  -LR      Recorded by [LR] Krystal Schmid, PT 11/16/18 0955      Row Name 11/16/18 0929             Plan of Care Review    Plan of Care Reviewed With  patient;daughter  -LR      Recorded by [LR] Krystal Schmid, PT 11/16/18 0955      Row Name 11/16/18 0929             Outcome Summary/Treatment Plan (PT)    Daily Summary of Progress (PT)  progress toward functional goals as expected  -LR      Recorded by [LR] Krystal Schmid, PT 11/16/18 0955        User Key  (r) = Recorded By, (t) = Taken By, (c) = Cosigned By    Initials Name Effective Dates Discipline    LR Krystal Schmid, PT 06/19/15 -  PT    Abbey Bhandari RN 06/16/16 -  Nurse          Wound 11/15/18 1055 Other (See comments) back incision (Active)   Dressing Appearance dry;intact;no drainage 11/15/2018  8:00 PM   Closure Adhesive bandage 11/15/2018  8:00 PM   Drainage Amount none 11/15/2018  8:00 PM   Dressing Care, Wound gauze 11/15/2018  3:00 PM           Physical Therapy Education     Title: PT OT SLP Therapies (Done)     Topic:  Physical Therapy (Done)     Point: Mobility training (Done)     Learning Progress Summary           Patient Acceptance, E,D, VU,NR by LR at 11/16/2018  9:29 AM    Comment:  Educated on correct log rolling technique, spinal precautions, correct t/f technique, correct gait mechanics, and HEP.    Acceptance, E,D, VU,NR by MJ at 11/15/2018  5:14 PM    Comment:  Edu re: back precautions, log roll, gait mechanics, benefits of mobility   Family Acceptance, E,D, VU,NR by LR at 11/16/2018  9:29 AM    Comment:  Educated on correct log rolling technique, spinal precautions, correct t/f technique, correct gait mechanics, and HEP.    Acceptance, E,D, VU,NR by MJ at 11/15/2018  5:14 PM    Comment:  Edu re: back precautions, log roll, gait mechanics, benefits of mobility                   Point: Home exercise program (Done)     Learning Progress Summary           Patient Acceptance, E,D, VU,NR by LR at 11/16/2018  9:29 AM    Comment:  Educated on correct log rolling technique, spinal precautions, correct t/f technique, correct gait mechanics, and HEP.   Family Acceptance, E,D, VU,NR by LR at 11/16/2018  9:29 AM    Comment:  Educated on correct log rolling technique, spinal precautions, correct t/f technique, correct gait mechanics, and HEP.                   Point: Body mechanics (Done)     Learning Progress Summary           Patient Acceptance, E,D, VU,NR by LR at 11/16/2018  9:29 AM    Comment:  Educated on correct log rolling technique, spinal precautions, correct t/f technique, correct gait mechanics, and HEP.    Acceptance, E,D, VU,NR by LUIS at 11/15/2018  5:14 PM    Comment:  Edu re: back precautions, log roll, gait mechanics, benefits of mobility   Family Acceptance, E,D, VU,NR by LR at 11/16/2018  9:29 AM    Comment:  Educated on correct log rolling technique, spinal precautions, correct t/f technique, correct gait mechanics, and HEP.    Acceptance, E,D, VU,NR by MJ at 11/15/2018  5:14 PM    Comment:  Edu re: back  precautions, log roll, gait mechanics, benefits of mobility                   Point: Precautions (Done)     Learning Progress Summary           Patient Acceptance, E,D, VU,NR by  at 11/16/2018  9:29 AM    Comment:  Educated on correct log rolling technique, spinal precautions, correct t/f technique, correct gait mechanics, and HEP.    Acceptance, E,D, VU,NR by  at 11/15/2018  5:14 PM    Comment:  Edu re: back precautions, log roll, gait mechanics, benefits of mobility   Family Acceptance, E,D, VU,NR by LR at 11/16/2018  9:29 AM    Comment:  Educated on correct log rolling technique, spinal precautions, correct t/f technique, correct gait mechanics, and HEP.    Acceptance, E,D, VU,NR by  at 11/15/2018  5:14 PM    Comment:  Edu re: back precautions, log roll, gait mechanics, benefits of mobility                               User Key     Initials Effective Dates Name Provider Type Discipline     06/19/15 -  Krystal Schmid, PT Physical Therapist PT     04/03/18 -  Christian Fleming, PT Physical Therapist PT                PT Recommendation and Plan     Outcome Summary/Treatment Plan (PT)  Daily Summary of Progress (PT): progress toward functional goals as expected  Plan of Care Reviewed With: patient, daughter  Progress: improving  Outcome Summary: Patient ambulated 350 feet with RW, limited by fatigue. CGA for all mobility. Initiated HEP today. Will continue to progress mobility training and strengthening as able.   Outcome Measures     Row Name 11/16/18 0929 11/15/18 1796          How much help from another person do you currently need...    Turning from your back to your side while in flat bed without using bedrails?  3  -LR  3  -MJ     Moving from lying on back to sitting on the side of a flat bed without bedrails?  3  -LR  3  -MJ     Moving to and from a bed to a chair (including a wheelchair)?  3  -LR  3  -MJ     Standing up from a chair using your arms (e.g., wheelchair, bedside chair)?  3  -LR  3   -MJ     Climbing 3-5 steps with a railing?  3  -LR  3  -MJ     To walk in hospital room?  3  -LR  3  -MJ     AM-PAC 6 Clicks Score  18  -LR  18  -MJ        Functional Assessment    Outcome Measure Options  AM-PAC 6 Clicks Basic Mobility (PT)  -LR  AM-PAC 6 Clicks Basic Mobility (PT)  -MJ       User Key  (r) = Recorded By, (t) = Taken By, (c) = Cosigned By    Initials Name Provider Type    Krystal Brown, PT Physical Therapist    Christian Herring, PT Physical Therapist         Time Calculation:   PT Charges     Row Name 11/16/18 0929             Time Calculation    Start Time  0929  -LR      PT Received On  11/16/18  -LR      PT Goal Re-Cert Due Date  11/25/18  -LR         Time Calculation- PT    Total Timed Code Minutes- PT  17 minute(s)  -LR         Timed Charges    19396 - PT Therapeutic Exercise Minutes  6  -LR      25900 - Gait Training Minutes   11  -LR        User Key  (r) = Recorded By, (t) = Taken By, (c) = Cosigned By    Initials Name Provider Type    Krystal Brown, PT Physical Therapist        Therapy Suggested Charges     Code   Minutes Charges    89171 (CPT®) Hc Pt Neuromusc Re Education Ea 15 Min      12939 (CPT®) Hc Pt Ther Proc Ea 15 Min 6     07486 (CPT®) Hc Gait Training Ea 15 Min 11 1    62618 (CPT®) Hc Pt Therapeutic Act Ea 15 Min      40915 (CPT®) Hc Pt Manual Therapy Ea 15 Min      83024 (CPT®) Hc Pt Iontophoresis Ea 15 Min      66366 (CPT®) Hc Pt Elec Stim Ea-Per 15 Min      77643 (CPT®) Hc Pt Ultrasound Ea 15 Min      72418 (CPT®) Hc Pt Self Care/Mgmt/Train Ea 15 Min      23703 (CPT®) Hc Pt Prosthetic (S) Train Initial Encounter, Each 15 Min      44296 (CPT®) Hc Pt Orthotic(S)/Prosthetic(S) Encounter, Each 15 Min      02206 (CPT®) Hc Orthotic(S) Mgmt/Train Initial Encounter, Each 15min      Total  17 1        Therapy Charges for Today     Code Description Service Date Service Provider Modifiers Qty    [id number] HC GAIT TRAINING EA 15 MIN 11/16/2018 Krystal Schmid  Le, PT GP 1          PT G-Codes  PT Professional Judgement Used?: Yes  Outcome Measure Options: AM-PAC 6 Clicks Basic Mobility (PT)  AM-PAC 6 Clicks Score: 18  Functional Limitation: Mobility: Walking and moving around  Mobility: Walking and Moving Around Current Status (): At least 40 percent but less than 60 percent impaired, limited or restricted  Mobility: Walking and Moving Around Goal Status (): At least 1 percent but less than 20 percent impaired, limited or restricted    Krystal Schmid, PT  11/16/2018         Electronically signed by Krystal Schmid, PT at 11/16/2018  9:57 AM       Occupational Therapy Notes (most recent note)     No notes of this type exist for this encounter.

## 2018-11-16 NOTE — PROGRESS NOTES
"Ortho Spine Progress Note     LOS: 0 days   Patient Care Team:  Mariia Del Real DO as PCP - General  Mariia Del Real DO as PCP - Family Medicine  Filemon Mayberry IV, MD as Cardiologist (Cardiology)  Jennifer Richards MD as Consulting Physician (Endocrinology)    Subjective: Back doing fine.  No leg pain.  Main current problem is blood sugars are elevated.  Currently being treated by Dr. AMBRIZ/Nicole.    Objective:   Vital Signs:  /56 (BP Location: Left arm, Patient Position: Lying)   Pulse 75   Temp 96.4 °F (35.8 °C) (Temporal)   Resp 16   Ht 167.6 cm (65.98\")   Wt 82 kg (180 lb 12.4 oz)   SpO2 95%   BMI 29.19 kg/m²     Labs:  Lab Results (last 24 hours)     Procedure Component Value Units Date/Time    POC Glucose Once [[id number]]  (Abnormal) Collected:  11/16/18 0936    Specimen:  Blood Updated:  11/16/18 0955     Glucose 337 mg/dL     POC Glucose Once [[id number]]  (Abnormal) Collected:  11/16/18 0838    Specimen:  Blood Updated:  11/16/18 0859     Glucose 352 mg/dL     POC Glucose Once [[id number]]  (Abnormal) Collected:  11/16/18 0724    Specimen:  Blood Updated:  11/16/18 0733     Glucose 331 mg/dL     POC Glucose Once [[id number]]  (Abnormal) Collected:  11/16/18 0545    Specimen:  Blood Updated:  11/16/18 0548     Glucose 339 mg/dL     Basic Metabolic Panel [[id number]]  (Abnormal) Collected:  11/16/18 0440    Specimen:  Blood Updated:  11/16/18 0535     Glucose 322 mg/dL      BUN 31 mg/dL      Creatinine 1.79 mg/dL      Sodium 133 mmol/L      Potassium 5.3 mmol/L      Chloride 106 mmol/L      CO2 20.0 mmol/L      Calcium 8.2 mg/dL      eGFR Non African Amer 27 mL/min/1.73      BUN/Creatinine Ratio 17.3     Anion Gap 7.0 mmol/L     Narrative:       [employer] Guidelines    Stage     Description        GFR  1         Normal or High     90+  2         Mild decrease      60-89  3         Moderate decrease  30-59  4         Severe decrease    15-29  5         Kidney failure     " <15    The MDRD GFR formula is only valid for adults with stable renal function between ages 18 and 70.    CBC (No Diff) [[id number]]  (Abnormal) Collected:  11/16/18 0440    Specimen:  Blood Updated:  11/16/18 0526     WBC 11.16 10*3/mm3      RBC 3.46 10*6/mm3      Hemoglobin 9.5 g/dL      Hematocrit 29.6 %      MCV 85.5 fL      MCH 27.5 pg      MCHC 32.1 g/dL      RDW 15.1 %      RDW-SD 47.1 fl      MPV 10.5 fL      Platelets 183 10*3/mm3     POC Glucose Once [[id number]]  (Abnormal) Collected:  11/16/18 0439    Specimen:  Blood Updated:  11/16/18 0442     Glucose 321 mg/dL     POC Glucose Once [[id number]]  (Abnormal) Collected:  11/16/18 0314    Specimen:  Blood Updated:  11/16/18 0320     Glucose 322 mg/dL     POC Glucose Once [[id number]]  (Abnormal) Collected:  11/16/18 0247    Specimen:  Blood Updated:  11/16/18 0300     Glucose 332 mg/dL     POC Glucose Once [[id number]]  (Abnormal) Collected:  11/15/18 2120    Specimen:  Blood Updated:  11/15/18 2123     Glucose 426 mg/dL     POC Glucose Once [[id number]]  (Abnormal) Collected:  11/15/18 2119    Specimen:  Blood Updated:  11/15/18 2123     Glucose 454 mg/dL     POC Glucose Once [[id number]]  (Abnormal) Collected:  11/15/18 1514    Specimen:  Blood Updated:  11/15/18 1526     Glucose 219 mg/dL     POC Glucose Once [[id number]]  (Abnormal) Collected:  11/15/18 1231    Specimen:  Blood Updated:  11/15/18 1233     Glucose 189 mg/dL           Sitting up in chair.  Awake, alert, oriented.  Motor intact lower extremities.    Assessment/Plan: It sounds like patient will be here another day to get her blood sugars under better control.  Okay to pull Hemovac Saturday morning.  Anticipate discharge Saturday if okay with Dr Thomason.  I have left a prescription for Percocet 5.0, #45, for home use.  I have discussed follow-up care and restrictions with her.  I will see her back in 3-4 weeks.    Zachary Key MD  11/16/18  10:51 AM

## 2018-11-17 VITALS
HEIGHT: 66 IN | TEMPERATURE: 98 F | OXYGEN SATURATION: 96 % | BODY MASS INDEX: 29.05 KG/M2 | HEART RATE: 59 BPM | SYSTOLIC BLOOD PRESSURE: 118 MMHG | RESPIRATION RATE: 18 BRPM | WEIGHT: 180.78 LBS | DIASTOLIC BLOOD PRESSURE: 54 MMHG

## 2018-11-17 LAB
GLUCOSE BLDC GLUCOMTR-MCNC: 121 MG/DL (ref 70–130)
GLUCOSE BLDC GLUCOMTR-MCNC: 123 MG/DL (ref 70–130)
GLUCOSE BLDC GLUCOMTR-MCNC: 127 MG/DL (ref 70–130)
GLUCOSE BLDC GLUCOMTR-MCNC: 128 MG/DL (ref 70–130)
GLUCOSE BLDC GLUCOMTR-MCNC: 130 MG/DL (ref 70–130)
GLUCOSE BLDC GLUCOMTR-MCNC: 132 MG/DL (ref 70–130)
GLUCOSE BLDC GLUCOMTR-MCNC: 133 MG/DL (ref 70–130)
GLUCOSE BLDC GLUCOMTR-MCNC: 133 MG/DL (ref 70–130)
GLUCOSE BLDC GLUCOMTR-MCNC: 134 MG/DL (ref 70–130)
GLUCOSE BLDC GLUCOMTR-MCNC: 154 MG/DL (ref 70–130)
GLUCOSE BLDC GLUCOMTR-MCNC: 160 MG/DL (ref 70–130)
GLUCOSE BLDC GLUCOMTR-MCNC: 166 MG/DL (ref 70–130)
GLUCOSE BLDC GLUCOMTR-MCNC: 170 MG/DL (ref 70–130)
GLUCOSE BLDC GLUCOMTR-MCNC: 175 MG/DL (ref 70–130)
GLUCOSE BLDC GLUCOMTR-MCNC: 178 MG/DL (ref 70–130)

## 2018-11-17 PROCEDURE — 97116 GAIT TRAINING THERAPY: CPT

## 2018-11-17 PROCEDURE — 63710000001 INSULIN ISOPHANE & REGULAR PER 5 UNITS: Performed by: NURSE PRACTITIONER

## 2018-11-17 PROCEDURE — 97110 THERAPEUTIC EXERCISES: CPT

## 2018-11-17 PROCEDURE — 82962 GLUCOSE BLOOD TEST: CPT

## 2018-11-17 RX ORDER — DOCUSATE SODIUM 100 MG/1
100 CAPSULE, LIQUID FILLED ORAL 2 TIMES DAILY
Qty: 60 CAPSULE | Refills: 0 | Status: ON HOLD | OUTPATIENT
Start: 2018-11-17 | End: 2019-05-20

## 2018-11-17 RX ORDER — OXYCODONE HYDROCHLORIDE AND ACETAMINOPHEN 5; 325 MG/1; MG/1
1 TABLET ORAL EVERY 6 HOURS PRN
Qty: 45 TABLET | Refills: 0 | Status: ON HOLD
Start: 2018-11-17 | End: 2019-05-20

## 2018-11-17 RX ORDER — CLOPIDOGREL BISULFATE 75 MG/1
75 TABLET ORAL DAILY
Qty: 90 TABLET | Refills: 3
Start: 2018-11-17 | End: 2019-08-06 | Stop reason: SDUPTHER

## 2018-11-17 RX ORDER — INSULIN ASPART 100 [IU]/ML
70 INJECTION, SUSPENSION SUBCUTANEOUS 2 TIMES DAILY WITH MEALS
Status: DISCONTINUED | OUTPATIENT
Start: 2018-11-17 | End: 2018-11-17 | Stop reason: RX

## 2018-11-17 RX ADMIN — HYDROCODONE BITARTRATE AND ACETAMINOPHEN 1 TABLET: 7.5; 325 TABLET ORAL at 02:21

## 2018-11-17 RX ADMIN — AMLODIPINE BESYLATE 5 MG: 5 TABLET ORAL at 08:53

## 2018-11-17 RX ADMIN — FAMOTIDINE 20 MG: 20 TABLET ORAL at 08:53

## 2018-11-17 RX ADMIN — GABAPENTIN 600 MG: 300 CAPSULE ORAL at 08:53

## 2018-11-17 RX ADMIN — INSULIN HUMAN 70 UNITS: 100 INJECTION, SUSPENSION SUBCUTANEOUS at 12:28

## 2018-11-17 RX ADMIN — HYDROCODONE BITARTRATE AND ACETAMINOPHEN 1 TABLET: 7.5; 325 TABLET ORAL at 08:53

## 2018-11-17 RX ADMIN — PANTOPRAZOLE SODIUM 40 MG: 40 TABLET, DELAYED RELEASE ORAL at 08:53

## 2018-11-17 RX ADMIN — ROSUVASTATIN CALCIUM 20 MG: 20 TABLET, FILM COATED ORAL at 08:53

## 2018-11-17 RX ADMIN — METOPROLOL TARTRATE 25 MG: 25 TABLET ORAL at 08:53

## 2018-11-17 RX ADMIN — BISACODYL 5 MG: 5 TABLET, COATED ORAL at 09:01

## 2018-11-17 NOTE — DISCHARGE SUMMARY
Patient Name: Narcisa Cotto  MRN: [id number]  : 1940  DOS: 2018    Attending: Zachary Key MD    Primary Care Provider: Mariia Del Real DO    Date of Admission:.11/15/2018  7:18 AM    Date of Discharge:  2018    Discharge Diagnosis:   S/P laminectomy    Lumbar stenosis    Stage 3 chronic kidney disease (CMS/[facility])    Hyperlipidemia LDL goal <70    Essential hypertension    Uncontrolled type 2 diabetes mellitus with complication, with long-term current use of insulin (CMS/[facility])    Coronary artery disease involving native coronary artery of native heart with angina pectoris (CMS/[facility])    Acute blood loss anemia, mild, asymptomatic    Leukocytosis, mild, likely reactive      Hospital Course  Patient is a 78 y.o. female presented for decompressive laminectomy, medial facetectomy and foraminotomies L2-L3 and L3-L4 by Dr. Key under [place]. She tolerated surgery well and was admitted for further medical management. Her back has been painful for about 10 years. She had intermittent numbness of her RLE. She uses a cane for ambulation. She has had recent falls. She is incontinent at baseline.    Patient was provided pain medications as needed for pain control.    Adjustments were made to pain medications to optimize postop pain management. Risks and benefits of opiate medications discussed with patient.    She was seen by PT and has progressed well over her stay.  She used an IS for atelectasis prophylaxis and mechanicals for DVT prophylaxis.  Home medications were resumed as appropriate, and labs were monitored and remained fairly stable.   She did have significantly elevated BG postoperatively. She required insulin drip for better glucose management. She was transitioned to her home insulin prior to discharge.    With the progress she has made, she is ready for DC home today.    Discussed with patient regarding plan and she shows understanding and agreement.          Procedures  "Performed  PREOPERATIVE DIAGNOSIS: Lumbar spinal stenosis L2-L3 and L3-L4.     POSTOPERATIVE DIAGNOSIS: Lumbar spinal stenosis L2-L3 and L3-L4.     PROCEDURES PERFORMED: Decompressive laminectomy, medial facetectomy and foraminotomies L2-L3 and L3-L4.      SURGEON: Zachary Key MD      Pertinent Test Results:    I reviewed the patient's new clinical results.   Results from last 7 days   Lab Units  18   0440   WBC 10*3/mm3  11.16*   HEMOGLOBIN g/dL  9.5*   HEMATOCRIT %  29.6*   PLATELETS 10*3/mm3  183     Results for JOHN PAUL GARCIA (MRN [id number]) as of 2018 16:17   Ref. Range 2018 13:56   Hemoglobin Latest Ref Range: 11.5 - 15.5 g/dL 12.3   Hematocrit Latest Ref Range: 34.5 - 44.0 % 38.8     Results from last 7 days   Lab Units  18   0440  11/15/18   0927   SODIUM mmol/L  133   --    POTASSIUM mmol/L  5.3  4.2   CHLORIDE mmol/L  106   --    CO2 mmol/L  20.0   --    BUN mg/dL  31*   --    CREATININE mg/dL  1.79*   --    CALCIUM mg/dL  8.2*   --    GLUCOSE mg/dL  322*   --      Results for JOHN PAUL GARCIA (MRN [id number]) as of 2018 16:17   Ref. Range 2018 11:09 2018 12:07 2018 13:07 2018 14:03   Glucose Latest Ref Range: 70 - 130 mg/dL 175 (H) 154 (H) 170 (H) 166 (H)     I reviewed the patient's new imaging including images and reports.      Physical therapy: patient ambulated 350 feet with use of rolling walker for support, verbal cues for spinal precautions.  HEP reviewed adn performed.    Discharge Assessment:    Vital Signs  Visit Vitals  /54 (BP Location: Right arm, Patient Position: Lying)   Pulse 59   Temp 98 °F (36.7 °C) (Oral)   Resp 18   Ht 167.6 cm (65.98\")   Wt 82 kg (180 lb 12.4 oz)   SpO2 96%   BMI 29.19 kg/m²     Temp (24hrs), Av.9 °F (36.6 °C), Min:97.8 °F (36.6 °C), Max:98 °F (36.7 °C)      General Appearance:    Alert, cooperative, in no acute distress   Lungs:     Clear to auscultation,respirations regular, even and             "       unlabored    Heart:    Regular rhythm and normal rate, normal S1 and S2   Abdomen:     Normal bowel sounds, no masses, no organomegaly, soft        non-tender, non-distended, no guarding, no rebound                 tenderness   Extremities:   Moves all extremities well, no edema, no cyanosis, no              redness   Pulses:   Pulses palpable and equal bilaterally   Skin:   No bleeding, bruising or rash. Back dressing CDI. HV- out   Neurologic:   Cranial nerves 2 - 12 grossly intact, sensation intact. Flexion and dorsiflexion intact bilateral feet.       Discharge Disposition: Home    Discharge Medications     Discharge Medications      New Medications      Instructions Start Date   docusate sodium 100 MG capsule  Commonly known as:  COLACE   100 mg, Oral, 2 Times Daily      oxyCODONE-acetaminophen 5-325 MG per tablet  Commonly known as:  PERCOCET   1 tablet, Oral, Every 4 Hours PRN         Changes to Medications      Instructions Start Date   clopidogrel 75 MG tablet  Commonly known as:  PLAVIX  What changed:  additional instructions   75 mg, Oral, Daily, Resume Sunday      diphenoxylate-atropine 2.5-0.025 MG per tablet  Commonly known as:  LOMOTIL  What changed:    · when to take this  · reasons to take this   1 tablet, Oral, Daily      insulin NPH-insulin regular (70-30) 100 UNIT/ML injection  Commonly known as:  NOVOLIN 70/30  What changed:    · how much to take  · how to take this  · when to take this  · additional instructions   40 units before breakfast, 40 units before lunch, and 60 units before supper         Continue These Medications      Instructions Start Date   ACCU-CHEK JOVITA device   1 each, Other, Take As Directed, Use as instructed      ACCU-CHEK JOVITA PLUS w/Device kit   No dose, route, or frequency recorded.      ACCU-CHEK JOVITA solution   1 each, In Vitro, Take As Directed      ACCU-CHEK JOVITA test strip  Generic drug:  glucose blood   1 each, Other, 4 Times Daily Before Meals & Nightly    "   ACCU-CHEK SOFTCLIX LANCETS lancets   1 each, Other, 3 Times Daily Before Meals, Use as instructed      amLODIPine 5 MG tablet  Commonly known as:  NORVASC   5 mg, Oral, Daily      aspirin 81 MG tablet  Commonly known as:  ASPIRIN LOW DOSE   81 mg, Oral, Daily      B-12 PO   500 mcg, Oral, Daily      cholecalciferol 1000 units tablet  Commonly known as:  VITAMIN D3   1,000 Units, Oral, Daily      [employer] VITAMIN B-12 1000 MCG tablet  Generic drug:  cyanocobalamin   1 tablet, Oral, Daily      gabapentin 600 MG tablet  Commonly known as:  NEURONTIN   600 mg, Oral, 2 Times Daily      Insulin Syringe 30G X 5/16\" 1 ML misc   1 each, Does not apply, 3 Times Daily Before Meals      B-D INS SYR ULTRAFINE 1CC/31G 31G X 5/16\" 1 ML misc  Generic drug:  Insulin Syringe-Needle U-100   No dose, route, or frequency recorded.      meclizine 25 MG tablet  Commonly known as:  ANTIVERT   25 mg, Oral, 3 Times Daily PRN      metoprolol tartrate 25 MG tablet  Commonly known as:  LOPRESSOR   25 mg, Oral, 2 Times Daily      ondansetron ODT 8 MG disintegrating tablet  Commonly known as:  ZOFRAN-ODT   8 mg, Oral, Every 8 Hours PRN      pantoprazole 40 MG EC tablet  Commonly known as:  PROTONIX   40 mg, Oral, 2 Times Daily      rosuvastatin 20 MG tablet  Commonly known as:  CRESTOR   20 mg, Oral, Daily      sharps container   1 each, Does not apply, Daily             Discharge Diet: Consistent carb diet    Activity at Discharge: ambulate    Follow-up Appointments  Dr. Key per his orders      NHAN Han  11/17/18  11:42 AM  "

## 2018-11-17 NOTE — THERAPY TREATMENT NOTE
Acute Care - Physical Therapy Treatment Note   [place]     Patient Name: Narcisa Cotto  : 1940  MRN: [id number]  Today's Date: 2018  Onset of Illness/Injury or Date of Surgery: 11/15/18  Date of Referral to PT: 11/15/18  Referring Physician: MD Shahid    Admit Date: 11/15/2018    Visit Dx:    ICD-10-CM ICD-9-CM   1. Impaired functional mobility, balance, gait, and endurance Z74.09 V49.89     Patient Active Problem List   Diagnosis   • Gastroesophageal reflux disease   • Stage 3 chronic kidney disease (CMS/Formerly [facility])   • Hyperlipidemia LDL goal <70   • Essential hypertension   • Vitamin D deficiency   • Diabetic gastroparesis (CMS/Formerly [facility])   • Anemia   • Gait instability   • Osteoarthritis   • Vitamin B12 deficiency   • Postherpetic neuralgia   • Peripheral arterial disease (CMS/Formerly [facility])   • Lower extremity edema   • Venous insufficiency   • Uncontrolled type 2 diabetes mellitus with complication, with long-term current use of insulin (CMS/Formerly [facility])   • Sleep apnea   • Diabetic peripheral neuropathy (CMS/Formerly [facility])   • Coronary artery disease involving native coronary artery of native heart with angina pectoris (CMS/Formerly [facility])   • Lumbar stenosis   • S/P laminectomy   • Acute blood loss anemia, mild, asymptomatic   • Leukocytosis, mild, likely reactive   • Renal insufficiency       Therapy Treatment    Rehabilitation Treatment Summary     Row Name 18 0805             Treatment Time/Intention    Discipline  physical therapy assistant  -AS      Document Type  therapy note (daily note)  -AS      Subjective Information  complains of;pain  -AS      Mode of Treatment  physical therapy  -AS      Patient/Family Observations  family at bedside  -AS      Patient Effort  excellent  -AS      Existing Precautions/Restrictions  fall;spinal;other (see comments) hemovac  -AS      Recorded by [AS] Concepcion Carrillo, PTA 18 0852      Row Name 18 0805             Cognitive Assessment/Intervention- PT/OT    Affect/Mental  Status (Cognitive)  WFL  -AS      Orientation Status (Cognition)  oriented x 3  -AS      Follows Commands (Cognition)  follows one step commands;over 90% accuracy;verbal cues/prompting required  -AS      Personal Safety Interventions  fall prevention program maintained;gait belt;nonskid shoes/slippers when out of bed;other (see comments) bed alarm  -AS      Recorded by [AS] Concepcion Carrillo, PTA 11/17/18 0852      Row Name 11/17/18 0805             Bed Mobility Assessment/Treatment    Supine-Sit [place] (Bed Mobility)  verbal cues;supervision  -AS      Sit-Supine [place] (Bed Mobility)  verbal cues;minimum assist (75% patient effort)  -AS      Bed Mobility, Safety Issues  decreased use of arms for pushing/pulling;decreased use of legs for bridging/pushing  -AS      Assistive Device (Bed Mobility)  bed rails;head of bed elevated  -AS      Comment (Bed Mobility)  verbal cues to lift B LE up on bed  -AS      Recorded by [AS] Concepcion Carrillo, PTA 11/17/18 0852      Row Name 11/17/18 0805             Sit-Stand Transfer    Sit-Stand [place] (Transfers)  verbal cues;contact guard  -AS      Assistive Device (Sit-Stand Transfers)  walker, front-wheeled  -AS      Recorded by [AS] Concepcion Carrillo, PTA 11/17/18 0852      Row Name 11/17/18 0805             Stand-Sit Transfer    Stand-Sit [place] (Transfers)  verbal cues;contact guard  -AS      Assistive Device (Stand-Sit Transfers)  walker, front-wheeled  -AS      Recorded by [AS] Concepcion Carrillo, PTA 11/17/18 0852      Row Name 11/17/18 0805             Toilet Transfer    Type (Toilet Transfer)  stand pivot/stand step  -AS      [place] Level (Toilet Transfer)  verbal cues;contact guard  -AS      Assistive Device (Toilet Transfer)  commode;walker, front-wheeled  -AS      Recorded by [AS] Concepcion Carrillo, PTA 11/17/18 0852      Row Name 11/17/18 0805             Gait/Stairs Assessment/Training    24567 - Gait Training Minutes   15  -AS       Gait/Stairs Assessment/Training  gait/ambulation assistive device  -AS      [place] Level (Gait)  verbal cues;contact guard  -AS      Assistive Device (Gait)  walker, front-wheeled  -AS      Distance in Feet (Gait)  350  -AS      Pattern (Gait)  step-through  -AS      Deviations/Abnormal Patterns (Gait)  bilateral deviations;base of support, narrow;jose l decreased;gait speed decreased  -AS      Bilateral Gait Deviations  forward flexed posture  -AS      Comment (Gait/Stairs)  verbal cues for posture and staying inside walker  -AS      Recorded by [AS] Concepcion Carrillo, [facility] 11/17/18 0852      Row Name 11/17/18 05             Motor Skills Assessment/Interventions    Additional Documentation  Therapeutic Exercise (Group)  -AS      Recorded by [AS] Concepcion Carrillo, [facility] 11/17/18 0852      Row Name 11/17/18 0805             Therapeutic Exercise    37563 - PT Therapeutic Exercise Minutes  8  -AS      Recorded by [AS] Concepcion Carrillo, PTA 11/17/18 0852      Row Name 11/17/18 05             Therapeutic Exercise    Lower Extremity (Therapeutic Exercise)  gluteal sets;heel slides, bilateral;quad sets, bilateral  -AS      Lower Extremity Range of Motion (Therapeutic Exercise)  hip internal/external rotation, bilateral;ankle dorsiflexion/plantar flexion, bilateral  -AS      Exercise Type (Therapeutic Exercise)  AROM (active range of motion)  -AS      Position (Therapeutic Exercise)  supine  -AS      Sets/Reps (Therapeutic Exercise)  1/10  -AS      Recorded by [AS] Concepcion Carrillo, PTA 11/17/18 0852      Row Name 11/17/18 0805             Positioning and Restraints    Pre-Treatment Position  in bed  -AS      Post Treatment Position  bed  -AS      In Bed  supine;call light within reach;encouraged to call for assist;exit alarm on;with family/caregiver  -AS      Recorded by [AS] Concepcion Carrillo, PTA 11/17/18 0852      Row Name 11/17/18 0805             Pain Scale: Numbers Pre/Post-Treatment    Pain  Scale: Numbers, Pretreatment  0/10 - no pain  -AS      Pain Scale: Numbers, Post-Treatment  9/10  -AS      Pain Location - Orientation  lower  -AS      Pain Location  back  -AS      Pre/Post Treatment Pain Comment  patient reports 9/10 after walk but once in bed decreased to 4/10  -AS      Pain Intervention(s)  Repositioned;Ambulation/increased activity  -AS      Recorded by [AS] Concepcion Carrillo, PTA 11/17/18 0852      Row Name                Wound 11/15/18 1055 Other (See comments) back incision    Wound - Properties Group Date first assessed: 11/15/18 [KS] Time first assessed: 1055 [KS] Side: Other (See comments) [KS] Location: back [KS] Type: incision [KS] Recorded by:  [KS] Abbey Guzman RN 11/15/18 1055      User Key  (r) = Recorded By, (t) = Taken By, (c) = Cosigned By    Initials Name Effective Dates Discipline    AS Concepcion Carrillo, PTA 06/22/15 -  PT    Abbey Bhandari RN 06/16/16 -  Nurse          Wound 11/15/18 1055 Other (See comments) back incision (Active)   Dressing Appearance dry;intact;no drainage 11/16/2018  8:00 PM   Closure Adhesive bandage 11/16/2018  8:00 PM   Drainage Amount none 11/16/2018  8:00 PM           Physical Therapy Education     Title: PT OT SLP Therapies (Active)     Topic: Physical Therapy (Active)     Point: Mobility training (Active)     Learning Progress Summary           Patient Acceptance, E, NR by AS at 11/17/2018  8:52 AM    Acceptance, E,D, VU,NR by LR at 11/16/2018  9:29 AM    Comment:  Educated on correct log rolling technique, spinal precautions, correct t/f technique, correct gait mechanics, and HEP.    Acceptance, E,D, VU,NR by LUIS at 11/15/2018  5:14 PM    Comment:  Edu re: back precautions, log roll, gait mechanics, benefits of mobility   Family Acceptance, E,D, VU,NR by LR at 11/16/2018  9:29 AM    Comment:  Educated on correct log rolling technique, spinal precautions, correct t/f technique, correct gait mechanics, and HEP.    Acceptance, E,D, VU,NR by  LUIS at 11/15/2018  5:14 PM    Comment:  Edu re: back precautions, log roll, gait mechanics, benefits of mobility                   Point: Home exercise program (Active)     Learning Progress Summary           Patient Acceptance, E, NR by AS at 11/17/2018  8:52 AM    Acceptance, E,D, VU,NR by LR at 11/16/2018  9:29 AM    Comment:  Educated on correct log rolling technique, spinal precautions, correct t/f technique, correct gait mechanics, and HEP.   Family Acceptance, E,D, VU,NR by LR at 11/16/2018  9:29 AM    Comment:  Educated on correct log rolling technique, spinal precautions, correct t/f technique, correct gait mechanics, and HEP.                   Point: Body mechanics (Active)     Learning Progress Summary           Patient Acceptance, E, NR by AS at 11/17/2018  8:52 AM    Acceptance, E,D, VU,NR by LR at 11/16/2018  9:29 AM    Comment:  Educated on correct log rolling technique, spinal precautions, correct t/f technique, correct gait mechanics, and HEP.    Acceptance, E,D, VU,NR by MJ at 11/15/2018  5:14 PM    Comment:  Edu re: back precautions, log roll, gait mechanics, benefits of mobility   Family Acceptance, E,D, VU,NR by LR at 11/16/2018  9:29 AM    Comment:  Educated on correct log rolling technique, spinal precautions, correct t/f technique, correct gait mechanics, and HEP.    Acceptance, E,D, VU,NR by MJ at 11/15/2018  5:14 PM    Comment:  Edu re: back precautions, log roll, gait mechanics, benefits of mobility                   Point: Precautions (Active)     Learning Progress Summary           Patient Acceptance, E, NR by AS at 11/17/2018  8:52 AM    Acceptance, E,D, VU,NR by LR at 11/16/2018  9:29 AM    Comment:  Educated on correct log rolling technique, spinal precautions, correct t/f technique, correct gait mechanics, and HEP.    Acceptance, E,D, VU,NR by MJ at 11/15/2018  5:14 PM    Comment:  Edu re: back precautions, log roll, gait mechanics, benefits of mobility   Family Acceptance, E,D, VU,NR  by GILES at 11/16/2018  9:29 AM    Comment:  Educated on correct log rolling technique, spinal precautions, correct t/f technique, correct gait mechanics, and HEP.    Acceptance, E,D, VU,NR by  at 11/15/2018  5:14 PM    Comment:  Edu re: back precautions, log roll, gait mechanics, benefits of mobility                               User Key     Initials Effective Dates Name Provider Type Discipline     06/19/15 -  Krystal Schmid, PT Physical Therapist PT    AS 06/22/15 -  Concepcion Carrillo, LAURIE Physical Therapy Assistant PT     04/03/18 -  Christian Fleming, PT Physical Therapist PT                PT Recommendation and Plan     Plan of Care Reviewed With: patient  Progress: improving  Outcome Summary: patient ambulated 350 feet with use of rolling walker for support, verbal cues for spinal precautions.  HEP reviewed adn performed.  Outcome Measures     Row Name 11/17/18 0805 11/16/18 0929 11/15/18 1714       How much help from another person do you currently need...    Turning from your back to your side while in flat bed without using bedrails?  4  -AS  3  -LR  3  -MJ    Moving from lying on back to sitting on the side of a flat bed without bedrails?  3  -AS  3  -LR  3  -MJ    Moving to and from a bed to a chair (including a wheelchair)?  3  -AS  3  -LR  3  -MJ    Standing up from a chair using your arms (e.g., wheelchair, bedside chair)?  3  -AS  3  -LR  3  -MJ    Climbing 3-5 steps with a railing?  3  -AS  3  -LR  3  -MJ    To walk in hospital room?  3  -AS  3  -LR  3  -MJ    AM-PAC 6 Clicks Score  19  -AS  18  -LR  18  -MJ       Functional Assessment    Outcome Measure Options  AM-PAC 6 Clicks Basic Mobility (PT)  -AS  AM-PAC 6 Clicks Basic Mobility (PT)  -LR  AM-PAC 6 Clicks Basic Mobility (PT)  -      User Key  (r) = Recorded By, (t) = Taken By, (c) = Cosigned By    Initials Name Provider Type    LR Krystal Schmid, PT Physical Therapist    AS Concepcion Carrillo, LAURIE Physical Therapy Assistant     Christian Herring, PT Physical Therapist         Time Calculation:   PT Charges     Row Name 11/17/18 0805             Time Calculation    Start Time  0805  -AS      PT Received On  11/17/18  -AS      PT Goal Re-Cert Due Date  11/25/18  -AS         Timed Charges    89148 - PT Therapeutic Exercise Minutes  8  -AS      69042 - Gait Training Minutes   15  -AS        User Key  (r) = Recorded By, (t) = Taken By, (c) = Cosigned By    Initials Name Provider Type    AS Concepcion Carrillo PTA Physical Therapy Assistant        Therapy Suggested Charges     Code   Minutes Charges    84874 (CPT®) Hc Pt Neuromusc Re Education Ea 15 Min      05139 (CPT®) Hc Pt Ther Proc Ea 15 Min 8 1    99367 (CPT®) Hc Gait Training Ea 15 Min 15 1    17648 (CPT®) Hc Pt Therapeutic Act Ea 15 Min      17134 (CPT®) Hc Pt Manual Therapy Ea 15 Min      07798 (CPT®) Hc Pt Iontophoresis Ea 15 Min      59590 (CPT®) Hc Pt Elec Stim Ea-Per 15 Min      30228 (CPT®) Hc Pt Ultrasound Ea 15 Min      76535 (CPT®) Hc Pt Self Care/Mgmt/Train Ea 15 Min      69058 (CPT®) Hc Pt Prosthetic (S) Train Initial Encounter, Each 15 Min      98419 (CPT®) Hc Pt Orthotic(S)/Prosthetic(S) Encounter, Each 15 Min      74936 (CPT®) Hc Orthotic(S) Mgmt/Train Initial Encounter, Each 15min      Total  23 2        Therapy Charges for Today     Code Description Service Date Service Provider Modifiers Qty    48464598670 HC PT THER PROC EA 15 MIN 11/17/2018 Concepcion Carrillo, PTA GP 1    72800584545 HC GAIT TRAINING EA 15 MIN 11/17/2018 Concepcion Carrillo, LAURIE GP 1          PT G-Codes  PT Professional Judgement Used?: Yes  Outcome Measure Options: AM-PAC 6 Clicks Basic Mobility (PT)  AM-PAC 6 Clicks Score: 19  Functional Limitation: Mobility: Walking and moving around  Mobility: Walking and Moving Around Current Status (): At least 40 percent but less than 60 percent impaired, limited or restricted  Mobility: Walking and Moving Around Goal Status (): At least 1 percent  but less than 20 percent impaired, limited or restricted    Concepcion Carrillo, PTA  11/17/2018

## 2018-11-17 NOTE — PROGRESS NOTES
"Ortho Spine Progress Note     LOS: 0 days   Patient Care Team:  Mariia Del Real DO as PCP - General  Mariia Del Real DO as PCP - Family Medicine  Filemon Mayberry IV, MD as Cardiologist (Cardiology)  Jennifer Richards MD as Consulting Physician (Endocrinology)    Subjective Pt reports a little back pain when moves or coughs, but tolerable. When in bed, minimal pain.    Objective     Vital Signs:  /63 (BP Location: Right arm, Patient Position: Lying)   Pulse 63   Temp 98 °F (36.7 °C) (Oral)   Resp 16   Ht 167.6 cm (65.98\")   Wt 82 kg (180 lb 12.4 oz)   SpO2 96%   BMI 29.19 kg/m²          Labs:  Lab Results (last 24 hours)     Procedure Component Value Units Date/Time    POC Glucose Once [[id number]]  (Abnormal) Collected:  11/17/18 0715    Specimen:  Blood Updated:  11/17/18 0716     Glucose 132 mg/dL     POC Glucose Once [[id number]]  (Normal) Collected:  11/17/18 0614    Specimen:  Blood Updated:  11/17/18 0616     Glucose 121 mg/dL     POC Glucose Once [[id number]]  (Normal) Collected:  11/17/18 0502    Specimen:  Blood Updated:  11/17/18 0503     Glucose 123 mg/dL     POC Glucose Once [[id number]]  (Abnormal) Collected:  11/17/18 0411    Specimen:  Blood Updated:  11/17/18 0414     Glucose 133 mg/dL     POC Glucose Once [[id number]]  (Abnormal) Collected:  11/17/18 0313    Specimen:  Blood Updated:  11/17/18 0314     Glucose 134 mg/dL     POC Glucose Once [[id number]]  (Abnormal) Collected:  11/17/18 0213    Specimen:  Blood Updated:  11/17/18 0216     Glucose 133 mg/dL     POC Glucose Once [[id number]]  (Normal) Collected:  11/17/18 0126    Specimen:  Blood Updated:  11/17/18 0128     Glucose 128 mg/dL     POC Glucose Once [[id number]]  (Normal) Collected:  11/17/18 0016    Specimen:  Blood Updated:  11/17/18 0019     Glucose 127 mg/dL     POC Glucose Once [[id number]]  (Abnormal) Collected:  11/16/18 2259    Specimen:  Blood Updated:  11/16/18 2302     Glucose 135 mg/dL     POC Glucose Once " [[id number]]  (Normal) Collected:  11/16/18 2211    Specimen:  Blood Updated:  11/16/18 2215     Glucose 118 mg/dL     POC Glucose Once [[id number]]  (Normal) Collected:  11/16/18 2101    Specimen:  Blood Updated:  11/16/18 2122     Glucose 96 mg/dL     POC Glucose Once [[id number]]  (Normal) Collected:  11/16/18 2015    Specimen:  Blood Updated:  11/16/18 2037     Glucose 110 mg/dL     POC Glucose Once [[id number]]  (Normal) Collected:  11/16/18 1846    Specimen:  Blood Updated:  11/16/18 1858     Glucose 106 mg/dL     POC Glucose Once [[id number]]  (Normal) Collected:  11/16/18 1740    Specimen:  Blood Updated:  11/16/18 1742     Glucose 109 mg/dL     POC Glucose Once [[id number]]  (Normal) Collected:  11/16/18 1646    Specimen:  Blood Updated:  11/16/18 1648     Glucose 119 mg/dL     POC Glucose Once [[id number]]  (Abnormal) Collected:  11/16/18 1537    Specimen:  Blood Updated:  11/16/18 1540     Glucose 151 mg/dL     POC Glucose Once [[id number]]  (Abnormal) Collected:  11/16/18 1448    Specimen:  Blood Updated:  11/16/18 1451     Glucose 178 mg/dL     POC Glucose Once [[id number]]  (Abnormal) Collected:  11/16/18 1354    Specimen:  Blood Updated:  11/16/18 1413     Glucose 233 mg/dL     POC Glucose Once [[id number]]  (Abnormal) Collected:  11/16/18 1247    Specimen:  Blood Updated:  11/16/18 1249     Glucose 292 mg/dL     POC Glucose Once [[id number]]  (Abnormal) Collected:  11/16/18 1145    Specimen:  Blood Updated:  11/16/18 1149     Glucose 293 mg/dL     POC Glucose Once [[id number]]  (Abnormal) Collected:  11/16/18 1032    Specimen:  Blood Updated:  11/16/18 1051     Glucose 325 mg/dL     POC Glucose Once [[id number]]  (Abnormal) Collected:  11/16/18 0936    Specimen:  Blood Updated:  11/16/18 0955     Glucose 337 mg/dL     POC Glucose Once [[id number]]  (Abnormal) Collected:  11/16/18 0838    Specimen:  Blood Updated:  11/16/18 0859     Glucose 352 mg/dL           Physical Exam:  Neurovascular intact.  Calves soft,  non-tender.      Assessment/Plan   Doing well. Blood sugars are under better control. Remove drain, dry dressing change. Home today if OK per Dr. AMBRIZ/Nicole Campbell 5 script on chart. F/U Dr. Key 3-4 weeks.    Alex Charles MD  11/17/18  8:04 AM

## 2018-11-17 NOTE — PLAN OF CARE
Problem: Patient Care Overview  Goal: Plan of Care Review  Outcome: Ongoing (interventions implemented as appropriate)   11/17/18 4380   OTHER   Outcome Summary patient ambulated 350 feet with use of rolling walker for support, verbal cues for spinal precautions. HEP reviewed adn performed.   Coping/Psychosocial   Plan of Care Reviewed With patient   Plan of Care Review   Progress improving

## 2018-11-17 NOTE — PROGRESS NOTES
Case Management Discharge Note    Final Note: Planning for discharge home today. CM notified Kina with [facility] of D/C date and they will see Ms. Cotto Sunday or Monday at the latest. Carla Dias RN [phone]    Destination      No service has been selected for the patient.      Durable Medical Equipment      No service has been selected for the patient.      Dialysis/Infusion      No service has been selected for the patient.      Home Medical Care - Selection Complete      Service Provider Request Status Selected Services Address Phone Number Fax Number    [facility] [address] [facility] [place] [address] [phone]      Community Resources      No service has been selected for the patient.             Final Discharge Disposition Code: 06 - home with home health care

## 2018-11-17 NOTE — PLAN OF CARE
Problem: Patient Care Overview  Goal: Plan of Care Review  Outcome: Ongoing (interventions implemented as appropriate)   11/17/18 5140   OTHER   Outcome Summary patient had a long and tiring night. patient tolerated Insulin Drip and hourly Finger Sticks well. would sleep between treatments. following protocol for insulin drip patient entered target glucose area at 10pm and ranged from 118 to 135 for next 10 hours and insulin setting remained at 1 unit/1 MG    Coping/Psychosocial   Plan of Care Reviewed With patient;daughter   Plan of Care Review   Progress improving       Problem: Skin Injury Risk (Adult)  Goal: Identify Related Risk Factors and Signs and Symptoms  Outcome: Ongoing (interventions implemented as appropriate)    Goal: Skin Health and Integrity  Outcome: Ongoing (interventions implemented as appropriate)      Problem: Fall Risk (Adult)  Goal: Identify Related Risk Factors and Signs and Symptoms  Outcome: Ongoing (interventions implemented as appropriate)    Goal: Absence of Fall  Outcome: Ongoing (interventions implemented as appropriate)      Problem: Diabetes, Type 2 (Adult)  Goal: Signs and Symptoms of Listed Potential Problems Will be Absent, Minimized or Managed (Diabetes, Type 2)  Outcome: Ongoing (interventions implemented as appropriate)

## 2019-01-28 ENCOUNTER — OFFICE VISIT (OUTPATIENT)
Dept: CARDIOLOGY | Facility: CLINIC | Age: 79
End: 2019-01-28

## 2019-01-28 ENCOUNTER — OFFICE VISIT (OUTPATIENT)
Dept: ENDOCRINOLOGY | Facility: CLINIC | Age: 79
End: 2019-01-28

## 2019-01-28 ENCOUNTER — OFFICE VISIT (OUTPATIENT)
Dept: INTERNAL MEDICINE | Facility: CLINIC | Age: 79
End: 2019-01-28

## 2019-01-28 VITALS
SYSTOLIC BLOOD PRESSURE: 110 MMHG | DIASTOLIC BLOOD PRESSURE: 80 MMHG | BODY MASS INDEX: 29.07 KG/M2 | HEART RATE: 70 BPM | WEIGHT: 180 LBS | OXYGEN SATURATION: 98 %

## 2019-01-28 VITALS
DIASTOLIC BLOOD PRESSURE: 64 MMHG | BODY MASS INDEX: 28.93 KG/M2 | OXYGEN SATURATION: 94 % | HEART RATE: 69 BPM | WEIGHT: 180 LBS | HEIGHT: 66 IN | SYSTOLIC BLOOD PRESSURE: 108 MMHG

## 2019-01-28 VITALS
WEIGHT: 180 LBS | SYSTOLIC BLOOD PRESSURE: 108 MMHG | DIASTOLIC BLOOD PRESSURE: 62 MMHG | HEART RATE: 62 BPM | BODY MASS INDEX: 28.93 KG/M2 | HEIGHT: 66 IN

## 2019-01-28 DIAGNOSIS — IMO0002 UNCONTROLLED TYPE 2 DIABETES MELLITUS WITH COMPLICATION, WITH LONG-TERM CURRENT USE OF INSULIN: Primary | ICD-10-CM

## 2019-01-28 DIAGNOSIS — I25.119 CORONARY ARTERY DISEASE INVOLVING NATIVE CORONARY ARTERY OF NATIVE HEART WITH ANGINA PECTORIS (HCC): ICD-10-CM

## 2019-01-28 DIAGNOSIS — E78.5 DYSLIPIDEMIA: ICD-10-CM

## 2019-01-28 DIAGNOSIS — E78.5 HYPERLIPIDEMIA LDL GOAL <70: ICD-10-CM

## 2019-01-28 DIAGNOSIS — I10 ESSENTIAL HYPERTENSION: ICD-10-CM

## 2019-01-28 DIAGNOSIS — I25.119 CORONARY ARTERY DISEASE INVOLVING NATIVE CORONARY ARTERY OF NATIVE HEART WITH ANGINA PECTORIS (HCC): Primary | ICD-10-CM

## 2019-01-28 DIAGNOSIS — M54.9 CHRONIC BACK PAIN GREATER THAN 3 MONTHS DURATION: ICD-10-CM

## 2019-01-28 DIAGNOSIS — G89.29 CHRONIC BACK PAIN GREATER THAN 3 MONTHS DURATION: ICD-10-CM

## 2019-01-28 DIAGNOSIS — R30.0 DYSURIA: ICD-10-CM

## 2019-01-28 DIAGNOSIS — N18.30 STAGE 3 CHRONIC KIDNEY DISEASE (HCC): ICD-10-CM

## 2019-01-28 DIAGNOSIS — K21.9 GASTROESOPHAGEAL REFLUX DISEASE, ESOPHAGITIS PRESENCE NOT SPECIFIED: ICD-10-CM

## 2019-01-28 DIAGNOSIS — E11.42 DIABETIC PERIPHERAL NEUROPATHY (HCC): ICD-10-CM

## 2019-01-28 DIAGNOSIS — I10 ESSENTIAL HYPERTENSION: Primary | ICD-10-CM

## 2019-01-28 LAB
ALBUMIN SERPL-MCNC: 4.77 G/DL (ref 3.2–4.8)
ALBUMIN/GLOB SERPL: 3.1 G/DL (ref 1.5–2.5)
ALP SERPL-CCNC: 88 U/L (ref 25–100)
ALT SERPL W P-5'-P-CCNC: 29 U/L (ref 7–40)
ANION GAP SERPL CALCULATED.3IONS-SCNC: 10 MMOL/L (ref 3–11)
ARTICHOKE IGE QN: 88 MG/DL (ref 0–130)
AST SERPL-CCNC: 22 U/L (ref 0–33)
BACTERIA UR QL AUTO: ABNORMAL /HPF
BILIRUB SERPL-MCNC: 0.3 MG/DL (ref 0.3–1.2)
BILIRUB UR QL STRIP: NEGATIVE
BUN BLD-MCNC: 28 MG/DL (ref 9–23)
BUN/CREAT SERPL: 14.7 (ref 7–25)
CALCIUM SPEC-SCNC: 9.4 MG/DL (ref 8.7–10.4)
CHLORIDE SERPL-SCNC: 105 MMOL/L (ref 99–109)
CHOLEST SERPL-MCNC: 140 MG/DL (ref 0–200)
CLARITY UR: ABNORMAL
CO2 SERPL-SCNC: 25 MMOL/L (ref 20–31)
COLOR UR: YELLOW
CREAT BLD-MCNC: 1.9 MG/DL (ref 0.6–1.3)
DEPRECATED RDW RBC AUTO: 49.4 FL (ref 37–54)
ERYTHROCYTE [DISTWIDTH] IN BLOOD BY AUTOMATED COUNT: 15.6 % (ref 11.3–14.5)
GFR SERPL CREATININE-BSD FRML MDRD: 26 ML/MIN/1.73
GLOBULIN UR ELPH-MCNC: 1.5 GM/DL
GLUCOSE BLD-MCNC: 72 MG/DL (ref 70–100)
GLUCOSE BLDC GLUCOMTR-MCNC: 92 MG/DL (ref 70–130)
GLUCOSE UR STRIP-MCNC: NEGATIVE MG/DL
HBA1C MFR BLD: 8.8 %
HCT VFR BLD AUTO: 37.8 % (ref 34.5–44)
HDLC SERPL-MCNC: 37 MG/DL (ref 40–60)
HGB BLD-MCNC: 11.6 G/DL (ref 11.5–15.5)
HGB UR QL STRIP.AUTO: NEGATIVE
HYALINE CASTS UR QL AUTO: ABNORMAL /LPF
KETONES UR QL STRIP: ABNORMAL
LEUKOCYTE ESTERASE UR QL STRIP.AUTO: ABNORMAL
MCH RBC QN AUTO: 26.5 PG (ref 27–31)
MCHC RBC AUTO-ENTMCNC: 30.7 G/DL (ref 32–36)
MCV RBC AUTO: 86.3 FL (ref 80–99)
NITRITE UR QL STRIP: NEGATIVE
PH UR STRIP.AUTO: <=5 [PH] (ref 5–8)
PLATELET # BLD AUTO: 215 10*3/MM3 (ref 150–450)
PMV BLD AUTO: 10.3 FL (ref 6–12)
POTASSIUM BLD-SCNC: 5.1 MMOL/L (ref 3.5–5.5)
PROT SERPL-MCNC: 6.3 G/DL (ref 5.7–8.2)
PROT UR QL STRIP: ABNORMAL
RBC # BLD AUTO: 4.38 10*6/MM3 (ref 3.89–5.14)
RBC # UR: ABNORMAL /HPF
REF LAB TEST METHOD: ABNORMAL
SODIUM BLD-SCNC: 140 MMOL/L (ref 132–146)
SP GR UR STRIP: 1.02 (ref 1–1.03)
SQUAMOUS #/AREA URNS HPF: ABNORMAL /HPF
TRIGL SERPL-MCNC: 163 MG/DL (ref 0–150)
UROBILINOGEN UR QL STRIP: ABNORMAL
WBC NRBC COR # BLD: 7.48 10*3/MM3 (ref 3.5–10.8)
WBC UR QL AUTO: ABNORMAL /HPF

## 2019-01-28 PROCEDURE — 81001 URINALYSIS AUTO W/SCOPE: CPT | Performed by: INTERNAL MEDICINE

## 2019-01-28 PROCEDURE — 99214 OFFICE O/P EST MOD 30 MIN: CPT | Performed by: INTERNAL MEDICINE

## 2019-01-28 PROCEDURE — 85027 COMPLETE CBC AUTOMATED: CPT | Performed by: INTERNAL MEDICINE

## 2019-01-28 PROCEDURE — 87186 SC STD MICRODIL/AGAR DIL: CPT | Performed by: INTERNAL MEDICINE

## 2019-01-28 PROCEDURE — 80061 LIPID PANEL: CPT | Performed by: INTERNAL MEDICINE

## 2019-01-28 PROCEDURE — 82043 UR ALBUMIN QUANTITATIVE: CPT | Performed by: INTERNAL MEDICINE

## 2019-01-28 PROCEDURE — 99213 OFFICE O/P EST LOW 20 MIN: CPT | Performed by: INTERNAL MEDICINE

## 2019-01-28 PROCEDURE — 82947 ASSAY GLUCOSE BLOOD QUANT: CPT | Performed by: INTERNAL MEDICINE

## 2019-01-28 PROCEDURE — 83036 HEMOGLOBIN GLYCOSYLATED A1C: CPT | Performed by: INTERNAL MEDICINE

## 2019-01-28 PROCEDURE — 80053 COMPREHEN METABOLIC PANEL: CPT | Performed by: INTERNAL MEDICINE

## 2019-01-28 PROCEDURE — 82570 ASSAY OF URINE CREATININE: CPT | Performed by: INTERNAL MEDICINE

## 2019-01-28 PROCEDURE — 99214 OFFICE O/P EST MOD 30 MIN: CPT | Performed by: PHYSICIAN ASSISTANT

## 2019-01-28 PROCEDURE — 87077 CULTURE AEROBIC IDENTIFY: CPT | Performed by: INTERNAL MEDICINE

## 2019-01-28 PROCEDURE — 87086 URINE CULTURE/COLONY COUNT: CPT | Performed by: INTERNAL MEDICINE

## 2019-01-28 NOTE — PROGRESS NOTES
[facility] at [facility]   OFFICE NOTE      Narcisa Cotto  1940  PCP: Mariia Del Real DO    SUBJECTIVE:   Narcisa Cotto is a 78 y.o. female seen for a follow up visit regarding the following:     CC:CAD    Problem List:  1. CAD   A)Echocardiogram 4/23/18 Normal EF. 60%.    B)Negative MPS for focal ischemia with mild LV dilation 4/23/18   2. HTN: On BB, Norvasc  3. HLD: Crestor. LDL 60 7/18  4. PAD  5. Laminectomy L2-L3 and L3-L4 11/18 Dr. Hylton     HPI:   The patient is a 78-year-old female presents today for follow-up regarding history of coronary disease, hypertension and dyslipidemia.  She states she did well through her back surgery and still trying to become more condition and recovering from the surgery.  She denies any chest tightness or chest pain suggesting angina pectoris.  She denies any palpitations, near-syncope, or syncope.  She denies heart failure symptoms.  She feels her legs are weak and part of this is because she's not condition.  She states her blood pressure well-controlled home.  She is tolerating her current medical therapy well.      ROS:  Review of Symptoms:  General: no recent weight loss/gain, weakness or fatigue  Skin: no rashes, lumps, or other skin changes  HEENT: no dizziness, lightheadedness, or vision changes  Respiratory: no cough or hemoptysis  Cardiovascular: no palpitations, and tachycardia  Gastrointestinal: no black/tarry stools or diarrhea  Urinary: no change in frequency or urgency  Peripheral Vascular: no claudication or leg cramps  Musculoskeletal: Recent back surgery  Psychiatric: no depression or excessive stress  Neurological: no sensory or motor loss, no syncope  Hematologic: no anemia, easy bruising or bleeding  Endocrine: no thyroid problems, nor heat or cold intolerance    Cardiac PMH: (Old records have been reviewed and summarized below)      Past Medical History, Past Surgical History, Family history, Social History, and  "Medications were all reviewed with the patient today and updated as necessary.       Current Outpatient Medications:   •  ACCU-CHEK JOVITA test strip, 1 each by Other route 4 (Four) Times a Day Before Meals & at Bedtime., Disp: 150 each, Rfl: 11  •  ACCU-CHEK SOFTCLIX LANCETS lancets, 1 each by Other route 3 (Three) Times a Day Before Meals. Use as instructed, Disp: 300 each, Rfl: 3  •  amLODIPine (NORVASC) 5 MG tablet, Take 1 tablet by mouth Daily for 360 days., Disp: 90 tablet, Rfl: 3  •  aspirin (ASPIRIN LOW DOSE) 81 MG tablet, Take 1 tablet by mouth Daily for 360 days., Disp: 90 tablet, Rfl: 3  •  B-D INS SYR ULTRAFINE 1CC/31G 31G X 5/16\" 1 ML misc, , Disp: , Rfl: 0  •  Blood Glucose Calibration (ACCU-CHEK JOVITA) solution, 1 each by In Vitro route Take As Directed., Disp: 1 each, Rfl: 3  •  Blood Glucose Monitoring Suppl (ACCU-CHEK JOVITA PLUS) w/Device kit, , Disp: , Rfl: 0  •  Blood Glucose Monitoring Suppl (ACCU-CHEK JOVITA) device, 1 each by Other route Take As Directed. Use as instructed, Disp: 1 each, Rfl: 0  •  cholecalciferol (VITAMIN D3) 1000 units tablet, Take 1,000 Units by mouth Daily., Disp: , Rfl:   •  clopidogrel (PLAVIX) 75 MG tablet, Take 1 tablet by mouth Daily for 360 days. Resume Sunday, Disp: 90 tablet, Rfl: 3  •  cyanocobalamin ([employer] VITAMIN B-12) 1000 MCG tablet, Take 1 tablet by mouth daily., Disp: , Rfl:   •  diphenoxylate-atropine (LOMOTIL) 2.5-0.025 MG per tablet, Take 1 tablet by mouth Daily. (Patient taking differently: Take 1 tablet by mouth Daily As Needed for Diarrhea.), Disp: 30 tablet, Rfl: 2  •  docusate sodium (COLACE) 100 MG capsule, Take 1 capsule by mouth 2 (Two) Times a Day., Disp: 60 capsule, Rfl: 0  •  gabapentin (NEURONTIN) 600 MG tablet, Take 1 tablet by mouth 2 (Two) Times a Day., Disp: 180 tablet, Rfl: 1  •  insulin NPH-insulin regular (NOVOLIN 70/30) (70-30) 100 UNIT/ML injection, 40 units before breakfast, 40 units before lunch, and 70 units before supper, Disp: 40 mL, " "Rfl: 11  •  Insulin Syringe 30G X 5/16\" 1 ML misc, 1 each 3 (Three) Times a Day Before Meals., Disp: 300 each, Rfl: 3  •  meclizine (ANTIVERT) 25 MG tablet, Take 1 tablet by mouth 3 (Three) Times a Day As Needed (3 times)., Disp: 270 tablet, Rfl: 1  •  metoprolol tartrate (LOPRESSOR) 25 MG tablet, Take 1 tablet by mouth 2 (Two) Times a Day for 360 days., Disp: 180 tablet, Rfl: 3  •  ondansetron ODT (ZOFRAN-ODT) 8 MG disintegrating tablet, Take 1 tablet by mouth Every 8 (Eight) Hours As Needed for Nausea or Vomiting., Disp: 180 tablet, Rfl: 1  •  oxyCODONE-acetaminophen (PERCOCET) 5-325 MG per tablet, Take 1 tablet by mouth Every 6 (Six) Hours As Needed, Disp: 45 tablet, Rfl: 0  •  pantoprazole (PROTONIX) 40 MG EC tablet, Take 1 tablet by mouth 2 (Two) Times a Day., Disp: 180 tablet, Rfl: 1  •  rosuvastatin (CRESTOR) 20 MG tablet, Take 1 tablet by mouth Daily for 360 days., Disp: 90 tablet, Rfl: 3  •  sharps container, 1 each Daily., Disp: 1 each, Rfl: 0      Allergies   Allergen Reactions   • Codeine Nausea And Vomiting     Patient Active Problem List   Diagnosis   • Gastroesophageal reflux disease   • Stage 3 chronic kidney disease (CMS/HCC)   • Hyperlipidemia LDL goal <70   • Essential hypertension   • Vitamin D deficiency   • Diabetic gastroparesis (CMS/HCC)   • Anemia   • Gait instability   • Osteoarthritis   • Vitamin B12 deficiency   • Postherpetic neuralgia   • Peripheral arterial disease (CMS/HCC)   • Lower extremity edema   • Venous insufficiency   • Uncontrolled type 2 diabetes mellitus with complication, with long-term current use of insulin (CMS/HCC)   • Sleep apnea   • Diabetic peripheral neuropathy (CMS/HCC)   • Coronary artery disease involving native coronary artery of native heart with angina pectoris (CMS/HCC)   • Lumbar stenosis   • S/P laminectomy   • Acute blood loss anemia, mild, asymptomatic   • Leukocytosis, mild, likely reactive   • Renal insufficiency     Past Medical History:   Diagnosis " "Date   • Arthritis    • Back pain    • Chest pain    • Chronic diastolic congestive heart failure (CMS/HCC)    • Chronic kidney disease, stage III (moderate) (CMS/HCC)    • Coronary artery disease involving native coronary artery of native heart with angina pectoris (CMS/HCC)    • Diabetes mellitus (CMS/HCC)    • Dyslipidemia    • Esophagus disorder 2016   • GERD (gastroesophageal reflux disease)    • History of cataract    • History of hypercalcemia    • History of hyperparathyroidism    • Hyperlipidemia    • Hypertension    • Lower extremity edema    • Peripheral arterial disease (CMS/HCC)    • Postherpetic neuralgia    • Venous insufficiency      Past Surgical History:   Procedure Laterality Date   • CATARACT EXTRACTION     • CHOLECYSTECTOMY     • COLONOSCOPY     • ENDOSCOPY     • EYE SURGERY Bilateral     cataract    • HYSTERECTOMY     • LUMBAR LAMINECTOMY DISCECTOMY DECOMPRESSION N/A 11/15/2018    Procedure: LUMBAR LAMINECTOMY L2-3 L 3-4,;  Surgeon: Zachary Key MD;  Location: [facility];  Service: Orthopedic Spine   • OTHER SURGICAL HISTORY      Hospitalized July 2014 for bladder obstruction status post stent placement and removal by Dr. Hi.   • PARATHYROIDECTOMY     • SPINAL CORD STIMULATOR IMPLANT     • TONSILLECTOMY       Family History   Problem Relation Age of Onset   • Kidney disease Other    • Kidney disease Mother    • Diabetes Father      Social History     Tobacco Use   • Smoking status: Never Smoker   • Smokeless tobacco: Never Used   Substance Use Topics   • Alcohol use: No         PHYSICAL EXAM:    /62 (BP Location: Left arm)   Pulse 62   Ht 167.6 cm (66\")   Wt 81.6 kg (180 lb)   BMI 29.05 kg/m²        Wt Readings from Last 5 Encounters:   01/28/19 81.6 kg (180 lb)   01/28/19 81.6 kg (180 lb)   01/28/19 81.6 kg (180 lb)   11/15/18 82 kg (180 lb 12.4 oz)   11/08/18 82 kg (180 lb 12.4 oz)       BP Readings from Last 5 Encounters:   01/28/19 108/62   01/28/19 108/64   01/28/19 110/80 "   11/17/18 118/54   09/28/18 128/72       General appearance - Alert, well appearing, and in no distress   Mental status - Affect appropriate to mood.  Eyes - Sclerae anicteric, Glasses  ENMT - Hearing mildly decreased bilaterally, Dental hygiene good.  Neck - Carotids upstroke normal bilaterally, no bruits, no JVD.  Resp - Clear to auscultation, no wheezes, rales or rhonchi, symmetric air entry.  Heart - Normal rate, regular rhythm, normal S1, S2, no murmurs, rubs, clicks or gallops.  GI - Soft, nontender, nondistended, no masses or organomegaly.  Neurological - Grossly intact - normal speech, no focal findings  Musculoskeletal - No joint tenderness, deformity or swelling, no muscular tenderness noted.  Extremities - Peripheral pulses normal, no pedal edema, no clubbing or cyanosis.  Skin - Normal coloration and turgor.  Psych -  oriented to person, place, and time.    Medical problems and test results were reviewed with the patient today.     Recent Results (from the past 672 hour(s))   POC Glucose Fingerstick    Collection Time: 01/28/19 11:27 AM   Result Value Ref Range    Glucose 92 70 - 130 mg/dL   POC Glycosylated Hemoglobin (Hb A1C)    Collection Time: 01/28/19 11:27 AM   Result Value Ref Range    Hemoglobin A1C 8.8 %           Stress and Echo 4/18    · Myocardial perfusion imaging demonstrates no focal areas of ischemia or infarct. There is quantitative transient ischemic dilatation of the left ventricle which raises the possibility of multivessel ischemia.  · Coronary artery calcification noted on CT attenuation correction images.  · No EF calculated due to patient having a TENS unit.       · Left ventricular systolic function is normal. Estimated EF = 60%.  · Left ventricular diastolic dysfunction (grade I) consistent with impaired relaxation.  · The cardiac valves are functionally normal.        ASSESSMENT   1. CAD: No Angina symptoms.  DAPT.   2. HTN: Controlled. BB and Norvasc  3. PAD: Normal BONNIE's  4/18  4. HLD: Recheck Lipid panel.  LDL <60 9/18. Continue Crestor.     PLAN  · Continue current medical therapy  · Follow up 6 months with Dr. Mayberry    1/28/2019  1:31 PM    Will Evon OCASIO

## 2019-01-28 NOTE — PROGRESS NOTES
.[place]  Subjective   Narcisa Cotto is a 78 y.o. female.   Chief Complaint   Patient presents with   • Hyperlipidemia     Follow Up   • Hypertension   • Insomnia   • Heartburn   • Chronic Kidney Disease Stage 3       Hypertension   This is a chronic problem. The current episode started more than 1 year ago. The problem is controlled. Pertinent negatives include no chest pain, headaches, neck pain, palpitations or shortness of breath. Risk factors for coronary artery disease include diabetes mellitus and dyslipidemia.   Chronic Kidney Disease   This is a chronic problem. The current episode started more than 1 year ago. Pertinent negatives include no abdominal pain, arthralgias, chest pain, chills, congestion, coughing, diaphoresis, fatigue, fever, headaches, myalgias, nausea, neck pain, numbness, rash, sore throat, swollen glands, urinary symptoms, vertigo, visual change or vomiting. The treatment provided mild relief.   Heartburn   She reports no abdominal pain, no chest pain, no coughing, no nausea, no sore throat or no wheezing. This is a chronic problem. The current episode started more than 1 year ago. Pertinent negatives include no fatigue.   Hyperlipidemia   This is a chronic problem. The current episode started more than 1 year ago. The problem is controlled. Pertinent negatives include no chest pain, myalgias or shortness of breath. Risk factors for coronary artery disease include diabetes mellitus, dyslipidemia and hypertension.   Insomnia   This is a chronic problem. The current episode started more than 1 year ago. Pertinent negatives include no abdominal pain, arthralgias, chest pain, chills, congestion, coughing, diaphoresis, fatigue, fever, headaches, myalgias, nausea, neck pain, numbness, rash, sore throat, swollen glands, urinary symptoms, vertigo, visual change or vomiting. The treatment provided significant relief.   chronic back pain s/p laminectomy  The following portions of the patient's  history were reviewed and updated as appropriate: allergies, current medications, past family history, past medical history, past social history, past surgical history and problem list.    Review of Systems   Constitutional: Positive for activity change. Negative for appetite change, chills, diaphoresis, fatigue, fever and unexpected weight change.   HENT: Negative for congestion, ear discharge, ear pain, mouth sores, nosebleeds, sinus pressure, sneezing and sore throat.    Eyes: Negative for pain, discharge and itching.   Respiratory: Negative for cough, chest tightness, shortness of breath and wheezing.    Cardiovascular: Negative for chest pain, palpitations and leg swelling.   Gastrointestinal: Negative for abdominal pain, constipation, diarrhea, nausea and vomiting.   Endocrine: Negative for cold intolerance, heat intolerance, polydipsia and polyphagia.   Genitourinary: Negative for dysuria, flank pain, frequency, hematuria and urgency.   Musculoskeletal: Positive for back pain. Negative for arthralgias, gait problem, myalgias, neck pain and neck stiffness.   Skin: Negative for color change, pallor and rash.   Neurological: Negative for vertigo, seizures, speech difficulty, numbness and headaches.   Psychiatric/Behavioral: Negative for agitation, confusion, decreased concentration and sleep disturbance. The patient has insomnia. The patient is not nervous/anxious.      /80   Pulse 70   Wt 81.6 kg (180 lb)   SpO2 98%   BMI 29.07 kg/m²     Objective   Physical Exam   Constitutional: She is oriented to person, place, and time. She appears well-developed.   HENT:   Head: Normocephalic.   Right Ear: External ear normal.   Left Ear: External ear normal.   Nose: Nose normal.   Mouth/Throat: Oropharynx is clear and moist.   Eyes: Conjunctivae are normal. Pupils are equal, round, and reactive to light.   Neck: No JVD present. No thyromegaly present.   Cardiovascular: Normal rate, regular rhythm and normal  heart sounds. Exam reveals no friction rub.   No murmur heard.  Pulmonary/Chest: Effort normal and breath sounds normal. No respiratory distress. She has no wheezes. She has no rales.   Abdominal: Soft. Bowel sounds are normal. She exhibits no distension. There is no tenderness. There is no guarding.   Musculoskeletal: She exhibits no edema or tenderness.   Lymphadenopathy:     She has no cervical adenopathy.   Neurological: She is alert and oriented to person, place, and time. She has normal reflexes. She displays normal reflexes. No cranial nerve deficit.   Skin: No rash noted.   Psychiatric: She has a normal mood and affect. Her behavior is normal.   Nursing note and vitals reviewed.      Assessment/Plan   Narcisa was seen today for chronic kidney disease, hypertension, hyperlipidemia and vitamin b12 def.    Diagnoses and all orders for this visit:    Insomnia, unspecified type  -     traZODone (DESYREL) 50 MG tablet; Take 1 tablet by mouth Every Night.  Stable with trazadone that was refilled today  Essential hypertension  -     Comprehensive Metabolic Panel  -     Lipid Panel  stable  Hyperlipidemia, unspecified hyperlipidemia type  -     Comprehensive Metabolic Panel  -     Lipid Panel  stable  Gastroesophageal reflux disease, esophagitis presence not specified  Stable.  Chronic kidney disease, stage 3 (moderate)  Labs today. Still following with nephro.      Chronic back pain   S/p recent laminectomy

## 2019-01-29 LAB
CREAT 24H UR-MCNC: 210.9 MG/DL
MICROALBUMIN UR-MCNC: 147.5 UG/ML
MICROALBUMIN/CREAT UR: 69.9 MG/G CREAT (ref 0–30)

## 2019-01-30 ENCOUNTER — TELEPHONE (OUTPATIENT)
Dept: INTERNAL MEDICINE | Facility: CLINIC | Age: 79
End: 2019-01-30

## 2019-01-30 DIAGNOSIS — N39.0 URINARY TRACT INFECTION WITHOUT HEMATURIA, SITE UNSPECIFIED: Primary | ICD-10-CM

## 2019-01-30 RX ORDER — NITROFURANTOIN 25; 75 MG/1; MG/1
100 CAPSULE ORAL 2 TIMES DAILY
Qty: 14 CAPSULE | Refills: 0 | Status: SHIPPED | OUTPATIENT
Start: 2019-01-30 | End: 2019-02-06

## 2019-01-31 LAB — BACTERIA SPEC AEROBE CULT: ABNORMAL

## 2019-02-04 DIAGNOSIS — N39.0 URINARY TRACT INFECTION WITHOUT HEMATURIA, SITE UNSPECIFIED: Primary | ICD-10-CM

## 2019-02-04 RX ORDER — DOXYCYCLINE 100 MG/1
100 TABLET ORAL 2 TIMES DAILY
Qty: 20 TABLET | Refills: 0 | Status: SHIPPED | OUTPATIENT
Start: 2019-02-04 | End: 2019-02-14

## 2019-02-04 NOTE — TELEPHONE ENCOUNTER
Spoke with pt and she said the pharmacy did not have her new insurance card so they were saying macrobid needed a pa, but we had sent in doxy but I called and cancelled it, pt did  the macrobid

## 2019-03-13 DIAGNOSIS — IMO0002 UNCONTROLLED TYPE 2 DIABETES MELLITUS WITH COMPLICATION, WITH LONG-TERM CURRENT USE OF INSULIN: ICD-10-CM

## 2019-03-13 DIAGNOSIS — E11.42 DIABETIC PERIPHERAL NEUROPATHY (HCC): ICD-10-CM

## 2019-03-13 DIAGNOSIS — I73.9 PERIPHERAL ARTERIAL DISEASE (HCC): ICD-10-CM

## 2019-03-13 DIAGNOSIS — IMO0002 UNCONTROLLED TYPE 2 DIABETES MELLITUS WITH COMPLICATION, WITH LONG-TERM CURRENT USE OF INSULIN: Primary | ICD-10-CM

## 2019-03-13 DIAGNOSIS — I25.119 CORONARY ARTERY DISEASE INVOLVING NATIVE CORONARY ARTERY OF NATIVE HEART WITH ANGINA PECTORIS (HCC): ICD-10-CM

## 2019-03-13 DIAGNOSIS — I10 ESSENTIAL HYPERTENSION: ICD-10-CM

## 2019-03-13 DIAGNOSIS — E78.5 HYPERLIPIDEMIA LDL GOAL <70: ICD-10-CM

## 2019-03-13 RX ORDER — MELATONIN
1000 DAILY
OUTPATIENT
Start: 2019-03-13

## 2019-03-13 RX ORDER — OXYCODONE HYDROCHLORIDE AND ACETAMINOPHEN 5; 325 MG/1; MG/1
1 TABLET ORAL EVERY 6 HOURS PRN
Qty: 45 TABLET | Refills: 0
Start: 2019-03-13

## 2019-03-13 RX ORDER — ONDANSETRON 8 MG/1
8 TABLET, ORALLY DISINTEGRATING ORAL EVERY 8 HOURS PRN
Qty: 180 TABLET | Refills: 1 | OUTPATIENT
Start: 2019-03-13

## 2019-03-13 RX ORDER — GABAPENTIN 600 MG/1
600 TABLET ORAL 2 TIMES DAILY
Qty: 180 TABLET | Refills: 1 | Status: SHIPPED | OUTPATIENT
Start: 2019-03-13 | End: 2019-11-26 | Stop reason: SDUPTHER

## 2019-03-13 RX ORDER — GABAPENTIN 600 MG/1
600 TABLET ORAL 2 TIMES DAILY
Qty: 180 TABLET | Refills: 1 | Status: CANCELLED | OUTPATIENT
Start: 2019-03-13

## 2019-03-13 RX ORDER — PANTOPRAZOLE SODIUM 40 MG/1
40 TABLET, DELAYED RELEASE ORAL 2 TIMES DAILY
Qty: 180 TABLET | Refills: 1 | OUTPATIENT
Start: 2019-03-13

## 2019-03-13 RX ORDER — DIPHENOXYLATE HYDROCHLORIDE AND ATROPINE SULFATE 2.5; .025 MG/1; MG/1
1 TABLET ORAL DAILY
Qty: 30 TABLET | Refills: 2 | OUTPATIENT
Start: 2019-03-13

## 2019-03-13 RX ORDER — MECLIZINE HYDROCHLORIDE 25 MG/1
25 TABLET ORAL 3 TIMES DAILY PRN
Qty: 270 TABLET | Refills: 1 | OUTPATIENT
Start: 2019-03-13

## 2019-03-13 RX ORDER — NAPROXEN SODIUM 220 MG
1 TABLET ORAL
Qty: 300 EACH | Refills: 3 | Status: SHIPPED | OUTPATIENT
Start: 2019-03-13 | End: 2019-06-21

## 2019-03-13 RX ORDER — DOCUSATE SODIUM 100 MG/1
100 CAPSULE, LIQUID FILLED ORAL 2 TIMES DAILY
Qty: 60 CAPSULE | Refills: 0 | OUTPATIENT
Start: 2019-03-13

## 2019-03-13 RX ORDER — ROSUVASTATIN CALCIUM 20 MG/1
20 TABLET, COATED ORAL DAILY
Qty: 90 TABLET | Refills: 3 | OUTPATIENT
Start: 2019-03-13 | End: 2020-03-07

## 2019-03-13 RX ORDER — AMLODIPINE BESYLATE 5 MG/1
5 TABLET ORAL DAILY
Qty: 90 TABLET | Refills: 3 | OUTPATIENT
Start: 2019-03-13 | End: 2020-03-07

## 2019-03-13 RX ORDER — CLOPIDOGREL BISULFATE 75 MG/1
75 TABLET ORAL DAILY
Qty: 90 TABLET | Refills: 3
Start: 2019-03-13 | End: 2020-03-07

## 2019-03-13 NOTE — TELEPHONE ENCOUNTER
PATIENT CHANGED PHARMACY TO [facility] AND NEEDS REFILLS ON LISTED MEDICATIONS. PT CAN BE REACHED -[phone]

## 2019-03-13 NOTE — TELEPHONE ENCOUNTER
PT CALLED BACK AND SHE NEEDS THESE CALLED IN FROM DR KARO HUSSEIN STRIPS   70/30 NOVOLIN  SYRINGES WITH THE SMALL NEEDLES

## 2019-03-15 ENCOUNTER — PRIOR AUTHORIZATION (OUTPATIENT)
Dept: INTERNAL MEDICINE | Facility: CLINIC | Age: 79
End: 2019-03-15

## 2019-03-28 ENCOUNTER — TELEPHONE (OUTPATIENT)
Dept: INTERNAL MEDICINE | Facility: CLINIC | Age: 79
End: 2019-03-28

## 2019-03-28 DIAGNOSIS — I25.119 CORONARY ARTERY DISEASE INVOLVING NATIVE CORONARY ARTERY OF NATIVE HEART WITH ANGINA PECTORIS (HCC): ICD-10-CM

## 2019-03-28 DIAGNOSIS — E78.5 HYPERLIPIDEMIA LDL GOAL <70: ICD-10-CM

## 2019-03-28 DIAGNOSIS — I10 ESSENTIAL HYPERTENSION: ICD-10-CM

## 2019-03-29 RX ORDER — PANTOPRAZOLE SODIUM 40 MG/1
40 TABLET, DELAYED RELEASE ORAL 2 TIMES DAILY
Qty: 180 TABLET | Refills: 1 | Status: ON HOLD | OUTPATIENT
Start: 2019-03-29 | End: 2019-07-12 | Stop reason: SDUPTHER

## 2019-03-29 RX ORDER — AMLODIPINE BESYLATE 5 MG/1
5 TABLET ORAL DAILY
Qty: 90 TABLET | Refills: 1 | Status: SHIPPED | OUTPATIENT
Start: 2019-03-29 | End: 2019-06-05 | Stop reason: HOSPADM

## 2019-03-29 RX ORDER — MECLIZINE HYDROCHLORIDE 25 MG/1
25 TABLET ORAL 3 TIMES DAILY PRN
Qty: 270 TABLET | Refills: 1 | Status: SHIPPED | OUTPATIENT
Start: 2019-03-29 | End: 2019-06-05 | Stop reason: HOSPADM

## 2019-03-29 RX ORDER — ROSUVASTATIN CALCIUM 20 MG/1
20 TABLET, COATED ORAL DAILY
Qty: 90 TABLET | Refills: 1 | Status: SHIPPED | OUTPATIENT
Start: 2019-03-29 | End: 2019-11-22 | Stop reason: SDUPTHER

## 2019-04-09 ENCOUNTER — TELEPHONE (OUTPATIENT)
Dept: ENDOCRINOLOGY | Facility: CLINIC | Age: 79
End: 2019-04-09

## 2019-04-09 DIAGNOSIS — IMO0002 UNCONTROLLED TYPE 2 DIABETES MELLITUS WITH COMPLICATION, WITH LONG-TERM CURRENT USE OF INSULIN: Primary | ICD-10-CM

## 2019-04-09 DIAGNOSIS — IMO0002 UNCONTROLLED TYPE 2 DIABETES MELLITUS WITH COMPLICATION, WITH LONG-TERM CURRENT USE OF INSULIN: ICD-10-CM

## 2019-04-09 NOTE — TELEPHONE ENCOUNTER
Please advise. Spoke with pharmacy Humulin 70/30 is covered under patient insurance without needing PA.

## 2019-04-26 ENCOUNTER — HOSPITAL ENCOUNTER (OUTPATIENT)
Dept: GENERAL RADIOLOGY | Facility: HOSPITAL | Age: 79
Discharge: HOME | End: 2019-04-26
Admitting: INTERNAL MEDICINE

## 2019-04-26 ENCOUNTER — OFFICE VISIT (OUTPATIENT)
Dept: INTERNAL MEDICINE | Facility: CLINIC | Age: 79
End: 2019-04-26

## 2019-04-26 VITALS
BODY MASS INDEX: 29.18 KG/M2 | WEIGHT: 180.8 LBS | OXYGEN SATURATION: 98 % | SYSTOLIC BLOOD PRESSURE: 130 MMHG | HEART RATE: 73 BPM | DIASTOLIC BLOOD PRESSURE: 80 MMHG

## 2019-04-26 DIAGNOSIS — K21.9 GASTROESOPHAGEAL REFLUX DISEASE, ESOPHAGITIS PRESENCE NOT SPECIFIED: ICD-10-CM

## 2019-04-26 DIAGNOSIS — N18.30 STAGE 3 CHRONIC KIDNEY DISEASE (HCC): ICD-10-CM

## 2019-04-26 DIAGNOSIS — R06.02 SOB (SHORTNESS OF BREATH): ICD-10-CM

## 2019-04-26 DIAGNOSIS — R53.83 OTHER FATIGUE: ICD-10-CM

## 2019-04-26 DIAGNOSIS — E78.5 HYPERLIPIDEMIA LDL GOAL <70: ICD-10-CM

## 2019-04-26 DIAGNOSIS — M48.061 SPINAL STENOSIS OF LUMBAR REGION, UNSPECIFIED WHETHER NEUROGENIC CLAUDICATION PRESENT: ICD-10-CM

## 2019-04-26 DIAGNOSIS — I10 ESSENTIAL HYPERTENSION: Primary | ICD-10-CM

## 2019-04-26 DIAGNOSIS — F51.01 PRIMARY INSOMNIA: ICD-10-CM

## 2019-04-26 LAB
ALBUMIN SERPL-MCNC: 4.3 G/DL (ref 3.5–5.2)
ALBUMIN/GLOB SERPL: 1.4 G/DL
ALP SERPL-CCNC: 85 U/L (ref 39–117)
ALT SERPL W P-5'-P-CCNC: 16 U/L (ref 1–33)
ANION GAP SERPL CALCULATED.3IONS-SCNC: 15 MMOL/L
AST SERPL-CCNC: 16 U/L (ref 1–32)
BASOPHILS # BLD AUTO: 0.02 10*3/MM3 (ref 0–0.2)
BASOPHILS NFR BLD AUTO: 0.3 % (ref 0–1.5)
BILIRUB SERPL-MCNC: 0.2 MG/DL (ref 0.2–1.2)
BUN BLD-MCNC: 21 MG/DL (ref 8–23)
BUN/CREAT SERPL: 11.2 (ref 7–25)
CALCIUM SPEC-SCNC: 9.6 MG/DL (ref 8.6–10.5)
CHLORIDE SERPL-SCNC: 100 MMOL/L (ref 98–107)
CHOLEST SERPL-MCNC: 250 MG/DL (ref 0–200)
CO2 SERPL-SCNC: 26 MMOL/L (ref 22–29)
CREAT BLD-MCNC: 1.88 MG/DL (ref 0.57–1)
D DIMER PPP FEU-MCNC: 0.45 MCGFEU/ML (ref 0–0.56)
DEPRECATED RDW RBC AUTO: 45.4 FL (ref 37–54)
EOSINOPHIL # BLD AUTO: 0.11 10*3/MM3 (ref 0–0.4)
EOSINOPHIL NFR BLD AUTO: 1.5 % (ref 0.3–6.2)
ERYTHROCYTE [DISTWIDTH] IN BLOOD BY AUTOMATED COUNT: 14.8 % (ref 12.3–15.4)
GFR SERPL CREATININE-BSD FRML MDRD: 26 ML/MIN/1.73
GLOBULIN UR ELPH-MCNC: 3 GM/DL
GLUCOSE BLD-MCNC: 136 MG/DL (ref 65–99)
HCT VFR BLD AUTO: 39.6 % (ref 34–46.6)
HDLC SERPL-MCNC: 33 MG/DL (ref 40–60)
HGB BLD-MCNC: 12.5 G/DL (ref 12–15.9)
IMM GRANULOCYTES # BLD AUTO: 0.04 10*3/MM3 (ref 0–0.05)
IMM GRANULOCYTES NFR BLD AUTO: 0.5 % (ref 0–0.5)
LDLC SERPL CALC-MCNC: 168 MG/DL (ref 0–100)
LDLC/HDLC SERPL: 5.1 {RATIO}
LYMPHOCYTES # BLD AUTO: 1.03 10*3/MM3 (ref 0.7–3.1)
LYMPHOCYTES NFR BLD AUTO: 14.1 % (ref 19.6–45.3)
MCH RBC QN AUTO: 26.5 PG (ref 26.6–33)
MCHC RBC AUTO-ENTMCNC: 31.6 G/DL (ref 31.5–35.7)
MCV RBC AUTO: 83.9 FL (ref 79–97)
MONOCYTES # BLD AUTO: 0.36 10*3/MM3 (ref 0.1–0.9)
MONOCYTES NFR BLD AUTO: 4.9 % (ref 5–12)
NEUTROPHILS # BLD AUTO: 5.78 10*3/MM3 (ref 1.7–7)
NEUTROPHILS NFR BLD AUTO: 79.2 % (ref 42.7–76)
NT-PROBNP SERPL-MCNC: 115.1 PG/ML (ref 5–1800)
PLATELET # BLD AUTO: 182 10*3/MM3 (ref 140–450)
PMV BLD AUTO: 10.7 FL (ref 6–12)
POTASSIUM BLD-SCNC: 4.4 MMOL/L (ref 3.5–5.2)
PROT SERPL-MCNC: 7.3 G/DL (ref 6–8.5)
RBC # BLD AUTO: 4.72 10*6/MM3 (ref 3.77–5.28)
SODIUM BLD-SCNC: 141 MMOL/L (ref 136–145)
TRIGL SERPL-MCNC: 244 MG/DL (ref 0–150)
TSH SERPL DL<=0.05 MIU/L-ACNC: 2.31 MIU/ML (ref 0.27–4.2)
VLDLC SERPL-MCNC: 48.8 MG/DL
WBC NRBC COR # BLD: 7.3 10*3/MM3 (ref 3.4–10.8)

## 2019-04-26 PROCEDURE — 85379 FIBRIN DEGRADATION QUANT: CPT | Performed by: INTERNAL MEDICINE

## 2019-04-26 PROCEDURE — 84443 ASSAY THYROID STIM HORMONE: CPT | Performed by: INTERNAL MEDICINE

## 2019-04-26 PROCEDURE — 71046 X-RAY EXAM CHEST 2 VIEWS: CPT

## 2019-04-26 PROCEDURE — 83880 ASSAY OF NATRIURETIC PEPTIDE: CPT | Performed by: INTERNAL MEDICINE

## 2019-04-26 PROCEDURE — 85025 COMPLETE CBC W/AUTO DIFF WBC: CPT | Performed by: INTERNAL MEDICINE

## 2019-04-26 PROCEDURE — 99214 OFFICE O/P EST MOD 30 MIN: CPT | Performed by: INTERNAL MEDICINE

## 2019-04-26 PROCEDURE — 80053 COMPREHEN METABOLIC PANEL: CPT | Performed by: INTERNAL MEDICINE

## 2019-04-26 PROCEDURE — 80061 LIPID PANEL: CPT | Performed by: INTERNAL MEDICINE

## 2019-04-26 NOTE — PROGRESS NOTES
Subjective   Narcisa Cotto is a 78 y.o. female.   Chief Complaint   Patient presents with   • Insomnia     Follow Up   • Hyperlipidemia   • Hypertension   • Heartburn   • Chronic Kidney Disease       Hyperlipidemia   This is a chronic problem. The current episode started more than 1 year ago. The problem is controlled. Pertinent negatives include no chest pain, myalgias or shortness of breath. Risk factors for coronary artery disease include diabetes mellitus, dyslipidemia and hypertension.   Hypertension   This is a chronic problem. The current episode started more than 1 year ago. The problem is controlled. Pertinent negatives include no chest pain, headaches, neck pain, palpitations or shortness of breath. Risk factors for coronary artery disease include diabetes mellitus and dyslipidemia.   Insomnia   This is a chronic problem. The current episode started more than 1 year ago. Associated symptoms include fatigue. Pertinent negatives include no abdominal pain, arthralgias, chest pain, chills, congestion, coughing, diaphoresis, fever, headaches, myalgias, nausea, neck pain, numbness, rash, sore throat, swollen glands, urinary symptoms, vertigo, visual change or vomiting. The treatment provided significant relief.   Heartburn   She reports no abdominal pain, no chest pain, no coughing, no nausea, no sore throat or no wheezing. This is a chronic problem. The current episode started more than 1 year ago. Associated symptoms include fatigue.   Chronic Kidney Disease   This is a chronic problem. The current episode started more than 1 year ago. Associated symptoms include fatigue. Pertinent negatives include no abdominal pain, arthralgias, chest pain, chills, congestion, coughing, diaphoresis, fever, headaches, myalgias, nausea, neck pain, numbness, rash, sore throat, swollen glands, urinary symptoms, vertigo, visual change or vomiting. The treatment provided mild relief.   chronic back pain s/p laminectomy SOB  for few weeks. No swelling. Worst with walking. No CP. Better with rest. No fever.  The following portions of the patient's history were reviewed and updated as appropriate: allergies, current medications, past family history, past medical history, past social history, past surgical history and problem list.    Review of Systems   Constitutional: Positive for activity change and fatigue. Negative for appetite change, chills, diaphoresis, fever and unexpected weight change.   HENT: Negative for congestion, ear discharge, ear pain, mouth sores, nosebleeds, sinus pressure, sneezing and sore throat.    Eyes: Negative for pain, discharge and itching.   Respiratory: Negative for cough, chest tightness, shortness of breath and wheezing.    Cardiovascular: Negative for chest pain, palpitations and leg swelling.   Gastrointestinal: Negative for abdominal pain, constipation, diarrhea, nausea and vomiting.   Endocrine: Negative for cold intolerance, heat intolerance, polydipsia and polyphagia.   Genitourinary: Negative for dysuria, flank pain, frequency, hematuria and urgency.   Musculoskeletal: Positive for back pain. Negative for arthralgias, gait problem, myalgias, neck pain and neck stiffness.   Skin: Negative for color change, pallor and rash.   Neurological: Negative for vertigo, seizures, speech difficulty, numbness and headaches.   Psychiatric/Behavioral: Negative for agitation, confusion, decreased concentration and sleep disturbance. The patient has insomnia. The patient is not nervous/anxious.      /80   Pulse 73   Wt 82 kg (180 lb 12.8 oz)   SpO2 98%   BMI 29.18 kg/m²     Objective   Physical Exam   Constitutional: She is oriented to person, place, and time. She appears well-developed.   HENT:   Head: Normocephalic.   Right Ear: External ear normal.   Left Ear: External ear normal.   Nose: Nose normal.   Mouth/Throat: Oropharynx is clear and moist.   Eyes: Conjunctivae are normal. Pupils are equal, round,  and reactive to light.   Neck: No JVD present. No thyromegaly present.   Cardiovascular: Normal rate, regular rhythm and normal heart sounds. Exam reveals no friction rub.   No murmur heard.  Pulmonary/Chest: Effort normal and breath sounds normal. No respiratory distress. She has no wheezes. She has no rales.   Abdominal: Soft. Bowel sounds are normal. She exhibits no distension. There is no tenderness. There is no guarding.   Musculoskeletal: She exhibits no edema or tenderness.   Lymphadenopathy:     She has no cervical adenopathy.   Neurological: She is alert and oriented to person, place, and time. She has normal reflexes. She displays normal reflexes. No cranial nerve deficit.   Skin: No rash noted.   Psychiatric: She has a normal mood and affect. Her behavior is normal.   Nursing note and vitals reviewed.      Assessment/Plan   Narcisa was seen today for chronic kidney disease, hypertension, hyperlipidemia and vitamin b12 def.    Diagnoses and all orders for this visit:    Insomnia, unspecified type  -     traZODone (DESYREL) 50 MG tablet; Take 1 tablet by mouth Every Night.  Stable with trazadone that was refilled today  Essential hypertension  -     Comprehensive Metabolic Panel  -     Lipid Panel  stable  Hyperlipidemia, unspecified hyperlipidemia type  -     Comprehensive Metabolic Panel  -     Lipid Panel  stable  Gastroesophageal reflux disease, esophagitis presence not specified  Stable.  Chronic kidney disease, stage 3 (moderate)  Labs today. Still following with nephro.      Chronic back pain   S/p recent laminectomy    SOB  cxr d dimer, bnp

## 2019-05-18 ENCOUNTER — APPOINTMENT (OUTPATIENT)
Dept: CT IMAGING | Facility: HOSPITAL | Age: 79
End: 2019-05-18

## 2019-05-18 ENCOUNTER — HOSPITAL ENCOUNTER (INPATIENT)
Facility: HOSPITAL | Age: 79
LOS: 18 days | Discharge: NURSING, REHAB OR LONG-TERM CARE | End: 2019-06-05
Attending: EMERGENCY MEDICINE | Admitting: INTERNAL MEDICINE

## 2019-05-18 ENCOUNTER — APPOINTMENT (OUTPATIENT)
Dept: GENERAL RADIOLOGY | Facility: HOSPITAL | Age: 79
End: 2019-05-18

## 2019-05-18 DIAGNOSIS — R77.8 ELEVATED TROPONIN: ICD-10-CM

## 2019-05-18 DIAGNOSIS — R11.2 INTRACTABLE VOMITING WITH NAUSEA, UNSPECIFIED VOMITING TYPE: ICD-10-CM

## 2019-05-18 DIAGNOSIS — I10 ESSENTIAL HYPERTENSION: ICD-10-CM

## 2019-05-18 DIAGNOSIS — Z78.9 IMPAIRED MOBILITY AND ADLS: ICD-10-CM

## 2019-05-18 DIAGNOSIS — E86.0 DEHYDRATION: ICD-10-CM

## 2019-05-18 DIAGNOSIS — R73.9 HYPERGLYCEMIA: ICD-10-CM

## 2019-05-18 DIAGNOSIS — I25.119 CORONARY ARTERY DISEASE INVOLVING NATIVE CORONARY ARTERY OF NATIVE HEART WITH ANGINA PECTORIS (HCC): ICD-10-CM

## 2019-05-18 DIAGNOSIS — Z74.09 IMPAIRED MOBILITY AND ADLS: ICD-10-CM

## 2019-05-18 DIAGNOSIS — I21.4 NSTEMI (NON-ST ELEVATED MYOCARDIAL INFARCTION) (HCC): ICD-10-CM

## 2019-05-18 DIAGNOSIS — Z74.09 IMPAIRED FUNCTIONAL MOBILITY, BALANCE, GAIT, AND ENDURANCE: ICD-10-CM

## 2019-05-18 DIAGNOSIS — I46.9 CARDIAC ARREST (HCC): ICD-10-CM

## 2019-05-18 PROBLEM — Z98.890 S/P LAMINECTOMY: Status: RESOLVED | Noted: 2018-11-15 | Resolved: 2019-05-18

## 2019-05-18 PROBLEM — N28.9 RENAL INSUFFICIENCY: Status: RESOLVED | Noted: 2018-11-16 | Resolved: 2019-05-18

## 2019-05-18 PROBLEM — D62 ACUTE BLOOD LOSS ANEMIA: Status: RESOLVED | Noted: 2018-11-16 | Resolved: 2019-05-18

## 2019-05-18 LAB
ALBUMIN SERPL-MCNC: 4.3 G/DL (ref 3.5–5.2)
ALBUMIN/GLOB SERPL: 1.2 G/DL
ALP SERPL-CCNC: 83 U/L (ref 39–117)
ALT SERPL W P-5'-P-CCNC: 38 U/L (ref 1–33)
ANION GAP SERPL CALCULATED.3IONS-SCNC: 21 MMOL/L
AST SERPL-CCNC: 16 U/L (ref 1–32)
ATMOSPHERIC PRESS: ABNORMAL MMHG
BACTERIA UR QL AUTO: ABNORMAL /HPF
BASE EXCESS BLDV CALC-SCNC: -4.3 MMOL/L (ref -2–2)
BASOPHILS # BLD AUTO: 0.01 10*3/MM3 (ref 0–0.2)
BASOPHILS NFR BLD AUTO: 0.2 % (ref 0–1.5)
BDY SITE: ABNORMAL
BILIRUB SERPL-MCNC: 0.9 MG/DL (ref 0.2–1.2)
BILIRUB UR QL STRIP: NEGATIVE
BODY TEMPERATURE: 37 C
BUN BLD-MCNC: 49 MG/DL (ref 8–23)
BUN/CREAT SERPL: 30.2 (ref 7–25)
CALCIUM SPEC-SCNC: 10.3 MG/DL (ref 8.6–10.5)
CHLORIDE SERPL-SCNC: 99 MMOL/L (ref 98–107)
CLARITY UR: CLEAR
CO2 BLDA-SCNC: 22.3 MMOL/L (ref 23–27)
CO2 SERPL-SCNC: 19 MMOL/L (ref 22–29)
COHGB MFR BLD: 1.7 %
COLOR UR: YELLOW
CREAT BLD-MCNC: 1.62 MG/DL (ref 0.57–1)
D-LACTATE SERPL-SCNC: 1.8 MMOL/L (ref 0.5–2)
D-LACTATE SERPL-SCNC: 2.1 MMOL/L (ref 0.5–2)
DEPRECATED RDW RBC AUTO: 47.8 FL (ref 37–54)
EOSINOPHIL # BLD AUTO: 0 10*3/MM3 (ref 0–0.4)
EOSINOPHIL NFR BLD AUTO: 0 % (ref 0.3–6.2)
ERYTHROCYTE [DISTWIDTH] IN BLOOD BY AUTOMATED COUNT: 15.8 % (ref 12.3–15.4)
GFR SERPL CREATININE-BSD FRML MDRD: 31 ML/MIN/1.73
GLOBULIN UR ELPH-MCNC: 3.7 GM/DL
GLUCOSE BLD-MCNC: 473 MG/DL (ref 65–99)
GLUCOSE BLDC GLUCOMTR-MCNC: 348 MG/DL (ref 70–130)
GLUCOSE BLDC GLUCOMTR-MCNC: 362 MG/DL (ref 70–130)
GLUCOSE BLDC GLUCOMTR-MCNC: 363 MG/DL (ref 70–130)
GLUCOSE UR STRIP-MCNC: ABNORMAL MG/DL
HCO3 BLDV-SCNC: 21.1 MMOL/L (ref 22–28)
HCT VFR BLD AUTO: 38.7 % (ref 34–46.6)
HGB BLD-MCNC: 12.3 G/DL (ref 12–15.9)
HGB BLDA-MCNC: 12.4 G/DL (ref 14–18)
HGB UR QL STRIP.AUTO: ABNORMAL
HOLD SPECIMEN: NORMAL
HOROWITZ INDEX BLD+IHG-RTO: 21 %
HYALINE CASTS UR QL AUTO: ABNORMAL /LPF
IMM GRANULOCYTES # BLD AUTO: 0.01 10*3/MM3 (ref 0–0.05)
IMM GRANULOCYTES NFR BLD AUTO: 0.2 % (ref 0–0.5)
KETONES UR QL STRIP: ABNORMAL
LEUKOCYTE ESTERASE UR QL STRIP.AUTO: NEGATIVE
LIPASE SERPL-CCNC: 25 U/L (ref 13–60)
LYMPHOCYTES # BLD AUTO: 0.38 10*3/MM3 (ref 0.7–3.1)
LYMPHOCYTES NFR BLD AUTO: 6.5 % (ref 19.6–45.3)
Lab: ABNORMAL
MCH RBC QN AUTO: 26.7 PG (ref 26.6–33)
MCHC RBC AUTO-ENTMCNC: 31.8 G/DL (ref 31.5–35.7)
MCV RBC AUTO: 83.9 FL (ref 79–97)
METHGB BLD QL: 0.8 %
MODALITY: ABNORMAL
MONOCYTES # BLD AUTO: 0.36 10*3/MM3 (ref 0.1–0.9)
MONOCYTES NFR BLD AUTO: 6.1 % (ref 5–12)
NEUTROPHILS # BLD AUTO: 5.11 10*3/MM3 (ref 1.7–7)
NEUTROPHILS NFR BLD AUTO: 87.2 % (ref 42.7–76)
NITRITE UR QL STRIP: NEGATIVE
NOTE: ABNORMAL
NOTIFIED BY: ABNORMAL
NOTIFIED WHO: ABNORMAL
OXYHGB MFR BLDV: 54.2 %
PCO2 BLDV: 39.3 MM HG (ref 41–51)
PH BLDV: 7.34 PH UNITS
PH UR STRIP.AUTO: <=5 [PH] (ref 5–8)
PLATELET # BLD AUTO: 151 10*3/MM3 (ref 140–450)
PMV BLD AUTO: 10.5 FL (ref 6–12)
PO2 BLDV: 33.2 MM HG (ref 27–53)
POTASSIUM BLD-SCNC: 4.4 MMOL/L (ref 3.5–5.2)
PROT SERPL-MCNC: 8 G/DL (ref 6–8.5)
PROT UR QL STRIP: ABNORMAL
RBC # BLD AUTO: 4.61 10*6/MM3 (ref 3.77–5.28)
RBC # UR: ABNORMAL /HPF
REF LAB TEST METHOD: ABNORMAL
SODIUM BLD-SCNC: 139 MMOL/L (ref 136–145)
SP GR UR STRIP: 1.02 (ref 1–1.03)
SQUAMOUS #/AREA URNS HPF: ABNORMAL /HPF
TROPONIN T SERPL-MCNC: 0.07 NG/ML (ref 0–0.03)
TROPONIN T SERPL-MCNC: 0.08 NG/ML (ref 0–0.03)
UROBILINOGEN UR QL STRIP: ABNORMAL
VENTILATOR MODE: ABNORMAL
WBC NRBC COR # BLD: 5.86 10*3/MM3 (ref 3.4–10.8)
WBC UR QL AUTO: ABNORMAL /HPF
WHOLE BLOOD HOLD SPECIMEN: NORMAL
WHOLE BLOOD HOLD SPECIMEN: NORMAL

## 2019-05-18 PROCEDURE — 63710000001 INSULIN REGULAR HUMAN PER 5 UNITS: Performed by: PHYSICIAN ASSISTANT

## 2019-05-18 PROCEDURE — 82820 HEMOGLOBIN-OXYGEN AFFINITY: CPT

## 2019-05-18 PROCEDURE — 84484 ASSAY OF TROPONIN QUANT: CPT | Performed by: EMERGENCY MEDICINE

## 2019-05-18 PROCEDURE — 99285 EMERGENCY DEPT VISIT HI MDM: CPT

## 2019-05-18 PROCEDURE — 81001 URINALYSIS AUTO W/SCOPE: CPT | Performed by: EMERGENCY MEDICINE

## 2019-05-18 PROCEDURE — 25010000002 ENOXAPARIN PER 10 MG: Performed by: PHYSICIAN ASSISTANT

## 2019-05-18 PROCEDURE — 99223 1ST HOSP IP/OBS HIGH 75: CPT | Performed by: FAMILY MEDICINE

## 2019-05-18 PROCEDURE — 82805 BLOOD GASES W/O2 SATURATION: CPT

## 2019-05-18 PROCEDURE — 83690 ASSAY OF LIPASE: CPT | Performed by: EMERGENCY MEDICINE

## 2019-05-18 PROCEDURE — 82962 GLUCOSE BLOOD TEST: CPT

## 2019-05-18 PROCEDURE — 83605 ASSAY OF LACTIC ACID: CPT | Performed by: EMERGENCY MEDICINE

## 2019-05-18 PROCEDURE — 63710000001 INSULIN LISPRO (HUMAN) PER 5 UNITS: Performed by: FAMILY MEDICINE

## 2019-05-18 PROCEDURE — 25010000002 ONDANSETRON PER 1 MG: Performed by: FAMILY MEDICINE

## 2019-05-18 PROCEDURE — 25010000002 ONDANSETRON PER 1 MG: Performed by: PHYSICIAN ASSISTANT

## 2019-05-18 PROCEDURE — 84484 ASSAY OF TROPONIN QUANT: CPT | Performed by: FAMILY MEDICINE

## 2019-05-18 PROCEDURE — 93005 ELECTROCARDIOGRAM TRACING: CPT | Performed by: EMERGENCY MEDICINE

## 2019-05-18 PROCEDURE — 74176 CT ABD & PELVIS W/O CONTRAST: CPT

## 2019-05-18 PROCEDURE — 80053 COMPREHEN METABOLIC PANEL: CPT | Performed by: EMERGENCY MEDICINE

## 2019-05-18 PROCEDURE — 71045 X-RAY EXAM CHEST 1 VIEW: CPT

## 2019-05-18 PROCEDURE — 85025 COMPLETE CBC W/AUTO DIFF WBC: CPT | Performed by: EMERGENCY MEDICINE

## 2019-05-18 PROCEDURE — 93005 ELECTROCARDIOGRAM TRACING: CPT | Performed by: NURSE PRACTITIONER

## 2019-05-18 RX ORDER — NICOTINE POLACRILEX 4 MG
15 LOZENGE BUCCAL
Status: DISCONTINUED | OUTPATIENT
Start: 2019-05-18 | End: 2019-05-20

## 2019-05-18 RX ORDER — PANTOPRAZOLE SODIUM 40 MG/10ML
80 INJECTION, POWDER, LYOPHILIZED, FOR SOLUTION INTRAVENOUS ONCE
Status: COMPLETED | OUTPATIENT
Start: 2019-05-18 | End: 2019-05-18

## 2019-05-18 RX ORDER — ROSUVASTATIN CALCIUM 20 MG/1
20 TABLET, COATED ORAL DAILY
Status: DISCONTINUED | OUTPATIENT
Start: 2019-05-19 | End: 2019-05-27

## 2019-05-18 RX ORDER — MORPHINE SULFATE 2 MG/ML
1 INJECTION, SOLUTION INTRAMUSCULAR; INTRAVENOUS EVERY 4 HOURS PRN
Status: DISCONTINUED | OUTPATIENT
Start: 2019-05-18 | End: 2019-05-26

## 2019-05-18 RX ORDER — NICOTINE POLACRILEX 4 MG
15 LOZENGE BUCCAL
Status: DISCONTINUED | OUTPATIENT
Start: 2019-05-18 | End: 2019-05-18 | Stop reason: SDUPTHER

## 2019-05-18 RX ORDER — DEXTROSE MONOHYDRATE 25 G/50ML
25 INJECTION, SOLUTION INTRAVENOUS
Status: DISCONTINUED | OUTPATIENT
Start: 2019-05-18 | End: 2019-05-20

## 2019-05-18 RX ORDER — DEXTROSE MONOHYDRATE 25 G/50ML
25 INJECTION, SOLUTION INTRAVENOUS
Status: DISCONTINUED | OUTPATIENT
Start: 2019-05-18 | End: 2019-05-18 | Stop reason: SDUPTHER

## 2019-05-18 RX ORDER — PANTOPRAZOLE SODIUM 40 MG/1
40 TABLET, DELAYED RELEASE ORAL 2 TIMES DAILY
Status: DISCONTINUED | OUTPATIENT
Start: 2019-05-18 | End: 2019-06-05 | Stop reason: HOSPADM

## 2019-05-18 RX ORDER — SODIUM CHLORIDE 0.9 % (FLUSH) 0.9 %
3 SYRINGE (ML) INJECTION EVERY 12 HOURS SCHEDULED
Status: DISCONTINUED | OUTPATIENT
Start: 2019-05-18 | End: 2019-05-19

## 2019-05-18 RX ORDER — RAMIPRIL 2.5 MG/1
2.5 CAPSULE ORAL DAILY
Status: DISCONTINUED | OUTPATIENT
Start: 2019-05-19 | End: 2019-05-20

## 2019-05-18 RX ORDER — SODIUM CHLORIDE 9 MG/ML
100 INJECTION, SOLUTION INTRAVENOUS CONTINUOUS
Status: DISCONTINUED | OUTPATIENT
Start: 2019-05-19 | End: 2019-05-19

## 2019-05-18 RX ORDER — ONDANSETRON 2 MG/ML
4 INJECTION INTRAMUSCULAR; INTRAVENOUS ONCE
Status: COMPLETED | OUTPATIENT
Start: 2019-05-18 | End: 2019-05-18

## 2019-05-18 RX ORDER — SODIUM CHLORIDE 0.9 % (FLUSH) 0.9 %
3-10 SYRINGE (ML) INJECTION AS NEEDED
Status: DISCONTINUED | OUTPATIENT
Start: 2019-05-18 | End: 2019-06-05 | Stop reason: HOSPADM

## 2019-05-18 RX ORDER — SODIUM CHLORIDE 0.9 % (FLUSH) 0.9 %
10 SYRINGE (ML) INJECTION AS NEEDED
Status: DISCONTINUED | OUTPATIENT
Start: 2019-05-18 | End: 2019-05-19

## 2019-05-18 RX ORDER — ASPIRIN 300 MG/1
300 SUPPOSITORY RECTAL ONCE
Status: COMPLETED | OUTPATIENT
Start: 2019-05-18 | End: 2019-05-18

## 2019-05-18 RX ORDER — CLOPIDOGREL BISULFATE 75 MG/1
75 TABLET ORAL DAILY
Status: DISCONTINUED | OUTPATIENT
Start: 2019-05-19 | End: 2019-05-24

## 2019-05-18 RX ORDER — ONDANSETRON 2 MG/ML
4 INJECTION INTRAMUSCULAR; INTRAVENOUS EVERY 6 HOURS PRN
Status: DISCONTINUED | OUTPATIENT
Start: 2019-05-18 | End: 2019-06-05 | Stop reason: HOSPADM

## 2019-05-18 RX ORDER — ASPIRIN 81 MG/1
81 TABLET, CHEWABLE ORAL DAILY
Status: DISCONTINUED | OUTPATIENT
Start: 2019-05-19 | End: 2019-06-05 | Stop reason: HOSPADM

## 2019-05-18 RX ORDER — LORAZEPAM 0.5 MG/1
0.5 TABLET ORAL NIGHTLY PRN
Status: DISCONTINUED | OUTPATIENT
Start: 2019-05-18 | End: 2019-05-27

## 2019-05-18 RX ORDER — GABAPENTIN 300 MG/1
600 CAPSULE ORAL EVERY 12 HOURS SCHEDULED
Status: DISCONTINUED | OUTPATIENT
Start: 2019-05-18 | End: 2019-05-20

## 2019-05-18 RX ORDER — AMLODIPINE BESYLATE 5 MG/1
5 TABLET ORAL DAILY
Status: DISCONTINUED | OUTPATIENT
Start: 2019-05-19 | End: 2019-05-20

## 2019-05-18 RX ORDER — SODIUM CHLORIDE 9 MG/ML
125 INJECTION, SOLUTION INTRAVENOUS CONTINUOUS
Status: DISCONTINUED | OUTPATIENT
Start: 2019-05-18 | End: 2019-05-21

## 2019-05-18 RX ORDER — NALOXONE HCL 0.4 MG/ML
0.4 VIAL (ML) INJECTION
Status: DISCONTINUED | OUTPATIENT
Start: 2019-05-18 | End: 2019-06-05 | Stop reason: HOSPADM

## 2019-05-18 RX ADMIN — INSULIN LISPRO 12 UNITS: 100 INJECTION, SOLUTION INTRAVENOUS; SUBCUTANEOUS at 22:43

## 2019-05-18 RX ADMIN — ONDANSETRON 4 MG: 2 INJECTION INTRAMUSCULAR; INTRAVENOUS at 16:21

## 2019-05-18 RX ADMIN — SODIUM CHLORIDE 1000 ML: 9 INJECTION, SOLUTION INTRAVENOUS at 16:22

## 2019-05-18 RX ADMIN — ASPIRIN 300 MG: 300 SUPPOSITORY RECTAL at 18:58

## 2019-05-18 RX ADMIN — ENOXAPARIN SODIUM 80 MG: 80 INJECTION SUBCUTANEOUS at 18:46

## 2019-05-18 RX ADMIN — PANTOPRAZOLE SODIUM 80 MG: 40 INJECTION, POWDER, FOR SOLUTION INTRAVENOUS at 18:59

## 2019-05-18 RX ADMIN — ONDANSETRON 4 MG: 2 INJECTION INTRAMUSCULAR; INTRAVENOUS at 21:08

## 2019-05-18 RX ADMIN — PANTOPRAZOLE SODIUM 40 MG: 40 TABLET, DELAYED RELEASE ORAL at 22:43

## 2019-05-18 RX ADMIN — SODIUM CHLORIDE 125 ML/HR: 9 INJECTION, SOLUTION INTRAVENOUS at 19:48

## 2019-05-18 RX ADMIN — INSULIN HUMAN 6 UNITS: 100 INJECTION, SOLUTION PARENTERAL at 19:00

## 2019-05-18 RX ADMIN — METOPROLOL TARTRATE 25 MG: 25 TABLET, FILM COATED ORAL at 22:43

## 2019-05-18 RX ADMIN — GABAPENTIN 600 MG: 300 CAPSULE ORAL at 22:43

## 2019-05-19 ENCOUNTER — APPOINTMENT (OUTPATIENT)
Dept: PULMONOLOGY | Facility: HOSPITAL | Age: 79
End: 2019-05-19

## 2019-05-19 PROBLEM — D72.829 LEUKOCYTOSIS: Status: RESOLVED | Noted: 2018-11-16 | Resolved: 2019-05-19

## 2019-05-19 PROBLEM — Q44.4 CHOLEDOCHAL CYST: Status: ACTIVE | Noted: 2019-05-19

## 2019-05-19 LAB
ANION GAP SERPL CALCULATED.3IONS-SCNC: 14 MMOL/L
BASOPHILS # BLD AUTO: 0.01 10*3/MM3 (ref 0–0.2)
BASOPHILS NFR BLD AUTO: 0.2 % (ref 0–1.5)
BUN BLD-MCNC: 54 MG/DL (ref 8–23)
BUN/CREAT SERPL: 38 (ref 7–25)
CALCIUM SPEC-SCNC: 9.3 MG/DL (ref 8.6–10.5)
CHLORIDE SERPL-SCNC: 109 MMOL/L (ref 98–107)
CO2 SERPL-SCNC: 20 MMOL/L (ref 22–29)
CREAT BLD-MCNC: 1.42 MG/DL (ref 0.57–1)
DEPRECATED RDW RBC AUTO: 49.4 FL (ref 37–54)
EOSINOPHIL # BLD AUTO: 0.01 10*3/MM3 (ref 0–0.4)
EOSINOPHIL NFR BLD AUTO: 0.2 % (ref 0.3–6.2)
ERYTHROCYTE [DISTWIDTH] IN BLOOD BY AUTOMATED COUNT: 16 % (ref 12.3–15.4)
GFR SERPL CREATININE-BSD FRML MDRD: 36 ML/MIN/1.73
GLUCOSE BLD-MCNC: 341 MG/DL (ref 65–99)
GLUCOSE BLDC GLUCOMTR-MCNC: 196 MG/DL (ref 70–130)
GLUCOSE BLDC GLUCOMTR-MCNC: 204 MG/DL (ref 70–130)
GLUCOSE BLDC GLUCOMTR-MCNC: 303 MG/DL (ref 70–130)
GLUCOSE BLDC GLUCOMTR-MCNC: 314 MG/DL (ref 70–130)
HCT VFR BLD AUTO: 32.3 % (ref 34–46.6)
HGB BLD-MCNC: 10.1 G/DL (ref 12–15.9)
IMM GRANULOCYTES # BLD AUTO: 0.02 10*3/MM3 (ref 0–0.05)
IMM GRANULOCYTES NFR BLD AUTO: 0.3 % (ref 0–0.5)
LYMPHOCYTES # BLD AUTO: 0.76 10*3/MM3 (ref 0.7–3.1)
LYMPHOCYTES NFR BLD AUTO: 12.6 % (ref 19.6–45.3)
MCH RBC QN AUTO: 26.6 PG (ref 26.6–33)
MCHC RBC AUTO-ENTMCNC: 31.3 G/DL (ref 31.5–35.7)
MCV RBC AUTO: 85.2 FL (ref 79–97)
MONOCYTES # BLD AUTO: 0.6 10*3/MM3 (ref 0.1–0.9)
MONOCYTES NFR BLD AUTO: 10 % (ref 5–12)
NEUTROPHILS # BLD AUTO: 4.63 10*3/MM3 (ref 1.7–7)
NEUTROPHILS NFR BLD AUTO: 77 % (ref 42.7–76)
PLATELET # BLD AUTO: 141 10*3/MM3 (ref 140–450)
PMV BLD AUTO: 11 FL (ref 6–12)
POTASSIUM BLD-SCNC: 4.3 MMOL/L (ref 3.5–5.2)
RBC # BLD AUTO: 3.79 10*6/MM3 (ref 3.77–5.28)
SODIUM BLD-SCNC: 143 MMOL/L (ref 136–145)
TROPONIN T SERPL-MCNC: 0.05 NG/ML (ref 0–0.03)
TROPONIN T SERPL-MCNC: 0.14 NG/ML (ref 0–0.03)
WBC NRBC COR # BLD: 6.01 10*3/MM3 (ref 3.4–10.8)

## 2019-05-19 PROCEDURE — 82962 GLUCOSE BLOOD TEST: CPT

## 2019-05-19 PROCEDURE — 63710000001 INSULIN DETEMIR PER 5 UNITS: Performed by: INTERNAL MEDICINE

## 2019-05-19 PROCEDURE — 94799 UNLISTED PULMONARY SVC/PX: CPT

## 2019-05-19 PROCEDURE — 94640 AIRWAY INHALATION TREATMENT: CPT

## 2019-05-19 PROCEDURE — 25010000002 MORPHINE PER 10 MG: Performed by: FAMILY MEDICINE

## 2019-05-19 PROCEDURE — 25010000002 ONDANSETRON PER 1 MG: Performed by: FAMILY MEDICINE

## 2019-05-19 PROCEDURE — 80048 BASIC METABOLIC PNL TOTAL CA: CPT | Performed by: FAMILY MEDICINE

## 2019-05-19 PROCEDURE — 99233 SBSQ HOSP IP/OBS HIGH 50: CPT | Performed by: INTERNAL MEDICINE

## 2019-05-19 PROCEDURE — 94060 EVALUATION OF WHEEZING: CPT

## 2019-05-19 PROCEDURE — 84484 ASSAY OF TROPONIN QUANT: CPT | Performed by: FAMILY MEDICINE

## 2019-05-19 PROCEDURE — 93005 ELECTROCARDIOGRAM TRACING: CPT | Performed by: FAMILY MEDICINE

## 2019-05-19 PROCEDURE — 94060 EVALUATION OF WHEEZING: CPT | Performed by: INTERNAL MEDICINE

## 2019-05-19 PROCEDURE — 99222 1ST HOSP IP/OBS MODERATE 55: CPT | Performed by: INTERNAL MEDICINE

## 2019-05-19 PROCEDURE — 85025 COMPLETE CBC W/AUTO DIFF WBC: CPT | Performed by: FAMILY MEDICINE

## 2019-05-19 RX ORDER — IPRATROPIUM BROMIDE AND ALBUTEROL SULFATE 2.5; .5 MG/3ML; MG/3ML
3 SOLUTION RESPIRATORY (INHALATION)
Status: DISCONTINUED | OUTPATIENT
Start: 2019-05-19 | End: 2019-05-21

## 2019-05-19 RX ADMIN — MORPHINE SULFATE 1 MG: 2 INJECTION, SOLUTION INTRAMUSCULAR; INTRAVENOUS at 08:44

## 2019-05-19 RX ADMIN — METOPROLOL TARTRATE 25 MG: 25 TABLET, FILM COATED ORAL at 09:50

## 2019-05-19 RX ADMIN — AMLODIPINE BESYLATE 5 MG: 5 TABLET ORAL at 09:51

## 2019-05-19 RX ADMIN — ASPIRIN 81 MG CHEWABLE TABLET 81 MG: 81 TABLET CHEWABLE at 09:50

## 2019-05-19 RX ADMIN — CLOPIDOGREL BISULFATE 75 MG: 75 TABLET ORAL at 09:51

## 2019-05-19 RX ADMIN — RAMIPRIL 2.5 MG: 2.5 CAPSULE ORAL at 09:51

## 2019-05-19 RX ADMIN — ROSUVASTATIN CALCIUM 20 MG: 20 TABLET, FILM COATED ORAL at 09:51

## 2019-05-19 RX ADMIN — METOPROLOL TARTRATE 25 MG: 25 TABLET, FILM COATED ORAL at 21:24

## 2019-05-19 RX ADMIN — INSULIN DETEMIR 25 UNITS: 100 INJECTION, SOLUTION SUBCUTANEOUS at 21:25

## 2019-05-19 RX ADMIN — INSULIN LISPRO 10 UNITS: 100 INJECTION, SOLUTION INTRAVENOUS; SUBCUTANEOUS at 08:51

## 2019-05-19 RX ADMIN — SODIUM CHLORIDE 125 ML/HR: 9 INJECTION, SOLUTION INTRAVENOUS at 21:24

## 2019-05-19 RX ADMIN — PANTOPRAZOLE SODIUM 40 MG: 40 TABLET, DELAYED RELEASE ORAL at 09:50

## 2019-05-19 RX ADMIN — MORPHINE SULFATE 1 MG: 2 INJECTION, SOLUTION INTRAMUSCULAR; INTRAVENOUS at 17:23

## 2019-05-19 RX ADMIN — SODIUM CHLORIDE 100 ML/HR: 9 INJECTION, SOLUTION INTRAVENOUS at 01:07

## 2019-05-19 RX ADMIN — IPRATROPIUM BROMIDE AND ALBUTEROL SULFATE 3 ML: 2.5; .5 SOLUTION RESPIRATORY (INHALATION) at 15:37

## 2019-05-19 RX ADMIN — GABAPENTIN 600 MG: 300 CAPSULE ORAL at 21:24

## 2019-05-19 RX ADMIN — PANTOPRAZOLE SODIUM 40 MG: 40 TABLET, DELAYED RELEASE ORAL at 21:24

## 2019-05-19 RX ADMIN — GABAPENTIN 600 MG: 300 CAPSULE ORAL at 09:50

## 2019-05-19 RX ADMIN — ONDANSETRON 4 MG: 2 INJECTION INTRAMUSCULAR; INTRAVENOUS at 08:44

## 2019-05-19 RX ADMIN — SODIUM CHLORIDE 125 ML/HR: 9 INJECTION, SOLUTION INTRAVENOUS at 14:23

## 2019-05-19 RX ADMIN — INSULIN LISPRO 10 UNITS: 100 INJECTION, SOLUTION INTRAVENOUS; SUBCUTANEOUS at 12:34

## 2019-05-19 RX ADMIN — IPRATROPIUM BROMIDE AND ALBUTEROL SULFATE 3 ML: 2.5; .5 SOLUTION RESPIRATORY (INHALATION) at 19:49

## 2019-05-19 RX ADMIN — SODIUM CHLORIDE, PRESERVATIVE FREE 3 ML: 5 INJECTION INTRAVENOUS at 08:55

## 2019-05-19 RX ADMIN — INSULIN DETEMIR 20 UNITS: 100 INJECTION, SOLUTION SUBCUTANEOUS at 09:57

## 2019-05-20 PROBLEM — I24.89 DEMAND ISCHEMIA: Status: ACTIVE | Noted: 2019-05-18

## 2019-05-20 PROBLEM — N17.0 ACUTE RENAL FAILURE WITH ACUTE TUBULAR NECROSIS SUPERIMPOSED ON STAGE 3 CHRONIC KIDNEY DISEASE (HCC): Status: ACTIVE | Noted: 2019-05-20

## 2019-05-20 PROBLEM — I24.8 DEMAND ISCHEMIA (HCC): Status: ACTIVE | Noted: 2019-05-18

## 2019-05-20 PROBLEM — N18.30 ACUTE RENAL FAILURE WITH ACUTE TUBULAR NECROSIS SUPERIMPOSED ON STAGE 3 CHRONIC KIDNEY DISEASE (HCC): Status: ACTIVE | Noted: 2019-05-20

## 2019-05-20 LAB
ANION GAP SERPL CALCULATED.3IONS-SCNC: 15 MMOL/L
BASOPHILS # BLD AUTO: 0.01 10*3/MM3 (ref 0–0.2)
BASOPHILS NFR BLD AUTO: 0.2 % (ref 0–1.5)
BUN BLD-MCNC: 72 MG/DL (ref 8–23)
BUN/CREAT SERPL: 28.1 (ref 7–25)
CALCIUM SPEC-SCNC: 8.7 MG/DL (ref 8.6–10.5)
CHLORIDE SERPL-SCNC: 108 MMOL/L (ref 98–107)
CK SERPL-CCNC: 114 U/L (ref 20–180)
CO2 SERPL-SCNC: 17 MMOL/L (ref 22–29)
CREAT BLD-MCNC: 2.56 MG/DL (ref 0.57–1)
DEPRECATED RDW RBC AUTO: 52.1 FL (ref 37–54)
EOSINOPHIL # BLD AUTO: 0.03 10*3/MM3 (ref 0–0.4)
EOSINOPHIL NFR BLD AUTO: 0.5 % (ref 0.3–6.2)
ERYTHROCYTE [DISTWIDTH] IN BLOOD BY AUTOMATED COUNT: 16.5 % (ref 12.3–15.4)
ERYTHROCYTE [SEDIMENTATION RATE] IN BLOOD: 27 MM/HR (ref 0–30)
FERRITIN SERPL-MCNC: 157.2 NG/ML (ref 13–150)
GFR SERPL CREATININE-BSD FRML MDRD: 18 ML/MIN/1.73
GLUCOSE BLD-MCNC: 155 MG/DL (ref 65–99)
GLUCOSE BLDC GLUCOMTR-MCNC: 146 MG/DL (ref 70–130)
GLUCOSE BLDC GLUCOMTR-MCNC: 166 MG/DL (ref 70–130)
GLUCOSE BLDC GLUCOMTR-MCNC: 171 MG/DL (ref 70–130)
GLUCOSE BLDC GLUCOMTR-MCNC: 172 MG/DL (ref 70–130)
GLUCOSE BLDC GLUCOMTR-MCNC: 179 MG/DL (ref 70–130)
GLUCOSE BLDC GLUCOMTR-MCNC: 212 MG/DL (ref 70–130)
HCT VFR BLD AUTO: 28.4 % (ref 34–46.6)
HGB BLD-MCNC: 9 G/DL (ref 12–15.9)
IMM GRANULOCYTES # BLD AUTO: 0.01 10*3/MM3 (ref 0–0.05)
IMM GRANULOCYTES NFR BLD AUTO: 0.2 % (ref 0–0.5)
IRON 24H UR-MRATE: 23 MCG/DL (ref 37–145)
IRON SATN MFR SERPL: 7 % (ref 20–50)
LDH SERPL-CCNC: 195 U/L (ref 135–214)
LYMPHOCYTES # BLD AUTO: 0.88 10*3/MM3 (ref 0.7–3.1)
LYMPHOCYTES NFR BLD AUTO: 13.8 % (ref 19.6–45.3)
MCH RBC QN AUTO: 27.1 PG (ref 26.6–33)
MCHC RBC AUTO-ENTMCNC: 31.7 G/DL (ref 31.5–35.7)
MCV RBC AUTO: 85.5 FL (ref 79–97)
MONOCYTES # BLD AUTO: 0.65 10*3/MM3 (ref 0.1–0.9)
MONOCYTES NFR BLD AUTO: 10.2 % (ref 5–12)
NEUTROPHILS # BLD AUTO: 4.81 10*3/MM3 (ref 1.7–7)
NEUTROPHILS NFR BLD AUTO: 75.1 % (ref 42.7–76)
PLATELET # BLD AUTO: 110 10*3/MM3 (ref 140–450)
PMV BLD AUTO: 10.3 FL (ref 6–12)
POTASSIUM BLD-SCNC: 4.3 MMOL/L (ref 3.5–5.2)
RBC # BLD AUTO: 3.32 10*6/MM3 (ref 3.77–5.28)
RETICS # AUTO: 0.06 10*6/MM3 (ref 0.02–0.13)
RETICS/RBC NFR AUTO: 1.73 % (ref 0.7–1.9)
SODIUM BLD-SCNC: 140 MMOL/L (ref 136–145)
TIBC SERPL-MCNC: 307 MCG/DL (ref 298–536)
TRANSFERRIN SERPL-MCNC: 206 MG/DL (ref 200–360)
TROPONIN T SERPL-MCNC: 0.32 NG/ML (ref 0–0.03)
URATE SERPL-MCNC: 12.9 MG/DL (ref 2.4–5.7)
WBC NRBC COR # BLD: 6.39 10*3/MM3 (ref 3.4–10.8)

## 2019-05-20 PROCEDURE — 84484 ASSAY OF TROPONIN QUANT: CPT | Performed by: INTERNAL MEDICINE

## 2019-05-20 PROCEDURE — 85025 COMPLETE CBC W/AUTO DIFF WBC: CPT | Performed by: INTERNAL MEDICINE

## 2019-05-20 PROCEDURE — 94799 UNLISTED PULMONARY SVC/PX: CPT

## 2019-05-20 PROCEDURE — 82962 GLUCOSE BLOOD TEST: CPT

## 2019-05-20 PROCEDURE — 82550 ASSAY OF CK (CPK): CPT | Performed by: INTERNAL MEDICINE

## 2019-05-20 PROCEDURE — 80048 BASIC METABOLIC PNL TOTAL CA: CPT | Performed by: INTERNAL MEDICINE

## 2019-05-20 PROCEDURE — 83615 LACTATE (LD) (LDH) ENZYME: CPT | Performed by: INTERNAL MEDICINE

## 2019-05-20 PROCEDURE — 99232 SBSQ HOSP IP/OBS MODERATE 35: CPT | Performed by: INTERNAL MEDICINE

## 2019-05-20 PROCEDURE — 83540 ASSAY OF IRON: CPT | Performed by: INTERNAL MEDICINE

## 2019-05-20 PROCEDURE — 85652 RBC SED RATE AUTOMATED: CPT | Performed by: INTERNAL MEDICINE

## 2019-05-20 PROCEDURE — 63710000001 INSULIN LISPRO (HUMAN) PER 5 UNITS: Performed by: INTERNAL MEDICINE

## 2019-05-20 PROCEDURE — 63710000001 INSULIN DETEMIR PER 5 UNITS: Performed by: INTERNAL MEDICINE

## 2019-05-20 PROCEDURE — 99233 SBSQ HOSP IP/OBS HIGH 50: CPT | Performed by: INTERNAL MEDICINE

## 2019-05-20 PROCEDURE — 84466 ASSAY OF TRANSFERRIN: CPT | Performed by: INTERNAL MEDICINE

## 2019-05-20 PROCEDURE — 84550 ASSAY OF BLOOD/URIC ACID: CPT | Performed by: INTERNAL MEDICINE

## 2019-05-20 PROCEDURE — 25010000002 MORPHINE PER 10 MG: Performed by: FAMILY MEDICINE

## 2019-05-20 PROCEDURE — 82728 ASSAY OF FERRITIN: CPT | Performed by: INTERNAL MEDICINE

## 2019-05-20 PROCEDURE — 85045 AUTOMATED RETICULOCYTE COUNT: CPT | Performed by: INTERNAL MEDICINE

## 2019-05-20 RX ORDER — DIPHENOXYLATE HYDROCHLORIDE AND ATROPINE SULFATE 2.5; .025 MG/1; MG/1
1 TABLET ORAL DAILY PRN
COMMUNITY
End: 2019-06-05 | Stop reason: HOSPADM

## 2019-05-20 RX ORDER — GABAPENTIN 300 MG/1
300 CAPSULE ORAL EVERY 12 HOURS SCHEDULED
Status: DISCONTINUED | OUTPATIENT
Start: 2019-05-20 | End: 2019-05-23

## 2019-05-20 RX ADMIN — ROSUVASTATIN CALCIUM 20 MG: 20 TABLET, FILM COATED ORAL at 09:21

## 2019-05-20 RX ADMIN — INSULIN DETEMIR 15 UNITS: 100 INJECTION, SOLUTION SUBCUTANEOUS at 20:50

## 2019-05-20 RX ADMIN — SODIUM CHLORIDE 125 ML/HR: 9 INJECTION, SOLUTION INTRAVENOUS at 15:24

## 2019-05-20 RX ADMIN — ASPIRIN 81 MG CHEWABLE TABLET 81 MG: 81 TABLET CHEWABLE at 09:20

## 2019-05-20 RX ADMIN — GABAPENTIN 600 MG: 300 CAPSULE ORAL at 09:20

## 2019-05-20 RX ADMIN — SODIUM CHLORIDE 125 ML/HR: 9 INJECTION, SOLUTION INTRAVENOUS at 20:50

## 2019-05-20 RX ADMIN — INSULIN LISPRO 3 UNITS: 100 INJECTION, SOLUTION INTRAVENOUS; SUBCUTANEOUS at 12:05

## 2019-05-20 RX ADMIN — INSULIN LISPRO 2 UNITS: 100 INJECTION, SOLUTION INTRAVENOUS; SUBCUTANEOUS at 17:32

## 2019-05-20 RX ADMIN — METOPROLOL TARTRATE 25 MG: 25 TABLET, FILM COATED ORAL at 20:49

## 2019-05-20 RX ADMIN — CLOPIDOGREL BISULFATE 75 MG: 75 TABLET ORAL at 09:21

## 2019-05-20 RX ADMIN — IPRATROPIUM BROMIDE AND ALBUTEROL SULFATE 3 ML: 2.5; .5 SOLUTION RESPIRATORY (INHALATION) at 09:10

## 2019-05-20 RX ADMIN — AMLODIPINE BESYLATE 5 MG: 5 TABLET ORAL at 09:21

## 2019-05-20 RX ADMIN — MORPHINE SULFATE 1 MG: 2 INJECTION, SOLUTION INTRAMUSCULAR; INTRAVENOUS at 12:12

## 2019-05-20 RX ADMIN — IPRATROPIUM BROMIDE AND ALBUTEROL SULFATE 3 ML: 2.5; .5 SOLUTION RESPIRATORY (INHALATION) at 15:46

## 2019-05-20 RX ADMIN — PANTOPRAZOLE SODIUM 40 MG: 40 TABLET, DELAYED RELEASE ORAL at 20:49

## 2019-05-20 RX ADMIN — IPRATROPIUM BROMIDE AND ALBUTEROL SULFATE 3 ML: 2.5; .5 SOLUTION RESPIRATORY (INHALATION) at 19:34

## 2019-05-20 RX ADMIN — METOPROLOL TARTRATE 25 MG: 25 TABLET, FILM COATED ORAL at 09:21

## 2019-05-20 RX ADMIN — PANTOPRAZOLE SODIUM 40 MG: 40 TABLET, DELAYED RELEASE ORAL at 09:21

## 2019-05-20 RX ADMIN — RAMIPRIL 2.5 MG: 2.5 CAPSULE ORAL at 09:21

## 2019-05-20 RX ADMIN — GABAPENTIN 300 MG: 300 CAPSULE ORAL at 20:49

## 2019-05-20 RX ADMIN — IPRATROPIUM BROMIDE AND ALBUTEROL SULFATE 3 ML: 2.5; .5 SOLUTION RESPIRATORY (INHALATION) at 12:29

## 2019-05-21 ENCOUNTER — APPOINTMENT (OUTPATIENT)
Dept: CARDIOLOGY | Facility: HOSPITAL | Age: 79
End: 2019-05-21

## 2019-05-21 LAB
ALBUMIN SERPL-MCNC: 3.4 G/DL (ref 3.5–5.2)
ALBUMIN/GLOB SERPL: 1.2 G/DL
ALP SERPL-CCNC: 71 U/L (ref 39–117)
ALT SERPL W P-5'-P-CCNC: 58 U/L (ref 1–33)
ANION GAP SERPL CALCULATED.3IONS-SCNC: 17 MMOL/L
ANION GAP SERPL CALCULATED.3IONS-SCNC: 18 MMOL/L
AST SERPL-CCNC: 24 U/L (ref 1–32)
BASOPHILS # BLD AUTO: 0.01 10*3/MM3 (ref 0–0.2)
BASOPHILS NFR BLD AUTO: 0.1 % (ref 0–1.5)
BILIRUB SERPL-MCNC: 0.6 MG/DL (ref 0.2–1.2)
BUN BLD-MCNC: 80 MG/DL (ref 8–23)
BUN BLD-MCNC: 81 MG/DL (ref 8–23)
BUN/CREAT SERPL: 30.3 (ref 7–25)
BUN/CREAT SERPL: 36 (ref 7–25)
CALCIUM SPEC-SCNC: 8.5 MG/DL (ref 8.6–10.5)
CALCIUM SPEC-SCNC: 8.7 MG/DL (ref 8.6–10.5)
CHLORIDE SERPL-SCNC: 107 MMOL/L (ref 98–107)
CHLORIDE SERPL-SCNC: 108 MMOL/L (ref 98–107)
CO2 SERPL-SCNC: 13 MMOL/L (ref 22–29)
CO2 SERPL-SCNC: 14 MMOL/L (ref 22–29)
CREAT BLD-MCNC: 2.25 MG/DL (ref 0.57–1)
CREAT BLD-MCNC: 2.64 MG/DL (ref 0.57–1)
CREAT UR-MCNC: 105.8 MG/DL
DEPRECATED RDW RBC AUTO: 52.4 FL (ref 37–54)
DEPRECATED RDW RBC AUTO: 53.1 FL (ref 37–54)
EOSINOPHIL # BLD AUTO: 0 10*3/MM3 (ref 0–0.4)
EOSINOPHIL NFR BLD AUTO: 0 % (ref 0.3–6.2)
EOSINOPHIL SPEC QL MICRO: 0 % EOS/100 CELLS (ref 0–0)
ERYTHROCYTE [DISTWIDTH] IN BLOOD BY AUTOMATED COUNT: 16.5 % (ref 12.3–15.4)
ERYTHROCYTE [DISTWIDTH] IN BLOOD BY AUTOMATED COUNT: 16.5 % (ref 12.3–15.4)
GFR SERPL CREATININE-BSD FRML MDRD: 17 ML/MIN/1.73
GFR SERPL CREATININE-BSD FRML MDRD: 21 ML/MIN/1.73
GLOBULIN UR ELPH-MCNC: 2.8 GM/DL
GLUCOSE BLD-MCNC: 202 MG/DL (ref 65–99)
GLUCOSE BLD-MCNC: 219 MG/DL (ref 65–99)
GLUCOSE BLDC GLUCOMTR-MCNC: 200 MG/DL (ref 70–130)
GLUCOSE BLDC GLUCOMTR-MCNC: 212 MG/DL (ref 70–130)
GLUCOSE BLDC GLUCOMTR-MCNC: 326 MG/DL (ref 70–130)
GLUCOSE BLDC GLUCOMTR-MCNC: 337 MG/DL (ref 70–130)
HCT VFR BLD AUTO: 29.3 % (ref 34–46.6)
HCT VFR BLD AUTO: 31.6 % (ref 34–46.6)
HGB BLD-MCNC: 9.2 G/DL (ref 12–15.9)
HGB BLD-MCNC: 9.6 G/DL (ref 12–15.9)
IMM GRANULOCYTES # BLD AUTO: 0.04 10*3/MM3 (ref 0–0.05)
IMM GRANULOCYTES NFR BLD AUTO: 0.4 % (ref 0–0.5)
LYMPHOCYTES # BLD AUTO: 0.42 10*3/MM3 (ref 0.7–3.1)
LYMPHOCYTES NFR BLD AUTO: 4.5 % (ref 19.6–45.3)
MAGNESIUM SERPL-MCNC: 2.3 MG/DL (ref 1.6–2.4)
MCH RBC QN AUTO: 26.6 PG (ref 26.6–33)
MCH RBC QN AUTO: 27.1 PG (ref 26.6–33)
MCHC RBC AUTO-ENTMCNC: 30.4 G/DL (ref 31.5–35.7)
MCHC RBC AUTO-ENTMCNC: 31.4 G/DL (ref 31.5–35.7)
MCV RBC AUTO: 86.2 FL (ref 79–97)
MCV RBC AUTO: 87.5 FL (ref 79–97)
MONOCYTES # BLD AUTO: 0.76 10*3/MM3 (ref 0.1–0.9)
MONOCYTES NFR BLD AUTO: 8.1 % (ref 5–12)
NEUTROPHILS # BLD AUTO: 8.21 10*3/MM3 (ref 1.7–7)
NEUTROPHILS NFR BLD AUTO: 87.3 % (ref 42.7–76)
PHOSPHATE SERPL-MCNC: 6.8 MG/DL (ref 2.5–4.5)
PLATELET # BLD AUTO: 110 10*3/MM3 (ref 140–450)
PLATELET # BLD AUTO: 118 10*3/MM3 (ref 140–450)
PMV BLD AUTO: 11.2 FL (ref 6–12)
PMV BLD AUTO: 11.5 FL (ref 6–12)
POTASSIUM BLD-SCNC: 4.8 MMOL/L (ref 3.5–5.2)
POTASSIUM BLD-SCNC: 5.2 MMOL/L (ref 3.5–5.2)
PROT SERPL-MCNC: 6.2 G/DL (ref 6–8.5)
PROT UR-MCNC: 12.4 MG/DL
RBC # BLD AUTO: 3.4 10*6/MM3 (ref 3.77–5.28)
RBC # BLD AUTO: 3.61 10*6/MM3 (ref 3.77–5.28)
SODIUM BLD-SCNC: 138 MMOL/L (ref 136–145)
SODIUM BLD-SCNC: 139 MMOL/L (ref 136–145)
SODIUM UR-SCNC: <20 MMOL/L
TROPONIN T SERPL-MCNC: 0.31 NG/ML (ref 0–0.03)
TROPONIN T SERPL-MCNC: 0.34 NG/ML (ref 0–0.03)
WBC NRBC COR # BLD: 8.53 10*3/MM3 (ref 3.4–10.8)
WBC NRBC COR # BLD: 9.4 10*3/MM3 (ref 3.4–10.8)

## 2019-05-21 PROCEDURE — 63710000001 INSULIN DETEMIR PER 5 UNITS: Performed by: INTERNAL MEDICINE

## 2019-05-21 PROCEDURE — 82962 GLUCOSE BLOOD TEST: CPT

## 2019-05-21 PROCEDURE — 87205 SMEAR GRAM STAIN: CPT | Performed by: INTERNAL MEDICINE

## 2019-05-21 PROCEDURE — 93306 TTE W/DOPPLER COMPLETE: CPT

## 2019-05-21 PROCEDURE — 84484 ASSAY OF TROPONIN QUANT: CPT | Performed by: INTERNAL MEDICINE

## 2019-05-21 PROCEDURE — 25010000002 ONDANSETRON PER 1 MG: Performed by: FAMILY MEDICINE

## 2019-05-21 PROCEDURE — 82570 ASSAY OF URINE CREATININE: CPT | Performed by: INTERNAL MEDICINE

## 2019-05-21 PROCEDURE — 85027 COMPLETE CBC AUTOMATED: CPT | Performed by: INTERNAL MEDICINE

## 2019-05-21 PROCEDURE — 84156 ASSAY OF PROTEIN URINE: CPT | Performed by: INTERNAL MEDICINE

## 2019-05-21 PROCEDURE — 84300 ASSAY OF URINE SODIUM: CPT | Performed by: INTERNAL MEDICINE

## 2019-05-21 PROCEDURE — 25010000002 MORPHINE PER 10 MG: Performed by: FAMILY MEDICINE

## 2019-05-21 PROCEDURE — 85025 COMPLETE CBC W/AUTO DIFF WBC: CPT | Performed by: INTERNAL MEDICINE

## 2019-05-21 PROCEDURE — 84100 ASSAY OF PHOSPHORUS: CPT | Performed by: INTERNAL MEDICINE

## 2019-05-21 PROCEDURE — 84484 ASSAY OF TROPONIN QUANT: CPT | Performed by: NURSE PRACTITIONER

## 2019-05-21 PROCEDURE — 94799 UNLISTED PULMONARY SVC/PX: CPT

## 2019-05-21 PROCEDURE — 99232 SBSQ HOSP IP/OBS MODERATE 35: CPT | Performed by: NURSE PRACTITIONER

## 2019-05-21 PROCEDURE — 83735 ASSAY OF MAGNESIUM: CPT | Performed by: INTERNAL MEDICINE

## 2019-05-21 PROCEDURE — 93005 ELECTROCARDIOGRAM TRACING: CPT | Performed by: INTERNAL MEDICINE

## 2019-05-21 PROCEDURE — 99233 SBSQ HOSP IP/OBS HIGH 50: CPT | Performed by: INTERNAL MEDICINE

## 2019-05-21 PROCEDURE — 80053 COMPREHEN METABOLIC PANEL: CPT | Performed by: INTERNAL MEDICINE

## 2019-05-21 PROCEDURE — 93306 TTE W/DOPPLER COMPLETE: CPT | Performed by: INTERNAL MEDICINE

## 2019-05-21 RX ORDER — NITROGLYCERIN 0.4 MG/1
TABLET SUBLINGUAL
Status: COMPLETED
Start: 2019-05-21 | End: 2019-05-21

## 2019-05-21 RX ORDER — IPRATROPIUM BROMIDE AND ALBUTEROL SULFATE 2.5; .5 MG/3ML; MG/3ML
3 SOLUTION RESPIRATORY (INHALATION) EVERY 4 HOURS PRN
Status: DISCONTINUED | OUTPATIENT
Start: 2019-05-21 | End: 2019-06-05 | Stop reason: HOSPADM

## 2019-05-21 RX ORDER — ISOSORBIDE MONONITRATE 30 MG/1
30 TABLET, EXTENDED RELEASE ORAL
Status: DISCONTINUED | OUTPATIENT
Start: 2019-05-21 | End: 2019-05-22

## 2019-05-21 RX ORDER — NITROGLYCERIN 0.4 MG/1
0.4 TABLET SUBLINGUAL
Status: DISCONTINUED | OUTPATIENT
Start: 2019-05-21 | End: 2019-06-05 | Stop reason: HOSPADM

## 2019-05-21 RX ORDER — NITROGLYCERIN 20 MG/100ML
5-200 INJECTION INTRAVENOUS
Status: DISCONTINUED | OUTPATIENT
Start: 2019-05-21 | End: 2019-05-23

## 2019-05-21 RX ADMIN — PANTOPRAZOLE SODIUM 40 MG: 40 TABLET, DELAYED RELEASE ORAL at 21:36

## 2019-05-21 RX ADMIN — NITROGLYCERIN 5 MCG/MIN: 20 INJECTION INTRAVENOUS at 22:19

## 2019-05-21 RX ADMIN — PANTOPRAZOLE SODIUM 40 MG: 40 TABLET, DELAYED RELEASE ORAL at 08:31

## 2019-05-21 RX ADMIN — INSULIN LISPRO 5 UNITS: 100 INJECTION, SOLUTION INTRAVENOUS; SUBCUTANEOUS at 08:31

## 2019-05-21 RX ADMIN — NITROGLYCERIN 0.4 MG: 0.4 TABLET SUBLINGUAL at 13:09

## 2019-05-21 RX ADMIN — LORAZEPAM 0.5 MG: 0.5 TABLET ORAL at 21:37

## 2019-05-21 RX ADMIN — SODIUM BICARBONATE 75 ML/HR: 84 INJECTION, SOLUTION INTRAVENOUS at 13:31

## 2019-05-21 RX ADMIN — CLOPIDOGREL BISULFATE 75 MG: 75 TABLET ORAL at 08:32

## 2019-05-21 RX ADMIN — INSULIN LISPRO 5 UNITS: 100 INJECTION, SOLUTION INTRAVENOUS; SUBCUTANEOUS at 12:13

## 2019-05-21 RX ADMIN — ROSUVASTATIN CALCIUM 20 MG: 20 TABLET, FILM COATED ORAL at 08:32

## 2019-05-21 RX ADMIN — MORPHINE SULFATE 1 MG: 2 INJECTION, SOLUTION INTRAMUSCULAR; INTRAVENOUS at 21:37

## 2019-05-21 RX ADMIN — ASPIRIN 81 MG CHEWABLE TABLET 81 MG: 81 TABLET CHEWABLE at 08:32

## 2019-05-21 RX ADMIN — IPRATROPIUM BROMIDE AND ALBUTEROL SULFATE 3 ML: 2.5; .5 SOLUTION RESPIRATORY (INHALATION) at 07:55

## 2019-05-21 RX ADMIN — INSULIN LISPRO 5 UNITS: 100 INJECTION, SOLUTION INTRAVENOUS; SUBCUTANEOUS at 17:27

## 2019-05-21 RX ADMIN — METOPROLOL TARTRATE 25 MG: 25 TABLET, FILM COATED ORAL at 21:37

## 2019-05-21 RX ADMIN — METOPROLOL TARTRATE 25 MG: 25 TABLET, FILM COATED ORAL at 08:32

## 2019-05-21 RX ADMIN — INSULIN LISPRO 2 UNITS: 100 INJECTION, SOLUTION INTRAVENOUS; SUBCUTANEOUS at 08:32

## 2019-05-21 RX ADMIN — ONDANSETRON 4 MG: 2 INJECTION INTRAMUSCULAR; INTRAVENOUS at 23:14

## 2019-05-21 RX ADMIN — GABAPENTIN 300 MG: 300 CAPSULE ORAL at 08:32

## 2019-05-21 RX ADMIN — MORPHINE SULFATE 1 MG: 2 INJECTION, SOLUTION INTRAMUSCULAR; INTRAVENOUS at 01:52

## 2019-05-21 RX ADMIN — GABAPENTIN 300 MG: 300 CAPSULE ORAL at 21:36

## 2019-05-21 RX ADMIN — INSULIN LISPRO 5 UNITS: 100 INJECTION, SOLUTION INTRAVENOUS; SUBCUTANEOUS at 17:28

## 2019-05-21 RX ADMIN — INSULIN LISPRO 2 UNITS: 100 INJECTION, SOLUTION INTRAVENOUS; SUBCUTANEOUS at 12:13

## 2019-05-21 RX ADMIN — ONDANSETRON 4 MG: 2 INJECTION INTRAMUSCULAR; INTRAVENOUS at 09:32

## 2019-05-21 RX ADMIN — ISOSORBIDE MONONITRATE 30 MG: 30 TABLET, EXTENDED RELEASE ORAL at 10:34

## 2019-05-21 RX ADMIN — INSULIN DETEMIR 15 UNITS: 100 INJECTION, SOLUTION SUBCUTANEOUS at 21:38

## 2019-05-22 LAB
ANION GAP SERPL CALCULATED.3IONS-SCNC: 15 MMOL/L
BASOPHILS # BLD AUTO: 0 10*3/MM3 (ref 0–0.2)
BASOPHILS NFR BLD AUTO: 0 % (ref 0–1.5)
BH CV ECHO MEAS - AO MAX PG (FULL): 2.4 MMHG
BH CV ECHO MEAS - AO MAX PG: 6.6 MMHG
BH CV ECHO MEAS - AO MEAN PG (FULL): 1 MMHG
BH CV ECHO MEAS - AO MEAN PG: 3 MMHG
BH CV ECHO MEAS - AO ROOT AREA (BSA CORRECTED): 1.4
BH CV ECHO MEAS - AO ROOT AREA: 5.3 CM^2
BH CV ECHO MEAS - AO ROOT DIAM: 2.6 CM
BH CV ECHO MEAS - AO V2 MAX: 128 CM/SEC
BH CV ECHO MEAS - AO V2 MEAN: 83.5 CM/SEC
BH CV ECHO MEAS - AO V2 VTI: 22.9 CM
BH CV ECHO MEAS - AVA(I,A): 2.1 CM^2
BH CV ECHO MEAS - AVA(I,D): 2.1 CM^2
BH CV ECHO MEAS - AVA(V,A): 2 CM^2
BH CV ECHO MEAS - AVA(V,D): 2 CM^2
BH CV ECHO MEAS - BSA(HAYCOCK): 2 M^2
BH CV ECHO MEAS - BSA: 1.9 M^2
BH CV ECHO MEAS - BZI_BMI: 29.1 KILOGRAMS/M^2
BH CV ECHO MEAS - BZI_METRIC_HEIGHT: 167.6 CM
BH CV ECHO MEAS - BZI_METRIC_WEIGHT: 81.6 KG
BH CV ECHO MEAS - EDV(CUBED): 73.6 ML
BH CV ECHO MEAS - EDV(MOD-SP2): 63 ML
BH CV ECHO MEAS - EDV(MOD-SP4): 55 ML
BH CV ECHO MEAS - EDV(TEICH): 78.1 ML
BH CV ECHO MEAS - EF(CUBED): 70.8 %
BH CV ECHO MEAS - EF(MOD-BP): 67 %
BH CV ECHO MEAS - EF(MOD-SP2): 63.5 %
BH CV ECHO MEAS - EF(MOD-SP4): 69.1 %
BH CV ECHO MEAS - EF(TEICH): 62.8 %
BH CV ECHO MEAS - ESV(CUBED): 21.5 ML
BH CV ECHO MEAS - ESV(MOD-SP2): 23 ML
BH CV ECHO MEAS - ESV(MOD-SP4): 17 ML
BH CV ECHO MEAS - ESV(TEICH): 29 ML
BH CV ECHO MEAS - FS: 33.7 %
BH CV ECHO MEAS - IVS/LVPW: 1
BH CV ECHO MEAS - IVSD: 0.97 CM
BH CV ECHO MEAS - LAD MAJOR: 4.6 CM
BH CV ECHO MEAS - LAT PEAK E' VEL: 4.5 CM/SEC
BH CV ECHO MEAS - LATERAL E/E' RATIO: 29.6
BH CV ECHO MEAS - LV DIASTOLIC VOL/BSA (35-75): 28.8 ML/M^2
BH CV ECHO MEAS - LV MASS(C)D: 130.3 GRAMS
BH CV ECHO MEAS - LV MASS(C)DI: 68.1 GRAMS/M^2
BH CV ECHO MEAS - LV MAX PG: 4.2 MMHG
BH CV ECHO MEAS - LV MEAN PG: 2 MMHG
BH CV ECHO MEAS - LV SYSTOLIC VOL/BSA (12-30): 8.9 ML/M^2
BH CV ECHO MEAS - LV V1 MAX: 102 CM/SEC
BH CV ECHO MEAS - LV V1 MEAN: 63.2 CM/SEC
BH CV ECHO MEAS - LV V1 VTI: 19.3 CM
BH CV ECHO MEAS - LVIDD: 4.2 CM
BH CV ECHO MEAS - LVIDS: 2.8 CM
BH CV ECHO MEAS - LVLD AP2: 6.3 CM
BH CV ECHO MEAS - LVLD AP4: 6 CM
BH CV ECHO MEAS - LVLS AP2: 5.3 CM
BH CV ECHO MEAS - LVLS AP4: 5 CM
BH CV ECHO MEAS - LVOT AREA (M): 2.5 CM^2
BH CV ECHO MEAS - LVOT AREA: 2.5 CM^2
BH CV ECHO MEAS - LVOT DIAM: 1.8 CM
BH CV ECHO MEAS - LVPWD: 0.96 CM
BH CV ECHO MEAS - MED PEAK E' VEL: 4.8 CM/SEC
BH CV ECHO MEAS - MEDIAL E/E' RATIO: 27.5
BH CV ECHO MEAS - MV A MAX VEL: 120 CM/SEC
BH CV ECHO MEAS - MV DEC TIME: 0.2 SEC
BH CV ECHO MEAS - MV E MAX VEL: 133 CM/SEC
BH CV ECHO MEAS - MV E/A: 1.1
BH CV ECHO MEAS - MV MAX PG: 10.4 MMHG
BH CV ECHO MEAS - MV MEAN PG: 4 MMHG
BH CV ECHO MEAS - MV V2 MAX: 161 CM/SEC
BH CV ECHO MEAS - MV V2 MEAN: 88.3 CM/SEC
BH CV ECHO MEAS - MV V2 VTI: 55 CM
BH CV ECHO MEAS - MVA(VTI): 0.89 CM^2
BH CV ECHO MEAS - PA ACC SLOPE: 594 CM/SEC^2
BH CV ECHO MEAS - PA ACC TIME: 0.13 SEC
BH CV ECHO MEAS - PA MAX PG: 3.3 MMHG
BH CV ECHO MEAS - PA PR(ACCEL): 18.7 MMHG
BH CV ECHO MEAS - PA V2 MAX: 90.5 CM/SEC
BH CV ECHO MEAS - RVSP: 32 MMHG
BH CV ECHO MEAS - SI(AO): 63.6 ML/M^2
BH CV ECHO MEAS - SI(CUBED): 27.2 ML/M^2
BH CV ECHO MEAS - SI(LVOT): 25.7 ML/M^2
BH CV ECHO MEAS - SI(MOD-SP2): 20.9 ML/M^2
BH CV ECHO MEAS - SI(MOD-SP4): 19.9 ML/M^2
BH CV ECHO MEAS - SI(TEICH): 25.7 ML/M^2
BH CV ECHO MEAS - SV(AO): 121.6 ML
BH CV ECHO MEAS - SV(CUBED): 52.1 ML
BH CV ECHO MEAS - SV(LVOT): 49.1 ML
BH CV ECHO MEAS - SV(MOD-SP2): 40 ML
BH CV ECHO MEAS - SV(MOD-SP4): 38 ML
BH CV ECHO MEAS - SV(TEICH): 49.1 ML
BH CV ECHO MEAS - TAPSE (>1.6): 2.4 CM2
BH CV ECHO MEAS - TR MAX PG: 29 MMHG
BH CV ECHO MEAS - TR MAX VEL: 241 CM/SEC
BH CV ECHO MEASUREMENTS AVERAGE E/E' RATIO: 28.6
BH CV VAS BP RIGHT ARM: NORMAL MMHG
BH CV XLRA - RV BASE: 2.9 CM
BH CV XLRA - RV LENGTH: 5.7 CM
BH CV XLRA - RV MID: 2 CM
BH CV XLRA - TDI S': 10.5 CM/SEC
BUN BLD-MCNC: 84 MG/DL (ref 8–23)
BUN/CREAT SERPL: 44.2 (ref 7–25)
CALCIUM SPEC-SCNC: 9.1 MG/DL (ref 8.6–10.5)
CHLORIDE SERPL-SCNC: 104 MMOL/L (ref 98–107)
CO2 SERPL-SCNC: 20 MMOL/L (ref 22–29)
CREAT BLD-MCNC: 1.9 MG/DL (ref 0.57–1)
DEPRECATED RDW RBC AUTO: 50.4 FL (ref 37–54)
EOSINOPHIL # BLD AUTO: 0.03 10*3/MM3 (ref 0–0.4)
EOSINOPHIL NFR BLD AUTO: 0.4 % (ref 0.3–6.2)
ERYTHROCYTE [DISTWIDTH] IN BLOOD BY AUTOMATED COUNT: 16.3 % (ref 12.3–15.4)
GFR SERPL CREATININE-BSD FRML MDRD: 26 ML/MIN/1.73
GLUCOSE BLD-MCNC: 370 MG/DL (ref 65–99)
GLUCOSE BLDC GLUCOMTR-MCNC: 275 MG/DL (ref 70–130)
GLUCOSE BLDC GLUCOMTR-MCNC: 276 MG/DL (ref 70–130)
GLUCOSE BLDC GLUCOMTR-MCNC: 390 MG/DL (ref 70–130)
GLUCOSE BLDC GLUCOMTR-MCNC: 397 MG/DL (ref 70–130)
HCT VFR BLD AUTO: 28.1 % (ref 34–46.6)
HGB BLD-MCNC: 8.9 G/DL (ref 12–15.9)
IMM GRANULOCYTES # BLD AUTO: 0.02 10*3/MM3 (ref 0–0.05)
IMM GRANULOCYTES NFR BLD AUTO: 0.3 % (ref 0–0.5)
LEFT ATRIUM VOLUME INDEX: 14.6 ML/M^2
LEFT ATRIUM VOLUME: 28 ML
LV EF 2D ECHO EST: 65 %
LYMPHOCYTES # BLD AUTO: 0.61 10*3/MM3 (ref 0.7–3.1)
LYMPHOCYTES NFR BLD AUTO: 8.7 % (ref 19.6–45.3)
MAXIMAL PREDICTED HEART RATE: 142 BPM
MCH RBC QN AUTO: 26.8 PG (ref 26.6–33)
MCHC RBC AUTO-ENTMCNC: 31.7 G/DL (ref 31.5–35.7)
MCV RBC AUTO: 84.6 FL (ref 79–97)
MONOCYTES # BLD AUTO: 0.55 10*3/MM3 (ref 0.1–0.9)
MONOCYTES NFR BLD AUTO: 7.8 % (ref 5–12)
NEUTROPHILS # BLD AUTO: 5.8 10*3/MM3 (ref 1.7–7)
NEUTROPHILS NFR BLD AUTO: 82.8 % (ref 42.7–76)
PLATELET # BLD AUTO: 108 10*3/MM3 (ref 140–450)
PMV BLD AUTO: 11.6 FL (ref 6–12)
POTASSIUM BLD-SCNC: 3.8 MMOL/L (ref 3.5–5.2)
RBC # BLD AUTO: 3.32 10*6/MM3 (ref 3.77–5.28)
SODIUM BLD-SCNC: 139 MMOL/L (ref 136–145)
STRESS TARGET HR: 121 BPM
TROPONIN T SERPL-MCNC: 0.29 NG/ML (ref 0–0.03)
WBC NRBC COR # BLD: 7.01 10*3/MM3 (ref 3.4–10.8)

## 2019-05-22 PROCEDURE — 63710000001 INSULIN LISPRO (HUMAN) PER 5 UNITS: Performed by: INTERNAL MEDICINE

## 2019-05-22 PROCEDURE — 25010000002 HEPARIN (PORCINE) PER 1000 UNITS: Performed by: INTERNAL MEDICINE

## 2019-05-22 PROCEDURE — 63710000001 INSULIN DETEMIR PER 5 UNITS: Performed by: INTERNAL MEDICINE

## 2019-05-22 PROCEDURE — 80048 BASIC METABOLIC PNL TOTAL CA: CPT | Performed by: INTERNAL MEDICINE

## 2019-05-22 PROCEDURE — G0108 DIAB MANAGE TRN  PER INDIV: HCPCS | Performed by: REGISTERED NURSE

## 2019-05-22 PROCEDURE — 99231 SBSQ HOSP IP/OBS SF/LOW 25: CPT | Performed by: NURSE PRACTITIONER

## 2019-05-22 PROCEDURE — 82962 GLUCOSE BLOOD TEST: CPT

## 2019-05-22 PROCEDURE — 25010000002 MORPHINE PER 10 MG: Performed by: FAMILY MEDICINE

## 2019-05-22 PROCEDURE — 85025 COMPLETE CBC W/AUTO DIFF WBC: CPT | Performed by: INTERNAL MEDICINE

## 2019-05-22 PROCEDURE — 84484 ASSAY OF TROPONIN QUANT: CPT | Performed by: NURSE PRACTITIONER

## 2019-05-22 PROCEDURE — 99233 SBSQ HOSP IP/OBS HIGH 50: CPT | Performed by: INTERNAL MEDICINE

## 2019-05-22 RX ORDER — HEPARIN SODIUM 5000 [USP'U]/ML
5000 INJECTION, SOLUTION INTRAVENOUS; SUBCUTANEOUS EVERY 8 HOURS SCHEDULED
Status: DISCONTINUED | OUTPATIENT
Start: 2019-05-22 | End: 2019-05-28

## 2019-05-22 RX ADMIN — PANTOPRAZOLE SODIUM 40 MG: 40 TABLET, DELAYED RELEASE ORAL at 20:34

## 2019-05-22 RX ADMIN — LORAZEPAM 0.5 MG: 0.5 TABLET ORAL at 20:33

## 2019-05-22 RX ADMIN — METOPROLOL TARTRATE 25 MG: 25 TABLET, FILM COATED ORAL at 09:26

## 2019-05-22 RX ADMIN — PANTOPRAZOLE SODIUM 40 MG: 40 TABLET, DELAYED RELEASE ORAL at 09:27

## 2019-05-22 RX ADMIN — HEPARIN SODIUM 5000 UNITS: 5000 INJECTION INTRAVENOUS; SUBCUTANEOUS at 17:37

## 2019-05-22 RX ADMIN — ASPIRIN 81 MG CHEWABLE TABLET 81 MG: 81 TABLET CHEWABLE at 09:26

## 2019-05-22 RX ADMIN — INSULIN LISPRO 5 UNITS: 100 INJECTION, SOLUTION INTRAVENOUS; SUBCUTANEOUS at 17:37

## 2019-05-22 RX ADMIN — ROSUVASTATIN CALCIUM 20 MG: 20 TABLET, FILM COATED ORAL at 09:27

## 2019-05-22 RX ADMIN — MORPHINE SULFATE 1 MG: 2 INJECTION, SOLUTION INTRAMUSCULAR; INTRAVENOUS at 17:32

## 2019-05-22 RX ADMIN — INSULIN DETEMIR 20 UNITS: 100 INJECTION, SOLUTION SUBCUTANEOUS at 20:37

## 2019-05-22 RX ADMIN — MORPHINE SULFATE 1 MG: 2 INJECTION, SOLUTION INTRAMUSCULAR; INTRAVENOUS at 09:44

## 2019-05-22 RX ADMIN — GABAPENTIN 300 MG: 300 CAPSULE ORAL at 09:26

## 2019-05-22 RX ADMIN — CLOPIDOGREL BISULFATE 75 MG: 75 TABLET ORAL at 09:26

## 2019-05-22 RX ADMIN — INSULIN LISPRO 6 UNITS: 100 INJECTION, SOLUTION INTRAVENOUS; SUBCUTANEOUS at 09:32

## 2019-05-22 RX ADMIN — INSULIN LISPRO 5 UNITS: 100 INJECTION, SOLUTION INTRAVENOUS; SUBCUTANEOUS at 09:33

## 2019-05-22 RX ADMIN — INSULIN LISPRO 8 UNITS: 100 INJECTION, SOLUTION INTRAVENOUS; SUBCUTANEOUS at 12:12

## 2019-05-22 RX ADMIN — HEPARIN SODIUM 5000 UNITS: 5000 INJECTION INTRAVENOUS; SUBCUTANEOUS at 20:35

## 2019-05-22 RX ADMIN — GABAPENTIN 300 MG: 300 CAPSULE ORAL at 20:34

## 2019-05-22 RX ADMIN — INSULIN LISPRO 6 UNITS: 100 INJECTION, SOLUTION INTRAVENOUS; SUBCUTANEOUS at 17:37

## 2019-05-22 RX ADMIN — METOPROLOL TARTRATE 25 MG: 25 TABLET, FILM COATED ORAL at 20:34

## 2019-05-22 RX ADMIN — INSULIN LISPRO 5 UNITS: 100 INJECTION, SOLUTION INTRAVENOUS; SUBCUTANEOUS at 12:12

## 2019-05-22 RX ADMIN — SODIUM BICARBONATE 75 ML/HR: 84 INJECTION, SOLUTION INTRAVENOUS at 06:10

## 2019-05-22 RX ADMIN — INSULIN LISPRO 6 UNITS: 100 INJECTION, SOLUTION INTRAVENOUS; SUBCUTANEOUS at 20:36

## 2019-05-22 RX ADMIN — SODIUM BICARBONATE 75 ML/HR: 84 INJECTION, SOLUTION INTRAVENOUS at 17:54

## 2019-05-23 ENCOUNTER — APPOINTMENT (OUTPATIENT)
Dept: CARDIOLOGY | Facility: HOSPITAL | Age: 79
End: 2019-05-23

## 2019-05-23 LAB
ACT BLD: 136 SECONDS (ref 82–152)
ANION GAP SERPL CALCULATED.3IONS-SCNC: 12 MMOL/L
BH CV LOWER VASCULAR LEFT COMMON FEMORAL AUGMENT: NORMAL
BH CV LOWER VASCULAR LEFT COMMON FEMORAL COMPRESS: NORMAL
BH CV LOWER VASCULAR LEFT COMMON FEMORAL PHASIC: NORMAL
BH CV LOWER VASCULAR LEFT COMMON FEMORAL SPONT: NORMAL
BH CV LOWER VASCULAR RIGHT COMMON FEMORAL AUGMENT: NORMAL
BH CV LOWER VASCULAR RIGHT COMMON FEMORAL COMPRESS: NORMAL
BH CV LOWER VASCULAR RIGHT COMMON FEMORAL PHASIC: NORMAL
BH CV LOWER VASCULAR RIGHT COMMON FEMORAL SPONT: NORMAL
BH CV LOWER VASCULAR RIGHT DISTAL FEMORAL AUGMENT: NORMAL
BH CV LOWER VASCULAR RIGHT DISTAL FEMORAL COMPRESS: NORMAL
BH CV LOWER VASCULAR RIGHT DISTAL FEMORAL PHASIC: NORMAL
BH CV LOWER VASCULAR RIGHT DISTAL FEMORAL SPONT: NORMAL
BH CV LOWER VASCULAR RIGHT GASTRONEMIUS COMPRESS: NORMAL
BH CV LOWER VASCULAR RIGHT GREATER SAPH AK COMPRESS: NORMAL
BH CV LOWER VASCULAR RIGHT GREATER SAPH BK COMPRESS: NORMAL
BH CV LOWER VASCULAR RIGHT LESSER SAPH COMPRESS: NORMAL
BH CV LOWER VASCULAR RIGHT MID FEMORAL AUGMENT: NORMAL
BH CV LOWER VASCULAR RIGHT MID FEMORAL COMPRESS: NORMAL
BH CV LOWER VASCULAR RIGHT MID FEMORAL PHASIC: NORMAL
BH CV LOWER VASCULAR RIGHT MID FEMORAL SPONT: NORMAL
BH CV LOWER VASCULAR RIGHT PERONEAL COMPRESS: NORMAL
BH CV LOWER VASCULAR RIGHT POPLITEAL AUGMENT: NORMAL
BH CV LOWER VASCULAR RIGHT POPLITEAL COMPRESS: NORMAL
BH CV LOWER VASCULAR RIGHT POPLITEAL PHASIC: NORMAL
BH CV LOWER VASCULAR RIGHT POPLITEAL SPONT: NORMAL
BH CV LOWER VASCULAR RIGHT POSTERIOR TIBIAL COMPRESS: NORMAL
BH CV LOWER VASCULAR RIGHT PROFUNDA FEMORAL COMPRESS: NORMAL
BH CV LOWER VASCULAR RIGHT PROFUNDA FEMORAL PHASIC: NORMAL
BH CV LOWER VASCULAR RIGHT PROFUNDA FEMORAL SPONT: NORMAL
BH CV LOWER VASCULAR RIGHT PROXIMAL FEMORAL AUGMENT: NORMAL
BH CV LOWER VASCULAR RIGHT PROXIMAL FEMORAL COMPRESS: NORMAL
BH CV LOWER VASCULAR RIGHT PROXIMAL FEMORAL PHASIC: NORMAL
BH CV LOWER VASCULAR RIGHT PROXIMAL FEMORAL SPONT: NORMAL
BH CV LOWER VASCULAR RIGHT SAPHENOFEMORAL JUNCTION COMPRESS: NORMAL
BH CV LOWER VASCULAR RIGHT SAPHENOFEMORAL JUNCTION PHASIC: NORMAL
BH CV LOWER VASCULAR RIGHT SAPHENOFEMORAL JUNCTION SPONT: NORMAL
BUN BLD-MCNC: 81 MG/DL (ref 8–23)
BUN/CREAT SERPL: 43.1 (ref 7–25)
CALCIUM SPEC-SCNC: 8.6 MG/DL (ref 8.6–10.5)
CHLORIDE SERPL-SCNC: 99 MMOL/L (ref 98–107)
CO2 SERPL-SCNC: 25 MMOL/L (ref 22–29)
CREAT BLD-MCNC: 1.88 MG/DL (ref 0.57–1)
DEPRECATED RDW RBC AUTO: 50.2 FL (ref 37–54)
ERYTHROCYTE [DISTWIDTH] IN BLOOD BY AUTOMATED COUNT: 16.2 % (ref 12.3–15.4)
GFR SERPL CREATININE-BSD FRML MDRD: 26 ML/MIN/1.73
GLUCOSE BLD-MCNC: 275 MG/DL (ref 65–99)
GLUCOSE BLDC GLUCOMTR-MCNC: 178 MG/DL (ref 70–130)
GLUCOSE BLDC GLUCOMTR-MCNC: 210 MG/DL (ref 70–130)
GLUCOSE BLDC GLUCOMTR-MCNC: 229 MG/DL (ref 70–130)
GLUCOSE BLDC GLUCOMTR-MCNC: 278 MG/DL (ref 70–130)
HCT VFR BLD AUTO: 26.7 % (ref 34–46.6)
HGB BLD-MCNC: 8.5 G/DL (ref 12–15.9)
MCH RBC QN AUTO: 27.1 PG (ref 26.6–33)
MCHC RBC AUTO-ENTMCNC: 31.8 G/DL (ref 31.5–35.7)
MCV RBC AUTO: 85 FL (ref 79–97)
PLATELET # BLD AUTO: 100 10*3/MM3 (ref 140–450)
PMV BLD AUTO: 12.5 FL (ref 6–12)
POTASSIUM BLD-SCNC: 3.9 MMOL/L (ref 3.5–5.2)
RBC # BLD AUTO: 3.14 10*6/MM3 (ref 3.77–5.28)
SODIUM BLD-SCNC: 136 MMOL/L (ref 136–145)
WBC NRBC COR # BLD: 5.44 10*3/MM3 (ref 3.4–10.8)

## 2019-05-23 PROCEDURE — 85347 COAGULATION TIME ACTIVATED: CPT

## 2019-05-23 PROCEDURE — C1887 CATHETER, GUIDING: HCPCS | Performed by: INTERNAL MEDICINE

## 2019-05-23 PROCEDURE — C1894 INTRO/SHEATH, NON-LASER: HCPCS | Performed by: INTERNAL MEDICINE

## 2019-05-23 PROCEDURE — 25010000002 PROTAMINE SULFATE PER 10 MG: Performed by: INTERNAL MEDICINE

## 2019-05-23 PROCEDURE — C1769 GUIDE WIRE: HCPCS | Performed by: INTERNAL MEDICINE

## 2019-05-23 PROCEDURE — 25010000002 MIDAZOLAM PER 1 MG: Performed by: INTERNAL MEDICINE

## 2019-05-23 PROCEDURE — C1725 CATH, TRANSLUMIN NON-LASER: HCPCS | Performed by: INTERNAL MEDICINE

## 2019-05-23 PROCEDURE — 63710000001 INSULIN DETEMIR PER 5 UNITS: Performed by: INTERNAL MEDICINE

## 2019-05-23 PROCEDURE — 93971 EXTREMITY STUDY: CPT

## 2019-05-23 PROCEDURE — 99232 SBSQ HOSP IP/OBS MODERATE 35: CPT | Performed by: INTERNAL MEDICINE

## 2019-05-23 PROCEDURE — 99231 SBSQ HOSP IP/OBS SF/LOW 25: CPT | Performed by: NURSE PRACTITIONER

## 2019-05-23 PROCEDURE — 82962 GLUCOSE BLOOD TEST: CPT

## 2019-05-23 PROCEDURE — 93971 EXTREMITY STUDY: CPT | Performed by: INTERNAL MEDICINE

## 2019-05-23 PROCEDURE — B2151ZZ FLUOROSCOPY OF LEFT HEART USING LOW OSMOLAR CONTRAST: ICD-10-PCS | Performed by: INTERNAL MEDICINE

## 2019-05-23 PROCEDURE — 0 IOPAMIDOL PER 1 ML: Performed by: INTERNAL MEDICINE

## 2019-05-23 PROCEDURE — 25010000002 HEPARIN (PORCINE) PER 1000 UNITS: Performed by: INTERNAL MEDICINE

## 2019-05-23 PROCEDURE — 80048 BASIC METABOLIC PNL TOTAL CA: CPT | Performed by: INTERNAL MEDICINE

## 2019-05-23 PROCEDURE — 25010000002 FENTANYL CITRATE (PF) 100 MCG/2ML SOLUTION: Performed by: INTERNAL MEDICINE

## 2019-05-23 PROCEDURE — 93454 CORONARY ARTERY ANGIO S&I: CPT | Performed by: INTERNAL MEDICINE

## 2019-05-23 PROCEDURE — 85027 COMPLETE CBC AUTOMATED: CPT | Performed by: INTERNAL MEDICINE

## 2019-05-23 PROCEDURE — 4A023N7 MEASUREMENT OF CARDIAC SAMPLING AND PRESSURE, LEFT HEART, PERCUTANEOUS APPROACH: ICD-10-PCS | Performed by: INTERNAL MEDICINE

## 2019-05-23 PROCEDURE — 93458 L HRT ARTERY/VENTRICLE ANGIO: CPT | Performed by: INTERNAL MEDICINE

## 2019-05-23 PROCEDURE — B2111ZZ FLUOROSCOPY OF MULTIPLE CORONARY ARTERIES USING LOW OSMOLAR CONTRAST: ICD-10-PCS | Performed by: INTERNAL MEDICINE

## 2019-05-23 RX ORDER — FENTANYL CITRATE 50 UG/ML
INJECTION, SOLUTION INTRAMUSCULAR; INTRAVENOUS AS NEEDED
Status: DISCONTINUED | OUTPATIENT
Start: 2019-05-23 | End: 2019-05-23 | Stop reason: HOSPADM

## 2019-05-23 RX ORDER — LIDOCAINE HYDROCHLORIDE 10 MG/ML
INJECTION, SOLUTION EPIDURAL; INFILTRATION; INTRACAUDAL; PERINEURAL AS NEEDED
Status: DISCONTINUED | OUTPATIENT
Start: 2019-05-23 | End: 2019-05-23 | Stop reason: HOSPADM

## 2019-05-23 RX ORDER — SODIUM CHLORIDE 9 MG/ML
3 INJECTION, SOLUTION INTRAVENOUS CONTINUOUS
Status: ACTIVE | OUTPATIENT
Start: 2019-05-23 | End: 2019-05-23

## 2019-05-23 RX ORDER — HEPARIN SODIUM 1000 [USP'U]/ML
INJECTION, SOLUTION INTRAVENOUS; SUBCUTANEOUS AS NEEDED
Status: DISCONTINUED | OUTPATIENT
Start: 2019-05-23 | End: 2019-05-23 | Stop reason: HOSPADM

## 2019-05-23 RX ORDER — MIDAZOLAM HYDROCHLORIDE 1 MG/ML
INJECTION INTRAMUSCULAR; INTRAVENOUS AS NEEDED
Status: DISCONTINUED | OUTPATIENT
Start: 2019-05-23 | End: 2019-05-23 | Stop reason: HOSPADM

## 2019-05-23 RX ORDER — PROTAMINE SULFATE 10 MG/ML
INJECTION, SOLUTION INTRAVENOUS AS NEEDED
Status: DISCONTINUED | OUTPATIENT
Start: 2019-05-23 | End: 2019-05-23 | Stop reason: HOSPADM

## 2019-05-23 RX ORDER — SODIUM CHLORIDE 9 MG/ML
1.5 INJECTION, SOLUTION INTRAVENOUS CONTINUOUS
Status: ACTIVE | OUTPATIENT
Start: 2019-05-23 | End: 2019-05-23

## 2019-05-23 RX ORDER — SODIUM CHLORIDE 9 MG/ML
250 INJECTION, SOLUTION INTRAVENOUS ONCE AS NEEDED
Status: DISCONTINUED | OUTPATIENT
Start: 2019-05-23 | End: 2019-05-29

## 2019-05-23 RX ADMIN — INSULIN LISPRO 4 UNITS: 100 INJECTION, SOLUTION INTRAVENOUS; SUBCUTANEOUS at 21:10

## 2019-05-23 RX ADMIN — HEPARIN SODIUM 5000 UNITS: 5000 INJECTION INTRAVENOUS; SUBCUTANEOUS at 21:10

## 2019-05-23 RX ADMIN — SODIUM CHLORIDE 1.5 ML/KG/HR: 9 INJECTION, SOLUTION INTRAVENOUS at 18:33

## 2019-05-23 RX ADMIN — CLOPIDOGREL BISULFATE 75 MG: 75 TABLET ORAL at 09:30

## 2019-05-23 RX ADMIN — METOPROLOL TARTRATE 25 MG: 25 TABLET, FILM COATED ORAL at 21:10

## 2019-05-23 RX ADMIN — INSULIN DETEMIR 20 UNITS: 100 INJECTION, SOLUTION SUBCUTANEOUS at 21:06

## 2019-05-23 RX ADMIN — ASPIRIN 81 MG CHEWABLE TABLET 81 MG: 81 TABLET CHEWABLE at 09:30

## 2019-05-23 RX ADMIN — PANTOPRAZOLE SODIUM 40 MG: 40 TABLET, DELAYED RELEASE ORAL at 21:09

## 2019-05-23 RX ADMIN — SODIUM BICARBONATE 75 ML/HR: 84 INJECTION, SOLUTION INTRAVENOUS at 05:40

## 2019-05-23 RX ADMIN — INSULIN LISPRO 6 UNITS: 100 INJECTION, SOLUTION INTRAVENOUS; SUBCUTANEOUS at 09:31

## 2019-05-23 RX ADMIN — PANTOPRAZOLE SODIUM 40 MG: 40 TABLET, DELAYED RELEASE ORAL at 09:30

## 2019-05-23 RX ADMIN — GABAPENTIN 300 MG: 300 CAPSULE ORAL at 09:30

## 2019-05-23 RX ADMIN — ROSUVASTATIN CALCIUM 20 MG: 20 TABLET, FILM COATED ORAL at 09:30

## 2019-05-23 RX ADMIN — HEPARIN SODIUM 5000 UNITS: 5000 INJECTION INTRAVENOUS; SUBCUTANEOUS at 05:39

## 2019-05-23 RX ADMIN — METOPROLOL TARTRATE 25 MG: 25 TABLET, FILM COATED ORAL at 09:30

## 2019-05-23 RX ADMIN — INSULIN LISPRO 4 UNITS: 100 INJECTION, SOLUTION INTRAVENOUS; SUBCUTANEOUS at 12:11

## 2019-05-24 ENCOUNTER — APPOINTMENT (OUTPATIENT)
Dept: CT IMAGING | Facility: HOSPITAL | Age: 79
End: 2019-05-24

## 2019-05-24 DIAGNOSIS — I21.4 NSTEMI, INITIAL EPISODE OF CARE (HCC): Primary | ICD-10-CM

## 2019-05-24 LAB
ACT BLD: 158 SECONDS (ref 82–152)
ANION GAP SERPL CALCULATED.3IONS-SCNC: 14 MMOL/L
BUN BLD-MCNC: 70 MG/DL (ref 8–23)
BUN/CREAT SERPL: 42.7 (ref 7–25)
CALCIUM SPEC-SCNC: 9.1 MG/DL (ref 8.6–10.5)
CHLORIDE SERPL-SCNC: 99 MMOL/L (ref 98–107)
CO2 SERPL-SCNC: 27 MMOL/L (ref 22–29)
CREAT BLD-MCNC: 1.64 MG/DL (ref 0.57–1)
DEPRECATED RDW RBC AUTO: 51 FL (ref 37–54)
ERYTHROCYTE [DISTWIDTH] IN BLOOD BY AUTOMATED COUNT: 16.1 % (ref 12.3–15.4)
GFR SERPL CREATININE-BSD FRML MDRD: 30 ML/MIN/1.73
GLUCOSE BLD-MCNC: 224 MG/DL (ref 65–99)
GLUCOSE BLDC GLUCOMTR-MCNC: 171 MG/DL (ref 70–130)
GLUCOSE BLDC GLUCOMTR-MCNC: 225 MG/DL (ref 70–130)
GLUCOSE BLDC GLUCOMTR-MCNC: 250 MG/DL (ref 70–130)
GLUCOSE BLDC GLUCOMTR-MCNC: 351 MG/DL (ref 70–130)
HCT VFR BLD AUTO: 27.9 % (ref 34–46.6)
HGB BLD-MCNC: 8.6 G/DL (ref 12–15.9)
MCH RBC QN AUTO: 26.6 PG (ref 26.6–33)
MCHC RBC AUTO-ENTMCNC: 30.8 G/DL (ref 31.5–35.7)
MCV RBC AUTO: 86.4 FL (ref 79–97)
PLATELET # BLD AUTO: 163 10*3/MM3 (ref 140–450)
PMV BLD AUTO: 12.5 FL (ref 6–12)
POTASSIUM BLD-SCNC: 4.1 MMOL/L (ref 3.5–5.2)
RBC # BLD AUTO: 3.23 10*6/MM3 (ref 3.77–5.28)
SODIUM BLD-SCNC: 140 MMOL/L (ref 136–145)
WBC NRBC COR # BLD: 6 10*3/MM3 (ref 3.4–10.8)

## 2019-05-24 PROCEDURE — 63710000001 INSULIN LISPRO PROTAMINE-INSULIN LISPRO (75-25) 100 UNIT/ML SUSPENSION 10 ML VIAL: Performed by: INTERNAL MEDICINE

## 2019-05-24 PROCEDURE — 82962 GLUCOSE BLOOD TEST: CPT

## 2019-05-24 PROCEDURE — 80048 BASIC METABOLIC PNL TOTAL CA: CPT | Performed by: INTERNAL MEDICINE

## 2019-05-24 PROCEDURE — 99223 1ST HOSP IP/OBS HIGH 75: CPT | Performed by: THORACIC SURGERY (CARDIOTHORACIC VASCULAR SURGERY)

## 2019-05-24 PROCEDURE — 70450 CT HEAD/BRAIN W/O DYE: CPT

## 2019-05-24 PROCEDURE — 99222 1ST HOSP IP/OBS MODERATE 55: CPT | Performed by: PSYCHIATRY & NEUROLOGY

## 2019-05-24 PROCEDURE — 85027 COMPLETE CBC AUTOMATED: CPT | Performed by: INTERNAL MEDICINE

## 2019-05-24 PROCEDURE — 99233 SBSQ HOSP IP/OBS HIGH 50: CPT | Performed by: INTERNAL MEDICINE

## 2019-05-24 PROCEDURE — 99232 SBSQ HOSP IP/OBS MODERATE 35: CPT | Performed by: INTERNAL MEDICINE

## 2019-05-24 PROCEDURE — 25010000002 HEPARIN (PORCINE) PER 1000 UNITS: Performed by: INTERNAL MEDICINE

## 2019-05-24 PROCEDURE — 97162 PT EVAL MOD COMPLEX 30 MIN: CPT

## 2019-05-24 RX ORDER — QUETIAPINE FUMARATE 25 MG/1
12.5 TABLET, FILM COATED ORAL NIGHTLY
Status: DISCONTINUED | OUTPATIENT
Start: 2019-05-24 | End: 2019-06-05 | Stop reason: HOSPADM

## 2019-05-24 RX ORDER — GABAPENTIN 300 MG/1
300 CAPSULE ORAL EVERY 12 HOURS SCHEDULED
Status: DISCONTINUED | OUTPATIENT
Start: 2019-05-24 | End: 2019-06-05 | Stop reason: HOSPADM

## 2019-05-24 RX ORDER — CHOLECALCIFEROL (VITAMIN D3) 125 MCG
5 CAPSULE ORAL NIGHTLY
Status: DISCONTINUED | OUTPATIENT
Start: 2019-05-24 | End: 2019-06-05 | Stop reason: HOSPADM

## 2019-05-24 RX ADMIN — INSULIN LISPRO 35 UNITS: 100 INJECTION, SUSPENSION SUBCUTANEOUS at 17:17

## 2019-05-24 RX ADMIN — SODIUM CHLORIDE, PRESERVATIVE FREE 10 ML: 5 INJECTION INTRAVENOUS at 20:44

## 2019-05-24 RX ADMIN — INSULIN LISPRO 8 UNITS: 100 INJECTION, SOLUTION INTRAVENOUS; SUBCUTANEOUS at 12:00

## 2019-05-24 RX ADMIN — GABAPENTIN 300 MG: 300 CAPSULE ORAL at 20:44

## 2019-05-24 RX ADMIN — PANTOPRAZOLE SODIUM 40 MG: 40 TABLET, DELAYED RELEASE ORAL at 08:17

## 2019-05-24 RX ADMIN — PANTOPRAZOLE SODIUM 40 MG: 40 TABLET, DELAYED RELEASE ORAL at 20:44

## 2019-05-24 RX ADMIN — INSULIN LISPRO 5 UNITS: 100 INJECTION, SOLUTION INTRAVENOUS; SUBCUTANEOUS at 11:59

## 2019-05-24 RX ADMIN — INSULIN LISPRO 2 UNITS: 100 INJECTION, SOLUTION INTRAVENOUS; SUBCUTANEOUS at 20:52

## 2019-05-24 RX ADMIN — HEPARIN SODIUM 5000 UNITS: 5000 INJECTION INTRAVENOUS; SUBCUTANEOUS at 13:54

## 2019-05-24 RX ADMIN — METOPROLOL TARTRATE 25 MG: 25 TABLET, FILM COATED ORAL at 08:25

## 2019-05-24 RX ADMIN — INSULIN LISPRO 5 UNITS: 100 INJECTION, SOLUTION INTRAVENOUS; SUBCUTANEOUS at 08:17

## 2019-05-24 RX ADMIN — MELATONIN TAB 5 MG 5 MG: 5 TAB at 20:44

## 2019-05-24 RX ADMIN — HEPARIN SODIUM 5000 UNITS: 5000 INJECTION INTRAVENOUS; SUBCUTANEOUS at 20:52

## 2019-05-24 RX ADMIN — CLOPIDOGREL BISULFATE 75 MG: 75 TABLET ORAL at 08:17

## 2019-05-24 RX ADMIN — INSULIN LISPRO 6 UNITS: 100 INJECTION, SOLUTION INTRAVENOUS; SUBCUTANEOUS at 08:18

## 2019-05-24 RX ADMIN — HEPARIN SODIUM 5000 UNITS: 5000 INJECTION INTRAVENOUS; SUBCUTANEOUS at 05:55

## 2019-05-24 RX ADMIN — METOPROLOL TARTRATE 25 MG: 25 TABLET, FILM COATED ORAL at 20:44

## 2019-05-24 RX ADMIN — QUETIAPINE FUMARATE 12.5 MG: 25 TABLET ORAL at 20:44

## 2019-05-24 RX ADMIN — ROSUVASTATIN CALCIUM 20 MG: 20 TABLET, FILM COATED ORAL at 08:17

## 2019-05-24 RX ADMIN — SODIUM CHLORIDE, PRESERVATIVE FREE 10 ML: 5 INJECTION INTRAVENOUS at 08:25

## 2019-05-24 RX ADMIN — GABAPENTIN 300 MG: 300 CAPSULE ORAL at 11:59

## 2019-05-24 RX ADMIN — ASPIRIN 81 MG CHEWABLE TABLET 81 MG: 81 TABLET CHEWABLE at 08:17

## 2019-05-25 ENCOUNTER — APPOINTMENT (OUTPATIENT)
Dept: GENERAL RADIOLOGY | Facility: HOSPITAL | Age: 79
End: 2019-05-25

## 2019-05-25 PROBLEM — D50.9 IRON DEFICIENCY ANEMIA: Status: ACTIVE | Noted: 2019-05-25

## 2019-05-25 LAB
ANION GAP SERPL CALCULATED.3IONS-SCNC: 13 MMOL/L
BUN BLD-MCNC: 70 MG/DL (ref 8–23)
BUN/CREAT SERPL: 44.6 (ref 7–25)
CALCIUM SPEC-SCNC: 9.3 MG/DL (ref 8.6–10.5)
CHLORIDE SERPL-SCNC: 97 MMOL/L (ref 98–107)
CO2 SERPL-SCNC: 27 MMOL/L (ref 22–29)
CREAT BLD-MCNC: 1.57 MG/DL (ref 0.57–1)
DEPRECATED RDW RBC AUTO: 49.3 FL (ref 37–54)
ERYTHROCYTE [DISTWIDTH] IN BLOOD BY AUTOMATED COUNT: 15.8 % (ref 12.3–15.4)
GFR SERPL CREATININE-BSD FRML MDRD: 32 ML/MIN/1.73
GLUCOSE BLD-MCNC: 82 MG/DL (ref 65–99)
GLUCOSE BLDC GLUCOMTR-MCNC: 102 MG/DL (ref 70–130)
GLUCOSE BLDC GLUCOMTR-MCNC: 154 MG/DL (ref 70–130)
GLUCOSE BLDC GLUCOMTR-MCNC: 240 MG/DL (ref 70–130)
GLUCOSE BLDC GLUCOMTR-MCNC: 281 MG/DL (ref 70–130)
HCT VFR BLD AUTO: 27.8 % (ref 34–46.6)
HGB BLD-MCNC: 8.7 G/DL (ref 12–15.9)
MCH RBC QN AUTO: 26.5 PG (ref 26.6–33)
MCHC RBC AUTO-ENTMCNC: 31.3 G/DL (ref 31.5–35.7)
MCV RBC AUTO: 84.8 FL (ref 79–97)
PLATELET # BLD AUTO: 136 10*3/MM3 (ref 140–450)
PMV BLD AUTO: 11.9 FL (ref 6–12)
POTASSIUM BLD-SCNC: 4 MMOL/L (ref 3.5–5.2)
RBC # BLD AUTO: 3.28 10*6/MM3 (ref 3.77–5.28)
SODIUM BLD-SCNC: 137 MMOL/L (ref 136–145)
VIT B12 BLD-MCNC: >2000 PG/ML (ref 211–946)
WBC NRBC COR # BLD: 4.84 10*3/MM3 (ref 3.4–10.8)

## 2019-05-25 PROCEDURE — 82962 GLUCOSE BLOOD TEST: CPT

## 2019-05-25 PROCEDURE — 25010000002 HEPARIN (PORCINE) PER 1000 UNITS: Performed by: INTERNAL MEDICINE

## 2019-05-25 PROCEDURE — 80048 BASIC METABOLIC PNL TOTAL CA: CPT | Performed by: INTERNAL MEDICINE

## 2019-05-25 PROCEDURE — 99231 SBSQ HOSP IP/OBS SF/LOW 25: CPT | Performed by: THORACIC SURGERY (CARDIOTHORACIC VASCULAR SURGERY)

## 2019-05-25 PROCEDURE — 82607 VITAMIN B-12: CPT | Performed by: INTERNAL MEDICINE

## 2019-05-25 PROCEDURE — 99232 SBSQ HOSP IP/OBS MODERATE 35: CPT | Performed by: PSYCHIATRY & NEUROLOGY

## 2019-05-25 PROCEDURE — 63710000001 INSULIN LISPRO PROTAMINE-INSULIN LISPRO (75-25) 100 UNIT/ML SUSPENSION 10 ML VIAL: Performed by: INTERNAL MEDICINE

## 2019-05-25 PROCEDURE — 99233 SBSQ HOSP IP/OBS HIGH 50: CPT | Performed by: INTERNAL MEDICINE

## 2019-05-25 PROCEDURE — 71045 X-RAY EXAM CHEST 1 VIEW: CPT

## 2019-05-25 PROCEDURE — 85027 COMPLETE CBC AUTOMATED: CPT | Performed by: INTERNAL MEDICINE

## 2019-05-25 PROCEDURE — 25010000002 FUROSEMIDE PER 20 MG: Performed by: INTERNAL MEDICINE

## 2019-05-25 PROCEDURE — 25010000002 MORPHINE PER 10 MG: Performed by: FAMILY MEDICINE

## 2019-05-25 RX ORDER — DOCUSATE SODIUM 100 MG/1
100 CAPSULE, LIQUID FILLED ORAL 2 TIMES DAILY
Status: DISCONTINUED | OUTPATIENT
Start: 2019-05-25 | End: 2019-06-05 | Stop reason: HOSPADM

## 2019-05-25 RX ORDER — FUROSEMIDE 10 MG/ML
20 INJECTION INTRAMUSCULAR; INTRAVENOUS ONCE
Status: COMPLETED | OUTPATIENT
Start: 2019-05-25 | End: 2019-05-25

## 2019-05-25 RX ORDER — FERROUS SULFATE 325(65) MG
325 TABLET ORAL
Status: DISCONTINUED | OUTPATIENT
Start: 2019-05-25 | End: 2019-05-29

## 2019-05-25 RX ADMIN — PANTOPRAZOLE SODIUM 40 MG: 40 TABLET, DELAYED RELEASE ORAL at 22:08

## 2019-05-25 RX ADMIN — ROSUVASTATIN CALCIUM 20 MG: 20 TABLET, FILM COATED ORAL at 09:53

## 2019-05-25 RX ADMIN — Medication 325 MG: at 09:53

## 2019-05-25 RX ADMIN — HEPARIN SODIUM 5000 UNITS: 5000 INJECTION INTRAVENOUS; SUBCUTANEOUS at 22:08

## 2019-05-25 RX ADMIN — FUROSEMIDE 20 MG: 10 INJECTION, SOLUTION INTRAMUSCULAR; INTRAVENOUS at 14:18

## 2019-05-25 RX ADMIN — METOPROLOL TARTRATE 25 MG: 25 TABLET, FILM COATED ORAL at 09:53

## 2019-05-25 RX ADMIN — INSULIN LISPRO 6 UNITS: 100 INJECTION, SOLUTION INTRAVENOUS; SUBCUTANEOUS at 22:08

## 2019-05-25 RX ADMIN — DOCUSATE SODIUM 100 MG: 100 CAPSULE, LIQUID FILLED ORAL at 22:07

## 2019-05-25 RX ADMIN — INSULIN LISPRO 4 UNITS: 100 INJECTION, SOLUTION INTRAVENOUS; SUBCUTANEOUS at 17:28

## 2019-05-25 RX ADMIN — LORAZEPAM 0.5 MG: 0.5 TABLET ORAL at 01:22

## 2019-05-25 RX ADMIN — MELATONIN TAB 5 MG 5 MG: 5 TAB at 22:07

## 2019-05-25 RX ADMIN — GABAPENTIN 300 MG: 300 CAPSULE ORAL at 22:07

## 2019-05-25 RX ADMIN — INSULIN LISPRO 20 UNITS: 100 INJECTION, SUSPENSION SUBCUTANEOUS at 13:02

## 2019-05-25 RX ADMIN — ASPIRIN 81 MG CHEWABLE TABLET 81 MG: 81 TABLET CHEWABLE at 09:54

## 2019-05-25 RX ADMIN — GABAPENTIN 300 MG: 300 CAPSULE ORAL at 09:56

## 2019-05-25 RX ADMIN — PANTOPRAZOLE SODIUM 40 MG: 40 TABLET, DELAYED RELEASE ORAL at 09:53

## 2019-05-25 RX ADMIN — MORPHINE SULFATE 1 MG: 2 INJECTION, SOLUTION INTRAMUSCULAR; INTRAVENOUS at 01:22

## 2019-05-25 RX ADMIN — SODIUM CHLORIDE, PRESERVATIVE FREE 10 ML: 5 INJECTION INTRAVENOUS at 09:57

## 2019-05-25 RX ADMIN — HEPARIN SODIUM 5000 UNITS: 5000 INJECTION INTRAVENOUS; SUBCUTANEOUS at 13:52

## 2019-05-25 RX ADMIN — INSULIN LISPRO 20 UNITS: 100 INJECTION, SUSPENSION SUBCUTANEOUS at 17:29

## 2019-05-25 RX ADMIN — HEPARIN SODIUM 5000 UNITS: 5000 INJECTION INTRAVENOUS; SUBCUTANEOUS at 06:58

## 2019-05-25 RX ADMIN — METOPROLOL TARTRATE 25 MG: 25 TABLET, FILM COATED ORAL at 22:07

## 2019-05-25 RX ADMIN — QUETIAPINE FUMARATE 12.5 MG: 25 TABLET ORAL at 22:07

## 2019-05-26 ENCOUNTER — APPOINTMENT (OUTPATIENT)
Dept: GENERAL RADIOLOGY | Facility: HOSPITAL | Age: 79
End: 2019-05-26

## 2019-05-26 LAB
ANION GAP SERPL CALCULATED.3IONS-SCNC: 13 MMOL/L
BUN BLD-MCNC: 69 MG/DL (ref 8–23)
BUN/CREAT SERPL: 41.1 (ref 7–25)
CALCIUM SPEC-SCNC: 9 MG/DL (ref 8.6–10.5)
CHLORIDE SERPL-SCNC: 98 MMOL/L (ref 98–107)
CO2 SERPL-SCNC: 28 MMOL/L (ref 22–29)
CREAT BLD-MCNC: 1.68 MG/DL (ref 0.57–1)
DEPRECATED RDW RBC AUTO: 51.2 FL (ref 37–54)
ERYTHROCYTE [DISTWIDTH] IN BLOOD BY AUTOMATED COUNT: 16.2 % (ref 12.3–15.4)
GFR SERPL CREATININE-BSD FRML MDRD: 29 ML/MIN/1.73
GLUCOSE BLD-MCNC: 65 MG/DL (ref 65–99)
GLUCOSE BLDC GLUCOMTR-MCNC: 102 MG/DL (ref 70–130)
GLUCOSE BLDC GLUCOMTR-MCNC: 163 MG/DL (ref 70–130)
GLUCOSE BLDC GLUCOMTR-MCNC: 189 MG/DL (ref 70–130)
GLUCOSE BLDC GLUCOMTR-MCNC: 227 MG/DL (ref 70–130)
GLUCOSE BLDC GLUCOMTR-MCNC: 78 MG/DL (ref 70–130)
HCT VFR BLD AUTO: 25.6 % (ref 34–46.6)
HGB BLD-MCNC: 8 G/DL (ref 12–15.9)
MCH RBC QN AUTO: 26.9 PG (ref 26.6–33)
MCHC RBC AUTO-ENTMCNC: 31.3 G/DL (ref 31.5–35.7)
MCV RBC AUTO: 86.2 FL (ref 79–97)
PLATELET # BLD AUTO: 141 10*3/MM3 (ref 140–450)
PMV BLD AUTO: 12.3 FL (ref 6–12)
POTASSIUM BLD-SCNC: 4.2 MMOL/L (ref 3.5–5.2)
RBC # BLD AUTO: 2.97 10*6/MM3 (ref 3.77–5.28)
SODIUM BLD-SCNC: 139 MMOL/L (ref 136–145)
TROPONIN T SERPL-MCNC: 0.32 NG/ML (ref 0–0.03)
WBC NRBC COR # BLD: 5.4 10*3/MM3 (ref 3.4–10.8)

## 2019-05-26 PROCEDURE — 86901 BLOOD TYPING SEROLOGIC RH(D): CPT

## 2019-05-26 PROCEDURE — 25010000002 MORPHINE PER 10 MG: Performed by: FAMILY MEDICINE

## 2019-05-26 PROCEDURE — 82962 GLUCOSE BLOOD TEST: CPT

## 2019-05-26 PROCEDURE — 93005 ELECTROCARDIOGRAM TRACING: CPT | Performed by: INTERNAL MEDICINE

## 2019-05-26 PROCEDURE — 63710000001 INSULIN LISPRO PROTAMINE-INSULIN LISPRO (75-25) 100 UNIT/ML SUSPENSION 10 ML VIAL: Performed by: INTERNAL MEDICINE

## 2019-05-26 PROCEDURE — 99232 SBSQ HOSP IP/OBS MODERATE 35: CPT | Performed by: INTERNAL MEDICINE

## 2019-05-26 PROCEDURE — 99231 SBSQ HOSP IP/OBS SF/LOW 25: CPT | Performed by: THORACIC SURGERY (CARDIOTHORACIC VASCULAR SURGERY)

## 2019-05-26 PROCEDURE — 25010000002 FUROSEMIDE PER 20 MG: Performed by: INTERNAL MEDICINE

## 2019-05-26 PROCEDURE — 84484 ASSAY OF TROPONIN QUANT: CPT | Performed by: INTERNAL MEDICINE

## 2019-05-26 PROCEDURE — 94640 AIRWAY INHALATION TREATMENT: CPT

## 2019-05-26 PROCEDURE — 85027 COMPLETE CBC AUTOMATED: CPT | Performed by: INTERNAL MEDICINE

## 2019-05-26 PROCEDURE — 80048 BASIC METABOLIC PNL TOTAL CA: CPT | Performed by: INTERNAL MEDICINE

## 2019-05-26 PROCEDURE — 71045 X-RAY EXAM CHEST 1 VIEW: CPT

## 2019-05-26 PROCEDURE — 86900 BLOOD TYPING SEROLOGIC ABO: CPT

## 2019-05-26 PROCEDURE — 25010000002 HEPARIN (PORCINE) PER 1000 UNITS: Performed by: INTERNAL MEDICINE

## 2019-05-26 RX ORDER — FUROSEMIDE 10 MG/ML
40 INJECTION INTRAMUSCULAR; INTRAVENOUS ONCE
Status: COMPLETED | OUTPATIENT
Start: 2019-05-26 | End: 2019-05-26

## 2019-05-26 RX ORDER — HYDROCODONE BITARTRATE AND ACETAMINOPHEN 5; 325 MG/1; MG/1
1 TABLET ORAL EVERY 6 HOURS PRN
Status: DISPENSED | OUTPATIENT
Start: 2019-05-26 | End: 2019-06-05

## 2019-05-26 RX ORDER — FUROSEMIDE 10 MG/ML
20 INJECTION INTRAMUSCULAR; INTRAVENOUS ONCE
Status: DISCONTINUED | OUTPATIENT
Start: 2019-05-26 | End: 2019-05-26

## 2019-05-26 RX ADMIN — FUROSEMIDE 40 MG: 10 INJECTION, SOLUTION INTRAMUSCULAR; INTRAVENOUS at 21:14

## 2019-05-26 RX ADMIN — MELATONIN TAB 5 MG 5 MG: 5 TAB at 21:14

## 2019-05-26 RX ADMIN — FUROSEMIDE 40 MG: 10 INJECTION, SOLUTION INTRAMUSCULAR; INTRAVENOUS at 12:05

## 2019-05-26 RX ADMIN — INSULIN LISPRO 20 UNITS: 100 INJECTION, SUSPENSION SUBCUTANEOUS at 12:08

## 2019-05-26 RX ADMIN — METOPROLOL TARTRATE 25 MG: 25 TABLET, FILM COATED ORAL at 09:33

## 2019-05-26 RX ADMIN — QUETIAPINE FUMARATE 12.5 MG: 25 TABLET ORAL at 21:13

## 2019-05-26 RX ADMIN — IPRATROPIUM BROMIDE AND ALBUTEROL SULFATE 3 ML: 2.5; .5 SOLUTION RESPIRATORY (INHALATION) at 11:09

## 2019-05-26 RX ADMIN — INSULIN LISPRO 2 UNITS: 100 INJECTION, SOLUTION INTRAVENOUS; SUBCUTANEOUS at 17:33

## 2019-05-26 RX ADMIN — SODIUM CHLORIDE, PRESERVATIVE FREE 10 ML: 5 INJECTION INTRAVENOUS at 21:14

## 2019-05-26 RX ADMIN — INSULIN LISPRO 20 UNITS: 100 INJECTION, SUSPENSION SUBCUTANEOUS at 09:35

## 2019-05-26 RX ADMIN — GABAPENTIN 300 MG: 300 CAPSULE ORAL at 21:13

## 2019-05-26 RX ADMIN — POLYETHYLENE GLYCOL 3350 17 G: 17 POWDER, FOR SOLUTION ORAL at 09:33

## 2019-05-26 RX ADMIN — INSULIN LISPRO 2 UNITS: 100 INJECTION, SOLUTION INTRAVENOUS; SUBCUTANEOUS at 21:14

## 2019-05-26 RX ADMIN — INSULIN LISPRO 35 UNITS: 100 INJECTION, SUSPENSION SUBCUTANEOUS at 17:33

## 2019-05-26 RX ADMIN — PANTOPRAZOLE SODIUM 40 MG: 40 TABLET, DELAYED RELEASE ORAL at 09:32

## 2019-05-26 RX ADMIN — DOCUSATE SODIUM 100 MG: 100 CAPSULE, LIQUID FILLED ORAL at 09:32

## 2019-05-26 RX ADMIN — HEPARIN SODIUM 5000 UNITS: 5000 INJECTION INTRAVENOUS; SUBCUTANEOUS at 21:14

## 2019-05-26 RX ADMIN — ROSUVASTATIN CALCIUM 20 MG: 20 TABLET, FILM COATED ORAL at 09:32

## 2019-05-26 RX ADMIN — PANTOPRAZOLE SODIUM 40 MG: 40 TABLET, DELAYED RELEASE ORAL at 21:13

## 2019-05-26 RX ADMIN — HEPARIN SODIUM 5000 UNITS: 5000 INJECTION INTRAVENOUS; SUBCUTANEOUS at 05:08

## 2019-05-26 RX ADMIN — GABAPENTIN 300 MG: 300 CAPSULE ORAL at 09:37

## 2019-05-26 RX ADMIN — INSULIN LISPRO 4 UNITS: 100 INJECTION, SOLUTION INTRAVENOUS; SUBCUTANEOUS at 12:05

## 2019-05-26 RX ADMIN — ASPIRIN 81 MG CHEWABLE TABLET 81 MG: 81 TABLET CHEWABLE at 09:33

## 2019-05-26 RX ADMIN — Medication 325 MG: at 09:32

## 2019-05-26 RX ADMIN — HEPARIN SODIUM 5000 UNITS: 5000 INJECTION INTRAVENOUS; SUBCUTANEOUS at 15:13

## 2019-05-26 RX ADMIN — MORPHINE SULFATE 1 MG: 2 INJECTION, SOLUTION INTRAMUSCULAR; INTRAVENOUS at 01:24

## 2019-05-26 RX ADMIN — HYDROCODONE BITARTRATE AND ACETAMINOPHEN 1 TABLET: 5; 325 TABLET ORAL at 09:35

## 2019-05-26 RX ADMIN — LORAZEPAM 0.5 MG: 0.5 TABLET ORAL at 21:14

## 2019-05-27 ENCOUNTER — APPOINTMENT (OUTPATIENT)
Dept: CT IMAGING | Facility: HOSPITAL | Age: 79
End: 2019-05-27

## 2019-05-27 ENCOUNTER — APPOINTMENT (OUTPATIENT)
Dept: GENERAL RADIOLOGY | Facility: HOSPITAL | Age: 79
End: 2019-05-27

## 2019-05-27 PROBLEM — I46.9 CARDIAC ARREST (HCC): Status: ACTIVE | Noted: 2019-05-27

## 2019-05-27 LAB
ABO GROUP BLD: NORMAL
ABO GROUP BLD: NORMAL
ALBUMIN SERPL-MCNC: 2.9 G/DL (ref 3.5–5.2)
ALBUMIN SERPL-MCNC: 3.2 G/DL (ref 3.5–5.2)
ALBUMIN/GLOB SERPL: 1.1 G/DL
ALP SERPL-CCNC: 64 U/L (ref 39–117)
ALP SERPL-CCNC: 77 U/L (ref 39–117)
ALT SERPL W P-5'-P-CCNC: 22 U/L (ref 1–33)
ALT SERPL W P-5'-P-CCNC: 35 U/L (ref 1–33)
ANION GAP SERPL CALCULATED.3IONS-SCNC: 11 MMOL/L
ANION GAP SERPL CALCULATED.3IONS-SCNC: 11 MMOL/L
ANION GAP SERPL CALCULATED.3IONS-SCNC: 15 MMOL/L
AST SERPL-CCNC: 12 U/L (ref 1–32)
AST SERPL-CCNC: 31 U/L (ref 1–32)
BILIRUB CONJ SERPL-MCNC: <0.2 MG/DL (ref 0.2–0.3)
BILIRUB INDIRECT SERPL-MCNC: ABNORMAL MG/DL
BILIRUB SERPL-MCNC: 0.4 MG/DL (ref 0.2–1.2)
BILIRUB SERPL-MCNC: 0.5 MG/DL (ref 0.2–1.2)
BLD GP AB SCN SERPL QL: NEGATIVE
BUN BLD-MCNC: 68 MG/DL (ref 8–23)
BUN BLD-MCNC: 70 MG/DL (ref 8–23)
BUN BLD-MCNC: 71 MG/DL (ref 8–23)
BUN/CREAT SERPL: 32.4 (ref 7–25)
BUN/CREAT SERPL: 37.6 (ref 7–25)
BUN/CREAT SERPL: 38.6 (ref 7–25)
CA-I SERPL ISE-MCNC: 1.22 MMOL/L (ref 1.12–1.32)
CA-I SERPL ISE-MCNC: 1.22 MMOL/L (ref 1.12–1.32)
CALCIUM SPEC-SCNC: 8.9 MG/DL (ref 8.6–10.5)
CALCIUM SPEC-SCNC: 9 MG/DL (ref 8.6–10.5)
CALCIUM SPEC-SCNC: 9.1 MG/DL (ref 8.6–10.5)
CHLORIDE SERPL-SCNC: 93 MMOL/L (ref 98–107)
CHLORIDE SERPL-SCNC: 93 MMOL/L (ref 98–107)
CHLORIDE SERPL-SCNC: 96 MMOL/L (ref 98–107)
CO2 SERPL-SCNC: 25 MMOL/L (ref 22–29)
CO2 SERPL-SCNC: 27 MMOL/L (ref 22–29)
CO2 SERPL-SCNC: 29 MMOL/L (ref 22–29)
CREAT BLD-MCNC: 1.84 MG/DL (ref 0.57–1)
CREAT BLD-MCNC: 1.86 MG/DL (ref 0.57–1)
CREAT BLD-MCNC: 2.1 MG/DL (ref 0.57–1)
DEPRECATED RDW RBC AUTO: 51.8 FL (ref 37–54)
DEPRECATED RDW RBC AUTO: 53.6 FL (ref 37–54)
ERYTHROCYTE [DISTWIDTH] IN BLOOD BY AUTOMATED COUNT: 16.5 % (ref 12.3–15.4)
ERYTHROCYTE [DISTWIDTH] IN BLOOD BY AUTOMATED COUNT: 16.8 % (ref 12.3–15.4)
GFR SERPL CREATININE-BSD FRML MDRD: 23 ML/MIN/1.73
GFR SERPL CREATININE-BSD FRML MDRD: 26 ML/MIN/1.73
GFR SERPL CREATININE-BSD FRML MDRD: 27 ML/MIN/1.73
GLOBULIN UR ELPH-MCNC: 2.9 GM/DL
GLUCOSE BLD-MCNC: 210 MG/DL (ref 65–99)
GLUCOSE BLD-MCNC: 239 MG/DL (ref 65–99)
GLUCOSE BLD-MCNC: 45 MG/DL (ref 65–99)
GLUCOSE BLDC GLUCOMTR-MCNC: 190 MG/DL (ref 70–130)
GLUCOSE BLDC GLUCOMTR-MCNC: 212 MG/DL (ref 70–130)
GLUCOSE BLDC GLUCOMTR-MCNC: 248 MG/DL (ref 70–130)
GLUCOSE BLDC GLUCOMTR-MCNC: 249 MG/DL (ref 70–130)
GLUCOSE BLDC GLUCOMTR-MCNC: 55 MG/DL (ref 70–130)
GLUCOSE BLDC GLUCOMTR-MCNC: 70 MG/DL (ref 70–130)
GLUCOSE BLDC GLUCOMTR-MCNC: 72 MG/DL (ref 70–130)
HAPTOGLOB SERPL-MCNC: 255 MG/DL (ref 30–200)
HCT VFR BLD AUTO: 21.8 % (ref 34–46.6)
HCT VFR BLD AUTO: 23.4 % (ref 34–46.6)
HCT VFR BLD AUTO: 24.6 % (ref 34–46.6)
HCT VFR BLD AUTO: 26.4 % (ref 34–46.6)
HGB BLD-MCNC: 6.8 G/DL (ref 12–15.9)
HGB BLD-MCNC: 7.1 G/DL (ref 12–15.9)
HGB BLD-MCNC: 7.5 G/DL (ref 12–15.9)
HGB BLD-MCNC: 8.3 G/DL (ref 12–15.9)
IRON 24H UR-MRATE: 27 MCG/DL (ref 37–145)
IRON SATN MFR SERPL: 10 % (ref 20–50)
LDH SERPL-CCNC: 172 U/L (ref 135–214)
MAGNESIUM SERPL-MCNC: 2.3 MG/DL (ref 1.6–2.4)
MAGNESIUM SERPL-MCNC: 2.3 MG/DL (ref 1.6–2.4)
MCH RBC QN AUTO: 26.5 PG (ref 26.6–33)
MCH RBC QN AUTO: 26.8 PG (ref 26.6–33)
MCHC RBC AUTO-ENTMCNC: 30.3 G/DL (ref 31.5–35.7)
MCHC RBC AUTO-ENTMCNC: 31.2 G/DL (ref 31.5–35.7)
MCV RBC AUTO: 85.8 FL (ref 79–97)
MCV RBC AUTO: 87.3 FL (ref 79–97)
PHOSPHATE SERPL-MCNC: 4.1 MG/DL (ref 2.5–4.5)
PLATELET # BLD AUTO: 145 10*3/MM3 (ref 140–450)
PLATELET # BLD AUTO: 172 10*3/MM3 (ref 140–450)
PMV BLD AUTO: 11.7 FL (ref 6–12)
PMV BLD AUTO: 12.2 FL (ref 6–12)
POTASSIUM BLD-SCNC: 4.3 MMOL/L (ref 3.5–5.2)
POTASSIUM BLD-SCNC: 4.6 MMOL/L (ref 3.5–5.2)
POTASSIUM BLD-SCNC: 5.1 MMOL/L (ref 3.5–5.2)
PROT SERPL-MCNC: 5.6 G/DL (ref 6–8.5)
PROT SERPL-MCNC: 6.1 G/DL (ref 6–8.5)
RBC # BLD AUTO: 2.54 10*6/MM3 (ref 3.77–5.28)
RBC # BLD AUTO: 2.68 10*6/MM3 (ref 3.77–5.28)
RH BLD: POSITIVE
RH BLD: POSITIVE
SODIUM BLD-SCNC: 131 MMOL/L (ref 136–145)
SODIUM BLD-SCNC: 133 MMOL/L (ref 136–145)
SODIUM BLD-SCNC: 136 MMOL/L (ref 136–145)
T&S EXPIRATION DATE: NORMAL
TIBC SERPL-MCNC: 270 MCG/DL (ref 298–536)
TRANSFERRIN SERPL-MCNC: 181 MG/DL (ref 200–360)
TROPONIN T SERPL-MCNC: 0.34 NG/ML (ref 0–0.03)
WBC NRBC COR # BLD: 5.83 10*3/MM3 (ref 3.4–10.8)
WBC NRBC COR # BLD: 7.19 10*3/MM3 (ref 3.4–10.8)

## 2019-05-27 PROCEDURE — 85027 COMPLETE CBC AUTOMATED: CPT | Performed by: INTERNAL MEDICINE

## 2019-05-27 PROCEDURE — 84484 ASSAY OF TROPONIN QUANT: CPT | Performed by: INTERNAL MEDICINE

## 2019-05-27 PROCEDURE — 82330 ASSAY OF CALCIUM: CPT | Performed by: INTERNAL MEDICINE

## 2019-05-27 PROCEDURE — 83735 ASSAY OF MAGNESIUM: CPT | Performed by: INTERNAL MEDICINE

## 2019-05-27 PROCEDURE — 86923 COMPATIBILITY TEST ELECTRIC: CPT

## 2019-05-27 PROCEDURE — 99231 SBSQ HOSP IP/OBS SF/LOW 25: CPT | Performed by: THORACIC SURGERY (CARDIOTHORACIC VASCULAR SURGERY)

## 2019-05-27 PROCEDURE — 84466 ASSAY OF TRANSFERRIN: CPT | Performed by: INTERNAL MEDICINE

## 2019-05-27 PROCEDURE — 36556 INSERT NON-TUNNEL CV CATH: CPT | Performed by: INTERNAL MEDICINE

## 2019-05-27 PROCEDURE — 74176 CT ABD & PELVIS W/O CONTRAST: CPT

## 2019-05-27 PROCEDURE — 86900 BLOOD TYPING SEROLOGIC ABO: CPT

## 2019-05-27 PROCEDURE — 83010 ASSAY OF HAPTOGLOBIN QUANT: CPT | Performed by: INTERNAL MEDICINE

## 2019-05-27 PROCEDURE — 93010 ELECTROCARDIOGRAM REPORT: CPT | Performed by: INTERNAL MEDICINE

## 2019-05-27 PROCEDURE — 84100 ASSAY OF PHOSPHORUS: CPT | Performed by: INTERNAL MEDICINE

## 2019-05-27 PROCEDURE — 99291 CRITICAL CARE FIRST HOUR: CPT | Performed by: INTERNAL MEDICINE

## 2019-05-27 PROCEDURE — 36430 TRANSFUSION BLD/BLD COMPNT: CPT

## 2019-05-27 PROCEDURE — 80076 HEPATIC FUNCTION PANEL: CPT | Performed by: INTERNAL MEDICINE

## 2019-05-27 PROCEDURE — 82962 GLUCOSE BLOOD TEST: CPT

## 2019-05-27 PROCEDURE — 25010000002 MORPHINE PER 10 MG: Performed by: NURSE PRACTITIONER

## 2019-05-27 PROCEDURE — 92950 HEART/LUNG RESUSCITATION CPR: CPT

## 2019-05-27 PROCEDURE — P9016 RBC LEUKOCYTES REDUCED: HCPCS

## 2019-05-27 PROCEDURE — 85018 HEMOGLOBIN: CPT | Performed by: NURSE PRACTITIONER

## 2019-05-27 PROCEDURE — 93005 ELECTROCARDIOGRAM TRACING: CPT | Performed by: INTERNAL MEDICINE

## 2019-05-27 PROCEDURE — 86901 BLOOD TYPING SEROLOGIC RH(D): CPT | Performed by: NURSE PRACTITIONER

## 2019-05-27 PROCEDURE — 80053 COMPREHEN METABOLIC PANEL: CPT | Performed by: INTERNAL MEDICINE

## 2019-05-27 PROCEDURE — 05HM33Z INSERTION OF INFUSION DEVICE INTO RIGHT INTERNAL JUGULAR VEIN, PERCUTANEOUS APPROACH: ICD-10-PCS | Performed by: INTERNAL MEDICINE

## 2019-05-27 PROCEDURE — 86850 RBC ANTIBODY SCREEN: CPT | Performed by: NURSE PRACTITIONER

## 2019-05-27 PROCEDURE — 83615 LACTATE (LD) (LDH) ENZYME: CPT | Performed by: INTERNAL MEDICINE

## 2019-05-27 PROCEDURE — 99233 SBSQ HOSP IP/OBS HIGH 50: CPT | Performed by: INTERNAL MEDICINE

## 2019-05-27 PROCEDURE — 71045 X-RAY EXAM CHEST 1 VIEW: CPT

## 2019-05-27 PROCEDURE — 25010000002 FUROSEMIDE PER 20 MG: Performed by: INTERNAL MEDICINE

## 2019-05-27 PROCEDURE — 83540 ASSAY OF IRON: CPT | Performed by: INTERNAL MEDICINE

## 2019-05-27 PROCEDURE — 25010000002 MIDAZOLAM PER 1 MG: Performed by: INTERNAL MEDICINE

## 2019-05-27 PROCEDURE — 25010000002 ONDANSETRON PER 1 MG: Performed by: FAMILY MEDICINE

## 2019-05-27 PROCEDURE — 85014 HEMATOCRIT: CPT | Performed by: INTERNAL MEDICINE

## 2019-05-27 PROCEDURE — 63710000001 INSULIN LISPRO (HUMAN) PER 5 UNITS: Performed by: INTERNAL MEDICINE

## 2019-05-27 PROCEDURE — 85018 HEMOGLOBIN: CPT | Performed by: INTERNAL MEDICINE

## 2019-05-27 PROCEDURE — 25010000002 HEPARIN (PORCINE) PER 1000 UNITS: Performed by: INTERNAL MEDICINE

## 2019-05-27 PROCEDURE — 85014 HEMATOCRIT: CPT | Performed by: NURSE PRACTITIONER

## 2019-05-27 PROCEDURE — 86900 BLOOD TYPING SEROLOGIC ABO: CPT | Performed by: NURSE PRACTITIONER

## 2019-05-27 PROCEDURE — 85060 BLOOD SMEAR INTERPRETATION: CPT | Performed by: INTERNAL MEDICINE

## 2019-05-27 RX ORDER — NICOTINE POLACRILEX 4 MG
15 LOZENGE BUCCAL
Status: DISCONTINUED | OUTPATIENT
Start: 2019-05-27 | End: 2019-06-05 | Stop reason: HOSPADM

## 2019-05-27 RX ORDER — FUROSEMIDE 10 MG/ML
40 INJECTION INTRAMUSCULAR; INTRAVENOUS ONCE
Status: COMPLETED | OUTPATIENT
Start: 2019-05-27 | End: 2019-05-27

## 2019-05-27 RX ORDER — ROSUVASTATIN CALCIUM 20 MG/1
20 TABLET, COATED ORAL NIGHTLY
Status: DISCONTINUED | OUTPATIENT
Start: 2019-05-27 | End: 2019-06-05 | Stop reason: HOSPADM

## 2019-05-27 RX ORDER — MIDAZOLAM HYDROCHLORIDE 1 MG/ML
2 INJECTION INTRAMUSCULAR; INTRAVENOUS ONCE
Status: COMPLETED | OUTPATIENT
Start: 2019-05-27 | End: 2019-05-27

## 2019-05-27 RX ORDER — DEXTROSE MONOHYDRATE 25 G/50ML
25 INJECTION, SOLUTION INTRAVENOUS
Status: DISCONTINUED | OUTPATIENT
Start: 2019-05-27 | End: 2019-06-05 | Stop reason: HOSPADM

## 2019-05-27 RX ORDER — MORPHINE SULFATE 2 MG/ML
2 INJECTION, SOLUTION INTRAMUSCULAR; INTRAVENOUS ONCE
Status: COMPLETED | OUTPATIENT
Start: 2019-05-27 | End: 2019-05-27

## 2019-05-27 RX ORDER — MIDAZOLAM HYDROCHLORIDE 1 MG/ML
INJECTION INTRAMUSCULAR; INTRAVENOUS
Status: DISPENSED
Start: 2019-05-27 | End: 2019-05-28

## 2019-05-27 RX ORDER — PROMETHAZINE HYDROCHLORIDE 25 MG/ML
12.5 INJECTION, SOLUTION INTRAMUSCULAR; INTRAVENOUS EVERY 6 HOURS PRN
Status: DISCONTINUED | OUTPATIENT
Start: 2019-05-27 | End: 2019-06-05 | Stop reason: HOSPADM

## 2019-05-27 RX ORDER — NITROGLYCERIN 20 MG/100ML
5-200 INJECTION INTRAVENOUS
Status: DISCONTINUED | OUTPATIENT
Start: 2019-05-27 | End: 2019-05-29

## 2019-05-27 RX ADMIN — INSULIN LISPRO 20 UNITS: 100 INJECTION, SUSPENSION SUBCUTANEOUS at 12:04

## 2019-05-27 RX ADMIN — POLYETHYLENE GLYCOL 3350 17 G: 17 POWDER, FOR SOLUTION ORAL at 09:13

## 2019-05-27 RX ADMIN — INSULIN LISPRO 2 UNITS: 100 INJECTION, SOLUTION INTRAVENOUS; SUBCUTANEOUS at 17:37

## 2019-05-27 RX ADMIN — QUETIAPINE FUMARATE 12.5 MG: 25 TABLET ORAL at 20:47

## 2019-05-27 RX ADMIN — NITROGLYCERIN 10 MCG/MIN: 20 INJECTION INTRAVENOUS at 14:20

## 2019-05-27 RX ADMIN — DOCUSATE SODIUM 100 MG: 100 CAPSULE, LIQUID FILLED ORAL at 09:13

## 2019-05-27 RX ADMIN — INSULIN LISPRO 6 UNITS: 100 INJECTION, SOLUTION INTRAVENOUS; SUBCUTANEOUS at 20:47

## 2019-05-27 RX ADMIN — MIDAZOLAM HYDROCHLORIDE 1 MG: 1 INJECTION, SOLUTION INTRAMUSCULAR; INTRAVENOUS at 16:49

## 2019-05-27 RX ADMIN — HYDROCODONE BITARTRATE AND ACETAMINOPHEN 1 TABLET: 5; 325 TABLET ORAL at 03:27

## 2019-05-27 RX ADMIN — FUROSEMIDE 40 MG: 10 INJECTION, SOLUTION INTRAMUSCULAR; INTRAVENOUS at 17:37

## 2019-05-27 RX ADMIN — ROSUVASTATIN CALCIUM 20 MG: 20 TABLET, FILM COATED ORAL at 20:47

## 2019-05-27 RX ADMIN — ONDANSETRON 4 MG: 2 INJECTION INTRAMUSCULAR; INTRAVENOUS at 14:09

## 2019-05-27 RX ADMIN — PANTOPRAZOLE SODIUM 40 MG: 40 TABLET, DELAYED RELEASE ORAL at 09:13

## 2019-05-27 RX ADMIN — ASPIRIN 81 MG CHEWABLE TABLET 81 MG: 81 TABLET CHEWABLE at 09:13

## 2019-05-27 RX ADMIN — HEPARIN SODIUM 5000 UNITS: 5000 INJECTION INTRAVENOUS; SUBCUTANEOUS at 20:46

## 2019-05-27 RX ADMIN — PANTOPRAZOLE SODIUM 40 MG: 40 TABLET, DELAYED RELEASE ORAL at 20:47

## 2019-05-27 RX ADMIN — Medication 325 MG: at 09:13

## 2019-05-27 RX ADMIN — INSULIN LISPRO 4 UNITS: 100 INJECTION, SOLUTION INTRAVENOUS; SUBCUTANEOUS at 12:04

## 2019-05-27 RX ADMIN — GABAPENTIN 300 MG: 300 CAPSULE ORAL at 20:47

## 2019-05-27 RX ADMIN — HYDROCODONE BITARTRATE AND ACETAMINOPHEN 1 TABLET: 5; 325 TABLET ORAL at 20:47

## 2019-05-27 RX ADMIN — MELATONIN TAB 5 MG 5 MG: 5 TAB at 20:47

## 2019-05-27 RX ADMIN — GABAPENTIN 300 MG: 300 CAPSULE ORAL at 09:10

## 2019-05-27 RX ADMIN — MORPHINE SULFATE 2 MG: 2 INJECTION, SOLUTION INTRAMUSCULAR; INTRAVENOUS at 14:18

## 2019-05-28 ENCOUNTER — APPOINTMENT (OUTPATIENT)
Dept: GENERAL RADIOLOGY | Facility: HOSPITAL | Age: 79
End: 2019-05-28

## 2019-05-28 PROBLEM — I20.0 UNSTABLE ANGINA (HCC): Status: RESOLVED | Noted: 2019-05-28 | Resolved: 2019-05-28

## 2019-05-28 PROBLEM — R00.1 BRADYCARDIA: Status: ACTIVE | Noted: 2019-05-28

## 2019-05-28 PROBLEM — Q44.4 CHOLEDOCHAL CYST: Status: RESOLVED | Noted: 2019-05-19 | Resolved: 2019-05-28

## 2019-05-28 PROBLEM — I20.0 UNSTABLE ANGINA (HCC): Status: ACTIVE | Noted: 2019-05-28

## 2019-05-28 PROBLEM — M48.061 LUMBAR STENOSIS: Status: RESOLVED | Noted: 2018-11-15 | Resolved: 2019-05-28

## 2019-05-28 PROBLEM — R11.2 INTRACTABLE VOMITING WITH NAUSEA: Status: RESOLVED | Noted: 2019-05-18 | Resolved: 2019-05-28

## 2019-05-28 LAB
ABO + RH BLD: NORMAL
ALBUMIN SERPL-MCNC: 3.2 G/DL (ref 3.5–5.2)
ALBUMIN/GLOB SERPL: 1.1 G/DL
ALP SERPL-CCNC: 78 U/L (ref 39–117)
ALT SERPL W P-5'-P-CCNC: 33 U/L (ref 1–33)
ANION GAP SERPL CALCULATED.3IONS-SCNC: 9 MMOL/L
APTT PPP: 31.6 SECONDS (ref 85–120)
AST SERPL-CCNC: 20 U/L (ref 1–32)
BASOPHILS # BLD AUTO: 0.01 10*3/MM3 (ref 0–0.2)
BASOPHILS # BLD AUTO: 0.01 10*3/MM3 (ref 0–0.2)
BASOPHILS NFR BLD AUTO: 0.2 % (ref 0–1.5)
BASOPHILS NFR BLD AUTO: 0.2 % (ref 0–1.5)
BH BB BLOOD EXPIRATION DATE: NORMAL
BH BB BLOOD TYPE BARCODE: 6200
BH BB DISPENSE STATUS: NORMAL
BH BB PRODUCT CODE: NORMAL
BH BB UNIT NUMBER: NORMAL
BILIRUB SERPL-MCNC: 0.7 MG/DL (ref 0.2–1.2)
BUN BLD-MCNC: 67 MG/DL (ref 8–23)
BUN/CREAT SERPL: 36 (ref 7–25)
CALCIUM SPEC-SCNC: 9.4 MG/DL (ref 8.6–10.5)
CHLORIDE SERPL-SCNC: 94 MMOL/L (ref 98–107)
CO2 SERPL-SCNC: 30 MMOL/L (ref 22–29)
CREAT BLD-MCNC: 1.86 MG/DL (ref 0.57–1)
CYTOLOGIST CVX/VAG CYTO: NORMAL
DEPRECATED RDW RBC AUTO: 53.6 FL (ref 37–54)
DEPRECATED RDW RBC AUTO: 54.7 FL (ref 37–54)
EOSINOPHIL # BLD AUTO: 0.1 10*3/MM3 (ref 0–0.4)
EOSINOPHIL # BLD AUTO: 0.1 10*3/MM3 (ref 0–0.4)
EOSINOPHIL NFR BLD AUTO: 1.5 % (ref 0.3–6.2)
EOSINOPHIL NFR BLD AUTO: 1.7 % (ref 0.3–6.2)
ERYTHROCYTE [DISTWIDTH] IN BLOOD BY AUTOMATED COUNT: 17 % (ref 12.3–15.4)
ERYTHROCYTE [DISTWIDTH] IN BLOOD BY AUTOMATED COUNT: 17.2 % (ref 12.3–15.4)
GFR SERPL CREATININE-BSD FRML MDRD: 26 ML/MIN/1.73
GLOBULIN UR ELPH-MCNC: 2.8 GM/DL
GLUCOSE BLD-MCNC: 93 MG/DL (ref 65–99)
GLUCOSE BLDC GLUCOMTR-MCNC: 113 MG/DL (ref 70–130)
GLUCOSE BLDC GLUCOMTR-MCNC: 206 MG/DL (ref 70–130)
GLUCOSE BLDC GLUCOMTR-MCNC: 224 MG/DL (ref 70–130)
GLUCOSE BLDC GLUCOMTR-MCNC: 90 MG/DL (ref 70–130)
HCT VFR BLD AUTO: 27 % (ref 34–46.6)
HCT VFR BLD AUTO: 27.1 % (ref 34–46.6)
HGB BLD-MCNC: 8.2 G/DL (ref 12–15.9)
HGB BLD-MCNC: 8.3 G/DL (ref 12–15.9)
IMM GRANULOCYTES # BLD AUTO: 0.04 10*3/MM3 (ref 0–0.05)
IMM GRANULOCYTES # BLD AUTO: 0.05 10*3/MM3 (ref 0–0.05)
IMM GRANULOCYTES NFR BLD AUTO: 0.7 % (ref 0–0.5)
IMM GRANULOCYTES NFR BLD AUTO: 0.8 % (ref 0–0.5)
INR PPP: 1.22 (ref 0.85–1.16)
LYMPHOCYTES # BLD AUTO: 0.47 10*3/MM3 (ref 0.7–3.1)
LYMPHOCYTES # BLD AUTO: 0.68 10*3/MM3 (ref 0.7–3.1)
LYMPHOCYTES NFR BLD AUTO: 11.5 % (ref 19.6–45.3)
LYMPHOCYTES NFR BLD AUTO: 7.3 % (ref 19.6–45.3)
MAGNESIUM SERPL-MCNC: 2.3 MG/DL (ref 1.6–2.4)
MCH RBC QN AUTO: 26.4 PG (ref 26.6–33)
MCH RBC QN AUTO: 26.7 PG (ref 26.6–33)
MCHC RBC AUTO-ENTMCNC: 30.3 G/DL (ref 31.5–35.7)
MCHC RBC AUTO-ENTMCNC: 30.7 G/DL (ref 31.5–35.7)
MCV RBC AUTO: 86.8 FL (ref 79–97)
MCV RBC AUTO: 87.1 FL (ref 79–97)
MONOCYTES # BLD AUTO: 0.45 10*3/MM3 (ref 0.1–0.9)
MONOCYTES # BLD AUTO: 0.49 10*3/MM3 (ref 0.1–0.9)
MONOCYTES NFR BLD AUTO: 7 % (ref 5–12)
MONOCYTES NFR BLD AUTO: 8.3 % (ref 5–12)
NEUTROPHILS # BLD AUTO: 4.64 10*3/MM3 (ref 1.7–7)
NEUTROPHILS # BLD AUTO: 5.43 10*3/MM3 (ref 1.7–7)
NEUTROPHILS NFR BLD AUTO: 78.3 % (ref 42.7–76)
NEUTROPHILS NFR BLD AUTO: 84 % (ref 42.7–76)
PATH INTERP BLD-IMP: NORMAL
PHOSPHATE SERPL-MCNC: 4.2 MG/DL (ref 2.5–4.5)
PLATELET # BLD AUTO: 164 10*3/MM3 (ref 140–450)
PLATELET # BLD AUTO: 169 10*3/MM3 (ref 140–450)
PMV BLD AUTO: 11.9 FL (ref 6–12)
PMV BLD AUTO: 12.3 FL (ref 6–12)
POTASSIUM BLD-SCNC: 4.9 MMOL/L (ref 3.5–5.2)
PROT SERPL-MCNC: 6 G/DL (ref 6–8.5)
PROTHROMBIN TIME: 14.8 SECONDS (ref 11.2–14.3)
RBC # BLD AUTO: 3.11 10*6/MM3 (ref 3.77–5.28)
RBC # BLD AUTO: 3.11 10*6/MM3 (ref 3.77–5.28)
SODIUM BLD-SCNC: 133 MMOL/L (ref 136–145)
TROPONIN T SERPL-MCNC: 0.32 NG/ML (ref 0–0.03)
UFH PPP CHRO-ACNC: 0.1 IU/ML (ref 0.3–0.7)
UFH PPP CHRO-ACNC: 0.18 IU/ML (ref 0.3–0.7)
UFH PPP CHRO-ACNC: 0.22 IU/ML (ref 0.3–0.7)
UNIT  ABO: NORMAL
UNIT  RH: NORMAL
WBC NRBC COR # BLD: 5.92 10*3/MM3 (ref 3.4–10.8)
WBC NRBC COR # BLD: 6.46 10*3/MM3 (ref 3.4–10.8)

## 2019-05-28 PROCEDURE — 85520 HEPARIN ASSAY: CPT

## 2019-05-28 PROCEDURE — 25010000002 MORPHINE PER 10 MG: Performed by: INTERNAL MEDICINE

## 2019-05-28 PROCEDURE — 99233 SBSQ HOSP IP/OBS HIGH 50: CPT | Performed by: INTERNAL MEDICINE

## 2019-05-28 PROCEDURE — 99231 SBSQ HOSP IP/OBS SF/LOW 25: CPT | Performed by: THORACIC SURGERY (CARDIOTHORACIC VASCULAR SURGERY)

## 2019-05-28 PROCEDURE — 82962 GLUCOSE BLOOD TEST: CPT

## 2019-05-28 PROCEDURE — 25010000002 DOPAMINE PER 40 MG

## 2019-05-28 PROCEDURE — 85520 HEPARIN ASSAY: CPT | Performed by: INTERNAL MEDICINE

## 2019-05-28 PROCEDURE — 80053 COMPREHEN METABOLIC PANEL: CPT | Performed by: INTERNAL MEDICINE

## 2019-05-28 PROCEDURE — P9016 RBC LEUKOCYTES REDUCED: HCPCS

## 2019-05-28 PROCEDURE — C1894 INTRO/SHEATH, NON-LASER: HCPCS | Performed by: INTERNAL MEDICINE

## 2019-05-28 PROCEDURE — 5A1223Z PERFORMANCE OF CARDIAC PACING, CONTINUOUS: ICD-10-PCS | Performed by: INTERNAL MEDICINE

## 2019-05-28 PROCEDURE — 93005 ELECTROCARDIOGRAM TRACING: CPT | Performed by: INTERNAL MEDICINE

## 2019-05-28 PROCEDURE — 25010000002 HEPARIN (PORCINE) PER 1000 UNITS: Performed by: INTERNAL MEDICINE

## 2019-05-28 PROCEDURE — 85610 PROTHROMBIN TIME: CPT | Performed by: INTERNAL MEDICINE

## 2019-05-28 PROCEDURE — 71045 X-RAY EXAM CHEST 1 VIEW: CPT

## 2019-05-28 PROCEDURE — 84100 ASSAY OF PHOSPHORUS: CPT | Performed by: INTERNAL MEDICINE

## 2019-05-28 PROCEDURE — 97530 THERAPEUTIC ACTIVITIES: CPT

## 2019-05-28 PROCEDURE — 25010000002 HEPARIN (PORCINE) IN NACL 25000-0.45 UT/250ML-% SOLUTION: Performed by: INTERNAL MEDICINE

## 2019-05-28 PROCEDURE — 85730 THROMBOPLASTIN TIME PARTIAL: CPT | Performed by: INTERNAL MEDICINE

## 2019-05-28 PROCEDURE — 83735 ASSAY OF MAGNESIUM: CPT | Performed by: INTERNAL MEDICINE

## 2019-05-28 PROCEDURE — 33210 INSERT ELECTRD/PM CATH SNGL: CPT | Performed by: INTERNAL MEDICINE

## 2019-05-28 PROCEDURE — 99291 CRITICAL CARE FIRST HOUR: CPT | Performed by: INTERNAL MEDICINE

## 2019-05-28 PROCEDURE — 84484 ASSAY OF TROPONIN QUANT: CPT | Performed by: INTERNAL MEDICINE

## 2019-05-28 PROCEDURE — 86900 BLOOD TYPING SEROLOGIC ABO: CPT

## 2019-05-28 PROCEDURE — 36430 TRANSFUSION BLD/BLD COMPNT: CPT

## 2019-05-28 PROCEDURE — 85025 COMPLETE CBC W/AUTO DIFF WBC: CPT | Performed by: INTERNAL MEDICINE

## 2019-05-28 PROCEDURE — 25010000002 FENTANYL CITRATE (PF) 100 MCG/2ML SOLUTION: Performed by: INTERNAL MEDICINE

## 2019-05-28 PROCEDURE — 63710000001 INSULIN LISPRO PROTAMINE-INSULIN LISPRO (75-25) 100 UNIT/ML SUSPENSION 10 ML VIAL: Performed by: INTERNAL MEDICINE

## 2019-05-28 PROCEDURE — 25010000002 MIDAZOLAM PER 1 MG: Performed by: INTERNAL MEDICINE

## 2019-05-28 PROCEDURE — 97164 PT RE-EVAL EST PLAN CARE: CPT

## 2019-05-28 RX ORDER — HEPARIN SODIUM 1000 [USP'U]/ML
30 INJECTION, SOLUTION INTRAVENOUS; SUBCUTANEOUS AS NEEDED
Status: DISCONTINUED | OUTPATIENT
Start: 2019-05-28 | End: 2019-05-28

## 2019-05-28 RX ORDER — HEPARIN SODIUM 1000 [USP'U]/ML
4000 INJECTION, SOLUTION INTRAVENOUS; SUBCUTANEOUS ONCE
Status: COMPLETED | OUTPATIENT
Start: 2019-05-28 | End: 2019-05-28

## 2019-05-28 RX ORDER — HEPARIN SODIUM 1000 [USP'U]/ML
60 INJECTION, SOLUTION INTRAVENOUS; SUBCUTANEOUS AS NEEDED
Status: DISCONTINUED | OUTPATIENT
Start: 2019-05-28 | End: 2019-05-28

## 2019-05-28 RX ORDER — LIDOCAINE HYDROCHLORIDE 10 MG/ML
INJECTION, SOLUTION EPIDURAL; INFILTRATION; INTRACAUDAL; PERINEURAL AS NEEDED
Status: DISCONTINUED | OUTPATIENT
Start: 2019-05-28 | End: 2019-05-28 | Stop reason: HOSPADM

## 2019-05-28 RX ORDER — CLOPIDOGREL BISULFATE 75 MG/1
75 TABLET ORAL DAILY
Status: DISCONTINUED | OUTPATIENT
Start: 2019-05-29 | End: 2019-06-03

## 2019-05-28 RX ORDER — MORPHINE SULFATE 2 MG/ML
2 INJECTION, SOLUTION INTRAMUSCULAR; INTRAVENOUS
Status: DISCONTINUED | OUTPATIENT
Start: 2019-05-28 | End: 2019-05-30

## 2019-05-28 RX ORDER — MORPHINE SULFATE 2 MG/ML
2 INJECTION, SOLUTION INTRAMUSCULAR; INTRAVENOUS EVERY 4 HOURS PRN
Status: COMPLETED | OUTPATIENT
Start: 2019-05-28 | End: 2019-05-28

## 2019-05-28 RX ORDER — MIDAZOLAM HYDROCHLORIDE 1 MG/ML
INJECTION INTRAMUSCULAR; INTRAVENOUS AS NEEDED
Status: DISCONTINUED | OUTPATIENT
Start: 2019-05-28 | End: 2019-05-28 | Stop reason: HOSPADM

## 2019-05-28 RX ORDER — DOPAMINE HYDROCHLORIDE 160 MG/100ML
INJECTION, SOLUTION INTRAVENOUS
Status: COMPLETED
Start: 2019-05-28 | End: 2019-05-28

## 2019-05-28 RX ORDER — NOREPINEPHRINE BIT/0.9 % NACL 8 MG/250ML
.02-.3 INFUSION BOTTLE (ML) INTRAVENOUS
Status: DISCONTINUED | OUTPATIENT
Start: 2019-05-28 | End: 2019-05-29

## 2019-05-28 RX ORDER — SODIUM CHLORIDE 9 MG/ML
INJECTION, SOLUTION INTRAVENOUS CONTINUOUS PRN
Status: COMPLETED | OUTPATIENT
Start: 2019-05-28 | End: 2019-05-28

## 2019-05-28 RX ORDER — DOPAMINE HYDROCHLORIDE 160 MG/100ML
2-20 INJECTION, SOLUTION INTRAVENOUS
Status: DISCONTINUED | OUTPATIENT
Start: 2019-05-28 | End: 2019-05-30

## 2019-05-28 RX ORDER — CLOPIDOGREL BISULFATE 75 MG/1
600 TABLET ORAL DAILY
Status: DISCONTINUED | OUTPATIENT
Start: 2019-05-28 | End: 2019-05-28

## 2019-05-28 RX ORDER — CLOPIDOGREL BISULFATE 75 MG/1
600 TABLET ORAL ONCE
Status: COMPLETED | OUTPATIENT
Start: 2019-05-28 | End: 2019-05-28

## 2019-05-28 RX ORDER — FENTANYL CITRATE 50 UG/ML
INJECTION, SOLUTION INTRAMUSCULAR; INTRAVENOUS AS NEEDED
Status: DISCONTINUED | OUTPATIENT
Start: 2019-05-28 | End: 2019-05-28 | Stop reason: HOSPADM

## 2019-05-28 RX ADMIN — HEPARIN SODIUM 12 UNITS/KG/HR: 10000 INJECTION, SOLUTION INTRAVENOUS at 10:07

## 2019-05-28 RX ADMIN — DOPAMINE HYDROCHLORIDE 2 MCG/KG/MIN: 160 INJECTION, SOLUTION INTRAVENOUS at 09:24

## 2019-05-28 RX ADMIN — Medication 325 MG: at 08:56

## 2019-05-28 RX ADMIN — ASPIRIN 81 MG CHEWABLE TABLET 81 MG: 81 TABLET CHEWABLE at 08:56

## 2019-05-28 RX ADMIN — INSULIN LISPRO 4 UNITS: 100 INJECTION, SOLUTION INTRAVENOUS; SUBCUTANEOUS at 21:34

## 2019-05-28 RX ADMIN — PANTOPRAZOLE SODIUM 40 MG: 40 TABLET, DELAYED RELEASE ORAL at 21:33

## 2019-05-28 RX ADMIN — GABAPENTIN 300 MG: 300 CAPSULE ORAL at 21:33

## 2019-05-28 RX ADMIN — CLOPIDOGREL BISULFATE 600 MG: 75 TABLET ORAL at 10:01

## 2019-05-28 RX ADMIN — MORPHINE SULFATE 2 MG: 2 INJECTION, SOLUTION INTRAMUSCULAR; INTRAVENOUS at 15:08

## 2019-05-28 RX ADMIN — MELATONIN TAB 5 MG 5 MG: 5 TAB at 21:33

## 2019-05-28 RX ADMIN — ATROPINE SULFATE 0.5 MG: 0.1 INJECTION PARENTERAL at 09:20

## 2019-05-28 RX ADMIN — MORPHINE SULFATE 2 MG: 2 INJECTION, SOLUTION INTRAMUSCULAR; INTRAVENOUS at 10:11

## 2019-05-28 RX ADMIN — ROSUVASTATIN CALCIUM 20 MG: 20 TABLET, FILM COATED ORAL at 21:33

## 2019-05-28 RX ADMIN — PANTOPRAZOLE SODIUM 40 MG: 40 TABLET, DELAYED RELEASE ORAL at 08:56

## 2019-05-28 RX ADMIN — HEPARIN SODIUM 5000 UNITS: 5000 INJECTION INTRAVENOUS; SUBCUTANEOUS at 06:36

## 2019-05-28 RX ADMIN — INSULIN LISPRO 20 UNITS: 100 INJECTION, SUSPENSION SUBCUTANEOUS at 21:33

## 2019-05-28 RX ADMIN — QUETIAPINE FUMARATE 12.5 MG: 25 TABLET ORAL at 21:33

## 2019-05-28 RX ADMIN — INSULIN LISPRO 4 UNITS: 100 INJECTION, SOLUTION INTRAVENOUS; SUBCUTANEOUS at 17:58

## 2019-05-28 RX ADMIN — HEPARIN SODIUM 4000 UNITS: 1000 INJECTION, SOLUTION INTRAVENOUS; SUBCUTANEOUS at 10:09

## 2019-05-28 RX ADMIN — GABAPENTIN 300 MG: 300 CAPSULE ORAL at 12:50

## 2019-05-28 RX ADMIN — HYDROCODONE BITARTRATE AND ACETAMINOPHEN 1 TABLET: 5; 325 TABLET ORAL at 21:33

## 2019-05-29 LAB
ABO + RH BLD: NORMAL
ABO GROUP BLD: NORMAL
ALBUMIN SERPL-MCNC: 2.9 G/DL (ref 3.5–5.2)
ALBUMIN/GLOB SERPL: 1 G/DL
ALP SERPL-CCNC: 82 U/L (ref 39–117)
ALT SERPL W P-5'-P-CCNC: 29 U/L (ref 1–33)
ANION GAP SERPL CALCULATED.3IONS-SCNC: 13 MMOL/L
AST SERPL-CCNC: 15 U/L (ref 1–32)
BASOPHILS # BLD AUTO: 0.01 10*3/MM3 (ref 0–0.2)
BASOPHILS NFR BLD AUTO: 0.1 % (ref 0–1.5)
BH BB BLOOD EXPIRATION DATE: NORMAL
BH BB BLOOD TYPE BARCODE: 600
BH BB DISPENSE STATUS: NORMAL
BH BB PRODUCT CODE: NORMAL
BH BB UNIT NUMBER: NORMAL
BILIRUB SERPL-MCNC: 0.8 MG/DL (ref 0.2–1.2)
BLD GP AB SCN SERPL QL: NEGATIVE
BUN BLD-MCNC: 65 MG/DL (ref 8–23)
BUN/CREAT SERPL: 32.3 (ref 7–25)
CALCIUM SPEC-SCNC: 9 MG/DL (ref 8.6–10.5)
CHLORIDE SERPL-SCNC: 94 MMOL/L (ref 98–107)
CO2 SERPL-SCNC: 25 MMOL/L (ref 22–29)
CREAT BLD-MCNC: 2.01 MG/DL (ref 0.57–1)
DEPRECATED RDW RBC AUTO: 53 FL (ref 37–54)
EOSINOPHIL # BLD AUTO: 0.11 10*3/MM3 (ref 0–0.4)
EOSINOPHIL NFR BLD AUTO: 1.6 % (ref 0.3–6.2)
ERYTHROCYTE [DISTWIDTH] IN BLOOD BY AUTOMATED COUNT: 16.8 % (ref 12.3–15.4)
GFR SERPL CREATININE-BSD FRML MDRD: 24 ML/MIN/1.73
GLOBULIN UR ELPH-MCNC: 3 GM/DL
GLUCOSE BLD-MCNC: 157 MG/DL (ref 65–99)
GLUCOSE BLDC GLUCOMTR-MCNC: 104 MG/DL (ref 70–130)
GLUCOSE BLDC GLUCOMTR-MCNC: 158 MG/DL (ref 70–130)
GLUCOSE BLDC GLUCOMTR-MCNC: 172 MG/DL (ref 70–130)
GLUCOSE BLDC GLUCOMTR-MCNC: 89 MG/DL (ref 70–130)
HCT VFR BLD AUTO: 24.3 % (ref 34–46.6)
HCT VFR BLD AUTO: 27.3 % (ref 34–46.6)
HCT VFR BLD AUTO: 29.5 % (ref 34–46.6)
HGB BLD-MCNC: 7.7 G/DL (ref 12–15.9)
HGB BLD-MCNC: 8.8 G/DL (ref 12–15.9)
HGB BLD-MCNC: 9.1 G/DL (ref 12–15.9)
IMM GRANULOCYTES # BLD AUTO: 0.04 10*3/MM3 (ref 0–0.05)
IMM GRANULOCYTES NFR BLD AUTO: 0.6 % (ref 0–0.5)
LYMPHOCYTES # BLD AUTO: 0.74 10*3/MM3 (ref 0.7–3.1)
LYMPHOCYTES NFR BLD AUTO: 11 % (ref 19.6–45.3)
MCH RBC QN AUTO: 26.8 PG (ref 26.6–33)
MCHC RBC AUTO-ENTMCNC: 30.8 G/DL (ref 31.5–35.7)
MCV RBC AUTO: 86.8 FL (ref 79–97)
MONOCYTES # BLD AUTO: 0.47 10*3/MM3 (ref 0.1–0.9)
MONOCYTES NFR BLD AUTO: 7 % (ref 5–12)
NEUTROPHILS # BLD AUTO: 5.4 10*3/MM3 (ref 1.7–7)
NEUTROPHILS NFR BLD AUTO: 80.3 % (ref 42.7–76)
PLATELET # BLD AUTO: 170 10*3/MM3 (ref 140–450)
PMV BLD AUTO: 11.8 FL (ref 6–12)
POTASSIUM BLD-SCNC: 4.7 MMOL/L (ref 3.5–5.2)
POTASSIUM BLD-SCNC: 5.4 MMOL/L (ref 3.5–5.2)
PROT SERPL-MCNC: 5.9 G/DL (ref 6–8.5)
RBC # BLD AUTO: 3.4 10*6/MM3 (ref 3.77–5.28)
RH BLD: POSITIVE
SODIUM BLD-SCNC: 132 MMOL/L (ref 136–145)
T&S EXPIRATION DATE: NORMAL
UFH PPP CHRO-ACNC: 0.14 IU/ML (ref 0.3–0.7)
UFH PPP CHRO-ACNC: 0.36 IU/ML (ref 0.3–0.7)
UNIT  ABO: NORMAL
UNIT  RH: NORMAL
WBC NRBC COR # BLD: 6.73 10*3/MM3 (ref 3.4–10.8)

## 2019-05-29 PROCEDURE — C1874 STENT, COATED/COV W/DEL SYS: HCPCS | Performed by: INTERNAL MEDICINE

## 2019-05-29 PROCEDURE — C1894 INTRO/SHEATH, NON-LASER: HCPCS | Performed by: INTERNAL MEDICINE

## 2019-05-29 PROCEDURE — 25010000002 FENTANYL CITRATE (PF) 100 MCG/2ML SOLUTION: Performed by: INTERNAL MEDICINE

## 2019-05-29 PROCEDURE — 36430 TRANSFUSION BLD/BLD COMPNT: CPT

## 2019-05-29 PROCEDURE — 99233 SBSQ HOSP IP/OBS HIGH 50: CPT | Performed by: INTERNAL MEDICINE

## 2019-05-29 PROCEDURE — 99231 SBSQ HOSP IP/OBS SF/LOW 25: CPT | Performed by: THORACIC SURGERY (CARDIOTHORACIC VASCULAR SURGERY)

## 2019-05-29 PROCEDURE — 86901 BLOOD TYPING SEROLOGIC RH(D): CPT | Performed by: INTERNAL MEDICINE

## 2019-05-29 PROCEDURE — P9016 RBC LEUKOCYTES REDUCED: HCPCS

## 2019-05-29 PROCEDURE — C1725 CATH, TRANSLUMIN NON-LASER: HCPCS | Performed by: INTERNAL MEDICINE

## 2019-05-29 PROCEDURE — 86900 BLOOD TYPING SEROLOGIC ABO: CPT | Performed by: INTERNAL MEDICINE

## 2019-05-29 PROCEDURE — 86900 BLOOD TYPING SEROLOGIC ABO: CPT

## 2019-05-29 PROCEDURE — 82962 GLUCOSE BLOOD TEST: CPT

## 2019-05-29 PROCEDURE — 92933 PRQ TRLML C ATHRC ST ANGIOP1: CPT | Performed by: INTERNAL MEDICINE

## 2019-05-29 PROCEDURE — 85347 COAGULATION TIME ACTIVATED: CPT

## 2019-05-29 PROCEDURE — 25010000002 MIDAZOLAM PER 1 MG: Performed by: INTERNAL MEDICINE

## 2019-05-29 PROCEDURE — C1769 GUIDE WIRE: HCPCS | Performed by: INTERNAL MEDICINE

## 2019-05-29 PROCEDURE — C1887 CATHETER, GUIDING: HCPCS | Performed by: INTERNAL MEDICINE

## 2019-05-29 PROCEDURE — 25010000002 PROTAMINE SULFATE PER 10 MG

## 2019-05-29 PROCEDURE — 027035Z DILATION OF CORONARY ARTERY, ONE ARTERY WITH TWO DRUG-ELUTING INTRALUMINAL DEVICES, PERCUTANEOUS APPROACH: ICD-10-PCS | Performed by: INTERNAL MEDICINE

## 2019-05-29 PROCEDURE — 86850 RBC ANTIBODY SCREEN: CPT | Performed by: INTERNAL MEDICINE

## 2019-05-29 PROCEDURE — C1769 GUIDE WIRE: HCPCS

## 2019-05-29 PROCEDURE — 80069 RENAL FUNCTION PANEL: CPT | Performed by: INTERNAL MEDICINE

## 2019-05-29 PROCEDURE — 85520 HEPARIN ASSAY: CPT

## 2019-05-29 PROCEDURE — 25010000002 HEPARIN (PORCINE) IN NACL 25000-0.45 UT/250ML-% SOLUTION

## 2019-05-29 PROCEDURE — 85025 COMPLETE CBC W/AUTO DIFF WBC: CPT | Performed by: INTERNAL MEDICINE

## 2019-05-29 PROCEDURE — 25010000002 HEPARIN (PORCINE) PER 1000 UNITS

## 2019-05-29 PROCEDURE — 99291 CRITICAL CARE FIRST HOUR: CPT | Performed by: INTERNAL MEDICINE

## 2019-05-29 PROCEDURE — 80053 COMPREHEN METABOLIC PANEL: CPT | Performed by: INTERNAL MEDICINE

## 2019-05-29 PROCEDURE — 0 IOPAMIDOL PER 1 ML: Performed by: INTERNAL MEDICINE

## 2019-05-29 PROCEDURE — C1760 CLOSURE DEV, VASC: HCPCS | Performed by: INTERNAL MEDICINE

## 2019-05-29 PROCEDURE — 63710000001 INSULIN REGULAR HUMAN PER 5 UNITS: Performed by: INTERNAL MEDICINE

## 2019-05-29 PROCEDURE — 85014 HEMATOCRIT: CPT | Performed by: INTERNAL MEDICINE

## 2019-05-29 PROCEDURE — 85018 HEMOGLOBIN: CPT | Performed by: INTERNAL MEDICINE

## 2019-05-29 PROCEDURE — 25010000002 HEPARIN (PORCINE) PER 1000 UNITS: Performed by: INTERNAL MEDICINE

## 2019-05-29 PROCEDURE — C1724 CATH, TRANS ATHEREC,ROTATION: HCPCS | Performed by: INTERNAL MEDICINE

## 2019-05-29 PROCEDURE — 84132 ASSAY OF SERUM POTASSIUM: CPT | Performed by: INTERNAL MEDICINE

## 2019-05-29 PROCEDURE — C9602 PERC D-E COR STENT ATHER S: HCPCS | Performed by: INTERNAL MEDICINE

## 2019-05-29 DEVICE — XIENCE SIERRA™ EVEROLIMUS ELUTING CORONARY STENT SYSTEM 3.50 MM X 38 MM / RAPID-EXCHANGE
Type: IMPLANTABLE DEVICE | Status: FUNCTIONAL
Brand: XIENCE SIERRA™

## 2019-05-29 DEVICE — XIENCE SIERRA™ EVEROLIMUS ELUTING CORONARY STENT SYSTEM 3.00 MM X 28 MM / RAPID-EXCHANGE
Type: IMPLANTABLE DEVICE | Status: FUNCTIONAL
Brand: XIENCE SIERRA™

## 2019-05-29 RX ORDER — PROTAMINE SULFATE 10 MG/ML
INJECTION, SOLUTION INTRAVENOUS
Status: COMPLETED
Start: 2019-05-29 | End: 2019-05-29

## 2019-05-29 RX ORDER — FENTANYL CITRATE 50 UG/ML
INJECTION, SOLUTION INTRAMUSCULAR; INTRAVENOUS AS NEEDED
Status: DISCONTINUED | OUTPATIENT
Start: 2019-05-29 | End: 2019-05-29 | Stop reason: HOSPADM

## 2019-05-29 RX ORDER — HEPARIN SODIUM 1000 [USP'U]/ML
INJECTION, SOLUTION INTRAVENOUS; SUBCUTANEOUS AS NEEDED
Status: DISCONTINUED | OUTPATIENT
Start: 2019-05-29 | End: 2019-05-29 | Stop reason: HOSPADM

## 2019-05-29 RX ORDER — PROTAMINE SULFATE 10 MG/ML
40 INJECTION, SOLUTION INTRAVENOUS ONCE
Status: COMPLETED | OUTPATIENT
Start: 2019-05-29 | End: 2019-05-29

## 2019-05-29 RX ORDER — HEPARIN SODIUM 1000 [USP'U]/ML
4800 INJECTION, SOLUTION INTRAVENOUS; SUBCUTANEOUS ONCE
Status: COMPLETED | OUTPATIENT
Start: 2019-05-29 | End: 2019-05-29

## 2019-05-29 RX ORDER — MIDAZOLAM HYDROCHLORIDE 1 MG/ML
INJECTION INTRAMUSCULAR; INTRAVENOUS AS NEEDED
Status: DISCONTINUED | OUTPATIENT
Start: 2019-05-29 | End: 2019-05-29 | Stop reason: HOSPADM

## 2019-05-29 RX ORDER — SODIUM CHLORIDE 9 MG/ML
3 INJECTION, SOLUTION INTRAVENOUS CONTINUOUS
Status: ACTIVE | OUTPATIENT
Start: 2019-05-29 | End: 2019-05-29

## 2019-05-29 RX ORDER — LIDOCAINE HYDROCHLORIDE 10 MG/ML
INJECTION, SOLUTION INFILTRATION; PERINEURAL AS NEEDED
Status: DISCONTINUED | OUTPATIENT
Start: 2019-05-29 | End: 2019-05-29 | Stop reason: HOSPADM

## 2019-05-29 RX ORDER — DEXTROSE MONOHYDRATE 25 G/50ML
50 INJECTION, SOLUTION INTRAVENOUS ONCE
Status: COMPLETED | OUTPATIENT
Start: 2019-05-29 | End: 2019-05-29

## 2019-05-29 RX ORDER — SODIUM CHLORIDE 9 MG/ML
244.8 INJECTION, SOLUTION INTRAVENOUS CONTINUOUS
Status: DISCONTINUED | OUTPATIENT
Start: 2019-05-29 | End: 2019-05-29

## 2019-05-29 RX ORDER — HEPARIN SODIUM 1000 [USP'U]/ML
1600 INJECTION, SOLUTION INTRAVENOUS; SUBCUTANEOUS ONCE
Status: DISCONTINUED | OUTPATIENT
Start: 2019-05-29 | End: 2019-05-29

## 2019-05-29 RX ADMIN — PANTOPRAZOLE SODIUM 40 MG: 40 TABLET, DELAYED RELEASE ORAL at 20:42

## 2019-05-29 RX ADMIN — SODIUM CHLORIDE 244.8 ML/HR: 9 INJECTION, SOLUTION INTRAVENOUS at 14:10

## 2019-05-29 RX ADMIN — QUETIAPINE FUMARATE 12.5 MG: 25 TABLET ORAL at 20:42

## 2019-05-29 RX ADMIN — DOCUSATE SODIUM 100 MG: 100 CAPSULE, LIQUID FILLED ORAL at 20:42

## 2019-05-29 RX ADMIN — GABAPENTIN 300 MG: 300 CAPSULE ORAL at 20:43

## 2019-05-29 RX ADMIN — SODIUM CHLORIDE 244.8 ML/HR: 9 INJECTION, SOLUTION INTRAVENOUS at 10:26

## 2019-05-29 RX ADMIN — HEPARIN SODIUM 4800 UNITS: 1000 INJECTION, SOLUTION INTRAVENOUS; SUBCUTANEOUS at 00:37

## 2019-05-29 RX ADMIN — ASPIRIN 81 MG CHEWABLE TABLET 81 MG: 81 TABLET CHEWABLE at 08:41

## 2019-05-29 RX ADMIN — PROTAMINE SULFATE 40 MG: 10 INJECTION, SOLUTION INTRAVENOUS at 14:06

## 2019-05-29 RX ADMIN — DEXTROSE MONOHYDRATE 50 ML: 70 INJECTION, SOLUTION INTRAVENOUS at 10:26

## 2019-05-29 RX ADMIN — Medication 325 MG: at 08:41

## 2019-05-29 RX ADMIN — CLOPIDOGREL BISULFATE 75 MG: 75 TABLET ORAL at 08:41

## 2019-05-29 RX ADMIN — INSULIN LISPRO 20 UNITS: 100 INJECTION, SUSPENSION SUBCUTANEOUS at 09:00

## 2019-05-29 RX ADMIN — GABAPENTIN 300 MG: 300 CAPSULE ORAL at 10:27

## 2019-05-29 RX ADMIN — INSULIN LISPRO 20 UNITS: 100 INJECTION, SUSPENSION SUBCUTANEOUS at 17:20

## 2019-05-29 RX ADMIN — HEPARIN SODIUM 16 UNITS/KG/HR: 10000 INJECTION, SOLUTION INTRAVENOUS at 10:29

## 2019-05-29 RX ADMIN — INSULIN HUMAN 10 UNITS: 100 INJECTION, SOLUTION PARENTERAL at 10:49

## 2019-05-29 RX ADMIN — MELATONIN TAB 5 MG 5 MG: 5 TAB at 20:43

## 2019-05-29 RX ADMIN — ROSUVASTATIN CALCIUM 20 MG: 20 TABLET, FILM COATED ORAL at 20:42

## 2019-05-29 RX ADMIN — PATIROMER 2 PACKET: 8.4 POWDER, FOR SUSPENSION ORAL at 10:46

## 2019-05-29 RX ADMIN — PANTOPRAZOLE SODIUM 40 MG: 40 TABLET, DELAYED RELEASE ORAL at 08:41

## 2019-05-29 RX ADMIN — SODIUM CHLORIDE 3 ML/KG/HR: 9 INJECTION, SOLUTION INTRAVENOUS at 16:13

## 2019-05-30 LAB
ABO + RH BLD: NORMAL
ABO + RH BLD: NORMAL
ACT BLD: 268 SECONDS (ref 82–152)
ACT BLD: 290 SECONDS (ref 82–152)
ALBUMIN SERPL-MCNC: 2.9 G/DL (ref 3.5–5.2)
ANION GAP SERPL CALCULATED.3IONS-SCNC: 12 MMOL/L
ANION GAP SERPL CALCULATED.3IONS-SCNC: 21 MMOL/L
BH BB BLOOD EXPIRATION DATE: NORMAL
BH BB BLOOD EXPIRATION DATE: NORMAL
BH BB BLOOD TYPE BARCODE: 6200
BH BB BLOOD TYPE BARCODE: 6200
BH BB DISPENSE STATUS: NORMAL
BH BB DISPENSE STATUS: NORMAL
BH BB PRODUCT CODE: NORMAL
BH BB PRODUCT CODE: NORMAL
BH BB UNIT NUMBER: NORMAL
BH BB UNIT NUMBER: NORMAL
BUN BLD-MCNC: 49 MG/DL (ref 8–23)
BUN BLD-MCNC: 67 MG/DL (ref 8–23)
BUN/CREAT SERPL: 27.7 (ref 7–25)
BUN/CREAT SERPL: 34.5 (ref 7–25)
CALCIUM SPEC-SCNC: 8.8 MG/DL (ref 8.6–10.5)
CALCIUM SPEC-SCNC: 9 MG/DL (ref 8.6–10.5)
CHLORIDE SERPL-SCNC: 95 MMOL/L (ref 98–107)
CHLORIDE SERPL-SCNC: 96 MMOL/L (ref 98–107)
CO2 SERPL-SCNC: 17 MMOL/L (ref 22–29)
CO2 SERPL-SCNC: 24 MMOL/L (ref 22–29)
CREAT BLD-MCNC: 1.77 MG/DL (ref 0.57–1)
CREAT BLD-MCNC: 1.94 MG/DL (ref 0.57–1)
DEPRECATED RDW RBC AUTO: 52 FL (ref 37–54)
ERYTHROCYTE [DISTWIDTH] IN BLOOD BY AUTOMATED COUNT: 16.3 % (ref 12.3–15.4)
GFR SERPL CREATININE-BSD FRML MDRD: 25 ML/MIN/1.73
GFR SERPL CREATININE-BSD FRML MDRD: 28 ML/MIN/1.73
GLUCOSE BLD-MCNC: 135 MG/DL (ref 65–99)
GLUCOSE BLD-MCNC: 146 MG/DL (ref 65–99)
GLUCOSE BLDC GLUCOMTR-MCNC: 118 MG/DL (ref 70–130)
GLUCOSE BLDC GLUCOMTR-MCNC: 146 MG/DL (ref 70–130)
GLUCOSE BLDC GLUCOMTR-MCNC: 160 MG/DL (ref 70–130)
GLUCOSE BLDC GLUCOMTR-MCNC: 98 MG/DL (ref 70–130)
HCT VFR BLD AUTO: 28.7 % (ref 34–46.6)
HGB BLD-MCNC: 8.9 G/DL (ref 12–15.9)
MCH RBC QN AUTO: 27.1 PG (ref 26.6–33)
MCHC RBC AUTO-ENTMCNC: 31 G/DL (ref 31.5–35.7)
MCV RBC AUTO: 87.5 FL (ref 79–97)
PHOSPHATE SERPL-MCNC: 4.4 MG/DL (ref 2.5–4.5)
PLATELET # BLD AUTO: 158 10*3/MM3 (ref 140–450)
PMV BLD AUTO: 11.5 FL (ref 6–12)
POTASSIUM BLD-SCNC: 4.8 MMOL/L (ref 3.5–5.2)
POTASSIUM BLD-SCNC: 5.4 MMOL/L (ref 3.5–5.2)
RBC # BLD AUTO: 3.28 10*6/MM3 (ref 3.77–5.28)
SODIUM BLD-SCNC: 132 MMOL/L (ref 136–145)
SODIUM BLD-SCNC: 133 MMOL/L (ref 136–145)
UNIT  ABO: NORMAL
UNIT  ABO: NORMAL
UNIT  RH: NORMAL
UNIT  RH: NORMAL
WBC NRBC COR # BLD: 6.13 10*3/MM3 (ref 3.4–10.8)

## 2019-05-30 PROCEDURE — 85027 COMPLETE CBC AUTOMATED: CPT | Performed by: INTERNAL MEDICINE

## 2019-05-30 PROCEDURE — 82962 GLUCOSE BLOOD TEST: CPT

## 2019-05-30 PROCEDURE — 99232 SBSQ HOSP IP/OBS MODERATE 35: CPT | Performed by: INTERNAL MEDICINE

## 2019-05-30 PROCEDURE — 94799 UNLISTED PULMONARY SVC/PX: CPT

## 2019-05-30 PROCEDURE — 80048 BASIC METABOLIC PNL TOTAL CA: CPT | Performed by: INTERNAL MEDICINE

## 2019-05-30 PROCEDURE — 25010000002 HEPARIN (PORCINE) PER 1000 UNITS: Performed by: INTERNAL MEDICINE

## 2019-05-30 PROCEDURE — 63710000001 INSULIN LISPRO (HUMAN) PER 5 UNITS: Performed by: INTERNAL MEDICINE

## 2019-05-30 PROCEDURE — 97530 THERAPEUTIC ACTIVITIES: CPT

## 2019-05-30 PROCEDURE — 93005 ELECTROCARDIOGRAM TRACING: CPT | Performed by: INTERNAL MEDICINE

## 2019-05-30 RX ORDER — ONDANSETRON 4 MG/1
8 TABLET, FILM COATED ORAL EVERY 6 HOURS PRN
Status: DISCONTINUED | OUTPATIENT
Start: 2019-05-30 | End: 2019-06-05 | Stop reason: HOSPADM

## 2019-05-30 RX ORDER — HEPARIN SODIUM 5000 [USP'U]/ML
5000 INJECTION, SOLUTION INTRAVENOUS; SUBCUTANEOUS EVERY 12 HOURS SCHEDULED
Status: DISCONTINUED | OUTPATIENT
Start: 2019-05-30 | End: 2019-06-05 | Stop reason: HOSPADM

## 2019-05-30 RX ADMIN — GABAPENTIN 300 MG: 300 CAPSULE ORAL at 08:27

## 2019-05-30 RX ADMIN — GABAPENTIN 300 MG: 300 CAPSULE ORAL at 21:04

## 2019-05-30 RX ADMIN — HEPARIN SODIUM 5000 UNITS: 5000 INJECTION INTRAVENOUS; SUBCUTANEOUS at 12:59

## 2019-05-30 RX ADMIN — PANTOPRAZOLE SODIUM 40 MG: 40 TABLET, DELAYED RELEASE ORAL at 21:04

## 2019-05-30 RX ADMIN — HEPARIN SODIUM 5000 UNITS: 5000 INJECTION INTRAVENOUS; SUBCUTANEOUS at 21:04

## 2019-05-30 RX ADMIN — QUETIAPINE FUMARATE 12.5 MG: 25 TABLET ORAL at 21:04

## 2019-05-30 RX ADMIN — DOCUSATE SODIUM 100 MG: 100 CAPSULE, LIQUID FILLED ORAL at 21:04

## 2019-05-30 RX ADMIN — MELATONIN TAB 5 MG 5 MG: 5 TAB at 21:04

## 2019-05-30 RX ADMIN — ROSUVASTATIN CALCIUM 20 MG: 20 TABLET, FILM COATED ORAL at 21:05

## 2019-05-30 RX ADMIN — INSULIN LISPRO 20 UNITS: 100 INJECTION, SUSPENSION SUBCUTANEOUS at 09:23

## 2019-05-30 RX ADMIN — INSULIN LISPRO 20 UNITS: 100 INJECTION, SUSPENSION SUBCUTANEOUS at 12:59

## 2019-05-30 RX ADMIN — PANTOPRAZOLE SODIUM 40 MG: 40 TABLET, DELAYED RELEASE ORAL at 08:26

## 2019-05-30 RX ADMIN — HYDROCODONE BITARTRATE AND ACETAMINOPHEN 1 TABLET: 5; 325 TABLET ORAL at 16:48

## 2019-05-30 RX ADMIN — ASPIRIN 81 MG CHEWABLE TABLET 81 MG: 81 TABLET CHEWABLE at 08:27

## 2019-05-30 RX ADMIN — INSULIN LISPRO 2 UNITS: 100 INJECTION, SOLUTION INTRAVENOUS; SUBCUTANEOUS at 17:37

## 2019-05-30 RX ADMIN — CLOPIDOGREL BISULFATE 75 MG: 75 TABLET ORAL at 08:26

## 2019-05-30 RX ADMIN — INSULIN LISPRO 20 UNITS: 100 INJECTION, SUSPENSION SUBCUTANEOUS at 17:38

## 2019-05-30 RX ADMIN — HYDROCODONE BITARTRATE AND ACETAMINOPHEN 1 TABLET: 5; 325 TABLET ORAL at 11:04

## 2019-05-31 PROBLEM — R00.1 BRADYCARDIA: Status: RESOLVED | Noted: 2019-05-28 | Resolved: 2019-05-31

## 2019-05-31 PROBLEM — I46.9 CARDIAC ARREST (HCC): Status: RESOLVED | Noted: 2019-05-27 | Resolved: 2019-05-31

## 2019-05-31 LAB
ALBUMIN SERPL-MCNC: 2.9 G/DL (ref 3.5–5.2)
ANION GAP SERPL CALCULATED.3IONS-SCNC: 11 MMOL/L
BUN BLD-MCNC: 37 MG/DL (ref 8–23)
BUN/CREAT SERPL: 25.3 (ref 7–25)
CALCIUM SPEC-SCNC: 9 MG/DL (ref 8.6–10.5)
CHLORIDE SERPL-SCNC: 100 MMOL/L (ref 98–107)
CO2 SERPL-SCNC: 25 MMOL/L (ref 22–29)
CREAT BLD-MCNC: 1.46 MG/DL (ref 0.57–1)
DEPRECATED RDW RBC AUTO: 53 FL (ref 37–54)
ERYTHROCYTE [DISTWIDTH] IN BLOOD BY AUTOMATED COUNT: 16.7 % (ref 12.3–15.4)
GFR SERPL CREATININE-BSD FRML MDRD: 35 ML/MIN/1.73
GLUCOSE BLD-MCNC: 48 MG/DL (ref 65–99)
GLUCOSE BLDC GLUCOMTR-MCNC: 109 MG/DL (ref 70–130)
GLUCOSE BLDC GLUCOMTR-MCNC: 114 MG/DL (ref 70–130)
GLUCOSE BLDC GLUCOMTR-MCNC: 116 MG/DL (ref 70–130)
GLUCOSE BLDC GLUCOMTR-MCNC: 80 MG/DL (ref 70–130)
GLUCOSE BLDC GLUCOMTR-MCNC: 91 MG/DL (ref 70–130)
HCT VFR BLD AUTO: 30.8 % (ref 34–46.6)
HGB BLD-MCNC: 9.5 G/DL (ref 12–15.9)
MCH RBC QN AUTO: 27.5 PG (ref 26.6–33)
MCHC RBC AUTO-ENTMCNC: 30.8 G/DL (ref 31.5–35.7)
MCV RBC AUTO: 89 FL (ref 79–97)
PHOSPHATE SERPL-MCNC: 2.9 MG/DL (ref 2.5–4.5)
PLATELET # BLD AUTO: 161 10*3/MM3 (ref 140–450)
PMV BLD AUTO: 10.6 FL (ref 6–12)
POTASSIUM BLD-SCNC: 4.6 MMOL/L (ref 3.5–5.2)
RBC # BLD AUTO: 3.46 10*6/MM3 (ref 3.77–5.28)
SODIUM BLD-SCNC: 136 MMOL/L (ref 136–145)
WBC NRBC COR # BLD: 5.89 10*3/MM3 (ref 3.4–10.8)

## 2019-05-31 PROCEDURE — 99233 SBSQ HOSP IP/OBS HIGH 50: CPT | Performed by: HOSPITALIST

## 2019-05-31 PROCEDURE — 63710000001 INSULIN DETEMIR PER 5 UNITS: Performed by: HOSPITALIST

## 2019-05-31 PROCEDURE — 25010000002 FUROSEMIDE PER 20 MG: Performed by: INTERNAL MEDICINE

## 2019-05-31 PROCEDURE — 80069 RENAL FUNCTION PANEL: CPT | Performed by: INTERNAL MEDICINE

## 2019-05-31 PROCEDURE — 99233 SBSQ HOSP IP/OBS HIGH 50: CPT | Performed by: INTERNAL MEDICINE

## 2019-05-31 PROCEDURE — 82962 GLUCOSE BLOOD TEST: CPT

## 2019-05-31 PROCEDURE — 25010000002 HEPARIN (PORCINE) PER 1000 UNITS: Performed by: INTERNAL MEDICINE

## 2019-05-31 PROCEDURE — 85027 COMPLETE CBC AUTOMATED: CPT | Performed by: INTERNAL MEDICINE

## 2019-05-31 PROCEDURE — 63710000001 INSULIN LISPRO (HUMAN) PER 5 UNITS: Performed by: HOSPITALIST

## 2019-05-31 RX ORDER — FUROSEMIDE 10 MG/ML
60 INJECTION INTRAMUSCULAR; INTRAVENOUS ONCE
Status: COMPLETED | OUTPATIENT
Start: 2019-05-31 | End: 2019-05-31

## 2019-05-31 RX ORDER — AMLODIPINE BESYLATE 5 MG/1
5 TABLET ORAL
Status: DISCONTINUED | OUTPATIENT
Start: 2019-05-31 | End: 2019-06-04

## 2019-05-31 RX ORDER — BISACODYL 10 MG
10 SUPPOSITORY, RECTAL RECTAL DAILY PRN
Status: DISCONTINUED | OUTPATIENT
Start: 2019-05-31 | End: 2019-06-05 | Stop reason: HOSPADM

## 2019-05-31 RX ADMIN — HYDROCODONE BITARTRATE AND ACETAMINOPHEN 1 TABLET: 5; 325 TABLET ORAL at 20:56

## 2019-05-31 RX ADMIN — GABAPENTIN 300 MG: 300 CAPSULE ORAL at 08:01

## 2019-05-31 RX ADMIN — FUROSEMIDE 60 MG: 10 INJECTION, SOLUTION INTRAMUSCULAR; INTRAVENOUS at 10:02

## 2019-05-31 RX ADMIN — METOPROLOL TARTRATE 25 MG: 25 TABLET ORAL at 20:56

## 2019-05-31 RX ADMIN — PANTOPRAZOLE SODIUM 40 MG: 40 TABLET, DELAYED RELEASE ORAL at 08:01

## 2019-05-31 RX ADMIN — SODIUM CHLORIDE, PRESERVATIVE FREE 10 ML: 5 INJECTION INTRAVENOUS at 20:57

## 2019-05-31 RX ADMIN — INSULIN DETEMIR 15 UNITS: 100 INJECTION, SOLUTION SUBCUTANEOUS at 21:01

## 2019-05-31 RX ADMIN — HEPARIN SODIUM 5000 UNITS: 5000 INJECTION INTRAVENOUS; SUBCUTANEOUS at 08:01

## 2019-05-31 RX ADMIN — CLOPIDOGREL BISULFATE 75 MG: 75 TABLET ORAL at 08:01

## 2019-05-31 RX ADMIN — ASPIRIN 81 MG CHEWABLE TABLET 81 MG: 81 TABLET CHEWABLE at 08:01

## 2019-05-31 RX ADMIN — HEPARIN SODIUM 5000 UNITS: 5000 INJECTION INTRAVENOUS; SUBCUTANEOUS at 20:56

## 2019-05-31 RX ADMIN — INSULIN LISPRO 5 UNITS: 100 INJECTION, SOLUTION INTRAVENOUS; SUBCUTANEOUS at 12:22

## 2019-05-31 RX ADMIN — DOCUSATE SODIUM 100 MG: 100 CAPSULE, LIQUID FILLED ORAL at 08:01

## 2019-05-31 RX ADMIN — DOCUSATE SODIUM 100 MG: 100 CAPSULE, LIQUID FILLED ORAL at 20:56

## 2019-05-31 RX ADMIN — QUETIAPINE FUMARATE 12.5 MG: 25 TABLET ORAL at 20:56

## 2019-05-31 RX ADMIN — MELATONIN TAB 5 MG 5 MG: 5 TAB at 20:56

## 2019-05-31 RX ADMIN — AMLODIPINE BESYLATE 5 MG: 5 TABLET ORAL at 12:21

## 2019-05-31 RX ADMIN — PANTOPRAZOLE SODIUM 40 MG: 40 TABLET, DELAYED RELEASE ORAL at 20:56

## 2019-05-31 RX ADMIN — ROSUVASTATIN CALCIUM 20 MG: 20 TABLET, FILM COATED ORAL at 20:56

## 2019-05-31 RX ADMIN — GABAPENTIN 300 MG: 300 CAPSULE ORAL at 20:56

## 2019-06-01 LAB
ALBUMIN SERPL-MCNC: 2.9 G/DL (ref 3.5–5.2)
ANION GAP SERPL CALCULATED.3IONS-SCNC: 12 MMOL/L
BUN BLD-MCNC: 28 MG/DL (ref 8–23)
BUN/CREAT SERPL: 20.1 (ref 7–25)
CALCIUM SPEC-SCNC: 9.1 MG/DL (ref 8.6–10.5)
CHLORIDE SERPL-SCNC: 97 MMOL/L (ref 98–107)
CO2 SERPL-SCNC: 26 MMOL/L (ref 22–29)
CREAT BLD-MCNC: 1.39 MG/DL (ref 0.57–1)
DEPRECATED RDW RBC AUTO: 52.7 FL (ref 37–54)
ERYTHROCYTE [DISTWIDTH] IN BLOOD BY AUTOMATED COUNT: 16.4 % (ref 12.3–15.4)
GFR SERPL CREATININE-BSD FRML MDRD: 37 ML/MIN/1.73
GLUCOSE BLD-MCNC: 59 MG/DL (ref 65–99)
GLUCOSE BLDC GLUCOMTR-MCNC: 149 MG/DL (ref 70–130)
GLUCOSE BLDC GLUCOMTR-MCNC: 154 MG/DL (ref 70–130)
GLUCOSE BLDC GLUCOMTR-MCNC: 212 MG/DL (ref 70–130)
GLUCOSE BLDC GLUCOMTR-MCNC: 222 MG/DL (ref 70–130)
GLUCOSE BLDC GLUCOMTR-MCNC: 64 MG/DL (ref 70–130)
HCT VFR BLD AUTO: 33.1 % (ref 34–46.6)
HGB BLD-MCNC: 10.2 G/DL (ref 12–15.9)
MCH RBC QN AUTO: 27.3 PG (ref 26.6–33)
MCHC RBC AUTO-ENTMCNC: 30.8 G/DL (ref 31.5–35.7)
MCV RBC AUTO: 88.7 FL (ref 79–97)
PHOSPHATE SERPL-MCNC: 3.3 MG/DL (ref 2.5–4.5)
PLATELET # BLD AUTO: 180 10*3/MM3 (ref 140–450)
PMV BLD AUTO: 10.5 FL (ref 6–12)
POTASSIUM BLD-SCNC: 4.3 MMOL/L (ref 3.5–5.2)
RBC # BLD AUTO: 3.73 10*6/MM3 (ref 3.77–5.28)
SODIUM BLD-SCNC: 135 MMOL/L (ref 136–145)
WBC NRBC COR # BLD: 4.52 10*3/MM3 (ref 3.4–10.8)

## 2019-06-01 PROCEDURE — 99232 SBSQ HOSP IP/OBS MODERATE 35: CPT | Performed by: PHYSICIAN ASSISTANT

## 2019-06-01 PROCEDURE — 99232 SBSQ HOSP IP/OBS MODERATE 35: CPT | Performed by: HOSPITALIST

## 2019-06-01 PROCEDURE — 25010000002 FUROSEMIDE PER 20 MG: Performed by: PHYSICIAN ASSISTANT

## 2019-06-01 PROCEDURE — 85027 COMPLETE CBC AUTOMATED: CPT | Performed by: INTERNAL MEDICINE

## 2019-06-01 PROCEDURE — 82962 GLUCOSE BLOOD TEST: CPT

## 2019-06-01 PROCEDURE — 25010000002 HEPARIN (PORCINE) PER 1000 UNITS: Performed by: INTERNAL MEDICINE

## 2019-06-01 PROCEDURE — 80069 RENAL FUNCTION PANEL: CPT | Performed by: INTERNAL MEDICINE

## 2019-06-01 RX ORDER — FUROSEMIDE 10 MG/ML
20 INJECTION INTRAMUSCULAR; INTRAVENOUS ONCE
Status: COMPLETED | OUTPATIENT
Start: 2019-06-01 | End: 2019-06-01

## 2019-06-01 RX ADMIN — PANTOPRAZOLE SODIUM 40 MG: 40 TABLET, DELAYED RELEASE ORAL at 10:09

## 2019-06-01 RX ADMIN — HEPARIN SODIUM 5000 UNITS: 5000 INJECTION INTRAVENOUS; SUBCUTANEOUS at 21:29

## 2019-06-01 RX ADMIN — MELATONIN TAB 5 MG 5 MG: 5 TAB at 21:28

## 2019-06-01 RX ADMIN — INSULIN LISPRO 2 UNITS: 100 INJECTION, SOLUTION INTRAVENOUS; SUBCUTANEOUS at 14:05

## 2019-06-01 RX ADMIN — HYDROCODONE BITARTRATE AND ACETAMINOPHEN 1 TABLET: 5; 325 TABLET ORAL at 15:45

## 2019-06-01 RX ADMIN — DOCUSATE SODIUM 100 MG: 100 CAPSULE, LIQUID FILLED ORAL at 21:28

## 2019-06-01 RX ADMIN — SODIUM CHLORIDE, PRESERVATIVE FREE 10 ML: 5 INJECTION INTRAVENOUS at 21:29

## 2019-06-01 RX ADMIN — ASPIRIN 81 MG CHEWABLE TABLET 81 MG: 81 TABLET CHEWABLE at 10:07

## 2019-06-01 RX ADMIN — HYDROCODONE BITARTRATE AND ACETAMINOPHEN 1 TABLET: 5; 325 TABLET ORAL at 21:28

## 2019-06-01 RX ADMIN — INSULIN LISPRO 4 UNITS: 100 INJECTION, SOLUTION INTRAVENOUS; SUBCUTANEOUS at 21:29

## 2019-06-01 RX ADMIN — INSULIN LISPRO 4 UNITS: 100 INJECTION, SOLUTION INTRAVENOUS; SUBCUTANEOUS at 19:10

## 2019-06-01 RX ADMIN — METOPROLOL TARTRATE 25 MG: 25 TABLET ORAL at 10:08

## 2019-06-01 RX ADMIN — CLOPIDOGREL BISULFATE 75 MG: 75 TABLET ORAL at 10:09

## 2019-06-01 RX ADMIN — DOCUSATE SODIUM 100 MG: 100 CAPSULE, LIQUID FILLED ORAL at 10:09

## 2019-06-01 RX ADMIN — QUETIAPINE FUMARATE 12.5 MG: 25 TABLET ORAL at 21:28

## 2019-06-01 RX ADMIN — HEPARIN SODIUM 5000 UNITS: 5000 INJECTION INTRAVENOUS; SUBCUTANEOUS at 10:06

## 2019-06-01 RX ADMIN — POLYETHYLENE GLYCOL 3350 17 G: 17 POWDER, FOR SOLUTION ORAL at 21:28

## 2019-06-01 RX ADMIN — FUROSEMIDE 20 MG: 10 INJECTION, SOLUTION INTRAMUSCULAR; INTRAVENOUS at 10:24

## 2019-06-01 RX ADMIN — AMLODIPINE BESYLATE 5 MG: 5 TABLET ORAL at 10:08

## 2019-06-01 RX ADMIN — GABAPENTIN 300 MG: 300 CAPSULE ORAL at 10:07

## 2019-06-01 RX ADMIN — METOPROLOL TARTRATE 25 MG: 25 TABLET ORAL at 21:29

## 2019-06-01 RX ADMIN — POLYETHYLENE GLYCOL 3350 17 G: 17 POWDER, FOR SOLUTION ORAL at 10:11

## 2019-06-01 RX ADMIN — ROSUVASTATIN CALCIUM 20 MG: 20 TABLET, FILM COATED ORAL at 21:28

## 2019-06-01 RX ADMIN — GABAPENTIN 300 MG: 300 CAPSULE ORAL at 21:28

## 2019-06-01 RX ADMIN — PANTOPRAZOLE SODIUM 40 MG: 40 TABLET, DELAYED RELEASE ORAL at 21:28

## 2019-06-01 NOTE — PLAN OF CARE
Problem: Fall Risk (Adult)  Goal: Identify Related Risk Factors and Signs and Symptoms   05/28/19 1716 06/01/19 0908   Fall Risk (Adult)   Related Risk Factors (Fall Risk) gait/mobility problems;fear of falling;environment unfamiliar --    Signs and Symptoms (Fall Risk) --  presence of risk factors     Goal: Absence of Fall   06/01/19 0908   Fall Risk (Adult)   Absence of Fall making progress toward outcome

## 2019-06-01 NOTE — PLAN OF CARE
Problem: Patient Care Overview  Goal: Plan of Care Review  Outcome: Ongoing (interventions implemented as appropriate)   06/01/19 2899   Coping/Psychosocial   Plan of Care Reviewed With patient   Plan of Care Review   Progress no change   OTHER   Outcome Summary Pt with no acute distress overnight, Plan for discharge to rehab when available.

## 2019-06-01 NOTE — PROGRESS NOTES
"NAL Progress Note  Narcisa Cotto  [id number]  1940 5/18/2019    Reason for visit:  SOA, LUKASZ    No new events over night    ROS:    Pulm: No shortness of  CV: no cp    I&O:    Intake/Output Summary (Last 24 hours) at 6/1/2019 0928  Last data filed at 6/1/2019 0837  Gross per 24 hour   Intake 1130 ml   Output 4900 ml   Net -3770 ml       PE:   Blood pressure 139/60, pulse 68, temperature 98.2 °F (36.8 °C), temperature source Oral, resp. rate 18, height 167.6 cm (65.98\"), weight 88 kg (194 lb), SpO2 99 %.    GENERAL: Awake and alert, in no acute distress.   HEENT: PER, No conjunctivitis  NECK: Supple, + JVD     HEART: RRR; No murmur, rubs, gallops.   LUNGS: Bilateral good air entry , no wheezing or  ABDOMEN: Soft, nontender, nondistended. Positive bowel sounds. No rebound or guarding. NO mass or HSM.  EXT:  No cyanosis, clubbing , mild edema. No cord.  : Genitalia generally unremarkable.  Without Villarreal catheter.  SKIN: Warm and dry without cutaneous eruptions on Inspection/palpation.    NEURO: Alert and oriented x 3, Motor 5/5 bilaterally    Medications:    Current Facility-Administered Medications:   •  amLODIPine (NORVASC) tablet 5 mg, 5 mg, Oral, Q24H, Krzysztof Torrez MD, 5 mg at 05/31/19 1221  •  aspirin chewable tablet 81 mg, 81 mg, Oral, Daily, Regan Benjamin MD, 81 mg at 05/31/19 0801  •  atropine sulfate injection 0.5-1 mg, 0.5-1 mg, Intravenous, Q5 Min PRN, Filemon Mayberry IV, MD, 0.5 mg at 05/28/19 0920  •  bisacodyl (DULCOLAX) suppository 10 mg, 10 mg, Rectal, Daily PRN, Krzysztof Torrez MD  •  [DISCONTINUED] clopidogrel (PLAVIX) tablet 600 mg, 600 mg, Oral, Daily **OR** clopidogrel (PLAVIX) tablet 75 mg, 75 mg, Oral, Daily, Filemon Mayberry IV, MD, 75 mg at 05/31/19 0801  •  dextrose (D50W) 25 g/ 50mL Intravenous Solution 25 g, 25 g, Intravenous, Q15 Min PRN, Janice Peguero APRN  •  dextrose (GLUTOSE) oral gel 15 g, 15 g, Oral, Q15 Min PRN, Janice Peguero APRN  •  " docusate sodium (COLACE) capsule 100 mg, 100 mg, Oral, BID, Mikala Shah DO, 100 mg at 05/31/19 2056  •  gabapentin (NEURONTIN) capsule 300 mg, 300 mg, Oral, Q12H, Ariana Burden MD, 300 mg at 05/31/19 2056  •  glucagon (human recombinant) (GLUCAGEN DIAGNOSTIC) injection 1 mg, 1 mg, Subcutaneous, Q15 Min PRN, Janice Peguero APRN  •  heparin (porcine) 5000 UNIT/ML injection 5,000 Units, 5,000 Units, Subcutaneous, Q12H, Enedelia Macias MD, 5,000 Units at 05/31/19 2056  •  HYDROcodone-acetaminophen (NORCO) 5-325 MG per tablet 1 tablet, 1 tablet, Oral, Q6H PRN, Mikala Shah DO, 1 tablet at 05/31/19 2056  •  insulin lispro (humaLOG) injection 0-9 Units, 0-9 Units, Subcutaneous, 4x Daily With Meals & Nightly, Le Claudio MD, 2 Units at 05/30/19 1737  •  ipratropium-albuterol (DUO-NEB) nebulizer solution 3 mL, 3 mL, Nebulization, Q4H PRN, Le Claudio MD, 3 mL at 05/26/19 1109  •  melatonin tablet 5 mg, 5 mg, Oral, Nightly, Ailyn Smart MD, 5 mg at 05/31/19 2056  •  metoprolol tartrate (LOPRESSOR) tablet 25 mg, 25 mg, Oral, Q12H, Filemon Mayberry IV, MD, 25 mg at 05/31/19 2056  •  [DISCONTINUED] morphine injection 1 mg, 1 mg, Intravenous, Q4H PRN, 1 mg at 05/26/19 0124 **AND** naloxone (NARCAN) injection 0.4 mg, 0.4 mg, Intravenous, Q5 Min PRN, Regan Benjamin MD  •  nitroglycerin (NITROSTAT) SL tablet 0.4 mg, 0.4 mg, Sublingual, Q5 Min PRN, Le Claudio MD, 0.4 mg at 05/21/19 1309  •  ondansetron (ZOFRAN) injection 4 mg, 4 mg, Intravenous, Q6H PRN, Regan Benjamin MD, 4 mg at 05/27/19 1409  •  ondansetron (ZOFRAN) tablet 8 mg, 8 mg, Oral, Q6H PRN, Enedelia Macias MD  •  pantoprazole (PROTONIX) EC tablet 40 mg, 40 mg, Oral, BID, Regan Benjamin MD, 40 mg at 05/31/19 2056  •  Pharmacy Consult - [facility], , Does not apply, Daily, Ailyn Rosales, [facility]  •  polyethylene glycol 3350 powder (packet), 17 g, Oral, BID, Krzysztof Torrez MD  •  promethazine (PHENERGAN) injection 12.5  mg, 12.5 mg, Intravenous, Q6H PRN, Camryn Echevarria APRN  •  QUEtiapine (SEROquel) tablet 12.5 mg, 12.5 mg, Oral, Nightly, Ailyn Smart MD, 12.5 mg at 05/31/19 2056  •  rosuvastatin (CRESTOR) tablet 20 mg, 20 mg, Oral, Nightly, Tayo Payne MD, 20 mg at 05/31/19 2056  •  sodium chloride 0.9 % flush 3-10 mL, 3-10 mL, Intravenous, PRN, Regan Benjamin MD, 10 mL at 05/31/19 2057    Meds reviewed and doses adjusted for renal function    Labs:  Results from last 7 days   Lab Units 06/01/19  0620 05/31/19  0626 05/30/19  1025 05/30/19  0420 05/29/19  2125 05/29/19  1416 05/29/19  0733 05/28/19  0948 05/28/19  0422  05/27/19  1429 05/27/19  1344   WBC 10*3/mm3 4.52 5.89  --  6.13  --   --  6.73 6.46 5.92  --  7.19  --    HEMOGLOBIN g/dL 10.2* 9.5*  --  8.9* 8.8* 7.7* 9.1* 8.2* 8.3*   < > 7.1*  --    HEMATOCRIT % 33.1* 30.8*  --  28.7* 27.3* 24.3* 29.5* 27.1* 27.0*   < > 23.4*  --    PLATELETS 10*3/mm3 180 161  --  158  --   --  170 169 164  --  172  --    SODIUM mmol/L 135* 136 132*  --   --   --  133*  132*  --  133*  --  131* 133*   POTASSIUM mmol/L 4.3 4.6 4.8  --  4.7  --  5.4*  5.4*  --  4.9  --  4.6 5.1   CHLORIDE mmol/L 97* 100 96*  --   --   --  95*  94*  --  94*  --  93* 93*   CO2 mmol/L 26.0 25.0 24.0  --   --   --  17.0*  25.0  --  30.0*  --  27.0 25.0   BUN mg/dL 28* 37* 49*  --   --   --  67*  65*  --  67*  --  70* 68*   CREATININE mg/dL 1.39* 1.46* 1.77*  --   --   --  1.94*  2.01*  --  1.86*  --  1.86* 2.10*   GLUCOSE mg/dL 59* 48* 135*  --   --   --  146*  157*  --  93  --  210* 239*   CALCIUM mg/dL 9.1 9.0 8.8  --   --   --  9.0  9.0  --  9.4  --  9.1 8.9   PHOSPHORUS mg/dL 3.3 2.9  --   --   --   --  4.4  --  4.2  --   --  4.1    < > = values in this interval not displayed.             Radiology:  Imaging Results (last 72 hours)     Procedure Component Value Units Date/Time    CT Abdomen Pelvis Without Contrast [[id number]] Collected:  05/18/19 1748     Updated:  05/21/19 1014     Narrative:       EXAMINATION: CT ABDOMEN/PELVIS WO CONTRAST - 05/18/2019     INDICATION: Abdominal pain.     TECHNIQUE: CT abdomen and pelvis without intravenous contrast.     The radiation dose reduction device was turned on for each scan per the  ALARA (As Low as Reasonably Achievable) protocol.     COMPARISON: CT dated 04/16/2015.     FINDINGS: Lung bases demonstrate subsegmental atelectasis and/or  scarring without consolidation or effusion. Liver without focal lesion.  Gallbladder is surgically absent. No biliary dilatation. Pancreas and  spleen unremarkable. Adrenals without distinct nodule. Kidneys without  hydronephrosis or hydroureter. No bulky retroperitoneal adenopathy.  Atherosclerotic nonaneurysmal abdominal aorta. GI tract evaluation  demonstrates small hiatal hernia. Within the distal duodenal c-loop is a  medially oriented area of outpouching or gas which may represent  duodenal diverticulum or choledochal cyst formation as this is adjacent  to or involving the ampulla. Distal bowel is unremarkable for mechanical  obstructive findings, however sigmoid diverticulosis is fairly  involvement without acute findings to suggest acute diverticulitis. No  free fluid or intra-abdominal free air. No loculated fluid collection.  Appendix is visualized and unremarkable. Pelvic viscera unremarkable  without bulky pelvic adenopathy or free fluid. Degenerative changes of  the spine without aggressive osseous or soft tissue body wall lesions  with right flank stimulator pack in place with thoracic spinal cord  stimulator leads noted and partially imaged.       Impression:       1. Small hiatal hernia.  2. Small area of outpouching gas-filled along the medial margin of the  duodenal c-loop distally at or involving the patella which may represent  choledochal cyst formation or duodenal diverticulum without significant  inflammatory findings or associated fluid.  3. No mechanical obstructive findings with sigmoid  diverticulosis,  however, no evidence for acute diverticulitis. No abscess or free air.     DICTATED:   05/18/2019  EDITED/ls :   05/18/2019         This report was finalized on 5/21/2019 10:11 AM by Dr. Stu Schroeder.       XR Chest 1 View [[id number]] Collected:  05/18/19 1543     Updated:  05/18/19 1653    Narrative:          EXAMINATION: XR CHEST 1 VW - 05/18/2019     INDICATION: Upper abdominal pain.      COMPARISON: 04/26/2019     FINDINGS: Lungs are clear without consolidation or opacification. No  pneumothorax or pleural effusion. Cardiac silhouette borderline enlarged  and unchanged. No pneumothorax or pleural effusion. Visualized portions  of the upper abdomen without abnormality; specifically no free air  underneath the hemidiaphragms.           Impression:       No acute cardiopulmonary process or free air underneath the  hemidiaphragms on this portable erect evaluation.     DICTATED:   05/18/2019  EDITED/ls :   05/18/2019              Renal Imaging:  No radiology results for the last day        IMPRESSION:  Acute kidney injury likely related to ATN and volume depletion with low blood pressure. UOP is adequate. Scr 1.3 -improving  Chronic kidney disease stage III-IV with a baseline creatinine around 1.8 - stable.   Proteinuria minimal- p/c ratio 0.11  Coronary artery disease questionable chest pain acute episode  Anemia s/p transfusion  Bradycardia   Hyponatremia - stable.   Hyperkalemia - improved       RECOMMENDATIONS:  Avoid nephrotoxic agents.  Adjust medicine per renal function   Monitor I/o   Continue with current regimen    Complex high risk patient.     Adrienne Sahu MD  6/1/2019  9:28 AM

## 2019-06-01 NOTE — PROGRESS NOTES
[facility] Medicine Services  PROGRESS NOTE    Patient Name: Narcisa Cotto  : 1940  MRN: [id number]    Date of Admission: 2019  Length of Stay: 14  Primary Care Physician: Mariia Del Real DO    Subjective   Subjective     CC:  Constipation, chest pain     HPI:  No significant acute events reported from nursing staff.  Patient is sleeping and appears comfortable.  She is easily arousable.  She denies any new complaints.  She is afebrile.    Review of Systems  Gen- No fevers, chills  CV- No chest pain, palpitations  Resp- As above  GI- as above     Otherwise ROS is negative except as mentioned in the HPI.    Objective   Objective     Vital Signs:   Temp:  [97.6 °F (36.4 °C)-98.8 °F (37.1 °C)] 98.2 °F (36.8 °C)  Heart Rate:  [67-88] 78  Resp:  [16-18] 18  BP: (127-151)/(50-69) 142/65      Physical Exam:  Constitutional: awake, alert, not in any acute distress  HENT: NCAT, mucous membranes moist  Neck: right IJ bandage, left IJ venous sheath has been discontinued and dressing is intact without any signs of bleeding or expanding hematoma.  Respiratory: Clear to auscultation superiorly but poor expansion at bases, normal respiratory effort, respiration nonlabored  Cardiovascular: RRR, no murmurs  Gastrointestinal: Positive bowel sounds, soft, nontender, nondistended, obese   Musculoskeletal: Anasarca with pitting edema extending to thigh    Psychiatric: Appropriate affect, cooperative  Neurologic: Oriented x 3, strength symmetric in all extremities but weak, speech clear  Skin: Bilateral groin hematomas are stable    Results Reviewed:  I have personally reviewed current lab, radiology, and data and agree.    Results from last 7 days   Lab Units 19  0620 19  0626 19  0420  19  0948   WBC 10*3/mm3 4.52 5.89 6.13   < > 6.46   HEMOGLOBIN g/dL 10.2* 9.5* 8.9*   < > 8.2*   HEMATOCRIT % 33.1* 30.8* 28.7*   < > 27.1*   PLATELETS 10*3/mm3 180 161 158   < > 169    INR   --   --   --   --  1.22*    < > = values in this interval not displayed.     Results from last 7 days   Lab Units 06/01/19  0620 05/31/19  0626 05/30/19  1025  05/29/19  0733 05/28/19  0946 05/28/19  0422  05/27/19  1344  05/26/19  1152   SODIUM mmol/L 135* 136 132*  --  133*  132*  --  133*   < > 133*   < >  --    POTASSIUM mmol/L 4.3 4.6 4.8   < > 5.4*  5.4*  --  4.9   < > 5.1   < >  --    CHLORIDE mmol/L 97* 100 96*  --  95*  94*  --  94*   < > 93*   < >  --    CO2 mmol/L 26.0 25.0 24.0  --  17.0*  25.0  --  30.0*   < > 25.0   < >  --    BUN mg/dL 28* 37* 49*  --  67*  65*  --  67*   < > 68*   < >  --    CREATININE mg/dL 1.39* 1.46* 1.77*  --  1.94*  2.01*  --  1.86*   < > 2.10*   < >  --    GLUCOSE mg/dL 59* 48* 135*  --  146*  157*  --  93   < > 239*   < >  --    CALCIUM mg/dL 9.1 9.0 8.8  --  9.0  9.0  --  9.4   < > 8.9   < >  --    ALT (SGPT) U/L  --   --   --   --  29  --  33  --  35*   < >  --    AST (SGOT) U/L  --   --   --   --  15  --  20  --  31   < >  --    TROPONIN T ng/mL  --   --   --   --   --  0.320*  --   --  0.345*  --  0.323*    < > = values in this interval not displayed.     Estimated Creatinine Clearance: 37.3 mL/min (A) (by C-G formula based on SCr of 1.39 mg/dL (H)).    Microbiology Results Abnormal     Procedure Component Value - Date/Time    Eosinophil Smear - Urine, Urine, Clean Catch [[id number]]  (Normal) Collected:  05/21/19 0157    Lab Status:  Final result Specimen:  Urine, Clean Catch Updated:  05/21/19 0732     Eosinophil Smear 0 % EOS/100 Cells     Narrative:       No eosinophil seen          Imaging Results (last 24 hours)     ** No results found for the last 24 hours. **          Results for orders placed during the hospital encounter of 05/18/19   Adult Transthoracic Echo Complete W/ Cont if Necessary Per Protocol    Narrative · Left ventricular systolic function is normal. Estimated EF = 65%.  · Left ventricular diastolic dysfunction (grade II) consistent with    pseudonormalization.  · Mild mitral valve regurgitation is present  · Mild aortic valve regurgitation is present.  · Right ventricular cavity is dilated.  · Estimated right ventricular systolic pressure from tricuspid   regurgitation is normal (<35 mmHg).          I have reviewed the medications:    Current Facility-Administered Medications:   •  amLODIPine (NORVASC) tablet 5 mg, 5 mg, Oral, Q24H, Krzysztof Torrez MD, 5 mg at 06/01/19 1008  •  aspirin chewable tablet 81 mg, 81 mg, Oral, Daily, Regan Benjamin MD, 81 mg at 06/01/19 1007  •  atropine sulfate injection 0.5-1 mg, 0.5-1 mg, Intravenous, Q5 Min PRN, Filemon Mayberry IV, MD, 0.5 mg at 05/28/19 0920  •  bisacodyl (DULCOLAX) suppository 10 mg, 10 mg, Rectal, Daily PRN, Krzysztof Torrez MD  •  [DISCONTINUED] clopidogrel (PLAVIX) tablet 600 mg, 600 mg, Oral, Daily **OR** clopidogrel (PLAVIX) tablet 75 mg, 75 mg, Oral, Daily, Filemon Mayberry IV, MD, 75 mg at 06/01/19 1009  •  dextrose (D50W) 25 g/ 50mL Intravenous Solution 25 g, 25 g, Intravenous, Q15 Min PRN, Janice Peguero APRN  •  dextrose (GLUTOSE) oral gel 15 g, 15 g, Oral, Q15 Min PRN, Janice Peguero APRN  •  docusate sodium (COLACE) capsule 100 mg, 100 mg, Oral, BID, Mikala Shah DO, 100 mg at 06/01/19 1009  •  gabapentin (NEURONTIN) capsule 300 mg, 300 mg, Oral, Q12H, Ariana Burden MD, 300 mg at 06/01/19 1007  •  glucagon (human recombinant) (GLUCAGEN DIAGNOSTIC) injection 1 mg, 1 mg, Subcutaneous, Q15 Min PRN, Janice Peguero APRN  •  heparin (porcine) 5000 UNIT/ML injection 5,000 Units, 5,000 Units, Subcutaneous, Q12H, Enedelia Macias MD, 5,000 Units at 06/01/19 1006  •  HYDROcodone-acetaminophen (NORCO) 5-325 MG per tablet 1 tablet, 1 tablet, Oral, Q6H PRN, Mikala Shah DO, 1 tablet at 05/31/19 2056  •  insulin lispro (humaLOG) injection 0-9 Units, 0-9 Units, Subcutaneous, 4x Daily With Meals & Nightly, Le Claudio MD, 2 Units at 05/30/19 1737  •   ipratropium-albuterol (DUO-NEB) nebulizer solution 3 mL, 3 mL, Nebulization, Q4H PRN, Le Claudio MD, 3 mL at 05/26/19 1109  •  melatonin tablet 5 mg, 5 mg, Oral, Nightly, Ailyn Smart MD, 5 mg at 05/31/19 2056  •  metoprolol tartrate (LOPRESSOR) tablet 25 mg, 25 mg, Oral, Q12H, Filemon Mayberry IV, MD, 25 mg at 06/01/19 1008  •  [DISCONTINUED] morphine injection 1 mg, 1 mg, Intravenous, Q4H PRN, 1 mg at 05/26/19 0124 **AND** naloxone (NARCAN) injection 0.4 mg, 0.4 mg, Intravenous, Q5 Min PRN, Regan Benjamin MD  •  nitroglycerin (NITROSTAT) SL tablet 0.4 mg, 0.4 mg, Sublingual, Q5 Min PRN, Le Claudio MD, 0.4 mg at 05/21/19 1309  •  ondansetron (ZOFRAN) injection 4 mg, 4 mg, Intravenous, Q6H PRN, Regan Benjamin MD, 4 mg at 05/27/19 1409  •  ondansetron (ZOFRAN) tablet 8 mg, 8 mg, Oral, Q6H PRN, Enedelia Macias MD  •  pantoprazole (PROTONIX) EC tablet 40 mg, 40 mg, Oral, BID, Regan Benjamin MD, 40 mg at 06/01/19 1009  •  Pharmacy Consult - [facility], , Does not apply, Daily, Ailyn Rosales, [facility]  •  polyethylene glycol 3350 powder (packet), 17 g, Oral, BID, Krzysztof Torrez MD, 17 g at 06/01/19 1011  •  promethazine (PHENERGAN) injection 12.5 mg, 12.5 mg, Intravenous, Q6H PRN, Camryn Echevarria APRN  •  QUEtiapine (SEROquel) tablet 12.5 mg, 12.5 mg, Oral, Nightly, Ailyn Smart MD, 12.5 mg at 05/31/19 2056  •  rosuvastatin (CRESTOR) tablet 20 mg, 20 mg, Oral, Nightly, AslamTayo MD, 20 mg at 05/31/19 2056  •  sodium chloride 0.9 % flush 3-10 mL, 3-10 mL, Intravenous, PRN, Regan Benjamin MD, 10 mL at 05/31/19 2057      Assessment/Plan   Assessment / Plan     Active Hospital Problems    Diagnosis POA   • **NSTEMI (non-ST elevated myocardial infarction) (CMS/[facility]) [I21.4] Yes     · Mild troponin elevation in the setting of chronic kidney disease, hypotension, and intractable nausea and vomiting, 5/18/2019  · Cardiac catheterization (5/23/2019): Severe multivessel CAD.  Culprit  for NSTEMI is a highly calcified ostial RCA 99% stenosis.    · Turned down for surgery due to poor functional status  · Staged PCI of the ostial to mid RCA using Xience PATRICIA, 5/29/2019     • Iron deficiency anemia [D50.9] Yes   • Acute renal failure with acute tubular necrosis superimposed on stage 3 chronic kidney disease (CMS/[facility]) [N17.0, N18.3] No   • Essential hypertension [I10] Yes   • GERD (gastroesophageal reflux disease) [K21.9] Yes   • Sleep apnea [G47.30] Yes   • Uncontrolled type 2 diabetes mellitus with complication, with long-term current use of insulin (CMS/[facility]) [E11.8, E11.65, Z79.4] Not Applicable   • Peripheral arterial disease (CMS/[facility]) [I73.9] Yes     · BONNIE (08/22/2013):  At rest right 0.98, left 0.85.  After 2 minutes of exercise right 0.51, left 0.68.  · Venous duplex LE (7/27/2016): All veins visiualized appear patent with distal augmentation bilaterally.  · High-grade right common iliac stenosis on cardiac catheterization, 5/23/2019     • Diabetic gastroparesis (CMS/[facility]) [E11.43, K31.84] Yes   • Hyperlipidemia LDL goal <70 [E78.5] Yes     · High intensity statin therapy is recommended given the presence of peripheral arterial disease and diabetes            Brief Hospital Course to date:  Mrs. Cotto is a 78 year-old F with a past hx of uncontrolled T2DM complicated by gastroparesis, diabetic nephropathy, and peripheral neuropathy, GERD, HTN, CKD III, anemia, DELFINA, and HLP who was admitted on 5/18 with NSTEMI after presenting to the [facility] ER with nausea/vomiting x 48 hours and chest pain.  Evaluation in the ER was suggestive of NSTEMI and pt underwent a LHC on 5/23 that revealed severe MV CAD. CT surgery evaluated the patient and considered CABG but felt that pt was too debilitated to safely proceed with surgery. She had an unstable bradycardia due to CHB vs. Asystole on 5/27, requiring emergent temporary PPM, and underwent PATRICIA x 2 to RCA on 5/29.     NSTEMI due to calcific  atherosclerosis of ostia of RCA  Severe MV CAD (99% ostial RCA stenosis s/p PCI, remaining 60% LAD, 70% LCCx)  S/p C 5/23, PCI aborted   S/p C with PATRICIA x 2 to RCA 5/29  - continue aspirin, plavix, crestor   - possible role for staged intervention of LAD and LCCx in the future  - monitor groin hematomas    Acute on Chronic LV Diastolic HF  - Pt has marked anasarca with significant volume overload, cardiology diuresing    Unstable Bradycardia due to CHB, Possible Asystole on 5/27 with ROSC after CPR, no meds   S/p Atropine/Dopamine Temporary PPM 5/28 by Dr. Reyes   - sinus rhythm on last ECG from 5/30 and telemetry monitor, continue to monitor on tele     Nonoliguric LUKASZ due to ATN on CKD III due to diabetic nephropathy  Associated Metabolic Acidosis    Minimal Proteinuria with P/Cr ratio of 0.11  - Peak Cr 2.64, baseline around 1.8  - Nephrology following  - diurese as able and avoid nephrotoxic medications when possible    Uncontrolled Insulin-dependent T2DM with microvascular complications of gastroparesis, neuropathy, nephropathy and macrovascular complications of CAD  - FSBS reviewed, patient has ongoing morning and late afternoon hypoglycemia  - Discontinue scheduled short acting insulin and long-acting basal insulin.  Continue on low-moderate sliding scale insulin for now   - HgbA1c 8.5%    Hypertension  -Blood pressure is suboptimally controlled, but has improved since amlodipine has been resumed.  Continue close monitoring.      PAD with 90% severe right common iliac stenosis, noted incidentally during 5/23 [facility]  - medical management     Acute Delirium / Metabolic Encephalopathy, resolved   - Neuro has seen this admit     Normocytic Anemia of Chronic Renal Disease  - requiring transfusion of 1 unit on 5/28. No evidence of active hemorrhage   - Hgb stable     Constipation  - bowel regimen, has successful bowel movement today with nurse reporting possible melena.  -H&H is stable, patient may need to  follow-up in outpatient for further work-up.    Hyponatremia, resolved  Hyperkalemia, resolved   Hyperlipidemia   DELFINA     DVT Prophylaxis:  SQ heparin  Disposition: I expect the patient to be discharged to SNF/rehab facility next week.     CODE STATUS:   Code Status and Medical Interventions:   Ordered at: 05/18/19 2051     Level Of Support Discussed With:    Patient     Code Status:    CPR     Medical Interventions (Level of Support Prior to Arrest):    Full         Electronically signed by Ce Merino MD, 06/01/19, 1:54 PM.

## 2019-06-01 NOTE — PROGRESS NOTES
[facility]  Cardiology Progress Note      Chief Complaint/Reason for service:    · NSTEMI   · Severe Multivessel CAD         Patient was resting comfortable. No complaints this morning. No CP, SOB.    Past medical, surgical, social and family history reviewed in the patient's electronic medical record.         Vital Sign Min/Max for last 24 hours  Temp  Min: 97.6 °F (36.4 °C)  Max: 98.8 °F (37.1 °C)   BP  Min: 127/50  Max: 166/68   Pulse  Min: 67  Max: 88   Resp  Min: 16  Max: 18   SpO2  Min: 97 %  Max: 99 %   Flow (L/min)  Min: 2  Max: 2      Intake/Output Summary (Last 24 hours) at 6/1/2019 0916  Last data filed at 6/1/2019 0837  Gross per 24 hour   Intake 1130 ml   Output 4900 ml   Net -3770 ml           Physical Exam   Constitutional: She is oriented to person, place, and time. She appears well-developed and well-nourished.   Eyes: Pupils are equal, round, and reactive to light.   Cardiovascular: Normal rate, regular rhythm and normal heart sounds.   No murmur heard.  Pulmonary/Chest: Effort normal and breath sounds normal. She has no wheezes. She has no rales.   Abdominal: Soft. Bowel sounds are normal.   Musculoskeletal: She exhibits no edema.   Neurological: She is alert and oriented to person, place, and time.   Skin: Skin is warm and dry.       Tele:  Normal sinus rhythm     Results Review (reviewed the patient's recent labs in the electronic medical record):         Results from last 7 days   Lab Units 06/01/19  0620 05/31/19  0626 05/30/19  1025  05/29/19  0733 05/28/19  0422 05/27/19  1429 05/27/19  1344   SODIUM mmol/L 135* 136 132*  --  133*  132* 133* 131* 133*   POTASSIUM mmol/L 4.3 4.6 4.8   < > 5.4*  5.4* 4.9 4.6 5.1   CHLORIDE mmol/L 97* 100 96*  --  95*  94* 94* 93* 93*   BUN mg/dL 28* 37* 49*  --  67*  65* 67* 70* 68*   CREATININE mg/dL 1.39* 1.46* 1.77*  --  1.94*  2.01* 1.86* 1.86* 2.10*   MAGNESIUM mg/dL  --   --   --   --   --  2.3 2.3 2.3    < > =  values in this interval not displayed.     Results from last 7 days   Lab Units 05/28/19  0946 05/27/19  1344 05/26/19  1152   TROPONIN T ng/mL 0.320* 0.345* 0.323*     Results from last 7 days   Lab Units 06/01/19  0620 05/31/19  0626 05/30/19  0420   WBC 10*3/mm3 4.52 5.89 6.13   HEMOGLOBIN g/dL 10.2* 9.5* 8.9*   HEMATOCRIT % 33.1* 30.8* 28.7*   PLATELETS 10*3/mm3 180 161 158       Lab Results   Component Value Date    HGBA1C 8.8 01/28/2019       Lab Results   Component Value Date    CHOL 250 (H) 04/26/2019    CHLPL 147 09/19/2018    TRIG 244 (H) 04/26/2019    HDL 33 (L) 04/26/2019     (H) 04/26/2019                    Active Hospital Problems    Diagnosis POA   • **NSTEMI (non-ST elevated myocardial infarction) (CMS/[facility]) [I21.4] Yes     · Mild troponin elevation in the setting of chronic kidney disease, hypotension, and intractable nausea and vomiting, 5/18/2019  · Cardiac catheterization (5/23/2019): Severe multivessel CAD.  Culprit for NSTEMI is a highly calcified ostial RCA 99% stenosis.    · Turned down for surgery due to poor functional status  · Staged PCI of the ostial to mid RCA using Xience PATRICIA, 5/29/2019     • Iron deficiency anemia [D50.9] Yes   • Acute renal failure with acute tubular necrosis superimposed on stage 3 chronic kidney disease (CMS/[facility]) [N17.0, N18.3] No   • Essential hypertension [I10] Yes   • GERD (gastroesophageal reflux disease) [K21.9] Yes   • Sleep apnea [G47.30] Yes   • Uncontrolled type 2 diabetes mellitus with complication, with long-term current use of insulin (CMS/[facility]) [E11.8, E11.65, Z79.4] Not Applicable   • Peripheral arterial disease (CMS/[facility]) [I73.9] Yes     · BONNIE (08/22/2013):  At rest right 0.98, left 0.85.  After 2 minutes of exercise right 0.51, left 0.68.  · Venous duplex LE (7/27/2016): All veins visiualized appear patent with distal augmentation bilaterally.  · High-grade right common iliac stenosis on cardiac catheterization, 5/23/2019     • Diabetic  gastroparesis (CMS/HCC) [E11.43, K31.84] Yes   • Hyperlipidemia LDL goal <70 [E78.5] Yes     · High intensity statin therapy is recommended given the presence of peripheral arterial disease and diabetes                · Lasix 40mg IV x 1 dose   · Plan for discharge to rehab   · Continue to work with PT    HEMALATHA Jordan  6/1/2019

## 2019-06-02 LAB
ALBUMIN SERPL-MCNC: 2.9 G/DL (ref 3.5–5.2)
ANION GAP SERPL CALCULATED.3IONS-SCNC: 10 MMOL/L
BUN BLD-MCNC: 26 MG/DL (ref 8–23)
BUN/CREAT SERPL: 20.3 (ref 7–25)
CALCIUM SPEC-SCNC: 9.1 MG/DL (ref 8.6–10.5)
CHLORIDE SERPL-SCNC: 96 MMOL/L (ref 98–107)
CO2 SERPL-SCNC: 30 MMOL/L (ref 22–29)
CREAT BLD-MCNC: 1.28 MG/DL (ref 0.57–1)
DEPRECATED RDW RBC AUTO: 51.5 FL (ref 37–54)
ERYTHROCYTE [DISTWIDTH] IN BLOOD BY AUTOMATED COUNT: 16.1 % (ref 12.3–15.4)
GFR SERPL CREATININE-BSD FRML MDRD: 40 ML/MIN/1.73
GLUCOSE BLD-MCNC: 149 MG/DL (ref 65–99)
GLUCOSE BLDC GLUCOMTR-MCNC: 136 MG/DL (ref 70–130)
GLUCOSE BLDC GLUCOMTR-MCNC: 210 MG/DL (ref 70–130)
GLUCOSE BLDC GLUCOMTR-MCNC: 211 MG/DL (ref 70–130)
GLUCOSE BLDC GLUCOMTR-MCNC: 236 MG/DL (ref 70–130)
HCT VFR BLD AUTO: 30.5 % (ref 34–46.6)
HGB BLD-MCNC: 9.6 G/DL (ref 12–15.9)
MCH RBC QN AUTO: 27.6 PG (ref 26.6–33)
MCHC RBC AUTO-ENTMCNC: 31.5 G/DL (ref 31.5–35.7)
MCV RBC AUTO: 87.6 FL (ref 79–97)
PHOSPHATE SERPL-MCNC: 3.9 MG/DL (ref 2.5–4.5)
PLATELET # BLD AUTO: 181 10*3/MM3 (ref 140–450)
PMV BLD AUTO: 10.6 FL (ref 6–12)
POTASSIUM BLD-SCNC: 4.4 MMOL/L (ref 3.5–5.2)
RBC # BLD AUTO: 3.48 10*6/MM3 (ref 3.77–5.28)
SODIUM BLD-SCNC: 136 MMOL/L (ref 136–145)
WBC NRBC COR # BLD: 3.1 10*3/MM3 (ref 3.4–10.8)

## 2019-06-02 PROCEDURE — 80069 RENAL FUNCTION PANEL: CPT | Performed by: INTERNAL MEDICINE

## 2019-06-02 PROCEDURE — 25010000002 HEPARIN (PORCINE) PER 1000 UNITS: Performed by: INTERNAL MEDICINE

## 2019-06-02 PROCEDURE — 97166 OT EVAL MOD COMPLEX 45 MIN: CPT

## 2019-06-02 PROCEDURE — 99232 SBSQ HOSP IP/OBS MODERATE 35: CPT | Performed by: HOSPITALIST

## 2019-06-02 PROCEDURE — 94799 UNLISTED PULMONARY SVC/PX: CPT

## 2019-06-02 PROCEDURE — 99232 SBSQ HOSP IP/OBS MODERATE 35: CPT | Performed by: PHYSICIAN ASSISTANT

## 2019-06-02 PROCEDURE — 82962 GLUCOSE BLOOD TEST: CPT

## 2019-06-02 PROCEDURE — 85027 COMPLETE CBC AUTOMATED: CPT | Performed by: INTERNAL MEDICINE

## 2019-06-02 RX ADMIN — AMLODIPINE BESYLATE 5 MG: 5 TABLET ORAL at 09:30

## 2019-06-02 RX ADMIN — GABAPENTIN 300 MG: 300 CAPSULE ORAL at 09:32

## 2019-06-02 RX ADMIN — INSULIN LISPRO 4 UNITS: 100 INJECTION, SOLUTION INTRAVENOUS; SUBCUTANEOUS at 19:24

## 2019-06-02 RX ADMIN — HYDROCODONE BITARTRATE AND ACETAMINOPHEN 1 TABLET: 5; 325 TABLET ORAL at 16:22

## 2019-06-02 RX ADMIN — ROSUVASTATIN CALCIUM 20 MG: 20 TABLET, FILM COATED ORAL at 21:29

## 2019-06-02 RX ADMIN — METOPROLOL TARTRATE 25 MG: 25 TABLET ORAL at 21:29

## 2019-06-02 RX ADMIN — HEPARIN SODIUM 5000 UNITS: 5000 INJECTION INTRAVENOUS; SUBCUTANEOUS at 21:30

## 2019-06-02 RX ADMIN — METOPROLOL TARTRATE 25 MG: 25 TABLET ORAL at 09:30

## 2019-06-02 RX ADMIN — DOCUSATE SODIUM 100 MG: 100 CAPSULE, LIQUID FILLED ORAL at 09:34

## 2019-06-02 RX ADMIN — HEPARIN SODIUM 5000 UNITS: 5000 INJECTION INTRAVENOUS; SUBCUTANEOUS at 09:32

## 2019-06-02 RX ADMIN — PANTOPRAZOLE SODIUM 40 MG: 40 TABLET, DELAYED RELEASE ORAL at 09:30

## 2019-06-02 RX ADMIN — PANTOPRAZOLE SODIUM 40 MG: 40 TABLET, DELAYED RELEASE ORAL at 21:29

## 2019-06-02 RX ADMIN — POLYETHYLENE GLYCOL 3350 17 G: 17 POWDER, FOR SOLUTION ORAL at 09:29

## 2019-06-02 RX ADMIN — HYDROCODONE BITARTRATE AND ACETAMINOPHEN 1 TABLET: 5; 325 TABLET ORAL at 09:37

## 2019-06-02 RX ADMIN — ASPIRIN 81 MG CHEWABLE TABLET 81 MG: 81 TABLET CHEWABLE at 09:32

## 2019-06-02 RX ADMIN — DOCUSATE SODIUM 100 MG: 100 CAPSULE, LIQUID FILLED ORAL at 21:29

## 2019-06-02 RX ADMIN — QUETIAPINE FUMARATE 12.5 MG: 25 TABLET ORAL at 21:29

## 2019-06-02 RX ADMIN — MELATONIN TAB 5 MG 5 MG: 5 TAB at 21:30

## 2019-06-02 RX ADMIN — INSULIN LISPRO 4 UNITS: 100 INJECTION, SOLUTION INTRAVENOUS; SUBCUTANEOUS at 15:00

## 2019-06-02 RX ADMIN — GABAPENTIN 300 MG: 300 CAPSULE ORAL at 21:29

## 2019-06-02 RX ADMIN — INSULIN LISPRO 4 UNITS: 100 INJECTION, SOLUTION INTRAVENOUS; SUBCUTANEOUS at 21:30

## 2019-06-02 RX ADMIN — CLOPIDOGREL BISULFATE 75 MG: 75 TABLET ORAL at 09:32

## 2019-06-02 NOTE — PROGRESS NOTES
"NAL Progress Note  Narcisa Cotto  [id number]  1940 5/18/2019    Reason for visit:  SOA, LUKASZ    No new events over night    ROS:    Pulm: No shortness of  CV: no cp    I&O:    Intake/Output Summary (Last 24 hours) at 6/2/2019 0934  Last data filed at 6/2/2019 0600  Gross per 24 hour   Intake --   Output 2250 ml   Net -2250 ml       PE:   Blood pressure 142/62, pulse 78, temperature 98.4 °F (36.9 °C), temperature source Oral, resp. rate 20, height 167.6 cm (65.98\"), weight 88 kg (194 lb), SpO2 98 %.    GENERAL: Awake and alert, in no acute distress.   HEENT: PER, No conjunctivitis  NECK: Supple, + JVD     HEART: RRR; No murmur, rubs, gallops.   LUNGS: Bilateral good air entry , no wheezing or  ABDOMEN: Soft, nontender, nondistended. Positive bowel sounds. No rebound or guarding. NO mass or HSM.  EXT:  No cyanosis, clubbing , mild edema. No cord.  : Genitalia generally unremarkable.  Without Villarreal catheter.  SKIN: Warm and dry without cutaneous eruptions on Inspection/palpation.    NEURO: Alert and oriented x 3, Motor 5/5 bilaterally    Medications:    Current Facility-Administered Medications:   •  amLODIPine (NORVASC) tablet 5 mg, 5 mg, Oral, Q24H, Krzysztof Torrez MD, 5 mg at 06/02/19 0930  •  aspirin chewable tablet 81 mg, 81 mg, Oral, Daily, Regan Benjamin MD, 81 mg at 06/02/19 0932  •  atropine sulfate injection 0.5-1 mg, 0.5-1 mg, Intravenous, Q5 Min PRN, Filemon Mayberry IV, MD, 0.5 mg at 05/28/19 0920  •  bisacodyl (DULCOLAX) suppository 10 mg, 10 mg, Rectal, Daily PRN, Krzysztof Torrez MD  •  [DISCONTINUED] clopidogrel (PLAVIX) tablet 600 mg, 600 mg, Oral, Daily **OR** clopidogrel (PLAVIX) tablet 75 mg, 75 mg, Oral, Daily, Filemon Mayberry IV, MD, 75 mg at 06/02/19 0932  •  dextrose (D50W) 25 g/ 50mL Intravenous Solution 25 g, 25 g, Intravenous, Q15 Min PRN, Janice Peguero APRN  •  dextrose (GLUTOSE) oral gel 15 g, 15 g, Oral, Q15 Min PRN, Janice Peguero APRN  •  docusate " sodium (COLACE) capsule 100 mg, 100 mg, Oral, BID, Mikala Shah DO, 100 mg at 06/02/19 0934  •  gabapentin (NEURONTIN) capsule 300 mg, 300 mg, Oral, Q12H, Ariana Burden MD, 300 mg at 06/02/19 0932  •  glucagon (human recombinant) (GLUCAGEN DIAGNOSTIC) injection 1 mg, 1 mg, Subcutaneous, Q15 Min PRN, Janice Peguero APRN  •  heparin (porcine) 5000 UNIT/ML injection 5,000 Units, 5,000 Units, Subcutaneous, Q12H, Enedelia Macias MD, 5,000 Units at 06/02/19 0932  •  HYDROcodone-acetaminophen (NORCO) 5-325 MG per tablet 1 tablet, 1 tablet, Oral, Q6H PRN, Mikala Shah DO, 1 tablet at 06/01/19 2128  •  insulin lispro (humaLOG) injection 0-9 Units, 0-9 Units, Subcutaneous, 4x Daily With Meals & Nightly, Le Claudio MD, 4 Units at 06/01/19 2129  •  ipratropium-albuterol (DUO-NEB) nebulizer solution 3 mL, 3 mL, Nebulization, Q4H PRN, Le Claudio MD, 3 mL at 05/26/19 1109  •  melatonin tablet 5 mg, 5 mg, Oral, Nightly, Ailyn Smart MD, 5 mg at 06/01/19 2128  •  metoprolol tartrate (LOPRESSOR) tablet 25 mg, 25 mg, Oral, Q12H, Filemon Mayberry IV, MD, 25 mg at 06/02/19 0930  •  [DISCONTINUED] morphine injection 1 mg, 1 mg, Intravenous, Q4H PRN, 1 mg at 05/26/19 0124 **AND** naloxone (NARCAN) injection 0.4 mg, 0.4 mg, Intravenous, Q5 Min PRN, Regan Benjamin MD  •  nitroglycerin (NITROSTAT) SL tablet 0.4 mg, 0.4 mg, Sublingual, Q5 Min PRN, Le Claudio MD, 0.4 mg at 05/21/19 1309  •  ondansetron (ZOFRAN) injection 4 mg, 4 mg, Intravenous, Q6H PRN, Regan Benjamin MD, 4 mg at 05/27/19 1409  •  ondansetron (ZOFRAN) tablet 8 mg, 8 mg, Oral, Q6H PRN, Enedelia Macias MD  •  pantoprazole (PROTONIX) EC tablet 40 mg, 40 mg, Oral, BID, Regan Benjamin MD, 40 mg at 06/02/19 0930  •  Pharmacy Consult - [facility], , Does not apply, Daily, Ailyn Rosales, [facility]  •  polyethylene glycol 3350 powder (packet), 17 g, Oral, BID, Krzysztof Torrez MD, 17 g at 06/02/19 0929  •  promethazine (PHENERGAN)  injection 12.5 mg, 12.5 mg, Intravenous, Q6H PRN, Camryn Echevarria APRN  •  QUEtiapine (SEROquel) tablet 12.5 mg, 12.5 mg, Oral, Nightly, Ailyn Smart MD, 12.5 mg at 06/01/19 2128  •  rosuvastatin (CRESTOR) tablet 20 mg, 20 mg, Oral, Nightly, Tayo Payne MD, 20 mg at 06/01/19 2128  •  sodium chloride 0.9 % flush 3-10 mL, 3-10 mL, Intravenous, PRN, Regan Benjamin MD, 10 mL at 06/01/19 2129    Meds reviewed and doses adjusted for renal function    Labs:  Results from last 7 days   Lab Units 06/02/19  0513 06/01/19  0620 05/31/19  0626 05/30/19  1025 05/30/19  0420 05/29/19  2125 05/29/19  1416 05/29/19  0733 05/28/19  0948 05/28/19  0422  05/27/19  1429 05/27/19  1344   WBC 10*3/mm3 3.10* 4.52 5.89  --  6.13  --   --  6.73 6.46 5.92  --  7.19  --    HEMOGLOBIN g/dL 9.6* 10.2* 9.5*  --  8.9* 8.8* 7.7* 9.1* 8.2* 8.3*   < > 7.1*  --    HEMATOCRIT % 30.5* 33.1* 30.8*  --  28.7* 27.3* 24.3* 29.5* 27.1* 27.0*   < > 23.4*  --    PLATELETS 10*3/mm3 181 180 161  --  158  --   --  170 169 164  --  172  --    SODIUM mmol/L 136 135* 136 132*  --   --   --  133*  132*  --  133*  --  131* 133*   POTASSIUM mmol/L 4.4 4.3 4.6 4.8  --  4.7  --  5.4*  5.4*  --  4.9  --  4.6 5.1   CHLORIDE mmol/L 96* 97* 100 96*  --   --   --  95*  94*  --  94*  --  93* 93*   CO2 mmol/L 30.0* 26.0 25.0 24.0  --   --   --  17.0*  25.0  --  30.0*  --  27.0 25.0   BUN mg/dL 26* 28* 37* 49*  --   --   --  67*  65*  --  67*  --  70* 68*   CREATININE mg/dL 1.28* 1.39* 1.46* 1.77*  --   --   --  1.94*  2.01*  --  1.86*  --  1.86* 2.10*   GLUCOSE mg/dL 149* 59* 48* 135*  --   --   --  146*  157*  --  93  --  210* 239*   CALCIUM mg/dL 9.1 9.1 9.0 8.8  --   --   --  9.0  9.0  --  9.4  --  9.1 8.9   PHOSPHORUS mg/dL 3.9 3.3 2.9  --   --   --   --  4.4  --  4.2  --   --  4.1    < > = values in this interval not displayed.             Radiology:  Imaging Results (last 72 hours)     Procedure Component Value Units Date/Time    CT Abdomen  Pelvis Without Contrast [[id number]] Collected:  05/18/19 1748     Updated:  05/21/19 1014    Narrative:       EXAMINATION: CT ABDOMEN/PELVIS WO CONTRAST - 05/18/2019     INDICATION: Abdominal pain.     TECHNIQUE: CT abdomen and pelvis without intravenous contrast.     The radiation dose reduction device was turned on for each scan per the  ALARA (As Low as Reasonably Achievable) protocol.     COMPARISON: CT dated 04/16/2015.     FINDINGS: Lung bases demonstrate subsegmental atelectasis and/or  scarring without consolidation or effusion. Liver without focal lesion.  Gallbladder is surgically absent. No biliary dilatation. Pancreas and  spleen unremarkable. Adrenals without distinct nodule. Kidneys without  hydronephrosis or hydroureter. No bulky retroperitoneal adenopathy.  Atherosclerotic nonaneurysmal abdominal aorta. GI tract evaluation  demonstrates small hiatal hernia. Within the distal duodenal c-loop is a  medially oriented area of outpouching or gas which may represent  duodenal diverticulum or choledochal cyst formation as this is adjacent  to or involving the ampulla. Distal bowel is unremarkable for mechanical  obstructive findings, however sigmoid diverticulosis is fairly  involvement without acute findings to suggest acute diverticulitis. No  free fluid or intra-abdominal free air. No loculated fluid collection.  Appendix is visualized and unremarkable. Pelvic viscera unremarkable  without bulky pelvic adenopathy or free fluid. Degenerative changes of  the spine without aggressive osseous or soft tissue body wall lesions  with right flank stimulator pack in place with thoracic spinal cord  stimulator leads noted and partially imaged.       Impression:       1. Small hiatal hernia.  2. Small area of outpouching gas-filled along the medial margin of the  duodenal c-loop distally at or involving the patella which may represent  choledochal cyst formation or duodenal diverticulum without  significant  inflammatory findings or associated fluid.  3. No mechanical obstructive findings with sigmoid diverticulosis,  however, no evidence for acute diverticulitis. No abscess or free air.     DICTATED:   05/18/2019  EDITED/ls :   05/18/2019         This report was finalized on 5/21/2019 10:11 AM by Dr. Stu Schroeder.       XR Chest 1 View [[id number]] Collected:  05/18/19 1543     Updated:  05/18/19 1653    Narrative:          EXAMINATION: XR CHEST 1 VW - 05/18/2019     INDICATION: Upper abdominal pain.      COMPARISON: 04/26/2019     FINDINGS: Lungs are clear without consolidation or opacification. No  pneumothorax or pleural effusion. Cardiac silhouette borderline enlarged  and unchanged. No pneumothorax or pleural effusion. Visualized portions  of the upper abdomen without abnormality; specifically no free air  underneath the hemidiaphragms.           Impression:       No acute cardiopulmonary process or free air underneath the  hemidiaphragms on this portable erect evaluation.     DICTATED:   05/18/2019  EDITED/ls :   05/18/2019              Renal Imaging:  No radiology results for the last day        IMPRESSION:  Acute kidney injury likely related to ATN and volume depletion with low blood pressure. UOP is adequate. Scr 1.2 -improving  Chronic kidney disease stage III-IV with a baseline creatinine around 1.8 - stable.   Proteinuria minimal- p/c ratio 0.11  Coronary artery disease questionable chest pain acute episode  Anemia s/p transfusion  Bradycardia   Hyponatremia - Resolved. .   Hyperkalemia - improved       RECOMMENDATIONS:  Continue with current regimen    Complex high risk patient.     Adrienne Sahu MD  6/2/2019  9:34 AM

## 2019-06-02 NOTE — THERAPY EVALUATION
Acute Care - Occupational Therapy Initial Evaluation  [facility]     Patient Name: Narcisa Cotto  : 1940  MRN: [id number]  Today's Date: 2019  Onset of Illness/Injury or Date of Surgery: 19  Date of Referral to OT: 19  Referring Physician: MD Shanon    Admit Date: 2019       ICD-10-CM ICD-9-CM   1. Hyperglycemia R73.9 790.29   2. Elevated troponin R74.8 790.6   3. Dehydration E86.0 276.51   4. Intractable vomiting with nausea, unspecified vomiting type R11.2 536.2   5. NSTEMI (non-ST elevated myocardial infarction) (CMS/[facility]) I21.4 410.70   6. Impaired functional mobility, balance, gait, and endurance Z74.09 V49.89   7. Essential hypertension I10 401.9   8. Coronary artery disease involving native coronary artery of native heart with angina pectoris (CMS/[facility]) I25.119 414.01     413.9   9. Cardiac arrest, asystole, s/p CPR/no meds on 2019 I46.9 427.5   10. Impaired mobility and ADLs Z74.09 799.89     Patient Active Problem List   Diagnosis   • Hyperlipidemia LDL goal <70   • Diabetic gastroparesis (CMS/[facility])   • Peripheral arterial disease (CMS/[facility])   • Uncontrolled type 2 diabetes mellitus with complication, with long-term current use of insulin (CMS/[facility])   • Sleep apnea   • Diabetic peripheral neuropathy (CMS/[facility])   • Coronary artery disease involving native coronary artery of native heart with angina pectoris (CMS/[facility])   • GERD (gastroesophageal reflux disease)   • Essential hypertension   • NSTEMI (non-ST elevated myocardial infarction) (CMS/[facility])   • Acute renal failure with acute tubular necrosis superimposed on stage 3 chronic kidney disease (CMS/[facility])   • Iron deficiency anemia     Past Medical History:   Diagnosis Date   • CAD (coronary artery disease)    • Choledochal cyst 2019   • Chronic diastolic congestive heart failure (CMS/[facility])    • Chronic low back pain    • Chronic venous insufficiency    • CKD (chronic kidney disease) stage 3, GFR 30-59 ml/min (CMS/[facility])     • Diabetic gastroparesis (CMS/[facility])    • Diverticulosis    • DJD (degenerative joint disease), lumbar    • DM2 (diabetes mellitus, type 2) (CMS/[facility])    • GERD (gastroesophageal reflux disease)    • Hiatal hernia    • History of hyperparathyroidism    • HLD (hyperlipidemia)    • HTN (hypertension)    • Lumbar stenosis 11/15/2018   • DELFINA (obstructive sleep apnea)    • Osteoarthritis 7/29/2016   • PAD (peripheral artery disease) (CMS/[facility])    • Postherpetic neuralgia    • Vitamin B12 deficiency 7/29/2016     Past Surgical History:   Procedure Laterality Date   • CARDIAC CATHETERIZATION N/A 5/23/2019    Procedure: LEFT HEART CATH;  Surgeon: Filemon Mayberry IV, MD;  Location:  [facility];  Service: Cardiovascular   • CARDIAC CATHETERIZATION N/A 5/29/2019    Procedure: Atherectomy-coronary;  Surgeon: Filemon Mayberry IV, MD;  Location:  [facility];  Service: Cardiovascular   • CARDIAC CATHETERIZATION N/A 5/29/2019    Procedure: Stent PATRICIA coronary;  Surgeon: Filemon Mayberry IV, MD;  Location:  [facility];  Service: Cardiovascular   • CARDIAC ELECTROPHYSIOLOGY PROCEDURE N/A 5/28/2019    Procedure: TEMPORARY PACEMAKER;  Surgeon: Jeffrey Reyes MD;  Location:  [facility];  Service: Cardiovascular   • CATARACT EXTRACTION Bilateral    • CHOLECYSTECTOMY     • COLONOSCOPY     • CYSTOSCOPY W/ URETERAL STENT PLACEMENT  2014   • ENDOSCOPY     • HYSTERECTOMY     • LUMBAR LAMINECTOMY DISCECTOMY DECOMPRESSION N/A 11/15/2018    Procedure: LUMBAR LAMINECTOMY L2-3 L 3-4,;  Surgeon: Zachary Key MD;  Location:  [facility];  Service: Orthopedic Spine   • PARATHYROIDECTOMY     • SPINAL CORD STIMULATOR IMPLANT     • TONSILLECTOMY            OT ASSESSMENT FLOWSHEET (last 12 hours)      Occupational Therapy Evaluation     Row Name 06/02/19 0800                   OT Evaluation Time/Intention    Subjective Information  complains of;weakness   -KF        Document Type  evaluation  -KF        Mode of Treatment  individual therapy;occupational therapy  -KF        Patient Effort  excellent  -KF        Symptoms Noted During/After Treatment  fatigue  -KF           General Information    Patient Profile Reviewed?  yes  -KF        Onset of Illness/Injury or Date of Surgery  05/18/19  -KF        Referring Physician  MD Shanon  -KF        Patient Observations  alert;cooperative;agree to therapy  -KF        Patient/Family Observations  Pt supine, RN present throughout, vitals stable, no family present  -KF        General Observations of Patient  dizzy upon sitting but vitals stable when assessed, no c/o SOA    -KF        Prior Level of Function  independent:;all household mobility;community mobility;gait;transfer;bed mobility;ADL's;home management;shopping;using stairs  -KF        Equipment Currently Used at Home  bath bench;grab bar;lift device;raised toilet;cane, straight;walker, rolling;power chair, (recliner lift)  -KF        Pertinent History of Current Functional Problem  Pt 77 yo female hx of HTN, DM2, CKD3, presents c/o N/V with chest pain, dx NSTEMI s/p stents R groin, cardiac arrest asystole 5/27  -KF        Existing Precautions/Restrictions  fall;cardiac  -KF        Risks Reviewed  patient:;LOB;nausea/vomiting;dizziness;increased discomfort;change in vital signs  -KF        Benefits Reviewed  patient:;improve function;increase strength;increase independence;increase balance;increase knowledge  -KF        Barriers to Rehab  medically complex  -KF           Relationship/Environment    Primary Source of Support/Comfort  child(laura)  -KF        Lives With  child(laura), adult dtr  -KF        Family Caregiver if Needed  child(laura), adult  -KF           Resource/Environmental Concerns    Current Living Arrangements  home/apartment/condo  -KF           Home Main Entrance    Number of Stairs, Main Entrance  two;one  -KF           Cognitive Assessment/Interventions     Additional Documentation  Cognitive Assessment/Intervention (Group)  -KF           Cognitive Assessment/Intervention- PT/OT    Affect/Mental Status (Cognitive)  WNL  -KF        Orientation Status (Cognition)  oriented x 4  -KF        Follows Commands (Cognition)  WFL  -KF        Cognitive Function (Cognitive)  safety deficit  -KF        Safety Deficit (Cognitive)  mild deficit;insight into deficits/self awareness;safety precautions awareness  -KF        Cognitive Interventions (Cognitive)  occupation/activity based interventions;process/task specific training  -KF        Personal Safety Interventions  fall prevention program maintained;gait belt;muscle strengthening facilitated;nonskid shoes/slippers when out of bed  -KF           Safety Issues, Functional Mobility    Safety Issues Affecting Function (Mobility)  safety precaution awareness;insight into deficits/self awareness  -KF        Impairments Affecting Function (Mobility)  balance;endurance/activity tolerance;strength  -KF           Bed Mobility Assessment/Treatment    Bed Mobility Assessment/Treatment  rolling left;scooting/bridging;supine-sit;rolling right  -KF        Rolling Left [place] (Bed Mobility)  contact guard  -KF        Rolling Right [place] (Bed Mobility)  minimum assist (75% patient effort)  -KF        Scooting/Bridging [place] (Bed Mobility)  contact guard;verbal cues  -KF        Supine-Sit [place] (Bed Mobility)  minimum assist (75% patient effort);2 person assist  -KF        Bed Mobility, Safety Issues  decreased use of arms for pushing/pulling;decreased use of legs for bridging/pushing  -KF        Assistive Device (Bed Mobility)  bed rails;head of bed elevated  -KF        Comment (Bed Mobility)  much improved from notes, min A to bring torso into sitting EOB   -KF           Functional Mobility    Functional Mobility- Ind. Level  contact guard assist;1 person + 1 person to manage equipment;verbal cues required  -KF         Functional Mobility- Device  rolling walker  -KF        Functional Mobility-Distance (Feet)  8 3 +5  -KF        Functional Mobility- Safety Issues  step length decreased;weight-shifting ability decreased  -KF        Functional Mobility- Comment  steps to pivot to chair then steps forward from chair with chair following 5 ft   -KF           Transfer Assessment/Treatment    Transfer Assessment/Treatment  sit-stand transfer;stand-sit transfer;bed-chair transfer  -KF        Comment (Transfers)  3 ft to chair with pivot at RW, cues for HP and seq  -KF           Bed-Chair Transfer    Bed-Chair [place] (Transfers)  contact guard;1 person to manage equipment;verbal cues  -KF        Assistive Device (Bed-Chair Transfers)  walker, front-wheeled  -KF           Sit-Stand Transfer    Sit-Stand [place] (Transfers)  contact guard;1 person to manage equipment;verbal cues  -KF        Assistive Device (Sit-Stand Transfers)  walker, front-wheeled  -KF           Stand-Sit Transfer    Stand-Sit [place] (Transfers)  contact guard;verbal cues  -KF        Assistive Device (Stand-Sit Transfers)  walker, front-wheeled  -KF           ADL Assessment/Intervention    BADL Assessment/Intervention  lower body dressing;upper body dressing;feeding  -           Upper Body Dressing Assessment/Training    Upper Body Dressing [place] Level  don;front opening garment;minimum assist (75% patient effort);verbal cues  -KF        Upper Body Dressing Position  edge of bed sitting  -KF        Comment (Upper Body Dressing)  min A to thread UEs and bring around back   -KF           Lower Body Dressing Assessment/Training    Lower Body Dressing [place] Level  don;socks;dependent (less than 25% patient effort);verbal cues  -KF        Lower Body Dressing Position  edge of bed sitting  -KF        Comment (Lower Body Dressing)  attempted unable due to weakness, dizziness, and pain  -           Self-Feeding Assessment/Training     [place] Level (Feeding)  prepare tray/open items;set up;scoop food and bring to mouth;independent  -KF        Position (Self-Feeding)  supported sitting  -KF        Comment (Feeding)  with items in reach min A only to open drink container, able to set up rest of meal and I for self-feeding   -KF           BADL Safety/Performance    Impairments, BADL Safety/Performance  balance;endurance/activity tolerance;range of motion;pain;strength  -KF        Skilled BADL Treatment/Intervention  BADL process/adaptation training;cognitive/safety deficit modifications  -KF           General ROM    GENERAL ROM COMMENTS  BUE WFL  -KF           MMT (Manual Muscle Testing)    General MMT Comments  BUE grossly 4/5  -KF           Motor Assessment/Interventions    Additional Documentation  Balance (Group);Gross Motor Coordination (Group);Balance Interventions (Group);Fine Motor Testing & Training (Group)  -KF           Gross Motor Coordination    Gross Motor Skill, Impairments Detail  BUE WFL   -KF           Balance    Balance  static sitting balance;static standing balance;dynamic sitting balance;dynamic standing balance  -KF           Static Sitting Balance    Level of [place] (Unsupported Sitting, Static Balance)  supervision  -KF        Sitting Position (Unsupported Sitting, Static Balance)  sitting on edge of bed  -KF        Time Able to Maintain Position (Unsupported Sitting, Static Balance)  more than 5 minutes  -KF        Comment (Unsupported Sitting, Static Balance)  pumping ankles gently, BP assessment   -KF           Dynamic Sitting Balance    Level of [place], Reaches Outside Midline (Sitting, Dynamic Balance)  contact guard assist  -KF        Sitting Position, Reaches Outside Midline (Sitting, Dynamic Balance)  sitting on edge of bed  -KF        Comment, Reaches Outside Midline (Sitting, Dynamic Balance)  scooting, ws, LBD attemtp  -KF           Static Standing Balance    Level of [place] (Supported  Standing, Static Balance)  contact guard assist  -KF        Time Able to Maintain Position (Supported Standing, Static Balance)  1 to 2 minutes  -KF        Assistive Device Utilized (Supported Standing, Static Balance)  walker, rolling  -KF           Dynamic Standing Balance    Level of [place], Reaches Outside Midline (Standing, Dynamic Balance)  minimal assist, 75% patient effort  -KF        Time Able to Maintain Position, Reaches Outside Midline (Standing, Dynamic Balance)  45 to 60 seconds  -KF        Assistive Device Utilized (Supported Standing, Dynamic Balance)  walker, rolling  -KF           Fine Motor Testing & Training    Training Activity, Fine Motor Coordination  manipulation of eating utensils;opening/closing containers  -KF        Comment, Fine Motor Coordination  mild deficits in coordination B   -KF           Sensory Assessment/Intervention    Sensory General Assessment  no sensation deficits identified intermittent numbness L hand   -KF           Positioning and Restraints    Pre-Treatment Position  in bed  -KF        Post Treatment Position  chair  -KF        In Chair  notified nsg;reclined;sitting;call light within reach;encouraged to call for assist;on mechanical lift sling;legs elevated RN waved exit   -KF           Pain Assessment    Additional Documentation  Pain Scale: Numbers Pre/Post-Treatment (Group)  -KF           Pain Scale: Numbers Pre/Post-Treatment    Pain Scale: Numbers, Pretreatment  0/10 - no pain  -KF        Pain Scale: Numbers, Post-Treatment  0/10 - no pain  -KF        Pre/Post Treatment Pain Comment  tolerated  -KF        Pain Intervention(s)  Repositioned;Ambulation/increased activity  -KF           Wound 05/30/19 1430 Right neck puncture    Wound - Properties Group Date first assessed: 05/30/19  -JJ Time first assessed: 1430  -JJ Present On Admission : no  -JJ Side: Right  -JJ Location: neck  -JJ Type: puncture  -JJ       Plan of Care Review    Plan of Care Reviewed With   patient  -KF           Clinical Impression (OT)    Date of Referral to OT  05/18/19  -KF        OT Diagnosis  ADL decline  -KF        Patient/Family Goals Statement (OT Eval)  PLOF  -KF        Criteria for Skilled Therapeutic Interventions Met (OT Eval)  yes;treatment indicated  -KF        Rehab Potential (OT Eval)  good, to achieve stated therapy goals  -KF        Therapy Frequency (OT Eval)  daily  -KF        Care Plan Review (OT)  evaluation/treatment results reviewed;care plan/treatment goals reviewed;risks/benefits reviewed;patient/other agree to care plan;current/potential barriers reviewed  -KF        Anticipated Equipment Needs at Discharge (OT)  -- TBD  -KF        Anticipated Discharge Disposition (OT)  inpatient rehabilitation facility  -KF           Vital Signs    Pre Systolic BP Rehab  133  -KF        Pre Treatment Diastolic BP  58  -KF        Post Systolic BP Rehab  133  -KF        Post Treatment Diastolic BP  66  -KF        Pretreatment Heart Rate (beats/min)  78  -KF        Posttreatment Heart Rate (beats/min)  81  -KF        Pre SpO2 (%)  98  -KF        O2 Delivery Pre Treatment  supplemental O2  -KF        Intra SpO2 (%)  91  -KF        O2 Delivery Intra Treatment  room air  -KF        Post SpO2 (%)  93  -KF        O2 Delivery Post Treatment  room air  -KF        Pre Patient Position  Supine  -KF        Intra Patient Position  Standing  -KF        Post Patient Position  Sitting  -KF        Rest Breaks   1  -KF           Planned OT Interventions    Planned Therapy Interventions (OT Eval)  activity tolerance training;BADL retraining;cognitive/visual perception retraining;functional balance retraining;IADL retraining;occupation/activity based interventions;patient/caregiver education/training;ROM/therapeutic exercise;strengthening exercise;transfer/mobility retraining  -KF           OT Goals    Bed Mobility Goal Selection (OT)  bed mobility, OT goal 1  -KF        Transfer Goal Selection (OT)   transfer, OT goal 1  -KF        Dressing Goal Selection (OT)  dressing, OT goal 1  -KF        Toileting Goal Selection (OT)  toileting, OT goal 1  -KF           Bed Mobility Goal 1 (OT)    Activity/Assistive Device (Bed Mobility Goal 1, OT)  sit to supine/supine to sit  -KF        [place] Level/Cues Needed (Bed Mobility Goal 1, OT)  contact guard assist;verbal cues required  -KF        Time Frame (Bed Mobility Goal 1, OT)  long term goal (LTG);by discharge  -KF        Progress/Outcomes (Bed Mobility Goal 1, OT)  goal ongoing  -KF           Transfer Goal 1 (OT)    Activity/Assistive Device (Transfer Goal 1, OT)  bed-to-chair/chair-to-bed;commode, bedside without drop arms;walker, rolling BSC for elevation  -KF        [place] Level/Cues Needed (Transfer Goal 1, OT)  contact guard assist;verbal cues required  -KF        Time Frame (Transfer Goal 1, OT)  long term goal (LTG);by discharge  -KF        Progress/Outcome (Transfer Goal 1, OT)  goal ongoing  -KF           Dressing Goal 1 (OT)    Activity/Assistive Device (Dressing Goal 1, OT)  lower body dressing socks  -KF        [place]/Cues Needed (Dressing Goal 1, OT)  verbal cues required;moderate assist (50-74% patient effort)  -KF        Time Frame (Dressing Goal 1, OT)  long term goal (LTG);by discharge  -KF        Progress/Outcome (Dressing Goal 1, OT)  goal ongoing  -KF           Toileting Goal 1 (OT)    Activity/Device (Toileting Goal 1, OT)  adjust/manage clothing;perform perineal hygiene;commode, bedside without drop arms  -KF        [place] Level/Cues Needed (Toileting Goal 1, OT)  moderate assist (50-74% patient effort);verbal cues required  -KF        Time Frame (Toileting Goal 1, OT)  long term goal (LTG);by discharge  -KF        Progress/Outcome (Toileting Goal 1, OT)  goal ongoing  -KF           Living Environment    Home Accessibility  stairs to enter home;tub/shower is not walk in  -KF          User Key  (r) = Recorded By, (t) = Taken  By, (c) = Cosigned By    Initials Name Effective Dates    Anmol Nascimento RN 06/16/16 -     Anayeli Lyles OT 04/03/18 -          Occupational Therapy Education     Title: PT OT SLP Therapies (In Progress)     Topic: Occupational Therapy (In Progress)     Point: ADL training (Done)     Description: Instruct learner(s) on proper safety adaptation and remediation techniques during self care or transfers.   Instruct in proper use of assistive devices.    Learning Progress Summary           Patient Acceptance, E,TB,D, VU,DU by  at 6/2/2019  8:00 AM    Comment:  Purpose of skilled OT, seq of transfers, bed mob seq                   Point: Precautions (Done)     Description: Instruct learner(s) on prescribed precautions during self-care and functional transfers.    Learning Progress Summary           Patient Acceptance, E,TB,D, VU,DU by  at 6/2/2019  8:00 AM    Comment:  Purpose of skilled OT, seq of transfers, bed mob seq                   Point: Body mechanics (Done)     Description: Instruct learner(s) on proper positioning and spine alignment during self-care, functional mobility activities and/or exercises.    Learning Progress Summary           Patient Acceptance, E,TB,D, VU,DU by  at 6/2/2019  8:00 AM    Comment:  Purpose of skilled OT, seq of transfers, bed mob seq                               User Key     Initials Effective Dates Name Provider Type Discipline     04/03/18 -  Anayeli Dennis OT Occupational Therapist OT                  OT Recommendation and Plan  Outcome Summary/Treatment Plan (OT)  Anticipated Equipment Needs at Discharge (OT): (TBD)  Anticipated Discharge Disposition (OT): inpatient rehabilitation facility  Planned Therapy Interventions (OT Eval): activity tolerance training, BADL retraining, cognitive/visual perception retraining, functional balance retraining, IADL retraining, occupation/activity based interventions, patient/caregiver education/training, ROM/therapeutic  exercise, strengthening exercise, transfer/mobility retraining  Therapy Frequency (OT Eval): daily  Plan of Care Review  Plan of Care Reviewed With: patient  Plan of Care Reviewed With: patient  Outcome Summary: OT eval completed Pt presents with deficits in AROM, strength, activity tolerance, and balance for ADL completion, recom IPOT d/c IRF, excellent rehab candidate and motivation, Pt ambulated to chair from bed and 5 ft forward with RW after brief rest, dep for socks but willing to attempt    Outcome Measures     Row Name 06/02/19 0800             How much help from another is currently needed...    Putting on and taking off regular lower body clothing?  1  -KF      Bathing (including washing, rinsing, and drying)  2  -KF      Toileting (which includes using toilet bed pan or urinal)  1  -KF      Putting on and taking off regular upper body clothing  3  -KF      Taking care of personal grooming (such as brushing teeth)  3  -KF      Eating meals  3  -KF      Score  13  -KF         Functional Assessment    Outcome Measure Options  AM-PAC 6 Clicks Daily Activity (OT)  -KF        User Key  (r) = Recorded By, (t) = Taken By, (c) = Cosigned By    Initials Name Provider Type    Anayeli Lyles OT Occupational Therapist          Time Calculation:   Time Calculation- OT     Row Name 06/02/19 0800             Time Calculation- OT    OT Start Time  0800  -KF      Total Timed Code Minutes- OT  0 minute(s)  -KF      OT Received On  06/02/19  -KF      OT Goal Re-Cert Due Date  06/12/19  -KF        User Key  (r) = Recorded By, (t) = Taken By, (c) = Cosigned By    Initials Name Provider Type    Anayeli Lyles OT Occupational Therapist        Therapy Charges for Today     Code Description Service Date Service Provider Modifiers Qty    [id number]  OT EVAL MOD COMPLEXITY 4 6/2/2019 Anayeli Dennis OT GO 1               Anayeli Dennis OT  6/2/2019

## 2019-06-02 NOTE — PLAN OF CARE
Problem: Patient Care Overview  Goal: Plan of Care Review  Outcome: Ongoing (interventions implemented as appropriate)   06/02/19 1268   Coping/Psychosocial   Plan of Care Reviewed With patient   Plan of Care Review   Progress improving   OTHER   Outcome Summary VSS, on 2L per nasal cannula. Rested well overnight. Will continue to monitor.

## 2019-06-02 NOTE — PLAN OF CARE
Problem: Patient Care Overview  Goal: Plan of Care Review  Outcome: Ongoing (interventions implemented as appropriate)   06/02/19 0850   Coping/Psychosocial   Plan of Care Reviewed With patient   Plan of Care Review   Progress improving   OTHER   Outcome Summary OT eval completed Pt presents with deficits in AROM, strength, activity tolerance, and balance for ADL completion, recom IPOT d/c IRF, excellent rehab candidate and motivation, Pt ambulated to chair from bed and 5 ft forward with RW after brief rest, dep for socks but willing to attempt

## 2019-06-02 NOTE — PROGRESS NOTES
[facility] at [facility]  Cardiology Progress Note      Chief Complaint/Reason for service:    · NSTEMI  · Severe Multivessel disease         Patient resting comfortably. Reports this is the best she has felt since entering the hospital. She is looking forward to working with PT today. No longer gets SOB while eating     Past medical, surgical, social and family history reviewed in the patient's electronic medical record.         Vital Sign Min/Max for last 24 hours  Temp  Min: 98.4 °F (36.9 °C)  Max: 98.4 °F (36.9 °C)   BP  Min: 133/58  Max: 149/64   Pulse  Min: 67  Max: 79   Resp  Min: 18  Max: 20   SpO2  Min: 98 %  Max: 98 %   Flow (L/min)  Min: 2  Max: 2      Intake/Output Summary (Last 24 hours) at 6/2/2019 0715  Last data filed at 6/2/2019 0500  Gross per 24 hour   Intake 650 ml   Output 2900 ml   Net -2250 ml           Physical Exam   Constitutional: She is oriented to person, place, and time. She appears well-nourished. No distress.   HENT:   Head: Normocephalic and atraumatic.   Eyes: Pupils are equal, round, and reactive to light.   Cardiovascular: Normal rate, regular rhythm and normal heart sounds.   No murmur heard.  Pulmonary/Chest: Effort normal and breath sounds normal. She has no wheezes. She has no rales.   Abdominal: Soft. Bowel sounds are normal. She exhibits no distension.   Musculoskeletal: She exhibits no edema.   Neurological: She is alert and oriented to person, place, and time.   Skin: Skin is warm and dry.   Psychiatric: She has a normal mood and affect. Her behavior is normal.       Tele:  NSR     Results Review (reviewed the patient's recent labs in the electronic medical record):         Results from last 7 days   Lab Units 06/01/19  0620 05/31/19  0626 05/30/19  1025  05/29/19  0733 05/28/19  0422 05/27/19  1429 05/27/19  1344   SODIUM mmol/L 135* 136 132*  --  133*  132* 133* 131* 133*   POTASSIUM mmol/L 4.3 4.6 4.8   < > 5.4*  5.4* 4.9 4.6 5.1   CHLORIDE  mmol/L 97* 100 96*  --  95*  94* 94* 93* 93*   BUN mg/dL 28* 37* 49*  --  67*  65* 67* 70* 68*   CREATININE mg/dL 1.39* 1.46* 1.77*  --  1.94*  2.01* 1.86* 1.86* 2.10*   MAGNESIUM mg/dL  --   --   --   --   --  2.3 2.3 2.3    < > = values in this interval not displayed.     Results from last 7 days   Lab Units 05/28/19  0946 05/27/19  1344 05/26/19  1152   TROPONIN T ng/mL 0.320* 0.345* 0.323*     Results from last 7 days   Lab Units 06/02/19  0513 06/01/19  0620 05/31/19  0626   WBC 10*3/mm3 3.10* 4.52 5.89   HEMOGLOBIN g/dL 9.6* 10.2* 9.5*   HEMATOCRIT % 30.5* 33.1* 30.8*   PLATELETS 10*3/mm3 181 180 161       Lab Results   Component Value Date    HGBA1C 8.8 01/28/2019       Lab Results   Component Value Date    CHOL 250 (H) 04/26/2019    CHLPL 147 09/19/2018    TRIG 244 (H) 04/26/2019    HDL 33 (L) 04/26/2019     (H) 04/26/2019                    Active Hospital Problems    Diagnosis POA   • **NSTEMI (non-ST elevated myocardial infarction) (CMS/[facility]) [I21.4] Yes     · Mild troponin elevation in the setting of chronic kidney disease, hypotension, and intractable nausea and vomiting, 5/18/2019  · Cardiac catheterization (5/23/2019): Severe multivessel CAD.  Culprit for NSTEMI is a highly calcified ostial RCA 99% stenosis.    · Turned down for surgery due to poor functional status  · Staged PCI of the ostial to mid RCA using Xience PATRICIA, 5/29/2019     • Iron deficiency anemia [D50.9] Yes   • Acute renal failure with acute tubular necrosis superimposed on stage 3 chronic kidney disease (CMS/HCC) [N17.0, N18.3] No   • Essential hypertension [I10] Yes   • GERD (gastroesophageal reflux disease) [K21.9] Yes   • Sleep apnea [G47.30] Yes   • Uncontrolled type 2 diabetes mellitus with complication, with long-term current use of insulin (CMS/[facility]) [E11.8, E11.65, Z79.4] Not Applicable   • Peripheral arterial disease (CMS/[facility]) [I73.9] Yes     · BONNIE (08/22/2013):  At rest right 0.98, left 0.85.  After 2 minutes of  exercise right 0.51, left 0.68.  · Venous duplex LE (7/27/2016): All veins visiualized appear patent with distal augmentation bilaterally.  · High-grade right common iliac stenosis on cardiac catheterization, 5/23/2019     • Diabetic gastroparesis (CMS/HCC) [E11.43, K31.84] Yes   • Hyperlipidemia LDL goal <70 [E78.5] Yes     · High intensity statin therapy is recommended given the presence of peripheral arterial disease and diabetes                · Diuretics PRN  · Continue working with PT   · Plan for rehab at discharge     HEMALATHA Jordan  6/2/2019

## 2019-06-03 LAB
ALBUMIN SERPL-MCNC: 3.1 G/DL (ref 3.5–5.2)
ANION GAP SERPL CALCULATED.3IONS-SCNC: 10 MMOL/L
BUN BLD-MCNC: 21 MG/DL (ref 8–23)
BUN/CREAT SERPL: 19.6 (ref 7–25)
CALCIUM SPEC-SCNC: 9.6 MG/DL (ref 8.6–10.5)
CHLORIDE SERPL-SCNC: 98 MMOL/L (ref 98–107)
CO2 SERPL-SCNC: 27 MMOL/L (ref 22–29)
CREAT BLD-MCNC: 1.07 MG/DL (ref 0.57–1)
DEPRECATED RDW RBC AUTO: 49 FL (ref 37–54)
ERYTHROCYTE [DISTWIDTH] IN BLOOD BY AUTOMATED COUNT: 15.5 % (ref 12.3–15.4)
GFR SERPL CREATININE-BSD FRML MDRD: 50 ML/MIN/1.73
GLUCOSE BLD-MCNC: 163 MG/DL (ref 65–99)
GLUCOSE BLDC GLUCOMTR-MCNC: 176 MG/DL (ref 70–130)
GLUCOSE BLDC GLUCOMTR-MCNC: 198 MG/DL (ref 70–130)
GLUCOSE BLDC GLUCOMTR-MCNC: 203 MG/DL (ref 70–130)
GLUCOSE BLDC GLUCOMTR-MCNC: 221 MG/DL (ref 70–130)
HCT VFR BLD AUTO: 31.8 % (ref 34–46.6)
HGB BLD-MCNC: 10.1 G/DL (ref 12–15.9)
MCH RBC QN AUTO: 27.6 PG (ref 26.6–33)
MCHC RBC AUTO-ENTMCNC: 31.8 G/DL (ref 31.5–35.7)
MCV RBC AUTO: 86.9 FL (ref 79–97)
PHOSPHATE SERPL-MCNC: 3 MG/DL (ref 2.5–4.5)
PLATELET # BLD AUTO: 187 10*3/MM3 (ref 140–450)
PMV BLD AUTO: 10.1 FL (ref 6–12)
POTASSIUM BLD-SCNC: 4.4 MMOL/L (ref 3.5–5.2)
RBC # BLD AUTO: 3.66 10*6/MM3 (ref 3.77–5.28)
SODIUM BLD-SCNC: 135 MMOL/L (ref 136–145)
WBC NRBC COR # BLD: 4.93 10*3/MM3 (ref 3.4–10.8)

## 2019-06-03 PROCEDURE — 85027 COMPLETE CBC AUTOMATED: CPT | Performed by: INTERNAL MEDICINE

## 2019-06-03 PROCEDURE — 80069 RENAL FUNCTION PANEL: CPT | Performed by: INTERNAL MEDICINE

## 2019-06-03 PROCEDURE — 82962 GLUCOSE BLOOD TEST: CPT

## 2019-06-03 PROCEDURE — 25010000002 HEPARIN (PORCINE) PER 1000 UNITS: Performed by: INTERNAL MEDICINE

## 2019-06-03 PROCEDURE — 97116 GAIT TRAINING THERAPY: CPT

## 2019-06-03 PROCEDURE — 97110 THERAPEUTIC EXERCISES: CPT

## 2019-06-03 PROCEDURE — 99232 SBSQ HOSP IP/OBS MODERATE 35: CPT | Performed by: INTERNAL MEDICINE

## 2019-06-03 PROCEDURE — 99232 SBSQ HOSP IP/OBS MODERATE 35: CPT | Performed by: HOSPITALIST

## 2019-06-03 RX ORDER — CLOPIDOGREL BISULFATE 75 MG/1
75 TABLET ORAL DAILY
Status: DISCONTINUED | OUTPATIENT
Start: 2019-06-04 | End: 2019-06-05 | Stop reason: HOSPADM

## 2019-06-03 RX ORDER — METOPROLOL TARTRATE 50 MG/1
50 TABLET, FILM COATED ORAL EVERY 12 HOURS SCHEDULED
Status: DISCONTINUED | OUTPATIENT
Start: 2019-06-03 | End: 2019-06-05 | Stop reason: HOSPADM

## 2019-06-03 RX ADMIN — DOCUSATE SODIUM 100 MG: 100 CAPSULE, LIQUID FILLED ORAL at 20:07

## 2019-06-03 RX ADMIN — MELATONIN TAB 5 MG 5 MG: 5 TAB at 20:07

## 2019-06-03 RX ADMIN — METOPROLOL TARTRATE 50 MG: 50 TABLET ORAL at 20:08

## 2019-06-03 RX ADMIN — POLYETHYLENE GLYCOL 3350 17 G: 17 POWDER, FOR SOLUTION ORAL at 20:07

## 2019-06-03 RX ADMIN — INSULIN LISPRO 2 UNITS: 100 INJECTION, SOLUTION INTRAVENOUS; SUBCUTANEOUS at 09:08

## 2019-06-03 RX ADMIN — PANTOPRAZOLE SODIUM 40 MG: 40 TABLET, DELAYED RELEASE ORAL at 20:07

## 2019-06-03 RX ADMIN — INSULIN LISPRO 2 UNITS: 100 INJECTION, SOLUTION INTRAVENOUS; SUBCUTANEOUS at 21:57

## 2019-06-03 RX ADMIN — INSULIN LISPRO 4 UNITS: 100 INJECTION, SOLUTION INTRAVENOUS; SUBCUTANEOUS at 11:54

## 2019-06-03 RX ADMIN — INSULIN LISPRO 4 UNITS: 100 INJECTION, SOLUTION INTRAVENOUS; SUBCUTANEOUS at 17:20

## 2019-06-03 RX ADMIN — HEPARIN SODIUM 5000 UNITS: 5000 INJECTION INTRAVENOUS; SUBCUTANEOUS at 20:07

## 2019-06-03 RX ADMIN — PANTOPRAZOLE SODIUM 40 MG: 40 TABLET, DELAYED RELEASE ORAL at 09:09

## 2019-06-03 RX ADMIN — HEPARIN SODIUM 5000 UNITS: 5000 INJECTION INTRAVENOUS; SUBCUTANEOUS at 09:09

## 2019-06-03 RX ADMIN — QUETIAPINE FUMARATE 12.5 MG: 25 TABLET ORAL at 20:07

## 2019-06-03 RX ADMIN — DOCUSATE SODIUM 100 MG: 100 CAPSULE, LIQUID FILLED ORAL at 09:09

## 2019-06-03 RX ADMIN — ROSUVASTATIN CALCIUM 20 MG: 20 TABLET, FILM COATED ORAL at 20:08

## 2019-06-03 RX ADMIN — ASPIRIN 81 MG CHEWABLE TABLET 81 MG: 81 TABLET CHEWABLE at 09:09

## 2019-06-03 RX ADMIN — METOPROLOL TARTRATE 25 MG: 25 TABLET ORAL at 09:09

## 2019-06-03 RX ADMIN — GABAPENTIN 300 MG: 300 CAPSULE ORAL at 09:09

## 2019-06-03 RX ADMIN — AMLODIPINE BESYLATE 5 MG: 5 TABLET ORAL at 09:09

## 2019-06-03 RX ADMIN — CLOPIDOGREL BISULFATE 75 MG: 75 TABLET ORAL at 09:09

## 2019-06-03 RX ADMIN — GABAPENTIN 300 MG: 300 CAPSULE ORAL at 20:07

## 2019-06-03 NOTE — PROGRESS NOTES
[facility] at [facility]  Cardiology Progress Note      Chief Complaint/Reason for service:    · NSTEMI  · Severe multivessel CAD       Patient transferred to telemetry last evening.  She states that she still feels weak.  Yesterday as a first day she is ambulated since being admitted on 5/18/2019.    Past medical, surgical, social and family history reviewed in the patient's electronic medical record.         Vital Sign Min/Max for last 24 hours  Temp  Min: 98.3 °F (36.8 °C)  Max: 98.3 °F (36.8 °C)   BP  Min: 147/65  Max: 150/75   Pulse  Min: 80  Max: 85   Resp  Min: 16  Max: 18   SpO2  Min: 98 %  Max: 98 %   Flow (L/min)  Min: 2  Max: 2      Intake/Output Summary (Last 24 hours) at 6/3/2019 1001  Last data filed at 6/3/2019 0900  Gross per 24 hour   Intake 240 ml   Output 2250 ml   Net -2010 ml           Physical Exam   Constitutional: She is oriented to person, place, and time. She appears well-developed and well-nourished.   HENT:   Head: Normocephalic and atraumatic.   Cardiovascular: Normal rate and regular rhythm.   No murmur heard.  Pulmonary/Chest: Tachypnea noted.   Abdominal: Soft.   Neurological: She is alert and oriented to person, place, and time.       Tele: Normal sinus rhythm    Results Review (reviewed the patient's recent labs in the electronic medical record):           Results from last 7 days   Lab Units 06/03/19  0529 06/02/19  0513 06/01/19  0620 05/31/19  0626 05/30/19  1025  05/29/19  0733 05/28/19  0422 05/27/19  1429 05/27/19  1344   SODIUM mmol/L 135* 136 135* 136 132*  --  133*  132* 133* 131* 133*   POTASSIUM mmol/L 4.4 4.4 4.3 4.6 4.8   < > 5.4*  5.4* 4.9 4.6 5.1   CHLORIDE mmol/L 98 96* 97* 100 96*  --  95*  94* 94* 93* 93*   BUN mg/dL 21 26* 28* 37* 49*  --  67*  65* 67* 70* 68*   CREATININE mg/dL 1.07* 1.28* 1.39* 1.46* 1.77*  --  1.94*  2.01* 1.86* 1.86* 2.10*   MAGNESIUM mg/dL  --   --   --   --   --   --   --  2.3 2.3 2.3    < > = values in this  interval not displayed.     Results from last 7 days   Lab Units 05/28/19  0946 05/27/19  1344   TROPONIN T ng/mL 0.320* 0.345*     Results from last 7 days   Lab Units 06/03/19  0529 06/02/19  0513 06/01/19  0620   WBC 10*3/mm3 4.93 3.10* 4.52   HEMOGLOBIN g/dL 10.1* 9.6* 10.2*   HEMATOCRIT % 31.8* 30.5* 33.1*   PLATELETS 10*3/mm3 187 181 180       Lab Results   Component Value Date    HGBA1C 8.8 01/28/2019       Lab Results   Component Value Date    CHOL 250 (H) 04/26/2019    CHLPL 147 09/19/2018    TRIG 244 (H) 04/26/2019    HDL 33 (L) 04/26/2019     (H) 04/26/2019              Active Hospital Problems    Diagnosis POA   • **NSTEMI (non-ST elevated myocardial infarction) (CMS/[facility]) [I21.4] Yes     Priority: High     · Mild troponin elevation in the setting of chronic kidney disease, hypotension, and intractable nausea and vomiting, 5/18/2019  · Cardiac catheterization (5/23/2019): Severe multivessel CAD.  Culprit for NSTEMI is a highly calcified ostial RCA 99% stenosis.    · Turned down for surgery due to poor functional status  · Staged PCI of the ostial to mid RCA using Xience PATRICIA, 5/29/2019     • Acute renal failure with acute tubular necrosis superimposed on stage 3 chronic kidney disease (CMS/[facility]) [N17.0, N18.3] No     Priority: Medium   • Diabetic gastroparesis (CMS/[facility]) [E11.43, K31.84] Yes     Priority: Medium   • Iron deficiency anemia [D50.9] Yes   • Essential hypertension [I10] Yes   • GERD (gastroesophageal reflux disease) [K21.9] Yes   • Sleep apnea [G47.30] Yes   • Uncontrolled type 2 diabetes mellitus with complication, with long-term current use of insulin (CMS/[facility]) [E11.8, E11.65, Z79.4] Not Applicable   • Peripheral arterial disease (CMS/[facility]) [I73.9] Yes     · BONNIE (08/22/2013):  At rest right 0.98, left 0.85.  After 2 minutes of exercise right 0.51, left 0.68.  · Venous duplex LE (7/27/2016): All veins visiualized appear patent with distal augmentation bilaterally.  · High-grade right  common iliac stenosis on cardiac catheterization, 5/23/2019     • Hyperlipidemia LDL goal <70 [E78.5] Yes     · High intensity statin therapy is recommended given the presence of peripheral arterial disease and diabetes       78-year-old female admitted for debility and NSTEMI who underwent high risk PCI of the RCA on 5/29/2019.  Patient is still weak and will require rehab upon discharge from the hospital.         · Increase metoprolol to 50 mg twice daily  · No ACE inhibitor/ARB due to recent acute kidney injury  · Okay for discharge to acute rehab from cardiology standpoint  · Eventual consideration for right iliac PTA/stenting    Filemon Mayberry IV, MD  6/3/2019

## 2019-06-03 NOTE — PROGRESS NOTES
[facility] Medicine Services  PROGRESS NOTE    Patient Name: Narcisa Cotto  : 1940  MRN: [id number]    Date of Admission: 2019  Length of Stay: 16  Primary Care Physician: Mariia Del Real DO    Subjective   Subjective     CC:  Constipation, chest pain     HPI:  No significant acute events reported from nursing staff.  Patient is up in chair, continues to feel better. Denies any chest pain or shortness of air.  Lower extremity edema is improving.  Denies any other complaints.    Review of Systems  Gen- No fevers, chills  CV- No chest pain, palpitations  Resp- As above  GI- as above     Otherwise ROS is negative except as mentioned in the HPI.    Objective   Objective     Vital Signs:   Temp:  [98.3 °F (36.8 °C)] 98.3 °F (36.8 °C)  Heart Rate:  [80-85] 85  Resp:  [16-18] 18  BP: (147-150)/(65-75) 150/75      Physical Exam:  Constitutional: awake, alert, not in any acute distress  HENT: NCAT, mucous membranes moist  Neck: right IJ bandage, left IJ venous sheath has been discontinued and dressing is intact without any signs of bleeding or expanding hematoma.  Respiratory: Clear to auscultation superiorly but poor expansion at bases, normal respiratory effort, respiration nonlabored  Cardiovascular: RRR, no murmurs  Gastrointestinal: Positive bowel sounds, soft, nontender, nondistended, obese   Musculoskeletal: 2+ pitting edema   Psychiatric: Appropriate affect, cooperative  Neurologic: Oriented x 3, strength symmetric in all extremities but weak, speech clear  Skin: Bilateral groin hematomas are stable    Results Reviewed:  I have personally reviewed current lab, radiology, and data and agree.    Results from last 7 days   Lab Units 19  0529 19  0513 19  0620  19  0948   WBC 10*3/mm3 4.93 3.10* 4.52   < > 6.46   HEMOGLOBIN g/dL 10.1* 9.6* 10.2*   < > 8.2*   HEMATOCRIT % 31.8* 30.5* 33.1*   < > 27.1*   PLATELETS 10*3/mm3 187 181 180   < > 169   INR    --   --   --   --  1.22*    < > = values in this interval not displayed.     Results from last 7 days   Lab Units 06/03/19  0529 06/02/19  0513 06/01/19  0620  05/29/19  0733 05/28/19  0946 05/28/19  0422   SODIUM mmol/L 135* 136 135*   < > 133*  132*  --  133*   POTASSIUM mmol/L 4.4 4.4 4.3   < > 5.4*  5.4*  --  4.9   CHLORIDE mmol/L 98 96* 97*   < > 95*  94*  --  94*   CO2 mmol/L 27.0 30.0* 26.0   < > 17.0*  25.0  --  30.0*   BUN mg/dL 21 26* 28*   < > 67*  65*  --  67*   CREATININE mg/dL 1.07* 1.28* 1.39*   < > 1.94*  2.01*  --  1.86*   GLUCOSE mg/dL 163* 149* 59*   < > 146*  157*  --  93   CALCIUM mg/dL 9.6 9.1 9.1   < > 9.0  9.0  --  9.4   ALT (SGPT) U/L  --   --   --   --  29  --  33   AST (SGOT) U/L  --   --   --   --  15  --  20   TROPONIN T ng/mL  --   --   --   --   --  0.320*  --     < > = values in this interval not displayed.     Estimated Creatinine Clearance: 48.1 mL/min (A) (by C-G formula based on SCr of 1.07 mg/dL (H)).    Microbiology Results Abnormal     Procedure Component Value - Date/Time    Eosinophil Smear - Urine, Urine, Clean Catch [[id number]]  (Normal) Collected:  05/21/19 0157    Lab Status:  Final result Specimen:  Urine, Clean Catch Updated:  05/21/19 0732     Eosinophil Smear 0 % EOS/100 Cells     Narrative:       No eosinophil seen          Imaging Results (last 24 hours)     ** No results found for the last 24 hours. **          Results for orders placed during the hospital encounter of 05/18/19   Adult Transthoracic Echo Complete W/ Cont if Necessary Per Protocol    Narrative · Left ventricular systolic function is normal. Estimated EF = 65%.  · Left ventricular diastolic dysfunction (grade II) consistent with   pseudonormalization.  · Mild mitral valve regurgitation is present  · Mild aortic valve regurgitation is present.  · Right ventricular cavity is dilated.  · Estimated right ventricular systolic pressure from tricuspid   regurgitation is normal (<35 mmHg).          I  have reviewed the medications:    Current Facility-Administered Medications:   •  amLODIPine (NORVASC) tablet 5 mg, 5 mg, Oral, Q24H, Krzysztof Torrez MD, 5 mg at 06/03/19 0909  •  aspirin chewable tablet 81 mg, 81 mg, Oral, Daily, Regan Benjamin MD, 81 mg at 06/03/19 0909  •  atropine sulfate injection 0.5-1 mg, 0.5-1 mg, Intravenous, Q5 Min PRN, Filemon Mayberry IV, MD, 0.5 mg at 05/28/19 0920  •  bisacodyl (DULCOLAX) suppository 10 mg, 10 mg, Rectal, Daily PRN, Krzysztof Torrez MD  •  [START ON 6/4/2019] clopidogrel (PLAVIX) tablet 75 mg, 75 mg, Oral, Daily, Michel Jurado, [facility]  •  dextrose (D50W) 25 g/ 50mL Intravenous Solution 25 g, 25 g, Intravenous, Q15 Min PRN, Janice Peguero APRN  •  dextrose (GLUTOSE) oral gel 15 g, 15 g, Oral, Q15 Min PRN, Janice Peguero APRN  •  docusate sodium (COLACE) capsule 100 mg, 100 mg, Oral, BID, Mikala Shah DO, 100 mg at 06/03/19 0909  •  gabapentin (NEURONTIN) capsule 300 mg, 300 mg, Oral, Q12H, Ariana Burden MD, 300 mg at 06/03/19 0909  •  glucagon (human recombinant) (GLUCAGEN DIAGNOSTIC) injection 1 mg, 1 mg, Subcutaneous, Q15 Min PRN, Janice Peguero APRN  •  heparin (porcine) 5000 UNIT/ML injection 5,000 Units, 5,000 Units, Subcutaneous, Q12H, Enedelia Macias MD, 5,000 Units at 06/03/19 0909  •  HYDROcodone-acetaminophen (NORCO) 5-325 MG per tablet 1 tablet, 1 tablet, Oral, Q6H PRN, Mikala Shah DO, 1 tablet at 06/02/19 1622  •  insulin lispro (humaLOG) injection 0-9 Units, 0-9 Units, Subcutaneous, 4x Daily With Meals & Nightly, Le Claudio MD, 4 Units at 06/03/19 1154  •  ipratropium-albuterol (DUO-NEB) nebulizer solution 3 mL, 3 mL, Nebulization, Q4H PRN, Le Claudio MD, 3 mL at 05/26/19 1109  •  melatonin tablet 5 mg, 5 mg, Oral, Nightly, Ailyn Smart MD, 5 mg at 06/02/19 2130  •  metoprolol tartrate (LOPRESSOR) tablet 50 mg, 50 mg, Oral, Q12H, Filemon Mayberry IV, MD  •  [DISCONTINUED] morphine injection 1  mg, 1 mg, Intravenous, Q4H PRN, 1 mg at 05/26/19 0124 **AND** naloxone (NARCAN) injection 0.4 mg, 0.4 mg, Intravenous, Q5 Min PRN, Regan Benjamin MD  •  nitroglycerin (NITROSTAT) SL tablet 0.4 mg, 0.4 mg, Sublingual, Q5 Min PRN, Le Claudio MD, 0.4 mg at 05/21/19 1309  •  ondansetron (ZOFRAN) injection 4 mg, 4 mg, Intravenous, Q6H PRN, Regan Benjamin MD, 4 mg at 05/27/19 1409  •  ondansetron (ZOFRAN) tablet 8 mg, 8 mg, Oral, Q6H PRN, Enedelia Macias MD  •  pantoprazole (PROTONIX) EC tablet 40 mg, 40 mg, Oral, BID, Regan Benjamin MD, 40 mg at 06/03/19 0909  •  Pharmacy Consult - [facility], , Does not apply, Daily, Ailyn Rosales, [facility]  •  polyethylene glycol 3350 powder (packet), 17 g, Oral, BID, Krzysztof Torrez MD, 17 g at 06/02/19 0929  •  promethazine (PHENERGAN) injection 12.5 mg, 12.5 mg, Intravenous, Q6H PRN, Camryn Echevarria, [facility]  •  QUEtiapine (SEROquel) tablet 12.5 mg, 12.5 mg, Oral, Nightly, Ailyn Smart MD, 12.5 mg at 06/02/19 2129  •  rosuvastatin (CRESTOR) tablet 20 mg, 20 mg, Oral, Nightly, Tayo Payne MD, 20 mg at 06/02/19 2129  •  sodium chloride 0.9 % flush 3-10 mL, 3-10 mL, Intravenous, PRN, Regan Benjamin MD, 10 mL at 06/01/19 2129      Assessment/Plan   Assessment / Plan     Active Hospital Problems    Diagnosis POA   • **NSTEMI (non-ST elevated myocardial infarction) (CMS/HCC) [I21.4] Yes     · Mild troponin elevation in the setting of chronic kidney disease, hypotension, and intractable nausea and vomiting, 5/18/2019  · Cardiac catheterization (5/23/2019): Severe multivessel CAD.  Culprit for NSTEMI is a highly calcified ostial RCA 99% stenosis.    · Turned down for surgery due to poor functional status  · Staged PCI of the ostial to mid RCA using Xience PATRICIA, 5/29/2019     • Iron deficiency anemia [D50.9] Yes   • Acute renal failure with acute tubular necrosis superimposed on stage 3 chronic kidney disease (CMS/HCC) [N17.0, N18.3] No   • Essential hypertension [I10]  Yes   • GERD (gastroesophageal reflux disease) [K21.9] Yes   • Sleep apnea [G47.30] Yes   • Uncontrolled type 2 diabetes mellitus with complication, with long-term current use of insulin (CMS/[facility]) [E11.8, E11.65, Z79.4] Not Applicable   • Peripheral arterial disease (CMS/[facility]) [I73.9] Yes     · BONNIE (08/22/2013):  At rest right 0.98, left 0.85.  After 2 minutes of exercise right 0.51, left 0.68.  · Venous duplex LE (7/27/2016): All veins visiualized appear patent with distal augmentation bilaterally.  · High-grade right common iliac stenosis on cardiac catheterization, 5/23/2019     • Diabetic gastroparesis (CMS/[facility]) [E11.43, K31.84] Yes   • Hyperlipidemia LDL goal <70 [E78.5] Yes     · High intensity statin therapy is recommended given the presence of peripheral arterial disease and diabetes            Brief Hospital Course to date:  Mrs. Cotto is a 78 year-old F with a past hx of uncontrolled T2DM complicated by gastroparesis, diabetic nephropathy, and peripheral neuropathy, GERD, HTN, CKD III, anemia, DELFINA, and HLP who was admitted on 5/18 with NSTEMI after presenting to the [facility] ER with nausea/vomiting x 48 hours and chest pain.  Evaluation in the ER was suggestive of NSTEMI and pt underwent a LHC on 5/23 that revealed severe MV CAD. CT surgery evaluated the patient and considered CABG but felt that pt was too debilitated to safely proceed with surgery. She had an unstable bradycardia due to CHB vs. Asystole on 5/27, requiring emergent temporary PPM, and underwent PATRICIA x 2 to RCA on 5/29.     NSTEMI due to calcific atherosclerosis of ostia of RCA  Severe MV CAD (99% ostial RCA stenosis s/p PCI, remaining 60% LAD, 70% LCCx)  S/p LHC 5/23, PCI aborted   S/p LHC with PATRICIA x 2 to RCA 5/29  - continue aspirin, plavix, crestor   - possible role for staged intervention of LAD and LCCx in the future  - monitor groin hematomas    Acute on Chronic LV Diastolic HF  - Pt has marked anasarca with volume overload  initiall, now improved significantly with diuretics    Unstable Bradycardia due to CHB, Possible Asystole on 5/27 with ROSC after CPR, no meds   S/p Atropine/Dopamine Temporary PPM 5/28 by Dr. Reyes   - sinus rhythm on last ECG from 5/30 and telemetry monitor, continue to monitor on tele     Nonoliguric LUKASZ due to ATN on CKD III due to diabetic nephropathy  Associated Metabolic Acidosis    Minimal Proteinuria with P/Cr ratio of 0.11  - Peak Cr 2.64, baseline around 1.8  - Nephrology following  - diurese as able and avoid nephrotoxic medications when possible    Uncontrolled Insulin-dependent T2DM with microvascular complications of gastroparesis, neuropathy, nephropathy and macrovascular complications of CAD  - FSBS reviewed, patient has ongoing morning and late afternoon hypoglycemia for several days, improved after scheduled basal and short acting insulin were discontinued.  - Continue on low-moderate sliding scale insulin for now   - HgbA1c 8.5%    Hypertension  -Blood pressure is suboptimally controlled, but has improved since amlodipine has been resumed.  Management per cardiology. Continue close monitoring.      PAD with 90% severe right common iliac stenosis, noted incidentally during 5/23 [facility]  - medical management     Acute Delirium / Metabolic Encephalopathy, resolved   - Neuro has seen this admit     Normocytic Anemia of Chronic Renal Disease  - requiring transfusion of 1 unit on 5/28. No evidence of active hemorrhage   - Hgb stable     Leukopenia   -Resolved, monitor for now    constipation  -bowel regimen, has successful bowel movement 6/2 with nurse reporting possible melena.  -H&H is stable, patient may need to follow-up in outpatient for further work-up.    Hyponatremia, resolved  Hyperkalemia, resolved   Hyperlipidemia   DELFINA     DVT Prophylaxis:  SQ heparin    Disposition: Poss STR at [facility],  following       CODE STATUS:   Code Status and Medical Interventions:   Ordered at: 05/18/19 2051     Level  Of Support Discussed With:    Patient     Code Status:    CPR     Medical Interventions (Level of Support Prior to Arrest):    Full         Electronically signed by Ce Merino MD, 06/03/19, 3:09 PM.

## 2019-06-03 NOTE — PROGRESS NOTES
"NAL Progress Note  Narcisa Cotto  [id number]  1940 5/18/2019    Reason for visit:  SOA, LUKASZ    No new events over night    ROS:    Pulm: No shortness of breath.  CV: no cp    I&O:    Intake/Output Summary (Last 24 hours) at 6/3/2019 0918  Last data filed at 6/3/2019 0100  Gross per 24 hour   Intake --   Output 2250 ml   Net -2250 ml       PE:   Blood pressure 150/75, pulse 85, temperature 98.3 °F (36.8 °C), temperature source Oral, resp. rate 18, height 167.6 cm (65.98\"), weight 86.8 kg (191 lb 4.8 oz), SpO2 98 %.    GENERAL: Awake and alert, in no acute distress.   HEENT: PER, No conjunctivitis  NECK: Supple, + JVD     HEART: RRR; No murmur, rubs, gallops.   LUNGS: Bilateral good air entry , no wheezing or  ABDOMEN: Soft, nontender, nondistended. Positive bowel sounds. No rebound or guarding. NO mass or HSM.  EXT:  No cyanosis, clubbing , mild edema. No cord.  : Genitalia generally unremarkable.  Without Villarreal catheter.  SKIN: Warm and dry without cutaneous eruptions on Inspection/palpation.    NEURO: Alert and oriented x 3, Motor 5/5 bilaterally    Medications:    Current Facility-Administered Medications:   •  amLODIPine (NORVASC) tablet 5 mg, 5 mg, Oral, Q24H, Krzysztof Torrez MD, 5 mg at 06/03/19 0909  •  aspirin chewable tablet 81 mg, 81 mg, Oral, Daily, Regan Benjamin MD, 81 mg at 06/03/19 0909  •  atropine sulfate injection 0.5-1 mg, 0.5-1 mg, Intravenous, Q5 Min PRN, Filemon Mayberry IV, MD, 0.5 mg at 05/28/19 0920  •  bisacodyl (DULCOLAX) suppository 10 mg, 10 mg, Rectal, Daily PRN, Krzysztof Torrez MD  •  [DISCONTINUED] clopidogrel (PLAVIX) tablet 600 mg, 600 mg, Oral, Daily **OR** clopidogrel (PLAVIX) tablet 75 mg, 75 mg, Oral, Daily, Filemon Mayberry IV, MD, 75 mg at 06/03/19 0909  •  dextrose (D50W) 25 g/ 50mL Intravenous Solution 25 g, 25 g, Intravenous, Q15 Min PRN, Janice Peguero APRN  •  dextrose (GLUTOSE) oral gel 15 g, 15 g, Oral, Q15 Min PRN, Janice Peguero, " APRN  •  docusate sodium (COLACE) capsule 100 mg, 100 mg, Oral, BID, Mikala Shah DO, 100 mg at 06/03/19 0909  •  gabapentin (NEURONTIN) capsule 300 mg, 300 mg, Oral, Q12H, Ariana Burden MD, 300 mg at 06/03/19 0909  •  glucagon (human recombinant) (GLUCAGEN DIAGNOSTIC) injection 1 mg, 1 mg, Subcutaneous, Q15 Min PRN, Janice Peguero APRN  •  heparin (porcine) 5000 UNIT/ML injection 5,000 Units, 5,000 Units, Subcutaneous, Q12H, Enedelia Macias MD, 5,000 Units at 06/03/19 0909  •  HYDROcodone-acetaminophen (NORCO) 5-325 MG per tablet 1 tablet, 1 tablet, Oral, Q6H PRN, Mikala Shah DO, 1 tablet at 06/02/19 1622  •  insulin lispro (humaLOG) injection 0-9 Units, 0-9 Units, Subcutaneous, 4x Daily With Meals & Nightly, Le Claudio MD, 2 Units at 06/03/19 0908  •  ipratropium-albuterol (DUO-NEB) nebulizer solution 3 mL, 3 mL, Nebulization, Q4H PRN, Le Claudio MD, 3 mL at 05/26/19 1109  •  melatonin tablet 5 mg, 5 mg, Oral, Nightly, Ailyn Smart MD, 5 mg at 06/02/19 2130  •  metoprolol tartrate (LOPRESSOR) tablet 25 mg, 25 mg, Oral, Q12H, Filemon Mayberry IV, MD, 25 mg at 06/03/19 0909  •  [DISCONTINUED] morphine injection 1 mg, 1 mg, Intravenous, Q4H PRN, 1 mg at 05/26/19 0124 **AND** naloxone (NARCAN) injection 0.4 mg, 0.4 mg, Intravenous, Q5 Min PRN, Regan Benjamin MD  •  nitroglycerin (NITROSTAT) SL tablet 0.4 mg, 0.4 mg, Sublingual, Q5 Min PRN, Le Claudio MD, 0.4 mg at 05/21/19 1309  •  ondansetron (ZOFRAN) injection 4 mg, 4 mg, Intravenous, Q6H PRN, Regan Benjamin MD, 4 mg at 05/27/19 1409  •  ondansetron (ZOFRAN) tablet 8 mg, 8 mg, Oral, Q6H PRN, Enedelia Macias MD  •  pantoprazole (PROTONIX) EC tablet 40 mg, 40 mg, Oral, BID, Regan Benjamin MD, 40 mg at 06/03/19 0909  •  Pharmacy Consult - [facility], , Does not apply, Daily, Ailyn Rosales, [facility]  •  polyethylene glycol 3350 powder (packet), 17 g, Oral, BID, Krzysztof Torrez MD, 17 g at 06/02/19 0929  •   promethazine (PHENERGAN) injection 12.5 mg, 12.5 mg, Intravenous, Q6H PRN, Camryn Echevarria APRN  •  QUEtiapine (SEROquel) tablet 12.5 mg, 12.5 mg, Oral, Nightly, Ailyn Smart MD, 12.5 mg at 06/02/19 2129  •  rosuvastatin (CRESTOR) tablet 20 mg, 20 mg, Oral, Nightly, Tayo Payne MD, 20 mg at 06/02/19 2129  •  sodium chloride 0.9 % flush 3-10 mL, 3-10 mL, Intravenous, PRN, Regan Benjamin MD, 10 mL at 06/01/19 2129    Meds reviewed and doses adjusted for renal function    Labs:  Results from last 7 days   Lab Units 06/03/19  0529 06/02/19  0513 06/01/19  0620 05/31/19  0626 05/30/19  1025 05/30/19  0420 05/29/19 2125 05/29/19  1416 05/29/19  0733 05/28/19  0948 05/28/19  0422  05/27/19  1344   WBC 10*3/mm3 4.93 3.10* 4.52 5.89  --  6.13  --   --  6.73 6.46 5.92   < >  --    HEMOGLOBIN g/dL 10.1* 9.6* 10.2* 9.5*  --  8.9* 8.8* 7.7* 9.1* 8.2* 8.3*   < >  --    HEMATOCRIT % 31.8* 30.5* 33.1* 30.8*  --  28.7* 27.3* 24.3* 29.5* 27.1* 27.0*   < >  --    PLATELETS 10*3/mm3 187 181 180 161  --  158  --   --  170 169 164   < >  --    SODIUM mmol/L 135* 136 135* 136 132*  --   --   --  133*  132*  --  133*   < > 133*   POTASSIUM mmol/L 4.4 4.4 4.3 4.6 4.8  --  4.7  --  5.4*  5.4*  --  4.9   < > 5.1   CHLORIDE mmol/L 98 96* 97* 100 96*  --   --   --  95*  94*  --  94*   < > 93*   CO2 mmol/L 27.0 30.0* 26.0 25.0 24.0  --   --   --  17.0*  25.0  --  30.0*   < > 25.0   BUN mg/dL 21 26* 28* 37* 49*  --   --   --  67*  65*  --  67*   < > 68*   CREATININE mg/dL 1.07* 1.28* 1.39* 1.46* 1.77*  --   --   --  1.94*  2.01*  --  1.86*   < > 2.10*   GLUCOSE mg/dL 163* 149* 59* 48* 135*  --   --   --  146*  157*  --  93   < > 239*   CALCIUM mg/dL 9.6 9.1 9.1 9.0 8.8  --   --   --  9.0  9.0  --  9.4   < > 8.9   PHOSPHORUS mg/dL 3.0 3.9 3.3 2.9  --   --   --   --  4.4  --  4.2  --  4.1    < > = values in this interval not displayed.             Radiology:  Imaging Results (last 72 hours)     Procedure Component Value  Units Date/Time    CT Abdomen Pelvis Without Contrast [[id number]] Collected:  05/18/19 1748     Updated:  05/21/19 1014    Narrative:       EXAMINATION: CT ABDOMEN/PELVIS WO CONTRAST - 05/18/2019     INDICATION: Abdominal pain.     TECHNIQUE: CT abdomen and pelvis without intravenous contrast.     The radiation dose reduction device was turned on for each scan per the  ALARA (As Low as Reasonably Achievable) protocol.     COMPARISON: CT dated 04/16/2015.     FINDINGS: Lung bases demonstrate subsegmental atelectasis and/or  scarring without consolidation or effusion. Liver without focal lesion.  Gallbladder is surgically absent. No biliary dilatation. Pancreas and  spleen unremarkable. Adrenals without distinct nodule. Kidneys without  hydronephrosis or hydroureter. No bulky retroperitoneal adenopathy.  Atherosclerotic nonaneurysmal abdominal aorta. GI tract evaluation  demonstrates small hiatal hernia. Within the distal duodenal c-loop is a  medially oriented area of outpouching or gas which may represent  duodenal diverticulum or choledochal cyst formation as this is adjacent  to or involving the ampulla. Distal bowel is unremarkable for mechanical  obstructive findings, however sigmoid diverticulosis is fairly  involvement without acute findings to suggest acute diverticulitis. No  free fluid or intra-abdominal free air. No loculated fluid collection.  Appendix is visualized and unremarkable. Pelvic viscera unremarkable  without bulky pelvic adenopathy or free fluid. Degenerative changes of  the spine without aggressive osseous or soft tissue body wall lesions  with right flank stimulator pack in place with thoracic spinal cord  stimulator leads noted and partially imaged.       Impression:       1. Small hiatal hernia.  2. Small area of outpouching gas-filled along the medial margin of the  duodenal c-loop distally at or involving the patella which may represent  choledochal cyst formation or duodenal  diverticulum without significant  inflammatory findings or associated fluid.  3. No mechanical obstructive findings with sigmoid diverticulosis,  however, no evidence for acute diverticulitis. No abscess or free air.     DICTATED:   05/18/2019  EDITED/ls :   05/18/2019         This report was finalized on 5/21/2019 10:11 AM by Dr. Stu Schroeder.       XR Chest 1 View [[id number]] Collected:  05/18/19 1543     Updated:  05/18/19 1653    Narrative:          EXAMINATION: XR CHEST 1 VW - 05/18/2019     INDICATION: Upper abdominal pain.      COMPARISON: 04/26/2019     FINDINGS: Lungs are clear without consolidation or opacification. No  pneumothorax or pleural effusion. Cardiac silhouette borderline enlarged  and unchanged. No pneumothorax or pleural effusion. Visualized portions  of the upper abdomen without abnormality; specifically no free air  underneath the hemidiaphragms.           Impression:       No acute cardiopulmonary process or free air underneath the  hemidiaphragms on this portable erect evaluation.     DICTATED:   05/18/2019  EDITED/ls :   05/18/2019              Renal Imaging:  No radiology results for the last day        IMPRESSION:  Acute kidney injury likely related to ATN and volume depletion with low blood pressure. UOP is adequate. -improving  Chronic kidney disease stage III-IV with a baseline creatinine around 1.8 - stable.   Proteinuria minimal- p/c ratio 0.11  Coronary artery disease questionable chest pain acute episode  Anemia s/p transfusion  Bradycardia   Hyponatremia - Resolved. .   Hyperkalemia - improved       RECOMMENDATIONS:  Continue with current regimen    Complex high risk patient.     Adrienne Sahu MD  6/3/2019  9:18 AM

## 2019-06-03 NOTE — PLAN OF CARE
Problem: Patient Care Overview  Goal: Plan of Care Review  Outcome: Ongoing (interventions implemented as appropriate)   06/03/19 1003   Coping/Psychosocial   Plan of Care Reviewed With patient   Plan of Care Review   Progress improving   OTHER   Outcome Summary patient ambulated 20 feet with assist x1, use of rolling walker for support, verbal cues for improved posture and staying inside walker. Distance limited by weakness and decreased strength. HEP completed.

## 2019-06-03 NOTE — PROGRESS NOTES
Continued Stay Note   [facility]     Patient Name: Narcisa Cotto  MRN: [id number]  Today's Date: 6/3/2019    Admit Date: 5/18/2019    Discharge Plan     Row Name 06/03/19 1154       Plan    Plan  SNF    Patient/Family in Agreement with Plan  yes    Plan Comments  Spoke with pt at bedside.  Discussed with her that [facility] and [facility] are not in netowrk with pt's insurance and that I have left a VM with [facility] (awaiting call back).  Per our discussion, pt is also agreeable with a referral to [facility] (called to Evangelina).  Evangelina will start insurance precert today.  CM will cont to follow for bed offers.      Final Discharge Disposition Code  03 - skilled nursing facility (SNF)        Discharge Codes    No documentation.       Expected Discharge Date and Time     Expected Discharge Date Expected Discharge Time    Jun 6, 2019             Camryn Vieira

## 2019-06-03 NOTE — THERAPY TREATMENT NOTE
Acute Care - Physical Therapy Treatment Note  [facility]     Patient Name: Narcisa Cotto  : 1940  MRN: [id number]  Today's Date: 6/3/2019  Onset of Illness/Injury or Date of Surgery: 19  Date of Referral to PT: 19  Referring Physician: MD Shanon    Admit Date: 2019    Visit Dx:    ICD-10-CM ICD-9-CM   1. Hyperglycemia R73.9 790.29   2. Elevated troponin R74.8 790.6   3. Dehydration E86.0 276.51   4. Intractable vomiting with nausea, unspecified vomiting type R11.2 536.2   5. NSTEMI (non-ST elevated myocardial infarction) (CMS/HCC) I21.4 410.70   6. Impaired functional mobility, balance, gait, and endurance Z74.09 V49.89   7. Essential hypertension I10 401.9   8. Coronary artery disease involving native coronary artery of native heart with angina pectoris (CMS/HCC) I25.119 414.01     413.9   9. Cardiac arrest, asystole, s/p CPR/no meds on 2019 I46.9 427.5   10. Impaired mobility and ADLs Z74.09 799.89     Patient Active Problem List   Diagnosis   • Hyperlipidemia LDL goal <70   • Diabetic gastroparesis (CMS/HCC)   • Peripheral arterial disease (CMS/HCC)   • Uncontrolled type 2 diabetes mellitus with complication, with long-term current use of insulin (CMS/HCC)   • Sleep apnea   • Diabetic peripheral neuropathy (CMS/HCC)   • Coronary artery disease involving native coronary artery of native heart with angina pectoris (CMS/HCC)   • GERD (gastroesophageal reflux disease)   • Essential hypertension   • NSTEMI (non-ST elevated myocardial infarction) (CMS/HCC)   • Acute renal failure with acute tubular necrosis superimposed on stage 3 chronic kidney disease (CMS/HCC)   • Iron deficiency anemia       Therapy Treatment    Rehabilitation Treatment Summary     Row Name 19 0826             Treatment Time/Intention    Discipline  physical therapy assistant  -AS      Document Type  therapy note (daily note)  -AS      Subjective Information  complains of;weakness  -AS      Mode of  Treatment  physical therapy  -AS      Patient/Family Observations  no family at bedside, patient supine and agreed to therapy.  -AS      Patient Effort  good  -AS      Comment  B LE edema, hematoma R groing  -AS      Existing Precautions/Restrictions  fall;cardiac  -AS      Treatment Considerations/Comments  tiffany brief prior to OOB due to incontinence  (Significant)   -AS      Recorded by [AS] Concepcion Carrillo PTA 06/03/19 1003      Row Name 06/03/19 0826             Cognitive Assessment/Intervention- PT/OT    Affect/Mental Status (Cognitive)  WNL  -AS      Orientation Status (Cognition)  oriented x 4  -AS      Follows Commands (Cognition)  follows one step commands;over 90% accuracy;repetition of directions required;verbal cues/prompting required  -AS      Safety Deficit (Cognitive)  mild deficit;awareness of need for assistance;insight into deficits/self awareness;safety precautions awareness;safety precautions follow-through/compliance  -AS      Personal Safety Interventions  fall prevention program maintained;gait belt;nonskid shoes/slippers when out of bed  -AS      Recorded by [AS] Concepcion Carrillo PTA 06/03/19 1003      Row Name 06/03/19 0826             Bed Mobility Assessment/Treatment    Supine-Sit [place] (Bed Mobility)  verbal cues;minimum assist (75% patient effort)  -AS      Bed Mobility, Safety Issues  decreased use of arms for pushing/pulling;decreased use of legs for bridging/pushing  -AS      Assistive Device (Bed Mobility)  bed rails;head of bed elevated  -AS      Comment (Bed Mobility)  verbal cues for sequencing, increased time and effort to reach EOB  -AS      Recorded by [AS] Concepcion Carrillo PTA 06/03/19 1003      Row Name 06/03/19 0826             Bed-Chair Transfer    Bed-Chair [place] (Transfers)  verbal cues;minimum assist (75% patient effort)  -AS      Assistive Device (Bed-Chair Transfers)  walker, front-wheeled  -AS      Recorded by [AS] Concepcion Carrillo, PTA  06/03/19 1003      Row Name 06/03/19 0826             Sit-Stand Transfer    Sit-Stand [place] (Transfers)  verbal cues;minimum assist (75% patient effort)  -AS      Assistive Device (Sit-Stand Transfers)  walker, front-wheeled  -AS      Recorded by [AS] Concepcion Carrillo, PTA 06/03/19 1003      Row Name 06/03/19 0826             Stand-Sit Transfer    Stand-Sit [place] (Transfers)  verbal cues;minimum assist (75% patient effort)  -AS      Assistive Device (Stand-Sit Transfers)  walker, front-wheeled  -AS      Recorded by [AS] Concepcion Carrillo, [facility] 06/03/19 1003      Row Name 06/03/19 0826             Gait/Stairs Assessment/Training    80527 - Gait Training Minutes   15  -AS      Gait/Stairs Assessment/Training  gait/ambulation assistive device  -AS      [place] Level (Gait)  verbal cues;minimum assist (75% patient effort)  -AS      Assistive Device (Gait)  walker, front-wheeled  -AS      Distance in Feet (Gait)  20  -AS      Pattern (Gait)  step-through  -AS      Deviations/Abnormal Patterns (Gait)  bilateral deviations;antalgic;base of support, narrow;jose l decreased;gait speed decreased  -AS      Bilateral Gait Deviations  forward flexed posture;weight shift ability decreased;heel strike decreased  -AS      Comment (Gait/Stairs)  patient ambulated 20 feet with use of rolling walker for support, verbal cues for improved posture and staying inside walker. Distance limited by weakness and decreased strength.   -AS      Recorded by [AS] Concepcion Carrillo, [facility] 06/03/19 1003      Row Name 06/03/19 0826             Motor Skills Assessment/Interventions    Additional Documentation  Therapeutic Exercise (Group)  -AS      Recorded by [AS] Concepcion Carrillo, PTA 06/03/19 1003      Row Name 06/03/19 0826             Therapeutic Exercise    26637 - PT Therapeutic Exercise Minutes  8  -AS      Recorded by [AS] Concepcion Carrillo, [facility] 06/03/19 1003      Row Name 06/03/19 0826             Therapeutic  Exercise    Upper Extremity Range of Motion (Therapeutic Exercise)  shoulder flexion/extension, bilateral;shoulder abduction/adduction, bilateral;elbow flexion/extension, bilateral  -AS      Lower Extremity Range of Motion (Therapeutic Exercise)  hip flexion/extension, bilateral;knee flexion/extension, bilateral;ankle dorsiflexion/plantar flexion, bilateral  -AS      Exercise Type (Therapeutic Exercise)  AROM (active range of motion)  -AS      Position (Therapeutic Exercise)  seated  -AS      Sets/Reps (Therapeutic Exercise)  1/10  -AS      Recorded by [AS] Concepcion Carrillo PTA 06/03/19 1003      Row Name 06/03/19 0826             Positioning and Restraints    Pre-Treatment Position  in bed  -AS      Post Treatment Position  chair  -AS      In Chair  reclined;call light within reach;notified nsg;encouraged to call for assist;waffle cushion  -AS      Recorded by [AS] Concepcion Carrillo PTA 06/03/19 1003      Row Name 06/03/19 0826             Pain Scale: Numbers Pre/Post-Treatment    Pain Scale: Numbers, Pretreatment  3/10  -AS      Pain Scale: Numbers, Post-Treatment  3/10  -AS      Pain Intervention(s)  Repositioned;Ambulation/increased activity  -AS      Recorded by [AS] Concepcion Carrillo, LAURIE 06/03/19 1003      Row Name                Wound 05/30/19 1430 Right neck puncture    Wound - Properties Group Date first assessed: 05/30/19 [JJ] Time first assessed: 1430 [JJ] Present On Admission : no [JJ] Side: Right [JJ] Location: neck [JJ] Type: puncture [JJ] Recorded by:  [JJ] Anmol Salguero RN 05/30/19 1443      User Key  (r) = Recorded By, (t) = Taken By, (c) = Cosigned By    Initials Name Effective Dates Discipline    AS Concepcion Carrillo, PTA 06/22/15 -  PT    Anmol Nascimento RN 06/16/16 -  Nurse          Wound 05/30/19 1430 Right neck puncture (Active)   Dressing Appearance dry;intact 6/3/2019  8:00 AM   Drainage Amount none 6/3/2019  8:00 AM   Dressing Care, Wound gauze;transparent film 6/2/2019  8:00  PM   Periwound Care, Wound dry periwound area maintained 6/3/2019  4:00 AM           Physical Therapy Education     Title: PT OT SLP Therapies (In Progress)     Topic: Physical Therapy (In Progress)     Point: Mobility training (In Progress)     Learning Progress Summary           Patient Acceptance, E, NR by AS at 6/3/2019 10:03 AM    Acceptance, E, NR by MEENU at 5/30/2019  8:15 AM    Acceptance, E,D, NR by SRIKANTH at 5/28/2019  2:05 PM    Acceptance, E,D, VU,NR by MB at 5/24/2019  2:36 PM   Family Acceptance, E,D, VU,NR by MB at 5/24/2019  2:36 PM                   Point: Home exercise program (In Progress)     Learning Progress Summary           Patient Acceptance, E, NR by AS at 6/3/2019 10:03 AM    Acceptance, E, NR by MEENU at 5/30/2019  8:15 AM    Acceptance, E,D, NR by SRIKANTH at 5/28/2019  2:05 PM    Acceptance, E,D, VU,NR by MB at 5/24/2019  2:36 PM   Family Acceptance, E,D, VU,NR by MB at 5/24/2019  2:36 PM                   Point: Body mechanics (In Progress)     Learning Progress Summary           Patient Acceptance, E, NR by AS at 6/3/2019 10:03 AM    Acceptance, E, NR by MEENU at 5/30/2019  8:15 AM    Acceptance, E,D, NR by SRIKANTH at 5/28/2019  2:05 PM    Acceptance, E,D, VU,NR by MB at 5/24/2019  2:36 PM   Family Acceptance, E,D, VU,NR by MB at 5/24/2019  2:36 PM                   Point: Precautions (In Progress)     Learning Progress Summary           Patient Acceptance, E, NR by AS at 6/3/2019 10:03 AM    Acceptance, E, NR by MEENU at 5/30/2019  8:15 AM    Acceptance, E,D, NR by SRIKANTH at 5/28/2019  2:05 PM    Acceptance, E,D, VU,NR by MB at 5/24/2019  2:36 PM   Family Acceptance, E,D, VU,NR by MB at 5/24/2019  2:36 PM                               User Key     Initials Effective Dates Name Provider Type Discipline    MEENU 06/19/15 -  Earnestine Giles PT Physical Therapist PT    SJ 06/19/15 -  Camryn Neri, PT Physical Therapist PT    AS 06/22/15 -  Concepcion Carrillo, PTA Physical Therapy Assistant PT    MB 03/14/16 -   Shirley Hammond, PT Physical Therapist PT                PT Recommendation and Plan     Plan of Care Reviewed With: patient  Progress: improving  Outcome Summary: patient ambulated 20 feet with assist x1, use of rolling walker for support, verbal cues for improved posture and staying inside walker. Distance limited by weakness and decreased strength. HEP completed.  Outcome Measures     Row Name 06/03/19 0826 06/02/19 0800          How much help from another person do you currently need...    Turning from your back to your side while in flat bed without using bedrails?  3  -AS  --     Moving from lying on back to sitting on the side of a flat bed without bedrails?  3  -AS  --     Moving to and from a bed to a chair (including a wheelchair)?  3  -AS  --     Standing up from a chair using your arms (e.g., wheelchair, bedside chair)?  3  -AS  --     Climbing 3-5 steps with a railing?  2  -AS  --     To walk in hospital room?  3  -AS  --     AM-PAC 6 Clicks Score  17  -AS  --        How much help from another is currently needed...    Putting on and taking off regular lower body clothing?  --  1  -KF     Bathing (including washing, rinsing, and drying)  --  2  -KF     Toileting (which includes using toilet bed pan or urinal)  --  1  -KF     Putting on and taking off regular upper body clothing  --  3  -KF     Taking care of personal grooming (such as brushing teeth)  --  3  -KF     Eating meals  --  3  -KF     Score  --  13  -KF        Functional Assessment    Outcome Measure Options  AM-PAC 6 Clicks Basic Mobility (PT)  -AS  AM-PAC 6 Clicks Daily Activity (OT)  -KF       User Key  (r) = Recorded By, (t) = Taken By, (c) = Cosigned By    Initials Name Provider Type    AS Concepcion Carrillo PTA Physical Therapy Assistant    Anayeli Lyles, OT Occupational Therapist         Time Calculation:   PT Charges     Row Name 06/03/19 0826             Time Calculation    Start Time  0826  -AS      PT Received On   06/03/19  -AS      PT Goal Re-Cert Due Date  06/03/19  -AS         Timed Charges    75915 - PT Therapeutic Exercise Minutes  8  -AS      53342 - Gait Training Minutes   15  -AS        User Key  (r) = Recorded By, (t) = Taken By, (c) = Cosigned By    Initials Name Provider Type    AS Concepcion Carrillo PTA Physical Therapy Assistant        Therapy Charges for Today     Code Description Service Date Service Provider Modifiers Qty    17252184298 HC PT THER PROC EA 15 MIN 6/3/2019 Concepcion Carrillo PTA GP 1    32507067296 HC GAIT TRAINING EA 15 MIN 6/3/2019 Concepcion Carrillo PTA GP 1          PT G-Codes  Outcome Measure Options: AM-PAC 6 Clicks Basic Mobility (PT)  AM-PAC 6 Clicks Score: 17  Score: 13    Concepcion Carrillo PTA  6/3/2019

## 2019-06-03 NOTE — PLAN OF CARE
Problem: Patient Care Overview  Goal: Plan of Care Review  Outcome: Ongoing (interventions implemented as appropriate)   06/03/19 6602   Coping/Psychosocial   Plan of Care Reviewed With patient   Plan of Care Review   Progress improving   OTHER   Outcome Summary no events/changes overnight, patient rested well, up to BSC overnight with one assist and gaitbelt, VSS, remains on 2LNC, continue to monitor

## 2019-06-04 LAB
ALBUMIN SERPL-MCNC: 2.8 G/DL (ref 3.5–5.2)
ANION GAP SERPL CALCULATED.3IONS-SCNC: 9 MMOL/L
BUN BLD-MCNC: 18 MG/DL (ref 8–23)
BUN/CREAT SERPL: 16.8 (ref 7–25)
CALCIUM SPEC-SCNC: 9.2 MG/DL (ref 8.6–10.5)
CHLORIDE SERPL-SCNC: 102 MMOL/L (ref 98–107)
CO2 SERPL-SCNC: 25 MMOL/L (ref 22–29)
CREAT BLD-MCNC: 1.07 MG/DL (ref 0.57–1)
DEPRECATED RDW RBC AUTO: 48.7 FL (ref 37–54)
ERYTHROCYTE [DISTWIDTH] IN BLOOD BY AUTOMATED COUNT: 15.5 % (ref 12.3–15.4)
GFR SERPL CREATININE-BSD FRML MDRD: 50 ML/MIN/1.73
GLUCOSE BLD-MCNC: 142 MG/DL (ref 65–99)
GLUCOSE BLDC GLUCOMTR-MCNC: 179 MG/DL (ref 70–130)
GLUCOSE BLDC GLUCOMTR-MCNC: 191 MG/DL (ref 70–130)
GLUCOSE BLDC GLUCOMTR-MCNC: 353 MG/DL (ref 70–130)
GLUCOSE BLDC GLUCOMTR-MCNC: 385 MG/DL (ref 70–130)
HCT VFR BLD AUTO: 30.3 % (ref 34–46.6)
HGB BLD-MCNC: 9.7 G/DL (ref 12–15.9)
MCH RBC QN AUTO: 27.6 PG (ref 26.6–33)
MCHC RBC AUTO-ENTMCNC: 32 G/DL (ref 31.5–35.7)
MCV RBC AUTO: 86.3 FL (ref 79–97)
PHOSPHATE SERPL-MCNC: 3.2 MG/DL (ref 2.5–4.5)
PLATELET # BLD AUTO: 179 10*3/MM3 (ref 140–450)
PMV BLD AUTO: 9.5 FL (ref 6–12)
POTASSIUM BLD-SCNC: 4 MMOL/L (ref 3.5–5.2)
RBC # BLD AUTO: 3.51 10*6/MM3 (ref 3.77–5.28)
SODIUM BLD-SCNC: 136 MMOL/L (ref 136–145)
WBC NRBC COR # BLD: 3.46 10*3/MM3 (ref 3.4–10.8)

## 2019-06-04 PROCEDURE — 82962 GLUCOSE BLOOD TEST: CPT

## 2019-06-04 PROCEDURE — 99233 SBSQ HOSP IP/OBS HIGH 50: CPT | Performed by: HOSPITALIST

## 2019-06-04 PROCEDURE — 25010000002 HEPARIN (PORCINE) PER 1000 UNITS: Performed by: INTERNAL MEDICINE

## 2019-06-04 PROCEDURE — 80069 RENAL FUNCTION PANEL: CPT | Performed by: INTERNAL MEDICINE

## 2019-06-04 PROCEDURE — 99231 SBSQ HOSP IP/OBS SF/LOW 25: CPT | Performed by: NURSE PRACTITIONER

## 2019-06-04 PROCEDURE — 85027 COMPLETE CBC AUTOMATED: CPT | Performed by: INTERNAL MEDICINE

## 2019-06-04 RX ORDER — AMLODIPINE BESYLATE 10 MG/1
10 TABLET ORAL
Status: DISCONTINUED | OUTPATIENT
Start: 2019-06-04 | End: 2019-06-05 | Stop reason: HOSPADM

## 2019-06-04 RX ADMIN — CLOPIDOGREL BISULFATE 75 MG: 75 TABLET ORAL at 08:45

## 2019-06-04 RX ADMIN — HYDROCODONE BITARTRATE AND ACETAMINOPHEN 1 TABLET: 5; 325 TABLET ORAL at 20:55

## 2019-06-04 RX ADMIN — MELATONIN TAB 5 MG 5 MG: 5 TAB at 20:54

## 2019-06-04 RX ADMIN — ROSUVASTATIN CALCIUM 20 MG: 20 TABLET, FILM COATED ORAL at 20:55

## 2019-06-04 RX ADMIN — INSULIN LISPRO 8 UNITS: 100 INJECTION, SOLUTION INTRAVENOUS; SUBCUTANEOUS at 17:15

## 2019-06-04 RX ADMIN — ASPIRIN 81 MG CHEWABLE TABLET 81 MG: 81 TABLET CHEWABLE at 08:45

## 2019-06-04 RX ADMIN — QUETIAPINE FUMARATE 12.5 MG: 25 TABLET ORAL at 20:55

## 2019-06-04 RX ADMIN — HEPARIN SODIUM 5000 UNITS: 5000 INJECTION INTRAVENOUS; SUBCUTANEOUS at 20:55

## 2019-06-04 RX ADMIN — DOCUSATE SODIUM 100 MG: 100 CAPSULE, LIQUID FILLED ORAL at 20:54

## 2019-06-04 RX ADMIN — GABAPENTIN 300 MG: 300 CAPSULE ORAL at 08:45

## 2019-06-04 RX ADMIN — DOCUSATE SODIUM 100 MG: 100 CAPSULE, LIQUID FILLED ORAL at 08:45

## 2019-06-04 RX ADMIN — PANTOPRAZOLE SODIUM 40 MG: 40 TABLET, DELAYED RELEASE ORAL at 20:54

## 2019-06-04 RX ADMIN — INSULIN LISPRO 8 UNITS: 100 INJECTION, SOLUTION INTRAVENOUS; SUBCUTANEOUS at 20:55

## 2019-06-04 RX ADMIN — HEPARIN SODIUM 5000 UNITS: 5000 INJECTION INTRAVENOUS; SUBCUTANEOUS at 08:44

## 2019-06-04 RX ADMIN — INSULIN LISPRO 2 UNITS: 100 INJECTION, SOLUTION INTRAVENOUS; SUBCUTANEOUS at 11:50

## 2019-06-04 RX ADMIN — INSULIN LISPRO 2 UNITS: 100 INJECTION, SOLUTION INTRAVENOUS; SUBCUTANEOUS at 08:44

## 2019-06-04 RX ADMIN — AMLODIPINE BESYLATE 10 MG: 10 TABLET ORAL at 08:45

## 2019-06-04 RX ADMIN — GABAPENTIN 300 MG: 300 CAPSULE ORAL at 20:55

## 2019-06-04 RX ADMIN — PANTOPRAZOLE SODIUM 40 MG: 40 TABLET, DELAYED RELEASE ORAL at 08:45

## 2019-06-04 RX ADMIN — METOPROLOL TARTRATE 50 MG: 50 TABLET ORAL at 20:54

## 2019-06-04 RX ADMIN — HYDROCODONE BITARTRATE AND ACETAMINOPHEN 1 TABLET: 5; 325 TABLET ORAL at 14:36

## 2019-06-04 RX ADMIN — METOPROLOL TARTRATE 50 MG: 50 TABLET ORAL at 08:45

## 2019-06-04 NOTE — PLAN OF CARE
Problem: Patient Care Overview  Goal: Plan of Care Review  Outcome: Ongoing (interventions implemented as appropriate)   06/04/19 9269   Coping/Psychosocial   Plan of Care Reviewed With patient   Plan of Care Review   Progress improving   OTHER   Outcome Summary Pt up to the chair most of the day. States feeling much better today. Appetite better. VSS. Voiced no c/o CP.

## 2019-06-04 NOTE — PROGRESS NOTES
[facility]  Cardiology Progress Note      Chief Complaint/Reason for service:    · NSTEMI  · Severe multivessel disease       Patient currently sitting up in bed without complaints.  Her metoprolol was increased yesterday but blood pressure remains elevated.  She is unable to take an ACE/ARB due to recent acute kidney injury.    Past medical, surgical, social and family history reviewed in the patient's electronic medical record.         Vital Sign Min/Max for last 24 hours  Temp  Min: 97.8 °F (36.6 °C)  Max: 97.8 °F (36.6 °C)   BP  Min: 154/70  Max: 154/70   Pulse  Min: 74  Max: 87   Resp  Min: 18  Max: 18   SpO2  Min: 98 %  Max: 98 %   No Data Recorded      Intake/Output Summary (Last 24 hours) at 6/4/2019 0814  Last data filed at 6/4/2019 0533  Gross per 24 hour   Intake 600 ml   Output 2050 ml   Net -1450 ml           Physical Exam   Constitutional: She is oriented to person, place, and time. She appears well-developed and well-nourished.   HENT:   Head: Normocephalic.   Neck: No JVD present. Carotid bruit is not present.   Cardiovascular: Normal rate, regular rhythm, normal heart sounds and intact distal pulses. Exam reveals no gallop and no friction rub.   No murmur heard.  Pulmonary/Chest: Effort normal and breath sounds normal.   Abdominal: Soft.   Musculoskeletal: She exhibits no edema.   Neurological: She is alert and oriented to person, place, and time.   Skin: Skin is warm and dry. No cyanosis. Nails show no clubbing.   Psychiatric: She has a normal mood and affect. Her behavior is normal.       Tele: Normal sinus rhythm    Results Review (reviewed the patient's recent labs in the electronic medical record):           Results from last 7 days   Lab Units 06/04/19  0446 06/03/19  0529 06/02/19  0513 06/01/19  0620 05/31/19  0626 05/30/19  1025  05/29/19  0733   SODIUM mmol/L 136 135* 136 135* 136 132*  --  133*  132*   POTASSIUM mmol/L 4.0 4.4 4.4 4.3 4.6 4.8   < >  5.4*  5.4*   CHLORIDE mmol/L 102 98 96* 97* 100 96*  --  95*  94*   BUN mg/dL 18 21 26* 28* 37* 49*  --  67*  65*   CREATININE mg/dL 1.07* 1.07* 1.28* 1.39* 1.46* 1.77*  --  1.94*  2.01*    < > = values in this interval not displayed.     Results from last 7 days   Lab Units 05/28/19  0946   TROPONIN T ng/mL 0.320*     Results from last 7 days   Lab Units 06/04/19  0446 06/03/19  0529 06/02/19  0513   WBC 10*3/mm3 3.46 4.93 3.10*   HEMOGLOBIN g/dL 9.7* 10.1* 9.6*   HEMATOCRIT % 30.3* 31.8* 30.5*   PLATELETS 10*3/mm3 179 187 181       Lab Results   Component Value Date    HGBA1C 8.8 01/28/2019       Lab Results   Component Value Date    CHOL 250 (H) 04/26/2019    CHLPL 147 09/19/2018    TRIG 244 (H) 04/26/2019    HDL 33 (L) 04/26/2019     (H) 04/26/2019            Active Hospital Problems    Diagnosis POA   • **NSTEMI (non-ST elevated myocardial infarction) (CMS/[facility]) [I21.4] Yes     Priority: High     · Mild troponin elevation in the setting of chronic kidney disease, hypotension, and intractable nausea and vomiting, 5/18/2019  · Cardiac catheterization (5/23/2019): Severe multivessel CAD.  Culprit for NSTEMI is a highly calcified ostial RCA 99% stenosis.    · Turned down for surgery due to poor functional status  · Staged PCI of the ostial to mid RCA using Xience PATRICIA, 5/29/2019     • Uncontrolled type 2 diabetes mellitus with complication, with long-term current use of insulin (CMS/[facility]) [E11.8, E11.65, Z79.4] Not Applicable     Priority: High   • Essential hypertension [I10] Yes     Priority: Medium   • Hyperlipidemia LDL goal <70 [E78.5] Yes     Priority: Medium     · High intensity statin therapy is recommended given the presence of peripheral arterial disease and diabetes     • Acute renal failure with acute tubular necrosis superimposed on stage 3 chronic kidney disease (CMS/[facility]) [N17.0, N18.3] No     Priority: Low   • Diabetic gastroparesis (CMS/HCC) [E11.43, K31.84] Yes     Priority: Low   • Iron  deficiency anemia [D50.9] Yes   • GERD (gastroesophageal reflux disease) [K21.9] Yes   • Sleep apnea [G47.30] Yes   • Peripheral arterial disease (CMS/HCC) [I73.9] Yes     · BONNIE (08/22/2013):  At rest right 0.98, left 0.85.  After 2 minutes of exercise right 0.51, left 0.68.  · Venous duplex LE (7/27/2016): All veins visiualized appear patent with distal augmentation bilaterally.  · High-grade right common iliac stenosis on cardiac catheterization, 5/23/2019                · Increase amlodipine 10 mg daily  · Okay for discharge to acute rehab from cardiology standpoint  · Follow-up in 6 weeks with cardiology    Ruth Parikh, NHAN  6/4/2019

## 2019-06-04 NOTE — PROGRESS NOTES
Continued Stay Note  [facility]     Patient Name: Narcisa Cotto  MRN: [id number]  Today's Date: 6/4/2019    Admit Date: 5/18/2019    Discharge Plan     Row Name 06/04/19 1101       Plan    Plan  [facility]    Patient/Family in Agreement with Plan  yes    Plan Comments  CM spoke w Oscar guthrie [facility]. Precert is pending. CM spoke w daughter Shanna who can transport pt is she knows a head of time.  She requested we keep her up to date w ins decision. CM will continue to follow.        Discharge Codes    No documentation.       Expected Discharge Date and Time     Expected Discharge Date Expected Discharge Time    Jun 6, 2019             Debo Fletcher, RN

## 2019-06-04 NOTE — PROGRESS NOTES
Continued Stay Note  [facility]     Patient Name: Narcisa Cotto  MRN: [id number]  Today's Date: 6/4/2019    Admit Date: 5/18/2019    Discharge Plan     Row Name 06/04/19 1438       Plan    Plan  [facility]    Patient/Family in Agreement with Plan  yes    Plan Comments  Oscar w [facility] called. Insurance has approved a bed and pt can transport tomorrow to the SRU at [facility].  Dr. Sesay, pt RN, pt daughter (Shanna) and pt all know and agree w plan. CM will f/u tomorrow.        Discharge Codes    No documentation.       Expected Discharge Date and Time     Expected Discharge Date Expected Discharge Time    Jun 6, 2019             Debo Fletcher, RN

## 2019-06-04 NOTE — PROGRESS NOTES
"                  Clinical Nutrition     Nutrition Assessment  Reason for Visit:   Follow-up protocol    Patient Name: Narcisa Cotto  YOB: 1940  MRN: [id number]  Date of Encounter: 06/04/19 3:33 PM  Admission date: 5/18/2019    Nutrition Assessment   Assessment     Admission mariam  NSTEMI (non-ST elevated myocardial infarction) (CMS/[facility])    Additional diagnosis/conditions/procedures  Cardiac arrest, asystole, s/p CPR/no meds on 5/27/2019  Acute renal failure with acute tubular necrosis superimposed on stage 3 chronic kidney disease (CMS/[facility])  Iron deficiency anemia  Severe Bradycardia  (5/29) S/p temporary PM placement   (5/30) temp PM d/c'd   Hyperkalemia-resolved       Additional PMH:  Hyperlipidemia LDL goal <70  Diabetic gastroparesis (CMS/[facility])  Peripheral arterial disease (CMS/[facility])  Uncontrolled type 2 diabetes mellitus with complication, with long-term current use of insulin (CMS/[facility])  Sleep apnea  Coronary artery disease involving native coronary artery of native heart with angina pectoris (CMS/[facility])  GERD (gastroesophageal reflux disease)  Essential hypertension  Vit B12 deficency      Reported/Observed/Food/Nutrition Related History:      Patient reports her appetite is not that great but she also does not care for the food. Long discussion about . Took preferences for dinner/breakfast with patient. Per PO intake charting, eating about 60% of past meals. Does not want Boost supplement.      Anthropometrics     Height: 167.6 cm (65.98\")  Last filed wt: Weight: 86.8 kg (191 lb 4.8 oz) (06/03/19 0500)  Weight Method: Lift/sling scale    BMI: BMI (Calculated): 29  Overweight: 25.0-29.9kg/m2     Ideal Body Weight (IBW) (kg): 59.54      Labs reviewed     Results from last 7 days   Lab Units 06/04/19  0446 06/03/19  0529 06/02/19  0513  05/29/19  0733   GLUCOSE mg/dL 142* 163* 149*   < > 146*  157*   BUN mg/dL 18 21 26*   < > 67*  65*   CREATININE mg/dL 1.07* 1.07* 1.28*   < > " 1.94*  2.01*   SODIUM mmol/L 136 135* 136   < > 133*  132*   CHLORIDE mmol/L 102 98 96*   < > 95*  94*   POTASSIUM mmol/L 4.0 4.4 4.4   < > 5.4*  5.4*   PHOSPHORUS mg/dL 3.2 3.0 3.9   < > 4.4   ALT (SGPT) U/L  --   --   --   --  29    < > = values in this interval not displayed.     Results from last 7 days   Lab Units 06/04/19  0446 06/03/19  0529 06/02/19  0513   ALBUMIN g/dL 2.80* 3.10* 2.90*       Results from last 7 days   Lab Units 06/04/19  1144 06/04/19  0729 06/03/19  2048 06/03/19  1657 06/03/19  1138 06/03/19  0730   GLUCOSE mg/dL 191* 179* 198* 221* 203* 176*       Medications reviewed   Pertinent: plavix, colace, gabapentin, insulin, miralax, seroquel    Current Nutrition Prescription     PO: Diet Regular; Cardiac, Consistent Carbohydrate      Dietary Nutrition Supplements: Boost Glucose Control TID     Intake: 60% of 5 meals from (5/31 - 6/4)      Nutrition Diagnosis     5/30 updated 6/4  Problem Inadequate oral intake   Etiology Poor appetite-improving   Signs/Symptoms 60% of 5 meals    Status: ongoing    Nutrition Intervention   1.  Follow treatment progress, Care plan reviewed  2. Interview for preferences, Menu provided, Encourage intake  3. Communicated patient preferences to kitchen in Epic    Goal:   General: Nutrition support treatment  PO: Increase intake, consume >67% of PO trays, continue intake of ONS    Monitoring/Evaluation:   Per protocol, PO intake, Supplement intake    Will Continue to follow per protocol    Krystal Newell  Time Spent: 30 minutes

## 2019-06-04 NOTE — PROGRESS NOTES
"   LOS: 17 days    Patient Care Team:  Mariia Del Real DO as PCP - General  Mariia Del Real DO as PCP - Family Medicine  Filemon Mayberry IV, MD as Cardiologist (Cardiology)  Jennifer Richards MD as Consulting Physician (Endocrinology)    Reason For Visit:    Subjective     Patient seen/examined, no new complaints      Review of Systems:    Pulm: No soa   CV:  No CP      Objective       amLODIPine 10 mg Oral Q24H   aspirin 81 mg Oral Daily   clopidogrel 75 mg Oral Daily   docusate sodium 100 mg Oral BID   gabapentin 300 mg Oral Q12H   heparin (porcine) 5,000 Units Subcutaneous Q12H   insulin lispro 0-9 Units Subcutaneous 4x Daily With Meals & Nightly   melatonin 5 mg Oral Nightly   metoprolol tartrate 50 mg Oral Q12H   pantoprazole 40 mg Oral BID   pharmacy consult - MTM  Does not apply Daily   polyethylene glycol 17 g Oral BID   QUEtiapine 12.5 mg Oral Nightly   rosuvastatin 20 mg Oral Nightly            Vital Signs:  Blood pressure 149/69, pulse 102, temperature 97.4 °F (36.3 °C), temperature source Oral, resp. rate 20, height 167.6 cm (65.98\"), weight 86.8 kg (191 lb 4.8 oz), SpO2 97 %.    Flowsheet Rows      First Filed Value   Admission Height  167.6 cm (66\") Documented at 05/18/2019 1516   Admission Weight  81.6 kg (180 lb) Documented at 05/18/2019 1516          06/03 0701 - 06/04 0700  In: 600 [P.O.:600]  Out: 2050 [Urine:2050]    Physical Exam:    General Appearance: NAD, alert and cooperative, Ox3  Eyes: PER, conjunctivae and sclerae normal, no icterus  Lungs: respirations regular and unlabored, no crepitus, clear to auscultation  Heart/CV: regular rhythm & normal rate, no murmur, no gallop, no rub and no edema  Abdomen: not distended, soft, non-tender, no masses,  bowel sounds present  Skin: No rash, Warm and dry    Radiology:      Renal Imaging:        Labs:  Results from last 7 days   Lab Units 06/04/19  0446 06/03/19  0529 06/02/19  0513   WBC 10*3/mm3 3.46 4.93 3.10*   HEMOGLOBIN g/dL 9.7* " 10.1* 9.6*   HEMATOCRIT % 30.3* 31.8* 30.5*   PLATELETS 10*3/mm3 179 187 181     Results from last 7 days   Lab Units 06/04/19  0446 06/03/19  0529 06/02/19  0513 06/01/19  0620   SODIUM mmol/L 136 135* 136 135*   POTASSIUM mmol/L 4.0 4.4 4.4 4.3   CHLORIDE mmol/L 102 98 96* 97*   CO2 mmol/L 25.0 27.0 30.0* 26.0   BUN mg/dL 18 21 26* 28*   CREATININE mg/dL 1.07* 1.07* 1.28* 1.39*   CALCIUM mg/dL 9.2 9.6 9.1 9.1   PHOSPHORUS mg/dL 3.2 3.0 3.9 3.3   ALBUMIN g/dL 2.80* 3.10* 2.90* 2.90*     Results from last 7 days   Lab Units 06/04/19  0446   GLUCOSE mg/dL 142*       Results from last 7 days   Lab Units 05/29/19  0733   ALK PHOS U/L 82   BILIRUBIN mg/dL 0.8   ALT (SGPT) U/L 29   AST (SGOT) U/L 15                 Estimated Creatinine Clearance: 48.1 mL/min (A) (by C-G formula based on SCr of 1.07 mg/dL (H)).      Assessment       NSTEMI (non-ST elevated myocardial infarction) (CMS/[facility])    Hyperlipidemia LDL goal <70    Diabetic gastroparesis (CMS/[facility])    Peripheral arterial disease (CMS/[facility])    Uncontrolled type 2 diabetes mellitus with complication, with long-term current use of insulin (CMS/[facility])    Sleep apnea    GERD (gastroesophageal reflux disease)    Essential hypertension    Acute renal failure with acute tubular necrosis superimposed on stage 3 chronic kidney disease (CMS/[facility])    Iron deficiency anemia        Impression:     Acute kidney injury likely related to ATN and volume depletion with low blood pressure. UOP is adequate. -improving  Chronic kidney disease stage III-IV with a baseline creatinine around 1.8 - stable.   Proteinuria minimal- p/c ratio 0.11  Coronary artery disease questionable chest pain acute episode  Anemia s/p transfusion  Bradycardia   Hyponatremia - Resolved. .   Hyperkalemia - improved      Recommendations:   Cr stable at baseline  Ok to discharge from renal standpoint,  Calcium is borderline high, likely due to prolonged immobilization- will monitor  Avoid nephrotoxic agents    [place]  VASYL Chaney DO  06/04/19  3:02 PM

## 2019-06-04 NOTE — PROGRESS NOTES
[facility] Medicine Services  PROGRESS NOTE    Patient Name: Narcisa Cotto  : 1940  MRN: [id number]    Date of Admission: 2019  Length of Stay: 17  Primary Care Physician: Mariia Del Real DO    Subjective   Subjective     CC:  F/U NSTEMI    HPI:  Patient seen this morning. Has no complaints. Feels well.     Review of Systems  Gen-no fevers, no chills  CV-no chest pain, no palpitations  Resp-no cough, no dyspnea  GI-no N/V/D, no abd pain      Otherwise ROS is negative except as mentioned in the HPI.    Objective   Objective     Vital Signs:   Temp:  [97.4 °F (36.3 °C)-97.8 °F (36.6 °C)] 97.4 °F (36.3 °C)  Heart Rate:  [] 102  Resp:  [18-20] 20  BP: (149-154)/(69-70) 149/69        Physical Exam:  Gen-no acute distress  HENT-NCAT, mucous membranes moist  CV-RRR, S1 S2 normal, no m/r/g  Resp-CTAB, no wheezes or rales  Abd-soft, NT, ND, +BS  Ext-no BLE edema  Neuro-A&Ox3, no focal deficits  Skin-no rashes  Psych-appropriate mood    Results Reviewed:  I have personally reviewed current lab, radiology, and data and agree.    Results from last 7 days   Lab Units 19  0513   WBC 10*3/mm3 3.46 4.93 3.10*   HEMOGLOBIN g/dL 9.7* 10.1* 9.6*   HEMATOCRIT % 30.3* 31.8* 30.5*   PLATELETS 10*3/mm3 179 187 181     Results from last 7 days   Lab Units 19  0529 19  0513  19  0733   SODIUM mmol/L 136 135* 136   < > 133*  132*   POTASSIUM mmol/L 4.0 4.4 4.4   < > 5.4*  5.4*   CHLORIDE mmol/L 102 98 96*   < > 95*  94*   CO2 mmol/L 25.0 27.0 30.0*   < > 17.0*  25.0   BUN mg/dL 18 21 26*   < > 67*  65*   CREATININE mg/dL 1.07* 1.07* 1.28*   < > 1.94*  2.01*   GLUCOSE mg/dL 142* 163* 149*   < > 146*  157*   CALCIUM mg/dL 9.2 9.6 9.1   < > 9.0  9.0   ALT (SGPT) U/L  --   --   --   --  29   AST (SGOT) U/L  --   --   --   --  15    < > = values in this interval not displayed.     Estimated Creatinine Clearance: 48.1  mL/min (A) (by C-G formula based on SCr of 1.07 mg/dL (H)).    Microbiology Results Abnormal     Procedure Component Value - Date/Time    Eosinophil Smear - Urine, Urine, Clean Catch [[id number]]  (Normal) Collected:  05/21/19 0157    Lab Status:  Final result Specimen:  Urine, Clean Catch Updated:  05/21/19 0732     Eosinophil Smear 0 % EOS/100 Cells     Narrative:       No eosinophil seen          Imaging Results (last 24 hours)     ** No results found for the last 24 hours. **          Results for orders placed during the hospital encounter of 05/18/19   Adult Transthoracic Echo Complete W/ Cont if Necessary Per Protocol    Narrative · Left ventricular systolic function is normal. Estimated EF = 65%.  · Left ventricular diastolic dysfunction (grade II) consistent with   pseudonormalization.  · Mild mitral valve regurgitation is present  · Mild aortic valve regurgitation is present.  · Right ventricular cavity is dilated.  · Estimated right ventricular systolic pressure from tricuspid   regurgitation is normal (<35 mmHg).          I have reviewed the medications:    Current Facility-Administered Medications:   •  amLODIPine (NORVASC) tablet 10 mg, 10 mg, Oral, Q24H, Leanna Parikh APRN, 10 mg at 06/04/19 0845  •  aspirin chewable tablet 81 mg, 81 mg, Oral, Daily, Regan Benjamin MD, 81 mg at 06/04/19 0845  •  atropine sulfate injection 0.5-1 mg, 0.5-1 mg, Intravenous, Q5 Min PRN, Filemon Mayberry IV, MD, 0.5 mg at 05/28/19 0920  •  bisacodyl (DULCOLAX) suppository 10 mg, 10 mg, Rectal, Daily PRN, Krzysztof Torrez MD  •  clopidogrel (PLAVIX) tablet 75 mg, 75 mg, Oral, Daily, Michel Jurado [facility], 75 mg at 06/04/19 0845  •  dextrose (D50W) 25 g/ 50mL Intravenous Solution 25 g, 25 g, Intravenous, Q15 Min PRN, Janice Peguero APRN  •  dextrose (GLUTOSE) oral gel 15 g, 15 g, Oral, Q15 Min PRN, Janice Peguero APRN  •  docusate sodium (COLACE) capsule 100 mg, 100 mg, Oral, BID, Mikala Shah DO,  100 mg at 06/04/19 0845  •  gabapentin (NEURONTIN) capsule 300 mg, 300 mg, Oral, Q12H, Ariana Burden MD, 300 mg at 06/04/19 0845  •  glucagon (human recombinant) (GLUCAGEN DIAGNOSTIC) injection 1 mg, 1 mg, Subcutaneous, Q15 Min PRN, Janice Peguero APRN  •  heparin (porcine) 5000 UNIT/ML injection 5,000 Units, 5,000 Units, Subcutaneous, Q12H, Enedelia Macias MD, 5,000 Units at 06/04/19 0844  •  HYDROcodone-acetaminophen (NORCO) 5-325 MG per tablet 1 tablet, 1 tablet, Oral, Q6H PRN, Mikala Shah DO, 1 tablet at 06/02/19 1622  •  insulin lispro (humaLOG) injection 0-9 Units, 0-9 Units, Subcutaneous, 4x Daily With Meals & Nightly, Le Claudio MD, 2 Units at 06/04/19 1150  •  ipratropium-albuterol (DUO-NEB) nebulizer solution 3 mL, 3 mL, Nebulization, Q4H PRN, Le Claudio MD, 3 mL at 05/26/19 1109  •  melatonin tablet 5 mg, 5 mg, Oral, Nightly, Ailyn Smart MD, 5 mg at 06/03/19 2007  •  metoprolol tartrate (LOPRESSOR) tablet 50 mg, 50 mg, Oral, Q12H, Filemon Mayberry IV, MD, 50 mg at 06/04/19 0845  •  [DISCONTINUED] morphine injection 1 mg, 1 mg, Intravenous, Q4H PRN, 1 mg at 05/26/19 0124 **AND** naloxone (NARCAN) injection 0.4 mg, 0.4 mg, Intravenous, Q5 Min PRN, Regan Benjamin MD  •  nitroglycerin (NITROSTAT) SL tablet 0.4 mg, 0.4 mg, Sublingual, Q5 Min PRN, Le Claudio MD, 0.4 mg at 05/21/19 1309  •  ondansetron (ZOFRAN) injection 4 mg, 4 mg, Intravenous, Q6H PRN, Regan Benjamin MD, 4 mg at 05/27/19 1409  •  ondansetron (ZOFRAN) tablet 8 mg, 8 mg, Oral, Q6H PRN, Enedelia Macias MD  •  pantoprazole (PROTONIX) EC tablet 40 mg, 40 mg, Oral, BID, Regan Benjamin MD, 40 mg at 06/04/19 0845  •  Pharmacy Consult - MTM, , Does not apply, Daily, Ailyn Rosales, [facility]  •  polyethylene glycol 3350 powder (packet), 17 g, Oral, BID, Krzysztof Torrez MD, 17 g at 06/03/19 2007  •  promethazine (PHENERGAN) injection 12.5 mg, 12.5 mg, Intravenous, Q6H PRN, Yanelis Laurent,  NHAN Engel  •  QUEtiapine (SEROquel) tablet 12.5 mg, 12.5 mg, Oral, Nightly, Ailyn Smart MD, 12.5 mg at 06/03/19 2007  •  rosuvastatin (CRESTOR) tablet 20 mg, 20 mg, Oral, Nightly, Aslam, MD Tayo, 20 mg at 06/03/19 2008  •  sodium chloride 0.9 % flush 3-10 mL, 3-10 mL, Intravenous, PRN, Regan Benjamin MD, 10 mL at 06/01/19 2129      Assessment/Plan   Assessment / Plan     Active Hospital Problems    Diagnosis POA   • **NSTEMI (non-ST elevated myocardial infarction) (CMS/[facility]) [I21.4] Yes     · Mild troponin elevation in the setting of chronic kidney disease, hypotension, and intractable nausea and vomiting, 5/18/2019  · Cardiac catheterization (5/23/2019): Severe multivessel CAD.  Culprit for NSTEMI is a highly calcified ostial RCA 99% stenosis.    · Turned down for surgery due to poor functional status  · Staged PCI of the ostial to mid RCA using Xience PATRICIA, 5/29/2019     • Iron deficiency anemia [D50.9] Yes   • Acute renal failure with acute tubular necrosis superimposed on stage 3 chronic kidney disease (CMS/[facility]) [N17.0, N18.3] No   • Essential hypertension [I10] Yes   • GERD (gastroesophageal reflux disease) [K21.9] Yes   • Sleep apnea [G47.30] Yes   • Uncontrolled type 2 diabetes mellitus with complication, with long-term current use of insulin (CMS/[facility]) [E11.8, E11.65, Z79.4] Not Applicable   • Peripheral arterial disease (CMS/[facility]) [I73.9] Yes     · BONNIE (08/22/2013):  At rest right 0.98, left 0.85.  After 2 minutes of exercise right 0.51, left 0.68.  · Venous duplex LE (7/27/2016): All veins visiualized appear patent with distal augmentation bilaterally.  · High-grade right common iliac stenosis on cardiac catheterization, 5/23/2019     • Diabetic gastroparesis (CMS/[facility]) [E11.43, K31.84] Yes   • Hyperlipidemia LDL goal <70 [E78.5] Yes     · High intensity statin therapy is recommended given the presence of peripheral arterial disease and diabetes            Brief Hospital Course to date:  Narcisa SIDDIQI  Yadiel is a 78 y.o. female with PMH of uncontrolled T2DM complicated by gastroparesis, diabetic nephropathy and peripheral neuropathy, GERD, HTN, HLD, CKD III, anemia, and DELFINA who was admitted on 5/18 after presenting to the [facility] ER with nausea/vomiting x 48 hours and chest pain.  Evaluation in the ER was suggestive of NSTEMI and patient underwent a LHC on 5/23 that revealed severe multivessel CAD. Troponin peaked at 0.345. CT Surgery evaluated the patient and considered CABG but felt that patient was too debilitated to safely proceed with surgery. Patient also developed encephalopathy and was evaluated by Neurology, felt to be delirium and possibly some contribution from uremia/LUKASZ (CT head was negative). She had an unstable bradycardia due to complete heart block vs. asystole on 5/27, had ROSC after CPR and was transferred to the ICU; she required emergent temporary pacemaker placement by Dr. Reyes. She then underwent PATRICIA x 2 to RCA on 5/29. Transferred back to the floor, hospitalists resumed care on 5/31.    PLAN:  --Continue ASA, Plavix, statin. Possible role for staged intervention of LAD and LCCx in the future. Bilateral groin hematomas are stable. F/U with Cardiology in 6 weeks.   --s/p diuresis, anasarca significantly improved.  --LUKASZ resolved. Cr peaked at 2.64. Nephrology following.    Awaiting rehab.    DVT Prophylaxis:  [facility]    Disposition: I expect the patient to be discharged to rehab anytime    CODE STATUS:   Code Status and Medical Interventions:   Ordered at: 05/18/19 2051     Level Of Support Discussed With:    Patient     Code Status:    CPR     Medical Interventions (Level of Support Prior to Arrest):    Full         Electronically signed by Violette Sesay MD, 06/04/19, 1:29 PM.

## 2019-06-05 ENCOUNTER — TELEPHONE (OUTPATIENT)
Dept: INTERNAL MEDICINE | Facility: CLINIC | Age: 79
End: 2019-06-05

## 2019-06-05 VITALS
OXYGEN SATURATION: 96 % | BODY MASS INDEX: 30.74 KG/M2 | HEART RATE: 85 BPM | TEMPERATURE: 97.6 F | WEIGHT: 191.3 LBS | SYSTOLIC BLOOD PRESSURE: 147 MMHG | RESPIRATION RATE: 20 BRPM | DIASTOLIC BLOOD PRESSURE: 68 MMHG | HEIGHT: 66 IN

## 2019-06-05 LAB
ALBUMIN SERPL-MCNC: 3.1 G/DL (ref 3.5–5.2)
ANION GAP SERPL CALCULATED.3IONS-SCNC: 10 MMOL/L
BUN BLD-MCNC: 16 MG/DL (ref 8–23)
BUN/CREAT SERPL: 15.1 (ref 7–25)
CALCIUM SPEC-SCNC: 9.1 MG/DL (ref 8.6–10.5)
CHLORIDE SERPL-SCNC: 100 MMOL/L (ref 98–107)
CO2 SERPL-SCNC: 26 MMOL/L (ref 22–29)
CREAT BLD-MCNC: 1.06 MG/DL (ref 0.57–1)
DEPRECATED RDW RBC AUTO: 48.3 FL (ref 37–54)
ERYTHROCYTE [DISTWIDTH] IN BLOOD BY AUTOMATED COUNT: 15.5 % (ref 12.3–15.4)
GFR SERPL CREATININE-BSD FRML MDRD: 50 ML/MIN/1.73
GLUCOSE BLD-MCNC: 93 MG/DL (ref 65–99)
GLUCOSE BLDC GLUCOMTR-MCNC: 117 MG/DL (ref 70–130)
HCT VFR BLD AUTO: 30.7 % (ref 34–46.6)
HGB BLD-MCNC: 9.9 G/DL (ref 12–15.9)
MCH RBC QN AUTO: 27.4 PG (ref 26.6–33)
MCHC RBC AUTO-ENTMCNC: 32.2 G/DL (ref 31.5–35.7)
MCV RBC AUTO: 85 FL (ref 79–97)
PHOSPHATE SERPL-MCNC: 4.2 MG/DL (ref 2.5–4.5)
PLATELET # BLD AUTO: 189 10*3/MM3 (ref 140–450)
PMV BLD AUTO: 9.6 FL (ref 6–12)
POTASSIUM BLD-SCNC: 4.2 MMOL/L (ref 3.5–5.2)
RBC # BLD AUTO: 3.61 10*6/MM3 (ref 3.77–5.28)
SODIUM BLD-SCNC: 136 MMOL/L (ref 136–145)
WBC NRBC COR # BLD: 4.54 10*3/MM3 (ref 3.4–10.8)

## 2019-06-05 PROCEDURE — 99231 SBSQ HOSP IP/OBS SF/LOW 25: CPT | Performed by: NURSE PRACTITIONER

## 2019-06-05 PROCEDURE — 25010000002 HEPARIN (PORCINE) PER 1000 UNITS: Performed by: INTERNAL MEDICINE

## 2019-06-05 PROCEDURE — 99239 HOSP IP/OBS DSCHRG MGMT >30: CPT | Performed by: NURSE PRACTITIONER

## 2019-06-05 PROCEDURE — 82962 GLUCOSE BLOOD TEST: CPT

## 2019-06-05 PROCEDURE — 85027 COMPLETE CBC AUTOMATED: CPT | Performed by: INTERNAL MEDICINE

## 2019-06-05 PROCEDURE — 80069 RENAL FUNCTION PANEL: CPT | Performed by: INTERNAL MEDICINE

## 2019-06-05 RX ORDER — METOPROLOL TARTRATE 50 MG/1
50 TABLET, FILM COATED ORAL EVERY 12 HOURS SCHEDULED
Qty: 180 TABLET | Refills: 3 | Status: SHIPPED | OUTPATIENT
Start: 2019-06-05 | End: 2019-06-21

## 2019-06-05 RX ORDER — AMLODIPINE BESYLATE 10 MG/1
10 TABLET ORAL
Qty: 90 TABLET | Refills: 3 | Status: SHIPPED | OUTPATIENT
Start: 2019-06-05 | End: 2019-06-21

## 2019-06-05 RX ORDER — HYDROCODONE BITARTRATE AND ACETAMINOPHEN 5; 325 MG/1; MG/1
1 TABLET ORAL EVERY 6 HOURS PRN
Start: 2019-06-05 | End: 2019-06-21

## 2019-06-05 RX ORDER — NITROGLYCERIN 0.4 MG/1
0.4 TABLET SUBLINGUAL
Qty: 60 TABLET | Refills: 1 | Status: SHIPPED | OUTPATIENT
Start: 2019-06-05 | End: 2020-04-30 | Stop reason: SDUPTHER

## 2019-06-05 RX ORDER — PSEUDOEPHEDRINE HCL 30 MG
100 TABLET ORAL 2 TIMES DAILY
Start: 2019-06-05 | End: 2019-06-21

## 2019-06-05 RX ORDER — CHOLECALCIFEROL (VITAMIN D3) 125 MCG
5 CAPSULE ORAL NIGHTLY
Start: 2019-06-05 | End: 2019-06-21

## 2019-06-05 RX ADMIN — AMLODIPINE BESYLATE 10 MG: 10 TABLET ORAL at 08:31

## 2019-06-05 RX ADMIN — ASPIRIN 81 MG CHEWABLE TABLET 81 MG: 81 TABLET CHEWABLE at 08:31

## 2019-06-05 RX ADMIN — CLOPIDOGREL BISULFATE 75 MG: 75 TABLET ORAL at 08:31

## 2019-06-05 RX ADMIN — DOCUSATE SODIUM 100 MG: 100 CAPSULE, LIQUID FILLED ORAL at 08:31

## 2019-06-05 RX ADMIN — GABAPENTIN 300 MG: 300 CAPSULE ORAL at 08:31

## 2019-06-05 RX ADMIN — POLYETHYLENE GLYCOL 3350 17 G: 17 POWDER, FOR SOLUTION ORAL at 08:35

## 2019-06-05 RX ADMIN — PANTOPRAZOLE SODIUM 40 MG: 40 TABLET, DELAYED RELEASE ORAL at 08:34

## 2019-06-05 RX ADMIN — METOPROLOL TARTRATE 50 MG: 50 TABLET ORAL at 08:34

## 2019-06-05 RX ADMIN — HEPARIN SODIUM 5000 UNITS: 5000 INJECTION INTRAVENOUS; SUBCUTANEOUS at 08:31

## 2019-06-05 NOTE — PROGRESS NOTES
Case Management Discharge Note    Final Note: Bed available today at [facility].  RN to call report to [phone] and fax DC summary to [phone].  [facility] to transport.  Pt will need to be at the Maternity Entrance of the [address] [facility] at 1115am.      Destination - Selection Complete      Service Provider Request Status Selected Services Address Phone Number Fax Number    [facility] Selected Skilled Nursing [address] [address] [phone]      Durable Medical Equipment      No service has been selected for the patient.      Dialysis/Infusion      No service has been selected for the patient.      Home Medical Care      No service has been selected for the patient.      Therapy      No service has been selected for the patient.      Community Resources      No service has been selected for the patient.        Transportation Services  W/C Van: ([facility])    Final Discharge Disposition Code: 03 - skilled nursing facility (SNF)

## 2019-06-05 NOTE — TELEPHONE ENCOUNTER
PLEASE CALL [facility] [phone]  REF NUMBER [id number]  THEY WERE CALLING ABOUT THE NOVOLIN 70/30 ITS NOT COVERED BY THE INSURANCE

## 2019-06-05 NOTE — PROGRESS NOTES
[facility]  Cardiology Progress Note      Chief Complaint/Reason for service:    · Non-STEMI  · Severe multivessel disease status post PCI       Patient's amlodipine was increased yesterday.  Patient denies any chest pain.  Case management working on placement for short-term rehab.      Past medical, surgical, social and family history reviewed in the patient's electronic medical record.       Vital Sign Min/Max for last 24 hours  Temp  Min: 97.4 °F (36.3 °C)  Max: 98.4 °F (36.9 °C)   BP  Min: 149/69  Max: 160/71   Pulse  Min: 69  Max: 102   Resp  Min: 20  Max: 20   SpO2  Min: 94 %  Max: 100 %   No Data Recorded      Intake/Output Summary (Last 24 hours) at 6/5/2019 0826  Last data filed at 6/5/2019 0500  Gross per 24 hour   Intake 720 ml   Output 1650 ml   Net -930 ml           Physical Exam   Constitutional: She is oriented to person, place, and time. She appears well-developed and well-nourished.   HENT:   Head: Normocephalic.   Neck: No JVD present. Carotid bruit is not present.   Cardiovascular: Normal rate, regular rhythm, normal heart sounds and intact distal pulses. Exam reveals no gallop and no friction rub.   No murmur heard.  Pulmonary/Chest: Effort normal and breath sounds normal.   Abdominal: Soft.   Musculoskeletal: She exhibits no edema.   Neurological: She is alert and oriented to person, place, and time.   Skin: Skin is warm and dry. No cyanosis. Nails show no clubbing.   Psychiatric: She has a normal mood and affect. Her behavior is normal.       Tele: Normal sinus rhythm    Results Review (reviewed the patient's recent labs in the electronic medical record):           Results from last 7 days   Lab Units 06/05/19  0329 06/04/19  0446 06/03/19  0529 06/02/19  0513 06/01/19  0620 05/31/19  0626 05/30/19  1025   SODIUM mmol/L 136 136 135* 136 135* 136 132*   POTASSIUM mmol/L 4.2 4.0 4.4 4.4 4.3 4.6 4.8   CHLORIDE mmol/L 100 102 98 96* 97* 100 96*   BUN mg/dL 16  18 21 26* 28* 37* 49*   CREATININE mg/dL 1.06* 1.07* 1.07* 1.28* 1.39* 1.46* 1.77*         Results from last 7 days   Lab Units 06/05/19  0329 06/04/19  0446 06/03/19  0529   WBC 10*3/mm3 4.54 3.46 4.93   HEMOGLOBIN g/dL 9.9* 9.7* 10.1*   HEMATOCRIT % 30.7* 30.3* 31.8*   PLATELETS 10*3/mm3 189 179 187       Lab Results   Component Value Date    HGBA1C 8.8 01/28/2019       Lab Results   Component Value Date    CHOL 250 (H) 04/26/2019    CHLPL 147 09/19/2018    TRIG 244 (H) 04/26/2019    HDL 33 (L) 04/26/2019     (H) 04/26/2019                    Active Hospital Problems    Diagnosis POA   • **NSTEMI (non-ST elevated myocardial infarction) (CMS/[facility]) [I21.4] Yes     Priority: High     · Mild troponin elevation in the setting of chronic kidney disease, hypotension, and intractable nausea and vomiting, 5/18/2019  · Cardiac catheterization (5/23/2019): Severe multivessel CAD.  Culprit for NSTEMI is a highly calcified ostial RCA 99% stenosis.    · Turned down for surgery due to poor functional status  · Staged PCI of the ostial to mid RCA using Xience PATRICIA, 5/29/2019     • Uncontrolled type 2 diabetes mellitus with complication, with long-term current use of insulin (CMS/[facility]) [E11.8, E11.65, Z79.4] Not Applicable     Priority: High   • Iron deficiency anemia [D50.9] Yes     Priority: Medium   • Essential hypertension [I10] Yes     Priority: Medium   • Peripheral arterial disease (CMS/[facility]) [I73.9] Yes     Priority: Medium     · BONNIE (08/22/2013):  At rest right 0.98, left 0.85.  After 2 minutes of exercise right 0.51, left 0.68.  · Venous duplex LE (7/27/2016): All veins visiualized appear patent with distal augmentation bilaterally.  · High-grade right common iliac stenosis on cardiac catheterization, 5/23/2019     • Hyperlipidemia LDL goal <70 [E78.5] Yes     Priority: Medium     · High intensity statin therapy is recommended given the presence of peripheral arterial disease and diabetes     • Acute renal failure  with acute tubular necrosis superimposed on stage 3 chronic kidney disease (CMS/HCC) [N17.0, N18.3] No     Priority: Low   • GERD (gastroesophageal reflux disease) [K21.9] Yes     Priority: Low   • Sleep apnea [G47.30] Yes     Priority: Low   • Diabetic gastroparesis (CMS/HCC) [E11.43, K31.84] Yes     Priority: Low     Patient is doing well from a cardiovascular standpoint has remained chest pain-free.  She is ready from a cardiovascular standpoint to be discharged once placement is available at short-term rehab.  She will go home on dual antiplatelet therapy, statin, Toprol on amlodipine.  She will follow-up in the office in 6 weeks.  Will consider staged PCI of the LAD and left circumflex in the future if patient becomes symptomatic.         · Okay for discharge to acute rehab  · Home on DAPT, statin, metoprolol and amlodipine  · Follow-up in the office in 6 weeks  · Consider staged PCI of LAD and left circumflex in the future if patient becomes symptomatic  · Please call with any further questions    Ruth Parikh, NHAN  6/5/2019

## 2019-06-05 NOTE — PLAN OF CARE
Problem: Patient Care Overview  Goal: Plan of Care Review  Outcome: Ongoing (interventions implemented as appropriate)   06/05/19 0231   Coping/Psychosocial   Plan of Care Reviewed With patient   Plan of Care Review   Progress improving   OTHER   Outcome Summary vss, plan to [facility] 6/5/19, will monitor       Problem: Cardiac: ACS (Acute Coronary Syndrome) (Adult)  Goal: Signs and Symptoms of Listed Potential Problems Will be Absent, Minimized or Managed (Cardiac: ACS)  Outcome: Ongoing (interventions implemented as appropriate)

## 2019-06-05 NOTE — DISCHARGE SUMMARY
[facility] Medicine Services  DISCHARGE SUMMARY    Patient Name: Narcisa Cotto  : 1940  MRN: [id number]    Date of Admission: 2019  Date of Discharge:  2019  Primary Care Physician: Mariia Del Real DO    Consults     Date and Time Order Name Status Description    2019 0848 Inpatient Cardiothoracic Surgery Consult Completed     2019 0942 Inpatient Nephrology Consult Completed           Hospital Course     Presenting Problem:   NSTEMI (non-ST elevated myocardial infarction) (CMS/[facility]) [I21.4]  NSTEMI (non-ST elevated myocardial infarction) (CMS/[facility]) [I21.4]    Active Hospital Problems    Diagnosis  POA   • **NSTEMI (non-ST elevated myocardial infarction) (CMS/[facility]) [I21.4]  Yes   • Iron deficiency anemia [D50.9]  Yes   • Acute renal failure with acute tubular necrosis superimposed on stage 3 chronic kidney disease (CMS/[facility]) [N17.0, N18.3]  No   • Essential hypertension [I10]  Yes   • GERD (gastroesophageal reflux disease) [K21.9]  Yes   • Sleep apnea [G47.30]  Yes   • Uncontrolled type 2 diabetes mellitus with complication, with long-term current use of insulin (CMS/[facility]) [E11.8, E11.65, Z79.4]  Not Applicable   • Peripheral arterial disease (CMS/[facility]) [I73.9]  Yes   • Diabetic gastroparesis (CMS/[facility]) [E11.43, K31.84]  Yes   • Hyperlipidemia LDL goal <70 [E78.5]  Yes      Resolved Hospital Problems    Diagnosis Date Resolved POA   • Unstable angina (CMS/[facility]) [I20.0] 2019 Clinically Undetermined   • Bradycardia [R00.1] 2019 No   • Cardiac arrest, asystole, s/p CPR/no meds on 2019 [I46.9] 2019 Yes          Hospital Course:  Narcisa Cotto is a 78 y.o. female  with PMH of uncontrolled T2DM complicated by gastroparesis, diabetic nephropathy and peripheral neuropathy, GERD, HTN, HLD, CKD III, anemia, and DELFINA who was admitted on  after presenting to the [facility] ER with nausea/vomiting x 48 hours and chest pain.  Evaluation in the ER was  suggestive of NSTEMI and patient underwent a LHC on 5/23 that revealed severe multivessel CAD. Troponin peaked at 0.345. CT Surgery evaluated the patient and considered CABG but felt that patient was too debilitated to safely proceed with surgery. Patient also developed encephalopathy and was evaluated by Neurology, felt to be delirium and possibly some contribution from uremia/LUKASZ (CT head was negative). She had an unstable bradycardia due to complete heart block vs. asystole on 5/27, had ROSC after CPR and was transferred to the ICU; she required emergent temporary pacemaker placement by Dr. Reyes. She then underwent PATRICIA x 2 to RCA on 5/29.  She has been placed on DAPT- aspirin and plavix, as well as a statin. Transferred back to the floor, hospitalists resumed care on 5/31.  Her LUKASZ has since resolved.  She is also s/p diuresis and her anasarca has significantly improved.  The patient is now medically stable and ready for rehab.  She has accepted a bed at [facility] and will transfer there today by medical van.  She will need to follow up with her PCP once she is discharged from rehab and with Dr. Mayberry, cardiology in 6 weeks.  They will discuss the possibility of staged PCI of her LAD and left circumflex in the future if she becomes symptomatic.        Discharge Follow Up Recommendations for labs/diagnostics:  Follow up with PCP once discharged from rehab.   Follow up with Dr. Mayberry, cardiology, 6 weeks.     Day of Discharge     HPI:   Resting comfortably in bed this morning with no overnight issues or complaints.  Feels well today.  Just wants to get stronger.  Ready to transfer to rehab.     Review of Systems  Gen- No fevers, chills  CV- No chest pain, palpitations  Resp- No cough, dyspnea  GI- No N/V/D, abd pain    Otherwise ROS is negative except as mentioned in the HPI.    Vital Signs:   Temp:  [97.6 °F (36.4 °C)-98.4 °F (36.9 °C)] 97.6 °F (36.4 °C)  Heart Rate:  [] 85  Resp:  [20] 20  BP:  (147-160)/(68-71) 147/68     Physical Exam:  Constitutional: No acute distress, awake, alert, resting in bed, no family at bedside.    HENT: NCAT, mucous membranes moist  Respiratory: Clear to auscultation bilaterally, respiratory effort normal on room air  Cardiovascular: RRR, no murmurs, rubs, or gallops, palpable pedal pulses bilaterally  Gastrointestinal: Positive bowel sounds, soft, nontender, nondistended  Musculoskeletal: No bilateral ankle edema  Psychiatric: Appropriate affect, cooperative  Neurologic: Oriented x 3, strength symmetric in all extremities, Cranial Nerves grossly intact to confrontation, speech clear  Skin: No rashes      Pertinent  and/or Most Recent Results     Results from last 7 days   Lab Units 06/05/19  0329 06/04/19  0446 06/03/19  0529 06/02/19  0513 06/01/19  0620 05/31/19  0626 05/30/19  1025 05/30/19  0420   WBC 10*3/mm3 4.54 3.46 4.93 3.10* 4.52 5.89  --  6.13   HEMOGLOBIN g/dL 9.9* 9.7* 10.1* 9.6* 10.2* 9.5*  --  8.9*   HEMATOCRIT % 30.7* 30.3* 31.8* 30.5* 33.1* 30.8*  --  28.7*   PLATELETS 10*3/mm3 189 179 187 181 180 161  --  158   SODIUM mmol/L 136 136 135* 136 135* 136 132*  --    POTASSIUM mmol/L 4.2 4.0 4.4 4.4 4.3 4.6 4.8  --    CHLORIDE mmol/L 100 102 98 96* 97* 100 96*  --    CO2 mmol/L 26.0 25.0 27.0 30.0* 26.0 25.0 24.0  --    BUN mg/dL 16 18 21 26* 28* 37* 49*  --    CREATININE mg/dL 1.06* 1.07* 1.07* 1.28* 1.39* 1.46* 1.77*  --    GLUCOSE mg/dL 93 142* 163* 149* 59* 48* 135*  --    CALCIUM mg/dL 9.1 9.2 9.6 9.1 9.1 9.0 8.8  --            Invalid input(s): PROT, LABALBU        Invalid input(s): TG, LDLCALC, LDLREALC        Brief Urine Lab Results  (Last result in the past 365 days)      Color   Clarity   Blood   Leuk Est   Nitrite   Protein   CREAT   Urine HCG        05/21/19 0157             105.8             Microbiology Results Abnormal     Procedure Component Value - Date/Time    Eosinophil Smear - Urine, Urine, Clean Catch [[id number]]  (Normal) Collected:   05/21/19 0157    Lab Status:  Final result Specimen:  Urine, Clean Catch Updated:  05/21/19 0732     Eosinophil Smear 0 % EOS/100 Cells     Narrative:       No eosinophil seen          Imaging Results (all)     Procedure Component Value Units Date/Time    XR Chest 1 View [[id number]] Collected:  05/27/19 1736     Updated:  05/29/19 1102    Narrative:       EXAMINATION: XR CHEST 1 VW-      INDICATION: Central line placement; R73.9-Hyperglycemia, unspecified;  R74.8-Abnormal levels of other serum enzymes; E86.0-Dehydration;  R11.2-Nausea with vomiting, unspecified; I21.4-Non-ST elevation (NSTEMI)  myocardial infarction; Z74.09-Other reduced mobility; I10-Essential  (primary) hypertension; I25.119-Atherosclerotic heart disease of native  coronary artery with unspecified angina pectoris.      COMPARISON: 05/27/2019 earlier same day.     FINDINGS: Status post right internal jugular central venous catheter  placement terminating within the distal SVC without postprocedural  pneumothorax. Cardiac silhouette enlarged and similar to prior with  decreased bibasilar opacifications from earlier same day, however,  likely layering trace right and trace to small left pleural effusions  are again noted with central pulmonary vascularity increased.  Defibrillator pads overlie the left chest.           Impression:       Right internal jugular central venous catheter placement  terminating within the distal SVC without postprocedural pneumothorax.  Persistent cardiomegaly with decreased appearance of bibasilar  opacifications which remain left greater than right with small left and  trace right partial layering pleural effusions with adjacent atelectasis  versus airspace disease.     D:  05/27/2019  E:  05/28/2019     This report was finalized on 5/29/2019 10:58 AM by Dr. Stu Schroeder.       CT Abdomen Pelvis Without Contrast [[id number]] Collected:  05/27/19 0910     Updated:  05/29/19 1027    Narrative:       EXAMINATION: CT ABDOMEN  AND PELVIS WO CONTRAST-      INDICATION: Disorders of retroperitoneum.      TECHNIQUE: CT abdomen and pelvis without intravenous contrast.     The radiation dose reduction device was turned on for each scan per the  ALARA (As Low as Reasonably Achievable) protocol.     COMPARISON: CT dated 05/18/2019.     FINDINGS: Lung bases demonstrate interval development of trace to small  bilateral pleural effusions with adjacent atelectasis. Liver without  focal lesion with trace perihepatic ascites. Gallbladder is surgically  absent. No biliary dilatation. Pancreas and spleen unremarkable.  Adrenals without distinct nodule. Kidneys without hydronephrosis or  hydroureter. No bulky retroperitoneal adenopathy. GI tract evaluation  without focal thickening or disproportionate dilatation of bowel.  Sigmoid diverticulosis without evidence for acute diverticulitis. No  loculated fluid collection or intraabdominal free air. Pelvic viscera  grossly unremarkable without bulky pelvic adenopathy and only trace free  fluid. Degenerative changes of the spine without aggressive osseous  lesion. Diffuse body wall edema consistent with anasarca.       Impression:       1. No retroperitoneal hematoma.  2. Diffuse body wall edema consistent with development of interval  anasarca.  3. Trace abdominopelvic ascites along with trace to small bilateral  pleural effusions right slightly greater than left.     D:  05/27/2019  E:  05/27/2019  .     This report was finalized on 5/29/2019 10:24 AM by Dr. Stu Schroeder.       XR Chest 1 View [[id number]] Collected:  05/27/19 0831     Updated:  05/29/19 1027    Narrative:       EXAMINATION: XR CHEST 1 VW-      INDICATION: Shortness of air; R73.9-Hyperglycemia, unspecified;  R74.8-Abnormal levels of other serum enzymes; E86.0-Dehydration;  R11.2-Nausea with vomiting, unspecified; I21.4-Non-ST elevation (NSTEMI)  myocardial infarction; Z74.09-Other reduced mobility.      COMPARISON: 05/26/2019.     FINDINGS:  Slight decrease in lung volumes from prior comparison with  hypoventilatory findings of vascular crowding and bibasilar atelectasis  increased. Bibasilar opacifications left greater than right may  represent atelectasis versus airspace disease without significant  effusion. No pneumothorax. Degenerative changes of the spine.           Impression:       Ascending lung volumes. Hypoventilatory findings with  superimposed bibasilar opacifications left greater than right concerning  for atelectasis versus airspace disease with increase from prior.     D:  05/27/2019  E:  05/27/2019     This report was finalized on 5/29/2019 10:24 AM by Dr. Stu Schroeder.       XR Chest 1 View [[id number]] Collected:  05/28/19 1442     Updated:  05/28/19 1509    Narrative:       EXAMINATION: XR CHEST 1 VW-05/28/2019:      INDICATION: Post left IJ temp pacer; R73.9-Hyperglycemia, unspecified;  R74.8-Abnormal levels of other serum enzymes; E86.0-Dehydration;  R11.2-Nausea with vomiting, unspecified; I21.4-Non-ST elevation (NSTEMI)  myocardial infarction; Z74.09-Other reduced mobility; I10-Essential  (primary) hypertension; I25.119-Atherosclerotic heart disease of native  coronary artery with unspecified angina pectoris; I46.9-Cardia.      COMPARISON: 05/28/2019.     FINDINGS: Portable chest reveals the heart to be enlarged. Lines and  tubes in satisfactory position. Small left pleural effusion with mild  increased markings at the left lung base. Degenerative changes seen  within the spine. Vascular calcification seen within the thoracic aorta.            Impression:       Mild increased markings identified in the left lung base  with small left pleural effusion. Slight prominence of the pulmonary  vascularity. Temporary pacer placed on the left with tip in the  ventricle.     D:  05/28/2019  E:  05/28/2019     This report was finalized on 5/28/2019 3:06 PM by Dr. Delphine Slaughter MD.       XR Chest 1 View [[id number]] Collected:  05/28/19  0825     Updated:  05/28/19 1030    Narrative:       EXAMINATION: XR CHEST 1 VW- 05/28/2019      INDICATION: R73.9-Hyperglycemia, unspecified; R74.8-Abnormal levels of  other serum enzymes; E86.0-Dehydration; R11.2-Nausea with vomiting,  unspecified; I21.4-Non-ST elevation (NSTEMI) myocardial infarction;  Z74.09-Other reduced mobility; I10-Essential (primary) hypertension;  I25.119-Atherosclerotic heart disease of native coronary artery with  unspecified angina pectoris; I46.9-Cardiac      COMPARISON: 05/27/2019     FINDINGS: Portable chest reveals heart to be enlarged with prominence of  the pulmonary vascularity. Small left pleural effusion. Deep line  catheter on the right tip in the SVC. Findings are unchanged.           Impression:       Stable chest as above.     D:  05/28/2019  E:  05/28/2019     This report was finalized on 5/28/2019 10:26 AM by Dr. Delphine Slaughter MD.       XR Chest 1 View [[id number]] Collected:  05/25/19 1330     Updated:  05/28/19 0840    Narrative:          EXAMINATION: XR CHEST 1 VW - 05/25/2019     INDICATION: R73.9-Hyperglycemia, unspecified; R74.8-Abnormal levels of  other serum enzymes; E86.0-Dehydration; R11.2-Nausea with vomiting,  unspecified; I21.4-Non-ST elevation (NSTEMI) myocardial infarction;  Z74.09-Other reduced mobility.      COMPARISON: 05/18/2019     FINDINGS: Portable chest reveals increased pulmonary vascularity  bilaterally with ill-defined opacification seen in the right upper and  lower lung fields. There is small left pleural effusion with mild  increased markings at the left lung base. Pulmonary vascularity is  increased.           Impression:       Mild increased pulmonary vascularity with increased markings  seen in the right upper and mid lung fields and left lung base with  small left pleural effusion.     DICTATED:   05/25/2019  EDITED/ls :   05/25/2019      This report was finalized on 5/28/2019 8:37 AM by Dr. Delphine Slaughter MD.       XR Chest 1  View [[id number]] Collected:  05/26/19 1140     Updated:  05/27/19 1800    Narrative:          EXAMINATION: XR CHEST 1 VW - 05/26/2019     INDICATION: R73.9-Hyperglycemia, unspecified; R74.8-Abnormal levels of  other serum enzymes; E86.0-Dehydration; R11.2-Nausea with vomiting,  unspecified; I21.4-Non-ST elevation (NSTEMI) myocardial infarction;  Z74.09-Other reduced mobility.      COMPARISON: 05/25/2019     FINDINGS: Cardiac silhouette enlarged. Increased pulmonary vascularity  again noted with interval increase from prior along with perihilar  predominant pulmonary opacifications. No pneumothorax or significant  effusion. Degenerative changes of the spine.          Impression:       Cardiac silhouette enlarged with interval increase in  pulmonary vascularity along with perihilar predominant pulmonary  opacifications worsened from prior without significant effusion.     DICTATED:   05/26/2019  EDITED/ls :   05/26/2019      This report was finalized on 5/27/2019 5:57 PM by Dr. Stu Schroeder.       CT Head Without Contrast [[id number]] Collected:  05/24/19 1140     Updated:  05/25/19 0957    Narrative:       EXAMINATION: CT HEAD WO CONTRAST - 05/24/2019     INDICATION:  R73.9-Hyperglycemia, unspecified; R74.8-Abnormal levels of  other serum enzymes; E86.0-Dehydration; R11.2-Nausea with vomiting,  unspecified; I21.4-Non-ST elevation (NSTEMI) myocardial infarction.  Trouble with speech, confusion, weakness.     TECHNIQUE: Multiple axial CT imaging is obtained of the head from skull  base to skull vertex without the administration of intravenous contrast.     The radiation dose reduction device was turned on for each scan per the  ALARA (As Low as Reasonably Achievable) protocol.     COMPARISON: 03/16/2015     FINDINGS: Parenchyma is unremarkable in appearance. Minimal low-density  area seen in the periventricular and subcortical white matter suggesting  chronic small vessel ischemic change. No hemorrhage or hydrocephalus.  No  mass, mass effect, or midline shift. No abnormal extra-axial fluid  collection is  identified. Bony structures reveal no evidence of osseous  abnormality. Mucosal thickening involving the left maxillary sinus.  Globes and orbits are intact. The mastoid air cells are patent.       Impression:       Chronic changes seen within the brain with no acute  intracranial abnormality.     DICTATED:   05/24/2019  EDITED/ls :   05/24/2019         This report was finalized on 5/25/2019 9:53 AM by Dr. Delphine Slaughter MD.       XR Chest 1 View [[id number]] Collected:  05/18/19 1543     Updated:  05/21/19 1633    Narrative:          EXAMINATION: XR CHEST 1 VW - 05/18/2019     INDICATION: Upper abdominal pain.      COMPARISON: 04/26/2019     FINDINGS: Lungs are clear without consolidation or opacification. No  pneumothorax or pleural effusion. Cardiac silhouette borderline enlarged  and unchanged. No pneumothorax or pleural effusion. Visualized portions  of the upper abdomen without abnormality; specifically no free air  underneath the hemidiaphragms.           Impression:       No acute cardiopulmonary process or free air underneath the  hemidiaphragms on this portable erect evaluation.     DICTATED:   05/18/2019  EDITED/ls :   05/18/2019      This report was finalized on 5/21/2019 4:30 PM by Dr. Stu Schroeder.       CT Abdomen Pelvis Without Contrast [[id number]] Collected:  05/18/19 1748     Updated:  05/21/19 1014    Narrative:       EXAMINATION: CT ABDOMEN/PELVIS WO CONTRAST - 05/18/2019     INDICATION: Abdominal pain.     TECHNIQUE: CT abdomen and pelvis without intravenous contrast.     The radiation dose reduction device was turned on for each scan per the  ALARA (As Low as Reasonably Achievable) protocol.     COMPARISON: CT dated 04/16/2015.     FINDINGS: Lung bases demonstrate subsegmental atelectasis and/or  scarring without consolidation or effusion. Liver without focal lesion.  Gallbladder is surgically absent. No  biliary dilatation. Pancreas and  spleen unremarkable. Adrenals without distinct nodule. Kidneys without  hydronephrosis or hydroureter. No bulky retroperitoneal adenopathy.  Atherosclerotic nonaneurysmal abdominal aorta. GI tract evaluation  demonstrates small hiatal hernia. Within the distal duodenal c-loop is a  medially oriented area of outpouching or gas which may represent  duodenal diverticulum or choledochal cyst formation as this is adjacent  to or involving the ampulla. Distal bowel is unremarkable for mechanical  obstructive findings, however sigmoid diverticulosis is fairly  involvement without acute findings to suggest acute diverticulitis. No  free fluid or intra-abdominal free air. No loculated fluid collection.  Appendix is visualized and unremarkable. Pelvic viscera unremarkable  without bulky pelvic adenopathy or free fluid. Degenerative changes of  the spine without aggressive osseous or soft tissue body wall lesions  with right flank stimulator pack in place with thoracic spinal cord  stimulator leads noted and partially imaged.       Impression:       1. Small hiatal hernia.  2. Small area of outpouching gas-filled along the medial margin of the  duodenal c-loop distally at or involving the patella which may represent  choledochal cyst formation or duodenal diverticulum without significant  inflammatory findings or associated fluid.  3. No mechanical obstructive findings with sigmoid diverticulosis,  however, no evidence for acute diverticulitis. No abscess or free air.     DICTATED:   05/18/2019  EDITED/ls :   05/18/2019         This report was finalized on 5/21/2019 10:11 AM by Dr. Stu Schroeder.             Results for orders placed during the hospital encounter of 05/18/19   Duplex Venous Lower Extremity - Right CAR    Narrative · Normal right lower extremity venous duplex scan.          Results for orders placed during the hospital encounter of 05/18/19   Duplex Venous Lower Extremity -  Right CAR    Narrative · Normal right lower extremity venous duplex scan.          Results for orders placed during the hospital encounter of 05/18/19   Adult Transthoracic Echo Complete W/ Cont if Necessary Per Protocol    Narrative · Left ventricular systolic function is normal. Estimated EF = 65%.  · Left ventricular diastolic dysfunction (grade II) consistent with   pseudonormalization.  · Mild mitral valve regurgitation is present  · Mild aortic valve regurgitation is present.  · Right ventricular cavity is dilated.  · Estimated right ventricular systolic pressure from tricuspid   regurgitation is normal (<35 mmHg).            Discharge Details        Discharge Medications      New Medications      Instructions Start Date   docusate sodium 100 MG capsule   100 mg, Oral, 2 Times Daily      HYDROcodone-acetaminophen 5-325 MG per tablet  Commonly known as:  NORCO   1 tablet, Oral, Every 6 Hours PRN      insulin lispro 100 UNIT/ML injection  Commonly known as:  humaLOG   0-9 Units, Subcutaneous, 4 Times Daily With Meals & Nightly      melatonin 5 MG tablet tablet   5 mg, Oral, Nightly      nitroglycerin 0.4 MG SL tablet  Commonly known as:  NITROSTAT   0.4 mg, Sublingual, Every 5 Minutes PRN, Take no more than 3 doses in 15 minutes.      polyethylene glycol pack packet  Commonly known as:  MIRALAX   17 g, Oral, 2 Times Daily         Changes to Medications      Instructions Start Date   amLODIPine 10 MG tablet  Commonly known as:  NORVASC  What changed:    · medication strength  · how much to take  · when to take this   10 mg, Oral, Every 24 Hours Scheduled      metoprolol tartrate 50 MG tablet  Commonly known as:  LOPRESSOR  What changed:    · medication strength  · how much to take  · when to take this   50 mg, Oral, Every 12 Hours Scheduled         Continue These Medications      Instructions Start Date   ACCU-CHEK JOVITA device   1 each, Other, Take As Directed, Use as instructed      ACCU-CHEK JOVITA PLUS  "w/Device kit   No dose, route, or frequency recorded.      ACCU-CHEK JOVITA solution   1 each, In Vitro, Take As Directed      ACCU-CHEK JOVITA test strip  Generic drug:  glucose blood   1 each, Other, 4 Times Daily Before Meals & Nightly      ACCU-CHEK SOFTCLIX LANCETS lancets   1 each, Other, 3 Times Daily Before Meals, Use as instructed      aspirin 81 MG tablet  Commonly known as:  ASPIRIN LOW DOSE   81 mg, Oral, Daily      cholecalciferol 1000 units tablet  Commonly known as:  VITAMIN D3   1,000 Units, Oral, Daily      clopidogrel 75 MG tablet  Commonly known as:  PLAVIX   75 mg, Oral, Daily, Resume Sunday      [employer] VITAMIN B-12 1000 MCG tablet  Generic drug:  cyanocobalamin   1 tablet, Oral, Daily      gabapentin 600 MG tablet  Commonly known as:  NEURONTIN   600 mg, Oral, 2 Times Daily      Insulin Syringe 30G X 5/16\" 1 ML misc   1 each, Does not apply, 3 Times Daily Before Meals      ondansetron ODT 8 MG disintegrating tablet  Commonly known as:  ZOFRAN-ODT   8 mg, Oral, Every 8 Hours PRN      pantoprazole 40 MG EC tablet  Commonly known as:  PROTONIX   40 mg, Oral, 2 Times Daily      rosuvastatin 20 MG tablet  Commonly known as:  CRESTOR   20 mg, Oral, Daily      sharps container   1 each, Does not apply, Daily         Stop These Medications    diphenoxylate-atropine 2.5-0.025 MG per tablet  Commonly known as:  LOMOTIL     insulin NPH-insulin regular (70-30) 100 UNIT/ML injection  Commonly known as:  HUMULIN 70/30     meclizine 25 MG tablet  Commonly known as:  ANTIVERT            Allergies   Allergen Reactions   • Codeine Nausea And Vomiting         Discharge Disposition:  Rehab Facility or Unit (DC - External)    Discharge Diet:  Diet Order   Procedures   • Diet Regular; Cardiac, Consistent Carbohydrate         Discharge Activity:   Activity Instructions     Activity as Tolerated              CODE STATUS:    Code Status and Medical Interventions:   Ordered at: 05/18/19 2051     Level Of Support Discussed " With:    Patient     Code Status:    CPR     Medical Interventions (Level of Support Prior to Arrest):    Full         Future Appointments   Date Time Provider Department Center   7/31/2019  3:30 PM Mariia DelR eal DO [facility]  [facility] None   8/20/2019 10:30 AM Filemon Mayberry IV, MD [facility] SHANNON None       Additional Instructions for the Follow-ups that You Need to Schedule     Discharge Follow-up with PCP   As directed       Currently Documented PCP:    Mariia Del Real DO    PCP Phone Number:    [phone]     Follow Up Details:  once discharged from rehab         Discharge Follow-up with Specified Provider: Cardiology; 6 Weeks   As directed      To:  Cardiology    Follow Up:  6 Weeks               Time Spent on Discharge:  40 minutes    Electronically signed by NHAN Vaughan, 06/05/19, 9:01 AM.

## 2019-06-06 NOTE — TELEPHONE ENCOUNTER
Adam,   Can you find out from [employer] what is covered: you may substitute Humulin 70/30, if formulary preferred.  MD

## 2019-06-13 ENCOUNTER — TELEPHONE (OUTPATIENT)
Dept: INTERNAL MEDICINE | Facility: CLINIC | Age: 79
End: 2019-06-13

## 2019-06-13 NOTE — TELEPHONE ENCOUNTER
PLEASE CALL JUSTIN WITH [facility]  THE PT IS BEING DISCHARGED TODAY AND THEY WANTED TO KNOW IF DR DURHAM WOULD FOLLOW THE HOME HEALTH ORDERS..  PLEASE CALL [phone]

## 2019-06-17 ENCOUNTER — OFFICE VISIT (OUTPATIENT)
Dept: INTERNAL MEDICINE | Facility: CLINIC | Age: 79
End: 2019-06-17

## 2019-06-17 ENCOUNTER — OFFICE VISIT (OUTPATIENT)
Dept: ENDOCRINOLOGY | Facility: CLINIC | Age: 79
End: 2019-06-17

## 2019-06-17 VITALS
DIASTOLIC BLOOD PRESSURE: 70 MMHG | SYSTOLIC BLOOD PRESSURE: 120 MMHG | HEART RATE: 93 BPM | BODY MASS INDEX: 27.16 KG/M2 | OXYGEN SATURATION: 98 % | WEIGHT: 168.2 LBS

## 2019-06-17 VITALS
BODY MASS INDEX: 27.13 KG/M2 | WEIGHT: 168 LBS | HEART RATE: 93 BPM | SYSTOLIC BLOOD PRESSURE: 120 MMHG | OXYGEN SATURATION: 98 % | DIASTOLIC BLOOD PRESSURE: 70 MMHG

## 2019-06-17 DIAGNOSIS — I21.4 NSTEMI (NON-ST ELEVATED MYOCARDIAL INFARCTION) (HCC): Primary | ICD-10-CM

## 2019-06-17 DIAGNOSIS — D50.8 OTHER IRON DEFICIENCY ANEMIA: ICD-10-CM

## 2019-06-17 DIAGNOSIS — IMO0002 UNCONTROLLED TYPE 2 DIABETES MELLITUS WITH COMPLICATION, WITH LONG-TERM CURRENT USE OF INSULIN: Primary | ICD-10-CM

## 2019-06-17 DIAGNOSIS — N18.30 ACUTE RENAL FAILURE WITH ACUTE TUBULAR NECROSIS SUPERIMPOSED ON STAGE 3 CHRONIC KIDNEY DISEASE (HCC): ICD-10-CM

## 2019-06-17 DIAGNOSIS — R53.83 OTHER FATIGUE: ICD-10-CM

## 2019-06-17 DIAGNOSIS — I44.2 COMPLETE HEART BLOCK (HCC): ICD-10-CM

## 2019-06-17 DIAGNOSIS — IMO0002 UNCONTROLLED TYPE 2 DIABETES MELLITUS WITH COMPLICATION, WITH LONG-TERM CURRENT USE OF INSULIN: ICD-10-CM

## 2019-06-17 DIAGNOSIS — G25.2 RESTING TREMOR: ICD-10-CM

## 2019-06-17 DIAGNOSIS — N17.0 ACUTE RENAL FAILURE WITH ACUTE TUBULAR NECROSIS SUPERIMPOSED ON STAGE 3 CHRONIC KIDNEY DISEASE (HCC): ICD-10-CM

## 2019-06-17 LAB
ALBUMIN SERPL-MCNC: 3.9 G/DL (ref 3.5–5.2)
ALBUMIN/GLOB SERPL: 1.1 G/DL
ALP SERPL-CCNC: 85 U/L (ref 39–117)
ALT SERPL W P-5'-P-CCNC: 27 U/L (ref 1–33)
ANION GAP SERPL CALCULATED.3IONS-SCNC: 16.5 MMOL/L
AST SERPL-CCNC: 22 U/L (ref 1–32)
BASOPHILS # BLD AUTO: 0.02 10*3/MM3 (ref 0–0.2)
BASOPHILS NFR BLD AUTO: 0.4 % (ref 0–1.5)
BILIRUB SERPL-MCNC: 0.5 MG/DL (ref 0.2–1.2)
BUN BLD-MCNC: 16 MG/DL (ref 8–23)
BUN/CREAT SERPL: 12.2 (ref 7–25)
CALCIUM SPEC-SCNC: 10.6 MG/DL (ref 8.6–10.5)
CHLORIDE SERPL-SCNC: 102 MMOL/L (ref 98–107)
CHOLEST SERPL-MCNC: 113 MG/DL (ref 0–200)
CO2 SERPL-SCNC: 22.5 MMOL/L (ref 22–29)
CREAT BLD-MCNC: 1.31 MG/DL (ref 0.57–1)
DEPRECATED RDW RBC AUTO: 57.1 FL (ref 37–54)
EOSINOPHIL # BLD AUTO: 0.05 10*3/MM3 (ref 0–0.4)
EOSINOPHIL NFR BLD AUTO: 1 % (ref 0.3–6.2)
ERYTHROCYTE [DISTWIDTH] IN BLOOD BY AUTOMATED COUNT: 16.6 % (ref 12.3–15.4)
GFR SERPL CREATININE-BSD FRML MDRD: 39 ML/MIN/1.73
GLOBULIN UR ELPH-MCNC: 3.4 GM/DL
GLUCOSE BLD-MCNC: 70 MG/DL (ref 65–99)
GLUCOSE BLDC GLUCOMTR-MCNC: 95 MG/DL (ref 70–130)
HBA1C MFR BLD: 6.1 %
HCT VFR BLD AUTO: 38.7 % (ref 34–46.6)
HDLC SERPL-MCNC: 36 MG/DL (ref 40–60)
HGB BLD-MCNC: 11.6 G/DL (ref 12–15.9)
IMM GRANULOCYTES # BLD AUTO: 0.02 10*3/MM3 (ref 0–0.05)
IMM GRANULOCYTES NFR BLD AUTO: 0.4 % (ref 0–0.5)
LDLC SERPL CALC-MCNC: 43 MG/DL (ref 0–100)
LDLC/HDLC SERPL: 1.19 {RATIO}
LYMPHOCYTES # BLD AUTO: 0.55 10*3/MM3 (ref 0.7–3.1)
LYMPHOCYTES NFR BLD AUTO: 11.3 % (ref 19.6–45.3)
MCH RBC QN AUTO: 28.2 PG (ref 26.6–33)
MCHC RBC AUTO-ENTMCNC: 30 G/DL (ref 31.5–35.7)
MCV RBC AUTO: 94.2 FL (ref 79–97)
MONOCYTES # BLD AUTO: 0.38 10*3/MM3 (ref 0.1–0.9)
MONOCYTES NFR BLD AUTO: 7.8 % (ref 5–12)
NEUTROPHILS # BLD AUTO: 3.84 10*3/MM3 (ref 1.7–7)
NEUTROPHILS NFR BLD AUTO: 79.1 % (ref 42.7–76)
NRBC BLD AUTO-RTO: 0 /100 WBC (ref 0–0.2)
PLATELET # BLD AUTO: 188 10*3/MM3 (ref 140–450)
PMV BLD AUTO: 10.5 FL (ref 6–12)
POTASSIUM BLD-SCNC: 4.8 MMOL/L (ref 3.5–5.2)
PROT SERPL-MCNC: 7.3 G/DL (ref 6–8.5)
RBC # BLD AUTO: 4.11 10*6/MM3 (ref 3.77–5.28)
SODIUM BLD-SCNC: 141 MMOL/L (ref 136–145)
TRIGL SERPL-MCNC: 170 MG/DL (ref 0–150)
TSH SERPL DL<=0.05 MIU/L-ACNC: 0.01 MIU/ML (ref 0.27–4.2)
VLDLC SERPL-MCNC: 34 MG/DL (ref 5–40)
WBC NRBC COR # BLD: 4.86 10*3/MM3 (ref 3.4–10.8)

## 2019-06-17 PROCEDURE — 83036 HEMOGLOBIN GLYCOSYLATED A1C: CPT | Performed by: INTERNAL MEDICINE

## 2019-06-17 PROCEDURE — 82962 GLUCOSE BLOOD TEST: CPT | Performed by: INTERNAL MEDICINE

## 2019-06-17 PROCEDURE — 99214 OFFICE O/P EST MOD 30 MIN: CPT | Performed by: INTERNAL MEDICINE

## 2019-06-17 PROCEDURE — 80061 LIPID PANEL: CPT | Performed by: INTERNAL MEDICINE

## 2019-06-17 PROCEDURE — 99213 OFFICE O/P EST LOW 20 MIN: CPT | Performed by: INTERNAL MEDICINE

## 2019-06-17 PROCEDURE — 84443 ASSAY THYROID STIM HORMONE: CPT | Performed by: INTERNAL MEDICINE

## 2019-06-17 PROCEDURE — 85025 COMPLETE CBC W/AUTO DIFF WBC: CPT | Performed by: INTERNAL MEDICINE

## 2019-06-17 PROCEDURE — 80053 COMPREHEN METABOLIC PANEL: CPT | Performed by: INTERNAL MEDICINE

## 2019-06-17 NOTE — PROGRESS NOTES
Chief Complaint   Patient presents with   • Uncontrolled type 2 diabetes mellitus with complication,     follow-up       HPI:   Narcisa Cotto is a 78 y.o.female who [facility] f/u evaluation of Type 2 diabetes. Last clinic visit 01/28/2019. Her history is as follows:    Interim Events:  - hospitalized in 05/2018 for NSTEMI. Pt reports she received 3 units of PRBCs    1) Type 2 diabetes with associated complications of peripheral neuropathy, CKD, PAD, retinopathy, and CAD:  - diagnosed in 2000  - was on basal-bolus regimen with Levemir and Humalog. Changed to Novolin 70/30 in (01/2018) due to high cost of the analogue insulins.     Current DM Medications:  - Novolin 70/30: 20 units with BK, 20 units with lunch, and 50 units with supper    Glucometer/ BG log Review:   - forgot meter today  - rare hypoglycemia    DM Health Maintenance:  Ophtho: Last visit - (5/2018), With Dr. Chicas, [facility], + retinopathy, getting injections both eyes  Podiatry: Last visit - no recent visit  Monofilament / foot exam: (09/2018)  Lipids: (06/2019) TChol 113, , HDL 36, LDL 43 - on crestor 20 mg daily  Urine Microalb/Cr ratio: (01/2019) 69.9, CKD stage 3, on ace-I  TSH: 0.008 (06/2019)  Aspirin: 81 mg daily  CBC: (06/2019) Hgb 11.6    2) diabetic peripheral neuropathy:  - pt did not start Lyrica due to cost  - On Gabapentin 600 mg BID - renal dosing    Review of Systems   Constitutional: Positive for fatigue.        Weight loss   HENT: Negative.    Eyes: Negative.  Negative for visual disturbance.   Respiratory: Negative.    Cardiovascular: Positive for leg swelling. Negative for chest pain.   Gastrointestinal: Negative.  Negative for constipation and diarrhea.   Endocrine: Negative for cold intolerance, polydipsia and polyuria.        See HPI   Genitourinary:        Stress urinary incontinence   Musculoskeletal: Positive for arthralgias (knees & hands). Negative for back pain, joint swelling and  myalgias.   Skin: Negative.  Negative for rash and wound.   Neurological: Positive for numbness (pain, Bilat  feet). Negative for headaches.   Hematological: Negative.    Psychiatric/Behavioral: Negative.      The following portions of the patient's history were reviewed and updated as appropriate: allergies, current medications, past family history, past medical history, past social history, past surgical history and problem list.    /70   Pulse 93   Wt 76.2 kg (168 lb)   SpO2 98%   Breastfeeding? No   BMI 27.13 kg/m²   Physical Exam   Constitutional: She is oriented to person, place, and time. She appears well-developed. No distress.   HENT:   Head: Normocephalic.   Mouth/Throat: Oropharynx is clear and moist.   Eyes: Conjunctivae and EOM are normal. Pupils are equal, round, and reactive to light.   Neck: No tracheal deviation present. No thyromegaly present.   No palpable thyroid nodules     Cardiovascular: Normal rate, regular rhythm and normal heart sounds.   No murmur heard.  Pulmonary/Chest: Effort normal and breath sounds normal. No respiratory distress.   Abdominal: Soft. Bowel sounds are normal.   No scarring at insulin injection sites   Lymphadenopathy:     She has no cervical adenopathy.   Neurological: She is alert and oriented to person, place, and time. No cranial nerve deficit.   Skin: Skin is warm and dry. She is not diaphoretic. No erythema.   Psychiatric: She has a normal mood and affect. Her behavior is normal.   Vitals reviewed.    LABS/IMAGING:   Office Visit on 06/17/2019   Component Date Value Ref Range Status   • Hemoglobin A1C 06/17/2019 6.1  % Final   • Glucose 06/17/2019 95  70 - 130 mg/dL Final   Office Visit on 06/17/2019   Component Date Value Ref Range Status   • Glucose 06/17/2019 70  65 - 99 mg/dL Final   • BUN 06/17/2019 16  8 - 23 mg/dL Final   • Creatinine 06/17/2019 1.31* 0.57 - 1.00 mg/dL Final   • Sodium 06/17/2019 141  136 - 145 mmol/L Final   • Potassium  06/17/2019 4.8  3.5 - 5.2 mmol/L Final   • Chloride 06/17/2019 102  98 - 107 mmol/L Final   • CO2 06/17/2019 22.5  22.0 - 29.0 mmol/L Final   • Calcium 06/17/2019 10.6* 8.6 - 10.5 mg/dL Final   • Total Protein 06/17/2019 7.3  6.0 - 8.5 g/dL Final   • Albumin 06/17/2019 3.90  3.50 - 5.20 g/dL Final   • ALT (SGPT) 06/17/2019 27  1 - 33 U/L Final   • AST (SGOT) 06/17/2019 22  1 - 32 U/L Final   • Alkaline Phosphatase 06/17/2019 85  39 - 117 U/L Final   • Total Bilirubin 06/17/2019 0.5  0.2 - 1.2 mg/dL Final   • eGFR Non African Amer 06/17/2019 39* >60 mL/min/1.73 Final   • Globulin 06/17/2019 3.4  gm/dL Final   • A/G Ratio 06/17/2019 1.1  g/dL Final   • BUN/Creatinine Ratio 06/17/2019 12.2  7.0 - 25.0 Final   • Anion Gap 06/17/2019 16.5  mmol/L Final   • TSH 06/17/2019 0.008* 0.270 - 4.200 mIU/mL Final   • Total Cholesterol 06/17/2019 113  0 - 200 mg/dL Final   • Triglycerides 06/17/2019 170* 0 - 150 mg/dL Final   • HDL Cholesterol 06/17/2019 36* 40 - 60 mg/dL Final   • LDL Cholesterol  06/17/2019 43  0 - 100 mg/dL Final   • VLDL Cholesterol 06/17/2019 34  5 - 40 mg/dL Final   • LDL/HDL Ratio 06/17/2019 1.19   Final   • WBC 06/17/2019 4.86  3.40 - 10.80 10*3/mm3 Final   • RBC 06/17/2019 4.11  3.77 - 5.28 10*6/mm3 Final   • Hemoglobin 06/17/2019 11.6* 12.0 - 15.9 g/dL Final   • Hematocrit 06/17/2019 38.7  34.0 - 46.6 % Final   • MCV 06/17/2019 94.2  79.0 - 97.0 fL Final   • MCH 06/17/2019 28.2  26.6 - 33.0 pg Final   • MCHC 06/17/2019 30.0* 31.5 - 35.7 g/dL Final   • RDW 06/17/2019 16.6* 12.3 - 15.4 % Final   • RDW-SD 06/17/2019 57.1* 37.0 - 54.0 fl Final   • MPV 06/17/2019 10.5  6.0 - 12.0 fL Final   • Platelets 06/17/2019 188  140 - 450 10*3/mm3 Final   • Neutrophil % 06/17/2019 79.1* 42.7 - 76.0 % Final   • Lymphocyte % 06/17/2019 11.3* 19.6 - 45.3 % Final   • Monocyte % 06/17/2019 7.8  5.0 - 12.0 % Final   • Eosinophil % 06/17/2019 1.0  0.3 - 6.2 % Final   • Basophil % 06/17/2019 0.4  0.0 - 1.5 % Final   • Immature  Grans % 06/17/2019 0.4  0.0 - 0.5 % Final   • Neutrophils, Absolute 06/17/2019 3.84  1.70 - 7.00 10*3/mm3 Final   • Lymphocytes, Absolute 06/17/2019 0.55* 0.70 - 3.10 10*3/mm3 Final   • Monocytes, Absolute 06/17/2019 0.38  0.10 - 0.90 10*3/mm3 Final   • Eosinophils, Absolute 06/17/2019 0.05  0.00 - 0.40 10*3/mm3 Final   • Basophils, Absolute 06/17/2019 0.02  0.00 - 0.20 10*3/mm3 Final   • Immature Grans, Absolute 06/17/2019 0.02  0.00 - 0.05 10*3/mm3 Final   • nRBC 06/17/2019 0.0  0.0 - 0.2 /100 WBC Final       ASSESSMENT/PLAN:  1)  Type 2 diabetes with associated complications of peripheral neuropathy, CKD, PAD, retinopathy: fair control, A1C% 6.1 today but this is falsely low.  Pt is anemic and recently transfuse 3 units of PRBCs   - discussed with patient that glycemic targets are generally set somewhat higher (eg, <8 percent) for older adult patients and those with comorbidities. Therefore a goal A1C% of 7-8% is reasonable for Ms. Cotto.    - Continue Novolin 70/30: 20 units before BK, 20 units before lunch and 50 units AC before supper.   - Will try adding low dose Jardiance 10 mg daily given her CAD. Samples given. Reviewed potential side effects with pt. Discussed plan with her PCP. Will monitor closely. If UTI/yeast infection develops, will D/C.    2) diabetic peripheral neuropathy  - pt on 600 mg BID: renal dosing    RTC 07/31/2019    ADDENDUM: spoke with pt's daughter 06.24.2019. Pt had not taken the Jardiance samples given prior to her hospitalization. Advised her pt not to take the Jardiance. Pt's daughter has already disposed of samples.

## 2019-06-17 NOTE — PROGRESS NOTES
Transitional Care Follow Up Visit  Subjective     Narcisa Cotto is a 78 y.o. female who presents for a transitional care management visit.    Within 48 business hours after discharge our office contacted her via telephone to coordinate her care and needs.      I reviewed and discussed the details of that call along with the discharge summary, hospital problems, inpatient lab results, inpatient diagnostic studies, and consultation reports with Narcisa.     Current outpatient and discharge medications have been reconciled for the patient.    Date of TCM Phone Call 8/1/2016   [facility]   Date of Admission 7/26/2016   Date of Discharge 7/30/2016   Discharge Disposition Home or Self Care     Risk for Readmission (LACE) Score: 15 (6/5/2019  6:00 AM)      History of Present Illness   Course During Hospital Stay:  77 y/o  With uncontrolled diabetes, gastroparesis, diabetic nephropathy, peripheral neuropathy, GERD, htn, hlp, ckd, anemia, marianne who was admitted after presenting to ER with nausea and vomiting and chest pain x 48 hours. ER evaluation suggested NSTEMI and patient underwent a LHC on 5/23 that reveals severe multivessel CAD. Troponin peaked at 0.345. Ct surgery evaluated the patient and considered CABG but felt patient was too debilitated to safely proceed with surgery. She developed encephalopathy and was evaluated by Neuro. Neuro felt her to be delirium and come contribution from LUKASZ. Head CT neg.  Unstable bradycardia secondary to complete heart block vs asystole on 5/27 and was transfer to ICU. Dr. Reyes placed an emergent  Temporary pacemaker (removed at a later date). She then underwent PATRICIA x 2 to RCA on 5/29. She was placed on DAPT, ASA, Plavix, and statin. Was transferred back to floor on 5/31. Her LUKASZ resolved. S/p diuresis. She was medically stable and transferred to [facility]  . She is now following up from [facility].  She did PT there.      The following portions of  the patient's history were reviewed and updated as appropriate: allergies, current medications, past family history, past medical history, past social history, past surgical history and problem list.    Review of Systems   Constitutional: Positive for fatigue. Negative for activity change, appetite change, chills, diaphoresis, fever and unexpected weight change.   HENT: Negative for congestion, ear discharge, ear pain, mouth sores, nosebleeds, sinus pressure, sneezing and sore throat.    Eyes: Negative for pain, discharge and itching.   Respiratory: Negative for cough, chest tightness, shortness of breath and wheezing.    Cardiovascular: Negative for chest pain, palpitations and leg swelling.   Gastrointestinal: Negative for abdominal pain, constipation, diarrhea, nausea and vomiting.   Endocrine: Negative for cold intolerance, heat intolerance, polydipsia and polyphagia.   Genitourinary: Negative for dysuria, flank pain, frequency, hematuria and urgency.   Musculoskeletal: Positive for arthralgias and myalgias. Negative for back pain, gait problem, neck pain and neck stiffness.   Skin: Negative for color change, pallor and rash.   Neurological: Negative for seizures, speech difficulty, numbness and headaches.   Psychiatric/Behavioral: Negative for agitation, confusion, decreased concentration and sleep disturbance. The patient is not nervous/anxious.      /70   Pulse 93   Wt 76.3 kg (168 lb 3.2 oz)   SpO2 98%   BMI 27.16 kg/m²     Objective   Physical Exam   Constitutional: She appears well-developed.   HENT:   Head: Normocephalic.   Right Ear: External ear normal.   Left Ear: External ear normal.   Nose: Nose normal.   Mouth/Throat: Oropharynx is clear and moist.   Eyes: Conjunctivae are normal. Pupils are equal, round, and reactive to light.   Neck: No JVD present. No thyromegaly present.   Cardiovascular: Normal rate, regular rhythm and normal heart sounds. Exam reveals no friction rub.   No murmur  heard.  Pulmonary/Chest: Effort normal and breath sounds normal. No respiratory distress. She has no wheezes. She has no rales.   Abdominal: Soft. Bowel sounds are normal. She exhibits no distension. There is no tenderness. There is no guarding.   Musculoskeletal: She exhibits no edema or tenderness.   Lymphadenopathy:     She has no cervical adenopathy.   Neurological: She displays normal reflexes. No cranial nerve deficit.   Resting tremor b/l   Skin: No rash noted.   Psychiatric: Her behavior is normal.   Nursing note and vitals reviewed.      Assessment/Plan   Narcisa was seen today for hospital follow up.    Diagnoses and all orders for this visit:    NSTEMI (non-ST elevated myocardial infarction) (CMS/[facility])  S/p 2 PATRICIA RCA lipids  Uncontrolled type 2 diabetes mellitus with complication, with long-term current use of insulin (CMS/[facility])  Sees Dr. Richards today at 11.  Acute renal failure with acute tubular necrosis superimposed on stage 3 chronic kidney disease (CMS/[facility])  -     Comprehensive Metabolic Panel    Complete heart block (CMS/[facility])  S/p temporary pacer followed by 2 PATRICIA to RCA Has f/u with Dr. Mayberry  Other fatigue  -     CBC & Differential  -     Comprehensive Metabolic Panel  -     TSH    Resting tremor  -     Ambulatory Referral to Neurology    Other iron deficiency anemia  -     CBC & Differential

## 2019-06-20 ENCOUNTER — TELEPHONE (OUTPATIENT)
Dept: INTERNAL MEDICINE | Facility: CLINIC | Age: 79
End: 2019-06-20

## 2019-06-20 NOTE — TELEPHONE ENCOUNTER
lvm with Shanna to return call, she is not on SHARLENE but I have spoken with Narcisa and she gave verbal that it is ok to speak with her and she will fill out SHARLENE next time she is in here

## 2019-06-20 NOTE — TELEPHONE ENCOUNTER
PATIENTS DAUGHTER, DA GUILLORY, IS CALLING. SHE SAID SOMEONE FROM OUR OFFICE CALLED HER MOTHER AND SAID SHE HAD A UNDERACTIVE THYRIID. SHE HAS BEEN FEELING TIRED AND WEAK SINCE COMING HOME FROM THE HOSPITAL, AND SHE WAS WONDERING IF THIS IS WHAT IS MAKING HER SO TIRED. SHE SAID THAT SOMEONE IN THIS OFFICE TOLD HER SHE NEEDED TO GET LABS DONE. COULD SOMEONE PLEASE CALL DA, AND LET HER KNOW WHAT HER MOTHER NEEDS TO DO. HER PHONE NUMBER [phone].

## 2019-06-21 ENCOUNTER — APPOINTMENT (OUTPATIENT)
Dept: CT IMAGING | Facility: HOSPITAL | Age: 79
End: 2019-06-21

## 2019-06-21 ENCOUNTER — HOSPITAL ENCOUNTER (INPATIENT)
Facility: HOSPITAL | Age: 79
LOS: 6 days | Discharge: NURSING, REHAB OR LONG-TERM CARE | End: 2019-07-01
Attending: EMERGENCY MEDICINE | Admitting: INTERNAL MEDICINE

## 2019-06-21 ENCOUNTER — APPOINTMENT (OUTPATIENT)
Dept: GENERAL RADIOLOGY | Facility: HOSPITAL | Age: 79
End: 2019-06-21

## 2019-06-21 ENCOUNTER — APPOINTMENT (OUTPATIENT)
Dept: NUCLEAR MEDICINE | Facility: HOSPITAL | Age: 79
End: 2019-06-21

## 2019-06-21 DIAGNOSIS — R79.89 ELEVATED BRAIN NATRIURETIC PEPTIDE (BNP) LEVEL: ICD-10-CM

## 2019-06-21 DIAGNOSIS — I82.4Z1 ACUTE DEEP VEIN THROMBOSIS (DVT) OF DISTAL VEIN OF RIGHT LOWER EXTREMITY (HCC): ICD-10-CM

## 2019-06-21 DIAGNOSIS — I44.2 COMPLETE HEART BLOCK (HCC): ICD-10-CM

## 2019-06-21 DIAGNOSIS — E11.42 DIABETIC PERIPHERAL NEUROPATHY (HCC): ICD-10-CM

## 2019-06-21 DIAGNOSIS — Z74.09 IMPAIRED MOBILITY AND ADLS: ICD-10-CM

## 2019-06-21 DIAGNOSIS — Z78.9 IMPAIRED MOBILITY AND ADLS: ICD-10-CM

## 2019-06-21 DIAGNOSIS — R09.02 HYPOXIA: ICD-10-CM

## 2019-06-21 DIAGNOSIS — R06.02 SHORTNESS OF BREATH: Primary | ICD-10-CM

## 2019-06-21 DIAGNOSIS — Z74.09 IMPAIRED FUNCTIONAL MOBILITY, BALANCE, GAIT, AND ENDURANCE: ICD-10-CM

## 2019-06-21 PROBLEM — R06.00 DYSPNEA: Status: ACTIVE | Noted: 2019-06-21

## 2019-06-21 LAB
ALBUMIN SERPL-MCNC: 4 G/DL (ref 3.5–5.2)
ALBUMIN/GLOB SERPL: 1.2 G/DL
ALP SERPL-CCNC: 95 U/L (ref 39–117)
ALT SERPL W P-5'-P-CCNC: 54 U/L (ref 1–33)
ANION GAP SERPL CALCULATED.3IONS-SCNC: 14 MMOL/L
AST SERPL-CCNC: 41 U/L (ref 1–32)
BACTERIA UR QL AUTO: ABNORMAL /HPF
BASOPHILS # BLD AUTO: 0.02 10*3/MM3 (ref 0–0.2)
BASOPHILS NFR BLD AUTO: 0.4 % (ref 0–1.5)
BILIRUB SERPL-MCNC: 0.8 MG/DL (ref 0.2–1.2)
BILIRUB UR QL STRIP: NEGATIVE
BUN BLD-MCNC: 20 MG/DL (ref 8–23)
BUN/CREAT SERPL: 15.4 (ref 7–25)
CALCIUM SPEC-SCNC: 10.2 MG/DL (ref 8.6–10.5)
CHLORIDE SERPL-SCNC: 100 MMOL/L (ref 98–107)
CLARITY UR: ABNORMAL
CO2 SERPL-SCNC: 21 MMOL/L (ref 22–29)
COLOR UR: YELLOW
CREAT BLD-MCNC: 1.3 MG/DL (ref 0.57–1)
DEPRECATED RDW RBC AUTO: 48.2 FL (ref 37–54)
EOSINOPHIL # BLD AUTO: 0.08 10*3/MM3 (ref 0–0.4)
EOSINOPHIL NFR BLD AUTO: 1.5 % (ref 0.3–6.2)
ERYTHROCYTE [DISTWIDTH] IN BLOOD BY AUTOMATED COUNT: 14.9 % (ref 12.3–15.4)
GFR SERPL CREATININE-BSD FRML MDRD: 40 ML/MIN/1.73
GLOBULIN UR ELPH-MCNC: 3.3 GM/DL
GLUCOSE BLD-MCNC: 236 MG/DL (ref 65–99)
GLUCOSE UR STRIP-MCNC: NEGATIVE MG/DL
HCT VFR BLD AUTO: 38.9 % (ref 34–46.6)
HGB BLD-MCNC: 12.3 G/DL (ref 12–15.9)
HGB UR QL STRIP.AUTO: ABNORMAL
HOLD SPECIMEN: NORMAL
HOLD SPECIMEN: NORMAL
HYALINE CASTS UR QL AUTO: ABNORMAL /LPF
IMM GRANULOCYTES # BLD AUTO: 0.01 10*3/MM3 (ref 0–0.05)
IMM GRANULOCYTES NFR BLD AUTO: 0.2 % (ref 0–0.5)
KETONES UR QL STRIP: ABNORMAL
LEUKOCYTE ESTERASE UR QL STRIP.AUTO: ABNORMAL
LYMPHOCYTES # BLD AUTO: 0.6 10*3/MM3 (ref 0.7–3.1)
LYMPHOCYTES NFR BLD AUTO: 11.5 % (ref 19.6–45.3)
MCH RBC QN AUTO: 27.5 PG (ref 26.6–33)
MCHC RBC AUTO-ENTMCNC: 31.6 G/DL (ref 31.5–35.7)
MCV RBC AUTO: 87 FL (ref 79–97)
MONOCYTES # BLD AUTO: 0.43 10*3/MM3 (ref 0.1–0.9)
MONOCYTES NFR BLD AUTO: 8.2 % (ref 5–12)
NEUTROPHILS # BLD AUTO: 4.1 10*3/MM3 (ref 1.7–7)
NEUTROPHILS NFR BLD AUTO: 78.2 % (ref 42.7–76)
NITRITE UR QL STRIP: NEGATIVE
NRBC BLD AUTO-RTO: 0 /100 WBC (ref 0–0.2)
NT-PROBNP SERPL-MCNC: 3264 PG/ML (ref 5–1800)
PH UR STRIP.AUTO: 5.5 [PH] (ref 5–8)
PLATELET # BLD AUTO: 204 10*3/MM3 (ref 140–450)
PMV BLD AUTO: 10.6 FL (ref 6–12)
POTASSIUM BLD-SCNC: 4.5 MMOL/L (ref 3.5–5.2)
PROT SERPL-MCNC: 7.3 G/DL (ref 6–8.5)
PROT UR QL STRIP: ABNORMAL
RBC # BLD AUTO: 4.47 10*6/MM3 (ref 3.77–5.28)
RBC # UR: ABNORMAL /HPF
REF LAB TEST METHOD: ABNORMAL
SODIUM BLD-SCNC: 135 MMOL/L (ref 136–145)
SP GR UR STRIP: 1.01 (ref 1–1.03)
SQUAMOUS #/AREA URNS HPF: ABNORMAL /HPF
TROPONIN T SERPL-MCNC: 0.01 NG/ML (ref 0–0.03)
TROPONIN T SERPL-MCNC: 0.02 NG/ML (ref 0–0.03)
UROBILINOGEN UR QL STRIP: ABNORMAL
WBC NRBC COR # BLD: 5.24 10*3/MM3 (ref 3.4–10.8)
WBC UR QL AUTO: ABNORMAL /HPF
WHOLE BLOOD HOLD SPECIMEN: NORMAL
WHOLE BLOOD HOLD SPECIMEN: NORMAL

## 2019-06-21 PROCEDURE — A9540 TC99M MAA: HCPCS | Performed by: EMERGENCY MEDICINE

## 2019-06-21 PROCEDURE — 99285 EMERGENCY DEPT VISIT HI MDM: CPT

## 2019-06-21 PROCEDURE — 93005 ELECTROCARDIOGRAM TRACING: CPT | Performed by: EMERGENCY MEDICINE

## 2019-06-21 PROCEDURE — 0 XENON XE 133: Performed by: EMERGENCY MEDICINE

## 2019-06-21 PROCEDURE — 87186 SC STD MICRODIL/AGAR DIL: CPT | Performed by: NURSE PRACTITIONER

## 2019-06-21 PROCEDURE — 71250 CT THORAX DX C-: CPT

## 2019-06-21 PROCEDURE — 93005 ELECTROCARDIOGRAM TRACING: CPT

## 2019-06-21 PROCEDURE — 99219 PR INITIAL OBSERVATION CARE/DAY 50 MINUTES: CPT | Performed by: FAMILY MEDICINE

## 2019-06-21 PROCEDURE — 83880 ASSAY OF NATRIURETIC PEPTIDE: CPT

## 2019-06-21 PROCEDURE — 87077 CULTURE AEROBIC IDENTIFY: CPT | Performed by: NURSE PRACTITIONER

## 2019-06-21 PROCEDURE — 87086 URINE CULTURE/COLONY COUNT: CPT | Performed by: NURSE PRACTITIONER

## 2019-06-21 PROCEDURE — 81001 URINALYSIS AUTO W/SCOPE: CPT | Performed by: NURSE PRACTITIONER

## 2019-06-21 PROCEDURE — 80053 COMPREHEN METABOLIC PANEL: CPT

## 2019-06-21 PROCEDURE — 0 TECHNETIUM ALBUMIN AGGREGATED: Performed by: EMERGENCY MEDICINE

## 2019-06-21 PROCEDURE — 25010000002 CEFTRIAXONE PER 250 MG: Performed by: NURSE PRACTITIONER

## 2019-06-21 PROCEDURE — 84484 ASSAY OF TROPONIN QUANT: CPT

## 2019-06-21 PROCEDURE — 85025 COMPLETE CBC W/AUTO DIFF WBC: CPT

## 2019-06-21 PROCEDURE — 78582 LUNG VENTILAT&PERFUS IMAGING: CPT

## 2019-06-21 PROCEDURE — 84484 ASSAY OF TROPONIN QUANT: CPT | Performed by: EMERGENCY MEDICINE

## 2019-06-21 PROCEDURE — 71045 X-RAY EXAM CHEST 1 VIEW: CPT

## 2019-06-21 PROCEDURE — A9558 XE133 XENON 10MCI: HCPCS | Performed by: EMERGENCY MEDICINE

## 2019-06-21 PROCEDURE — 94640 AIRWAY INHALATION TREATMENT: CPT

## 2019-06-21 RX ORDER — AMLODIPINE BESYLATE 5 MG/1
5 TABLET ORAL DAILY
Status: DISCONTINUED | OUTPATIENT
Start: 2019-06-22 | End: 2019-07-01 | Stop reason: HOSPADM

## 2019-06-21 RX ORDER — CLOPIDOGREL BISULFATE 75 MG/1
75 TABLET ORAL DAILY
Status: DISCONTINUED | OUTPATIENT
Start: 2019-06-22 | End: 2019-07-01 | Stop reason: HOSPADM

## 2019-06-21 RX ORDER — NITROGLYCERIN 0.4 MG/1
0.4 TABLET SUBLINGUAL
Status: DISCONTINUED | OUTPATIENT
Start: 2019-06-21 | End: 2019-07-01 | Stop reason: HOSPADM

## 2019-06-21 RX ORDER — ROSUVASTATIN CALCIUM 20 MG/1
20 TABLET, COATED ORAL DAILY
Status: DISCONTINUED | OUTPATIENT
Start: 2019-06-22 | End: 2019-07-01 | Stop reason: HOSPADM

## 2019-06-21 RX ORDER — LANOLIN ALCOHOL/MO/W.PET/CERES
1000 CREAM (GRAM) TOPICAL DAILY
Status: DISCONTINUED | OUTPATIENT
Start: 2019-06-22 | End: 2019-07-01 | Stop reason: HOSPADM

## 2019-06-21 RX ORDER — SODIUM CHLORIDE 0.9 % (FLUSH) 0.9 %
10 SYRINGE (ML) INJECTION AS NEEDED
Status: DISCONTINUED | OUTPATIENT
Start: 2019-06-21 | End: 2019-07-01 | Stop reason: HOSPADM

## 2019-06-21 RX ORDER — PANTOPRAZOLE SODIUM 40 MG/1
40 TABLET, DELAYED RELEASE ORAL 2 TIMES DAILY
Status: DISCONTINUED | OUTPATIENT
Start: 2019-06-21 | End: 2019-07-01 | Stop reason: HOSPADM

## 2019-06-21 RX ORDER — IPRATROPIUM BROMIDE AND ALBUTEROL SULFATE 2.5; .5 MG/3ML; MG/3ML
3 SOLUTION RESPIRATORY (INHALATION) ONCE
Status: COMPLETED | OUTPATIENT
Start: 2019-06-21 | End: 2019-06-21

## 2019-06-21 RX ORDER — ONDANSETRON 4 MG/1
8 TABLET, ORALLY DISINTEGRATING ORAL EVERY 8 HOURS PRN
Status: DISCONTINUED | OUTPATIENT
Start: 2019-06-21 | End: 2019-07-01 | Stop reason: HOSPADM

## 2019-06-21 RX ORDER — AMLODIPINE BESYLATE 5 MG/1
5 TABLET ORAL DAILY
COMMUNITY
End: 2019-11-22 | Stop reason: SDUPTHER

## 2019-06-21 RX ORDER — MELATONIN
1000 DAILY
Status: DISCONTINUED | OUTPATIENT
Start: 2019-06-22 | End: 2019-07-01 | Stop reason: HOSPADM

## 2019-06-21 RX ORDER — GABAPENTIN 300 MG/1
600 CAPSULE ORAL EVERY 12 HOURS SCHEDULED
Status: DISCONTINUED | OUTPATIENT
Start: 2019-06-21 | End: 2019-07-01 | Stop reason: HOSPADM

## 2019-06-21 RX ORDER — ASPIRIN 81 MG/1
81 TABLET, CHEWABLE ORAL DAILY
Status: DISCONTINUED | OUTPATIENT
Start: 2019-06-22 | End: 2019-06-24

## 2019-06-21 RX ADMIN — PANTOPRAZOLE SODIUM 40 MG: 40 TABLET, DELAYED RELEASE ORAL at 21:21

## 2019-06-21 RX ADMIN — GABAPENTIN 600 MG: 300 CAPSULE ORAL at 21:21

## 2019-06-21 RX ADMIN — Medication 1 DOSE: at 16:14

## 2019-06-21 RX ADMIN — CEFTRIAXONE SODIUM 1 G: 1 INJECTION, POWDER, FOR SOLUTION INTRAMUSCULAR; INTRAVENOUS at 17:50

## 2019-06-21 RX ADMIN — XENON XE-133 14.84 MILLICURIE: 10 GAS RESPIRATORY (INHALATION) at 16:02

## 2019-06-21 RX ADMIN — IPRATROPIUM BROMIDE AND ALBUTEROL SULFATE 3 ML: 2.5; .5 SOLUTION RESPIRATORY (INHALATION) at 17:29

## 2019-06-21 RX ADMIN — METOPROLOL TARTRATE 25 MG: 25 TABLET ORAL at 21:22

## 2019-06-22 ENCOUNTER — APPOINTMENT (OUTPATIENT)
Dept: CARDIOLOGY | Facility: HOSPITAL | Age: 79
End: 2019-06-22

## 2019-06-22 PROBLEM — N18.30 CKD (CHRONIC KIDNEY DISEASE) STAGE 3, GFR 30-59 ML/MIN (HCC): Status: ACTIVE | Noted: 2019-06-22

## 2019-06-22 PROBLEM — R06.02 SHORTNESS OF BREATH: Status: ACTIVE | Noted: 2019-06-22

## 2019-06-22 LAB
BH CV ECHO MEAS - AO MAX PG (FULL): 5.4 MMHG
BH CV ECHO MEAS - AO MAX PG: 9.6 MMHG
BH CV ECHO MEAS - AO MEAN PG (FULL): 3.2 MMHG
BH CV ECHO MEAS - AO MEAN PG: 5.3 MMHG
BH CV ECHO MEAS - AO ROOT AREA (BSA CORRECTED): 1.3
BH CV ECHO MEAS - AO ROOT AREA: 4.4 CM^2
BH CV ECHO MEAS - AO ROOT DIAM: 2.4 CM
BH CV ECHO MEAS - AO V2 MAX: 154.7 CM/SEC
BH CV ECHO MEAS - AO V2 MEAN: 104.2 CM/SEC
BH CV ECHO MEAS - AO V2 VTI: 27.1 CM
BH CV ECHO MEAS - AVA(I,A): 2.3 CM^2
BH CV ECHO MEAS - AVA(I,D): 2.3 CM^2
BH CV ECHO MEAS - AVA(V,A): 2.1 CM^2
BH CV ECHO MEAS - AVA(V,D): 2.1 CM^2
BH CV ECHO MEAS - BSA(HAYCOCK): 1.9 M^2
BH CV ECHO MEAS - BSA(HAYCOCK): 1.9 M^2
BH CV ECHO MEAS - BSA: 1.8 M^2
BH CV ECHO MEAS - BSA: 1.8 M^2
BH CV ECHO MEAS - BZI_BMI: 25.8 KILOGRAMS/M^2
BH CV ECHO MEAS - BZI_BMI: 25.8 KILOGRAMS/M^2
BH CV ECHO MEAS - BZI_METRIC_HEIGHT: 167.6 CM
BH CV ECHO MEAS - BZI_METRIC_HEIGHT: 167.6 CM
BH CV ECHO MEAS - BZI_METRIC_WEIGHT: 72.6 KG
BH CV ECHO MEAS - BZI_METRIC_WEIGHT: 72.6 KG
BH CV ECHO MEAS - EDV(CUBED): 55.5 ML
BH CV ECHO MEAS - EDV(TEICH): 62.5 ML
BH CV ECHO MEAS - EF(CUBED): 68.3 %
BH CV ECHO MEAS - EF(TEICH): 60.6 %
BH CV ECHO MEAS - ESV(CUBED): 17.6 ML
BH CV ECHO MEAS - ESV(TEICH): 24.6 ML
BH CV ECHO MEAS - FS: 31.8 %
BH CV ECHO MEAS - IVS/LVPW: 1
BH CV ECHO MEAS - IVSD: 0.96 CM
BH CV ECHO MEAS - LA DIMENSION: 3.2 CM
BH CV ECHO MEAS - LA/AO: 1.4
BH CV ECHO MEAS - LV MASS(C)D: 111 GRAMS
BH CV ECHO MEAS - LV MASS(C)DI: 61 GRAMS/M^2
BH CV ECHO MEAS - LV MAX PG: 4.1 MMHG
BH CV ECHO MEAS - LV MEAN PG: 2.1 MMHG
BH CV ECHO MEAS - LV V1 MAX: 101.7 CM/SEC
BH CV ECHO MEAS - LV V1 MEAN: 66.9 CM/SEC
BH CV ECHO MEAS - LV V1 VTI: 19.8 CM
BH CV ECHO MEAS - LVIDD: 3.8 CM
BH CV ECHO MEAS - LVIDS: 2.6 CM
BH CV ECHO MEAS - LVOT AREA (M): 3.1 CM^2
BH CV ECHO MEAS - LVOT AREA: 3.1 CM^2
BH CV ECHO MEAS - LVOT DIAM: 2 CM
BH CV ECHO MEAS - LVPWD: 0.96 CM
BH CV ECHO MEAS - MV P1/2T MAX VEL: 150.5 CM/SEC
BH CV ECHO MEAS - MV V2 MAX: 219.9 CM/SEC
BH CV ECHO MEAS - MV V2 MEAN: 128.6 CM/SEC
BH CV ECHO MEAS - MVA P1/2T LCG: 1.5 CM^2
BH CV ECHO MEAS - PULM DIAS VEL: 61.1 CM/SEC
BH CV ECHO MEAS - PULM S/D: 0.87
BH CV ECHO MEAS - PULM SYS VEL: 52.8 CM/SEC
BH CV ECHO MEAS - SI(AO): 65.2 ML/M^2
BH CV ECHO MEAS - SI(CUBED): 20.8 ML/M^2
BH CV ECHO MEAS - SI(LVOT): 34.1 ML/M^2
BH CV ECHO MEAS - SI(TEICH): 20.8 ML/M^2
BH CV ECHO MEAS - SV(AO): 118.5 ML
BH CV ECHO MEAS - SV(CUBED): 37.9 ML
BH CV ECHO MEAS - SV(LVOT): 62.1 ML
BH CV ECHO MEAS - SV(TEICH): 37.9 ML
BH CV LOW VAS RIGHT PERONEAL VESSEL: 1
BH CV LOWER VASCULAR RIGHT COMMON FEMORAL AUGMENT: NORMAL
BH CV LOWER VASCULAR RIGHT COMMON FEMORAL COMPRESS: NORMAL
BH CV LOWER VASCULAR RIGHT COMMON FEMORAL PHASIC: NORMAL
BH CV LOWER VASCULAR RIGHT COMMON FEMORAL SPONT: NORMAL
BH CV LOWER VASCULAR RIGHT DISTAL FEMORAL AUGMENT: NORMAL
BH CV LOWER VASCULAR RIGHT DISTAL FEMORAL COMPRESS: NORMAL
BH CV LOWER VASCULAR RIGHT DISTAL FEMORAL PHASIC: NORMAL
BH CV LOWER VASCULAR RIGHT DISTAL FEMORAL SPONT: NORMAL
BH CV LOWER VASCULAR RIGHT GASTRONEMIUS COMPRESS: NORMAL
BH CV LOWER VASCULAR RIGHT GREATER SAPH AK COMPRESS: NORMAL
BH CV LOWER VASCULAR RIGHT GREATER SAPH BK COMPRESS: NORMAL
BH CV LOWER VASCULAR RIGHT LESSER SAPH COMPRESS: NORMAL
BH CV LOWER VASCULAR RIGHT MID FEMORAL AUGMENT: NORMAL
BH CV LOWER VASCULAR RIGHT MID FEMORAL COMPRESS: NORMAL
BH CV LOWER VASCULAR RIGHT MID FEMORAL PHASIC: NORMAL
BH CV LOWER VASCULAR RIGHT MID FEMORAL SPONT: NORMAL
BH CV LOWER VASCULAR RIGHT PERONEAL COMPRESS: NORMAL
BH CV LOWER VASCULAR RIGHT POPLITEAL AUGMENT: NORMAL
BH CV LOWER VASCULAR RIGHT POPLITEAL COMPRESS: NORMAL
BH CV LOWER VASCULAR RIGHT POPLITEAL PHASIC: NORMAL
BH CV LOWER VASCULAR RIGHT POPLITEAL SPONT: NORMAL
BH CV LOWER VASCULAR RIGHT POSTERIOR TIBIAL COMPRESS: NORMAL
BH CV LOWER VASCULAR RIGHT PROFUNDA FEMORAL COMPRESS: NORMAL
BH CV LOWER VASCULAR RIGHT PROXIMAL FEMORAL AUGMENT: NORMAL
BH CV LOWER VASCULAR RIGHT PROXIMAL FEMORAL COMPRESS: NORMAL
BH CV LOWER VASCULAR RIGHT PROXIMAL FEMORAL PHASIC: NORMAL
BH CV LOWER VASCULAR RIGHT PROXIMAL FEMORAL SPONT: NORMAL
BH CV LOWER VASCULAR RIGHT SAPHENOFEMORAL JUNCTION COMPETENT: NORMAL
BH CV LOWER VASCULAR RIGHT SAPHENOFEMORAL JUNCTION COMPRESS: NORMAL
BH CV LOWER VASCULAR RIGHT SAPHENOFEMORAL JUNCTION SPONT: NORMAL
BH CV XLRA - RV BASE: 3.8 CM
BH CV XLRA - RV LENGTH: 6.6 CM
BH CV XLRA - RV MID: 3.3 CM
GLUCOSE BLDC GLUCOMTR-MCNC: 300 MG/DL (ref 70–130)
GLUCOSE BLDC GLUCOMTR-MCNC: 301 MG/DL (ref 70–130)
GLUCOSE BLDC GLUCOMTR-MCNC: 325 MG/DL (ref 70–130)

## 2019-06-22 PROCEDURE — 93971 EXTREMITY STUDY: CPT

## 2019-06-22 PROCEDURE — 93005 ELECTROCARDIOGRAM TRACING: CPT | Performed by: PHYSICIAN ASSISTANT

## 2019-06-22 PROCEDURE — 25010000002 CEFTRIAXONE PER 250 MG: Performed by: FAMILY MEDICINE

## 2019-06-22 PROCEDURE — 82962 GLUCOSE BLOOD TEST: CPT

## 2019-06-22 PROCEDURE — 25010000002 ENOXAPARIN PER 10 MG: Performed by: INTERNAL MEDICINE

## 2019-06-22 PROCEDURE — 93325 DOPPLER ECHO COLOR FLOW MAPG: CPT

## 2019-06-22 PROCEDURE — 99233 SBSQ HOSP IP/OBS HIGH 50: CPT | Performed by: INTERNAL MEDICINE

## 2019-06-22 PROCEDURE — 93971 EXTREMITY STUDY: CPT | Performed by: INTERNAL MEDICINE

## 2019-06-22 PROCEDURE — G0378 HOSPITAL OBSERVATION PER HR: HCPCS

## 2019-06-22 PROCEDURE — 63710000001 INSULIN LISPRO (HUMAN) PER 5 UNITS: Performed by: INTERNAL MEDICINE

## 2019-06-22 PROCEDURE — 25010000002 FUROSEMIDE PER 20 MG: Performed by: PHYSICIAN ASSISTANT

## 2019-06-22 PROCEDURE — 93321 DOPPLER ECHO F-UP/LMTD STD: CPT

## 2019-06-22 PROCEDURE — 93308 TTE F-UP OR LMTD: CPT

## 2019-06-22 RX ORDER — IPRATROPIUM BROMIDE AND ALBUTEROL SULFATE 2.5; .5 MG/3ML; MG/3ML
3 SOLUTION RESPIRATORY (INHALATION) EVERY 4 HOURS PRN
Status: DISCONTINUED | OUTPATIENT
Start: 2019-06-22 | End: 2019-07-01 | Stop reason: HOSPADM

## 2019-06-22 RX ORDER — DEXTROSE MONOHYDRATE 25 G/50ML
25 INJECTION, SOLUTION INTRAVENOUS
Status: DISCONTINUED | OUTPATIENT
Start: 2019-06-22 | End: 2019-07-01 | Stop reason: HOSPADM

## 2019-06-22 RX ORDER — METOPROLOL TARTRATE 50 MG/1
50 TABLET, FILM COATED ORAL EVERY 12 HOURS SCHEDULED
Status: DISCONTINUED | OUTPATIENT
Start: 2019-06-22 | End: 2019-07-01 | Stop reason: HOSPADM

## 2019-06-22 RX ORDER — NICOTINE POLACRILEX 4 MG
15 LOZENGE BUCCAL
Status: DISCONTINUED | OUTPATIENT
Start: 2019-06-22 | End: 2019-07-01 | Stop reason: HOSPADM

## 2019-06-22 RX ORDER — FUROSEMIDE 10 MG/ML
40 INJECTION INTRAMUSCULAR; INTRAVENOUS ONCE
Status: COMPLETED | OUTPATIENT
Start: 2019-06-22 | End: 2019-06-22

## 2019-06-22 RX ADMIN — CEFTRIAXONE SODIUM 1 G: 1 INJECTION, POWDER, FOR SOLUTION INTRAMUSCULAR; INTRAVENOUS at 18:17

## 2019-06-22 RX ADMIN — FUROSEMIDE 40 MG: 10 INJECTION, SOLUTION INTRAMUSCULAR; INTRAVENOUS at 12:31

## 2019-06-22 RX ADMIN — PANTOPRAZOLE SODIUM 40 MG: 40 TABLET, DELAYED RELEASE ORAL at 10:03

## 2019-06-22 RX ADMIN — GABAPENTIN 600 MG: 300 CAPSULE ORAL at 20:36

## 2019-06-22 RX ADMIN — ROSUVASTATIN CALCIUM 20 MG: 20 TABLET, FILM COATED ORAL at 10:03

## 2019-06-22 RX ADMIN — AMLODIPINE BESYLATE 5 MG: 5 TABLET ORAL at 10:04

## 2019-06-22 RX ADMIN — METOPROLOL TARTRATE 50 MG: 50 TABLET ORAL at 20:36

## 2019-06-22 RX ADMIN — INSULIN LISPRO 5 UNITS: 100 INJECTION, SOLUTION INTRAVENOUS; SUBCUTANEOUS at 18:17

## 2019-06-22 RX ADMIN — CLOPIDOGREL BISULFATE 75 MG: 75 TABLET ORAL at 10:04

## 2019-06-22 RX ADMIN — METOPROLOL TARTRATE 25 MG: 25 TABLET ORAL at 10:04

## 2019-06-22 RX ADMIN — PANTOPRAZOLE SODIUM 40 MG: 40 TABLET, DELAYED RELEASE ORAL at 20:36

## 2019-06-22 RX ADMIN — METOPROLOL TARTRATE 25 MG: 25 TABLET ORAL at 12:31

## 2019-06-22 RX ADMIN — GABAPENTIN 600 MG: 300 CAPSULE ORAL at 10:03

## 2019-06-22 RX ADMIN — ASPIRIN 81 MG 81 MG: 81 TABLET ORAL at 10:03

## 2019-06-22 RX ADMIN — CYANOCOBALAMIN TAB 1000 MCG 1000 MCG: 1000 TAB at 10:03

## 2019-06-22 RX ADMIN — VITAMIN D, TAB 1000IU (100/BT) 1000 UNITS: 25 TAB at 10:04

## 2019-06-22 RX ADMIN — INSULIN LISPRO 5 UNITS: 100 INJECTION, SOLUTION INTRAVENOUS; SUBCUTANEOUS at 22:56

## 2019-06-22 RX ADMIN — INSULIN LISPRO 5 UNITS: 100 INJECTION, SOLUTION INTRAVENOUS; SUBCUTANEOUS at 12:31

## 2019-06-22 RX ADMIN — ENOXAPARIN SODIUM 70 MG: 80 INJECTION SUBCUTANEOUS at 18:17

## 2019-06-23 LAB
ALBUMIN SERPL-MCNC: 3.5 G/DL (ref 3.5–5.2)
ALBUMIN/GLOB SERPL: 1.6 G/DL
ALP SERPL-CCNC: 83 U/L (ref 39–117)
ALT SERPL W P-5'-P-CCNC: 40 U/L (ref 1–33)
ANION GAP SERPL CALCULATED.3IONS-SCNC: 13 MMOL/L
AST SERPL-CCNC: 25 U/L (ref 1–32)
BACTERIA SPEC AEROBE CULT: ABNORMAL
BILIRUB SERPL-MCNC: 0.5 MG/DL (ref 0.2–1.2)
BUN BLD-MCNC: 21 MG/DL (ref 8–23)
BUN/CREAT SERPL: 14.7 (ref 7–25)
CALCIUM SPEC-SCNC: 9.4 MG/DL (ref 8.6–10.5)
CHLORIDE SERPL-SCNC: 98 MMOL/L (ref 98–107)
CO2 SERPL-SCNC: 25 MMOL/L (ref 22–29)
CREAT BLD-MCNC: 1.43 MG/DL (ref 0.57–1)
DEPRECATED RDW RBC AUTO: 46.7 FL (ref 37–54)
ERYTHROCYTE [DISTWIDTH] IN BLOOD BY AUTOMATED COUNT: 14.6 % (ref 12.3–15.4)
GFR SERPL CREATININE-BSD FRML MDRD: 35 ML/MIN/1.73
GLOBULIN UR ELPH-MCNC: 2.2 GM/DL
GLUCOSE BLD-MCNC: 226 MG/DL (ref 65–99)
GLUCOSE BLDC GLUCOMTR-MCNC: 228 MG/DL (ref 70–130)
GLUCOSE BLDC GLUCOMTR-MCNC: 243 MG/DL (ref 70–130)
GLUCOSE BLDC GLUCOMTR-MCNC: 320 MG/DL (ref 70–130)
GLUCOSE BLDC GLUCOMTR-MCNC: 389 MG/DL (ref 70–130)
HCT VFR BLD AUTO: 32.2 % (ref 34–46.6)
HGB BLD-MCNC: 10.2 G/DL (ref 12–15.9)
MAGNESIUM SERPL-MCNC: 1.7 MG/DL (ref 1.6–2.4)
MCH RBC QN AUTO: 27.5 PG (ref 26.6–33)
MCHC RBC AUTO-ENTMCNC: 31.7 G/DL (ref 31.5–35.7)
MCV RBC AUTO: 86.8 FL (ref 79–97)
PLATELET # BLD AUTO: 147 10*3/MM3 (ref 140–450)
PMV BLD AUTO: 11.2 FL (ref 6–12)
POTASSIUM BLD-SCNC: 4.2 MMOL/L (ref 3.5–5.2)
PROT SERPL-MCNC: 5.7 G/DL (ref 6–8.5)
RBC # BLD AUTO: 3.71 10*6/MM3 (ref 3.77–5.28)
SODIUM BLD-SCNC: 136 MMOL/L (ref 136–145)
WBC NRBC COR # BLD: 3.87 10*3/MM3 (ref 3.4–10.8)

## 2019-06-23 PROCEDURE — 93005 ELECTROCARDIOGRAM TRACING: CPT | Performed by: PHYSICIAN ASSISTANT

## 2019-06-23 PROCEDURE — 99233 SBSQ HOSP IP/OBS HIGH 50: CPT | Performed by: INTERNAL MEDICINE

## 2019-06-23 PROCEDURE — 25010000002 ENOXAPARIN PER 10 MG: Performed by: INTERNAL MEDICINE

## 2019-06-23 PROCEDURE — 97162 PT EVAL MOD COMPLEX 30 MIN: CPT

## 2019-06-23 PROCEDURE — 85027 COMPLETE CBC AUTOMATED: CPT | Performed by: INTERNAL MEDICINE

## 2019-06-23 PROCEDURE — 80053 COMPREHEN METABOLIC PANEL: CPT | Performed by: INTERNAL MEDICINE

## 2019-06-23 PROCEDURE — 63710000001 INSULIN DETEMIR PER 5 UNITS: Performed by: INTERNAL MEDICINE

## 2019-06-23 PROCEDURE — 25010000002 CEFTRIAXONE PER 250 MG: Performed by: FAMILY MEDICINE

## 2019-06-23 PROCEDURE — 83735 ASSAY OF MAGNESIUM: CPT | Performed by: INTERNAL MEDICINE

## 2019-06-23 PROCEDURE — G0378 HOSPITAL OBSERVATION PER HR: HCPCS

## 2019-06-23 PROCEDURE — 82962 GLUCOSE BLOOD TEST: CPT

## 2019-06-23 RX ORDER — SODIUM CHLORIDE 9 MG/ML
75 INJECTION, SOLUTION INTRAVENOUS CONTINUOUS
Status: ACTIVE | OUTPATIENT
Start: 2019-06-23 | End: 2019-06-24

## 2019-06-23 RX ORDER — TRAMADOL HYDROCHLORIDE 50 MG/1
25 TABLET ORAL ONCE AS NEEDED
Status: COMPLETED | OUTPATIENT
Start: 2019-06-23 | End: 2019-06-23

## 2019-06-23 RX ORDER — MECLIZINE HYDROCHLORIDE 25 MG/1
25 TABLET ORAL 3 TIMES DAILY PRN
COMMUNITY
End: 2019-11-22 | Stop reason: SDUPTHER

## 2019-06-23 RX ADMIN — TRAMADOL HYDROCHLORIDE 25 MG: 50 TABLET, FILM COATED ORAL at 20:12

## 2019-06-23 RX ADMIN — INSULIN LISPRO 6 UNITS: 100 INJECTION, SOLUTION INTRAVENOUS; SUBCUTANEOUS at 21:32

## 2019-06-23 RX ADMIN — CEFTRIAXONE SODIUM 1 G: 1 INJECTION, POWDER, FOR SOLUTION INTRAMUSCULAR; INTRAVENOUS at 18:08

## 2019-06-23 RX ADMIN — INSULIN LISPRO 5 UNITS: 100 INJECTION, SOLUTION INTRAVENOUS; SUBCUTANEOUS at 18:08

## 2019-06-23 RX ADMIN — INSULIN LISPRO 3 UNITS: 100 INJECTION, SOLUTION INTRAVENOUS; SUBCUTANEOUS at 12:29

## 2019-06-23 RX ADMIN — GABAPENTIN 600 MG: 300 CAPSULE ORAL at 21:32

## 2019-06-23 RX ADMIN — PANTOPRAZOLE SODIUM 40 MG: 40 TABLET, DELAYED RELEASE ORAL at 21:32

## 2019-06-23 RX ADMIN — APIXABAN 10 MG: 5 TABLET, FILM COATED ORAL at 18:08

## 2019-06-23 RX ADMIN — GABAPENTIN 600 MG: 300 CAPSULE ORAL at 08:08

## 2019-06-23 RX ADMIN — CYANOCOBALAMIN TAB 1000 MCG 1000 MCG: 1000 TAB at 08:08

## 2019-06-23 RX ADMIN — METOPROLOL TARTRATE 50 MG: 50 TABLET ORAL at 21:32

## 2019-06-23 RX ADMIN — INSULIN LISPRO 3 UNITS: 100 INJECTION, SOLUTION INTRAVENOUS; SUBCUTANEOUS at 08:08

## 2019-06-23 RX ADMIN — VITAMIN D, TAB 1000IU (100/BT) 1000 UNITS: 25 TAB at 08:08

## 2019-06-23 RX ADMIN — SODIUM CHLORIDE 75 ML/HR: 9 INJECTION, SOLUTION INTRAVENOUS at 18:09

## 2019-06-23 RX ADMIN — PANTOPRAZOLE SODIUM 40 MG: 40 TABLET, DELAYED RELEASE ORAL at 08:08

## 2019-06-23 RX ADMIN — AMLODIPINE BESYLATE 5 MG: 5 TABLET ORAL at 08:08

## 2019-06-23 RX ADMIN — ROSUVASTATIN CALCIUM 20 MG: 20 TABLET, FILM COATED ORAL at 08:08

## 2019-06-23 RX ADMIN — CLOPIDOGREL BISULFATE 75 MG: 75 TABLET ORAL at 08:08

## 2019-06-23 RX ADMIN — METOPROLOL TARTRATE 50 MG: 50 TABLET ORAL at 08:08

## 2019-06-23 RX ADMIN — ASPIRIN 81 MG 81 MG: 81 TABLET ORAL at 08:08

## 2019-06-23 RX ADMIN — INSULIN DETEMIR 10 UNITS: 100 INJECTION, SOLUTION SUBCUTANEOUS at 21:33

## 2019-06-23 RX ADMIN — ENOXAPARIN SODIUM 70 MG: 80 INJECTION SUBCUTANEOUS at 05:22

## 2019-06-24 LAB
ANION GAP SERPL CALCULATED.3IONS-SCNC: 11 MMOL/L
BUN BLD-MCNC: 21 MG/DL (ref 8–23)
BUN/CREAT SERPL: 15.2 (ref 7–25)
CALCIUM SPEC-SCNC: 9.2 MG/DL (ref 8.6–10.5)
CHLORIDE SERPL-SCNC: 100 MMOL/L (ref 98–107)
CO2 SERPL-SCNC: 26 MMOL/L (ref 22–29)
CREAT BLD-MCNC: 1.38 MG/DL (ref 0.57–1)
DEPRECATED RDW RBC AUTO: 45.3 FL (ref 37–54)
ERYTHROCYTE [DISTWIDTH] IN BLOOD BY AUTOMATED COUNT: 14.2 % (ref 12.3–15.4)
GFR SERPL CREATININE-BSD FRML MDRD: 37 ML/MIN/1.73
GLUCOSE BLD-MCNC: 249 MG/DL (ref 65–99)
GLUCOSE BLDC GLUCOMTR-MCNC: 227 MG/DL (ref 70–130)
GLUCOSE BLDC GLUCOMTR-MCNC: 297 MG/DL (ref 70–130)
GLUCOSE BLDC GLUCOMTR-MCNC: 308 MG/DL (ref 70–130)
GLUCOSE BLDC GLUCOMTR-MCNC: 342 MG/DL (ref 70–130)
HCT VFR BLD AUTO: 31.4 % (ref 34–46.6)
HGB BLD-MCNC: 9.9 G/DL (ref 12–15.9)
MCH RBC QN AUTO: 27.4 PG (ref 26.6–33)
MCHC RBC AUTO-ENTMCNC: 31.5 G/DL (ref 31.5–35.7)
MCV RBC AUTO: 87 FL (ref 79–97)
PLATELET # BLD AUTO: 147 10*3/MM3 (ref 140–450)
PMV BLD AUTO: 11 FL (ref 6–12)
POTASSIUM BLD-SCNC: 4.6 MMOL/L (ref 3.5–5.2)
RBC # BLD AUTO: 3.61 10*6/MM3 (ref 3.77–5.28)
SODIUM BLD-SCNC: 137 MMOL/L (ref 136–145)
WBC NRBC COR # BLD: 2.73 10*3/MM3 (ref 3.4–10.8)

## 2019-06-24 PROCEDURE — 85027 COMPLETE CBC AUTOMATED: CPT | Performed by: INTERNAL MEDICINE

## 2019-06-24 PROCEDURE — 97166 OT EVAL MOD COMPLEX 45 MIN: CPT

## 2019-06-24 PROCEDURE — 80048 BASIC METABOLIC PNL TOTAL CA: CPT | Performed by: INTERNAL MEDICINE

## 2019-06-24 PROCEDURE — 99225 PR SBSQ OBSERVATION CARE/DAY 25 MINUTES: CPT | Performed by: HOSPITALIST

## 2019-06-24 PROCEDURE — G0378 HOSPITAL OBSERVATION PER HR: HCPCS

## 2019-06-24 PROCEDURE — 63710000001 INSULIN DETEMIR PER 5 UNITS: Performed by: INTERNAL MEDICINE

## 2019-06-24 PROCEDURE — 82962 GLUCOSE BLOOD TEST: CPT

## 2019-06-24 PROCEDURE — 25010000002 CEFTRIAXONE PER 250 MG: Performed by: FAMILY MEDICINE

## 2019-06-24 RX ADMIN — INSULIN LISPRO 5 UNITS: 100 INJECTION, SOLUTION INTRAVENOUS; SUBCUTANEOUS at 21:29

## 2019-06-24 RX ADMIN — CEFTRIAXONE SODIUM 1 G: 1 INJECTION, POWDER, FOR SOLUTION INTRAMUSCULAR; INTRAVENOUS at 18:10

## 2019-06-24 RX ADMIN — GABAPENTIN 600 MG: 300 CAPSULE ORAL at 08:31

## 2019-06-24 RX ADMIN — METOPROLOL TARTRATE 50 MG: 50 TABLET ORAL at 08:31

## 2019-06-24 RX ADMIN — SODIUM CHLORIDE, PRESERVATIVE FREE 10 ML: 5 INJECTION INTRAVENOUS at 08:31

## 2019-06-24 RX ADMIN — GABAPENTIN 600 MG: 300 CAPSULE ORAL at 21:28

## 2019-06-24 RX ADMIN — METOPROLOL TARTRATE 50 MG: 50 TABLET ORAL at 21:29

## 2019-06-24 RX ADMIN — VITAMIN D, TAB 1000IU (100/BT) 1000 UNITS: 25 TAB at 08:30

## 2019-06-24 RX ADMIN — APIXABAN 10 MG: 5 TABLET, FILM COATED ORAL at 08:31

## 2019-06-24 RX ADMIN — INSULIN LISPRO 5 UNITS: 100 INJECTION, SOLUTION INTRAVENOUS; SUBCUTANEOUS at 18:09

## 2019-06-24 RX ADMIN — PANTOPRAZOLE SODIUM 40 MG: 40 TABLET, DELAYED RELEASE ORAL at 08:30

## 2019-06-24 RX ADMIN — PANTOPRAZOLE SODIUM 40 MG: 40 TABLET, DELAYED RELEASE ORAL at 21:28

## 2019-06-24 RX ADMIN — AMLODIPINE BESYLATE 5 MG: 5 TABLET ORAL at 08:30

## 2019-06-24 RX ADMIN — CLOPIDOGREL BISULFATE 75 MG: 75 TABLET ORAL at 08:31

## 2019-06-24 RX ADMIN — INSULIN DETEMIR 10 UNITS: 100 INJECTION, SOLUTION SUBCUTANEOUS at 21:29

## 2019-06-24 RX ADMIN — ROSUVASTATIN CALCIUM 20 MG: 20 TABLET, FILM COATED ORAL at 08:30

## 2019-06-24 RX ADMIN — INSULIN LISPRO 3 UNITS: 100 INJECTION, SOLUTION INTRAVENOUS; SUBCUTANEOUS at 08:31

## 2019-06-24 RX ADMIN — APIXABAN 10 MG: 5 TABLET, FILM COATED ORAL at 21:28

## 2019-06-24 RX ADMIN — CYANOCOBALAMIN TAB 1000 MCG 1000 MCG: 1000 TAB at 08:31

## 2019-06-24 RX ADMIN — INSULIN LISPRO 4 UNITS: 100 INJECTION, SOLUTION INTRAVENOUS; SUBCUTANEOUS at 12:13

## 2019-06-25 PROBLEM — R06.00 DYSPNEA: Status: RESOLVED | Noted: 2019-06-21 | Resolved: 2019-06-25

## 2019-06-25 PROBLEM — N17.0 ACUTE RENAL FAILURE WITH ACUTE TUBULAR NECROSIS SUPERIMPOSED ON STAGE 3 CHRONIC KIDNEY DISEASE (HCC): Status: RESOLVED | Noted: 2019-05-20 | Resolved: 2019-06-25

## 2019-06-25 PROBLEM — N18.30 ACUTE RENAL FAILURE WITH ACUTE TUBULAR NECROSIS SUPERIMPOSED ON STAGE 3 CHRONIC KIDNEY DISEASE (HCC): Status: RESOLVED | Noted: 2019-05-20 | Resolved: 2019-06-25

## 2019-06-25 PROBLEM — I21.4 NSTEMI (NON-ST ELEVATED MYOCARDIAL INFARCTION) (HCC): Status: RESOLVED | Noted: 2019-05-18 | Resolved: 2019-06-25

## 2019-06-25 PROBLEM — I82.4Z1 ACUTE DEEP VEIN THROMBOSIS (DVT) OF DISTAL VEIN OF RIGHT LOWER EXTREMITY: Status: ACTIVE | Noted: 2019-06-25

## 2019-06-25 LAB
GLUCOSE BLDC GLUCOMTR-MCNC: 205 MG/DL (ref 70–130)
GLUCOSE BLDC GLUCOMTR-MCNC: 251 MG/DL (ref 70–130)
GLUCOSE BLDC GLUCOMTR-MCNC: 255 MG/DL (ref 70–130)
GLUCOSE BLDC GLUCOMTR-MCNC: 273 MG/DL (ref 70–130)

## 2019-06-25 PROCEDURE — G0378 HOSPITAL OBSERVATION PER HR: HCPCS

## 2019-06-25 PROCEDURE — 25010000002 CEFTRIAXONE PER 250 MG: Performed by: FAMILY MEDICINE

## 2019-06-25 PROCEDURE — 99232 SBSQ HOSP IP/OBS MODERATE 35: CPT | Performed by: INTERNAL MEDICINE

## 2019-06-25 PROCEDURE — 97110 THERAPEUTIC EXERCISES: CPT | Performed by: PHYSICAL THERAPIST

## 2019-06-25 PROCEDURE — 97116 GAIT TRAINING THERAPY: CPT | Performed by: PHYSICAL THERAPIST

## 2019-06-25 PROCEDURE — 63710000001 INSULIN DETEMIR PER 5 UNITS: Performed by: INTERNAL MEDICINE

## 2019-06-25 PROCEDURE — 99225 PR SBSQ OBSERVATION CARE/DAY 25 MINUTES: CPT | Performed by: HOSPITALIST

## 2019-06-25 PROCEDURE — 82962 GLUCOSE BLOOD TEST: CPT

## 2019-06-25 RX ORDER — TRAMADOL HYDROCHLORIDE 50 MG/1
25 TABLET ORAL EVERY 8 HOURS PRN
Status: DISCONTINUED | OUTPATIENT
Start: 2019-06-25 | End: 2019-06-27

## 2019-06-25 RX ADMIN — VITAMIN D, TAB 1000IU (100/BT) 1000 UNITS: 25 TAB at 08:52

## 2019-06-25 RX ADMIN — CEFTRIAXONE SODIUM 1 G: 1 INJECTION, POWDER, FOR SOLUTION INTRAMUSCULAR; INTRAVENOUS at 17:35

## 2019-06-25 RX ADMIN — APIXABAN 10 MG: 5 TABLET, FILM COATED ORAL at 08:52

## 2019-06-25 RX ADMIN — TRAMADOL HYDROCHLORIDE 25 MG: 50 TABLET, FILM COATED ORAL at 20:25

## 2019-06-25 RX ADMIN — INSULIN LISPRO 4 UNITS: 100 INJECTION, SOLUTION INTRAVENOUS; SUBCUTANEOUS at 13:00

## 2019-06-25 RX ADMIN — INSULIN DETEMIR 10 UNITS: 100 INJECTION, SOLUTION SUBCUTANEOUS at 20:56

## 2019-06-25 RX ADMIN — PANTOPRAZOLE SODIUM 40 MG: 40 TABLET, DELAYED RELEASE ORAL at 08:52

## 2019-06-25 RX ADMIN — AMLODIPINE BESYLATE 5 MG: 5 TABLET ORAL at 08:51

## 2019-06-25 RX ADMIN — PANTOPRAZOLE SODIUM 40 MG: 40 TABLET, DELAYED RELEASE ORAL at 20:54

## 2019-06-25 RX ADMIN — INSULIN LISPRO 4 UNITS: 100 INJECTION, SOLUTION INTRAVENOUS; SUBCUTANEOUS at 17:39

## 2019-06-25 RX ADMIN — INSULIN LISPRO 3 UNITS: 100 INJECTION, SOLUTION INTRAVENOUS; SUBCUTANEOUS at 08:56

## 2019-06-25 RX ADMIN — ROSUVASTATIN CALCIUM 20 MG: 20 TABLET, FILM COATED ORAL at 08:51

## 2019-06-25 RX ADMIN — METOPROLOL TARTRATE 50 MG: 50 TABLET ORAL at 20:55

## 2019-06-25 RX ADMIN — INSULIN LISPRO 4 UNITS: 100 INJECTION, SOLUTION INTRAVENOUS; SUBCUTANEOUS at 20:54

## 2019-06-25 RX ADMIN — GABAPENTIN 600 MG: 300 CAPSULE ORAL at 08:52

## 2019-06-25 RX ADMIN — APIXABAN 10 MG: 5 TABLET, FILM COATED ORAL at 20:54

## 2019-06-25 RX ADMIN — METOPROLOL TARTRATE 50 MG: 50 TABLET ORAL at 08:52

## 2019-06-25 RX ADMIN — CYANOCOBALAMIN TAB 1000 MCG 1000 MCG: 1000 TAB at 08:52

## 2019-06-25 RX ADMIN — GABAPENTIN 600 MG: 300 CAPSULE ORAL at 20:54

## 2019-06-25 RX ADMIN — CLOPIDOGREL BISULFATE 75 MG: 75 TABLET ORAL at 08:52

## 2019-06-26 LAB
GLUCOSE BLDC GLUCOMTR-MCNC: 221 MG/DL (ref 70–130)
GLUCOSE BLDC GLUCOMTR-MCNC: 252 MG/DL (ref 70–130)
GLUCOSE BLDC GLUCOMTR-MCNC: 260 MG/DL (ref 70–130)
GLUCOSE BLDC GLUCOMTR-MCNC: 367 MG/DL (ref 70–130)

## 2019-06-26 PROCEDURE — 99232 SBSQ HOSP IP/OBS MODERATE 35: CPT | Performed by: INTERNAL MEDICINE

## 2019-06-26 PROCEDURE — 97110 THERAPEUTIC EXERCISES: CPT | Performed by: PHYSICAL THERAPIST

## 2019-06-26 PROCEDURE — 97116 GAIT TRAINING THERAPY: CPT | Performed by: PHYSICAL THERAPIST

## 2019-06-26 PROCEDURE — 82962 GLUCOSE BLOOD TEST: CPT

## 2019-06-26 PROCEDURE — 63710000001 INSULIN DETEMIR PER 5 UNITS: Performed by: INTERNAL MEDICINE

## 2019-06-26 PROCEDURE — 99232 SBSQ HOSP IP/OBS MODERATE 35: CPT | Performed by: HOSPITALIST

## 2019-06-26 RX ORDER — METOPROLOL TARTRATE 50 MG/1
50 TABLET, FILM COATED ORAL 2 TIMES DAILY
Qty: 180 TABLET | Refills: 0 | Status: SHIPPED | OUTPATIENT
Start: 2019-06-26 | End: 2020-03-10

## 2019-06-26 RX ADMIN — GABAPENTIN 600 MG: 300 CAPSULE ORAL at 20:16

## 2019-06-26 RX ADMIN — PANTOPRAZOLE SODIUM 40 MG: 40 TABLET, DELAYED RELEASE ORAL at 08:13

## 2019-06-26 RX ADMIN — METOPROLOL TARTRATE 50 MG: 50 TABLET ORAL at 20:16

## 2019-06-26 RX ADMIN — INSULIN LISPRO 4 UNITS: 100 INJECTION, SOLUTION INTRAVENOUS; SUBCUTANEOUS at 17:57

## 2019-06-26 RX ADMIN — APIXABAN 10 MG: 5 TABLET, FILM COATED ORAL at 08:13

## 2019-06-26 RX ADMIN — INSULIN LISPRO 6 UNITS: 100 INJECTION, SOLUTION INTRAVENOUS; SUBCUTANEOUS at 20:16

## 2019-06-26 RX ADMIN — ROSUVASTATIN CALCIUM 20 MG: 20 TABLET, FILM COATED ORAL at 08:13

## 2019-06-26 RX ADMIN — GABAPENTIN 600 MG: 300 CAPSULE ORAL at 08:14

## 2019-06-26 RX ADMIN — APIXABAN 10 MG: 5 TABLET, FILM COATED ORAL at 20:17

## 2019-06-26 RX ADMIN — AMLODIPINE BESYLATE 5 MG: 5 TABLET ORAL at 08:14

## 2019-06-26 RX ADMIN — INSULIN LISPRO 4 UNITS: 100 INJECTION, SOLUTION INTRAVENOUS; SUBCUTANEOUS at 12:54

## 2019-06-26 RX ADMIN — VITAMIN D, TAB 1000IU (100/BT) 1000 UNITS: 25 TAB at 08:13

## 2019-06-26 RX ADMIN — INSULIN DETEMIR 10 UNITS: 100 INJECTION, SOLUTION SUBCUTANEOUS at 20:17

## 2019-06-26 RX ADMIN — CYANOCOBALAMIN TAB 1000 MCG 1000 MCG: 1000 TAB at 08:13

## 2019-06-26 RX ADMIN — METOPROLOL TARTRATE 50 MG: 50 TABLET ORAL at 08:13

## 2019-06-26 RX ADMIN — PANTOPRAZOLE SODIUM 40 MG: 40 TABLET, DELAYED RELEASE ORAL at 20:16

## 2019-06-26 RX ADMIN — CLOPIDOGREL BISULFATE 75 MG: 75 TABLET ORAL at 08:13

## 2019-06-26 RX ADMIN — INSULIN LISPRO 4 UNITS: 100 INJECTION, SOLUTION INTRAVENOUS; SUBCUTANEOUS at 08:14

## 2019-06-27 LAB
GLUCOSE BLDC GLUCOMTR-MCNC: 236 MG/DL (ref 70–130)
GLUCOSE BLDC GLUCOMTR-MCNC: 248 MG/DL (ref 70–130)
GLUCOSE BLDC GLUCOMTR-MCNC: 272 MG/DL (ref 70–130)
GLUCOSE BLDC GLUCOMTR-MCNC: 295 MG/DL (ref 70–130)

## 2019-06-27 PROCEDURE — 97110 THERAPEUTIC EXERCISES: CPT

## 2019-06-27 PROCEDURE — 63710000001 INSULIN LISPRO (HUMAN) PER 5 UNITS: Performed by: NURSE PRACTITIONER

## 2019-06-27 PROCEDURE — 99232 SBSQ HOSP IP/OBS MODERATE 35: CPT | Performed by: NURSE PRACTITIONER

## 2019-06-27 PROCEDURE — 97530 THERAPEUTIC ACTIVITIES: CPT

## 2019-06-27 PROCEDURE — 97116 GAIT TRAINING THERAPY: CPT | Performed by: PHYSICAL THERAPIST

## 2019-06-27 PROCEDURE — 82962 GLUCOSE BLOOD TEST: CPT

## 2019-06-27 PROCEDURE — 97110 THERAPEUTIC EXERCISES: CPT | Performed by: PHYSICAL THERAPIST

## 2019-06-27 PROCEDURE — 63710000001 INSULIN DETEMIR PER 5 UNITS: Performed by: NURSE PRACTITIONER

## 2019-06-27 RX ORDER — TRAMADOL HYDROCHLORIDE 50 MG/1
25 TABLET ORAL EVERY 8 HOURS PRN
Status: DISCONTINUED | OUTPATIENT
Start: 2019-06-27 | End: 2019-06-29

## 2019-06-27 RX ADMIN — METOPROLOL TARTRATE 50 MG: 50 TABLET ORAL at 20:29

## 2019-06-27 RX ADMIN — VITAMIN D, TAB 1000IU (100/BT) 1000 UNITS: 25 TAB at 09:57

## 2019-06-27 RX ADMIN — INSULIN LISPRO 3 UNITS: 100 INJECTION, SOLUTION INTRAVENOUS; SUBCUTANEOUS at 17:43

## 2019-06-27 RX ADMIN — METOPROLOL TARTRATE 50 MG: 50 TABLET ORAL at 09:57

## 2019-06-27 RX ADMIN — SODIUM CHLORIDE, PRESERVATIVE FREE 10 ML: 5 INJECTION INTRAVENOUS at 20:30

## 2019-06-27 RX ADMIN — INSULIN DETEMIR 15 UNITS: 100 INJECTION, SOLUTION SUBCUTANEOUS at 20:30

## 2019-06-27 RX ADMIN — AMLODIPINE BESYLATE 5 MG: 5 TABLET ORAL at 09:56

## 2019-06-27 RX ADMIN — INSULIN LISPRO 4 UNITS: 100 INJECTION, SOLUTION INTRAVENOUS; SUBCUTANEOUS at 11:55

## 2019-06-27 RX ADMIN — CLOPIDOGREL BISULFATE 75 MG: 75 TABLET ORAL at 09:57

## 2019-06-27 RX ADMIN — GABAPENTIN 600 MG: 300 CAPSULE ORAL at 20:29

## 2019-06-27 RX ADMIN — TRAMADOL HYDROCHLORIDE 25 MG: 50 TABLET, FILM COATED ORAL at 22:00

## 2019-06-27 RX ADMIN — PANTOPRAZOLE SODIUM 40 MG: 40 TABLET, DELAYED RELEASE ORAL at 09:57

## 2019-06-27 RX ADMIN — ROSUVASTATIN CALCIUM 20 MG: 20 TABLET, FILM COATED ORAL at 09:57

## 2019-06-27 RX ADMIN — INSULIN LISPRO 3 UNITS: 100 INJECTION, SOLUTION INTRAVENOUS; SUBCUTANEOUS at 11:55

## 2019-06-27 RX ADMIN — INSULIN LISPRO 4 UNITS: 100 INJECTION, SOLUTION INTRAVENOUS; SUBCUTANEOUS at 21:41

## 2019-06-27 RX ADMIN — APIXABAN 10 MG: 5 TABLET, FILM COATED ORAL at 20:29

## 2019-06-27 RX ADMIN — GABAPENTIN 600 MG: 300 CAPSULE ORAL at 09:56

## 2019-06-27 RX ADMIN — INSULIN LISPRO 3 UNITS: 100 INJECTION, SOLUTION INTRAVENOUS; SUBCUTANEOUS at 17:44

## 2019-06-27 RX ADMIN — ONDANSETRON 8 MG: 4 TABLET, ORALLY DISINTEGRATING ORAL at 08:49

## 2019-06-27 RX ADMIN — INSULIN LISPRO 3 UNITS: 100 INJECTION, SOLUTION INTRAVENOUS; SUBCUTANEOUS at 08:10

## 2019-06-27 RX ADMIN — PANTOPRAZOLE SODIUM 40 MG: 40 TABLET, DELAYED RELEASE ORAL at 20:29

## 2019-06-27 RX ADMIN — CYANOCOBALAMIN TAB 1000 MCG 1000 MCG: 1000 TAB at 09:57

## 2019-06-27 RX ADMIN — APIXABAN 10 MG: 5 TABLET, FILM COATED ORAL at 09:57

## 2019-06-28 LAB
GLUCOSE BLDC GLUCOMTR-MCNC: 200 MG/DL (ref 70–130)
GLUCOSE BLDC GLUCOMTR-MCNC: 251 MG/DL (ref 70–130)
GLUCOSE BLDC GLUCOMTR-MCNC: 289 MG/DL (ref 70–130)
GLUCOSE BLDC GLUCOMTR-MCNC: 291 MG/DL (ref 70–130)

## 2019-06-28 PROCEDURE — 63710000001 INSULIN LISPRO (HUMAN) PER 5 UNITS: Performed by: INTERNAL MEDICINE

## 2019-06-28 PROCEDURE — 82962 GLUCOSE BLOOD TEST: CPT

## 2019-06-28 PROCEDURE — 97530 THERAPEUTIC ACTIVITIES: CPT

## 2019-06-28 PROCEDURE — 63710000001 INSULIN DETEMIR PER 5 UNITS: Performed by: NURSE PRACTITIONER

## 2019-06-28 PROCEDURE — 99232 SBSQ HOSP IP/OBS MODERATE 35: CPT | Performed by: NURSE PRACTITIONER

## 2019-06-28 PROCEDURE — 97116 GAIT TRAINING THERAPY: CPT

## 2019-06-28 RX ADMIN — AMLODIPINE BESYLATE 5 MG: 5 TABLET ORAL at 08:11

## 2019-06-28 RX ADMIN — GABAPENTIN 600 MG: 300 CAPSULE ORAL at 08:11

## 2019-06-28 RX ADMIN — INSULIN LISPRO 4 UNITS: 100 INJECTION, SOLUTION INTRAVENOUS; SUBCUTANEOUS at 18:38

## 2019-06-28 RX ADMIN — APIXABAN 10 MG: 5 TABLET, FILM COATED ORAL at 21:06

## 2019-06-28 RX ADMIN — INSULIN LISPRO 4 UNITS: 100 INJECTION, SOLUTION INTRAVENOUS; SUBCUTANEOUS at 21:53

## 2019-06-28 RX ADMIN — CLOPIDOGREL BISULFATE 75 MG: 75 TABLET ORAL at 08:12

## 2019-06-28 RX ADMIN — GABAPENTIN 600 MG: 300 CAPSULE ORAL at 21:06

## 2019-06-28 RX ADMIN — INSULIN DETEMIR 15 UNITS: 100 INJECTION, SOLUTION SUBCUTANEOUS at 21:54

## 2019-06-28 RX ADMIN — INSULIN LISPRO 3 UNITS: 100 INJECTION, SOLUTION INTRAVENOUS; SUBCUTANEOUS at 08:12

## 2019-06-28 RX ADMIN — INSULIN LISPRO 3 UNITS: 100 INJECTION, SOLUTION INTRAVENOUS; SUBCUTANEOUS at 12:24

## 2019-06-28 RX ADMIN — METOPROLOL TARTRATE 50 MG: 50 TABLET ORAL at 08:11

## 2019-06-28 RX ADMIN — APIXABAN 10 MG: 5 TABLET, FILM COATED ORAL at 08:12

## 2019-06-28 RX ADMIN — METOPROLOL TARTRATE 50 MG: 50 TABLET ORAL at 21:06

## 2019-06-28 RX ADMIN — CYANOCOBALAMIN TAB 1000 MCG 1000 MCG: 1000 TAB at 08:12

## 2019-06-28 RX ADMIN — PANTOPRAZOLE SODIUM 40 MG: 40 TABLET, DELAYED RELEASE ORAL at 21:06

## 2019-06-28 RX ADMIN — VITAMIN D, TAB 1000IU (100/BT) 1000 UNITS: 25 TAB at 08:11

## 2019-06-28 RX ADMIN — INSULIN LISPRO 4 UNITS: 100 INJECTION, SOLUTION INTRAVENOUS; SUBCUTANEOUS at 12:24

## 2019-06-28 RX ADMIN — ROSUVASTATIN CALCIUM 20 MG: 20 TABLET, FILM COATED ORAL at 08:12

## 2019-06-28 RX ADMIN — INSULIN LISPRO 3 UNITS: 100 INJECTION, SOLUTION INTRAVENOUS; SUBCUTANEOUS at 18:38

## 2019-06-28 RX ADMIN — PANTOPRAZOLE SODIUM 40 MG: 40 TABLET, DELAYED RELEASE ORAL at 08:11

## 2019-06-29 LAB
ANION GAP SERPL CALCULATED.3IONS-SCNC: 14 MMOL/L (ref 5–15)
BUN BLD-MCNC: 28 MG/DL (ref 8–23)
BUN/CREAT SERPL: 21.5 (ref 7–25)
CALCIUM SPEC-SCNC: 9.5 MG/DL (ref 8.6–10.5)
CHLORIDE SERPL-SCNC: 98 MMOL/L (ref 98–107)
CO2 SERPL-SCNC: 24 MMOL/L (ref 22–29)
CREAT BLD-MCNC: 1.3 MG/DL (ref 0.57–1)
DEPRECATED RDW RBC AUTO: 42.3 FL (ref 37–54)
ERYTHROCYTE [DISTWIDTH] IN BLOOD BY AUTOMATED COUNT: 13.8 % (ref 12.3–15.4)
GFR SERPL CREATININE-BSD FRML MDRD: 40 ML/MIN/1.73
GLUCOSE BLD-MCNC: 212 MG/DL (ref 65–99)
GLUCOSE BLDC GLUCOMTR-MCNC: 236 MG/DL (ref 70–130)
GLUCOSE BLDC GLUCOMTR-MCNC: 278 MG/DL (ref 70–130)
GLUCOSE BLDC GLUCOMTR-MCNC: 288 MG/DL (ref 70–130)
GLUCOSE BLDC GLUCOMTR-MCNC: 294 MG/DL (ref 70–130)
HCT VFR BLD AUTO: 31.1 % (ref 34–46.6)
HGB BLD-MCNC: 10.1 G/DL (ref 12–15.9)
MCH RBC QN AUTO: 27.4 PG (ref 26.6–33)
MCHC RBC AUTO-ENTMCNC: 32.5 G/DL (ref 31.5–35.7)
MCV RBC AUTO: 84.3 FL (ref 79–97)
PLATELET # BLD AUTO: 148 10*3/MM3 (ref 140–450)
PMV BLD AUTO: 11.8 FL (ref 6–12)
POTASSIUM BLD-SCNC: 4.1 MMOL/L (ref 3.5–5.2)
RBC # BLD AUTO: 3.69 10*6/MM3 (ref 3.77–5.28)
SODIUM BLD-SCNC: 136 MMOL/L (ref 136–145)
WBC NRBC COR # BLD: 4.14 10*3/MM3 (ref 3.4–10.8)

## 2019-06-29 PROCEDURE — 63710000001 INSULIN DETEMIR PER 5 UNITS: Performed by: NURSE PRACTITIONER

## 2019-06-29 PROCEDURE — 99232 SBSQ HOSP IP/OBS MODERATE 35: CPT | Performed by: HOSPITALIST

## 2019-06-29 PROCEDURE — 80048 BASIC METABOLIC PNL TOTAL CA: CPT | Performed by: NURSE PRACTITIONER

## 2019-06-29 PROCEDURE — 85027 COMPLETE CBC AUTOMATED: CPT | Performed by: NURSE PRACTITIONER

## 2019-06-29 PROCEDURE — 82962 GLUCOSE BLOOD TEST: CPT

## 2019-06-29 RX ADMIN — PANTOPRAZOLE SODIUM 40 MG: 40 TABLET, DELAYED RELEASE ORAL at 20:44

## 2019-06-29 RX ADMIN — INSULIN LISPRO 3 UNITS: 100 INJECTION, SOLUTION INTRAVENOUS; SUBCUTANEOUS at 12:51

## 2019-06-29 RX ADMIN — INSULIN LISPRO 4 UNITS: 100 INJECTION, SOLUTION INTRAVENOUS; SUBCUTANEOUS at 12:51

## 2019-06-29 RX ADMIN — APIXABAN 10 MG: 5 TABLET, FILM COATED ORAL at 08:55

## 2019-06-29 RX ADMIN — METOPROLOL TARTRATE 50 MG: 50 TABLET ORAL at 08:55

## 2019-06-29 RX ADMIN — AMLODIPINE BESYLATE 5 MG: 5 TABLET ORAL at 08:55

## 2019-06-29 RX ADMIN — INSULIN DETEMIR 15 UNITS: 100 INJECTION, SOLUTION SUBCUTANEOUS at 20:46

## 2019-06-29 RX ADMIN — INSULIN LISPRO 3 UNITS: 100 INJECTION, SOLUTION INTRAVENOUS; SUBCUTANEOUS at 17:46

## 2019-06-29 RX ADMIN — INSULIN LISPRO 4 UNITS: 100 INJECTION, SOLUTION INTRAVENOUS; SUBCUTANEOUS at 20:46

## 2019-06-29 RX ADMIN — CLOPIDOGREL BISULFATE 75 MG: 75 TABLET ORAL at 08:55

## 2019-06-29 RX ADMIN — APIXABAN 10 MG: 5 TABLET, FILM COATED ORAL at 20:44

## 2019-06-29 RX ADMIN — INSULIN LISPRO 4 UNITS: 100 INJECTION, SOLUTION INTRAVENOUS; SUBCUTANEOUS at 17:47

## 2019-06-29 RX ADMIN — CYANOCOBALAMIN TAB 1000 MCG 1000 MCG: 1000 TAB at 08:55

## 2019-06-29 RX ADMIN — INSULIN LISPRO 3 UNITS: 100 INJECTION, SOLUTION INTRAVENOUS; SUBCUTANEOUS at 08:52

## 2019-06-29 RX ADMIN — VITAMIN D, TAB 1000IU (100/BT) 1000 UNITS: 25 TAB at 08:55

## 2019-06-29 RX ADMIN — ROSUVASTATIN CALCIUM 20 MG: 20 TABLET, FILM COATED ORAL at 08:55

## 2019-06-29 RX ADMIN — METOPROLOL TARTRATE 50 MG: 50 TABLET ORAL at 20:44

## 2019-06-29 RX ADMIN — GABAPENTIN 600 MG: 300 CAPSULE ORAL at 08:54

## 2019-06-29 RX ADMIN — PANTOPRAZOLE SODIUM 40 MG: 40 TABLET, DELAYED RELEASE ORAL at 08:55

## 2019-06-29 RX ADMIN — GABAPENTIN 600 MG: 300 CAPSULE ORAL at 20:44

## 2019-06-30 LAB
ANION GAP SERPL CALCULATED.3IONS-SCNC: 12 MMOL/L (ref 5–15)
BUN BLD-MCNC: 24 MG/DL (ref 8–23)
BUN/CREAT SERPL: 20.7 (ref 7–25)
CALCIUM SPEC-SCNC: 10.3 MG/DL (ref 8.6–10.5)
CHLORIDE SERPL-SCNC: 95 MMOL/L (ref 98–107)
CO2 SERPL-SCNC: 27 MMOL/L (ref 22–29)
CREAT BLD-MCNC: 1.16 MG/DL (ref 0.57–1)
DEPRECATED RDW RBC AUTO: 42.4 FL (ref 37–54)
ERYTHROCYTE [DISTWIDTH] IN BLOOD BY AUTOMATED COUNT: 13.9 % (ref 12.3–15.4)
GFR SERPL CREATININE-BSD FRML MDRD: 45 ML/MIN/1.73
GLUCOSE BLD-MCNC: 265 MG/DL (ref 65–99)
GLUCOSE BLDC GLUCOMTR-MCNC: 206 MG/DL (ref 70–130)
GLUCOSE BLDC GLUCOMTR-MCNC: 247 MG/DL (ref 70–130)
GLUCOSE BLDC GLUCOMTR-MCNC: 275 MG/DL (ref 70–130)
GLUCOSE BLDC GLUCOMTR-MCNC: 305 MG/DL (ref 70–130)
HCT VFR BLD AUTO: 34.6 % (ref 34–46.6)
HGB BLD-MCNC: 11.2 G/DL (ref 12–15.9)
MAGNESIUM SERPL-MCNC: 1.9 MG/DL (ref 1.6–2.4)
MCH RBC QN AUTO: 27.3 PG (ref 26.6–33)
MCHC RBC AUTO-ENTMCNC: 32.4 G/DL (ref 31.5–35.7)
MCV RBC AUTO: 84.4 FL (ref 79–97)
PHOSPHATE SERPL-MCNC: 3.6 MG/DL (ref 2.5–4.5)
PLATELET # BLD AUTO: 179 10*3/MM3 (ref 140–450)
PMV BLD AUTO: 10.6 FL (ref 6–12)
POTASSIUM BLD-SCNC: 4.4 MMOL/L (ref 3.5–5.2)
RBC # BLD AUTO: 4.1 10*6/MM3 (ref 3.77–5.28)
SODIUM BLD-SCNC: 134 MMOL/L (ref 136–145)
WBC NRBC COR # BLD: 4.63 10*3/MM3 (ref 3.4–10.8)

## 2019-06-30 PROCEDURE — 63710000001 INSULIN DETEMIR PER 5 UNITS: Performed by: PHYSICIAN ASSISTANT

## 2019-06-30 PROCEDURE — 63710000001 INSULIN LISPRO (HUMAN) PER 5 UNITS: Performed by: PHYSICIAN ASSISTANT

## 2019-06-30 PROCEDURE — 82962 GLUCOSE BLOOD TEST: CPT

## 2019-06-30 PROCEDURE — 83735 ASSAY OF MAGNESIUM: CPT | Performed by: PHYSICIAN ASSISTANT

## 2019-06-30 PROCEDURE — 63710000001 INSULIN LISPRO (HUMAN) PER 5 UNITS: Performed by: INTERNAL MEDICINE

## 2019-06-30 PROCEDURE — 85027 COMPLETE CBC AUTOMATED: CPT | Performed by: PHYSICIAN ASSISTANT

## 2019-06-30 PROCEDURE — 99232 SBSQ HOSP IP/OBS MODERATE 35: CPT | Performed by: PHYSICIAN ASSISTANT

## 2019-06-30 PROCEDURE — 84100 ASSAY OF PHOSPHORUS: CPT | Performed by: PHYSICIAN ASSISTANT

## 2019-06-30 PROCEDURE — 80048 BASIC METABOLIC PNL TOTAL CA: CPT | Performed by: PHYSICIAN ASSISTANT

## 2019-06-30 RX ORDER — POTASSIUM CHLORIDE 750 MG/1
40 CAPSULE, EXTENDED RELEASE ORAL AS NEEDED
Status: DISCONTINUED | OUTPATIENT
Start: 2019-06-30 | End: 2019-07-01 | Stop reason: HOSPADM

## 2019-06-30 RX ORDER — MAGNESIUM SULFATE HEPTAHYDRATE 40 MG/ML
4 INJECTION, SOLUTION INTRAVENOUS AS NEEDED
Status: DISCONTINUED | OUTPATIENT
Start: 2019-06-30 | End: 2019-07-01 | Stop reason: HOSPADM

## 2019-06-30 RX ORDER — MAGNESIUM SULFATE HEPTAHYDRATE 40 MG/ML
2 INJECTION, SOLUTION INTRAVENOUS AS NEEDED
Status: DISCONTINUED | OUTPATIENT
Start: 2019-06-30 | End: 2019-07-01 | Stop reason: HOSPADM

## 2019-06-30 RX ORDER — POTASSIUM CHLORIDE 7.45 MG/ML
10 INJECTION INTRAVENOUS
Status: DISCONTINUED | OUTPATIENT
Start: 2019-06-30 | End: 2019-07-01 | Stop reason: HOSPADM

## 2019-06-30 RX ORDER — POTASSIUM CHLORIDE 1.5 G/1.77G
40 POWDER, FOR SOLUTION ORAL AS NEEDED
Status: DISCONTINUED | OUTPATIENT
Start: 2019-06-30 | End: 2019-07-01 | Stop reason: HOSPADM

## 2019-06-30 RX ADMIN — INSULIN LISPRO 3 UNITS: 100 INJECTION, SOLUTION INTRAVENOUS; SUBCUTANEOUS at 12:30

## 2019-06-30 RX ADMIN — INSULIN LISPRO 5 UNITS: 100 INJECTION, SOLUTION INTRAVENOUS; SUBCUTANEOUS at 12:31

## 2019-06-30 RX ADMIN — METOPROLOL TARTRATE 50 MG: 50 TABLET ORAL at 08:19

## 2019-06-30 RX ADMIN — VITAMIN D, TAB 1000IU (100/BT) 1000 UNITS: 25 TAB at 08:19

## 2019-06-30 RX ADMIN — INSULIN LISPRO 3 UNITS: 100 INJECTION, SOLUTION INTRAVENOUS; SUBCUTANEOUS at 08:19

## 2019-06-30 RX ADMIN — ROSUVASTATIN CALCIUM 20 MG: 20 TABLET, FILM COATED ORAL at 08:19

## 2019-06-30 RX ADMIN — APIXABAN 10 MG: 5 TABLET, FILM COATED ORAL at 08:19

## 2019-06-30 RX ADMIN — PANTOPRAZOLE SODIUM 40 MG: 40 TABLET, DELAYED RELEASE ORAL at 21:37

## 2019-06-30 RX ADMIN — INSULIN LISPRO 3 UNITS: 100 INJECTION, SOLUTION INTRAVENOUS; SUBCUTANEOUS at 21:38

## 2019-06-30 RX ADMIN — CLOPIDOGREL BISULFATE 75 MG: 75 TABLET ORAL at 08:19

## 2019-06-30 RX ADMIN — AMLODIPINE BESYLATE 5 MG: 5 TABLET ORAL at 08:19

## 2019-06-30 RX ADMIN — CYANOCOBALAMIN TAB 1000 MCG 1000 MCG: 1000 TAB at 08:19

## 2019-06-30 RX ADMIN — APIXABAN 5 MG: 5 TABLET, FILM COATED ORAL at 21:37

## 2019-06-30 RX ADMIN — INSULIN LISPRO 6 UNITS: 100 INJECTION, SOLUTION INTRAVENOUS; SUBCUTANEOUS at 17:46

## 2019-06-30 RX ADMIN — INSULIN LISPRO 3 UNITS: 100 INJECTION, SOLUTION INTRAVENOUS; SUBCUTANEOUS at 08:18

## 2019-06-30 RX ADMIN — MAGNESIUM OXIDE TAB 400 MG (241.3 MG ELEMENTAL MG) 400 MG: 400 (241.3 MG) TAB at 17:58

## 2019-06-30 RX ADMIN — GABAPENTIN 600 MG: 300 CAPSULE ORAL at 21:37

## 2019-06-30 RX ADMIN — PANTOPRAZOLE SODIUM 40 MG: 40 TABLET, DELAYED RELEASE ORAL at 08:19

## 2019-06-30 RX ADMIN — GABAPENTIN 600 MG: 300 CAPSULE ORAL at 08:19

## 2019-06-30 RX ADMIN — METOPROLOL TARTRATE 50 MG: 50 TABLET ORAL at 21:38

## 2019-06-30 RX ADMIN — INSULIN DETEMIR 18 UNITS: 100 INJECTION, SOLUTION SUBCUTANEOUS at 21:43

## 2019-06-30 RX ADMIN — INSULIN LISPRO 4 UNITS: 100 INJECTION, SOLUTION INTRAVENOUS; SUBCUTANEOUS at 17:47

## 2019-07-01 ENCOUNTER — DOCUMENTATION (OUTPATIENT)
Dept: CARDIAC REHAB | Facility: HOSPITAL | Age: 79
End: 2019-07-01

## 2019-07-01 VITALS
RESPIRATION RATE: 18 BRPM | DIASTOLIC BLOOD PRESSURE: 57 MMHG | WEIGHT: 157.4 LBS | BODY MASS INDEX: 25.3 KG/M2 | HEIGHT: 66 IN | HEART RATE: 85 BPM | TEMPERATURE: 97.6 F | SYSTOLIC BLOOD PRESSURE: 128 MMHG | OXYGEN SATURATION: 97 %

## 2019-07-01 LAB
GLUCOSE BLDC GLUCOMTR-MCNC: 227 MG/DL (ref 70–130)
GLUCOSE BLDC GLUCOMTR-MCNC: 294 MG/DL (ref 70–130)

## 2019-07-01 PROCEDURE — 82962 GLUCOSE BLOOD TEST: CPT

## 2019-07-01 PROCEDURE — 97530 THERAPEUTIC ACTIVITIES: CPT

## 2019-07-01 PROCEDURE — 97110 THERAPEUTIC EXERCISES: CPT

## 2019-07-01 PROCEDURE — 99239 HOSP IP/OBS DSCHRG MGMT >30: CPT | Performed by: INTERNAL MEDICINE

## 2019-07-01 PROCEDURE — 63710000001 INSULIN LISPRO (HUMAN) PER 5 UNITS: Performed by: PHYSICIAN ASSISTANT

## 2019-07-01 PROCEDURE — 97116 GAIT TRAINING THERAPY: CPT | Performed by: PHYSICAL THERAPIST

## 2019-07-01 PROCEDURE — 97110 THERAPEUTIC EXERCISES: CPT | Performed by: PHYSICAL THERAPIST

## 2019-07-01 RX ADMIN — METOPROLOL TARTRATE 50 MG: 50 TABLET ORAL at 08:53

## 2019-07-01 RX ADMIN — INSULIN LISPRO 4 UNITS: 100 INJECTION, SOLUTION INTRAVENOUS; SUBCUTANEOUS at 11:49

## 2019-07-01 RX ADMIN — INSULIN LISPRO 6 UNITS: 100 INJECTION, SOLUTION INTRAVENOUS; SUBCUTANEOUS at 11:49

## 2019-07-01 RX ADMIN — ROSUVASTATIN CALCIUM 20 MG: 20 TABLET, FILM COATED ORAL at 08:53

## 2019-07-01 RX ADMIN — INSULIN LISPRO 3 UNITS: 100 INJECTION, SOLUTION INTRAVENOUS; SUBCUTANEOUS at 08:57

## 2019-07-01 RX ADMIN — AMLODIPINE BESYLATE 5 MG: 5 TABLET ORAL at 08:53

## 2019-07-01 RX ADMIN — CLOPIDOGREL BISULFATE 75 MG: 75 TABLET ORAL at 08:53

## 2019-07-01 RX ADMIN — GABAPENTIN 600 MG: 300 CAPSULE ORAL at 09:03

## 2019-07-01 RX ADMIN — INSULIN LISPRO 6 UNITS: 100 INJECTION, SOLUTION INTRAVENOUS; SUBCUTANEOUS at 08:54

## 2019-07-01 RX ADMIN — APIXABAN 5 MG: 5 TABLET, FILM COATED ORAL at 08:53

## 2019-07-01 RX ADMIN — CYANOCOBALAMIN TAB 1000 MCG 1000 MCG: 1000 TAB at 08:53

## 2019-07-01 RX ADMIN — VITAMIN D, TAB 1000IU (100/BT) 1000 UNITS: 25 TAB at 08:52

## 2019-07-01 RX ADMIN — PANTOPRAZOLE SODIUM 40 MG: 40 TABLET, DELAYED RELEASE ORAL at 08:53

## 2019-07-01 NOTE — DISCHARGE SUMMARY
[facility]  DISCHARGE SUMMARY    Patient Name: Narcisa Cotto  : 1940  MRN: [id number]    Date of Admission: 2019  Date of Discharge:  2019  Primary Care Physician: Mariia Del Real DO    Consults     Date and Time Order Name Status Description    2019 2114 Inpatient Cardiology Consult Completed     2019 0848 Inpatient Cardiothoracic Surgery Consult Completed     2019 0942 Inpatient Nephrology Consult Completed           Hospital Course     Presenting Problem:   Dyspnea [R06.00]  Shortness of breath [R06.02]  Shortness of breath [R06.02]    Active Hospital Problems    Diagnosis  POA   • **Acute deep vein thrombosis (DVT) of distal vein of right lower extremity (CMS/[facility]) [I82.4Z1]  Yes     Priority: High   • CKD (chronic kidney disease) stage 3, GFR 30-59 ml/min (CMS/[facility]) [N18.3]  Yes   • Shortness of breath [R06.02]  Yes   • Essential hypertension [I10]  Yes   • Coronary artery disease involving native coronary artery of native heart with angina pectoris (CMS/[facility]) [I25.119]  Yes   • Sleep apnea [G47.30]  Yes   • Uncontrolled type 2 diabetes mellitus with complication, with long-term current use of insulin (CMS/[facility]) [E11.8, E11.65, Z79.4]  Not Applicable   • Peripheral arterial disease (CMS/[facility]) [I73.9]  Yes      Resolved Hospital Problems    Diagnosis Date Resolved POA   • Dyspnea [R06.00] 2019 Yes          Hospital Course:  Narcisa Cotto is a 79 y.o. female with past medical history of HTN, DM, CKD, CAD s/p recent stent to RCA who presents with dyspnea and weakness. Found to have UTI and RLE DVT. She was admitted to our service and underwent CT chest which showed no acute process, a lung perfusion scan showed low probability of PE.  RLE duplex showed a DVT and she was started on Eliquis.  As mentioned, she had a UTI upon admission- Cultures grew Klebsiella and she has since completed five days of Rocephin.  Of note, she had  a recent PATRICIA x 2 to RCA- she will continue her plavix, metoprolol, statin, but will d/c her ASA given the addition of Eliquis to her regimen.  She will follow up with Dr. Mayberry in one month. Of note, she will be going home with home health and her granddaughter's assistance for the time being.           Discharge Follow Up Recommendations for labs/diagnostics:   with PCP in one week, with Dr. Mayberry in one month.     Day of Discharge     HPI:   Doing well this am, denies any issues overnight. Anxious to go home.     Review of Systems  Gen- No fevers, chills  CV- No chest pain, palpitations  Resp- No cough, dyspnea  GI- No N/V/D, abd pain    Otherwise ROS is negative except as mentioned in the HPI.    Vital Signs:   Temp:  [97.6 °F (36.4 °C)-98.5 °F (36.9 °C)] 97.6 °F (36.4 °C)  Heart Rate:  [85-92] 85  Resp:  [18] 18  BP: (126-153)/(54-65) 128/57     Physical Exam:  Constitutional: No acute distress, awake, alert  HENT: NCAT, mucous membranes moist  Respiratory: Clear to auscultation bilaterally, respiratory effort normal   Cardiovascular: RRR, no murmurs, rubs, or gallops, palpable pedal pulses bilaterally  Gastrointestinal: Positive bowel sounds, soft, nontender, nondistended  Musculoskeletal: No bilateral ankle edema  Psychiatric: Appropriate affect, cooperative  Neurologic: Oriented x 3, strength symmetric in all extremities, Cranial Nerves grossly intact to confrontation, speech clear  Skin: No rashes  Pertinent  and/or Most Recent Results     Results from last 7 days   Lab Units 06/30/19  1346 06/29/19  0304   WBC 10*3/mm3 4.63 4.14   HEMOGLOBIN g/dL 11.2* 10.1*   HEMATOCRIT % 34.6 31.1*   PLATELETS 10*3/mm3 179 148   SODIUM mmol/L 134* 136   POTASSIUM mmol/L 4.4 4.1   CHLORIDE mmol/L 95* 98   CO2 mmol/L 27.0 24.0   BUN mg/dL 24* 28*   CREATININE mg/dL 1.16* 1.30*   GLUCOSE mg/dL 265* 212*   CALCIUM mg/dL 10.3 9.5           Invalid input(s): PROT, LABALBU        Invalid input(s): TG, LDLCALC, LDLREALC         Brief Urine Lab Results  (Last result in the past 365 days)      Color   Clarity   Blood   Leuk Est   Nitrite   Protein   CREAT   Urine HCG        06/21/19 1715 Yellow Cloudy Trace Moderate (2+) Negative 100 mg/dL (2+)               Microbiology Results Abnormal     Procedure Component Value - Date/Time    Urine Culture - Urine, Urine, Clean Catch [[id number]]  (Abnormal)  (Susceptibility) Collected:  06/21/19 1715    Lab Status:  Final result Specimen:  Urine, Clean Catch Updated:  06/23/19 0345     Urine Culture >100,000 CFU/mL Klebsiella pneumoniae ssp pneumoniae    Susceptibility      Klebsiella pneumoniae ssp pneumoniae     NICK     Ampicillin Resistant     Ampicillin + Sulbactam Susceptible     Cefazolin Susceptible     Cefepime Susceptible     Ceftazidime Susceptible     Ceftriaxone Susceptible     Gentamicin Susceptible     Levofloxacin Susceptible     Nitrofurantoin Intermediate     Piperacillin + Tazobactam Susceptible     Tetracycline Susceptible     Trimethoprim + Sulfamethoxazole Susceptible                          Imaging Results (all)     Procedure Component Value Units Date/Time    NM Lung Ventilation Perfusion [[id number]] Collected:  06/21/19 1708     Updated:  06/22/19 1215    Narrative:       EXAMINATION: NM LUNG VENTILATION PERFUSION - 06/21/2019     INDICATION: Shortness of breath. Evaluate for pulmonary embolus.      TECHNIQUE: 14.8 mCi of Xenon-133 was inhaled and wash-in, equilibrium,  and washout images were obtained of the chest in the posterior  projection. Perfusion imaging was obtained after 5.5 mCi of Technetium  99 MAA was injected intravenously. Imaging is obtained of the lung  fields in the anterior, posterior, lateral, and oblique projections.     COMPARISON: NONE     FINDINGS: There is homogeneous uptake of tracer seen throughout the lung  fields on ventilation imaging with slight delay in washout of tracer of  the lung bases suggesting possibly a mild obstructive component.  There  is homogeneous uptake of tracer seen throughout the lung fields  bilaterally on perfusion imaging. There is mild enlargement of the  heart.       Impression:       Mild obstructive component at the lung bases bilaterally  with low probability for pulmonary embolism.     DICTATED:   06/21/2019  EDITED/ls :   06/21/2019          This report was finalized on 6/22/2019 12:12 PM by Dr. Delphine Slaughter MD.       CT Chest Without Contrast [[id number]] Collected:  06/21/19 1611     Updated:  06/22/19 1215    Narrative:       EXAMINATION: CT CHEST WO CONTRAST - 06/21/2019     INDICATION: Shortness of breath, chest pain.     TECHNIQUE: Multiple axial CT imaging is obtained of the chest without  the administration of intravenous contrast.     The radiation dose reduction device was turned on for each scan per the  ALARA (As Low as Reasonably Achievable) protocol.     COMPARISON: NONE     FINDINGS: Thyroid is heterogeneous in appearance. No mediastinal mass.  Small lymph nodes identified scattered throughout the mediastinum.  Vascular calcification seen within the thoracic aorta. Coronary artery  calcifications identified. No pericardial effusion. No bulky hilar or  axillary lymphadenopathy. Small hiatal hernia. The lung parenchyma  reveals no parenchymal consolidation, pulmonary mass or nodule. No  pleural effusion or pneumothorax. Degenerative changes seen within the  spine. Visualized upper abdomen is unremarkable.       Impression:       No acute intrathoracic abnormality.     DICTATED:   06/21/2019  EDITED/ls :   06/21/2019         This report was finalized on 6/22/2019 12:12 PM by Dr. Delphine Slaughter MD.       XR Chest 1 View [[id number]] Collected:  06/21/19 1444     Updated:  06/21/19 1716    Narrative:       EXAMINATION: XR CHEST 1 VW-06/21/2019:      INDICATION: SOA, triage protocol.      COMPARISON: 05/28/2019.     FINDINGS: Portable chest reveals cardiac and mediastinal silhouettes to  be within normal  limits. The lung fields are grossly clear. Vascular  calcification seen within the thoracic aorta. No pleural effusion or  pneumothorax. The bony structures are unremarkable. Pulmonary  vascularity is within normal limits.           Impression:       Stable chest, no acute cardiopulmonary disease.     D:  06/21/2019  E:  06/21/2019     This report was finalized on 6/21/2019 5:13 PM by Dr. Delphine Slaughter MD.             Results for orders placed during the hospital encounter of 06/21/19   Duplex Venous Lower Extremity - Right CAR    Narrative · Evidence of an isolated DVT in 1 of the right peroneal veins.          Results for orders placed during the hospital encounter of 06/21/19   Duplex Venous Lower Extremity - Right CAR    Narrative · Evidence of an isolated DVT in 1 of the right peroneal veins.          Results for orders placed during the hospital encounter of 06/21/19   Adult Transthoracic Echo Limited W/ Cont if Necessary Per Protocol    Narrative · Normal left ventricular systolic function  · MAC with trace MR  · Mild aortic valve sclerosis            Discharge Details        Discharge Medications      New Medications      Instructions Start Date   apixaban 5 MG tablet tablet  Commonly known as:  ELIQUIS   5 mg, Oral, Every 12 Hours Scheduled         Changes to Medications      Instructions Start Date   metoprolol tartrate 50 MG tablet  Commonly known as:  LOPRESSOR  What changed:    · medication strength  · how much to take  · when to take this   50 mg, Oral, 2 Times Daily         Continue These Medications      Instructions Start Date   amLODIPine 5 MG tablet  Commonly known as:  NORVASC   5 mg, Oral, Daily      cholecalciferol 1000 units tablet  Commonly known as:  VITAMIN D3   1,000 Units, Oral, Daily      clopidogrel 75 MG tablet  Commonly known as:  PLAVIX   75 mg, Oral, Daily, Resume Sunday      [employer] VITAMIN B-12 1000 MCG tablet  Generic drug:  cyanocobalamin   1 tablet, Oral, Daily       gabapentin 600 MG tablet  Commonly known as:  NEURONTIN   600 mg, Oral, 2 Times Daily      insulin NPH-insulin regular (70-30) 100 UNIT/ML injection  Commonly known as:  humuLIN 70/30,novoLIN 70/30   20 Units, Subcutaneous, 2 Times Daily Before Meals, 20 units with breakfast, 20 units with lunch and 50 units with dinner.       insulin NPH-insulin regular (70-30) 100 UNIT/ML injection  Commonly known as:  humuLIN 70/30,novoLIN 70/30   50 Units, Subcutaneous, Daily With Dinner, 20 units with breakfast, 20 units with lunch and 50 units with dinner.       meclizine 25 MG tablet  Commonly known as:  ANTIVERT   25 mg, Oral, 3 Times Daily PRN      nitroglycerin 0.4 MG SL tablet  Commonly known as:  NITROSTAT   0.4 mg, Sublingual, Every 5 Minutes PRN, Take no more than 3 doses in 15 minutes.      ondansetron ODT 8 MG disintegrating tablet  Commonly known as:  ZOFRAN-ODT   8 mg, Oral, Every 8 Hours PRN      pantoprazole 40 MG EC tablet  Commonly known as:  PROTONIX   40 mg, Oral, 2 Times Daily      rosuvastatin 20 MG tablet  Commonly known as:  CRESTOR   20 mg, Oral, Daily         Stop These Medications    aspirin 81 MG tablet  Commonly known as:  ASPIRIN LOW DOSE        ASK your doctor about these medications      Instructions Start Date   apixaban 5 MG tablet tablet  Commonly known as:  ELIQUIS  Ask about: Should I take this medication?   10 mg, Oral, Every 12 Hours Scheduled             Allergies   Allergen Reactions   • Codeine Nausea And Vomiting         Discharge Disposition:  Home or Self Care    Discharge Diet:  Diet Order   Procedures   • Diet Regular; Cardiac, Consistent Carbohydrate         Discharge Activity:         CODE STATUS:    Code Status and Medical Interventions:   Ordered at: 06/21/19 2114     Code Status:    CPR     Medical Interventions (Level of Support Prior to Arrest):    Full         Future Appointments   Date Time Provider Department Center   7/30/2019 11:20 AM Leanna Parikh APRN [facility]  [facility] None   7/31/2019  3:30 PM Mariia Del Real DO [facility] None   7/31/2019  4:00 PM Jennifer Richards MD [facility] None   8/1/2019  9:45 AM Mariia Del Real DO [facility] None       Additional Instructions for the Follow-ups that You Need to Schedule     Ambulatory Referral to Home Health   As directed      Face to Face Visit Date:  6/25/2019    Follow-up Provider for Plan of Care?:  I treated the patient in an acute care facility and will not continue treatment after discharge.    Follow-up Provider:  MARIIA DEL REAL [[id number]]    Reason/Clinical Findings:  generalized weakness, DVT    Describe mobility limitations that make leaving home difficult:  generalized weakness    Nursing/Therapeutic Services Requested:  Physical Therapy Occupational Therapy Skilled Nursing    Skilled nursing orders:  Other (type 2 diabetes) Cardiopulmonary assessments    PT orders:  Strengthening Home safety assessment    Occupational orders:  Home safety assessment    Frequency:  1 Week 1         Discharge Follow-up with PCP   As directed       Currently Documented PCP:    Mariia Del Real DO    PCP Phone Number:    [phone]     Follow Up Details:  in one week with PCP         Discharge Follow-up with Specialty: Anton Mayberry; 1 Month   As directed      Specialty:  Anton Mayberry    Follow Up:  1 Month    Follow Up Details:  Hospital FU for PCI, DVT, and RLE claudication               Time Spent on Discharge: 35 minutes    Electronically signed by Zandra Dias MD, 07/01/19, 11:50 AM.

## 2019-07-01 NOTE — THERAPY TREATMENT NOTE
Acute Care - Occupational Therapy Treatment Note  [facility]     Patient Name: Narcisa Cotto  : 1940  MRN: [id number]  Today's Date: 2019  Onset of Illness/Injury or Date of Surgery: 19  Date of Referral to OT: 19  Referring Physician: DO Jesika    Admit Date: 2019       ICD-10-CM ICD-9-CM   1. Shortness of breath R06.02 786.05   2. Hypoxia R09.02 799.02   3. Elevated brain natriuretic peptide (BNP) level R79.89 790.99   4. Impaired functional mobility, balance, gait, and endurance Z74.09 V49.89   5. Impaired mobility and ADLs Z74.09 799.89   6. Acute deep vein thrombosis (DVT) of distal vein of right lower extremity (CMS/HCC) I82.4Z1 453.42   7. Diabetic peripheral neuropathy (CMS/HCC) E11.42 250.60     357.2   8. Complete heart block (CMS/HCC) I44.2 426.0     Patient Active Problem List   Diagnosis   • Hyperlipidemia LDL goal <70   • Diabetic gastroparesis (CMS/HCC)   • Peripheral arterial disease (CMS/HCC)   • Uncontrolled type 2 diabetes mellitus with complication, with long-term current use of insulin (CMS/HCC)   • Sleep apnea   • Diabetic peripheral neuropathy (CMS/HCC)   • Coronary artery disease involving native coronary artery of native heart with angina pectoris (CMS/HCC)   • GERD (gastroesophageal reflux disease)   • Essential hypertension   • Iron deficiency anemia   • Complete heart block (CMS/HCC)   • CKD (chronic kidney disease) stage 3, GFR 30-59 ml/min (CMS/HCC)   • Shortness of breath   • Acute deep vein thrombosis (DVT) of distal vein of right lower extremity (CMS/HCC)     Past Medical History:   Diagnosis Date   • CAD (coronary artery disease)    • Choledochal cyst 2019   • Chronic diastolic congestive heart failure (CMS/HCC)    • Chronic low back pain    • Chronic venous insufficiency    • CKD (chronic kidney disease) stage 3, GFR 30-59 ml/min (CMS/HCC)    • Diabetic gastroparesis (CMS/HCC)    • Diverticulosis    • DJD (degenerative joint disease), lumbar     • DM2 (diabetes mellitus, type 2) (CMS/[facility])    • GERD (gastroesophageal reflux disease)    • Hiatal hernia    • History of hyperparathyroidism    • HLD (hyperlipidemia)    • HTN (hypertension)    • Lumbar stenosis 11/15/2018   • DELFINA (obstructive sleep apnea)    • Osteoarthritis 7/29/2016   • PAD (peripheral artery disease) (CMS/[facility])    • Postherpetic neuralgia    • Vitamin B12 deficiency 7/29/2016     Past Surgical History:   Procedure Laterality Date   • CARDIAC CATHETERIZATION N/A 5/23/2019    Procedure: LEFT HEART CATH;  Surgeon: Filemon Mayberry IV, MD;  Location:  [facility] CATH INVASIVE LOCATION;  Service: Cardiovascular   • CARDIAC CATHETERIZATION N/A 5/29/2019    Procedure: Atherectomy-coronary;  Surgeon: Filemon Mayberry IV, MD;  Location: [facility] CATH INVASIVE LOCATION;  Service: Cardiovascular   • CARDIAC CATHETERIZATION N/A 5/29/2019    Procedure: Stent PATRICIA coronary;  Surgeon: Filemon Mayberry IV, MD;  Location: [facility] CATH INVASIVE LOCATION;  Service: Cardiovascular   • CARDIAC ELECTROPHYSIOLOGY PROCEDURE N/A 5/28/2019    Procedure: TEMPORARY PACEMAKER;  Surgeon: Jeffrey Reyes MD;  Location: [facility] CATH INVASIVE LOCATION;  Service: Cardiovascular   • CATARACT EXTRACTION Bilateral    • CHOLECYSTECTOMY     • COLONOSCOPY     • CYSTOSCOPY W/ URETERAL STENT PLACEMENT  2014   • ENDOSCOPY     • HYSTERECTOMY     • LUMBAR LAMINECTOMY DISCECTOMY DECOMPRESSION N/A 11/15/2018    Procedure: LUMBAR LAMINECTOMY L2-3 L 3-4,;  Surgeon: Zachary Key MD;  Location:  [facility];  Service: Orthopedic Spine   • PARATHYROIDECTOMY     • SPINAL CORD STIMULATOR IMPLANT     • TONSILLECTOMY         Therapy Treatment    Rehabilitation Treatment Summary     Row Name 07/01/19 0745             Treatment Time/Intention    Discipline  occupational therapist  -AC      Document Type  therapy note (daily note)  -AC      Subjective Information  complains of;pain  -AC      Patient/Family Observations  Pt  received in bed  -AC      Patient Effort  good  -AC      Existing Precautions/Restrictions  fall  -AC      Recorded by [AC] Monik Child, OT 07/01/19 0829      Row Name 07/01/19 0745             Vital Signs    Pre Systolic BP Rehab  153  -AC      Pre Treatment Diastolic BP  65  -AC      Pretreatment Heart Rate (beats/min)  89  -AC      Intratreatment Heart Rate (beats/min)  101  -AC      Posttreatment Heart Rate (beats/min)  84  -AC      Pre SpO2 (%)  99  -AC      O2 Delivery Pre Treatment  room air  -AC      Intra SpO2 (%)  94  -AC      O2 Delivery Intra Treatment  room air  -AC      Post SpO2 (%)  96  -AC      O2 Delivery Post Treatment  room air  -AC      Pre Patient Position  Supine  -AC      Intra Patient Position  Standing  -AC      Post Patient Position  Sitting  -AC      Recovery Time  ~ 1min  -AC      Rest Breaks   2  -AC      Recorded by [AC] Monik Child, OT 07/01/19 0829      Row Name 07/01/19 0745             Cognitive Assessment/Intervention    Additional Documentation  Cognitive Assessment/Intervention (Group)  -AC      Recorded by [AC] Monik Child, OT 07/01/19 0829      Row Name 07/01/19 0745             Cognitive Assessment/Intervention- PT/OT    Affect/Mental Status (Cognitive)  WFL  -AC      Orientation Status (Cognition)  oriented to  -AC      Follows Commands (Cognition)  WFL  -AC      Safety Deficit (Cognitive)  safety precautions awareness;safety precautions follow-through/compliance  -AC      Personal Safety Interventions  fall prevention program maintained;gait belt;nonskid shoes/slippers when out of bed  -AC      Recorded by [AC] Monik Child, OT 07/01/19 0829      Row Name 07/01/19 0745             Safety Issues, Functional Mobility    Impairments Affecting Function (Mobility)  balance;endurance/activity tolerance;strength  -AC      Recorded by [AC] Monik Child, OT 07/01/19 0829      Row Name 07/01/19 0745             Bed Mobility Assessment/Treatment    Supine-Sit [place]  (Bed Mobility)  conditional independence  -AC      Assistive Device (Bed Mobility)  bed rails;head of bed elevated  -AC      Recorded by [AC] Monik Child, OT 07/01/19 0829      Row Name 07/01/19 0745             Functional Mobility    Functional Mobility- Ind. Level  contact guard assist  -AC      Functional Mobility- Device  rolling walker  -AC      Functional Mobility-Distance (Feet)  160  -AC      Functional Mobility- Comment  required 2 standing restbreaks  -AC      Recorded by [AC] Monik Child, OT 07/01/19 0829      Row Name 07/01/19 0745             Transfer Assessment/Treatment    Transfer Assessment/Treatment  sit-stand transfer;stand-sit transfer  -AC      Recorded by [AC] Monik Child, OT 07/01/19 0829      Row Name 07/01/19 0745             Sit-Stand Transfer    Sit-Stand [place] (Transfers)  supervision  -AC      Assistive Device (Sit-Stand Transfers)  walker, front-wheeled  -AC      Recorded by [AC] Monik Child, OT 07/01/19 0829      Row Name 07/01/19 0745             Stand-Sit Transfer    Stand-Sit [place] (Transfers)  supervision  -AC      Assistive Device (Stand-Sit Transfers)  walker, front-wheeled  -AC      Recorded by [AC] Monik Child, OT 07/01/19 0829      Row Name 07/01/19 0745             Toilet Transfer    Assistive Device (Toilet Transfer)  -- reports she just went to the restroom  -AC      Recorded by [AC] Monik Child, OT 07/01/19 0829      Row Name 07/01/19 0745             ADL Assessment/Intervention    92560 - OT Self Care/Mgmt Minutes  2  -AC      BADL Assessment/Intervention  lower body dressing;grooming  -AC      Recorded by [AC] Monik Child, OT 07/01/19 0829      Row Name 07/01/19 0745             Lower Body Dressing Assessment/Training    Lower Body Dressing [place] Level  don;socks;supervision  -AC      Lower Body Dressing Position  long sitting  -AC      Recorded by [AC] Monik Child, OT 07/01/19 0829      Row Name 07/01/19 0745              Grooming Assessment/Training    [place] Level (Grooming)  hair care, combing/brushing;set up  -AC      Grooming Position  edge of bed sitting  -AC      Recorded by [AC] Monik Child, OT 07/01/19 0829      Row Name 07/01/19 0745             Therapeutic Exercise    43535 - OT Therapeutic Exercise Minutes  8  -AC      44920 - OT Therapeutic Activity Minutes  15  -AC      Recorded by [AC] Monik Child, OT 07/01/19 0829      Row Name 07/01/19 0745             Therapeutic Exercise    Upper Extremity Range of Motion (Therapeutic Exercise)  shoulder flexion/extension, bilateral;shoulder horizontal abduction/adduction, bilateral;elbow flexion/extension, bilateral  -AC      Hand (Therapeutic Exercise)  finger flexion/extension, bilateral  -AC      Exercise Type (Therapeutic Exercise)  AROM (active range of motion)  -AC      Position (Therapeutic Exercise)  seated  -AC      Sets/Reps (Therapeutic Exercise)  2/10  -AC      Recorded by [AC] Monik Child, OT 07/01/19 0829      Row Name 07/01/19 0745             Positioning and Restraints    Pre-Treatment Position  in bed  -AC      Post Treatment Position  chair  -AC      In Chair  reclined;call light within reach;encouraged to call for assist  -AC      Recorded by [AC] Monik Child, OT 07/01/19 0829      Row Name 07/01/19 0745             Pain Scale: Numbers Pre/Post-Treatment    Pain Scale: Numbers, Pretreatment  4/10  -AC      Pain Scale: Numbers, Post-Treatment  4/10  -AC      Pain Location - Side  Left  -AC      Pain Location  back  -AC      Pain Intervention(s)  Repositioned  -AC      Recorded by [AC] Monik Child, OT 07/01/19 0829      Row Name 07/01/19 0745             Outcome Summary/Treatment Plan (OT)    Daily Summary of Progress (OT)  progress toward functional goals as expected  -AC      Barriers to Overall Progress (OT)  weakness, decreased activity tolerance  -AC      Anticipated Discharge Disposition (OT)  skilled nursing facility  -AC      Recorded  by [AC] Monik Child, FRED 07/01/19 0829        User Key  (r) = Recorded By, (t) = Taken By, (c) = Cosigned By    Initials Name Effective Dates Discipline     Monik Child OT 06/23/15 -  OT             Occupational Therapy Education     Title: PT OT SLP Therapies (In Progress)     Topic: Occupational Therapy (In Progress)     Point: ADL training (Done)     Description: Instruct learner(s) on proper safety adaptation and remediation techniques during self care or transfers.   Instruct in proper use of assistive devices.    Learning Progress Summary           Patient Acceptance, E,TB, VU by  at 7/1/2019  7:45 AM    Acceptance, E,TB, VU by  at 6/27/2019 11:35 AM    Acceptance, E, VU by  at 6/26/2019  3:01 PM    Comment:  safety with toileting and commode transfer, UE HEP    Acceptance, E, VU by  at 6/24/2019  7:40 AM    Comment:  beneftis of activity, role of OT                               User Key     Initials Effective Dates Name Provider Type Discipline     06/23/15 -  Monik Child, OT Occupational Therapist OT                OT Recommendation and Plan  Outcome Summary/Treatment Plan (OT)  Daily Summary of Progress (OT): progress toward functional goals as expected  Barriers to Overall Progress (OT): weakness, decreased activity tolerance  Anticipated Discharge Disposition (OT): skilled nursing facility  Planned Therapy Interventions (OT Eval): activity tolerance training, adaptive equipment training, BADL retraining, functional balance retraining, occupation/activity based interventions, strengthening exercise, transfer/mobility retraining  Therapy Frequency (OT Eval): daily  Daily Summary of Progress (OT): progress toward functional goals as expected  Plan of Care Review  Plan of Care Reviewed With: patient  Plan of Care Reviewed With: patient  Outcome Summary: Pt conditional independent supine to sit, setup to brush hair,  supervision to don socks,   supervision STS with RW,  CGA to ambulate  160 ft with RW given 2 standing restbreaks.   Pt with good effort with UE ther ex with noted SOA. Pt is limtied by weakness and decresed activity tolerance.  Continue with OT to advance pt toward increased independnece with self care.   Outcome Measures     Row Name 07/01/19 0745 06/28/19 1520          How much help from another person do you currently need...    Turning from your back to your side while in flat bed without using bedrails?  --  4  -LF     Moving from lying on back to sitting on the side of a flat bed without bedrails?  --  3  -LF     Moving to and from a bed to a chair (including a wheelchair)?  --  3  -LF     Standing up from a chair using your arms (e.g., wheelchair, bedside chair)?  --  3  -LF     Climbing 3-5 steps with a railing?  --  3  -LF     To walk in hospital room?  --  3  -LF     AM-PAC 6 Clicks Score  --  19  -LF        How much help from another is currently needed...    Putting on and taking off regular lower body clothing?  3  -AC  --     Bathing (including washing, rinsing, and drying)  2  -AC  --     Toileting (which includes using toilet bed pan or urinal)  3  -AC  --     Putting on and taking off regular upper body clothing  3  -AC  --     Taking care of personal grooming (such as brushing teeth)  3  -AC  --     Eating meals  4  -AC  --     Score  18  -AC  --        Functional Assessment    Outcome Measure Options  AM-PAC 6 Clicks Daily Activity (OT)  -  AM-PAC 6 Clicks Basic Mobility (PT)  -       User Key  (r) = Recorded By, (t) = Taken By, (c) = Cosigned By    Initials Name Provider Type    AC Monik Child, OT Occupational Therapist    LF Jackie Patterson, PT Physical Therapist           Time Calculation:   Time Calculation- OT     Row Name 07/01/19 0745             Time Calculation- OT    OT Start Time  0745  -      OT Received On  07/01/19  -      OT Goal Re-Cert Due Date  07/04/19  -         Timed Charges    61028 - OT Therapeutic Exercise Minutes  8  -AC       13593 - OT Therapeutic Activity Minutes  15  -AC      10556 - OT Self Care/Mgmt Minutes  2  -AC        User Key  (r) = Recorded By, (t) = Taken By, (c) = Cosigned By    Initials Name Provider Type    AC Monik Child, OT Occupational Therapist        Therapy Charges for Today     Code Description Service Date Service Provider Modifiers Qty    [id number]  OT THER PROC EA 15 MIN 7/1/2019 Monik Child OT GO 1    [id number]  OT THERAPEUTIC ACT EA 15 MIN 7/1/2019 Monik Child OT GO 1               Monik Child OT  7/1/2019

## 2019-07-01 NOTE — PROGRESS NOTES
Case Management Discharge Note    Final Note: [facility] patient.  will call and fax information to [facility]. Family is coming to transport pt home. I called Elysia at [facility] [phone] was not able to leave message that pt was not coming [facility]. [facility] called back to say they do not have enough Physical Therapist to provide HH. Spoke with April at  they do not accept pt's insurance. Spoke with Michel at [facility] they do not have enough Physical Therapist to provide HH. I spoke with Oleksandr at [facility] they will provide PT/OT.    Destination      No service has been selected for the patient.      Durable Medical Equipment      No service has been selected for the patient.      Dialysis/Infusion      No service has been selected for the patient.      Home Medical Care - Selection Complete      Service Provider Request Status Selected Services Address Phone Number Fax Number    [facility] Selected Home Health Services [address] [facility] [address] [phone]      Therapy      No service has been selected for the patient.      Community Resources      No service has been selected for the patient.             Final Discharge Disposition Code: 03 - skilled nursing facility (SNF)

## 2019-07-01 NOTE — THERAPY TREATMENT NOTE
Acute Care - Physical Therapy Treatment Note  [facility]     Patient Name: Narcisa Cotto  : 1940  MRN: [id number]  Today's Date: 2019  Onset of Illness/Injury or Date of Surgery: 19  Date of Referral to PT: 19  Referring Physician: DO Jesika    Admit Date: 2019    Visit Dx:    ICD-10-CM ICD-9-CM   1. Shortness of breath R06.02 786.05   2. Hypoxia R09.02 799.02   3. Elevated brain natriuretic peptide (BNP) level R79.89 790.99   4. Impaired functional mobility, balance, gait, and endurance Z74.09 V49.89   5. Impaired mobility and ADLs Z74.09 799.89   6. Acute deep vein thrombosis (DVT) of distal vein of right lower extremity (CMS/HCC) I82.4Z1 453.42   7. Diabetic peripheral neuropathy (CMS/HCC) E11.42 250.60     357.2   8. Complete heart block (CMS/HCC) I44.2 426.0     Patient Active Problem List   Diagnosis   • Hyperlipidemia LDL goal <70   • Diabetic gastroparesis (CMS/HCC)   • Peripheral arterial disease (CMS/HCC)   • Uncontrolled type 2 diabetes mellitus with complication, with long-term current use of insulin (CMS/HCC)   • Sleep apnea   • Diabetic peripheral neuropathy (CMS/HCC)   • Coronary artery disease involving native coronary artery of native heart with angina pectoris (CMS/HCC)   • GERD (gastroesophageal reflux disease)   • Essential hypertension   • Iron deficiency anemia   • Complete heart block (CMS/HCC)   • CKD (chronic kidney disease) stage 3, GFR 30-59 ml/min (CMS/HCC)   • Shortness of breath   • Acute deep vein thrombosis (DVT) of distal vein of right lower extremity (CMS/HCC)       Therapy Treatment    Rehabilitation Treatment Summary     Row Name 19 1113 19 0745          Treatment Time/Intention    Discipline  physical therapist  -LM  occupational therapist  -AC     Document Type  therapy note (daily note)  -LM  therapy note (daily note)  -AC     Subjective Information  no complaints  -LM  complains of;pain  -AC     Mode of Treatment  individual  therapy;physical therapy  -LM  --     Patient/Family Observations  Pt lying in bed.  Pleasant and agreeable to PT tx.  No family present.  -LM2  Pt received in bed  -AC     Care Plan Review  care plan/treatment goals reviewed;risks/benefits reviewed;patient/other agree to care plan  -LM2  --     Patient Effort  good  -LM2  good  -AC     Existing Precautions/Restrictions  fall  -LM3  fall  -AC     Recorded by [LM] Maritza Albert, PT 07/01/19 1152  [LM2] Maritza Albert, PT 07/01/19 1153  [LM3] Maritza Albert, PT 07/01/19 1157 [AC] Monik Child, OT 07/01/19 0829     Row Name 07/01/19 1113 07/01/19 0745          Vital Signs    Pre Systolic BP Rehab  --  153  -AC     Pre Treatment Diastolic BP  --  65  -AC     Pretreatment Heart Rate (beats/min)  80  -LM  89  -AC     Intratreatment Heart Rate (beats/min)  --  101  -AC     Posttreatment Heart Rate (beats/min)  86  -LM  84  -AC     Pre SpO2 (%)  96  -LM  99  -AC     O2 Delivery Pre Treatment  room air  -LM  room air  -AC     Intra SpO2 (%)  --  94  -AC     O2 Delivery Intra Treatment  --  room air  -AC     Post SpO2 (%)  96  -LM  96  -AC     O2 Delivery Post Treatment  room air  -LM  room air  -AC     Pre Patient Position  Supine  -LM  Supine  -AC     Intra Patient Position  Standing  -LM  Standing  -AC     Post Patient Position  Supine  -LM  Sitting  -AC     Recovery Time  --  ~ 1min  -AC     Rest Breaks   --  2  -AC     Recorded by [LM] Maritza Albert, PT 07/01/19 1157 [AC] Monik Child, OT 07/01/19 0829     Row Name 07/01/19 0745             Cognitive Assessment/Intervention    Additional Documentation  Cognitive Assessment/Intervention (Group)  -AC      Recorded by [AC] Monik Child, OT 07/01/19 0829      Row Name 07/01/19 1113 07/01/19 0744          Cognitive Assessment/Intervention- PT/OT    Affect/Mental Status (Cognitive)  WFL  -LM  WFL  -AC     Orientation Status (Cognition)  oriented x 3  -LM  oriented to  -AC     Follows Commands (Cognition)  WFL  -LM  WFL   -AC     Safety Deficit (Cognitive)  mild deficit;safety precautions awareness;safety precautions follow-through/compliance  -LM  safety precautions awareness;safety precautions follow-through/compliance  -AC     Personal Safety Interventions  fall prevention program maintained;gait belt;muscle strengthening facilitated;nonskid shoes/slippers when out of bed  -LM  fall prevention program maintained;gait belt;nonskid shoes/slippers when out of bed  -AC     Recorded by [LM] Maritza Albert, PT 07/01/19 8677 [AC] Monik Child, OT 07/01/19 0829     Row Name 07/01/19 0745             Safety Issues, Functional Mobility    Impairments Affecting Function (Mobility)  balance;endurance/activity tolerance;strength  -AC      Recorded by [AC] Monik Child, OT 07/01/19 0829      Row Name 07/01/19 1113 07/01/19 0745          Bed Mobility Assessment/Treatment    Supine-Sit Tripp (Bed Mobility)  independent  -LM  conditional independence  -AC     Sit-Supine [place]pp (Bed Mobility)  independent  -LM  --     Assistive Device (Bed Mobility)  --  bed rails;head of bed elevated  -AC     Comment (Bed Mobility)  HOB flat; No BR  -LM  --     Recorded by [LM] Maritza Albert, PT 07/01/19 6527 [AC] Monik Child, OT 07/01/19 0829     Row Name 07/01/19 0713             Functional Mobility    Functional Mobility- Ind. Level  contact guard assist  -AC      Functional Mobility- Device  rolling walker  -AC      Functional Mobility-Distance (Feet)  160  -AC      Functional Mobility- Comment  required 2 standing restbreaks  -AC      Recorded by [AC] Monik Child, OT 07/01/19 0829      Row Name 07/01/19 1113 07/01/19 0745          Transfer Assessment/Treatment    Transfer Assessment/Treatment  sit-stand transfer;stand-sit transfer  -LM  sit-stand transfer;stand-sit transfer  -AC     Recorded by [LM] Maritza Albert, PT 07/01/19 1157 [AC] Monik Child, OT 07/01/19 0829     Row Name 07/01/19 1113 07/01/19 0713          Sit-Stand Transfer     Sit-Stand [place] (Transfers)  stand by assist  -LM  supervision  -AC     Assistive Device (Sit-Stand Transfers)  walker, front-wheeled  -LM  walker, front-wheeled  -AC     Recorded by [LM] Maritza Albert, PT 07/01/19 1157 [AC] Monik Child, OT 07/01/19 0829     Row Name 07/01/19 1113 07/01/19 0745          Stand-Sit Transfer    Stand-Sit [place] (Transfers)  stand by assist  -LM  supervision  -AC     Assistive Device (Stand-Sit Transfers)  walker, front-wheeled  -LM  walker, front-wheeled  -AC     Recorded by [LM] Maritza Albert, PT 07/01/19 1157 [AC] Monik Child, OT 07/01/19 0829     Row Name 07/01/19 0745             Toilet Transfer    Assistive Device (Toilet Transfer)  -- reports she just went to the restroom  -AC      Recorded by [AC] Monik Child, OT 07/01/19 0829      Row Name 07/01/19 1113             Gait/Stairs Assessment/Training    89603 - Gait Training Minutes   15  -LM      Gait/Stairs Assessment/Training  gait/ambulation independence;gait/ambulation assistive device;distance ambulated  -LM      [place] Level (Gait)  contact guard  -LM      Assistive Device (Gait)  walker, front-wheeled  -LM      Distance in Feet (Gait)  168 feet  -LM      Deviations/Abnormal Patterns (Gait)  jose l decreased  -LM      Bilateral Gait Deviations  forward flexed posture  -LM      Comment (Gait/Stairs)  Pt took one standing rest break skilled nursing through gait due to fatigue.  Vc's for upright posture.  -LM      Recorded by [LM] Maritza Albert, PT 07/01/19 1157      Row Name 07/01/19 0745             ADL Assessment/Intervention    46139 - OT Self Care/Mgmt Minutes  2  -AC      BADL Assessment/Intervention  lower body dressing;grooming  -AC      Recorded by [AC] Monik Child, OT 07/01/19 0829      Row Name 07/01/19 0734             Lower Body Dressing Assessment/Training    Lower Body Dressing [place] Level  don;socks;supervision  -AC      Lower Body Dressing Position  long sitting  -AC       Recorded by [AC] Monik Child, OT 07/01/19 0829      Row Name 07/01/19 0745             Grooming Assessment/Training    [place] Level (Grooming)  hair care, combing/brushing;set up  -AC      Grooming Position  edge of bed sitting  -AC      Recorded by [AC] Monik Child, OT 07/01/19 0829      Row Name 07/01/19 1113 07/01/19 0745          Therapeutic Exercise    08810 - PT Therapeutic Exercise Minutes  8  -LM  --     58761 - OT Therapeutic Exercise Minutes  --  8  -AC     90758 - OT Therapeutic Activity Minutes  --  15  -AC     Recorded by [LM] Maritza Albert, PT 07/01/19 1157 [AC] Monik Child, OT 07/01/19 0829     Row Name 07/01/19 1113             Lower Extremity Supine Therapeutic Exercise    Performed, Supine Lower Extremity (Therapeutic Exercise)  hip abduction/adduction;SLR (straight leg raise);quadriceps sets;ankle pumps;heel slides  -LM      Exercise Type, Supine Lower Extremity (Therapeutic Exercise)  AROM (active range of motion)  -LM      Sets/Reps Detail, Supine Lower Extremity (Therapeutic Exercise)  x20 bilaterally  -LM      Recorded by [LM] Maritza Albert, PT 07/01/19 1157      Row Name 07/01/19 0745             Therapeutic Exercise    Upper Extremity Range of Motion (Therapeutic Exercise)  shoulder flexion/extension, bilateral;shoulder horizontal abduction/adduction, bilateral;elbow flexion/extension, bilateral  -AC      Hand (Therapeutic Exercise)  finger flexion/extension, bilateral  -AC      Exercise Type (Therapeutic Exercise)  AROM (active range of motion)  -AC      Position (Therapeutic Exercise)  seated  -AC      Sets/Reps (Therapeutic Exercise)  2/10  -AC      Recorded by [AC] Monik Child, OT 07/01/19 0829      Row Name 07/01/19 1113 07/01/19 0745          Positioning and Restraints    Pre-Treatment Position  in bed  -LM  in bed  -AC     Post Treatment Position  bed  -LM  chair  -AC     In Bed  supine;call light within reach;encouraged to call for assist;notified nsg  -LM  --      In Chair  --  reclined;call light within reach;encouraged to call for assist  -AC     Recorded by [LM] Maritza Albert, PT 07/01/19 1157 [AC] Monik Child, OT 07/01/19 0829     Row Name 07/01/19 1113 07/01/19 0745          Pain Scale: Numbers Pre/Post-Treatment    Pain Scale: Numbers, Pretreatment  7/10  -LM  4/10  -AC     Pain Scale: Numbers, Post-Treatment  0/10 - no pain  -LM  4/10  -AC     Pain Location - Side  Left  -LM  Left  -AC     Pain Location - Orientation  generalized  -LM  --     Pain Location  flank Ribs  -LM  back  -AC     Pain Intervention(s)  Repositioned;Ambulation/increased activity  -LM  Repositioned  -AC     Recorded by [LM] Maritza Albert, PT 07/01/19 1157 [AC] Monik Child, OT 07/01/19 0829     Row Name 07/01/19 1113             Plan of Care Review    Plan of Care Reviewed With  patient  -LM      Recorded by [LM] Maritza Albert, PT 07/01/19 1157      Row Name 07/01/19 0745             Outcome Summary/Treatment Plan (OT)    Daily Summary of Progress (OT)  progress toward functional goals as expected  -AC      Barriers to Overall Progress (OT)  weakness, decreased activity tolerance  -AC      Anticipated Discharge Disposition (OT)  skilled nursing facility  -AC      Recorded by [AC] Monik Child, OT 07/01/19 0829      Row Name 07/01/19 1113             Outcome Summary/Treatment Plan (PT)    Daily Summary of Progress (PT)  progress toward functional goals is good  -LM      Recorded by [LM] Maritza Albert, PT 07/01/19 115        User Key  (r) = Recorded By, (t) = Taken By, (c) = Cosigned By    Initials Name Effective Dates Discipline    AC Monik Child, OT 06/23/15 -  OT    LM Maritza Albert, PT 06/15/16 -  PT                   Physical Therapy Education     Title: PT OT SLP Therapies (In Progress)     Topic: Physical Therapy (Done)     Point: Mobility training (Done)     Learning Progress Summary           Patient Acceptance, E,D, VU,NR by  at 6/28/2019  3:20 PM    Acceptance, E,D, VU,NR by   at 6/23/2019 11:22 AM                   Point: Home exercise program (Done)     Learning Progress Summary           Patient Acceptance, E,D, VU,NR by  at 6/28/2019  3:20 PM    Acceptance, E,D, VU,NR by  at 6/23/2019 11:22 AM                   Point: Body mechanics (Done)     Learning Progress Summary           Patient Acceptance, E,D, VU,NR by  at 6/28/2019  3:20 PM    Acceptance, E,D, VU,NR by  at 6/23/2019 11:22 AM                   Point: Precautions (Done)     Learning Progress Summary           Patient Acceptance, E,D, VU,NR by  at 6/28/2019  3:20 PM    Acceptance, E,D, VU,NR by  at 6/23/2019 11:22 AM                               User Key     Initials Effective Dates Name Provider Type Discipline     06/08/18 -  Jackie Patterson, PT Physical Therapist PT                PT Recommendation and Plan     Outcome Summary/Treatment Plan (PT)  Daily Summary of Progress (PT): progress toward functional goals is good  Plan of Care Reviewed With: patient  Outcome Summary: PT evaluation completed on this date.  Pt transferred supine<-->sit independently, stood with SBA, and ambulated 168 feet using rw with CGAx1.  Pt tolerated BLE ther ex well without complaint.  Continue with skilled PT to improve mobility and safety prior to d/c.  Outcome Measures     Row Name 07/01/19 1113 07/01/19 0745 06/28/19 1520       How much help from another person do you currently need...    Turning from your back to your side while in flat bed without using bedrails?  4  -LM  --  4  -LF    Moving from lying on back to sitting on the side of a flat bed without bedrails?  4  -LM  --  3  -LF    Moving to and from a bed to a chair (including a wheelchair)?  3  -LM  --  3  -LF    Standing up from a chair using your arms (e.g., wheelchair, bedside chair)?  3  -LM  --  3  -LF    Climbing 3-5 steps with a railing?  3  -LM  --  3  -LF    To walk in hospital room?  3  -LM  --  3  -LF    AM-PAC 6 Clicks Score  20  -LM  --  19  -LF        How much help from another is currently needed...    Putting on and taking off regular lower body clothing?  --  3  -AC  --    Bathing (including washing, rinsing, and drying)  --  2  -AC  --    Toileting (which includes using toilet bed pan or urinal)  --  3  -AC  --    Putting on and taking off regular upper body clothing  --  3  -AC  --    Taking care of personal grooming (such as brushing teeth)  --  3  -AC  --    Eating meals  --  4  -AC  --    Score  --  18  -AC  --       Functional Assessment    Outcome Measure Options  AM-PAC 6 Clicks Basic Mobility (PT)  -LM  AM-PAC 6 Clicks Daily Activity (OT)  -AC  AM-PAC 6 Clicks Basic Mobility (PT)  -LF      User Key  (r) = Recorded By, (t) = Taken By, (c) = Cosigned By    Initials Name Provider Type    AC Monik Child, OT Occupational Therapist    LM Maritza Albert, PT Physical Therapist    LF Jackie Patterson, PT Physical Therapist         Time Calculation:   PT Charges     Row Name 07/01/19 1113             Time Calculation    Start Time  1113  -LM      PT Received On  07/01/19  -LM      PT Goal Re-Cert Due Date  07/03/19  -LM         Timed Charges    23352 - PT Therapeutic Exercise Minutes  8  -LM      51264 - Gait Training Minutes   15  -LM        User Key  (r) = Recorded By, (t) = Taken By, (c) = Cosigned By    Initials Name Provider Type    LM Maritza Albert, PT Physical Therapist        Therapy Charges for Today     Code Description Service Date Service Provider Modifiers Qty    95420993944 HC GAIT TRAINING EA 15 MIN 7/1/2019 Maritza Albert, PT GP 1    73412070229 HC PT THER PROC EA 15 MIN 7/1/2019 Maritza Albert, PT GP 1          PT G-Codes  Outcome Measure Options: AM-PAC 6 Clicks Basic Mobility (PT)  AM-PAC 6 Clicks Score: 20  Score: 18    Maritza Albert PT  7/1/2019

## 2019-07-01 NOTE — PLAN OF CARE
Problem: Patient Care Overview  Goal: Plan of Care Review  Outcome: Ongoing (interventions implemented as appropriate)   07/01/19 0768   Coping/Psychosocial   Plan of Care Reviewed With patient   Plan of Care Review   Progress improving   OTHER   Outcome Summary Pt conditional independent supine to sit, setup to brush hair, supervision to don socks, supervision STS with RW, CGA to ambulate 160 ft with RW given 2 standing restbreaks. Pt with good effort with UE ther ex with noted SOA. Pt is limtied by weakness and decresed activity tolerance. Continue with OT to advance pt toward increased independnece with self care.

## 2019-07-01 NOTE — PLAN OF CARE
Problem: Patient Care Overview  Goal: Plan of Care Review  Outcome: Ongoing (interventions implemented as appropriate)   07/01/19 1113   Coping/Psychosocial   Plan of Care Reviewed With patient   OTHER   Outcome Summary PT evaluation completed on this date. Pt transferred supine<-->sit independently, stood with SBA, and ambulated 168 feet using rw with CGAx1. Pt tolerated BLE ther ex well without complaint. Continue with skilled PT to improve mobility and safety prior to d/c.

## 2019-07-01 NOTE — PLAN OF CARE
Problem: Patient Care Overview  Goal: Plan of Care Review  Outcome: Ongoing (interventions implemented as appropriate)    Goal: Discharge Needs Assessment  Outcome: Ongoing (interventions implemented as appropriate)    Goal: Interprofessional Rounds/Family Conf  Outcome: Ongoing (interventions implemented as appropriate)      Problem: Fall Risk (Adult)  Goal: Absence of Fall  Outcome: Ongoing (interventions implemented as appropriate)      Problem: VTE, DVT and PE (Adult)  Goal: Signs and Symptoms of Listed Potential Problems Will be Absent, Minimized or Managed (VTE, DVT and PE)  Outcome: Ongoing (interventions implemented as appropriate)      Problem: Pain, Acute (Adult)  Goal: Acceptable Pain Control/Comfort Level  Outcome: Ongoing (interventions implemented as appropriate)      Problem: Breathing Pattern Ineffective (Adult)  Goal: Effective Oxygenation/Ventilation  Outcome: Ongoing (interventions implemented as appropriate)    Goal: Anxiety/Fear Reduction  Outcome: Ongoing (interventions implemented as appropriate)

## 2019-07-01 NOTE — PROGRESS NOTES
Continued Stay Note  [facility]     Patient Name: Narcisa Cotto  MRN: [id number]  Today's Date: 7/1/2019    Admit Date: 6/21/2019    Discharge Plan     Row Name 07/01/19 1100       Plan    Plan  Update    Plan Comments  Sp[facility] with Elysia at [facility] she is still waiting on precert. CM will still follow as needed.    Final Discharge Disposition Code  03 - skilled nursing facility (SNF)        Discharge Codes    No documentation.       Expected Discharge Date and Time     Expected Discharge Date Expected Discharge Time    Jun 27, 2019             Enedelia Burk RN

## 2019-07-02 ENCOUNTER — TELEPHONE (OUTPATIENT)
Dept: INTERNAL MEDICINE | Facility: CLINIC | Age: 79
End: 2019-07-02

## 2019-07-02 ENCOUNTER — TRANSITIONAL CARE MANAGEMENT TELEPHONE ENCOUNTER (OUTPATIENT)
Dept: INTERNAL MEDICINE | Facility: CLINIC | Age: 79
End: 2019-07-02

## 2019-07-02 ENCOUNTER — READMISSION MANAGEMENT (OUTPATIENT)
Dept: CALL CENTER | Facility: HOSPITAL | Age: 79
End: 2019-07-02

## 2019-07-02 ENCOUNTER — DOCUMENTATION (OUTPATIENT)
Dept: CARDIAC REHAB | Facility: HOSPITAL | Age: 79
End: 2019-07-02

## 2019-07-02 NOTE — TELEPHONE ENCOUNTER
ANDREW FROM [facility] WOULD LIKE A VERBAL FOR PT TO HAVE PT OT AND SKILLED NURSING; SHE WOULD LIKE THE VERBAL IN THE MORNING BECAUSE THEY WOULD LIKE TO START PT. THEN; PLEASE CALL ANDREW AT [phone]

## 2019-07-02 NOTE — OUTREACH NOTE
Prep Survey      Responses   Facility patient discharged from?  [facility]   Is patient eligible?  No   What are the reasons patient is not eligible?  [facility]   Does the patient have one of the following disease processes/diagnoses(primary or secondary)?  Other   Prep survey completed?  Yes          Carolyn Guzman RN

## 2019-07-02 NOTE — PROGRESS NOTES
Pt. Referred for Phase II Cardiac Rehab. Staff discussed benefits of exercise, program protocol, and educational material provided. Teach back verified.  Permission granted from patient for staff to fax referral information to outlying program at this time.  Staff to fax referral info to [facility].

## 2019-07-02 NOTE — TELEPHONE ENCOUNTER
Called to make sure pt received her discharge medications.  States she is waiting to hear from her pharmacy that they are filled. Upon looking at prescribed meds, looks like that meds were sent to [facility] [place], operated by [facility].  There was not a default pharmacy in her chart, which I  added her [facility] in [place].  I talked with Mago (pharmacist at [facility] [place], [facility]) and she will contact her [facility] and transfer her eliquis and metoprolol tartrate.  Pt knows to call me back if there are any problems with getting her medicines.

## 2019-07-02 NOTE — DISCHARGE PLACEMENT REQUEST
"Narcisa Garcia (79 y.o. Female)     Alanna Blanco, RN  [phone]    Date of Birth Social Security Number Address Home Phone MRN    1940  [address] [place] [address] [phone] [id number]    [place] Marital Status          [place]        Admission Date Admission Type Admitting Provider Attending Provider Department, Room/Bed    19 Emergency Zandra Dias MD  [address], S579/1    Discharge Date Discharge Disposition Discharge Destination        2019 Home or Self Care              Attending Provider:  (none)   Allergies:  Codeine    Isolation:  None   Infection:  None   Code Status:  Prior    Ht:  167.6 cm (66\")   Wt:  71.4 kg (157 lb 6.4 oz)    Admission Cmt:  None   Principal Problem:  Acute deep vein thrombosis (DVT) of distal vein of right lower extremity (CMS/HCC) [I82.4Z1] More...                 Active Insurance as of 2019     Primary Coverage     Payor Plan Insurance Group Employer/Plan Group    ANTHEM MEDICARE REPLACEMENT ANTHEM MEDICARE ADVANTAGE [id number]     Payor Plan Address Payor Plan Phone Number Payor Plan Fax Number Effective Dates    [address] [phone]  2019 - None Entered    [facility] [address]       Subscriber Name Subscriber Birth Date Member ID       NARCISA GARCIA 1940 [id number]                 Emergency Contacts      (Rel.) Home Phone Work Phone Mobile Phone    Shanna Loja (Daughter) [phone] -- --    Barb Ellis (Daughter) [phone] -- [phone]               Discharge Summary      Zandra Dias MD at 2019  9:50 AM              [facility]  DISCHARGE SUMMARY    Patient Name: Narcisa Garcia  : 1940  MRN: [id number]    Date of Admission: 2019  Date of Discharge:  2019  Primary Care Physician: Mariia Del Real DO    Consults     Date and Time Order Name Status Description    2019 " 2114 Inpatient Cardiology Consult Completed     5/24/2019 0848 Inpatient Cardiothoracic Surgery Consult Completed     5/20/2019 0942 Inpatient Nephrology Consult Completed           Hospital Course     Presenting Problem:   Dyspnea [R06.00]  Shortness of breath [R06.02]  Shortness of breath [R06.02]    Active Hospital Problems    Diagnosis  POA   • **Acute deep vein thrombosis (DVT) of distal vein of right lower extremity (CMS/[facility]) [I82.4Z1]  Yes     Priority: High   • CKD (chronic kidney disease) stage 3, GFR 30-59 ml/min (CMS/[facility]) [N18.3]  Yes   • Shortness of breath [R06.02]  Yes   • Essential hypertension [I10]  Yes   • Coronary artery disease involving native coronary artery of native heart with angina pectoris (CMS/[facility]) [I25.119]  Yes   • Sleep apnea [G47.30]  Yes   • Uncontrolled type 2 diabetes mellitus with complication, with long-term current use of insulin (CMS/[facility]) [E11.8, E11.65, Z79.4]  Not Applicable   • Peripheral arterial disease (CMS/[facility]) [I73.9]  Yes      Resolved Hospital Problems    Diagnosis Date Resolved POA   • Dyspnea [R06.00] 06/25/2019 Yes          Hospital Course:  Narcisa Cotto is a 79 y.o. female with past medical history of HTN, DM, CKD, CAD s/p recent stent to RCA who presents with dyspnea and weakness. Found to have UTI and RLE DVT. She was admitted to our service and underwent CT chest which showed no acute process, a lung perfusion scan showed low probability of PE.  RLE duplex showed a DVT and she was started on Eliquis.  As mentioned, she had a UTI upon admission- Cultures grew Klebsiella and she has since completed five days of Rocephin.  Of note, she had a recent PATRICIA x 2 to RCA- she will continue her plavix, metoprolol, statin, but will d/c her ASA given the addition of Eliquis to her regimen.  She will follow up with Dr. Mayberry in one month. Of note, she will be going home with home health and her granddaughter's assistance for the time being.           Discharge Follow  Up Recommendations for labs/diagnostics:   with PCP in one week, with Dr. Mayberry in one month.     Day of Discharge     HPI:   Doing well this am, denies any issues overnight. Anxious to go home.     Review of Systems  Gen- No fevers, chills  CV- No chest pain, palpitations  Resp- No cough, dyspnea  GI- No N/V/D, abd pain    Otherwise ROS is negative except as mentioned in the HPI.    Vital Signs:   Temp:  [97.6 °F (36.4 °C)-98.5 °F (36.9 °C)] 97.6 °F (36.4 °C)  Heart Rate:  [85-92] 85  Resp:  [18] 18  BP: (126-153)/(54-65) 128/57     Physical Exam:  Constitutional: No acute distress, awake, alert  HENT: NCAT, mucous membranes moist  Respiratory: Clear to auscultation bilaterally, respiratory effort normal   Cardiovascular: RRR, no murmurs, rubs, or gallops, palpable pedal pulses bilaterally  Gastrointestinal: Positive bowel sounds, soft, nontender, nondistended  Musculoskeletal: No bilateral ankle edema  Psychiatric: Appropriate affect, cooperative  Neurologic: Oriented x 3, strength symmetric in all extremities, Cranial Nerves grossly intact to confrontation, speech clear  Skin: No rashes  Pertinent  and/or Most Recent Results     Results from last 7 days   Lab Units 06/30/19  1346 06/29/19  0304   WBC 10*3/mm3 4.63 4.14   HEMOGLOBIN g/dL 11.2* 10.1*   HEMATOCRIT % 34.6 31.1*   PLATELETS 10*3/mm3 179 148   SODIUM mmol/L 134* 136   POTASSIUM mmol/L 4.4 4.1   CHLORIDE mmol/L 95* 98   CO2 mmol/L 27.0 24.0   BUN mg/dL 24* 28*   CREATININE mg/dL 1.16* 1.30*   GLUCOSE mg/dL 265* 212*   CALCIUM mg/dL 10.3 9.5           Invalid input(s): PROT, LABALBU        Invalid input(s): TG, LDLCALC, LDLREALC        Brief Urine Lab Results  (Last result in the past 365 days)      Color   Clarity   Blood   Leuk Est   Nitrite   Protein   CREAT   Urine HCG        06/21/19 1715 Yellow Cloudy Trace Moderate (2+) Negative 100 mg/dL (2+)               Microbiology Results Abnormal     Procedure Component Value - Date/Time    Urine Culture  - Urine, Urine, Clean Catch [[id number]]  (Abnormal)  (Susceptibility) Collected:  06/21/19 1715    Lab Status:  Final result Specimen:  Urine, Clean Catch Updated:  06/23/19 0345     Urine Culture >100,000 CFU/mL Klebsiella pneumoniae ssp pneumoniae    Susceptibility      Klebsiella pneumoniae ssp pneumoniae     NICK     Ampicillin Resistant     Ampicillin + Sulbactam Susceptible     Cefazolin Susceptible     Cefepime Susceptible     Ceftazidime Susceptible     Ceftriaxone Susceptible     Gentamicin Susceptible     Levofloxacin Susceptible     Nitrofurantoin Intermediate     Piperacillin + Tazobactam Susceptible     Tetracycline Susceptible     Trimethoprim + Sulfamethoxazole Susceptible                          Imaging Results (all)     Procedure Component Value Units Date/Time    NM Lung Ventilation Perfusion [[id number]] Collected:  06/21/19 1708     Updated:  06/22/19 1215    Narrative:       EXAMINATION: NM LUNG VENTILATION PERFUSION - 06/21/2019     INDICATION: Shortness of breath. Evaluate for pulmonary embolus.      TECHNIQUE: 14.8 mCi of Xenon-133 was inhaled and wash-in, equilibrium,  and washout images were obtained of the chest in the posterior  projection. Perfusion imaging was obtained after 5.5 mCi of Technetium  99 MAA was injected intravenously. Imaging is obtained of the lung  fields in the anterior, posterior, lateral, and oblique projections.     COMPARISON: NONE     FINDINGS: There is homogeneous uptake of tracer seen throughout the lung  fields on ventilation imaging with slight delay in washout of tracer of  the lung bases suggesting possibly a mild obstructive component. There  is homogeneous uptake of tracer seen throughout the lung fields  bilaterally on perfusion imaging. There is mild enlargement of the  heart.       Impression:       Mild obstructive component at the lung bases bilaterally  with low probability for pulmonary embolism.     DICTATED:   06/21/2019  EDITED/ls :   06/21/2019           This report was finalized on 6/22/2019 12:12 PM by Dr. Delphine Slaughter MD.       CT Chest Without Contrast [[id number]] Collected:  06/21/19 1611     Updated:  06/22/19 1215    Narrative:       EXAMINATION: CT CHEST WO CONTRAST - 06/21/2019     INDICATION: Shortness of breath, chest pain.     TECHNIQUE: Multiple axial CT imaging is obtained of the chest without  the administration of intravenous contrast.     The radiation dose reduction device was turned on for each scan per the  ALARA (As Low as Reasonably Achievable) protocol.     COMPARISON: NONE     FINDINGS: Thyroid is heterogeneous in appearance. No mediastinal mass.  Small lymph nodes identified scattered throughout the mediastinum.  Vascular calcification seen within the thoracic aorta. Coronary artery  calcifications identified. No pericardial effusion. No bulky hilar or  axillary lymphadenopathy. Small hiatal hernia. The lung parenchyma  reveals no parenchymal consolidation, pulmonary mass or nodule. No  pleural effusion or pneumothorax. Degenerative changes seen within the  spine. Visualized upper abdomen is unremarkable.       Impression:       No acute intrathoracic abnormality.     DICTATED:   06/21/2019  EDITED/ls :   06/21/2019         This report was finalized on 6/22/2019 12:12 PM by Dr. Delphine Slaughter MD.       XR Chest 1 View [[id number]] Collected:  06/21/19 1444     Updated:  06/21/19 1716    Narrative:       EXAMINATION: XR CHEST 1 VW-06/21/2019:      INDICATION: SOA, triage protocol.      COMPARISON: 05/28/2019.     FINDINGS: Portable chest reveals cardiac and mediastinal silhouettes to  be within normal limits. The lung fields are grossly clear. Vascular  calcification seen within the thoracic aorta. No pleural effusion or  pneumothorax. The bony structures are unremarkable. Pulmonary  vascularity is within normal limits.           Impression:       Stable chest, no acute cardiopulmonary disease.     D:  06/21/2019  E:   06/21/2019     This report was finalized on 6/21/2019 5:13 PM by Dr. Delphine Slaughter MD.             Results for orders placed during the hospital encounter of 06/21/19   Duplex Venous Lower Extremity - Right CAR    Narrative · Evidence of an isolated DVT in 1 of the right peroneal veins.          Results for orders placed during the hospital encounter of 06/21/19   Duplex Venous Lower Extremity - Right CAR    Narrative · Evidence of an isolated DVT in 1 of the right peroneal veins.          Results for orders placed during the hospital encounter of 06/21/19   Adult Transthoracic Echo Limited W/ Cont if Necessary Per Protocol    Narrative · Normal left ventricular systolic function  · MAC with trace MR  · Mild aortic valve sclerosis            Discharge Details        Discharge Medications      New Medications      Instructions Start Date   apixaban 5 MG tablet tablet  Commonly known as:  ELIQUIS   5 mg, Oral, Every 12 Hours Scheduled         Changes to Medications      Instructions Start Date   metoprolol tartrate 50 MG tablet  Commonly known as:  LOPRESSOR  What changed:    · medication strength  · how much to take  · when to take this   50 mg, Oral, 2 Times Daily         Continue These Medications      Instructions Start Date   amLODIPine 5 MG tablet  Commonly known as:  NORVASC   5 mg, Oral, Daily      cholecalciferol 1000 units tablet  Commonly known as:  VITAMIN D3   1,000 Units, Oral, Daily      clopidogrel 75 MG tablet  Commonly known as:  PLAVIX   75 mg, Oral, Daily, Resume Sunday      [employer] VITAMIN B-12 1000 MCG tablet  Generic drug:  cyanocobalamin   1 tablet, Oral, Daily      gabapentin 600 MG tablet  Commonly known as:  NEURONTIN   600 mg, Oral, 2 Times Daily      insulin NPH-insulin regular (70-30) 100 UNIT/ML injection  Commonly known as:  humuLIN 70/30,novoLIN 70/30   20 Units, Subcutaneous, 2 Times Daily Before Meals, 20 units with breakfast, 20 units with lunch and 50 units with dinner.        insulin NPH-insulin regular (70-30) 100 UNIT/ML injection  Commonly known as:  humuLIN 70/30,novoLIN 70/30   50 Units, Subcutaneous, Daily With Dinner, 20 units with breakfast, 20 units with lunch and 50 units with dinner.       meclizine 25 MG tablet  Commonly known as:  ANTIVERT   25 mg, Oral, 3 Times Daily PRN      nitroglycerin 0.4 MG SL tablet  Commonly known as:  NITROSTAT   0.4 mg, Sublingual, Every 5 Minutes PRN, Take no more than 3 doses in 15 minutes.      ondansetron ODT 8 MG disintegrating tablet  Commonly known as:  ZOFRAN-ODT   8 mg, Oral, Every 8 Hours PRN      pantoprazole 40 MG EC tablet  Commonly known as:  PROTONIX   40 mg, Oral, 2 Times Daily      rosuvastatin 20 MG tablet  Commonly known as:  CRESTOR   20 mg, Oral, Daily         Stop These Medications    aspirin 81 MG tablet  Commonly known as:  ASPIRIN LOW DOSE        ASK your doctor about these medications      Instructions Start Date   apixaban 5 MG tablet tablet  Commonly known as:  ELIQUIS  Ask about: Should I take this medication?   10 mg, Oral, Every 12 Hours Scheduled             Allergies   Allergen Reactions   • Codeine Nausea And Vomiting         Discharge Disposition:  Home or Self Care    Discharge Diet:  Diet Order   Procedures   • Diet Regular; Cardiac, Consistent Carbohydrate         Discharge Activity:         CODE STATUS:    Code Status and Medical Interventions:   Ordered at: 06/21/19 2114     Code Status:    CPR     Medical Interventions (Level of Support Prior to Arrest):    Full         Future Appointments   Date Time Provider Department Center   7/30/2019 11:20 AM Leanna Parikh APRN [facility] None   7/31/2019  3:30 PM Mariia Del Real DO [facility] None   7/31/2019  4:00 PM Jennifer Richards MD [facility] None   8/1/2019  9:45 AM Mariia Del Real DO [facility] None       Additional Instructions for the Follow-ups that You Need to Schedule     Ambulatory Referral to Home Health   As directed      Face to  Face Visit Date:  6/25/2019    Follow-up Provider for Plan of Care?:  I treated the patient in an acute care facility and will not continue treatment after discharge.    Follow-up Provider:  GAETANO DURHAM [[id number]]    Reason/Clinical Findings:  generalized weakness, DVT    Describe mobility limitations that make leaving home difficult:  generalized weakness    Nursing/Therapeutic Services Requested:  Physical Therapy Occupational Therapy Skilled Nursing    Skilled nursing orders:  Other (type 2 diabetes) Cardiopulmonary assessments    PT orders:  Strengthening Home safety assessment    Occupational orders:  Home safety assessment    Frequency:  1 Week 1         Discharge Follow-up with PCP   As directed       Currently Documented PCP:    Gaetano Durham DO    PCP Phone Number:    [phone]     Follow Up Details:  in one week with PCP         Discharge Follow-up with Specialty: Anton Mayberry; 1 Month   As directed      Specialty:  Anton Mayberry    Follow Up:  1 Month    Follow Up Details:  Hospital FU for PCI, DVT, and RLE claudication               Time Spent on Discharge: 35 minutes    Electronically signed by Zandra Dias MD, 07/01/19, 11:50 AM.      Electronically signed by Zandra Dias MD at 7/1/2019 11:54 AM

## 2019-07-02 NOTE — OUTREACH NOTE
"RIVERA call completed and has upcoming hospital follow up with PCP Dr Mariia Del Real 7/10/19. See TCM call flowsheet for details.    Pt c/o feeling tired but states weakness has improved. Denies any sob except when she \"gets too tired from walking\". She states ate a baked potato yesterday evening and bowl of grits this am and tolerated well. Denies n/v, fever/chills, pain, bowel/bladder issues. She states HH is suppose to come out either today or tomorrow. She hasn't received her eliquis yet...refer to telephone encounter dated 7/2/19 for details. PCP appt confirmed.  "

## 2019-07-03 NOTE — TELEPHONE ENCOUNTER
Erin called to check on this please call her back today they wanted to get started today with the pt

## 2019-07-05 ENCOUNTER — TELEPHONE (OUTPATIENT)
Dept: INTERNAL MEDICINE | Facility: CLINIC | Age: 79
End: 2019-07-05

## 2019-07-07 ENCOUNTER — APPOINTMENT (OUTPATIENT)
Dept: GENERAL RADIOLOGY | Facility: HOSPITAL | Age: 79
End: 2019-07-07

## 2019-07-07 ENCOUNTER — APPOINTMENT (OUTPATIENT)
Dept: MRI IMAGING | Facility: HOSPITAL | Age: 79
End: 2019-07-07

## 2019-07-07 ENCOUNTER — APPOINTMENT (OUTPATIENT)
Dept: CT IMAGING | Facility: HOSPITAL | Age: 79
End: 2019-07-07

## 2019-07-07 ENCOUNTER — HOSPITAL ENCOUNTER (INPATIENT)
Facility: HOSPITAL | Age: 79
LOS: 5 days | Discharge: HOME HEALTH | End: 2019-07-12
Attending: EMERGENCY MEDICINE | Admitting: HOSPITALIST

## 2019-07-07 ENCOUNTER — APPOINTMENT (OUTPATIENT)
Dept: CARDIOLOGY | Facility: HOSPITAL | Age: 79
End: 2019-07-07

## 2019-07-07 DIAGNOSIS — E16.2 HYPOGLYCEMIA: ICD-10-CM

## 2019-07-07 DIAGNOSIS — Z78.9 IMPAIRED MOBILITY AND ADLS: ICD-10-CM

## 2019-07-07 DIAGNOSIS — E16.2 ACUTE METABOLIC ENCEPHALOPATHY DUE TO HYPOGLYCEMIA: ICD-10-CM

## 2019-07-07 DIAGNOSIS — R47.1 DYSARTHRIA: ICD-10-CM

## 2019-07-07 DIAGNOSIS — R13.10 DYSPHAGIA, UNSPECIFIED TYPE: ICD-10-CM

## 2019-07-07 DIAGNOSIS — I63.9 CEREBROVASCULAR ACCIDENT (CVA), UNSPECIFIED MECHANISM (HCC): Primary | ICD-10-CM

## 2019-07-07 DIAGNOSIS — Z74.09 IMPAIRED MOBILITY AND ADLS: ICD-10-CM

## 2019-07-07 DIAGNOSIS — G93.41 ACUTE METABOLIC ENCEPHALOPATHY DUE TO HYPOGLYCEMIA: ICD-10-CM

## 2019-07-07 DIAGNOSIS — R53.1 WEAKNESS: ICD-10-CM

## 2019-07-07 LAB
ALBUMIN SERPL-MCNC: 3.8 G/DL (ref 3.5–5.2)
ALBUMIN/GLOB SERPL: 1.2 G/DL
ALP SERPL-CCNC: 73 U/L (ref 39–117)
ALT SERPL W P-5'-P-CCNC: 41 U/L (ref 1–33)
ANION GAP SERPL CALCULATED.3IONS-SCNC: 17 MMOL/L (ref 5–15)
AST SERPL-CCNC: 39 U/L (ref 1–32)
BASOPHILS # BLD AUTO: 0.01 10*3/MM3 (ref 0–0.2)
BASOPHILS NFR BLD AUTO: 0.2 % (ref 0–1.5)
BH CV ECHO MEAS - AO MAX PG (FULL): 7.7 MMHG
BH CV ECHO MEAS - AO MAX PG: 13.8 MMHG
BH CV ECHO MEAS - AO MEAN PG (FULL): 4.2 MMHG
BH CV ECHO MEAS - AO MEAN PG: 7.8 MMHG
BH CV ECHO MEAS - AO ROOT AREA (BSA CORRECTED): 1.6
BH CV ECHO MEAS - AO ROOT AREA: 6.6 CM^2
BH CV ECHO MEAS - AO ROOT DIAM: 2.9 CM
BH CV ECHO MEAS - AO V2 MAX: 186 CM/SEC
BH CV ECHO MEAS - AO V2 MEAN: 131.4 CM/SEC
BH CV ECHO MEAS - AO V2 VTI: 37.8 CM
BH CV ECHO MEAS - AVA(I,A): 1.7 CM^2
BH CV ECHO MEAS - AVA(I,D): 1.7 CM^2
BH CV ECHO MEAS - AVA(V,A): 1.8 CM^2
BH CV ECHO MEAS - AVA(V,D): 1.8 CM^2
BH CV ECHO MEAS - BSA(HAYCOCK): 1.9 M^2
BH CV ECHO MEAS - BSA: 1.9 M^2
BH CV ECHO MEAS - BZI_BMI: 27.3 KILOGRAMS/M^2
BH CV ECHO MEAS - BZI_METRIC_HEIGHT: 167.6 CM
BH CV ECHO MEAS - BZI_METRIC_WEIGHT: 76.7 KG
BH CV ECHO MEAS - EDV(CUBED): 52.6 ML
BH CV ECHO MEAS - EDV(MOD-SP4): 71 ML
BH CV ECHO MEAS - EDV(TEICH): 59.9 ML
BH CV ECHO MEAS - EF(CUBED): 63.2 %
BH CV ECHO MEAS - EF(MOD-BP): 60 %
BH CV ECHO MEAS - EF(MOD-SP4): 56.3 %
BH CV ECHO MEAS - EF(TEICH): 55.5 %
BH CV ECHO MEAS - ESV(CUBED): 19.4 ML
BH CV ECHO MEAS - ESV(MOD-SP4): 31 ML
BH CV ECHO MEAS - ESV(TEICH): 26.6 ML
BH CV ECHO MEAS - FS: 28.4 %
BH CV ECHO MEAS - IVS/LVPW: 0.84
BH CV ECHO MEAS - IVSD: 0.82 CM
BH CV ECHO MEAS - LA DIMENSION: 3.4 CM
BH CV ECHO MEAS - LA/AO: 1.2
BH CV ECHO MEAS - LAD MAJOR: 5.8 CM
BH CV ECHO MEAS - LAT PEAK E' VEL: 8.9 CM/SEC
BH CV ECHO MEAS - LATERAL E/E' RATIO: 15.9
BH CV ECHO MEAS - LV DIASTOLIC VOL/BSA (35-75): 38.1 ML/M^2
BH CV ECHO MEAS - LV MASS(C)D: 98.5 GRAMS
BH CV ECHO MEAS - LV MASS(C)DI: 52.9 GRAMS/M^2
BH CV ECHO MEAS - LV MAX PG: 6.1 MMHG
BH CV ECHO MEAS - LV MEAN PG: 3.6 MMHG
BH CV ECHO MEAS - LV SYSTOLIC VOL/BSA (12-30): 16.6 ML/M^2
BH CV ECHO MEAS - LV V1 MAX: 123.4 CM/SEC
BH CV ECHO MEAS - LV V1 MEAN: 88.8 CM/SEC
BH CV ECHO MEAS - LV V1 VTI: 24.2 CM
BH CV ECHO MEAS - LVIDD: 3.7 CM
BH CV ECHO MEAS - LVIDS: 2.7 CM
BH CV ECHO MEAS - LVLD AP4: 6.7 CM
BH CV ECHO MEAS - LVLS AP4: 5.6 CM
BH CV ECHO MEAS - LVOT AREA (M): 2.8 CM^2
BH CV ECHO MEAS - LVOT AREA: 2.7 CM^2
BH CV ECHO MEAS - LVOT DIAM: 1.9 CM
BH CV ECHO MEAS - LVPWD: 0.97 CM
BH CV ECHO MEAS - MED PEAK E' VEL: 4.9 CM/SEC
BH CV ECHO MEAS - MEDIAL E/E' RATIO: 28.4
BH CV ECHO MEAS - MV A MAX VEL: 192.7 CM/SEC
BH CV ECHO MEAS - MV E MAX VEL: 144 CM/SEC
BH CV ECHO MEAS - MV E/A: 0.75
BH CV ECHO MEAS - PA ACC SLOPE: 1102 CM/SEC^2
BH CV ECHO MEAS - PA ACC TIME: 0.1 SEC
BH CV ECHO MEAS - PA PR(ACCEL): 36.2 MMHG
BH CV ECHO MEAS - RAP SYSTOLE: 3 MMHG
BH CV ECHO MEAS - RVSP: 41 MMHG
BH CV ECHO MEAS - SI(AO): 133.2 ML/M^2
BH CV ECHO MEAS - SI(CUBED): 17.9 ML/M^2
BH CV ECHO MEAS - SI(LVOT): 35.1 ML/M^2
BH CV ECHO MEAS - SI(MOD-SP4): 21.5 ML/M^2
BH CV ECHO MEAS - SI(TEICH): 17.9 ML/M^2
BH CV ECHO MEAS - SV(AO): 248.1 ML
BH CV ECHO MEAS - SV(CUBED): 33.3 ML
BH CV ECHO MEAS - SV(LVOT): 65.3 ML
BH CV ECHO MEAS - SV(MOD-SP4): 40 ML
BH CV ECHO MEAS - SV(TEICH): 33.3 ML
BH CV ECHO MEAS - TAPSE (>1.6): 3.5 CM2
BH CV ECHO MEAS - TR MAX PG: 38 MMHG
BH CV ECHO MEAS - TR MAX VEL: 308.1 CM/SEC
BH CV ECHO MEASUREMENTS AVERAGE E/E' RATIO: 20.87
BH CV VAS BP LEFT ARM: NORMAL MMHG
BH CV XLRA - RV BASE: 3.5 CM
BH CV XLRA - RV LENGTH: 6.1 CM
BH CV XLRA - RV MID: 3.1 CM
BILIRUB SERPL-MCNC: 0.5 MG/DL (ref 0.2–1.2)
BUN BLD-MCNC: 29 MG/DL (ref 8–23)
BUN/CREAT SERPL: 25.2 (ref 7–25)
CALCIUM SPEC-SCNC: 10.2 MG/DL (ref 8.6–10.5)
CHLORIDE SERPL-SCNC: 102 MMOL/L (ref 98–107)
CO2 SERPL-SCNC: 17 MMOL/L (ref 22–29)
CREAT BLD-MCNC: 1.15 MG/DL (ref 0.57–1)
DEPRECATED RDW RBC AUTO: 45.9 FL (ref 37–54)
EOSINOPHIL # BLD AUTO: 0.01 10*3/MM3 (ref 0–0.4)
EOSINOPHIL NFR BLD AUTO: 0.2 % (ref 0.3–6.2)
ERYTHROCYTE [DISTWIDTH] IN BLOOD BY AUTOMATED COUNT: 14.8 % (ref 12.3–15.4)
GFR SERPL CREATININE-BSD FRML MDRD: 46 ML/MIN/1.73
GLOBULIN UR ELPH-MCNC: 3.1 GM/DL
GLUCOSE BLD-MCNC: 35 MG/DL (ref 65–99)
GLUCOSE BLDC GLUCOMTR-MCNC: 101 MG/DL (ref 70–130)
GLUCOSE BLDC GLUCOMTR-MCNC: 256 MG/DL (ref 70–130)
GLUCOSE BLDC GLUCOMTR-MCNC: 284 MG/DL (ref 70–130)
GLUCOSE BLDC GLUCOMTR-MCNC: 48 MG/DL (ref 70–130)
GLUCOSE BLDC GLUCOMTR-MCNC: 85 MG/DL (ref 70–130)
HCT VFR BLD AUTO: 37.9 % (ref 34–46.6)
HGB BLD-MCNC: 12 G/DL (ref 12–15.9)
HOLD SPECIMEN: NORMAL
IMM GRANULOCYTES # BLD AUTO: 0.03 10*3/MM3 (ref 0–0.05)
IMM GRANULOCYTES NFR BLD AUTO: 0.5 % (ref 0–0.5)
LEFT ATRIUM VOLUME INDEX: 29.5 ML/M^2
LEFT ATRIUM VOLUME: 55 ML
LYMPHOCYTES # BLD AUTO: 0.6 10*3/MM3 (ref 0.7–3.1)
LYMPHOCYTES NFR BLD AUTO: 10.4 % (ref 19.6–45.3)
MCH RBC QN AUTO: 27.6 PG (ref 26.6–33)
MCHC RBC AUTO-ENTMCNC: 31.7 G/DL (ref 31.5–35.7)
MCV RBC AUTO: 87.1 FL (ref 79–97)
MONOCYTES # BLD AUTO: 0.26 10*3/MM3 (ref 0.1–0.9)
MONOCYTES NFR BLD AUTO: 4.5 % (ref 5–12)
NEUTROPHILS # BLD AUTO: 4.85 10*3/MM3 (ref 1.7–7)
NEUTROPHILS NFR BLD AUTO: 84.2 % (ref 42.7–76)
NRBC BLD AUTO-RTO: 0 /100 WBC (ref 0–0.2)
PLATELET # BLD AUTO: 258 10*3/MM3 (ref 140–450)
PMV BLD AUTO: 11.1 FL (ref 6–12)
POTASSIUM BLD-SCNC: 4.4 MMOL/L (ref 3.5–5.2)
PROT SERPL-MCNC: 6.9 G/DL (ref 6–8.5)
RBC # BLD AUTO: 4.35 10*6/MM3 (ref 3.77–5.28)
SODIUM BLD-SCNC: 136 MMOL/L (ref 136–145)
TROPONIN T SERPL-MCNC: <0.01 NG/ML (ref 0–0.03)
WBC NRBC COR # BLD: 5.76 10*3/MM3 (ref 3.4–10.8)
WHOLE BLOOD HOLD SPECIMEN: NORMAL
WHOLE BLOOD HOLD SPECIMEN: NORMAL

## 2019-07-07 PROCEDURE — 99223 1ST HOSP IP/OBS HIGH 75: CPT | Performed by: PSYCHIATRY & NEUROLOGY

## 2019-07-07 PROCEDURE — 63710000001 INSULIN LISPRO (HUMAN) PER 5 UNITS: Performed by: INTERNAL MEDICINE

## 2019-07-07 PROCEDURE — 80047 BASIC METABLC PNL IONIZED CA: CPT

## 2019-07-07 PROCEDURE — 0 IOPAMIDOL PER 1 ML: Performed by: EMERGENCY MEDICINE

## 2019-07-07 PROCEDURE — 82962 GLUCOSE BLOOD TEST: CPT

## 2019-07-07 PROCEDURE — 70498 CT ANGIOGRAPHY NECK: CPT

## 2019-07-07 PROCEDURE — 93005 ELECTROCARDIOGRAM TRACING: CPT | Performed by: EMERGENCY MEDICINE

## 2019-07-07 PROCEDURE — 85025 COMPLETE CBC W/AUTO DIFF WBC: CPT | Performed by: EMERGENCY MEDICINE

## 2019-07-07 PROCEDURE — 70496 CT ANGIOGRAPHY HEAD: CPT

## 2019-07-07 PROCEDURE — 99223 1ST HOSP IP/OBS HIGH 75: CPT | Performed by: INTERNAL MEDICINE

## 2019-07-07 PROCEDURE — 0042T HC CT CEREBRAL PERFUSION W/WO CONTRAST: CPT

## 2019-07-07 PROCEDURE — 85610 PROTHROMBIN TIME: CPT

## 2019-07-07 PROCEDURE — 84484 ASSAY OF TROPONIN QUANT: CPT | Performed by: EMERGENCY MEDICINE

## 2019-07-07 PROCEDURE — 70450 CT HEAD/BRAIN W/O DYE: CPT

## 2019-07-07 PROCEDURE — 99285 EMERGENCY DEPT VISIT HI MDM: CPT

## 2019-07-07 PROCEDURE — 93306 TTE W/DOPPLER COMPLETE: CPT

## 2019-07-07 PROCEDURE — 71045 X-RAY EXAM CHEST 1 VIEW: CPT

## 2019-07-07 PROCEDURE — 93306 TTE W/DOPPLER COMPLETE: CPT | Performed by: INTERNAL MEDICINE

## 2019-07-07 PROCEDURE — 92610 EVALUATE SWALLOWING FUNCTION: CPT

## 2019-07-07 PROCEDURE — 85014 HEMATOCRIT: CPT

## 2019-07-07 PROCEDURE — 80053 COMPREHEN METABOLIC PANEL: CPT | Performed by: EMERGENCY MEDICINE

## 2019-07-07 RX ORDER — SODIUM CHLORIDE 9 MG/ML
50 INJECTION, SOLUTION INTRAVENOUS CONTINUOUS
Status: ACTIVE | OUTPATIENT
Start: 2019-07-07 | End: 2019-07-08

## 2019-07-07 RX ORDER — SODIUM CHLORIDE 9 MG/ML
125 INJECTION, SOLUTION INTRAVENOUS CONTINUOUS
Status: DISCONTINUED | OUTPATIENT
Start: 2019-07-07 | End: 2019-07-07

## 2019-07-07 RX ORDER — SODIUM CHLORIDE 0.9 % (FLUSH) 0.9 %
3-10 SYRINGE (ML) INJECTION AS NEEDED
Status: DISCONTINUED | OUTPATIENT
Start: 2019-07-07 | End: 2019-07-12 | Stop reason: HOSPADM

## 2019-07-07 RX ORDER — DEXTROSE MONOHYDRATE 25 G/50ML
25 INJECTION, SOLUTION INTRAVENOUS
Status: DISCONTINUED | OUTPATIENT
Start: 2019-07-07 | End: 2019-07-12 | Stop reason: HOSPADM

## 2019-07-07 RX ORDER — GABAPENTIN 300 MG/1
600 CAPSULE ORAL EVERY 12 HOURS SCHEDULED
Status: DISCONTINUED | OUTPATIENT
Start: 2019-07-07 | End: 2019-07-12 | Stop reason: HOSPADM

## 2019-07-07 RX ORDER — METOPROLOL TARTRATE 50 MG/1
50 TABLET, FILM COATED ORAL 2 TIMES DAILY
Status: DISCONTINUED | OUTPATIENT
Start: 2019-07-07 | End: 2019-07-12 | Stop reason: HOSPADM

## 2019-07-07 RX ORDER — DEXTROSE MONOHYDRATE 100 MG/ML
100 INJECTION, SOLUTION INTRAVENOUS CONTINUOUS
Status: DISCONTINUED | OUTPATIENT
Start: 2019-07-07 | End: 2019-07-07

## 2019-07-07 RX ORDER — CLOPIDOGREL BISULFATE 75 MG/1
75 TABLET ORAL DAILY
Status: DISCONTINUED | OUTPATIENT
Start: 2019-07-07 | End: 2019-07-12 | Stop reason: HOSPADM

## 2019-07-07 RX ORDER — SODIUM CHLORIDE 0.9 % (FLUSH) 0.9 %
3 SYRINGE (ML) INJECTION EVERY 12 HOURS SCHEDULED
Status: DISCONTINUED | OUTPATIENT
Start: 2019-07-07 | End: 2019-07-12 | Stop reason: HOSPADM

## 2019-07-07 RX ORDER — AMLODIPINE BESYLATE 5 MG/1
5 TABLET ORAL DAILY
Status: DISCONTINUED | OUTPATIENT
Start: 2019-07-07 | End: 2019-07-12 | Stop reason: HOSPADM

## 2019-07-07 RX ORDER — NICOTINE POLACRILEX 4 MG
15 LOZENGE BUCCAL
Status: DISCONTINUED | OUTPATIENT
Start: 2019-07-07 | End: 2019-07-12 | Stop reason: HOSPADM

## 2019-07-07 RX ORDER — SODIUM CHLORIDE 0.9 % (FLUSH) 0.9 %
10 SYRINGE (ML) INJECTION AS NEEDED
Status: DISCONTINUED | OUTPATIENT
Start: 2019-07-07 | End: 2019-07-12 | Stop reason: HOSPADM

## 2019-07-07 RX ORDER — ATORVASTATIN CALCIUM 40 MG/1
80 TABLET, FILM COATED ORAL NIGHTLY
Status: DISCONTINUED | OUTPATIENT
Start: 2019-07-07 | End: 2019-07-12 | Stop reason: HOSPADM

## 2019-07-07 RX ORDER — ASPIRIN 600 MG/1
300 SUPPOSITORY RECTAL ONCE
Status: COMPLETED | OUTPATIENT
Start: 2019-07-07 | End: 2019-07-07

## 2019-07-07 RX ADMIN — IOPAMIDOL 40 ML: 755 INJECTION, SOLUTION INTRAVENOUS at 12:13

## 2019-07-07 RX ADMIN — AMLODIPINE BESYLATE 5 MG: 5 TABLET ORAL at 18:20

## 2019-07-07 RX ADMIN — GABAPENTIN 600 MG: 300 CAPSULE ORAL at 21:45

## 2019-07-07 RX ADMIN — ATORVASTATIN CALCIUM 80 MG: 40 TABLET, FILM COATED ORAL at 21:45

## 2019-07-07 RX ADMIN — DEXTROSE MONOHYDRATE 100 ML/HR: 100 INJECTION, SOLUTION INTRAVENOUS at 13:50

## 2019-07-07 RX ADMIN — IOPAMIDOL 76 ML: 755 INJECTION, SOLUTION INTRAVENOUS at 12:15

## 2019-07-07 RX ADMIN — SODIUM CHLORIDE 500 ML: 9 INJECTION, SOLUTION INTRAVENOUS at 13:00

## 2019-07-07 RX ADMIN — METOPROLOL TARTRATE 50 MG: 50 TABLET ORAL at 21:45

## 2019-07-07 RX ADMIN — INSULIN LISPRO 4 UNITS: 100 INJECTION, SOLUTION INTRAVENOUS; SUBCUTANEOUS at 21:47

## 2019-07-07 RX ADMIN — ASPIRIN 300 MG: 600 SUPPOSITORY RECTAL at 14:00

## 2019-07-07 RX ADMIN — APIXABAN 5 MG: 5 TABLET, FILM COATED ORAL at 21:46

## 2019-07-07 RX ADMIN — SODIUM CHLORIDE 50 ML/HR: 9 INJECTION, SOLUTION INTRAVENOUS at 21:45

## 2019-07-07 RX ADMIN — INSULIN LISPRO 4 UNITS: 100 INJECTION, SOLUTION INTRAVENOUS; SUBCUTANEOUS at 18:27

## 2019-07-07 RX ADMIN — CLOPIDOGREL BISULFATE 75 MG: 75 TABLET ORAL at 18:20

## 2019-07-07 NOTE — THERAPY EVALUATION
Acute Care - Speech Language Pathology   [facility]       Patient Name: Narcisa Cotto  : 1940  MRN: [id number]  Today's Date: 2019               Admit Date: 2019    Visit Dx:     ICD-10-CM ICD-9-CM   1. Cerebrovascular accident (CVA), unspecified mechanism (CMS/[facility]) I63.9 434.91   2. Dysarthria R47.1 784.51   3. Weakness R53.1 780.79   4. Hypoglycemia E16.2 251.2   5. Dysphagia, unspecified type R13.10 787.20     Patient Active Problem List   Diagnosis   • Hyperlipidemia LDL goal <70   • Diabetic gastroparesis (CMS/[facility])   • Peripheral arterial disease (CMS/[facility])   • Uncontrolled type 2 diabetes mellitus with complication, with long-term current use of insulin (CMS/[facility])   • Sleep apnea   • Diabetic peripheral neuropathy (CMS/[facility])   • Coronary artery disease involving native coronary artery of native heart with angina pectoris (CMS/[facility])   • GERD (gastroesophageal reflux disease)   • Essential hypertension   • Iron deficiency anemia   • Complete heart block (CMS/[facility])   • CKD (chronic kidney disease) stage 3, GFR 30-59 ml/min (CMS/[facility])   • Shortness of breath   • Acute deep vein thrombosis (DVT) of distal vein of right lower extremity (CMS/[facility])   • CVA (cerebral vascular accident) (CMS/[facility])   • Hypoglycemia     Past Medical History:   Diagnosis Date   • CAD (coronary artery disease)    • Choledochal cyst 2019   • Chronic diastolic congestive heart failure (CMS/[facility])    • Chronic low back pain    • Chronic venous insufficiency    • CKD (chronic kidney disease) stage 3, GFR 30-59 ml/min (CMS/[facility])    • Diabetic gastroparesis (CMS/[facility])    • Diverticulosis    • DJD (degenerative joint disease), lumbar    • DM2 (diabetes mellitus, type 2) (CMS/[facility])    • GERD (gastroesophageal reflux disease)    • Hiatal hernia    • History of hyperparathyroidism    • HLD (hyperlipidemia)    • HTN (hypertension)    • Lumbar stenosis 11/15/2018   • DELFINA (obstructive  sleep apnea)    • Osteoarthritis 7/29/2016   • PAD (peripheral artery disease) (CMS/[facility])    • Postherpetic neuralgia    • Vitamin B12 deficiency 7/29/2016     Past Surgical History:   Procedure Laterality Date   • CARDIAC CATHETERIZATION N/A 5/23/2019    Procedure: LEFT HEART CATH;  Surgeon: Filemon Mayberry IV, MD;  Location:  [facility];  Service: Cardiovascular   • CARDIAC CATHETERIZATION N/A 5/29/2019    Procedure: Atherectomy-coronary;  Surgeon: Filemon Mayberry IV, MD;  Location:  [facility];  Service: Cardiovascular   • CARDIAC CATHETERIZATION N/A 5/29/2019    Procedure: Stent PATRICIA coronary;  Surgeon: Filemon Mayberry IV, MD;  Location:  [facility];  Service: Cardiovascular   • CARDIAC ELECTROPHYSIOLOGY PROCEDURE N/A 5/28/2019    Procedure: TEMPORARY PACEMAKER;  Surgeon: Jeffrey Reyes MD;  Location:  [facility];  Service: Cardiovascular   • CATARACT EXTRACTION Bilateral    • CHOLECYSTECTOMY     • COLONOSCOPY     • CYSTOSCOPY W/ URETERAL STENT PLACEMENT  2014   • ENDOSCOPY     • HYSTERECTOMY     • LUMBAR LAMINECTOMY DISCECTOMY DECOMPRESSION N/A 11/15/2018    Procedure: LUMBAR LAMINECTOMY L2-3 L 3-4,;  Surgeon: Zachary Key MD;  Location:  [facility];  Service: Orthopedic Spine   • PARATHYROIDECTOMY     • SPINAL CORD STIMULATOR IMPLANT     • TONSILLECTOMY          SWALLOW EVALUATION (last 72 hours)      SLP Adult Swallow Evaluation     Row Name 07/07/19 1600                   Rehab Evaluation    Document Type  evaluation  -        Subjective Information  no complaints  -CH        Patient Observations  alert;cooperative;agree to therapy  -CH        Patient/Family Observations  No family present  -CH        Patient Effort  good  -CH           General Information    Patient Profile Reviewed  yes  -CH        Pertinent History Of Current Problem  Patient admitted on CVA pathway, CT (-) , unable to obtain MRI,  NIH 13, failed dysphagia screeen, R neglect and dysarthria (Improving)  -CH        Current Method of Nutrition  NPO  -CH        Precautions/Limitations, Vision  WFL;for purposes of eval  -CH        Precautions/Limitations, Hearing  WFL;for purposes of eval  -CH        Prior Level of Function-Communication  WFL  -CH        Prior Level of Function-Swallowing  no diet consistency restrictions  -        Plans/Goals Discussed with  patient;agreed upon  -        Barriers to Rehab  none identified  -        Patient's Goals for Discharge  return home;return to PO diet  -           Pain Assessment    Additional Documentation  Pain Scale: FACES Pre/Post-Treatment (Group)  -           Pain Scale: FACES Pre/Post-Treatment    Pain: FACES Scale, Pretreatment  0-->no hurt  -CH        Pain: FACES Scale, Post-Treatment  0-->no hurt  -CH           Oral Motor and Function    Dentition Assessment  edentulous, dentures not available  -        Secretion Management  WNL/WFL  -CH        Mucosal Quality  moist, healthy  -CH        Gag Response  WFL  -CH        Volitional Swallow  WFL  -CH        Volitional Cough  WFL  -CH           Oral Musculature and Cranial Nerve Assessment    Oral Motor General Assessment  generalized oral motor weakness;lingual impairment  -CH        Lingual Impairment, Detail. Cranial Nerves IX, XII (Glossopharyngeal and Hypoglossal)  reduced strength  -           Clinical Swallow Eval    Oral Prep Phase  WFL  -CH        Oral Transit  WFL  -CH        Oral Residue  WFL  -CH        Pharyngeal Phase  no overt signs/symptoms of pharyngeal impairment  -        Clinical Swallow Evaluation Summary  Clinical swallow evaluation completed. Patient with no overt s/sx of difficulty or aspiration with soft solids, mixed consistencies or thin liquids. Mildly increeased mastication reequired with solids due to patient being edentulous (denturees are at home). Recommend soft whole diet consisstency and thin liquids.  ST to f/u for diet tolerance and education as needed.   -CH           Clinical Impression    SLP Swallowing Diagnosis  mild;oral dysfunction;functional pharyngeal phase  -        Functional Impact  risk of aspiration/pneumonia  -        Rehab Potential/Prognosis, Swallowing  good, to achieve stated therapy goals  -        Swallow Criteria for Skilled Therapeutic Interventions Met  demonstrates skilled criteria  -           Recommendations    Therapy Frequency (Swallow)  PRN  -        Predicted Duration Therapy Intervention (Days)  1 week  -        SLP Diet Recommendation  soft textures;whole;thin liquids  -        SLP Rec. for Method of Medication Administration  meds whole;with thin liquids;as tolerated  -        Monitor for Signs of Aspiration  yes;notify SLP if any concerns  -        Anticipated Dischage Disposition  unknown  -          User Key  (r) = Recorded By, (t) = Taken By, (c) = Cosigned By    Initials Name Effective Dates    yAlin Lozoya, MS CCC-SLP 02/14/19 -           EDUCATION  The patient has been educated in the following areas:   Dysphagia (Swallowing Impairment) Oral Care/Hydration Modified Diet Instruction.    SLP Recommendation and Plan  SLP Swallowing Diagnosis: mild, oral dysfunction, functional pharyngeal phase  SLP Diet Recommendation: soft textures, whole, thin liquids     SLP Rec. for Method of Medication Administration: meds whole, with thin liquids, as tolerated     Monitor for Signs of Aspiration: yes, notify SLP if any concerns     Swallow Criteria for Skilled Therapeutic Interventions Met: demonstrates skilled criteria  Anticipated Dischage Disposition: unknown  Rehab Potential/Prognosis, Swallowing: good, to achieve stated therapy goals  Therapy Frequency (Swallow): PRN  Predicted Duration Therapy Intervention (Days): 1 week               SLP GOALS     Row Name 07/07/19 1600             Oral Nutrition/Hydration Goal 1 (SLP)    Oral Nutrition/Hydration Goal  1, SLP  LTG: Patient will tolerate regular diet and thin liquids without difficulty or s/sx of aspiration  -CH      Time Frame (Oral Nutrition/Hydration Goal 1, SLP)  by discharge  -CH         Oral Nutrition/Hydration Goal 2 (SLP)    Oral Nutrition/Hydration Goal 2, SLP  STG: Patient will tolerate soft whole diet consistency and thin liquids without difficulty or s/sx of aspiration  -CH      Time Frame (Oral Nutrition/Hydration Goal 2, SLP)  by discharge  -CH        User Key  (r) = Recorded By, (t) = Taken By, (c) = Cosigned By    Initials Name Provider Type    Aylin Lozoya MS CCC-SLP Speech and Language Pathologist             Time Calculation:   Time Calculation- SLP     Row Name 07/07/19 1650             Time Calculation- SLP    SLP Start Time  1600  -      SLP Received On  07/07/19  -        User Key  (r) = Recorded By, (t) = Taken By, (c) = Cosigned By    Initials Name Provider Type    Aylin Lozoya MS CCC-SLP Speech and Language Pathologist          Therapy Charges for Today     Code Description Service Date Service Provider Modifiers Qty    [id number] HC ST EVAL ORAL PHARYNG SWALLOW 4 7/7/2019 Aylin Johnson MS CCC-SLP GN 1               Aylin Johnson MS CCC-SLP  7/7/2019

## 2019-07-07 NOTE — H&P
[facility]  HISTORY AND PHYSICAL    Patient Name: Narcisa Cotto  : 1940  MRN: [id number]  Primary Care Physician: Mariia Del Real DO  Date of admission: 2019      Subjective   Subjective     Chief Complaint:Right sided weakness    HPI:  Narcisa Cotto is a 79 y.o. female with hx of CAD s/p recent stent to RCA on Plavix, well controlled T2DM A1C 6.1%, CKD stage 3, hypertension, recent admit and d/c for here  with RLE DVT on Eliquis.Patient presents to ED with complaints of right sided weakness and slurred speech, Patient was last seen normal last night at 11pm, daughter states that she woke up this morning around 9 AM to sounds of moaning and groaning, she was found to have slurred speech and was listless on the right side such EMS was called and patient was brought to the ED.  Of note daughter states that last night patient took 70 units of regular insulin at once.  On arrival to the ED patient was hemodynamically stable, initial work-up did show glucose of 35, she underwent CT head that was negative, CT cerebral perfusion was normal, CTA head and neck, that showed excess calcification at the carotid bifurcations bilaterally however narrowing was extremely difficult to measure.  Patient was given aspirin, neurology and neurosurgery were consulted and hospital medicine was asked to admit.    Review of Systems   Gen- No fevers, chills  CV- No chest pain, palpitations  Resp- No cough, dyspnea  GI- No N/V/D, abd pain    Otherwise complete ROS reviewed and is negative except as mentioned in the HPI.    Personal History     Past Medical History:   Diagnosis Date   • CAD (coronary artery disease)    • Choledochal cyst 2019   • Chronic diastolic congestive heart failure (CMS/HCC)    • Chronic low back pain    • Chronic venous insufficiency    • CKD (chronic kidney disease) stage 3, GFR 30-59 ml/min (CMS/HCC)    • Diabetic gastroparesis (CMS/HCC)     • Diverticulosis    • DJD (degenerative joint disease), lumbar    • DM2 (diabetes mellitus, type 2) (CMS/[facility])    • GERD (gastroesophageal reflux disease)    • Hiatal hernia    • History of hyperparathyroidism    • HLD (hyperlipidemia)    • HTN (hypertension)    • Lumbar stenosis 11/15/2018   • DELFINA (obstructive sleep apnea)    • Osteoarthritis 7/29/2016   • PAD (peripheral artery disease) (CMS/[facility])    • Postherpetic neuralgia    • Vitamin B12 deficiency 7/29/2016       Past Surgical History:   Procedure Laterality Date   • CARDIAC CATHETERIZATION N/A 5/23/2019    Procedure: LEFT HEART CATH;  Surgeon: Filemon Mayberry IV, MD;  Location:  [facility];  Service: Cardiovascular   • CARDIAC CATHETERIZATION N/A 5/29/2019    Procedure: Atherectomy-coronary;  Surgeon: Filemon Mayberry IV, MD;  Location:  [facility];  Service: Cardiovascular   • CARDIAC CATHETERIZATION N/A 5/29/2019    Procedure: Stent PATRICIA coronary;  Surgeon: Filemon Mayberry IV, MD;  Location:  [facility];  Service: Cardiovascular   • CARDIAC ELECTROPHYSIOLOGY PROCEDURE N/A 5/28/2019    Procedure: TEMPORARY PACEMAKER;  Surgeon: Jeffrey Reyes MD;  Location:  [facility];  Service: Cardiovascular   • CATARACT EXTRACTION Bilateral    • CHOLECYSTECTOMY     • COLONOSCOPY     • CYSTOSCOPY W/ URETERAL STENT PLACEMENT  2014   • ENDOSCOPY     • HYSTERECTOMY     • LUMBAR LAMINECTOMY DISCECTOMY DECOMPRESSION N/A 11/15/2018    Procedure: LUMBAR LAMINECTOMY L2-3 L 3-4,;  Surgeon: Zachary Key MD;  Location:  [facility];  Service: Orthopedic Spine   • PARATHYROIDECTOMY     • SPINAL CORD STIMULATOR IMPLANT     • TONSILLECTOMY         Family History: family history includes Diabetes in her father; Kidney disease in her mother. Otherwise pertinent FHx was reviewed and unremarkable.     Social History:  reports that she has never smoked. She has never used smokeless  tobacco. She reports that she does not drink alcohol or use drugs.  Social History     Social History Narrative    Friend Ruth Nuno, with pt today       Medications:    Available home medication information reviewed.    (Not in a hospital admission)    Allergies   Allergen Reactions   • Codeine Nausea And Vomiting       Objective   Objective     Vital Signs:   Temp:  [98.2 °F (36.8 °C)] 98.2 °F (36.8 °C)  Heart Rate:  [89-95] 95  Resp:  [18] 18  BP: (137-153)/(63-65) 137/65   Total (NIH Stroke Scale): 13    Physical Exam   Constitutional: Chronic ill appearing elderly female awake, alert, restless  Eyes: PERRLA, sclerae anicteric, no conjunctival injection  HENT: NCAT, mucous membranes moist  Neck: Supple, no thyromegaly, no lymphadenopathy, trachea midline  Respiratory: Clear to auscultation bilaterally, nonlabored respirations   Cardiovascular: RRR, no murmurs, rubs, or gallops, palpable pedal pulses bilaterally  Gastrointestinal: Obese positive bowel sounds, soft, nontender, nondistended  Musculoskeletal: No bilateral ankle edema, no clubbing or cyanosis to extremities  Psychiatric: Appropriate affect, cooperative  Neurologic: Oriented x 3, right sided weakness, dysarthria  Skin: No rashes    Results Reviewed:  I have personally reviewed current lab, radiology, and data and agree.    Results from last 7 days   Lab Units 07/07/19  1141   WBC 10*3/mm3 5.76   HEMOGLOBIN g/dL 12.0   HEMATOCRIT % 37.9   PLATELETS 10*3/mm3 258     Results from last 7 days   Lab Units 07/07/19  1246   SODIUM mmol/L 136   POTASSIUM mmol/L 4.4   CHLORIDE mmol/L 102   CO2 mmol/L 17.0*   BUN mg/dL 29*   CREATININE mg/dL 1.15*   GLUCOSE mg/dL 35*   CALCIUM mg/dL 10.2   ALT (SGPT) U/L 41*   AST (SGOT) U/L 39*   TROPONIN T ng/mL <0.010     Estimated Creatinine Clearance: 41.5 mL/min (A) (by C-G formula based on SCr of 1.15 mg/dL (H)).  Brief Urine Lab Results  (Last result in the past 365 days)      Color   Clarity   Blood   Leuk Est   Nitrite    Protein   CREAT   Urine HCG        06/21/19 1715 Yellow Cloudy Trace Moderate (2+) Negative 100 mg/dL (2+)             Imaging Results (last 24 hours)     Procedure Component Value Units Date/Time    CT Angiogram Head With & Without Contrast [[id number]] Collected:  07/07/19 1237     Updated:  07/07/19 1241    Narrative:       EXAMINATION: CT ANGIOGRAM NECK W WO CONTRAST-, CT ANGIOGRAM HEAD W WO  CONTRAST-      INDICATION: Stroke right-sided weakness     TECHNIQUE: Multiple axial CT imaging is obtained of the head and neck  following the ministration of intravenous contrast.     Stenosis measurement was performed by the NASCET or similar method.     The radiation dose reduction device was turned on for each scan per the  ALARA (As Low as Reasonably Achievable) protocol.     COMPARISON: NONE     FINDINGS: Lung apices are grossly clear with vascular calcification seen  within the thoracic aortic arch. Atherosclerotic disease seen at the  origin the great vessels with no significant stenosis. The thyroid is  heterogeneous in appearance with multiple small nodules identified.  Degenerative changes identified within the spine. No gross abnormality  identified within the soft tissues of the neck. There is no bulky  adenopathy identified. Musculature is intact. No abnormal mass or fluid  collections identified.     The common carotid arteries are without significant stenosis with  extensive calcification seen at the carotid bifurcations bilaterally.  There is somewhat difficulty measuring the area of narrowing of the  distal common carotid artery and internal carotid arteries right greater  than left due to the extensive calcification. There is at least moderate  stenosis identified of the proximal right internal carotid artery. There  is mild stenosis identified of the left internal carotid artery. The  intracranial vessels reveal atherosclerotic disease seen in the  cavernous portions the internal carotid arteries.  There is no aneurysmal  dilatation of the vascular formation or significant stenosis seen of  either the anterior posterior circulation. Anterior middle cerebral  arteries are unremarkable. Posterior circulation is intact. There is a  dominant left vertebral artery. Degenerative changes seen within the  spine             Impression:       Extensive calcification seen at the carotid bifurcations  bilaterally making measurements of the narrowing extremely difficult  however there is at least a moderate stenosis identified of the proximal  right internal carotid artery and mild stenosis of the proximal left  internal carotid artery. No definite significant stenosis is identified.  The intracranial vessels are unremarkable.          CT Angiogram Neck With & Without Contrast [[id number]] Collected:  07/07/19 1237     Updated:  07/07/19 1241    Narrative:       EXAMINATION: CT ANGIOGRAM NECK W WO CONTRAST-, CT ANGIOGRAM HEAD W WO  CONTRAST-      INDICATION: Stroke right-sided weakness     TECHNIQUE: Multiple axial CT imaging is obtained of the head and neck  following the ministration of intravenous contrast.     Stenosis measurement was performed by the NASCET or similar method.     The radiation dose reduction device was turned on for each scan per the  ALARA (As Low as Reasonably Achievable) protocol.     COMPARISON: NONE     FINDINGS: Lung apices are grossly clear with vascular calcification seen  within the thoracic aortic arch. Atherosclerotic disease seen at the  origin the great vessels with no significant stenosis. The thyroid is  heterogeneous in appearance with multiple small nodules identified.  Degenerative changes identified within the spine. No gross abnormality  identified within the soft tissues of the neck. There is no bulky  adenopathy identified. Musculature is intact. No abnormal mass or fluid  collections identified.     The common carotid arteries are without significant stenosis with  extensive  calcification seen at the carotid bifurcations bilaterally.  There is somewhat difficulty measuring the area of narrowing of the  distal common carotid artery and internal carotid arteries right greater  than left due to the extensive calcification. There is at least moderate  stenosis identified of the proximal right internal carotid artery. There  is mild stenosis identified of the left internal carotid artery. The  intracranial vessels reveal atherosclerotic disease seen in the  cavernous portions the internal carotid arteries. There is no aneurysmal  dilatation of the vascular formation or significant stenosis seen of  either the anterior posterior circulation. Anterior middle cerebral  arteries are unremarkable. Posterior circulation is intact. There is a  dominant left vertebral artery. Degenerative changes seen within the  spine             Impression:       Extensive calcification seen at the carotid bifurcations  bilaterally making measurements of the narrowing extremely difficult  however there is at least a moderate stenosis identified of the proximal  right internal carotid artery and mild stenosis of the proximal left  internal carotid artery. No definite significant stenosis is identified.  The intracranial vessels are unremarkable.          XR Chest 1 View [[id number]] Collected:  07/07/19 1228     Updated:  07/07/19 1229    Narrative:          EXAMINATION: XR CHEST 1 VW-      INDICATION: Stroke Protocol (Onset > 12 Hrs)      COMPARISON: 06/21/2019     FINDINGS: Portable chest reveals heart to be enlarged. Underlying  chronic and emphysematous changes in with the lung fields bilaterally.  Degenerative changes seen within the spine. A pleural effusion or  pneumothorax. Pulmonary vascularity is within normal limits.           Impression:       Chronic changes no acute parenchymal disease          CT Cerebral Perfusion With & Without Contrast [[id number]] Collected:  07/07/19 1223     Updated:  07/07/19  1227    Narrative:       EXAMINATION: CT CEREBRAL PERFUSION W WO CONTRAST-      INDICATION: TIA, initial screening stroke symptoms     TECHNIQUE: Cerebral perfusion analysis was performed using computed  tomography with contrast administration, including post processing of  parametric maps with determination of cerebral blood flow, cerebral  blood volume, and mean transit time.      The radiation dose reduction device was turned on for each scan per the  ALARA (As Low as Reasonably Achievable) protocol.     COMPARISON: NONE     FINDINGS: No evidence of abnormality within cerebral blood flow or  cerebral blood volume. No evidence of reversible ischemia. No increased  mean transit time to suggest evidence of an occlusion or stenosis.             Impression:       No large perfusion abnormality to suggest evidence of  reversible ischemia                   CT Head Without Contrast Stroke Protocol [[id number]] Collected:  07/07/19 1113     Updated:  07/07/19 1158    Narrative:       EXAMINATION: CT HEAD WO CONTRAST  - 07/07/2019     INDICATION: Stroke symptoms, right-sided weakness     TECHNIQUE: Multiple axial CT imaging is obtained of the head from skull  base to skull vertex without the administration of intravenous contrast.     The radiation dose reduction device was turned on for each scan per the  ALARA (As Low as Reasonably Achievable) protocol.     COMPARISON: 05/24/2019     FINDINGS: Parenchyma is grossly unremarkable in appearance with minimal  low-density area seen in the periventricular and subcortical white  matter. No hemorrhage or hydrocephalus. No mass, mass effect, or midline  shift. No abnormal extra-axial fluid collection identified. The bony  structures reveal no evidence of osseous abnormality with minimal  mucosal thickening seen of the left maxillary sinus. Globes and orbits  are intact. The mastoid air cells are patent.       Impression:       Stable chronic changes seen within the brain with no  acute  intracranial abnormality identified.     Examination was performed 07/07/2019 at 1053 hours and examination  results were given to the ER physician at time of performance of the  examination at the scanner.     DICTATED:   07/07/2019  EDITED/ls :   07/07/2019               Results for orders placed during the hospital encounter of 06/21/19   Adult Transthoracic Echo Limited W/ Cont if Necessary Per Protocol    Narrative · Normal left ventricular systolic function  · MAC with trace MR  · Mild aortic valve sclerosis          Assessment/Plan   Assessment / Plan     Active Hospital Problems    Diagnosis POA   • CVA (cerebral vascular accident) (CMS/[facility]) [I63.9] Yes   • Hypoglycemia [E16.2] Yes   • Acute deep vein thrombosis (DVT) of distal vein of right lower extremity (CMS/[facility]) [I82.4Z1] Yes     · Venous duplex (6/2019): Right peroneal DVT     • CKD (chronic kidney disease) stage 3, GFR 30-59 ml/min (CMS/[facility]) [N18.3] Yes   • Essential hypertension [I10] Yes   • Coronary artery disease involving native coronary artery of native heart with angina pectoris (CMS/[facility]) [I25.119] Yes     · Echo (8/31/2016): EF > 70%. Moderate thickening of the noncoronary cusp of the aortic valve.  · Echo (04/23/2018): LVEF 60%. Grade I diastolic dysfunction.   Cardiac valves are functionally normal.  · Nuclear stress test (04/23/2018): No focal areas of ischemia or infarct.  Transient ischemic dilatation present raising possibility of balanced ischemia/multivessel CAD  · Cardiac catheterization (5/23/2019): Severe multivessel CAD.  Culprit for NSTEMI is a highly calcified ostial RCA 99% stenosis.    · Staged rotational atherectomy and PCI of the RCA using a PATRICIA, 5/29/2019     • Diabetic peripheral neuropathy (CMS/[facility]) [E11.42] Yes   • Peripheral arterial disease (CMS/[facility]) [I73.9] Yes     · BONNIE (08/22/2013):  At rest right 0.98, left 0.85.  After 2 minutes of exercise right 0.51, left 0.68.  · High-grade right common iliac stenosis on  cardiac catheterization, 5/23/2019     • Uncontrolled type 2 diabetes mellitus with complication, with long-term current use of insulin (CMS/[facility]) [E11.8, E11.65, Z79.4] Not Applicable        79 y.o. female with hx of CAD s/p recent stent to RCA on Plavix, well controlled T2DM A1C 6.1%, CKD stage 3, hypertension, recent admit and d/c for here 7/1 with RLE DVT on Eliquis.Patient presents to ED with right sided weakness and slurred speech, admitted with concerns for CVA.     Plan  - CT head is negative, CT cerebral perfusion is normal, CTA head and neck, that shows excess calcification at the carotid bifurcations bilaterally however narrowing was extremely difficult to measure.  Unable to obtain MRI at this time as patient has a spinal stimulator patient was given aspirin, neurology and neurosurgery have been consulted.  -Stroke work-up per protocol, get echo, discussed with Dr. Fatima, will attempt to obtain MRI if spinal stimulator can be turned off, okay to continue aspirin, statin Plavix and Eliquis.  -History of type 2 diabetes previously well controlled with A1c of 6.1%, however, patient woke up with severe hypoglycemia could have contributed to above symptoms, continue D10 infusion, hold home insulin at this time.  Will need re-education on her home insulin regimen  -Recently diagnosed right lower extremity DVT continue home Eliquis  -CKD stage III, creatinine seems to be stable continue to monitor at this time.  -Continue home meds for chronic medical conditions as listed on problem list above  -PT/OT/CM    DVT prophylaxis:  Eliquis    CODE STATUS:    Code Status and Medical Interventions:   Ordered at: 07/07/19 1340     Level Of Support Discussed With:    Patient     Code Status:    CPR     Medical Interventions (Level of Support Prior to Arrest):    Full       Admission Status:  I believe this patient meets INPATIENT status due to the need for care which can only be reasonably provided in an hospital setting  . Patient has suspected acute CVA requiring ongoing w/u .  In such, I feel patient’s risk for adverse outcomes and need for care warrant INPATIENT evaluation and predict the patient’s care encounter to likely last beyond 2 midnights.      Electronically signed by Bailee Harmon MD, 07/07/19, 1:10 PM.

## 2019-07-07 NOTE — PLAN OF CARE
Problem: Patient Care Overview  Goal: Plan of Care Review  Outcome: Ongoing (interventions implemented as appropriate)   07/07/19 1811   Coping/Psychosocial   Plan of Care Reviewed With patient   Plan of Care Review   Progress improving   OTHER   Outcome Summary admitted to room 359, oriented to unit and POC. made comfortable, facial droop and slurred speach resolved , slp will eval . denies pain or SOB, remains on 2L sr [id number] degree avb noted       Problem: Stroke (Ischemic) (Adult)  Goal: Signs and Symptoms of Listed Potential Problems Will be Absent, Minimized or Managed (Stroke)  Outcome: Ongoing (interventions implemented as appropriate)   07/07/19 1811   Goal/Outcome Evaluation   Problems Assessed (Stroke (Ischemic)) communication impairment;eating/swallowing impairment;hemodynamic instability;motor/sensory impairment;bladder/bowel dysfunction   Problems Assessed (Stroke (Ischemic)) bladder/bowel dysfunction;communication impairment;eating/swallowing impairment

## 2019-07-07 NOTE — ED PROVIDER NOTES
Subjective   Narcisa Cotto is a 79 y.o. female who presents to the ED with complaints of slurred speech. The patient's last known normal was at 10 pm last night. This morning, the daughter reports that she heard the patient yelling for her and she noticed that the patient had slurred speech. She also notes that the patient had right sided weakness and right facial droop. She denies headache, chest pain, palpitations, abdominal pain, nausea, vomiting, and diarrhea. She was recently discharged on 7/1 for a DVT in her right calf. She also had coronary stents placed in May of 2019. She is taking Eliquis and Plavix. She is also diabetic. She does not have a history of stroke. There are no other acute complaints at this time.         History provided by:  Patient  Neurologic Problem   The patient's primary symptoms include focal weakness, slurred speech and weakness (right sided ). This is a new problem. The neurological problem developed suddenly. There was right-sided focality noted. Pertinent negatives include no abdominal pain, chest pain, headaches, nausea, palpitations or vomiting.       Review of Systems   Cardiovascular: Negative for chest pain and palpitations.   Gastrointestinal: Negative for abdominal pain, diarrhea, nausea and vomiting.   Neurological: Positive for focal weakness, speech difficulty and weakness (right sided ). Negative for headaches.   All other systems reviewed and are negative.      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   • Chronic diastolic congestive heart failure (CMS/HCC)    • Chronic low back pain    • Chronic venous insufficiency    • CKD (chronic kidney disease) stage 3, GFR 30-59 ml/min (CMS/HCC)    • Diabetic gastroparesis (CMS/HCC)    • Diverticulosis    • DJD (degenerative joint disease), lumbar    • DM2 (diabetes mellitus, type 2) (CMS/HCC)    • GERD (gastroesophageal reflux disease)    • Hiatal hernia    •  History of hyperparathyroidism    • HLD (hyperlipidemia)    • HTN (hypertension)    • Lumbar stenosis 11/15/2018   • DELFINA (obstructive sleep apnea)    • Osteoarthritis 7/29/2016   • PAD (peripheral artery disease) (CMS/[facility])    • Postherpetic neuralgia    • Vitamin B12 deficiency 7/29/2016       Allergies   Allergen Reactions   • Codeine Nausea And Vomiting       Past Surgical History:   Procedure Laterality Date   • CARDIAC CATHETERIZATION N/A 5/23/2019    Procedure: LEFT HEART CATH;  Surgeon: Filemon Mayberry IV, MD;  Location: [facility] CATH INVASIVE LOCATION;  Service: Cardiovascular   • CARDIAC CATHETERIZATION N/A 5/29/2019    Procedure: Atherectomy-coronary;  Surgeon: Filemon Mayberry IV, MD;  Location: [facility] CATH INVASIVE LOCATION;  Service: Cardiovascular   • CARDIAC CATHETERIZATION N/A 5/29/2019    Procedure: Stent PATRICIA coronary;  Surgeon: Filemon Mayberry IV, MD;  Location: [facility] CATH INVASIVE LOCATION;  Service: Cardiovascular   • CARDIAC ELECTROPHYSIOLOGY PROCEDURE N/A 5/28/2019    Procedure: TEMPORARY PACEMAKER;  Surgeon: Jeffrey Reyes MD;  Location: [facility] CATH INVASIVE LOCATION;  Service: Cardiovascular   • CATARACT EXTRACTION Bilateral    • CHOLECYSTECTOMY     • COLONOSCOPY     • CYSTOSCOPY W/ URETERAL STENT PLACEMENT  2014   • ENDOSCOPY     • HYSTERECTOMY     • LUMBAR LAMINECTOMY DISCECTOMY DECOMPRESSION N/A 11/15/2018    Procedure: LUMBAR LAMINECTOMY L2-3 L 3-4,;  Surgeon: Zachary Key MD;  Location:  [facility];  Service: Orthopedic Spine   • PARATHYROIDECTOMY     • SPINAL CORD STIMULATOR IMPLANT     • TONSILLECTOMY         Family History   Problem Relation Age of Onset   • Kidney disease Mother    • Diabetes Father        Social History     Socioeconomic History   • Marital status:      Spouse name: N/A   • Number of children: 4   • Years of education: H.S.   • Highest education level: High school graduate   Occupational History   • Occupation: Administrative  Assistant     Employer: RETIRED   Tobacco Use   • Smoking status: Never Smoker   • Smokeless tobacco: Never Used   Substance and Sexual Activity   • Alcohol use: No   • Drug use: No   • Sexual activity: No   Social History Narrative    Friend Ruth Nuno, with pt today         Objective   Physical Exam   Constitutional: She is oriented to person, place, and time. She appears well-developed and well-nourished. No distress.   HENT:   Head: Normocephalic and atraumatic.   Eyes: Conjunctivae are normal. No scleral icterus.   Neck: Normal range of motion. Neck supple.   Cardiovascular: Normal rate, regular rhythm and normal heart sounds.   Pulmonary/Chest: Effort normal and breath sounds normal.   Abdominal: Soft. There is no tenderness.   Musculoskeletal: Normal range of motion.   Neurological: She is alert and oriented to person, place, and time.   Patient cannot move right leg off stretcher. Left sided gaze preference. Dysarthria without aphasia.    Skin: Skin is warm and dry.   Psychiatric: She has a normal mood and affect. Her behavior is normal.   Nursing note and vitals reviewed.      Critical Care  Performed by: Mainor Rasmussen MD  Authorized by: Mainor Rasmussen MD     Critical care provider statement:     Critical care time (minutes):  60    Critical care time was exclusive of:  Separately billable procedures and treating other patients    Critical care was necessary to treat or prevent imminent or life-threatening deterioration of the following conditions:  CNS failure or compromise    Critical care was time spent personally by me on the following activities:  Development of treatment plan with patient or surrogate, discussions with consultants, evaluation of patient's response to treatment, examination of patient, obtaining history from patient or surrogate, ordering and performing treatments and interventions, ordering and review of laboratory studies, ordering and review of radiographic studies, pulse  oximetry, re-evaluation of patient's condition and review of old charts                   ED Course  ED Course as of Jul 07 1448   Sun Jul 07, 2019   1126 Patient has a significant neurologic deficit with left-sided gaze preference and will not pass the right to the of the midline with her gaze.  She has significant weakness of her right side.  She has dysarthria though without aphasia, and is oriented x3She is not a candidate for TPA as she is on Eliquis.I discussed findings with the patient to date.  I am awaiting perfusion data.  Last known well was 13 hours ago however.  We will plan on admission.  [HH]   1137 Aspirin is held due to the patient's treatment with both Eliquis and Plavix.  [HH]   1305 Patient is serially reevaluated throughout the ED course with last reevaluation now.  Her symptoms have actually improved.  She is crossing the midline to the right very minimally however her strength and coordination in her right hand has significantly improved.  She still has dysarthria but it has also improved.There is no large vessel occlusion on CT perfusion.  She has moderate stenosis in on her carotid angiogram.I discussed the findings with intervention and neurology.  We will treat the patient with rectal aspirin  [HH]   1315 Surinder findings with Dr. Harmon who will admit for definitive inpatient care  [HH]   1447 Patient had hypoglycemia with a sugar in the high 50s on EMS arrival.  It increased to 130 and transferred per EMS.  The patient had recurrent hypoglycemia here that was asymptomatic with a glucose of 35 on her stick though 55 on repeat fingerstick without interim treatment.  She is treated with IV D10 with serial re-evaluations ordered  [HH]      ED Course User Index  [HH] Mainor Rasmussen MD       Recent Results (from the past 24 hour(s))   Light Blue Top    Collection Time: 07/07/19 11:41 AM   Result Value Ref Range    Extra Tube hold for add-on    Lavender Top    Collection Time: 07/07/19 11:41 AM    Result Value Ref Range    Extra Tube hold for add-on    CBC Auto Differential    Collection Time: 07/07/19 11:41 AM   Result Value Ref Range    WBC 5.76 3.40 - 10.80 10*3/mm3    RBC 4.35 3.77 - 5.28 10*6/mm3    Hemoglobin 12.0 12.0 - 15.9 g/dL    Hematocrit 37.9 34.0 - 46.6 %    MCV 87.1 79.0 - 97.0 fL    MCH 27.6 26.6 - 33.0 pg    MCHC 31.7 31.5 - 35.7 g/dL    RDW 14.8 12.3 - 15.4 %    RDW-SD 45.9 37.0 - 54.0 fl    MPV 11.1 6.0 - 12.0 fL    Platelets 258 140 - 450 10*3/mm3    Neutrophil % 84.2 (H) 42.7 - 76.0 %    Lymphocyte % 10.4 (L) 19.6 - 45.3 %    Monocyte % 4.5 (L) 5.0 - 12.0 %    Eosinophil % 0.2 (L) 0.3 - 6.2 %    Basophil % 0.2 0.0 - 1.5 %    Immature Grans % 0.5 0.0 - 0.5 %    Neutrophils, Absolute 4.85 1.70 - 7.00 10*3/mm3    Lymphocytes, Absolute 0.60 (L) 0.70 - 3.10 10*3/mm3    Monocytes, Absolute 0.26 0.10 - 0.90 10*3/mm3    Eosinophils, Absolute 0.01 0.00 - 0.40 10*3/mm3    Basophils, Absolute 0.01 0.00 - 0.20 10*3/mm3    Immature Grans, Absolute 0.03 0.00 - 0.05 10*3/mm3    nRBC 0.0 0.0 - 0.2 /100 WBC   Green Top (Gel)    Collection Time: 07/07/19 12:46 PM   Result Value Ref Range    Extra Tube Hold for add-ons.    Comprehensive Metabolic Panel    Collection Time: 07/07/19 12:46 PM   Result Value Ref Range    Glucose 35 (C) 65 - 99 mg/dL    BUN 29 (H) 8 - 23 mg/dL    Creatinine 1.15 (H) 0.57 - 1.00 mg/dL    Sodium 136 136 - 145 mmol/L    Potassium 4.4 3.5 - 5.2 mmol/L    Chloride 102 98 - 107 mmol/L    CO2 17.0 (L) 22.0 - 29.0 mmol/L    Calcium 10.2 8.6 - 10.5 mg/dL    Total Protein 6.9 6.0 - 8.5 g/dL    Albumin 3.80 3.50 - 5.20 g/dL    ALT (SGPT) 41 (H) 1 - 33 U/L    AST (SGOT) 39 (H) 1 - 32 U/L    Alkaline Phosphatase 73 39 - 117 U/L    Total Bilirubin 0.5 0.2 - 1.2 mg/dL    eGFR Non African Amer 46 (L) >60 mL/min/1.73    Globulin 3.1 gm/dL    A/G Ratio 1.2 g/dL    BUN/Creatinine Ratio 25.2 (H) 7.0 - 25.0    Anion Gap 17.0 (H) 5.0 - 15.0 mmol/L   Troponin    Collection Time: 07/07/19 12:46 PM    Result Value Ref Range    Troponin T <0.010 0.000 - 0.030 ng/mL   POC Glucose Once    Collection Time: 07/07/19  1:38 PM   Result Value Ref Range    Glucose 48 (C) 70 - 130 mg/dL   POC Glucose Once    Collection Time: 07/07/19  2:27 PM   Result Value Ref Range    Glucose 85 70 - 130 mg/dL     Note: In addition to lab results from this visit, the labs listed above may include labs taken at another facility or during a different encounter within the last 24 hours. Please correlate lab times with ED admission and discharge times for further clarification of the services performed during this visit.    CT Angiogram Head With & Without Contrast   Preliminary Result   Extensive calcification seen at the carotid bifurcations   bilaterally making measurements of the narrowing extremely difficult   however there is at least a moderate stenosis identified of the proximal   right internal carotid artery and mild stenosis of the proximal left   internal carotid artery. No definite significant stenosis is identified.   The intracranial vessels are unremarkable.       DICTATED:   07/07/2019   EDITED/ls :   07/07/2019           CT Angiogram Neck With & Without Contrast   Preliminary Result   Extensive calcification seen at the carotid bifurcations   bilaterally making measurements of the narrowing extremely difficult   however there is at least a moderate stenosis identified of the proximal   right internal carotid artery and mild stenosis of the proximal left   internal carotid artery. No definite significant stenosis is identified.   The intracranial vessels are unremarkable.       DICTATED:   07/07/2019   EDITED/ls :   07/07/2019           CT Cerebral Perfusion With & Without Contrast   Preliminary Result   No large perfusion abnormality to suggest evidence of   reversible ischemia.       DICTATED:   07/07/2019   EDITED/ls :   07/07/2019                           XR Chest 1 View   Preliminary Result   Chronic changes no acute  parenchymal disease              CT Head Without Contrast Stroke Protocol   Preliminary Result   Stable chronic changes seen within the brain with no acute   intracranial abnormality identified.       Examination was performed 07/07/2019 at 1053 hours and examination   results were given to the ER physician at time of performance of the   examination at the scanner.       DICTATED:   07/07/2019   EDITED/ls :   07/07/2019                 Vitals:    07/07/19 1215 07/07/19 1331 07/07/19 1400 07/07/19 1420   BP:  137/65 151/62 149/72   Pulse: 93 95 97 92   Resp:       Temp:       TempSrc:       SpO2: 96% 98% 96% 97%   Weight:       Height:         Medications   sodium chloride 0.9 % flush 10 mL (not administered)   aspirin suppository 300 mg (not administered)   dextrose 10 % infusion (100 mL/hr Intravenous New Bag 7/7/19 1350)   dextrose (GLUTOSE) oral gel 15 g (not administered)   dextrose (D50W) 25 g/ 50mL Intravenous Solution 25 g (not administered)   glucagon (human recombinant) (GLUCAGEN DIAGNOSTIC) injection 1 mg (not administered)   insulin lispro (humaLOG) injection 0-7 Units (not administered)   sodium chloride 0.9 % bolus 500 mL (0 mL Intravenous Stopped 7/7/19 1427)   iopamidol (ISOVUE-370) 76 % injection 100 mL (40 mL Intravenous Given 7/7/19 1213)   iopamidol (ISOVUE-370) 76 % injection 100 mL (76 mL Intravenous Given 7/7/19 1215)     ECG/EMG Results (last 24 hours)     ** No results found for the last 24 hours. **        ECG 12 Lead   Final Result   Test Reason : Stroke Protocol (Onset > 12 Hrs)   Blood Pressure : **/** mmHG   Vent. Rate : 091 BPM     Atrial Rate : 091 BPM      P-R Int : 246 ms          QRS Dur : 126 ms       QT Int : 388 ms       P-R-T Axes : 060 -53 026 degrees      QTc Int : 477 ms      Sinus rhythm with 1st degree AV block   Nonspecific intraventricular block   Abnormal ECG   When compared with ECG of 23-JUN-2019 06:18,   Nonspecific intraventricular block has replaced Right bundle  branch block   Nonspecific T wave abnormality has replaced inverted T waves in Inferior   leads   Nonspecific T wave abnormality, improved in Anterior leads   Confirmed by KELLI RASMUSSEN MD ([id number]) on 7/7/2019 2:11:19 PM      Referred By:  BARBRA           Confirmed By:KELLI RASMUSSEN MD                        [facility]    Final diagnoses:   Cerebrovascular accident (CVA), unspecified mechanism (CMS/HCC)   Dysarthria   Weakness   Hypoglycemia       Documentation assistance provided by tom Moreno.  Information recorded by the scribe was done at my direction and has been verified and validated by me.     Bijal Moreno  07/07/19 1314       Bijal Moreno  07/07/19 1315       Kelli Rasmussen MD  07/07/19 9098       Kelli Rasmussen MD  07/07/19 9086

## 2019-07-07 NOTE — ED PROVIDER NOTES
Subjective   Narcisa Cotto is a 79 y.o. female who presents to the ED with complaints of slurred speech. The patient's last known normal was at 10 pm last night. This morning, the daughter reports that she heard the patient yelling for her and she noticed that the patient had slurred speech. She also notes that the patient had right sided weakness and right facial droop. She denies headache, chest pain, palpitations, abdominal pain, nausea, vomiting, and diarrhea. She was recently discharged on 7/1 for a DVT in her right calf. She also had coronary stents placed in May of 2019. She is taking Eliquis and Plavix. She is also diabetic. She does not have a history of stroke. There are no other acute complaints at this time.         History provided by:  Patient and EMS personnel  Neurologic Problem   The patient's primary symptoms include focal weakness, slurred speech and weakness (right sided). This is a new problem. The neurological problem developed suddenly. There was right-sided focality noted. Pertinent negatives include no abdominal pain, chest pain, headaches, nausea, palpitations or vomiting.       Review of Systems   Cardiovascular: Negative for chest pain and palpitations.   Gastrointestinal: Negative for abdominal pain, diarrhea, nausea and vomiting.   Neurological: Positive for focal weakness, speech difficulty and weakness (right sided). Negative for headaches.   All other systems reviewed and are negative.      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   • Chronic diastolic congestive heart failure (CMS/HCC)    • Chronic low back pain    • Chronic venous insufficiency    • CKD (chronic kidney disease) stage 3, GFR 30-59 ml/min (CMS/HCC)    • Diabetic gastroparesis (CMS/HCC)    • Diverticulosis    • DJD (degenerative joint disease), lumbar    • DM2 (diabetes mellitus, type 2) (CMS/HCC)    • GERD (gastroesophageal reflux disease)    • Hiatal  hernia    • History of hyperparathyroidism    • HLD (hyperlipidemia)    • HTN (hypertension)    • Lumbar stenosis 11/15/2018   • DELFINA (obstructive sleep apnea)    • Osteoarthritis 7/29/2016   • PAD (peripheral artery disease) (CMS/[facility])    • Postherpetic neuralgia    • Vitamin B12 deficiency 7/29/2016       Allergies   Allergen Reactions   • Codeine Nausea And Vomiting       Past Surgical History:   Procedure Laterality Date   • CARDIAC CATHETERIZATION N/A 5/23/2019    Procedure: LEFT HEART CATH;  Surgeon: Filemon Mayberry IV, MD;  Location: [facility] CATH INVASIVE LOCATION;  Service: Cardiovascular   • CARDIAC CATHETERIZATION N/A 5/29/2019    Procedure: Atherectomy-coronary;  Surgeon: Filemon Mayberry IV, MD;  Location: [facility] CATH INVASIVE LOCATION;  Service: Cardiovascular   • CARDIAC CATHETERIZATION N/A 5/29/2019    Procedure: Stent PATRICIA coronary;  Surgeon: Filemon Mayberry IV, MD;  Location: [facility] CATH INVASIVE LOCATION;  Service: Cardiovascular   • CARDIAC ELECTROPHYSIOLOGY PROCEDURE N/A 5/28/2019    Procedure: TEMPORARY PACEMAKER;  Surgeon: Jeffrey Reyes MD;  Location: [facility] CATH INVASIVE LOCATION;  Service: Cardiovascular   • CATARACT EXTRACTION Bilateral    • CHOLECYSTECTOMY     • COLONOSCOPY     • CYSTOSCOPY W/ URETERAL STENT PLACEMENT  2014   • ENDOSCOPY     • HYSTERECTOMY     • LUMBAR LAMINECTOMY DISCECTOMY DECOMPRESSION N/A 11/15/2018    Procedure: LUMBAR LAMINECTOMY L2-3 L 3-4,;  Surgeon: Zachary Key MD;  Location:  [facility];  Service: Orthopedic Spine   • PARATHYROIDECTOMY     • SPINAL CORD STIMULATOR IMPLANT     • TONSILLECTOMY         Family History   Problem Relation Age of Onset   • Kidney disease Mother    • Diabetes Father        Social History     Socioeconomic History   • Marital status:      Spouse name: N/A   • Number of children: 4   • Years of education: H.S.   • Highest education level: High school graduate   Occupational History   • Occupation:       Employer: RETIRED   Tobacco Use   • Smoking status: Never Smoker   • Smokeless tobacco: Never Used   Substance and Sexual Activity   • Alcohol use: No   • Drug use: No   • Sexual activity: No   Social History Narrative    Friend Ruth Nuno, with pt today         Objective   Physical Exam   Constitutional: She is oriented to person, place, and time. She appears well-developed and well-nourished. No distress.   HENT:   Head: Normocephalic and atraumatic.   Eyes: Conjunctivae are normal. No scleral icterus.   Neck: Normal range of motion. Neck supple.   Cardiovascular: Normal rate, regular rhythm and normal heart sounds.   Pulmonary/Chest: Effort normal and breath sounds normal.   Abdominal: Soft. There is no tenderness.   Musculoskeletal: Normal range of motion.   Neurological: She is alert and oriented to person, place, and time.   Patient cannot move right leg off of stretcher. Left sided gaze preference. Dysarthria without aphasia.    Skin: Skin is warm and dry.   Psychiatric: She has a normal mood and affect. Her behavior is normal.   Nursing note and vitals reviewed.      Critical Care  Performed by: Mainor Rasmussen MD  Authorized by: Mainor Rasmussen MD     Critical care provider statement:     Critical care time (minutes):  35    Critical care time was exclusive of:  Separately billable procedures and treating other patients    Critical care was necessary to treat or prevent imminent or life-threatening deterioration of the following conditions:  CNS failure or compromise    Critical care was time spent personally by me on the following activities:  Development of treatment plan with patient or surrogate, discussions with consultants, evaluation of patient's response to treatment, examination of patient, interpretation of cardiac output measurements, obtaining history from patient or surrogate, ordering and performing treatments and interventions, ordering and review of laboratory  studies, ordering and review of radiographic studies, pulse oximetry, re-evaluation of patient's condition and review of old charts             ED Course  ED Course as of Jul 07 1326   Sun Jul 07, 2019   1126 Patient has a significant neurologic deficit with left-sided gaze preference and will not pass the right to the of the midline with her gaze.  She has significant weakness of her right side.  She has dysarthria though without aphasia, and is oriented x3She is not a candidate for TPA as she is on Eliquis.I discussed findings with the patient to date.  I am awaiting perfusion data.  Last known well was 13 hours ago however.  We will plan on admission.  [HH]   1137 Aspirin is held due to the patient's treatment with both Eliquis and Plavix.  [HH]   1305 Patient is serially reevaluated throughout the ED course with last reevaluation now.  Her symptoms have actually improved.  She is crossing the midline to the right very minimally however her strength and coordination in her right hand has significantly improved.  She still has dysarthria but it has also improved.There is no large vessel occlusion on CT perfusion.  She has moderate stenosis in on her carotid angiogram.I discussed the findings with intervention and neurology.  We will treat the patient with rectal aspirin  [HH]   1315 Surinder findings with Dr. Harmon who will admit for definitive inpatient care  []      ED Course User Index  [] Mainor Rasmussen MD       Recent Results (from the past 24 hour(s))   Light Blue Top    Collection Time: 07/07/19 11:41 AM   Result Value Ref Range    Extra Tube hold for add-on    Lavender Top    Collection Time: 07/07/19 11:41 AM   Result Value Ref Range    Extra Tube hold for add-on    CBC Auto Differential    Collection Time: 07/07/19 11:41 AM   Result Value Ref Range    WBC 5.76 3.40 - 10.80 10*3/mm3    RBC 4.35 3.77 - 5.28 10*6/mm3    Hemoglobin 12.0 12.0 - 15.9 g/dL    Hematocrit 37.9 34.0 - 46.6 %    MCV 87.1 79.0 -  97.0 fL    MCH 27.6 26.6 - 33.0 pg    MCHC 31.7 31.5 - 35.7 g/dL    RDW 14.8 12.3 - 15.4 %    RDW-SD 45.9 37.0 - 54.0 fl    MPV 11.1 6.0 - 12.0 fL    Platelets 258 140 - 450 10*3/mm3    Neutrophil % 84.2 (H) 42.7 - 76.0 %    Lymphocyte % 10.4 (L) 19.6 - 45.3 %    Monocyte % 4.5 (L) 5.0 - 12.0 %    Eosinophil % 0.2 (L) 0.3 - 6.2 %    Basophil % 0.2 0.0 - 1.5 %    Immature Grans % 0.5 0.0 - 0.5 %    Neutrophils, Absolute 4.85 1.70 - 7.00 10*3/mm3    Lymphocytes, Absolute 0.60 (L) 0.70 - 3.10 10*3/mm3    Monocytes, Absolute 0.26 0.10 - 0.90 10*3/mm3    Eosinophils, Absolute 0.01 0.00 - 0.40 10*3/mm3    Basophils, Absolute 0.01 0.00 - 0.20 10*3/mm3    Immature Grans, Absolute 0.03 0.00 - 0.05 10*3/mm3    nRBC 0.0 0.0 - 0.2 /100 WBC   Comprehensive Metabolic Panel    Collection Time: 07/07/19 12:46 PM   Result Value Ref Range    Glucose 35 (C) 65 - 99 mg/dL    BUN 29 (H) 8 - 23 mg/dL    Creatinine 1.15 (H) 0.57 - 1.00 mg/dL    Sodium 136 136 - 145 mmol/L    Potassium 4.4 3.5 - 5.2 mmol/L    Chloride 102 98 - 107 mmol/L    CO2 17.0 (L) 22.0 - 29.0 mmol/L    Calcium 10.2 8.6 - 10.5 mg/dL    Total Protein 6.9 6.0 - 8.5 g/dL    Albumin 3.80 3.50 - 5.20 g/dL    ALT (SGPT) 41 (H) 1 - 33 U/L    AST (SGOT) 39 (H) 1 - 32 U/L    Alkaline Phosphatase 73 39 - 117 U/L    Total Bilirubin 0.5 0.2 - 1.2 mg/dL    eGFR Non African Amer 46 (L) >60 mL/min/1.73    Globulin 3.1 gm/dL    A/G Ratio 1.2 g/dL    BUN/Creatinine Ratio 25.2 (H) 7.0 - 25.0    Anion Gap 17.0 (H) 5.0 - 15.0 mmol/L   POC Glucose Once    Collection Time: 07/07/19  1:38 PM   Result Value Ref Range    Glucose 48 (C) 70 - 130 mg/dL     Note: In addition to lab results from this visit, the labs listed above may include labs taken at another facility or during a different encounter within the last 24 hours. Please correlate lab times with ED admission and discharge times for further clarification of the services performed during this visit.    CT Angiogram Head With & Without  "Contrast   Preliminary Result   Extensive calcification seen at the carotid bifurcations   bilaterally making measurements of the narrowing extremely difficult   however there is at least a moderate stenosis identified of the proximal   right internal carotid artery and mild stenosis of the proximal left   internal carotid artery. No definite significant stenosis is identified.   The intracranial vessels are unremarkable.              CT Angiogram Neck With & Without Contrast   Preliminary Result   Extensive calcification seen at the carotid bifurcations   bilaterally making measurements of the narrowing extremely difficult   however there is at least a moderate stenosis identified of the proximal   right internal carotid artery and mild stenosis of the proximal left   internal carotid artery. No definite significant stenosis is identified.   The intracranial vessels are unremarkable.              XR Chest 1 View   Preliminary Result   Chronic changes no acute parenchymal disease              CT Cerebral Perfusion With & Without Contrast   Preliminary Result   No large perfusion abnormality to suggest evidence of   reversible ischemia                          CT Head Without Contrast Stroke Protocol   Preliminary Result   Stable chronic changes seen within the brain with no acute   intracranial abnormality identified.       Examination was performed 07/07/2019 at 1053 hours and examination   results were given to the ER physician at time of performance of the   examination at the scanner.       DICTATED:   07/07/2019   EDITED/ls :   07/07/2019                 Vitals:    07/07/19 1119 07/07/19 1135 07/07/19 1331   BP:  153/63 137/65   Pulse:  89 95   Resp:  18    Temp:  98.2 °F (36.8 °C)    TempSrc:  Oral    SpO2:  96% 98%   Weight: 76.7 kg (169 lb)     Height: 167.6 cm (66\")       Medications   sodium chloride 0.9 % flush 10 mL (not administered)   sodium chloride 0.9 % infusion (125 mL/hr Intravenous Rate/Dose " Change 7/7/19 1336)   aspirin suppository 300 mg (not administered)   dextrose 10 % infusion (not administered)   sodium chloride 0.9 % bolus 500 mL (500 mL Intravenous New Bag 7/7/19 1300)   iopamidol (ISOVUE-370) 76 % injection 100 mL (40 mL Intravenous Given 7/7/19 1213)   iopamidol (ISOVUE-370) 76 % injection 100 mL (76 mL Intravenous Given 7/7/19 1215)     ECG/EMG Results (last 24 hours)     Procedure Component Value Units Date/Time    ECG 12 Lead [[id number]] Collected:  07/07/19 1203     Updated:  07/07/19 1216        ECG 12 Lead                           MDM    Final diagnoses:   Cerebrovascular accident (CVA), unspecified mechanism (CMS/HCC)   Dysarthria   Weakness       Documentation assistance provided by tom Moreno.  Information recorded by the scribe was done at my direction and has been verified and validated by me.     Bijal Moreno  07/07/19 1326       Bijal oMreno  07/07/19 1341       Bijal Moreno  07/07/19 1343       Bijal Moreno  07/07/19 1344

## 2019-07-07 NOTE — CONSULTS
Consults    Patient Care Team:  Mariia Del Real DO as PCP - General  Mariia Del Real DO as PCP - Family Medicine  Filemon Mayberry IV, MD as Cardiologist (Cardiology)  Jennifer Richards MD as Consulting Physician (Endocrinology)    Chief complaint: right sided weakness    Subjective     History of Present Illness    79 y.o. female referred by Dr Rasmussen for acute onset of right sided weakness.  Pt LKW at 10 pm 7/6/19.  This am Dtr reports pt has slurred speech, right facial droop and right sided weakness.  Recently discharged on 7/1/19 for R LE DVT on Eliquis.  Taking Plavix for two cardiac stents in May 2019.  Pt also has spinal cord stimulator.      Sx are improving over last few hours but continues to have right sided weakness and slurred speech of moderate intensity.     Gluc 35 at 1337        CTA/CTP/HCT, my review of films, extensive calcifications, no perfusion defect, mild atrophy    Review of Systems   Constitutional: Positive for fatigue. Negative for activity change and unexpected weight change.   HENT: Negative for tinnitus and trouble swallowing.    Eyes: Negative for photophobia and visual disturbance.   Respiratory: Negative for apnea, cough and choking.    Cardiovascular: Negative for leg swelling.   Gastrointestinal: Negative for nausea and vomiting.   Endocrine: Negative for cold intolerance and heat intolerance.   Genitourinary: Negative for difficulty urinating, frequency, menstrual problem and urgency.   Musculoskeletal: Positive for arthralgias, back pain and gait problem. Negative for myalgias and neck pain.   Skin: Negative for color change and rash.   Allergic/Immunologic: Negative for immunocompromised state.   Neurological: Positive for speech difficulty and weakness. Negative for dizziness, tremors, seizures, syncope, facial asymmetry, light-headedness, numbness and headaches.   Hematological: Negative for adenopathy. Does not bruise/bleed easily.   Psychiatric/Behavioral:  Positive for decreased concentration. Negative for behavioral problems, confusion, hallucinations and sleep disturbance.        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   • Chronic diastolic congestive heart failure (CMS/[facility])    • Chronic low back pain    • Chronic venous insufficiency    • CKD (chronic kidney disease) stage 3, GFR 30-59 ml/min (CMS/[facility])    • Diabetic gastroparesis (CMS/[facility])    • Diverticulosis    • DJD (degenerative joint disease), lumbar    • DM2 (diabetes mellitus, type 2) (CMS/[facility])    • GERD (gastroesophageal reflux disease)    • Hiatal hernia    • History of hyperparathyroidism    • HLD (hyperlipidemia)    • HTN (hypertension)    • Lumbar stenosis 11/15/2018   • DELFINA (obstructive sleep apnea)    • Osteoarthritis 7/29/2016   • PAD (peripheral artery disease) (CMS/[facility])    • Postherpetic neuralgia    • Vitamin B12 deficiency 7/29/2016   ,   Past Surgical History:   Procedure Laterality Date   • CARDIAC CATHETERIZATION N/A 5/23/2019    Procedure: LEFT HEART CATH;  Surgeon: Filemon Mayberry IV, MD;  Location:  [facility] CATH INVASIVE LOCATION;  Service: Cardiovascular   • CARDIAC CATHETERIZATION N/A 5/29/2019    Procedure: Atherectomy-coronary;  Surgeon: Filemon Mayberry IV, MD;  Location: [facility] CATH INVASIVE LOCATION;  Service: Cardiovascular   • CARDIAC CATHETERIZATION N/A 5/29/2019    Procedure: Stent PATRICIA coronary;  Surgeon: Filemon Mayberry IV, MD;  Location: [facility] CATH INVASIVE LOCATION;  Service: Cardiovascular   • CARDIAC ELECTROPHYSIOLOGY PROCEDURE N/A 5/28/2019    Procedure: TEMPORARY PACEMAKER;  Surgeon: Jeffrey Reyes MD;  Location: [facility] CATH INVASIVE LOCATION;  Service: Cardiovascular   • CATARACT EXTRACTION Bilateral    • CHOLECYSTECTOMY     • COLONOSCOPY     • CYSTOSCOPY W/ URETERAL STENT PLACEMENT  2014   • ENDOSCOPY     • HYSTERECTOMY     • LUMBAR LAMINECTOMY DISCECTOMY DECOMPRESSION N/A  11/15/2018    Procedure: LUMBAR LAMINECTOMY L2-3 L 3-4,;  Surgeon: Zachary Key MD;  Location: [facility];  Service: Orthopedic Spine   • PARATHYROIDECTOMY     • SPINAL CORD STIMULATOR IMPLANT     • TONSILLECTOMY     ,   Family History   Problem Relation Age of Onset   • Kidney disease Mother    • Diabetes Father    ,   Social History     Tobacco Use   • Smoking status: Never Smoker   • Smokeless tobacco: Never Used   Substance Use Topics   • Alcohol use: No   • Drug use: No   ,   (Not in a hospital admission), Scheduled Meds:    aspirin 300 mg Rectal Once   , Continuous Infusions:    dextrose 100 mL/hr    sodium chloride 125 mL/hr Last Rate: 125 mL/hr (07/07/19 1336)   , PRN Meds:  sodium chloride and Allergies:  Codeine    Objective      Vital Signs  Temp:  [98.2 °F (36.8 °C)] 98.2 °F (36.8 °C)  Heart Rate:  [89] 89  Resp:  [18] 18  BP: (153)/(63) 153/63    Physical Exam   Constitutional: She is oriented to person, place, and time. Vital signs are normal. She appears well-developed and well-nourished. No distress.   HENT:   Head: Normocephalic and atraumatic.   Eyes: EOM and lids are normal. Pupils are equal, round, and reactive to light.   Fundoscopic exam:       The right eye shows no exudate, no hemorrhage and no papilledema. The right eye shows venous pulsations.        The left eye shows no exudate, no hemorrhage and no papilledema. The left eye shows venous pulsations.   Neck: Normal range of motion and phonation normal. Normal carotid pulses present. Carotid bruit is not present. No thyroid mass and no thyromegaly present.   Cardiovascular: Normal rate, regular rhythm and normal heart sounds.   Pulmonary/Chest: Effort normal.   Neurological: She is oriented to person, place, and time. She has an abnormal Heel to Shin Test. She has a normal Finger-Nose-Finger Test.   Skin: Skin is warm and dry.   Psychiatric: She has a normal mood and affect. Her behavior is normal. Her speech is slurred.   Nursing note  and vitals reviewed.    Neurologic Exam     Mental Status   Oriented to person, place, and time.   Follows 2 step commands.   Attention: decreased. Concentration: decreased.   Speech: slurred   Level of consciousness: alert  Knowledge: poor.   Unable to name object. Unable to repeat. Abnormal comprehension.     Cranial Nerves     CN II   Visual fields full to confrontation.   Visual acuity: normal  Right visual field deficit: none  Left visual field deficit: none     CN III, IV, VI   Pupils are equal, round, and reactive to light.  Extraocular motions are normal.   Right pupil: Shape: regular. Reactivity: brisk. Consensual response: intact.   Left pupil: Shape: regular. Reactivity: brisk. Consensual response: intact.   Nystagmus: none   Diplopia: none  Ophthalmoparesis: none  Upgaze: normal  Downgaze: normal  Conjugate gaze: present  Vestibulo-ocular reflex: present    CN V   Right facial sensation deficit: complete  Left facial sensation deficit: none  Right corneal reflex: normal  Left corneal reflex: normal    CN VII   Right facial weakness: central  Left facial weakness: none    CN VIII   Hearing: intact    CN IX, X   Palate: symmetric  Right gag reflex: normal  Left gag reflex: normal    CN XI   Right sternocleidomastoid strength: normal  Left sternocleidomastoid strength: normal    CN XII   Tongue: not atrophic  Fasciculations: absent  Tongue deviation: none    Motor Exam   Muscle bulk: normal  Overall muscle tone: normal  Right arm tone: normal  Left arm tone: normal  Right leg tone: normal  Left leg tone: normal    Strength   Strength 5/5 except as noted.   Right deltoid: 3/5  Right biceps: 3/5  Right triceps: 3/5  Right wrist flexion: 3/5  Right wrist extension: 3/5  Right iliopsoas: 3/5  Right quadriceps: 3/5  Right hamstring: 3/5  Right glutei: 3/5  Right anterior tibial: 3/5  Right posterior tibial: 3/5  Right peroneal: 3/5  Right gastroc: 3/5    Sensory Exam   Light touch normal.   Vibration normal.    Proprioception normal.   Pinprick normal.     Gait, Coordination, and Reflexes     Gait  Gait: circumduction (right )    Coordination   Finger to nose coordination: normal  Heel to shin coordination: abnormal    Tremor   Resting tremor: absent  Intention tremor: absent  Action tremor: absent    Reflexes   Reflexes 2+ except as noted.   Right plantar: upgoing    Results Review:    I reviewed the patient's new clinical results.  I reviewed the patient's new imaging results and agree with the interpretation.  I reviewed the patient's other test results and agree with the interpretation  Discussed with Dr Rasmussen and Dr Harmon        Assessment/Plan       Acute cerebrovascular accident (CVA) of cerebellum (CMS/HCC)    Peripheral arterial disease (CMS/[facility])    Uncontrolled type 2 diabetes mellitus with complication, with long-term current use of insulin (CMS/[facility])    Diabetic peripheral neuropathy (CMS/[facility])    CKD (chronic kidney disease) stage 3, GFR 30-59 ml/min (CMS/[facility])    Acute deep vein thrombosis (DVT) of distal vein of right lower extremity (CMS/[facility])    CVA (cerebral vascular accident) (CMS/[facility])    1.  Right sided weakness - continue Eliquis, Plavix, add ASA, possibly secondary to hypoglycemia.  MRI Brain when stimulator can be turned off.  Echo.       I discussed the patients findings and my recommendations with patient, family, primary care team and consulting provider    Angel Fatima MD  07/07/19  1:34 PM

## 2019-07-08 ENCOUNTER — TELEPHONE (OUTPATIENT)
Dept: INTERNAL MEDICINE | Facility: CLINIC | Age: 79
End: 2019-07-08

## 2019-07-08 LAB
CHOLEST SERPL-MCNC: 90 MG/DL (ref 0–200)
GLUCOSE BLDC GLUCOMTR-MCNC: 167 MG/DL (ref 70–130)
GLUCOSE BLDC GLUCOMTR-MCNC: 204 MG/DL (ref 70–130)
GLUCOSE BLDC GLUCOMTR-MCNC: 215 MG/DL (ref 70–130)
GLUCOSE BLDC GLUCOMTR-MCNC: 87 MG/DL (ref 70–130)
HBA1C MFR BLD: 6.3 % (ref 4.8–5.6)
HDLC SERPL-MCNC: 25 MG/DL (ref 40–60)
LDLC SERPL CALC-MCNC: 27 MG/DL (ref 0–100)
LDLC/HDLC SERPL: 1.09 {RATIO}
TRIGL SERPL-MCNC: 189 MG/DL (ref 0–150)
VLDLC SERPL-MCNC: 37.8 MG/DL

## 2019-07-08 PROCEDURE — 99233 SBSQ HOSP IP/OBS HIGH 50: CPT | Performed by: INTERNAL MEDICINE

## 2019-07-08 PROCEDURE — 63710000001 INSULIN LISPRO (HUMAN) PER 5 UNITS: Performed by: INTERNAL MEDICINE

## 2019-07-08 PROCEDURE — G0108 DIAB MANAGE TRN  PER INDIV: HCPCS

## 2019-07-08 PROCEDURE — 80061 LIPID PANEL: CPT | Performed by: INTERNAL MEDICINE

## 2019-07-08 PROCEDURE — 97161 PT EVAL LOW COMPLEX 20 MIN: CPT

## 2019-07-08 PROCEDURE — 92523 SPEECH SOUND LANG COMPREHEN: CPT

## 2019-07-08 PROCEDURE — 99232 SBSQ HOSP IP/OBS MODERATE 35: CPT | Performed by: PSYCHIATRY & NEUROLOGY

## 2019-07-08 PROCEDURE — 92526 ORAL FUNCTION THERAPY: CPT

## 2019-07-08 PROCEDURE — 82962 GLUCOSE BLOOD TEST: CPT

## 2019-07-08 PROCEDURE — 83036 HEMOGLOBIN GLYCOSYLATED A1C: CPT | Performed by: INTERNAL MEDICINE

## 2019-07-08 PROCEDURE — 97166 OT EVAL MOD COMPLEX 45 MIN: CPT

## 2019-07-08 RX ORDER — ACETAMINOPHEN 325 MG/1
650 TABLET ORAL EVERY 6 HOURS PRN
Status: DISCONTINUED | OUTPATIENT
Start: 2019-07-08 | End: 2019-07-12 | Stop reason: HOSPADM

## 2019-07-08 RX ADMIN — INSULIN LISPRO 3 UNITS: 100 INJECTION, SOLUTION INTRAVENOUS; SUBCUTANEOUS at 20:35

## 2019-07-08 RX ADMIN — INSULIN LISPRO 2 UNITS: 100 INJECTION, SOLUTION INTRAVENOUS; SUBCUTANEOUS at 16:53

## 2019-07-08 RX ADMIN — GABAPENTIN 600 MG: 300 CAPSULE ORAL at 20:35

## 2019-07-08 RX ADMIN — ACETAMINOPHEN 650 MG: 325 TABLET, FILM COATED ORAL at 20:34

## 2019-07-08 RX ADMIN — AMLODIPINE BESYLATE 5 MG: 5 TABLET ORAL at 09:52

## 2019-07-08 RX ADMIN — APIXABAN 5 MG: 5 TABLET, FILM COATED ORAL at 09:52

## 2019-07-08 RX ADMIN — CLOPIDOGREL BISULFATE 75 MG: 75 TABLET ORAL at 09:52

## 2019-07-08 RX ADMIN — SODIUM CHLORIDE, PRESERVATIVE FREE 3 ML: 5 INJECTION INTRAVENOUS at 09:55

## 2019-07-08 RX ADMIN — ATORVASTATIN CALCIUM 80 MG: 40 TABLET, FILM COATED ORAL at 20:35

## 2019-07-08 RX ADMIN — METOPROLOL TARTRATE 50 MG: 50 TABLET ORAL at 09:52

## 2019-07-08 RX ADMIN — INSULIN LISPRO 3 UNITS: 100 INJECTION, SOLUTION INTRAVENOUS; SUBCUTANEOUS at 11:46

## 2019-07-08 RX ADMIN — METOPROLOL TARTRATE 50 MG: 50 TABLET ORAL at 20:35

## 2019-07-08 RX ADMIN — GABAPENTIN 600 MG: 300 CAPSULE ORAL at 09:52

## 2019-07-08 RX ADMIN — ACETAMINOPHEN 650 MG: 325 TABLET, FILM COATED ORAL at 02:08

## 2019-07-08 RX ADMIN — APIXABAN 5 MG: 5 TABLET, FILM COATED ORAL at 20:35

## 2019-07-08 NOTE — PLAN OF CARE
Problem: Fall Risk (Adult)  Goal: Identify Related Risk Factors and Signs and Symptoms  Outcome: Ongoing (interventions implemented as appropriate)   07/08/19 1848   Fall Risk (Adult)   Related Risk Factors (Fall Risk) gait/mobility problems;sensory deficits;sleep pattern alteration;environment unfamiliar   Signs and Symptoms (Fall Risk) presence of risk factors       Problem: Patient Care Overview  Goal: Plan of Care Review  Outcome: Ongoing (interventions implemented as appropriate)   07/08/19 1848   Coping/Psychosocial   Plan of Care Reviewed With patient   Plan of Care Review   Progress improving   OTHER   Outcome Summary Pt A/O x4. Right sided weakness still there but improving, NIH 2. Decreased sensation on right leg and right arm, speech clear, SLP changed dier to regular consistency. ambualted with PT, voiding well, Bm today. VSS, Asked patient to havve her family bring magnet to turn stimulator off in order to have an MRI. Family unable to find at home, contacted the rep. Might be able to have magnet available isaac. Blood sugars stable, required coverage for lunch and dinner   07/08/19 1848   Coping/Psychosocial   Plan of Care Reviewed With patient   Plan of Care Review   Progress improving   OTHER   Outcome Summary Pt A/O x4. Right sided weakness still there but improving, Decreased sensation on right leg and right arm, speech clear, SLP changed dier to regular consistency. ambualted with PT, voiding well, Bm today. VSS          Problem: Stroke (Ischemic) (Adult)  Goal: Signs and Symptoms of Listed Potential Problems Will be Absent, Minimized or Managed (Stroke)  Outcome: Ongoing (interventions implemented as appropriate)   07/08/19 1848   Goal/Outcome Evaluation   Problems Assessed (Stroke (Ischemic)) all   Problems Assessed (Stroke (Ischemic)) motor/sensory impairment

## 2019-07-08 NOTE — PLAN OF CARE
Problem: Patient Care Overview  Goal: Plan of Care Review  Outcome: Ongoing (interventions implemented as appropriate)   07/08/19 1445   Coping/Psychosocial   Plan of Care Reviewed With patient   OTHER   Outcome Summary PT PRESENTS WITH EVOLVING SYMPTOMS TO INCLUDE L SIDED WEAKNESS, IMPAIRED BALANCE, CHRONIC BACK PAIN, DECREASED ENDURANCE AND DECLINE IN FUNCTIONAL MOBILITY. PT AMBULATED 44 FEET WITH R WALKER AND CGA. DISTANCE LIMITED BY CHRONIC BACK PAIN AND SOA. RECOMMEND IRF AT D/C AS PT'S DTR WORKS DURING THE DAY.        Problem: Stroke (Ischemic) (Adult)  Goal: Signs and Symptoms of Listed Potential Problems Will be Absent, Minimized or Managed (Stroke)  Outcome: Ongoing (interventions implemented as appropriate)   07/08/19 1445   Goal/Outcome Evaluation   Problems Assessed (Stroke (Ischemic)) cognitive impairment;communication impairment;motor/sensory impairment   Problems Assessed (Stroke (Ischemic)) motor/sensory impairment

## 2019-07-08 NOTE — PROGRESS NOTES
Discharge Planning Assessment  [facility]     Patient Name: Narcisa Cotto  MRN: [id number]  Today's Date: 7/8/2019    Admit Date: 7/7/2019    Discharge Needs Assessment     Row Name 07/08/19 1143       Living Environment    Lives With  child(laura), adult    Name(s) of Who Lives With Patient  Daughter:  Shanna Loja    Current Living Arrangements  home/apartment/condo    Family Caregiver if Needed  child(laura), adult    Quality of Family Relationships  helpful;involved;supportive    Able to Return to Prior Arrangements  yes    Living Arrangement Comments  Ms. Cotto lives with her daughter in a one story home, 2 steps to enter, in [facility].  She stated that her daughter is available to assist her at home when she is not at work.       Resource/Environmental Concerns    Transportation Concerns  car, none       Transition Planning    Patient/Family Anticipates Transition to  inpatient rehabilitation facility    Patient/Family Anticipated Services at Transition  rehabilitation services    Transportation Anticipated  family or friend will provide       Discharge Needs Assessment    Equipment Currently Used at Home  walker, rolling;cane, straight;rollator;shower chair;grab bar raised toilet seats    Equipment Needed After Discharge  none    Discharge Facility/Level of Care Needs  nursing facility, skilled    Offered/Gave Vendor List  yes    Patient's Choice of Community Agency(s)  [facility]        Discharge Plan     Row Name 07/08/19 1146       Plan    Plan  [facility]    Patient/Family in Agreement with Plan  yes    Plan Comments  Met with Ms. Cotto at the bedside to initiate discharge planning.  Ms. Cotto has been evaluated by OT and recommendation is for rehab.  Speech has also evaluated and patient has some dysphagia. PT will be evaluating the patient later today.  Ms. Cotto lives with her daughter, Shanna, who assists her at home when not  working.  She denies any DME needs.   Ms. Cotto has been to rehab before at [facility], but requests a referral to [facility].  [facility], ph [phone], fax [phone].  Ms. Cotto rehab admission will require a prior auth from [place] [employer].    CM will continue to follow.    Final Discharge Disposition Code  03 - skilled nursing facility (SNF)        Destination      No service coordination in this encounter.      Durable Medical Equipment      No service coordination in this encounter.      Dialysis/Infusion      No service coordination in this encounter.      Home Medical Care      No service coordination in this encounter.      Therapy      No service coordination in this encounter.      Community Resources      No service coordination in this encounter.        Expected Discharge Date and Time     Expected Discharge Date Expected Discharge Time    Jul 10, 2019         Demographic Summary     Row Name 07/08/19 1141       General Information    Admission Type  inpatient    Arrived From  home    Reason for Consult  discharge planning    General Information Comments  PCP:  Mariia Del Real       Contact Information    Permission Granted to Share Info With      Contact Information Comments  Daughter: Shanna Loja, ph [phone]        Functional Status     Row Name 07/08/19 1142       Functional Status    Usual Activity Tolerance  moderate    Current Activity Tolerance  -- Following for PT notes       Functional Status, IADL    Medications  independent    Meal Preparation  independent    Housekeeping  independent    Laundry  independent    Shopping  independent       Mental Status    General Appearance WDL  WDL        Psychosocial    No documentation.       Abuse/Neglect    No documentation.       Legal     Row Name 07/08/19 1143       Financial/Legal    Who Manages Finances if Patient Unable  No advance directives.    Finance  Comments  Ms. Cotto has prescription drug coverage with [place] [employer].  Verified insurance with patient.        Substance Abuse    No documentation.       Patient Forms    No documentation.           Ana Gunn

## 2019-07-08 NOTE — PLAN OF CARE
Problem: Patient Care Overview  Goal: Plan of Care Review  Outcome: Ongoing (interventions implemented as appropriate)   07/08/19 4823   Coping/Psychosocial   Plan of Care Reviewed With patient   Plan of Care Review   Progress improving   OTHER   Outcome Summary Pt. doing well this shift. Her NIH-2. No facial droop or slurred speech. VS WNL, will continue to monitor.

## 2019-07-08 NOTE — THERAPY EVALUATION
Acute Care - Occupational Therapy Initial Evaluation  [facility]     Patient Name: Narcisa Cotto  : 1940  MRN: [id number]  Today's Date: 2019  Onset of Illness/Injury or Date of Surgery: 19  Date of Referral to OT: 19  Referring Physician: MD Eden    Admit Date: 2019       ICD-10-CM ICD-9-CM   1. Cerebrovascular accident (CVA), unspecified mechanism (CMS/[facility]) I63.9 434.91   2. Dysarthria R47.1 784.51   3. Weakness R53.1 780.79   4. Hypoglycemia E16.2 251.2   5. Dysphagia, unspecified type R13.10 787.20   6. Impaired mobility and ADLs Z74.09 799.89     Patient Active Problem List   Diagnosis   • Hyperlipidemia LDL goal <70   • Diabetic gastroparesis (CMS/[facility])   • Peripheral arterial disease (CMS/[facility])   • Uncontrolled type 2 diabetes mellitus with complication, with long-term current use of insulin (CMS/[facility])   • Sleep apnea   • Diabetic peripheral neuropathy (CMS/[facility])   • Coronary artery disease involving native coronary artery of native heart with angina pectoris (CMS/[facility])   • GERD (gastroesophageal reflux disease)   • Essential hypertension   • Iron deficiency anemia   • Complete heart block (CMS/[facility])   • CKD (chronic kidney disease) stage 3, GFR 30-59 ml/min (CMS/[facility])   • Shortness of breath   • Acute deep vein thrombosis (DVT) of distal vein of right lower extremity (CMS/[facility])   • CVA (cerebral vascular accident) (CMS/[facility])   • Hypoglycemia     Past Medical History:   Diagnosis Date   • CAD (coronary artery disease)    • Choledochal cyst 2019   • Chronic diastolic congestive heart failure (CMS/[facility])    • Chronic low back pain    • Chronic venous insufficiency    • CKD (chronic kidney disease) stage 3, GFR 30-59 ml/min (CMS/[facility])    • Diabetic gastroparesis (CMS/[facility])    • Diverticulosis    • DJD (degenerative joint disease), lumbar    • DM2 (diabetes mellitus, type 2) (CMS/[facility])    • GERD (gastroesophageal reflux disease)    • Hiatal hernia    • History of hyperparathyroidism    •  HLD (hyperlipidemia)    • HTN (hypertension)    • Lumbar stenosis 11/15/2018   • DELFINA (obstructive sleep apnea)    • Osteoarthritis 7/29/2016   • PAD (peripheral artery disease) (CMS/[facility])    • Postherpetic neuralgia    • Vitamin B12 deficiency 7/29/2016     Past Surgical History:   Procedure Laterality Date   • CARDIAC CATHETERIZATION N/A 5/23/2019    Procedure: LEFT HEART CATH;  Surgeon: Filemon Mayberry IV, MD;  Location:  [facility];  Service: Cardiovascular   • CARDIAC CATHETERIZATION N/A 5/29/2019    Procedure: Atherectomy-coronary;  Surgeon: Filemon Mayberry IV, MD;  Location:  [facility];  Service: Cardiovascular   • CARDIAC CATHETERIZATION N/A 5/29/2019    Procedure: Stent PATRICIA coronary;  Surgeon: Filemon Mayberry IV, MD;  Location:  [facility];  Service: Cardiovascular   • CARDIAC ELECTROPHYSIOLOGY PROCEDURE N/A 5/28/2019    Procedure: TEMPORARY PACEMAKER;  Surgeon: Jeffrey Reyes MD;  Location:  [facility];  Service: Cardiovascular   • CATARACT EXTRACTION Bilateral    • CHOLECYSTECTOMY     • COLONOSCOPY     • CYSTOSCOPY W/ URETERAL STENT PLACEMENT  2014   • ENDOSCOPY     • HYSTERECTOMY     • LUMBAR LAMINECTOMY DISCECTOMY DECOMPRESSION N/A 11/15/2018    Procedure: LUMBAR LAMINECTOMY L2-3 L 3-4,;  Surgeon: Zachary Key MD;  Location:  [facility];  Service: Orthopedic Spine   • PARATHYROIDECTOMY     • SPINAL CORD STIMULATOR IMPLANT     • TONSILLECTOMY            OT ASSESSMENT FLOWSHEET (last 12 hours)      Occupational Therapy Evaluation     Row Name 07/08/19 0859                   OT Evaluation Time/Intention    Subjective Information  complains of;weakness  -AC        Document Type  evaluation  -AC        Mode of Treatment  occupational therapy  -AC        Patient Effort  good  -AC           General Information    Patient Profile Reviewed?  yes  -AC        Onset of Illness/Injury or Date of Surgery   07/07/19  -        Referring Physician  MD Eden  -        Patient Observations  alert;cooperative;agree to therapy  -AC        Patient/Family Observations  No family present  -        General Observations of Patient  Pt received in bed.  IV intact  -AC        Prior Level of Function  independent:;all household mobility;ADL's;dependent:;home management  -AC        Equipment Currently Used at Home  cane, straight;grab bar;rollator;bath bench;walker, rolling  -AC        Pertinent History of Current Functional Problem  Pt admit with slurred speech, facial droop, L gaze preference and right sided weakness.   H/O back surgery in November,  MI in May, and recent admit in June with R LE DVT  -AC        Existing Precautions/Restrictions  fall  -AC        Risks Reviewed  patient:;LOB  -AC        Benefits Reviewed  patient:;improve function;increase independence;increase strength;increase balance;increase knowledge  -        Barriers to Rehab  medically complex  -AC           Relationship/Environment    Primary Source of Support/Comfort  child(laura)  -AC        Lives With  child(laura), adult  -AC        Concerns About Impact on Relationships  lives with dtr.  dtr works during the day  -AC           Resource/Environmental Concerns    Current Living Arrangements  home/apartment/condo  -           Cognitive Assessment/Interventions    Additional Documentation  Cognitive Assessment/Intervention (Group)  -           Cognitive Assessment/Intervention- PT/OT    Orientation Status (Cognition)  oriented x 4  -AC        Follows Commands (Cognition)  WFL  -AC        Safety Deficit (Cognitive)  mild deficit;safety precautions awareness;safety precautions follow-through/compliance  -        Personal Safety Interventions  fall prevention program maintained;gait belt;nonskid shoes/slippers when out of bed  -AC           Bed Mobility Assessment/Treatment    Bed Mobility Assessment/Treatment  supine-sit  -AC        Supine-Sit  [place] (Bed Mobility)  moderate assist (50% patient effort)  -        Bed Mobility, Safety Issues  decreased use of arms for pushing/pulling;decreased use of legs for bridging/pushing  -AC        Assistive Device (Bed Mobility)  head of bed elevated  -           Functional Mobility    Functional Mobility- Ind. Level  contact guard assist  -AC        Functional Mobility- Device  rolling walker  -        Functional Mobility-Distance (Feet)  16  -AC           Transfer Assessment/Treatment    Transfer Assessment/Treatment  sit-stand transfer;stand-sit transfer  -           Sit-Stand Transfer    Sit-Stand [place] (Transfers)  contact guard  -AC        Assistive Device (Sit-Stand Transfers)  walker, front-wheeled  -AC           Stand-Sit Transfer    Stand-Sit [place] (Transfers)  contact guard  -AC        Assistive Device (Stand-Sit Transfers)  walker, front-wheeled  -           ADL Assessment/Intervention    BADL Assessment/Intervention  lower body dressing  -           Lower Body Dressing Assessment/Training    Lower Body Dressing [place] Level  doff;socks  -        Lower Body Dressing Position  unsupported sitting  -        Comment (Lower Body Dressing)  CGA with L, mod a R  -AC           BADL Safety/Performance    Impairments, BADL Safety/Performance  balance;endurance/activity tolerance;pain;strength;trunk/postural control  -           General ROM    GENERAL ROM COMMENTS  R shld limited 25%, otherwise BUE AROM WNL  -AC           MMT (Manual Muscle Testing)    General MMT Comments  R shld 3-/5, R otherwise 3+/5,  LUE grossly 4-/5  -AC           Motor Assessment/Interventions    Additional Documentation  Gross Motor Coordination (Group)  -           Gross Motor Coordination    Gross Motor Impairments  finger to nose;rapid alternating intact  -AC           Fine Motor Testing & Training    Fine Motor Tests  -- intact opposition  -           Sensory Assessment/Intervention     Sensory General Assessment  no sensation deficits identified BUE  -AC        Additional Documentation  Vision Assessment/Intervention (Group)  -AC           Vision Assessment/Intervention    Visual Impairment/Limitations  corrective lenses for reading peripheral slightly impaired R  -AC           Positioning and Restraints    Pre-Treatment Position  in bed  -AC        Post Treatment Position  chair  -AC        In Chair  with PT;call light within reach;encouraged to call for assist  -AC           Pain Assessment    Additional Documentation  Pain Scale: Numbers Pre/Post-Treatment (Group)  -AC           Pain Scale: Numbers Pre/Post-Treatment    Pain Location - Side  Bilateral  -AC        Pain Location - Orientation  lower  -AC        Pain Location  extremity  -AC        Pain Intervention(s)  Repositioned  -AC           Pain Scale: FACES Pre/Post-Treatment    Pain: FACES Scale, Pretreatment  0-->no hurt  -AC        Pain: FACES Scale, Post-Treatment  2-->hurts little bit  -AC           Clinical Impression (OT)    Date of Referral to OT  07/07/19  -AC        OT Diagnosis  ADL decline  -AC        Patient/Family Goals Statement (OT Eval)  increase ADL indpendence  -AC        Criteria for Skilled Therapeutic Interventions Met (OT Eval)  yes;treatment indicated  -AC        Rehab Potential (OT Eval)  good, to achieve stated therapy goals  -AC        Therapy Frequency (OT Eval)  daily  -AC        Care Plan Review (OT)  evaluation/treatment results reviewed  -AC        Anticipated Discharge Disposition (OT)  inpatient rehabilitation facility  -AC           Vital Signs    Pre Systolic BP Rehab  127  -AC        Pre Treatment Diastolic BP  59  -AC        Intra Systolic BP Rehab  118  -AC        Intra Treatment Diastolic BP  55  -AC        Post Systolic BP Rehab  120  -AC        Post Treatment Diastolic BP  58  -AC        Pretreatment Heart Rate (beats/min)  88  -AC        Posttreatment Heart Rate (beats/min)  90  -AC        Pre  SpO2 (%)  97  -AC        O2 Delivery Pre Treatment  supplemental O2  -AC        Post SpO2 (%)  97  -AC        O2 Delivery Post Treatment  supplemental O2  -AC        Pre Patient Position  Supine  -AC        Intra Patient Position  Standing  -AC        Post Patient Position  Sitting  -AC           Planned OT Interventions    Planned Therapy Interventions (OT Eval)  BADL retraining;functional balance retraining;occupation/activity based interventions;strengthening exercise;transfer/mobility retraining  -AC           OT Goals    Bed Mobility Goal Selection (OT)  bed mobility, OT goal 1  -AC        Transfer Goal Selection (OT)  transfer, OT goal 1  -AC        Dressing Goal Selection (OT)  dressing, OT goal 1  -AC        Toileting Goal Selection (OT)  --  -AC        Strength Goal Selection (OT)  strength, OT goal 1  -AC        Additional Documentation  Strength Goal Selection (OT) (Row)  -AC           Bed Mobility Goal 1 (OT)    Activity/Assistive Device (Bed Mobility Goal 1, OT)  sit to supine;supine to sit  -AC        [place] Level/Cues Needed (Bed Mobility Goal 1, OT)  contact guard assist  -AC        Time Frame (Bed Mobility Goal 1, OT)  by discharge  -AC        Progress/Outcomes (Bed Mobility Goal 1, OT)  goal ongoing  -AC           Transfer Goal 1 (OT)    Activity/Assistive Device (Transfer Goal 1, OT)  bed-to-chair/chair-to-bed;toilet;walker, rolling  -AC        [place] Level/Cues Needed (Transfer Goal 1, OT)  supervision required  -AC        Time Frame (Transfer Goal 1, OT)  by discharge  -AC        Progress/Outcome (Transfer Goal 1, OT)  goal ongoing  -AC           Dressing Goal 1 (OT)    Activity/Assistive Device (Dressing Goal 1, OT)  lower body dressing AE prn  -AC        [place]/Cues Needed (Dressing Goal 1, OT)  set-up required;supervision required  -AC        Time Frame (Dressing Goal 1, OT)  by discharge  -AC        Progress/Outcome (Dressing Goal 1, OT)  goal ongoing  -AC            Strength Goal 1 (OT)    Strength Goal 1 (OT)  Pt will increase RUE 1 MMG as needed to support ADls  -AC        Time Frame (Strength Goal 1, OT)  by discharge  -AC        Progress/Outcome (Strength Goal 1, OT)  goal ongoing  -AC           Living Environment    Home Accessibility  stairs to enter home;tub/shower is not walk in  -AC          User Key  (r) = Recorded By, (t) = Taken By, (c) = Cosigned By    Initials Name Effective Dates    AC Monik Child, OT 06/23/15 -                OT Recommendation and Plan  Outcome Summary/Treatment Plan (OT)  Anticipated Discharge Disposition (OT): inpatient rehabilitation facility  Planned Therapy Interventions (OT Eval): BADL retraining, functional balance retraining, occupation/activity based interventions, strengthening exercise, transfer/mobility retraining  Therapy Frequency (OT Eval): daily  Plan of Care Review  Plan of Care Reviewed With: patient  Plan of Care Reviewed With: patient  Outcome Summary: OT eval complete. Pt presents with R sided weakness and decreased activity tolerance limiting independence with self care.  Pt required mod A supine to sit,  mod A to don R sock, CGA to don L,  CGA STS with RW,  CGA to ambulate 16 ft with RW.  OT will follow to advance pt toward PLOF with ADLs.  Recommend IP rehab upon d/c.     Outcome Measures     Row Name 07/08/19 0859             How much help from another is currently needed...    Putting on and taking off regular lower body clothing?  2  -AC      Bathing (including washing, rinsing, and drying)  2  -AC      Toileting (which includes using toilet bed pan or urinal)  2  -AC      Putting on and taking off regular upper body clothing  3  -AC      Taking care of personal grooming (such as brushing teeth)  3  -AC      Eating meals  3  -AC      AM-PAC 6 Clicks Score (OT)  15  -AC         Modified [place] Scale    Pre-Stroke Modified [place] Scale  4 - Moderately severe disability.  Unable to walk without assistance, and unable  to attend to own bodily needs without assistance.  -         Functional Assessment    Outcome Measure Options  AM-PAC 6 Clicks Daily Activity (OT);Modified Santa  -        User Key  (r) = Recorded By, (t) = Taken By, (c) = Cosigned By    Initials Name Provider Type    Monik Gonzalez, OT Occupational Therapist          Time Calculation:   Time Calculation- OT     Row Name 07/08/19 0859             Time Calculation- OT    OT Start Time  0859  -      OT Received On  07/08/19  -      OT Goal Re-Cert Due Date  07/18/19  -        User Key  (r) = Recorded By, (t) = Taken By, (c) = Cosigned By    Initials Name Provider Type    Monik Gonzalez, OT Occupational Therapist        Therapy Charges for Today     Code Description Service Date Service Provider Modifiers Qty    [id number]  OT EVAL MOD COMPLEXITY 4 7/8/2019 Monik Child OT GO 1               Monik Child OT  7/8/2019

## 2019-07-08 NOTE — PLAN OF CARE
Problem: Patient Care Overview  Goal: Plan of Care Review  Outcome: Ongoing (interventions implemented as appropriate)   07/08/19 1624   Coping/Psychosocial   Plan of Care Reviewed With patient   SLP evaluation and treatment completed. Will sign-off as pt now able to tolerate regular diet w/ dentures and cognitive-communication skills were found to be WFL/back to baseline. Please see note for further details and recommendations.      Problem: Stroke (Ischemic) (Adult)  Goal: Signs and Symptoms of Listed Potential Problems Will be Absent, Minimized or Managed (Stroke)  Outcome: Ongoing (interventions implemented as appropriate)   07/08/19 1624   Goal/Outcome Evaluation   Problems Assessed (Stroke (Ischemic)) cognitive impairment;communication impairment;eating/swallowing impairment   Problems Assessed (Stroke (Ischemic)) none

## 2019-07-08 NOTE — PLAN OF CARE
Problem: Stroke (Ischemic) (Adult)  Goal: Signs and Symptoms of Listed Potential Problems Will be Absent, Minimized or Managed (Stroke)  Outcome: Ongoing (interventions implemented as appropriate)   07/08/19 0862   Goal/Outcome Evaluation   Problems Assessed (Stroke (Ischemic)) motor/sensory impairment   Problems Assessed (Stroke (Ischemic)) motor/sensory impairment

## 2019-07-08 NOTE — TELEPHONE ENCOUNTER
[facility] STATES THAT THEY WILL BE PUTTING THE PATIENT'S HOME HEALTH ON HOLD DUE TO HER BEING IN [facility].

## 2019-07-08 NOTE — PROGRESS NOTES
[facility] Medicine Services  PROGRESS NOTE    Patient Name: Narcisa Cotto  : 1940  MRN: [id number]    Date of Admission: 2019  Length of Stay: 1  Primary Care Physician: Mariia Del Real DO    Subjective   Subjective     CC:  Slurred speech, weakness     HPI:  Patient doing ok. Reports symptoms improved from admission but endorse some continued LE weakness. Reports she has gotten MRIs in the past and turned off her spinal stimulator in the past .    Review of Systems      Otherwise ROS is negative except as mentioned in the HPI.    Objective   Objective     Vital Signs:   Temp:  [97.7 °F (36.5 °C)-98.2 °F (36.8 °C)] 97.8 °F (36.6 °C)  Heart Rate:  [78-99] 85  Resp:  [16-18] 16  BP: (114-153)/(48-93) 123/93  Total (NIH Stroke Scale): 2     Physical Exam:  GEN- no acute distress noted, resting in bed, awake  HEENT- atraumatic, normocephlic, eomi  NECK- supple, trachea midline, no masses  RESP: ctab, normal effort  CV: no murmurs, s1/s2, rrr  MSK: no edema noted, spontaneous movement of all extremities  NEURO: alert, oriented, right sided LE weakness noted, strength 3/5 compared to 5/5 on left, speech clear   SKIN: no rashes  PSYCH: appropriate mood and affect       Results Reviewed:  I have personally reviewed current lab, radiology, and data and agree.    Results from last 7 days   Lab Units 19  1141   WBC 10*3/mm3 5.76   HEMOGLOBIN g/dL 12.0   HEMATOCRIT % 37.9   PLATELETS 10*3/mm3 258     Results from last 7 days   Lab Units 19  1246   SODIUM mmol/L 136   POTASSIUM mmol/L 4.4   CHLORIDE mmol/L 102   CO2 mmol/L 17.0*   BUN mg/dL 29*   CREATININE mg/dL 1.15*   GLUCOSE mg/dL 35*   CALCIUM mg/dL 10.2   ALT (SGPT) U/L 41*   AST (SGOT) U/L 39*   TROPONIN T ng/mL <0.010     Estimated Creatinine Clearance: 41.5 mL/min (A) (by C-G formula based on SCr of 1.15 mg/dL (H)).    Microbiology Results Abnormal     None          Imaging Results (last 24 hours)     Procedure  Component Value Units Date/Time    XR Chest 1 View [[id number]] Collected:  07/07/19 1228     Updated:  07/08/19 0915    Narrative:          EXAMINATION: XR CHEST 1 VW - 07/07/2019     INDICATION: Evaluate for stroke.     COMPARISON: 06/21/2019     FINDINGS: Portable chest reveals the heart to be enlarged. Underlying  chronic and emphysematous change is seen in the lung fields bilaterally.  Degenerative change is  seen within the spine. No pleural effusion or  pneumothorax. Pulmonary vascularity is within normal limits.           Impression:       Chronic changes. No acute parenchymal disease.     DICTATED:   07/07/2019  EDITED/ls :   07/07/2019      This report was finalized on 7/8/2019 9:12 AM by Dr. Delphine Slaughter MD.       CT Angiogram Head With & Without Contrast [[id number]] Collected:  07/07/19 1237     Updated:  07/08/19 0915    Narrative:       EXAMINATION: CT ANGIOGRAM NECK WWO CONTRAST, CT ANGIOGRAM HEAD WWO  CONTRAST - 07/07/2019      INDICATION: Right-sided weakness, evaluate for stroke.     TECHNIQUE: Multiple axial CT imaging is obtained of the head and neck  following the ministration of intravenous contrast.     Stenosis measurement was performed by the NASCET or similar method.     The radiation dose reduction device was turned on for each scan per the  ALARA (As Low as Reasonably Achievable) protocol.     COMPARISON: NONE     FINDINGS: Lung apices are grossly clear with vascular calcification seen  within the thoracic aortic arch. Atherosclerotic disease seen at the  origin the great vessels with no significant stenosis. The thyroid is  heterogeneous in appearance with multiple small nodules identified.  Degenerative changes identified within the spine. No gross abnormality  identified within the soft tissues of the neck. There is no bulky  adenopathy identified. Musculature is intact. No abnormal mass or fluid  collections identified.     The common carotid arteries are without significant  stenosis with  extensive calcification seen at the carotid bifurcations bilaterally.  There is somewhat difficulty measuring the area of narrowing of the  distal common carotid artery and internal carotid arteries right greater  than left due to the extensive calcification. There is at least moderate  stenosis identified of the proximal right internal carotid artery. There  is mild stenosis identified of the left internal carotid artery. The  intracranial vessels reveal atherosclerotic disease seen in the  cavernous portions of the internal carotid arteries. There is no  aneurysmal dilatation or vascular malformation or significant stenosis  seen of either the anterior or posterior circulation. Anterior and  middle cerebral arteries are unremarkable. Posterior circulation is  intact. There is a dominant left vertebral artery. Degenerative changes  seen within the spine.       Impression:       Extensive calcification seen at the carotid bifurcations  bilaterally making measurements of the narrowing extremely difficult  however there is at least a moderate stenosis identified of the proximal  right internal carotid artery and mild stenosis of the proximal left  internal carotid artery. No definite significant stenosis is identified.  The intracranial vessels are unremarkable.     DICTATED:   07/07/2019  EDITED/ls :   07/07/2019      This report was finalized on 7/8/2019 9:12 AM by Dr. Delphine Slaughter MD.       CT Angiogram Neck With & Without Contrast [[id number]] Collected:  07/07/19 1237     Updated:  07/08/19 0915    Narrative:       EXAMINATION: CT ANGIOGRAM NECK WWO CONTRAST, CT ANGIOGRAM HEAD WWO  CONTRAST - 07/07/2019      INDICATION: Right-sided weakness, evaluate for stroke.     TECHNIQUE: Multiple axial CT imaging is obtained of the head and neck  following the ministration of intravenous contrast.     Stenosis measurement was performed by the NASCET or similar method.     The radiation dose reduction  device was turned on for each scan per the  ALARA (As Low as Reasonably Achievable) protocol.     COMPARISON: NONE     FINDINGS: Lung apices are grossly clear with vascular calcification seen  within the thoracic aortic arch. Atherosclerotic disease seen at the  origin the great vessels with no significant stenosis. The thyroid is  heterogeneous in appearance with multiple small nodules identified.  Degenerative changes identified within the spine. No gross abnormality  identified within the soft tissues of the neck. There is no bulky  adenopathy identified. Musculature is intact. No abnormal mass or fluid  collections identified.     The common carotid arteries are without significant stenosis with  extensive calcification seen at the carotid bifurcations bilaterally.  There is somewhat difficulty measuring the area of narrowing of the  distal common carotid artery and internal carotid arteries right greater  than left due to the extensive calcification. There is at least moderate  stenosis identified of the proximal right internal carotid artery. There  is mild stenosis identified of the left internal carotid artery. The  intracranial vessels reveal atherosclerotic disease seen in the  cavernous portions of the internal carotid arteries. There is no  aneurysmal dilatation or vascular malformation or significant stenosis  seen of either the anterior or posterior circulation. Anterior and  middle cerebral arteries are unremarkable. Posterior circulation is  intact. There is a dominant left vertebral artery. Degenerative changes  seen within the spine.       Impression:       Extensive calcification seen at the carotid bifurcations  bilaterally making measurements of the narrowing extremely difficult  however there is at least a moderate stenosis identified of the proximal  right internal carotid artery and mild stenosis of the proximal left  internal carotid artery. No definite significant stenosis is  identified.  The intracranial vessels are unremarkable.     DICTATED:   07/07/2019  EDITED/ls :   07/07/2019      This report was finalized on 7/8/2019 9:12 AM by Dr. Delphine Slaughter MD.       CT Cerebral Perfusion With & Without Contrast [[id number]] Collected:  07/07/19 1223     Updated:  07/08/19 0915    Narrative:       EXAMINATION: CT CEREBRAL PERFUSION WWO CONTRAST - 07/07/2019      INDICATION: TIA, stroke symptoms     TECHNIQUE: Cerebral perfusion analysis was performed using computed  tomography with contrast administration, including post processing of  parametric maps with determination of cerebral blood flow, cerebral  blood volume, and mean transit time.      The radiation dose reduction device was turned on for each scan per the  ALARA (As Low as Reasonably Achievable) protocol.     COMPARISON: NONE     FINDINGS: No evidence of abnormality within cerebral blood flow or  cerebral blood volume. No evidence of reversible ischemia. No increased  mean transit time to suggest evidence of an occlusion or stenosis.       Impression:       No large perfusion abnormality to suggest evidence of  reversible ischemia.     DICTATED:   07/07/2019  EDITED/ls :   07/07/2019            This report was finalized on 7/8/2019 9:12 AM by Dr. Delphine Slaughter MD.       CT Head Without Contrast Stroke Protocol [[id number]] Collected:  07/07/19 1113     Updated:  07/08/19 0915    Narrative:       EXAMINATION: CT HEAD WO CONTRAST  - 07/07/2019     INDICATION: Stroke symptoms, right-sided weakness     TECHNIQUE: Multiple axial CT imaging is obtained of the head from skull  base to skull vertex without the administration of intravenous contrast.     The radiation dose reduction device was turned on for each scan per the  ALARA (As Low as Reasonably Achievable) protocol.     COMPARISON: 05/24/2019     FINDINGS: Parenchyma is grossly unremarkable in appearance with minimal  low-density area seen in the periventricular and  subcortical white  matter. No hemorrhage or hydrocephalus. No mass, mass effect, or midline  shift. No abnormal extra-axial fluid collection identified. The bony  structures reveal no evidence of osseous abnormality with minimal  mucosal thickening seen of the left maxillary sinus. Globes and orbits  are intact. The mastoid air cells are patent.       Impression:       Stable chronic changes seen within the brain with no acute  intracranial abnormality identified.     Examination was performed 07/07/2019 at 1053 hours and examination  results were given to the ER physician at time of performance of the  examination at the scanner.     DICTATED:   07/07/2019  EDITED/ls :   07/07/2019         This report was finalized on 7/8/2019 9:12 AM by Dr. Delphine Slaughter MD.             Results for orders placed during the hospital encounter of 07/07/19   Adult Transthoracic Echo Complete W/ Cont if Necessary Per Protocol (With Agitated Saline)    Narrative · Left ventricular systolic function is normal. Calculated EF = 60%  · Left ventricular diastolic dysfunction is noted (grade I) consistent   with impaired relaxation.  · The mitral valve is abnormal in structure. Moderate MAC is present.   There is mild anterior mitral leaflet thickening present. Trace mitral   valve regurgitation is present. No significant mitral valve stenosis is   present  · Mild tricuspid valve regurgitation is present.  · Estimated right ventricular systolic pressure from tricuspid   regurgitation is mildly elevated (35-45 mmHg).  · No evidence of a patent foramen ovale. Saline test results are negative.          I have reviewed the medications:  Scheduled Meds:  amLODIPine 5 mg Oral Daily   apixaban 5 mg Oral Q12H   atorvastatin 80 mg Oral Nightly   clopidogrel 75 mg Oral Daily   gabapentin 600 mg Oral Q12H   insulin lispro 0-7 Units Subcutaneous 4x Daily With Meals & Nightly   metoprolol tartrate 50 mg Oral BID   sodium chloride 3 mL Intravenous  Q12H     Continuous Infusions:   PRN Meds:.•  acetaminophen  •  dextrose  •  dextrose  •  glucagon (human recombinant)  •  sodium chloride  •  sodium chloride      Assessment/Plan   Assessment / Plan     Active Hospital Problems    Diagnosis  POA   • **CVA (cerebral vascular accident) (CMS/[facility]) [I63.9]  Yes   • Hypoglycemia [E16.2]  Yes   • Acute deep vein thrombosis (DVT) of distal vein of right lower extremity (CMS/[facility]) [I82.4Z1]  Yes   • CKD (chronic kidney disease) stage 3, GFR 30-59 ml/min (CMS/[facility]) [N18.3]  Yes   • Essential hypertension [I10]  Yes   • Coronary artery disease involving native coronary artery of native heart with angina pectoris (CMS/[facility]) [I25.119]  Yes   • Diabetic peripheral neuropathy (CMS/[facility]) [E11.42]  Yes   • Peripheral arterial disease (CMS/[facility]) [I73.9]  Yes   • Uncontrolled type 2 diabetes mellitus with complication, with long-term current use of insulin (CMS/[facility]) [E11.8, E11.65, Z79.4]  Not Applicable      Resolved Hospital Problems   No resolved problems to display.        Brief Hospital Course to date:  Narcisa Cotto is a 79 y.o. female with hx of CAD s/p recent stent to RCA on Plavix, well controlled T2DM A1C 6.1%, CKD stage 3, hypertension, recent admit and d/c for here 7/1 with RLE DVT on Eliquis.Patient presents to ED with right sided weakness and slurred speech, admitted with concerns for CVA.     Right sided weakness and slurred speech, improving  ---symptoms improving, stroke workup ongoing- thus far have obtained negative CT H, CT perfusion, CTA H&N does show extensive calcifications at carotid bifurcations but difficult to measure. Working on obtaining MRI, patient has spinal stimulator and working to turn this off. Some concern that symptoms may be due to hypoglycemia, as patient woke up with severe hypoglycemia, now on D10- improving and symptoms resolving.  ---ECHO with moderate anterior mitral leaflet thickening, normal EF, no PFO     History of T2DM with symptomatic  hypoglycemia this admission  ---A1C 6.3, well controlled  ---hold home insulin, diabetes educator to see regarding education with low blood sugars  ---continue to monitor, off D10 now.    Recent diagnosis of RLE DVT  ---continue eliquis    CKD III  ---monitor, labs in AM     DVT Prophylaxis:  eliquis     Disposition: I expect the patient to be discharged to rehab, pending completion of stroke workup     CODE STATUS:   Code Status and Medical Interventions:   Ordered at: 07/07/19 1340     Level Of Support Discussed With:    Patient     Code Status:    CPR     Medical Interventions (Level of Support Prior to Arrest):    Full         Electronically signed by Kati Garvin MD, 07/08/19, 11:15 AM.

## 2019-07-08 NOTE — CONSULTS
"Diabetes Education  Assessment/Teaching    Patient Name:  Narcisa Cotto  YOB: 1940  MRN: [id number]  Admit Date:  7/7/2019      Assessment Date:  7/8/2019    Most Recent Value   General Information    Referral From:  MD patterson   Height  167.6 cm (66\")   Weight  76.7 kg (169 lb)   How would you rate your current health?  fair   Pregnancy Assessment   Diabetes History   What type of diabetes do you have?  Type 2   Length of Diabetes Diagnosis  10 + years   Current DM knowledge  excellent   Have you had diabetes education/teaching in the past?  yes   When and where was your diabetes education?  endocrinologist   Do you test your blood sugar at home?  yes   Frequency of checks  at least 3x a day   Meter type  unsure   Who performs the test?  self   Typical readings  90's in the AM fasting   Have you had low blood sugar? (<70mg/dl)  yes   How often do you have low blood sugar?  occasionally   Have you had high blood sugar? (>140mg/dl)  no   How would you rate your diabetes control?  good   Do you have any diabetes complications?  heart disease, circulation problems   Education Preferences   What areas of diabetes would you like to learn about?  avoiding low blood sugar, diabetes complications, testing my blood sugar at home, understanding diabetes   Barriers to Learning  other (comment) [multiple recent health complications]   Nutrition Information   Do you eat mostly at home or out of the house?  at home   What is the biggest challenge you have with your diet?  Other (comment) [decreased appetite]   Assessment Topics   Healthy Eating - Assessment  Needs education   Being Active - Assessment  Competent   Taking Medication - Assessment  Competent   Problem Solving - Assessment  Needs education   Reducing Risk - Assessment  Needs education   Healthy Coping - Assessment  Needs education   Monitoring - Assessment  Needs education   DM Goals            Most Recent Value   DM Education Needs   Meter  Has own "   Meter Type  Other (comment)   Frequency of Testing  AC/HS   Blood Glucose Target Range  per ADA recommendations   Medication  Insulin, Vial   Problem Solving  Hypoglycemia, Hyperglycemia, Sick days, Signs, Symptoms   Reducing Risks  A1C testing, Cardiovascular   Physical Activity  Walking   Physical Activity Frequency  Occasionally   Healthy Coping  Appropriate   Discharge Plan  Home, Facility, Follow-up with endocrinolgoist [pt unsure at this time if will go home or rehab facility]   Motivation  Engaged   Teaching Method  Explanation, Discussion, Handouts, Teach back   Patient Response  Verbalized understanding, Needs reinforcement            Other Comments:  Saw Ms. Cotto at bedside for diabetes education. She reports she was diagnosed with diabetes in 2000. She states she was recently hospitalized for a blood cot in her leg, and in May of this year she had a MI and was hospitalized for 19 days. Her daughter has also recently had health problems, so she has been under more stress than normal. She reports the morning of her admission, when she checked her blood sugar at home it was in the low 50's. Her endocrinologist had recently decreased her PM 70/30 insulin dose from 70 units to 50 units (approx in early June). Ms. Cotto states that she normally checks her blood sugar prior to administering insulin, but she forgot to the night before this low, and she also now remembers that she took 70 units rather than the new dose of 50 units that she was supposed to take, and she also states that all she had to eat this particular day was 1/2 of a sandwich. She reports that she may have 2 or 3 mornings where her blood sugar is low per month, but she wakes up feeling symptomatic and is able to check her blood sugar and treat. The morning of her admission, she reports she did not feel her normal hypoglycemia symptoms. She reports that her usual morning fasting blood sugar is in the low to mid 90's. Discussed importance  of checking blood sugar prior to administering insulin, as well as checking dose of insulin before administering. Ms. Cotto lives with her daughter-suggested she and her daughter look at her dose together if possible before she injects her insulin, as well as reviewing her blood sugar together so that she is sure that she has checked it. Also suggested she follow up with her endocrinologist before scheduled appointment on 7/31, because she reports her appetite is poor and she feels she may not be eating enough for her current prescribed insulin doses. Reviewed self management/treatment of hypoglycemia at home-Ms. Cotto is very familiar with treatment and was able to teach back rule of 15 without any instruction. Current A1c is 6.3 and she reports she does not typically have any high blood sugars at home. She is concerned about her blood sugar being over 200 in the hospital-discussed that her body is currently under stress, as well as insulin dosing is different here than at home. Offered free outpatient diabetes education follow up-she declines at this time. Ms. Cotto was given my contact information, handouts on hypoglycemia and hyperglycemia, as well as handout on current ADA recommendations regarding blood sugar targets and A1c. Suggested she hang up hypoglycemia handout on wall near where she checks blood sugar and administers insulin to help serve as a reminder to check blood sugar and insulin dose, as well as information for her family on how to treat low blood sugar. Thank you for the consult.        Electronically signed by:  Concepcion Chiang RN  07/08/19 3:02 PM

## 2019-07-08 NOTE — PLAN OF CARE
Problem: Patient Care Overview  Goal: Plan of Care Review  Outcome: Ongoing (interventions implemented as appropriate)   07/08/19 0898   Coping/Psychosocial   Plan of Care Reviewed With patient   Plan of Care Review   Progress (Evaluation)   OTHER   Outcome Summary OT eval complete. Pt presents with R sided weakness and decreased activity tolerance limiting independence with self care. Pt required mod A supine to sit, mod A to don R sock, CGA to don L, CGA STS with RW, CGA to ambulate 16 ft with RW. OT will follow to advance pt toward PLOF with ADLs. Recommend IP rehab upon d/c.

## 2019-07-08 NOTE — DISCHARGE PLACEMENT REQUEST
"Narcisa Garcia (79 y.o. Female)  Referral for swing beds.  From Ana Gunn RN BSN, Case Management, ph [phone]    Date of Birth Social Security Number Address Home Phone MRN    1940  [address]  [facility] [address] [phone] [id number]    [employer] Marital Status          Islam        Admission Date Admission Type Admitting Provider Attending Provider Department, Room/Bed    19 Emergency Kati Garvin MD Hunter, Sarah M, MD [facility] 3G, S359/1    Discharge Date Discharge Disposition Discharge Destination                       Attending Provider:  Kati Garvin MD    Allergies:  Codeine    Isolation:  None   Infection:  None   Code Status:  CPR    Ht:  167.6 cm (66\")   Wt:  76.7 kg (169 lb)    Admission Cmt:  None   Principal Problem:  CVA (cerebral vascular accident) (CMS/[facility]) [I63.9]                 Active Insurance as of 2019     Primary Coverage     Payor Plan Insurance Group Employer/Plan Group    ANTHEM MEDICARE REPLACEMENT ANTHEM MEDICARE ADVANTAGE [id number]     Payor Plan Address Payor Plan Phone Number Payor Plan Fax Number Effective Dates    [address] [phone]  2019 - None Entered    [facility] [address]       Subscriber Name Subscriber Birth Date Member ID       NARCISA GARCIA 1940 [id number]                 Emergency Contacts      (Rel.) Home Phone Work Phone Mobile Phone    Shanna Loja (Daughter) [phone] -- --    CalebBarb (Daughter) [phone] -- [phone]               History & Physical      Bailee Harmon MD at 2019  1:10 PM              [facility] Medicine Services  HISTORY AND PHYSICAL    Patient Name: Narcisa Garcia  : 1940  MRN: [id number]  Primary Care Physician: Mariia Del Real DO  Date of admission: 2019      Subjective   Subjective     Chief Complaint:Right sided weakness    HPI:  Narcisa Garcia is a 79 y.o. female with " hx of CAD s/p recent stent to RCA on Plavix, well controlled T2DM A1C 6.1%, CKD stage 3, hypertension, recent admit and d/c for here 7/1 with RLE DVT on Eliquis.Patient presents to ED with complaints of right sided weakness and slurred speech, Patient was last seen normal last night at 11pm, daughter states that she woke up this morning around 9 AM to sounds of moaning and groaning, she was found to have slurred speech and was listless on the right side such EMS was called and patient was brought to the ED.  Of note daughter states that last night patient took 70 units of regular insulin at once.  On arrival to the ED patient was hemodynamically stable, initial work-up did show glucose of 35, she underwent CT head that was negative, CT cerebral perfusion was normal, CTA head and neck, that showed excess calcification at the carotid bifurcations bilaterally however narrowing was extremely difficult to measure.  Patient was given aspirin, neurology and neurosurgery were consulted and hospital medicine was asked to admit.    Review of Systems   Gen- No fevers, chills  CV- No chest pain, palpitations  Resp- No cough, dyspnea  GI- No N/V/D, abd pain    Otherwise complete ROS reviewed and is negative except as mentioned in the HPI.    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   • Chronic diastolic congestive heart failure (CMS/HCC)    • Chronic low back pain    • Chronic venous insufficiency    • CKD (chronic kidney disease) stage 3, GFR 30-59 ml/min (CMS/HCC)    • Diabetic gastroparesis (CMS/HCC)    • Diverticulosis    • DJD (degenerative joint disease), lumbar    • DM2 (diabetes mellitus, type 2) (CMS/HCC)    • GERD (gastroesophageal reflux disease)    • Hiatal hernia    • History of hyperparathyroidism    • HLD (hyperlipidemia)    • HTN (hypertension)    • Lumbar stenosis 11/15/2018   • DELFINA (obstructive sleep apnea)    • Osteoarthritis  7/29/2016   • PAD (peripheral artery disease) (CMS/[facility])    • Postherpetic neuralgia    • Vitamin B12 deficiency 7/29/2016       Past Surgical History:   Procedure Laterality Date   • CARDIAC CATHETERIZATION N/A 5/23/2019    Procedure: LEFT HEART CATH;  Surgeon: Filemon Mayberry IV, MD;  Location:  [facility] CATH INVASIVE LOCATION;  Service: Cardiovascular   • CARDIAC CATHETERIZATION N/A 5/29/2019    Procedure: Atherectomy-coronary;  Surgeon: Filemon Mayberry IV, MD;  Location: [facility] CATH INVASIVE LOCATION;  Service: Cardiovascular   • CARDIAC CATHETERIZATION N/A 5/29/2019    Procedure: Stent PATRICIA coronary;  Surgeon: Filemon Mayberry IV, MD;  Location:  [facility] CATH INVASIVE LOCATION;  Service: Cardiovascular   • CARDIAC ELECTROPHYSIOLOGY PROCEDURE N/A 5/28/2019    Procedure: TEMPORARY PACEMAKER;  Surgeon: Jeffrey Reyes MD;  Location:  [facility] CATH INVASIVE LOCATION;  Service: Cardiovascular   • CATARACT EXTRACTION Bilateral    • CHOLECYSTECTOMY     • COLONOSCOPY     • CYSTOSCOPY W/ URETERAL STENT PLACEMENT  2014   • ENDOSCOPY     • HYSTERECTOMY     • LUMBAR LAMINECTOMY DISCECTOMY DECOMPRESSION N/A 11/15/2018    Procedure: LUMBAR LAMINECTOMY L2-3 L 3-4,;  Surgeon: Zachary Key MD;  Location:  [facility] OR;  Service: Orthopedic Spine   • PARATHYROIDECTOMY     • SPINAL CORD STIMULATOR IMPLANT     • TONSILLECTOMY         Family History: family history includes Diabetes in her father; Kidney disease in her mother. Otherwise pertinent FHx was reviewed and unremarkable.     Social History:  reports that she has never smoked. She has never used smokeless tobacco. She reports that she does not drink alcohol or use drugs.  Social History     Social History Narrative    Friend Ruth Nuno, with pt today       Medications:    Available home medication information reviewed.    (Not in a hospital admission)    Allergies   Allergen Reactions   • Codeine Nausea And Vomiting       Objective   Objective     Vital  Signs:   Temp:  [98.2 °F (36.8 °C)] 98.2 °F (36.8 °C)  Heart Rate:  [89-95] 95  Resp:  [18] 18  BP: (137-153)/(63-65) 137/65   Total (NIH Stroke Scale): 13    Physical Exam   Constitutional: Chronic ill appearing elderly female awake, alert, restless  Eyes: PERRLA, sclerae anicteric, no conjunctival injection  HENT: NCAT, mucous membranes moist  Neck: Supple, no thyromegaly, no lymphadenopathy, trachea midline  Respiratory: Clear to auscultation bilaterally, nonlabored respirations   Cardiovascular: RRR, no murmurs, rubs, or gallops, palpable pedal pulses bilaterally  Gastrointestinal: Obese positive bowel sounds, soft, nontender, nondistended  Musculoskeletal: No bilateral ankle edema, no clubbing or cyanosis to extremities  Psychiatric: Appropriate affect, cooperative  Neurologic: Oriented x 3, right sided weakness, dysarthria  Skin: No rashes    Results Reviewed:  I have personally reviewed current lab, radiology, and data and agree.    Results from last 7 days   Lab Units 07/07/19  1141   WBC 10*3/mm3 5.76   HEMOGLOBIN g/dL 12.0   HEMATOCRIT % 37.9   PLATELETS 10*3/mm3 258     Results from last 7 days   Lab Units 07/07/19  1246   SODIUM mmol/L 136   POTASSIUM mmol/L 4.4   CHLORIDE mmol/L 102   CO2 mmol/L 17.0*   BUN mg/dL 29*   CREATININE mg/dL 1.15*   GLUCOSE mg/dL 35*   CALCIUM mg/dL 10.2   ALT (SGPT) U/L 41*   AST (SGOT) U/L 39*   TROPONIN T ng/mL <0.010     Estimated Creatinine Clearance: 41.5 mL/min (A) (by C-G formula based on SCr of 1.15 mg/dL (H)).  Brief Urine Lab Results  (Last result in the past 365 days)      Color   Clarity   Blood   Leuk Est   Nitrite   Protein   CREAT   Urine HCG        06/21/19 1715 Yellow Cloudy Trace Moderate (2+) Negative 100 mg/dL (2+)             Imaging Results (last 24 hours)     Procedure Component Value Units Date/Time    CT Angiogram Head With & Without Contrast [[id number]] Collected:  07/07/19 1237     Updated:  07/07/19 1241    Narrative:       EXAMINATION: CT  ANGIOGRAM NECK W WO CONTRAST-, CT ANGIOGRAM HEAD W WO  CONTRAST-      INDICATION: Stroke right-sided weakness     TECHNIQUE: Multiple axial CT imaging is obtained of the head and neck  following the ministration of intravenous contrast.     Stenosis measurement was performed by the NASCET or similar method.     The radiation dose reduction device was turned on for each scan per the  ALARA (As Low as Reasonably Achievable) protocol.     COMPARISON: NONE     FINDINGS: Lung apices are grossly clear with vascular calcification seen  within the thoracic aortic arch. Atherosclerotic disease seen at the  origin the great vessels with no significant stenosis. The thyroid is  heterogeneous in appearance with multiple small nodules identified.  Degenerative changes identified within the spine. No gross abnormality  identified within the soft tissues of the neck. There is no bulky  adenopathy identified. Musculature is intact. No abnormal mass or fluid  collections identified.     The common carotid arteries are without significant stenosis with  extensive calcification seen at the carotid bifurcations bilaterally.  There is somewhat difficulty measuring the area of narrowing of the  distal common carotid artery and internal carotid arteries right greater  than left due to the extensive calcification. There is at least moderate  stenosis identified of the proximal right internal carotid artery. There  is mild stenosis identified of the left internal carotid artery. The  intracranial vessels reveal atherosclerotic disease seen in the  cavernous portions the internal carotid arteries. There is no aneurysmal  dilatation of the vascular formation or significant stenosis seen of  either the anterior posterior circulation. Anterior middle cerebral  arteries are unremarkable. Posterior circulation is intact. There is a  dominant left vertebral artery. Degenerative changes seen within the  spine             Impression:       Extensive  calcification seen at the carotid bifurcations  bilaterally making measurements of the narrowing extremely difficult  however there is at least a moderate stenosis identified of the proximal  right internal carotid artery and mild stenosis of the proximal left  internal carotid artery. No definite significant stenosis is identified.  The intracranial vessels are unremarkable.          CT Angiogram Neck With & Without Contrast [[id number]] Collected:  07/07/19 1237     Updated:  07/07/19 1241    Narrative:       EXAMINATION: CT ANGIOGRAM NECK W WO CONTRAST-, CT ANGIOGRAM HEAD W WO  CONTRAST-      INDICATION: Stroke right-sided weakness     TECHNIQUE: Multiple axial CT imaging is obtained of the head and neck  following the ministration of intravenous contrast.     Stenosis measurement was performed by the NASCET or similar method.     The radiation dose reduction device was turned on for each scan per the  ALARA (As Low as Reasonably Achievable) protocol.     COMPARISON: NONE     FINDINGS: Lung apices are grossly clear with vascular calcification seen  within the thoracic aortic arch. Atherosclerotic disease seen at the  origin the great vessels with no significant stenosis. The thyroid is  heterogeneous in appearance with multiple small nodules identified.  Degenerative changes identified within the spine. No gross abnormality  identified within the soft tissues of the neck. There is no bulky  adenopathy identified. Musculature is intact. No abnormal mass or fluid  collections identified.     The common carotid arteries are without significant stenosis with  extensive calcification seen at the carotid bifurcations bilaterally.  There is somewhat difficulty measuring the area of narrowing of the  distal common carotid artery and internal carotid arteries right greater  than left due to the extensive calcification. There is at least moderate  stenosis identified of the proximal right internal carotid artery. There  is  mild stenosis identified of the left internal carotid artery. The  intracranial vessels reveal atherosclerotic disease seen in the  cavernous portions the internal carotid arteries. There is no aneurysmal  dilatation of the vascular formation or significant stenosis seen of  either the anterior posterior circulation. Anterior middle cerebral  arteries are unremarkable. Posterior circulation is intact. There is a  dominant left vertebral artery. Degenerative changes seen within the  spine             Impression:       Extensive calcification seen at the carotid bifurcations  bilaterally making measurements of the narrowing extremely difficult  however there is at least a moderate stenosis identified of the proximal  right internal carotid artery and mild stenosis of the proximal left  internal carotid artery. No definite significant stenosis is identified.  The intracranial vessels are unremarkable.          XR Chest 1 View [[id number]] Collected:  07/07/19 1228     Updated:  07/07/19 1229    Narrative:          EXAMINATION: XR CHEST 1 VW-      INDICATION: Stroke Protocol (Onset > 12 Hrs)      COMPARISON: 06/21/2019     FINDINGS: Portable chest reveals heart to be enlarged. Underlying  chronic and emphysematous changes in with the lung fields bilaterally.  Degenerative changes seen within the spine. A pleural effusion or  pneumothorax. Pulmonary vascularity is within normal limits.           Impression:       Chronic changes no acute parenchymal disease          CT Cerebral Perfusion With & Without Contrast [[id number]] Collected:  07/07/19 1223     Updated:  07/07/19 1227    Narrative:       EXAMINATION: CT CEREBRAL PERFUSION W WO CONTRAST-      INDICATION: TIA, initial screening stroke symptoms     TECHNIQUE: Cerebral perfusion analysis was performed using computed  tomography with contrast administration, including post processing of  parametric maps with determination of cerebral blood flow, cerebral  blood volume,  and mean transit time.      The radiation dose reduction device was turned on for each scan per the  ALARA (As Low as Reasonably Achievable) protocol.     COMPARISON: NONE     FINDINGS: No evidence of abnormality within cerebral blood flow or  cerebral blood volume. No evidence of reversible ischemia. No increased  mean transit time to suggest evidence of an occlusion or stenosis.             Impression:       No large perfusion abnormality to suggest evidence of  reversible ischemia                   CT Head Without Contrast Stroke Protocol [[id number]] Collected:  07/07/19 1113     Updated:  07/07/19 1158    Narrative:       EXAMINATION: CT HEAD WO CONTRAST  - 07/07/2019     INDICATION: Stroke symptoms, right-sided weakness     TECHNIQUE: Multiple axial CT imaging is obtained of the head from skull  base to skull vertex without the administration of intravenous contrast.     The radiation dose reduction device was turned on for each scan per the  ALARA (As Low as Reasonably Achievable) protocol.     COMPARISON: 05/24/2019     FINDINGS: Parenchyma is grossly unremarkable in appearance with minimal  low-density area seen in the periventricular and subcortical white  matter. No hemorrhage or hydrocephalus. No mass, mass effect, or midline  shift. No abnormal extra-axial fluid collection identified. The bony  structures reveal no evidence of osseous abnormality with minimal  mucosal thickening seen of the left maxillary sinus. Globes and orbits  are intact. The mastoid air cells are patent.       Impression:       Stable chronic changes seen within the brain with no acute  intracranial abnormality identified.     Examination was performed 07/07/2019 at 1053 hours and examination  results were given to the ER physician at time of performance of the  examination at the scanner.     DICTATED:   07/07/2019  EDITED/ls :   07/07/2019               Results for orders placed during the hospital encounter of 06/21/19   Adult  Transthoracic Echo Limited W/ Cont if Necessary Per Protocol    Narrative · Normal left ventricular systolic function  · MAC with trace MR  · Mild aortic valve sclerosis          Assessment/Plan   Assessment / Plan     Active Hospital Problems    Diagnosis POA   • CVA (cerebral vascular accident) (CMS/[facility]) [I63.9] Yes   • Hypoglycemia [E16.2] Yes   • Acute deep vein thrombosis (DVT) of distal vein of right lower extremity (CMS/[facility]) [I82.4Z1] Yes     · Venous duplex (6/2019): Right peroneal DVT     • CKD (chronic kidney disease) stage 3, GFR 30-59 ml/min (CMS/[facility]) [N18.3] Yes   • Essential hypertension [I10] Yes   • Coronary artery disease involving native coronary artery of native heart with angina pectoris (CMS/[facility]) [I25.119] Yes     · Echo (8/31/2016): EF > 70%. Moderate thickening of the noncoronary cusp of the aortic valve.  · Echo (04/23/2018): LVEF 60%. Grade I diastolic dysfunction.   Cardiac valves are functionally normal.  · Nuclear stress test (04/23/2018): No focal areas of ischemia or infarct.  Transient ischemic dilatation present raising possibility of balanced ischemia/multivessel CAD  · Cardiac catheterization (5/23/2019): Severe multivessel CAD.  Culprit for NSTEMI is a highly calcified ostial RCA 99% stenosis.    · Staged rotational atherectomy and PCI of the RCA using a PATRICIA, 5/29/2019     • Diabetic peripheral neuropathy (CMS/HCC) [E11.42] Yes   • Peripheral arterial disease (CMS/[facility]) [I73.9] Yes     · BONNIE (08/22/2013):  At rest right 0.98, left 0.85.  After 2 minutes of exercise right 0.51, left 0.68.  · High-grade right common iliac stenosis on cardiac catheterization, 5/23/2019     • Uncontrolled type 2 diabetes mellitus with complication, with long-term current use of insulin (CMS/[facility]) [E11.8, E11.65, Z79.4] Not Applicable        79 y.o. female with hx of CAD s/p recent stent to RCA on Plavix, well controlled T2DM A1C 6.1%, CKD stage 3, hypertension, recent admit and d/c for here 7/1 with RLE  DVT on Eliquis.Patient presents to ED with right sided weakness and slurred speech, admitted with concerns for CVA.     Plan  - CT head is negative, CT cerebral perfusion is normal, CTA head and neck, that shows excess calcification at the carotid bifurcations bilaterally however narrowing was extremely difficult to measure.  Unable to obtain MRI at this time as patient has a spinal stimulator patient was given aspirin, neurology and neurosurgery have been consulted.  -Stroke work-up per protocol, get echo, discussed with Dr. Fatima, will attempt to obtain MRI if spinal stimulator can be turned off, okay to continue aspirin, statin Plavix and Eliquis.  -History of type 2 diabetes previously well controlled with A1c of 6.1%, however, patient woke up with severe hypoglycemia could have contributed to above symptoms, continue D10 infusion, hold home insulin at this time.  Will need re-education on her home insulin regimen  -Recently diagnosed right lower extremity DVT continue home Eliquis  -CKD stage III, creatinine seems to be stable continue to monitor at this time.  -Continue home meds for chronic medical conditions as listed on problem list above  -PT/OT/CM    DVT prophylaxis:  Eliquis    CODE STATUS:    Code Status and Medical Interventions:   Ordered at: 07/07/19 1340     Level Of Support Discussed With:    Patient     Code Status:    CPR     Medical Interventions (Level of Support Prior to Arrest):    Full       Admission Status:  I believe this patient meets INPATIENT status due to the need for care which can only be reasonably provided in an hospital setting . Patient has suspected acute CVA requiring ongoing w/u .  In such, I feel patient’s risk for adverse outcomes and need for care warrant INPATIENT evaluation and predict the patient’s care encounter to likely last beyond 2 midnights.      Electronically signed by Bailee Harmon MD, 07/07/19, 1:10 PM.        Electronically signed by Bailee Harmon MD at  7/7/2019  1:59 PM       Hospital Medications (active)       Dose Frequency Start End    acetaminophen (TYLENOL) tablet 650 mg 650 mg Every 6 Hours PRN 7/8/2019     Sig - Route: Take 2 tablets by mouth Every 6 (Six) Hours As Needed for Mild Pain . - Oral    amLODIPine (NORVASC) tablet 5 mg 5 mg Daily 7/7/2019     Sig - Route: Take 1 tablet by mouth Daily. - Oral    apixaban (ELIQUIS) tablet 5 mg 5 mg Every 12 Hours Scheduled 7/7/2019     Sig - Route: Take 1 tablet by mouth Every 12 (Twelve) Hours. - Oral    aspirin suppository 300 mg 300 mg Once 7/7/2019 7/7/2019    Sig - Route: Insert 0.5 suppositories into the rectum 1 (One) Time. - Rectal    atorvastatin (LIPITOR) tablet 80 mg 80 mg Nightly 7/7/2019     Sig - Route: Take 2 tablets by mouth Every Night. - Oral    clopidogrel (PLAVIX) tablet 75 mg 75 mg Daily 7/7/2019 11/12/2019    Sig - Route: Take 1 tablet by mouth Daily. - Oral    dextrose (D50W) 25 g/ 50mL Intravenous Solution 25 g 25 g Every 15 Minutes PRN 7/7/2019     Sig - Route: Infuse 50 mL into a venous catheter Every 15 (Fifteen) Minutes As Needed for Low Blood Sugar (Blood Sugar Less Than 70). - Intravenous    dextrose (GLUTOSE) oral gel 15 g 15 g Every 15 Minutes PRN 7/7/2019     Sig - Route: Take 15 application by mouth Every 15 (Fifteen) Minutes As Needed for Low Blood Sugar (Blood sugar less than 70). - Oral    gabapentin (NEURONTIN) capsule 600 mg 600 mg Every 12 Hours Scheduled 7/7/2019     Sig - Route: Take 2 capsules by mouth Every 12 (Twelve) Hours. - Oral    glucagon (human recombinant) (GLUCAGEN DIAGNOSTIC) injection 1 mg 1 mg As Needed 7/7/2019     Sig - Route: Inject 1 mg under the skin into the appropriate area as directed As Needed for Low Blood Sugar (Blood Glucose Less Than 70). - Subcutaneous    insulin lispro (humaLOG) injection 0-7 Units 0-7 Units 4 Times Daily With Meals & Nightly 7/7/2019     Sig - Route: Inject 0-7 Units under the skin into the appropriate area as directed 4 (Four)  Times a Day With Meals & at Bedtime. - Subcutaneous    iopamidol (ISOVUE-370) 76 % injection 100 mL 100 mL Once in Imaging 2019    Sig - Route: Infuse 100 mL into a venous catheter Once. - Intravenous    iopamidol (ISOVUE-370) 76 % injection 100 mL 100 mL Once in Imaging 2019    Sig - Route: Infuse 100 mL into a venous catheter Once. - Intravenous    metoprolol tartrate (LOPRESSOR) tablet 50 mg 50 mg 2 Times Daily 2019     Sig - Route: Take 1 tablet by mouth 2 (Two) Times a Day. - Oral    sodium chloride 0.9 % bolus 500 mL 500 mL Once 2019    Sig - Route: Infuse 500 mL into a venous catheter 1 (One) Time. - Intravenous    sodium chloride 0.9 % flush 10 mL 10 mL As Needed 2019     Sig - Route: Infuse 10 mL into a venous catheter As Needed for Line Care. - Intravenous    Cosign for Ordering: Accepted by Mainor Rasmussen MD on 2019 11:05 AM    sodium chloride 0.9 % flush 3 mL 3 mL Every 12 Hours Scheduled 2019     Sig - Route: Infuse 3 mL into a venous catheter Every 12 (Twelve) Hours. - Intravenous    sodium chloride 0.9 % flush 3-10 mL 3-10 mL As Needed 2019     Sig - Route: Infuse 3-10 mL into a venous catheter As Needed for Line Care. - Intravenous    sodium chloride 0.9 % infusion 50 mL/hr Continuous 2019    Sig - Route: Infuse 50 mL/hr into a venous catheter Continuous. - Intravenous    dextrose 10 % infusion (Discontinued) 100 mL/hr Continuous 2019    Sig - Route: Infuse 100 mL/hr into a venous catheter Continuous. - Intravenous    sodium chloride 0.9 % infusion (Discontinued) 125 mL/hr Continuous 2019    Sig - Route: Infuse 125 mL/hr into a venous catheter Continuous. - Intravenous             Physician Progress Notes (most recent note)      Kati Garvin MD at 2019 11:15 AM              [facility] Medicine Services  PROGRESS NOTE    Patient Name: Narcisa SIDDIQI Cotto  :  1940  MRN: [id number]    Date of Admission: 7/7/2019  Length of Stay: 1  Primary Care Physician: Mariia Del Real DO    Subjective   Subjective     CC:  Slurred speech, weakness     HPI:  Patient doing ok. Reports symptoms improved from admission but endorse some continued LE weakness. Reports she has gotten MRIs in the past and turned off her spinal stimulator in the past .    Review of Systems      Otherwise ROS is negative except as mentioned in the HPI.    Objective   Objective     Vital Signs:   Temp:  [97.7 °F (36.5 °C)-98.2 °F (36.8 °C)] 97.8 °F (36.6 °C)  Heart Rate:  [78-99] 85  Resp:  [16-18] 16  BP: (114-153)/(48-93) 123/93  Total (NIH Stroke Scale): 2     Physical Exam:  GEN- no acute distress noted, resting in bed, awake  HEENT- atraumatic, normocephlic, eomi  NECK- supple, trachea midline, no masses  RESP: ctab, normal effort  CV: no murmurs, s1/s2, rrr  MSK: no edema noted, spontaneous movement of all extremities  NEURO: alert, oriented, right sided LE weakness noted, strength 3/5 compared to 5/5 on left, speech clear   SKIN: no rashes  PSYCH: appropriate mood and affect       Results Reviewed:  I have personally reviewed current lab, radiology, and data and agree.    Results from last 7 days   Lab Units 07/07/19  1141   WBC 10*3/mm3 5.76   HEMOGLOBIN g/dL 12.0   HEMATOCRIT % 37.9   PLATELETS 10*3/mm3 258     Results from last 7 days   Lab Units 07/07/19  1246   SODIUM mmol/L 136   POTASSIUM mmol/L 4.4   CHLORIDE mmol/L 102   CO2 mmol/L 17.0*   BUN mg/dL 29*   CREATININE mg/dL 1.15*   GLUCOSE mg/dL 35*   CALCIUM mg/dL 10.2   ALT (SGPT) U/L 41*   AST (SGOT) U/L 39*   TROPONIN T ng/mL <0.010     Estimated Creatinine Clearance: 41.5 mL/min (A) (by C-G formula based on SCr of 1.15 mg/dL (H)).    Microbiology Results Abnormal     None          Imaging Results (last 24 hours)     Procedure Component Value Units Date/Time    XR Chest 1 View [[id number]] Collected:  07/07/19 1228     Updated:  07/08/19  0915    Narrative:          EXAMINATION: XR CHEST 1 VW - 07/07/2019     INDICATION: Evaluate for stroke.     COMPARISON: 06/21/2019     FINDINGS: Portable chest reveals the heart to be enlarged. Underlying  chronic and emphysematous change is seen in the lung fields bilaterally.  Degenerative change is  seen within the spine. No pleural effusion or  pneumothorax. Pulmonary vascularity is within normal limits.           Impression:       Chronic changes. No acute parenchymal disease.     DICTATED:   07/07/2019  EDITED/ls :   07/07/2019      This report was finalized on 7/8/2019 9:12 AM by Dr. Delphine Slaughter MD.       CT Angiogram Head With & Without Contrast [[id number]] Collected:  07/07/19 1237     Updated:  07/08/19 0915    Narrative:       EXAMINATION: CT ANGIOGRAM NECK WWO CONTRAST, CT ANGIOGRAM HEAD WWO  CONTRAST - 07/07/2019      INDICATION: Right-sided weakness, evaluate for stroke.     TECHNIQUE: Multiple axial CT imaging is obtained of the head and neck  following the ministration of intravenous contrast.     Stenosis measurement was performed by the NASCET or similar method.     The radiation dose reduction device was turned on for each scan per the  ALARA (As Low as Reasonably Achievable) protocol.     COMPARISON: NONE     FINDINGS: Lung apices are grossly clear with vascular calcification seen  within the thoracic aortic arch. Atherosclerotic disease seen at the  origin the great vessels with no significant stenosis. The thyroid is  heterogeneous in appearance with multiple small nodules identified.  Degenerative changes identified within the spine. No gross abnormality  identified within the soft tissues of the neck. There is no bulky  adenopathy identified. Musculature is intact. No abnormal mass or fluid  collections identified.     The common carotid arteries are without significant stenosis with  extensive calcification seen at the carotid bifurcations bilaterally.  There is somewhat difficulty  measuring the area of narrowing of the  distal common carotid artery and internal carotid arteries right greater  than left due to the extensive calcification. There is at least moderate  stenosis identified of the proximal right internal carotid artery. There  is mild stenosis identified of the left internal carotid artery. The  intracranial vessels reveal atherosclerotic disease seen in the  cavernous portions of the internal carotid arteries. There is no  aneurysmal dilatation or vascular malformation or significant stenosis  seen of either the anterior or posterior circulation. Anterior and  middle cerebral arteries are unremarkable. Posterior circulation is  intact. There is a dominant left vertebral artery. Degenerative changes  seen within the spine.       Impression:       Extensive calcification seen at the carotid bifurcations  bilaterally making measurements of the narrowing extremely difficult  however there is at least a moderate stenosis identified of the proximal  right internal carotid artery and mild stenosis of the proximal left  internal carotid artery. No definite significant stenosis is identified.  The intracranial vessels are unremarkable.     DICTATED:   07/07/2019  EDITED/ls :   07/07/2019      This report was finalized on 7/8/2019 9:12 AM by Dr. Delphine Slaughter MD.       CT Angiogram Neck With & Without Contrast [[id number]] Collected:  07/07/19 1237     Updated:  07/08/19 0915    Narrative:       EXAMINATION: CT ANGIOGRAM NECK WWO CONTRAST, CT ANGIOGRAM HEAD WWO  CONTRAST - 07/07/2019      INDICATION: Right-sided weakness, evaluate for stroke.     TECHNIQUE: Multiple axial CT imaging is obtained of the head and neck  following the ministration of intravenous contrast.     Stenosis measurement was performed by the NASCET or similar method.     The radiation dose reduction device was turned on for each scan per the  ALARA (As Low as Reasonably Achievable) protocol.     COMPARISON: NONE      FINDINGS: Lung apices are grossly clear with vascular calcification seen  within the thoracic aortic arch. Atherosclerotic disease seen at the  origin the great vessels with no significant stenosis. The thyroid is  heterogeneous in appearance with multiple small nodules identified.  Degenerative changes identified within the spine. No gross abnormality  identified within the soft tissues of the neck. There is no bulky  adenopathy identified. Musculature is intact. No abnormal mass or fluid  collections identified.     The common carotid arteries are without significant stenosis with  extensive calcification seen at the carotid bifurcations bilaterally.  There is somewhat difficulty measuring the area of narrowing of the  distal common carotid artery and internal carotid arteries right greater  than left due to the extensive calcification. There is at least moderate  stenosis identified of the proximal right internal carotid artery. There  is mild stenosis identified of the left internal carotid artery. The  intracranial vessels reveal atherosclerotic disease seen in the  cavernous portions of the internal carotid arteries. There is no  aneurysmal dilatation or vascular malformation or significant stenosis  seen of either the anterior or posterior circulation. Anterior and  middle cerebral arteries are unremarkable. Posterior circulation is  intact. There is a dominant left vertebral artery. Degenerative changes  seen within the spine.       Impression:       Extensive calcification seen at the carotid bifurcations  bilaterally making measurements of the narrowing extremely difficult  however there is at least a moderate stenosis identified of the proximal  right internal carotid artery and mild stenosis of the proximal left  internal carotid artery. No definite significant stenosis is identified.  The intracranial vessels are unremarkable.     DICTATED:   07/07/2019  EDITED/ls :   07/07/2019      This report was  finalized on 7/8/2019 9:12 AM by Dr. Delphine Slaughter MD.       CT Cerebral Perfusion With & Without Contrast [[id number]] Collected:  07/07/19 1223     Updated:  07/08/19 0915    Narrative:       EXAMINATION: CT CEREBRAL PERFUSION WWO CONTRAST - 07/07/2019      INDICATION: TIA, stroke symptoms     TECHNIQUE: Cerebral perfusion analysis was performed using computed  tomography with contrast administration, including post processing of  parametric maps with determination of cerebral blood flow, cerebral  blood volume, and mean transit time.      The radiation dose reduction device was turned on for each scan per the  ALARA (As Low as Reasonably Achievable) protocol.     COMPARISON: NONE     FINDINGS: No evidence of abnormality within cerebral blood flow or  cerebral blood volume. No evidence of reversible ischemia. No increased  mean transit time to suggest evidence of an occlusion or stenosis.       Impression:       No large perfusion abnormality to suggest evidence of  reversible ischemia.     DICTATED:   07/07/2019  EDITED/ls :   07/07/2019            This report was finalized on 7/8/2019 9:12 AM by Dr. Delphine Slaughter MD.       CT Head Without Contrast Stroke Protocol [[id number]] Collected:  07/07/19 1113     Updated:  07/08/19 0915    Narrative:       EXAMINATION: CT HEAD WO CONTRAST  - 07/07/2019     INDICATION: Stroke symptoms, right-sided weakness     TECHNIQUE: Multiple axial CT imaging is obtained of the head from skull  base to skull vertex without the administration of intravenous contrast.     The radiation dose reduction device was turned on for each scan per the  ALARA (As Low as Reasonably Achievable) protocol.     COMPARISON: 05/24/2019     FINDINGS: Parenchyma is grossly unremarkable in appearance with minimal  low-density area seen in the periventricular and subcortical white  matter. No hemorrhage or hydrocephalus. No mass, mass effect, or midline  shift. No abnormal extra-axial fluid  collection identified. The bony  structures reveal no evidence of osseous abnormality with minimal  mucosal thickening seen of the left maxillary sinus. Globes and orbits  are intact. The mastoid air cells are patent.       Impression:       Stable chronic changes seen within the brain with no acute  intracranial abnormality identified.     Examination was performed 07/07/2019 at 1053 hours and examination  results were given to the ER physician at time of performance of the  examination at the scanner.     DICTATED:   07/07/2019  EDITED/ls :   07/07/2019         This report was finalized on 7/8/2019 9:12 AM by Dr. Delphine Slaughter MD.             Results for orders placed during the hospital encounter of 07/07/19   Adult Transthoracic Echo Complete W/ Cont if Necessary Per Protocol (With Agitated Saline)    Narrative · Left ventricular systolic function is normal. Calculated EF = 60%  · Left ventricular diastolic dysfunction is noted (grade I) consistent   with impaired relaxation.  · The mitral valve is abnormal in structure. Moderate MAC is present.   There is mild anterior mitral leaflet thickening present. Trace mitral   valve regurgitation is present. No significant mitral valve stenosis is   present  · Mild tricuspid valve regurgitation is present.  · Estimated right ventricular systolic pressure from tricuspid   regurgitation is mildly elevated (35-45 mmHg).  · No evidence of a patent foramen ovale. Saline test results are negative.          I have reviewed the medications:  Scheduled Meds:  amLODIPine 5 mg Oral Daily   apixaban 5 mg Oral Q12H   atorvastatin 80 mg Oral Nightly   clopidogrel 75 mg Oral Daily   gabapentin 600 mg Oral Q12H   insulin lispro 0-7 Units Subcutaneous 4x Daily With Meals & Nightly   metoprolol tartrate 50 mg Oral BID   sodium chloride 3 mL Intravenous Q12H     Continuous Infusions:   PRN Meds:.•  acetaminophen  •  dextrose  •  dextrose  •  glucagon (human recombinant)  •  sodium  chloride  •  sodium chloride      Assessment/Plan   Assessment / Plan     Active Hospital Problems    Diagnosis  POA   • **CVA (cerebral vascular accident) (CMS/[facility]) [I63.9]  Yes   • Hypoglycemia [E16.2]  Yes   • Acute deep vein thrombosis (DVT) of distal vein of right lower extremity (CMS/[facility]) [I82.4Z1]  Yes   • CKD (chronic kidney disease) stage 3, GFR 30-59 ml/min (CMS/[facility]) [N18.3]  Yes   • Essential hypertension [I10]  Yes   • Coronary artery disease involving native coronary artery of native heart with angina pectoris (CMS/[facility]) [I25.119]  Yes   • Diabetic peripheral neuropathy (CMS/[facility]) [E11.42]  Yes   • Peripheral arterial disease (CMS/[facility]) [I73.9]  Yes   • Uncontrolled type 2 diabetes mellitus with complication, with long-term current use of insulin (CMS/[facility]) [E11.8, E11.65, Z79.4]  Not Applicable      Resolved Hospital Problems   No resolved problems to display.        Brief Hospital Course to date:  Narcisa Cotto is a 79 y.o. female with hx of CAD s/p recent stent to RCA on Plavix, well controlled T2DM A1C 6.1%, CKD stage 3, hypertension, recent admit and d/c for here 7/1 with RLE DVT on Eliquis.Patient presents to ED with right sided weakness and slurred speech, admitted with concerns for CVA.     Right sided weakness and slurred speech, improving  ---symptoms improving, stroke workup ongoing- thus far have obtained negative CT H, CT perfusion, CTA H&N does show extensive calcifications at carotid bifurcations but difficult to measure. Working on obtaining MRI, patient has spinal stimulator and working to turn this off. Some concern that symptoms may be due to hypoglycemia, as patient woke up with severe hypoglycemia, now on D10- improving and symptoms resolving.  ---ECHO with moderate anterior mitral leaflet thickening, normal EF, no PFO     History of T2DM with symptomatic hypoglycemia this admission  ---A1C 6.3, well controlled  ---hold home insulin, diabetes educator to see regarding education with  low blood sugars  ---continue to monitor, off D10 now.    Recent diagnosis of RLE DVT  ---continue eliquis    CKD III  ---monitor, labs in AM     DVT Prophylaxis:  eliquis     Disposition: I expect the patient to be discharged to rehab, pending completion of stroke workup     CODE STATUS:   Code Status and Medical Interventions:   Ordered at: 07/07/19 1340     Level Of Support Discussed With:    Patient     Code Status:    CPR     Medical Interventions (Level of Support Prior to Arrest):    Full         Electronically signed by Kati Garvin MD, 07/08/19, 11:15 AM.        Electronically signed by Kati Garvin MD at 7/8/2019 11:24 AM          Consult Notes (most recent note)      Angel Fatima MD at 7/7/2019  1:34 PM          Consults    Patient Care Team:  Mariia Del Real DO as PCP - General  Mariia Del Real DO as PCP - Family Medicine  Filemon Mayberry IV, MD as Cardiologist (Cardiology)  Jennifer Richards MD as Consulting Physician (Endocrinology)    Chief complaint: right sided weakness    Subjective     History of Present Illness    79 y.o. female referred by Dr Rasmussen for acute onset of right sided weakness.  Pt LKW at 10 pm 7/6/19.  This am Dtr reports pt has slurred speech, right facial droop and right sided weakness.  Recently discharged on 7/1/19 for R LE DVT on Eliquis.  Taking Plavix for two cardiac stents in May 2019.  Pt also has spinal cord stimulator.      Sx are improving over last few hours but continues to have right sided weakness and slurred speech of moderate intensity.     Gluc 35 at 1337        CTA/CTP/HCT, my review of films, extensive calcifications, no perfusion defect, mild atrophy    Review of Systems   Constitutional: Positive for fatigue. Negative for activity change and unexpected weight change.   HENT: Negative for tinnitus and trouble swallowing.    Eyes: Negative for photophobia and visual disturbance.   Respiratory: Negative for apnea, cough and choking.     Cardiovascular: Negative for leg swelling.   Gastrointestinal: Negative for nausea and vomiting.   Endocrine: Negative for cold intolerance and heat intolerance.   Genitourinary: Negative for difficulty urinating, frequency, menstrual problem and urgency.   Musculoskeletal: Positive for arthralgias, back pain and gait problem. Negative for myalgias and neck pain.   Skin: Negative for color change and rash.   Allergic/Immunologic: Negative for immunocompromised state.   Neurological: Positive for speech difficulty and weakness. Negative for dizziness, tremors, seizures, syncope, facial asymmetry, light-headedness, numbness and headaches.   Hematological: Negative for adenopathy. Does not bruise/bleed easily.   Psychiatric/Behavioral: Positive for decreased concentration. Negative for behavioral problems, confusion, hallucinations and sleep disturbance.        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   • Chronic diastolic congestive heart failure (CMS/HCC)    • Chronic low back pain    • Chronic venous insufficiency    • CKD (chronic kidney disease) stage 3, GFR 30-59 ml/min (CMS/[facility])    • Diabetic gastroparesis (CMS/[facility])    • Diverticulosis    • DJD (degenerative joint disease), lumbar    • DM2 (diabetes mellitus, type 2) (CMS/[facility])    • GERD (gastroesophageal reflux disease)    • Hiatal hernia    • History of hyperparathyroidism    • HLD (hyperlipidemia)    • HTN (hypertension)    • Lumbar stenosis 11/15/2018   • DELFINA (obstructive sleep apnea)    • Osteoarthritis 7/29/2016   • PAD (peripheral artery disease) (CMS/[facility])    • Postherpetic neuralgia    • Vitamin B12 deficiency 7/29/2016   ,   Past Surgical History:   Procedure Laterality Date   • CARDIAC CATHETERIZATION N/A 5/23/2019    Procedure: LEFT HEART CATH;  Surgeon: Filemon Mayberry IV, MD;  Location: [facility] CATH INVASIVE LOCATION;  Service: Cardiovascular   • CARDIAC CATHETERIZATION N/A  5/29/2019    Procedure: Atherectomy-coronary;  Surgeon: Filemon Mayberry IV, MD;  Location:  [facility] CATH INVASIVE LOCATION;  Service: Cardiovascular   • CARDIAC CATHETERIZATION N/A 5/29/2019    Procedure: Stent PATRICIA coronary;  Surgeon: Filemon Mayberry IV, MD;  Location:  [facility] CATH INVASIVE LOCATION;  Service: Cardiovascular   • CARDIAC ELECTROPHYSIOLOGY PROCEDURE N/A 5/28/2019    Procedure: TEMPORARY PACEMAKER;  Surgeon: Jeffrey Reyes MD;  Location:  [facility] CATH INVASIVE LOCATION;  Service: Cardiovascular   • CATARACT EXTRACTION Bilateral    • CHOLECYSTECTOMY     • COLONOSCOPY     • CYSTOSCOPY W/ URETERAL STENT PLACEMENT  2014   • ENDOSCOPY     • HYSTERECTOMY     • LUMBAR LAMINECTOMY DISCECTOMY DECOMPRESSION N/A 11/15/2018    Procedure: LUMBAR LAMINECTOMY L2-3 L 3-4,;  Surgeon: Zachary Key MD;  Location:  [facility] OR;  Service: Orthopedic Spine   • PARATHYROIDECTOMY     • SPINAL CORD STIMULATOR IMPLANT     • TONSILLECTOMY     ,   Family History   Problem Relation Age of Onset   • Kidney disease Mother    • Diabetes Father    ,   Social History     Tobacco Use   • Smoking status: Never Smoker   • Smokeless tobacco: Never Used   Substance Use Topics   • Alcohol use: No   • Drug use: No   ,   (Not in a hospital admission), Scheduled Meds:    aspirin 300 mg Rectal Once   , Continuous Infusions:    dextrose 100 mL/hr    sodium chloride 125 mL/hr Last Rate: 125 mL/hr (07/07/19 1336)   , PRN Meds:  sodium chloride and Allergies:  Codeine    Objective      Vital Signs  Temp:  [98.2 °F (36.8 °C)] 98.2 °F (36.8 °C)  Heart Rate:  [89] 89  Resp:  [18] 18  BP: (153)/(63) 153/63    Physical Exam   Constitutional: She is oriented to person, place, and time. Vital signs are normal. She appears well-developed and well-nourished. No distress.   HENT:   Head: Normocephalic and atraumatic.   Eyes: EOM and lids are normal. Pupils are equal, round, and reactive to light.   Fundoscopic exam:       The right eye  shows no exudate, no hemorrhage and no papilledema. The right eye shows venous pulsations.        The left eye shows no exudate, no hemorrhage and no papilledema. The left eye shows venous pulsations.   Neck: Normal range of motion and phonation normal. Normal carotid pulses present. Carotid bruit is not present. No thyroid mass and no thyromegaly present.   Cardiovascular: Normal rate, regular rhythm and normal heart sounds.   Pulmonary/Chest: Effort normal.   Neurological: She is oriented to person, place, and time. She has an abnormal Heel to Shin Test. She has a normal Finger-Nose-Finger Test.   Skin: Skin is warm and dry.   Psychiatric: She has a normal mood and affect. Her behavior is normal. Her speech is slurred.   Nursing note and vitals reviewed.    Neurologic Exam     Mental Status   Oriented to person, place, and time.   Follows 2 step commands.   Attention: decreased. Concentration: decreased.   Speech: slurred   Level of consciousness: alert  Knowledge: poor.   Unable to name object. Unable to repeat. Abnormal comprehension.     Cranial Nerves     CN II   Visual fields full to confrontation.   Visual acuity: normal  Right visual field deficit: none  Left visual field deficit: none     CN III, IV, VI   Pupils are equal, round, and reactive to light.  Extraocular motions are normal.   Right pupil: Shape: regular. Reactivity: brisk. Consensual response: intact.   Left pupil: Shape: regular. Reactivity: brisk. Consensual response: intact.   Nystagmus: none   Diplopia: none  Ophthalmoparesis: none  Upgaze: normal  Downgaze: normal  Conjugate gaze: present  Vestibulo-ocular reflex: present    CN V   Right facial sensation deficit: complete  Left facial sensation deficit: none  Right corneal reflex: normal  Left corneal reflex: normal    CN VII   Right facial weakness: central  Left facial weakness: none    CN VIII   Hearing: intact    CN IX, X   Palate: symmetric  Right gag reflex: normal  Left gag reflex:  normal    CN XI   Right sternocleidomastoid strength: normal  Left sternocleidomastoid strength: normal    CN XII   Tongue: not atrophic  Fasciculations: absent  Tongue deviation: none    Motor Exam   Muscle bulk: normal  Overall muscle tone: normal  Right arm tone: normal  Left arm tone: normal  Right leg tone: normal  Left leg tone: normal    Strength   Strength 5/5 except as noted.   Right deltoid: 3/5  Right biceps: 3/5  Right triceps: 3/5  Right wrist flexion: 3/5  Right wrist extension: 3/5  Right iliopsoas: 3/5  Right quadriceps: 3/5  Right hamstring: 3/5  Right glutei: 3/5  Right anterior tibial: 3/5  Right posterior tibial: 3/5  Right peroneal: 3/5  Right gastroc: 3/5    Sensory Exam   Light touch normal.   Vibration normal.   Proprioception normal.   Pinprick normal.     Gait, Coordination, and Reflexes     Gait  Gait: circumduction (right )    Coordination   Finger to nose coordination: normal  Heel to shin coordination: abnormal    Tremor   Resting tremor: absent  Intention tremor: absent  Action tremor: absent    Reflexes   Reflexes 2+ except as noted.   Right plantar: upgoing    Results Review:    I reviewed the patient's new clinical results.  I reviewed the patient's new imaging results and agree with the interpretation.  I reviewed the patient's other test results and agree with the interpretation  Discussed with Dr Rasmussen and Dr Harmon        Assessment/Plan       Acute cerebrovascular accident (CVA) of cerebellum (CMS/[facility])    Peripheral arterial disease (CMS/[facility])    Uncontrolled type 2 diabetes mellitus with complication, with long-term current use of insulin (CMS/[facility])    Diabetic peripheral neuropathy (CMS/[facility])    CKD (chronic kidney disease) stage 3, GFR 30-59 ml/min (CMS/[facility])    Acute deep vein thrombosis (DVT) of distal vein of right lower extremity (CMS/[facility])    CVA (cerebral vascular accident) (CMS/[facility])    1.  Right sided weakness - continue Eliquis, Plavix, add ASA, possibly secondary to  hypoglycemia.  MRI Brain when stimulator can be turned off.  Echo.       I discussed the patients findings and my recommendations with patient, family, primary care team and consulting provider    Angel Fatima MD  07/07/19  1:34 PM       Electronically signed by Angel Fatima MD at 7/7/2019  1:46 PM       Physical Therapy Notes (most recent note)     No notes of this type exist for this encounter.           Occupational Therapy Notes (most recent note)      Monik Child, OT at 7/8/2019  9:59 AM          Problem: Stroke (Ischemic) (Adult)  Goal: Signs and Symptoms of Listed Potential Problems Will be Absent, Minimized or Managed (Stroke)  Outcome: Ongoing (interventions implemented as appropriate)   07/08/19 0859   Goal/Outcome Evaluation   Problems Assessed (Stroke (Ischemic)) motor/sensory impairment   Problems Assessed (Stroke (Ischemic)) motor/sensory impairment           Electronically signed by Monik Child OT at 7/8/2019  9:59 AM          Speech Language Pathology Notes (most recent note)      Aylin Johnson MS CCC-SLP at 7/7/2019  4:52 PM          Problem: Patient Care Overview  Goal: Plan of Care Review  Outcome: Ongoing (interventions implemented as appropriate)   07/07/19 1651   Coping/Psychosocial   Plan of Care Reviewed With patient   SLP re-evaluation, treatment and caregiver instruction completed. Will address dysphagia with education and adjustments to diet consistency as needed. Please see note for further details and recommendations.          Electronically signed by Aylin Johnson MS CCC-SLP at 7/7/2019  4:52 PM

## 2019-07-08 NOTE — PAYOR COMM NOTE
"Narcisa Garcia (79 y.o. Female)     Date of Birth Social Security Number Address Home Phone MRN    1940  [id number]  [facility] [address] [phone] [id number]    [place] Marital Status          Sikhism        Admission Date Admission Type Admitting Provider Attending Provider Department, Room/Bed    19 Emergency Kati Garvin MD Hunter, Sarah M, MD [facility] 3G, S359/1    Discharge Date Discharge Disposition Discharge Destination                       Attending Provider:  Kati Garvin MD    Allergies:  Codeine    Isolation:  None   Infection:  None   Code Status:  CPR    Ht:  167.6 cm (66\")   Wt:  76.7 kg (169 lb)    Admission Cmt:  None   Principal Problem:  CVA (cerebral vascular accident) (CMS/[facility]) [I63.9]                 Active Insurance as of 2019     Primary Coverage     Payor Plan Insurance Group Employer/Plan Group    ANTHEM MEDICARE REPLACEMENT ANTHEM MEDICARE ADVANTAGE [id number]     Payor Plan Address Payor Plan Phone Number Payor Plan Fax Number Effective Dates    [address] [phone]  2019 - None Entered    [facility] [address]       Subscriber Name Subscriber Birth Date Member ID       NARCISA GARCIA 1940 [id number]                 Emergency Contacts      (Rel.) Home Phone Work Phone Mobile Phone    Shanna Loja (Daughter) [phone] -- --    Barb Ellis (Daughter) [phone] -- [phone]               History & Physical      Bailee Harmon MD at 2019  1:10 PM              [facility]  HISTORY AND PHYSICAL    Patient Name: Narcisa Garcia  : 1940  MRN: [id number]  Primary Care Physician: Mariia Del Real DO  Date of admission: 2019      Subjective   Subjective     Chief Complaint:Right sided weakness    HPI:  Narcisa Garcia is a 79 y.o. female with hx of CAD s/p recent stent to RCA on Plavix, well controlled T2DM A1C 6.1%, CKD " stage 3, hypertension, recent admit and d/c for here 7/1 with RLE DVT on Eliquis.Patient presents to ED with complaints of right sided weakness and slurred speech, Patient was last seen normal last night at 11pm, daughter states that she woke up this morning around 9 AM to sounds of moaning and groaning, she was found to have slurred speech and was listless on the right side such EMS was called and patient was brought to the ED.  Of note daughter states that last night patient took 70 units of regular insulin at once.  On arrival to the ED patient was hemodynamically stable, initial work-up did show glucose of 35, she underwent CT head that was negative, CT cerebral perfusion was normal, CTA head and neck, that showed excess calcification at the carotid bifurcations bilaterally however narrowing was extremely difficult to measure.  Patient was given aspirin, neurology and neurosurgery were consulted and hospital medicine was asked to admit.    Review of Systems   Gen- No fevers, chills  CV- No chest pain, palpitations  Resp- No cough, dyspnea  GI- No N/V/D, abd pain    Otherwise complete ROS reviewed and is negative except as mentioned in the HPI.    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   • Chronic diastolic congestive heart failure (CMS/HCC)    • Chronic low back pain    • Chronic venous insufficiency    • CKD (chronic kidney disease) stage 3, GFR 30-59 ml/min (CMS/HCC)    • Diabetic gastroparesis (CMS/HCC)    • Diverticulosis    • DJD (degenerative joint disease), lumbar    • DM2 (diabetes mellitus, type 2) (CMS/HCC)    • GERD (gastroesophageal reflux disease)    • Hiatal hernia    • History of hyperparathyroidism    • HLD (hyperlipidemia)    • HTN (hypertension)    • Lumbar stenosis 11/15/2018   • DELFINA (obstructive sleep apnea)    • Osteoarthritis 7/29/2016   • PAD (peripheral artery disease) (CMS/HCC)    • Postherpetic  neuralgia    • Vitamin B12 deficiency 7/29/2016       Past Surgical History:   Procedure Laterality Date   • CARDIAC CATHETERIZATION N/A 5/23/2019    Procedure: LEFT HEART CATH;  Surgeon: Filemon Mayberry IV, MD;  Location:  [facility];  Service: Cardiovascular   • CARDIAC CATHETERIZATION N/A 5/29/2019    Procedure: Atherectomy-coronary;  Surgeon: Filemon Mayberry IV, MD;  Location:  [facility];  Service: Cardiovascular   • CARDIAC CATHETERIZATION N/A 5/29/2019    Procedure: Stent PATRICIA coronary;  Surgeon: Filemon Mayberry IV, MD;  Location:  [facility];  Service: Cardiovascular   • CARDIAC ELECTROPHYSIOLOGY PROCEDURE N/A 5/28/2019    Procedure: TEMPORARY PACEMAKER;  Surgeon: Jeffrey Reyes MD;  Location:  [facility];  Service: Cardiovascular   • CATARACT EXTRACTION Bilateral    • CHOLECYSTECTOMY     • COLONOSCOPY     • CYSTOSCOPY W/ URETERAL STENT PLACEMENT  2014   • ENDOSCOPY     • HYSTERECTOMY     • LUMBAR LAMINECTOMY DISCECTOMY DECOMPRESSION N/A 11/15/2018    Procedure: LUMBAR LAMINECTOMY L2-3 L 3-4,;  Surgeon: Zachary Key MD;  Location:  [facility];  Service: Orthopedic Spine   • PARATHYROIDECTOMY     • SPINAL CORD STIMULATOR IMPLANT     • TONSILLECTOMY         Family History: family history includes Diabetes in her father; Kidney disease in her mother. Otherwise pertinent FHx was reviewed and unremarkable.     Social History:  reports that she has never smoked. She has never used smokeless tobacco. She reports that she does not drink alcohol or use drugs.  Social History     Social History Narrative    Friend Ruth Nuno, with pt today       Medications:    Available home medication information reviewed.    (Not in a hospital admission)    Allergies   Allergen Reactions   • Codeine Nausea And Vomiting       Objective   Objective     Vital Signs:   Temp:  [98.2 °F (36.8 °C)] 98.2 °F (36.8 °C)  Heart Rate:   [89-95] 95  Resp:  [18] 18  BP: (137-153)/(63-65) 137/65   Total (NIH Stroke Scale): 13    Physical Exam   Constitutional: Chronic ill appearing elderly female awake, alert, restless  Eyes: PERRLA, sclerae anicteric, no conjunctival injection  HENT: NCAT, mucous membranes moist  Neck: Supple, no thyromegaly, no lymphadenopathy, trachea midline  Respiratory: Clear to auscultation bilaterally, nonlabored respirations   Cardiovascular: RRR, no murmurs, rubs, or gallops, palpable pedal pulses bilaterally  Gastrointestinal: Obese positive bowel sounds, soft, nontender, nondistended  Musculoskeletal: No bilateral ankle edema, no clubbing or cyanosis to extremities  Psychiatric: Appropriate affect, cooperative  Neurologic: Oriented x 3, right sided weakness, dysarthria  Skin: No rashes    Results Reviewed:  I have personally reviewed current lab, radiology, and data and agree.    Results from last 7 days   Lab Units 07/07/19  1141   WBC 10*3/mm3 5.76   HEMOGLOBIN g/dL 12.0   HEMATOCRIT % 37.9   PLATELETS 10*3/mm3 258     Results from last 7 days   Lab Units 07/07/19  1246   SODIUM mmol/L 136   POTASSIUM mmol/L 4.4   CHLORIDE mmol/L 102   CO2 mmol/L 17.0*   BUN mg/dL 29*   CREATININE mg/dL 1.15*   GLUCOSE mg/dL 35*   CALCIUM mg/dL 10.2   ALT (SGPT) U/L 41*   AST (SGOT) U/L 39*   TROPONIN T ng/mL <0.010     Estimated Creatinine Clearance: 41.5 mL/min (A) (by C-G formula based on SCr of 1.15 mg/dL (H)).  Brief Urine Lab Results  (Last result in the past 365 days)      Color   Clarity   Blood   Leuk Est   Nitrite   Protein   CREAT   Urine HCG        06/21/19 1715 Yellow Cloudy Trace Moderate (2+) Negative 100 mg/dL (2+)             Imaging Results (last 24 hours)     Procedure Component Value Units Date/Time    CT Angiogram Head With & Without Contrast [[id number]] Collected:  07/07/19 1237     Updated:  07/07/19 1241    Narrative:       EXAMINATION: CT ANGIOGRAM NECK W WO CONTRAST-, CT ANGIOGRAM HEAD W WO  CONTRAST-       INDICATION: Stroke right-sided weakness     TECHNIQUE: Multiple axial CT imaging is obtained of the head and neck  following the ministration of intravenous contrast.     Stenosis measurement was performed by the NASCET or similar method.     The radiation dose reduction device was turned on for each scan per the  ALARA (As Low as Reasonably Achievable) protocol.     COMPARISON: NONE     FINDINGS: Lung apices are grossly clear with vascular calcification seen  within the thoracic aortic arch. Atherosclerotic disease seen at the  origin the great vessels with no significant stenosis. The thyroid is  heterogeneous in appearance with multiple small nodules identified.  Degenerative changes identified within the spine. No gross abnormality  identified within the soft tissues of the neck. There is no bulky  adenopathy identified. Musculature is intact. No abnormal mass or fluid  collections identified.     The common carotid arteries are without significant stenosis with  extensive calcification seen at the carotid bifurcations bilaterally.  There is somewhat difficulty measuring the area of narrowing of the  distal common carotid artery and internal carotid arteries right greater  than left due to the extensive calcification. There is at least moderate  stenosis identified of the proximal right internal carotid artery. There  is mild stenosis identified of the left internal carotid artery. The  intracranial vessels reveal atherosclerotic disease seen in the  cavernous portions the internal carotid arteries. There is no aneurysmal  dilatation of the vascular formation or significant stenosis seen of  either the anterior posterior circulation. Anterior middle cerebral  arteries are unremarkable. Posterior circulation is intact. There is a  dominant left vertebral artery. Degenerative changes seen within the  spine             Impression:       Extensive calcification seen at the carotid bifurcations  bilaterally making  measurements of the narrowing extremely difficult  however there is at least a moderate stenosis identified of the proximal  right internal carotid artery and mild stenosis of the proximal left  internal carotid artery. No definite significant stenosis is identified.  The intracranial vessels are unremarkable.          CT Angiogram Neck With & Without Contrast [[id number]] Collected:  07/07/19 1237     Updated:  07/07/19 1241    Narrative:       EXAMINATION: CT ANGIOGRAM NECK W WO CONTRAST-, CT ANGIOGRAM HEAD W WO  CONTRAST-      INDICATION: Stroke right-sided weakness     TECHNIQUE: Multiple axial CT imaging is obtained of the head and neck  following the ministration of intravenous contrast.     Stenosis measurement was performed by the NASCET or similar method.     The radiation dose reduction device was turned on for each scan per the  ALARA (As Low as Reasonably Achievable) protocol.     COMPARISON: NONE     FINDINGS: Lung apices are grossly clear with vascular calcification seen  within the thoracic aortic arch. Atherosclerotic disease seen at the  origin the great vessels with no significant stenosis. The thyroid is  heterogeneous in appearance with multiple small nodules identified.  Degenerative changes identified within the spine. No gross abnormality  identified within the soft tissues of the neck. There is no bulky  adenopathy identified. Musculature is intact. No abnormal mass or fluid  collections identified.     The common carotid arteries are without significant stenosis with  extensive calcification seen at the carotid bifurcations bilaterally.  There is somewhat difficulty measuring the area of narrowing of the  distal common carotid artery and internal carotid arteries right greater  than left due to the extensive calcification. There is at least moderate  stenosis identified of the proximal right internal carotid artery. There  is mild stenosis identified of the left internal carotid artery.  The  intracranial vessels reveal atherosclerotic disease seen in the  cavernous portions the internal carotid arteries. There is no aneurysmal  dilatation of the vascular formation or significant stenosis seen of  either the anterior posterior circulation. Anterior middle cerebral  arteries are unremarkable. Posterior circulation is intact. There is a  dominant left vertebral artery. Degenerative changes seen within the  spine             Impression:       Extensive calcification seen at the carotid bifurcations  bilaterally making measurements of the narrowing extremely difficult  however there is at least a moderate stenosis identified of the proximal  right internal carotid artery and mild stenosis of the proximal left  internal carotid artery. No definite significant stenosis is identified.  The intracranial vessels are unremarkable.          XR Chest 1 View [[id number]] Collected:  07/07/19 1228     Updated:  07/07/19 1229    Narrative:          EXAMINATION: XR CHEST 1 VW-      INDICATION: Stroke Protocol (Onset > 12 Hrs)      COMPARISON: 06/21/2019     FINDINGS: Portable chest reveals heart to be enlarged. Underlying  chronic and emphysematous changes in with the lung fields bilaterally.  Degenerative changes seen within the spine. A pleural effusion or  pneumothorax. Pulmonary vascularity is within normal limits.           Impression:       Chronic changes no acute parenchymal disease          CT Cerebral Perfusion With & Without Contrast [[id number]] Collected:  07/07/19 1223     Updated:  07/07/19 1227    Narrative:       EXAMINATION: CT CEREBRAL PERFUSION W WO CONTRAST-      INDICATION: TIA, initial screening stroke symptoms     TECHNIQUE: Cerebral perfusion analysis was performed using computed  tomography with contrast administration, including post processing of  parametric maps with determination of cerebral blood flow, cerebral  blood volume, and mean transit time.      The radiation dose reduction  device was turned on for each scan per the  ALARA (As Low as Reasonably Achievable) protocol.     COMPARISON: NONE     FINDINGS: No evidence of abnormality within cerebral blood flow or  cerebral blood volume. No evidence of reversible ischemia. No increased  mean transit time to suggest evidence of an occlusion or stenosis.             Impression:       No large perfusion abnormality to suggest evidence of  reversible ischemia                   CT Head Without Contrast Stroke Protocol [[id number]] Collected:  07/07/19 1113     Updated:  07/07/19 1158    Narrative:       EXAMINATION: CT HEAD WO CONTRAST  - 07/07/2019     INDICATION: Stroke symptoms, right-sided weakness     TECHNIQUE: Multiple axial CT imaging is obtained of the head from skull  base to skull vertex without the administration of intravenous contrast.     The radiation dose reduction device was turned on for each scan per the  ALARA (As Low as Reasonably Achievable) protocol.     COMPARISON: 05/24/2019     FINDINGS: Parenchyma is grossly unremarkable in appearance with minimal  low-density area seen in the periventricular and subcortical white  matter. No hemorrhage or hydrocephalus. No mass, mass effect, or midline  shift. No abnormal extra-axial fluid collection identified. The bony  structures reveal no evidence of osseous abnormality with minimal  mucosal thickening seen of the left maxillary sinus. Globes and orbits  are intact. The mastoid air cells are patent.       Impression:       Stable chronic changes seen within the brain with no acute  intracranial abnormality identified.     Examination was performed 07/07/2019 at 1053 hours and examination  results were given to the ER physician at time of performance of the  examination at the scanner.     DICTATED:   07/07/2019  EDITED/ls :   07/07/2019               Results for orders placed during the hospital encounter of 06/21/19   Adult Transthoracic Echo Limited W/ Cont if Necessary Per  Protocol    Narrative · Normal left ventricular systolic function  · MAC with trace MR  · Mild aortic valve sclerosis          Assessment/Plan   Assessment / Plan     Active Hospital Problems    Diagnosis POA   • CVA (cerebral vascular accident) (CMS/[facility]) [I63.9] Yes   • Hypoglycemia [E16.2] Yes   • Acute deep vein thrombosis (DVT) of distal vein of right lower extremity (CMS/[facility]) [I82.4Z1] Yes     · Venous duplex (6/2019): Right peroneal DVT     • CKD (chronic kidney disease) stage 3, GFR 30-59 ml/min (CMS/[facility]) [N18.3] Yes   • Essential hypertension [I10] Yes   • Coronary artery disease involving native coronary artery of native heart with angina pectoris (CMS/HCC) [I25.119] Yes     · Echo (8/31/2016): EF > 70%. Moderate thickening of the noncoronary cusp of the aortic valve.  · Echo (04/23/2018): LVEF 60%. Grade I diastolic dysfunction.   Cardiac valves are functionally normal.  · Nuclear stress test (04/23/2018): No focal areas of ischemia or infarct.  Transient ischemic dilatation present raising possibility of balanced ischemia/multivessel CAD  · Cardiac catheterization (5/23/2019): Severe multivessel CAD.  Culprit for NSTEMI is a highly calcified ostial RCA 99% stenosis.    · Staged rotational atherectomy and PCI of the RCA using a PATRICIA, 5/29/2019     • Diabetic peripheral neuropathy (CMS/HCC) [E11.42] Yes   • Peripheral arterial disease (CMS/[facility]) [I73.9] Yes     · BONNIE (08/22/2013):  At rest right 0.98, left 0.85.  After 2 minutes of exercise right 0.51, left 0.68.  · High-grade right common iliac stenosis on cardiac catheterization, 5/23/2019     • Uncontrolled type 2 diabetes mellitus with complication, with long-term current use of insulin (CMS/[facility]) [E11.8, E11.65, Z79.4] Not Applicable        79 y.o. female with hx of CAD s/p recent stent to RCA on Plavix, well controlled T2DM A1C 6.1%, CKD stage 3, hypertension, recent admit and d/c for here 7/1 with RLE DVT on Eliquis.Patient presents to ED with right  sided weakness and slurred speech, admitted with concerns for CVA.     Plan  - CT head is negative, CT cerebral perfusion is normal, CTA head and neck, that shows excess calcification at the carotid bifurcations bilaterally however narrowing was extremely difficult to measure.  Unable to obtain MRI at this time as patient has a spinal stimulator patient was given aspirin, neurology and neurosurgery have been consulted.  -Stroke work-up per protocol, get echo, discussed with Dr. Fatima, will attempt to obtain MRI if spinal stimulator can be turned off, okay to continue aspirin, statin Plavix and Eliquis.  -History of type 2 diabetes previously well controlled with A1c of 6.1%, however, patient woke up with severe hypoglycemia could have contributed to above symptoms, continue D10 infusion, hold home insulin at this time.  Will need re-education on her home insulin regimen  -Recently diagnosed right lower extremity DVT continue home Eliquis  -CKD stage III, creatinine seems to be stable continue to monitor at this time.  -Continue home meds for chronic medical conditions as listed on problem list above  -PT/OT/CM    DVT prophylaxis:  Eliquis    CODE STATUS:    Code Status and Medical Interventions:   Ordered at: 07/07/19 1340     Level Of Support Discussed With:    Patient     Code Status:    CPR     Medical Interventions (Level of Support Prior to Arrest):    Full       Admission Status:  I believe this patient meets INPATIENT status due to the need for care which can only be reasonably provided in an hospital setting . Patient has suspected acute CVA requiring ongoing w/u .  In such, I feel patient’s risk for adverse outcomes and need for care warrant INPATIENT evaluation and predict the patient’s care encounter to likely last beyond 2 midnights.      Electronically signed by Bailee Harmon MD, 07/07/19, 1:10 PM.        Electronically signed by Bailee Harmon MD at 7/7/2019  1:59 PM       ICU Vital Signs     Row  Name 07/08/19 0716 07/08/19 0637 07/08/19 0517 07/08/19 0200 07/08/19 0122       Vitals    Temp  97.8 °F (36.6 °C)  --  97.7 °F (36.5 °C)  --  97.9 °F (36.6 °C)    Temp src  Oral  --  Oral  --  Oral    Pulse  --  --  78  --  80    Heart Rate Source  Monitor  --  Monitor  --  Monitor    Resp  16  --  16  --  16    Resp Rate Source  Visual  --  Visual  --  Visual    BP  127/59  --  121/52  --  121/48    BP Location  Left arm  --  Left arm  --  Left arm    BP Method  Automatic  --  Automatic  --  Automatic    Patient Position  Lying  --  Lying  --  Lying       Oxygen Therapy    SpO2  --  --  95 %  --  97 %    Device (Oxygen Therapy)  --  room air  room air  room air  --    Row Name 07/08/19 0000 07/07/19 2200 07/07/19 2145 07/07/19 2030 07/07/19 1922       Vitals    Temp  --  --  --  --  97.7 °F (36.5 °C)    Temp src  --  --  --  --  Oral    Pulse  --  --  93  --  89    Heart Rate Source  --  --  --  --  Monitor    Resp  --  --  --  --  18    Resp Rate Source  --  --  --  --  Visual    BP  --  --  129/52  --  114/59    BP Location  --  --  --  --  Left arm    BP Method  --  --  --  --  Automatic    Patient Position  --  --  --  --  Lying       Oxygen Therapy    SpO2  --  --  --  --  98 %    Device (Oxygen Therapy)  room air  room air  --  room air  --    Row Name 07/07/19 1613 07/07/19 1612 07/07/19 1420 07/07/19 1400 07/07/19 1331       Vitals    Temp  98 °F (36.7 °C)  --  --  --  --    Temp src  Oral  --  --  --  --    Pulse  97  99  92  97  95    Heart Rate Source  Monitor  Monitor  --  --  --    Resp  18  18  --  --  --    Resp Rate Source  Visual  Visual  --  --  --    BP  138/81  140/57  149/72  151/62  137/65    Noninvasive MAP (mmHg)  --  --  102  97  92    BP Location  Left arm  Right arm  --  --  --    BP Method  Automatic  Automatic  --  --  --       Oxygen Therapy    SpO2  --  97 %  97 %  96 %  98 %    Device (Oxygen Therapy)  room air  room air  --  --  --    Row Name 07/07/19 1215 07/07/19 1200 07/07/19  "1135 07/07/19 1119          Height and Weight    Height  --  --  --  167.6 cm (66\")     Weight  --  --  --  76.7 kg (169 lb)     Ideal Body Weight (IBW) (kg)  --  --  --  59.58     BSA (Calculated - sq m)  --  --  --  1.86 sq meters     BMI (Calculated)  --  --  --  27.3     Weight in (lb) to have BMI = 25  --  --  --  154.6        Vitals    Temp  --  --  98.2 °F (36.8 °C)  --     Temp src  --  --  Oral  --     Pulse  93  --  89  --     Heart Rate Source  --  --  Monitor  --     Resp  --  --  18  --     Resp Rate Source  --  --  Visual  --     BP  --  145/66  153/63  --     Noninvasive MAP (mmHg)  --  99  --  --     BP Method  --  --  Automatic  --        Oxygen Therapy    SpO2  96 %  --  96 %  --     Device (Oxygen Therapy)  --  --  room air  --         Hospital Medications (active)       Dose Frequency Start End    acetaminophen (TYLENOL) tablet 650 mg 650 mg Every 6 Hours PRN 7/8/2019     Sig - Route: Take 2 tablets by mouth Every 6 (Six) Hours As Needed for Mild Pain . - Oral    amLODIPine (NORVASC) tablet 5 mg 5 mg Daily 7/7/2019     Sig - Route: Take 1 tablet by mouth Daily. - Oral    apixaban (ELIQUIS) tablet 5 mg 5 mg Every 12 Hours Scheduled 7/7/2019     Sig - Route: Take 1 tablet by mouth Every 12 (Twelve) Hours. - Oral    aspirin suppository 300 mg 300 mg Once 7/7/2019 7/7/2019    Sig - Route: Insert 0.5 suppositories into the rectum 1 (One) Time. - Rectal    atorvastatin (LIPITOR) tablet 80 mg 80 mg Nightly 7/7/2019     Sig - Route: Take 2 tablets by mouth Every Night. - Oral    clopidogrel (PLAVIX) tablet 75 mg 75 mg Daily 7/7/2019 11/12/2019    Sig - Route: Take 1 tablet by mouth Daily. - Oral    dextrose (D50W) 25 g/ 50mL Intravenous Solution 25 g 25 g Every 15 Minutes PRN 7/7/2019     Sig - Route: Infuse 50 mL into a venous catheter Every 15 (Fifteen) Minutes As Needed for Low Blood Sugar (Blood Sugar Less Than 70). - Intravenous    dextrose (GLUTOSE) oral gel 15 g 15 g Every 15 Minutes PRN 7/7/2019  "    Sig - Route: Take 15 application by mouth Every 15 (Fifteen) Minutes As Needed for Low Blood Sugar (Blood sugar less than 70). - Oral    gabapentin (NEURONTIN) capsule 600 mg 600 mg Every 12 Hours Scheduled 7/7/2019     Sig - Route: Take 2 capsules by mouth Every 12 (Twelve) Hours. - Oral    glucagon (human recombinant) (GLUCAGEN DIAGNOSTIC) injection 1 mg 1 mg As Needed 7/7/2019     Sig - Route: Inject 1 mg under the skin into the appropriate area as directed As Needed for Low Blood Sugar (Blood Glucose Less Than 70). - Subcutaneous    insulin lispro (humaLOG) injection 0-7 Units 0-7 Units 4 Times Daily With Meals & Nightly 7/7/2019     Sig - Route: Inject 0-7 Units under the skin into the appropriate area as directed 4 (Four) Times a Day With Meals & at Bedtime. - Subcutaneous    iopamidol (ISOVUE-370) 76 % injection 100 mL 100 mL Once in Imaging 7/7/2019 7/7/2019    Sig - Route: Infuse 100 mL into a venous catheter Once. - Intravenous    iopamidol (ISOVUE-370) 76 % injection 100 mL 100 mL Once in Imaging 7/7/2019 7/7/2019    Sig - Route: Infuse 100 mL into a venous catheter Once. - Intravenous    metoprolol tartrate (LOPRESSOR) tablet 50 mg 50 mg 2 Times Daily 7/7/2019     Sig - Route: Take 1 tablet by mouth 2 (Two) Times a Day. - Oral    sodium chloride 0.9 % bolus 500 mL 500 mL Once 7/7/2019 7/7/2019    Sig - Route: Infuse 500 mL into a venous catheter 1 (One) Time. - Intravenous    sodium chloride 0.9 % flush 10 mL 10 mL As Needed 7/7/2019     Sig - Route: Infuse 10 mL into a venous catheter As Needed for Line Care. - Intravenous    Cosign for Ordering: Accepted by Mainor Rasmussen MD on 7/7/2019 11:05 AM    sodium chloride 0.9 % flush 3 mL 3 mL Every 12 Hours Scheduled 7/7/2019     Sig - Route: Infuse 3 mL into a venous catheter Every 12 (Twelve) Hours. - Intravenous    sodium chloride 0.9 % flush 3-10 mL 3-10 mL As Needed 7/7/2019     Sig - Route: Infuse 3-10 mL into a venous catheter As Needed for Line  Care. - Intravenous    sodium chloride 0.9 % infusion 50 mL/hr Continuous 7/7/2019 7/8/2019    Sig - Route: Infuse 50 mL/hr into a venous catheter Continuous. - Intravenous    dextrose 10 % infusion (Discontinued) 100 mL/hr Continuous 7/7/2019 7/7/2019    Sig - Route: Infuse 100 mL/hr into a venous catheter Continuous. - Intravenous    sodium chloride 0.9 % infusion (Discontinued) 125 mL/hr Continuous 7/7/2019 7/7/2019    Sig - Route: Infuse 125 mL/hr into a venous catheter Continuous. - Intravenous            Lab Results (last 24 hours)     Procedure Component Value Units Date/Time    Hemoglobin A1c [[id number]]  (Abnormal) Collected:  07/08/19 0656    Specimen:  Blood Updated:  07/08/19 0836     Hemoglobin A1C 6.30 %     Narrative:       Hemoglobin A1C Ranges:    Increased Risk for Diabetes  5.7% to 6.4%  Diabetes                     >= 6.5%  Diabetic Goal                < 7.0%    Lipid Panel [[id number]]  (Abnormal) Collected:  07/08/19 0656    Specimen:  Blood Updated:  07/08/19 0820     Total Cholesterol 90 mg/dL      Triglycerides 189 mg/dL      HDL Cholesterol 25 mg/dL      LDL Cholesterol  27 mg/dL      VLDL Cholesterol 37.8 mg/dL      LDL/HDL Ratio 1.09    Narrative:       Cholesterol Reference Ranges  (U.[place]. Department of Health and Human Services ATP III Classifications)    Desirable          <200 mg/dL  Borderline High    200-239 mg/dL  High Risk          >240 mg/dL      Triglyceride Reference Ranges  (U.S. Department of Health and Human Services ATP III Classifications)    Normal           <150 mg/dL  Borderline High  150-199 mg/dL  High             200-499 mg/dL  Very High        >500 mg/dL    HDL Reference Ranges  (U.S. Department of Health and Human Services ATP III Classifcations)    Low     <40 mg/dl (major risk factor for CHD)  High    >60 mg/dl ('negative' risk factor for CHD)        LDL Reference Ranges  (U.S. Department of Health and Human Services ATP III Classifcations)    Optimal          <100  mg/dL  Near Optimal     100-129 mg/dL  Borderline High  130-159 mg/dL  High             160-189 mg/dL  Very High        >189 mg/dL    POC Glucose Once [[id number]]  (Normal) Collected:  07/08/19 0715    Specimen:  Blood Updated:  07/08/19 0716     Glucose 87 mg/dL     POC Glucose Once [[id number]]  (Abnormal) Collected:  07/07/19 2015    Specimen:  Blood Updated:  07/07/19 2019     Glucose 256 mg/dL     POC Glucose Once [[id number]]  (Abnormal) Collected:  07/07/19 1821    Specimen:  Blood Updated:  07/07/19 1832     Glucose 284 mg/dL     POC Glucose Once [[id number]]  (Normal) Collected:  07/07/19 1515    Specimen:  Blood Updated:  07/07/19 1518     Glucose 101 mg/dL     POC Glucose Once [[id number]]  (Normal) Collected:  07/07/19 1427    Specimen:  Blood Updated:  07/07/19 1428     Glucose 85 mg/dL     [facility] Draw [[id number]] Collected:  07/07/19 1141    Specimen:  Blood Updated:  07/07/19 1401    Narrative:       The following orders were created for panel order [facility] Draw.  Procedure                               Abnormality         Status                     ---------                               -----------         ------                     Light Blue Top[[id number]]                                   Final result               Green Top (Gel)[[id number]]                                  Final result               Lavender Top[[id number]]                                     Final result               Gold Top - SST[[id number]]                                                              Green Top (No Gel)[[id number]]                                                            Please view results for these tests on the individual orders.    Green Top (Gel) [[id number]] Collected:  07/07/19 1246    Specimen:  Blood Updated:  07/07/19 1401     Extra Tube Hold for add-ons.     Comment: Auto resulted.       Troponin [[id number]]  (Normal) Collected:  07/07/19 1246    Specimen:  Blood Updated:  07/07/19 1349     Troponin T  <0.010 ng/mL     Narrative:       Troponin T Reference Range:  <= 0.03 ng/mL-   Negative for AMI  >0.03 ng/mL-     Abnormal for myocardial necrosis.  Clinicians would have to utilize clinical acumen, EKG, Troponin and serial changes to determine if it is an Acute Myocardial Infarction or myocardial injury due to an underlying chronic condition.     POC Glucose Once [[id number]]  (Abnormal) Collected:  07/07/19 1338    Specimen:  Blood Updated:  07/07/19 1339     Glucose 48 mg/dL     Comprehensive Metabolic Panel [[id number]]  (Abnormal) Collected:  07/07/19 1246    Specimen:  Blood Updated:  07/07/19 1337     Glucose 35 mg/dL      BUN 29 mg/dL      Creatinine 1.15 mg/dL      Sodium 136 mmol/L      Potassium 4.4 mmol/L      Chloride 102 mmol/L      CO2 17.0 mmol/L      Calcium 10.2 mg/dL      Total Protein 6.9 g/dL      Albumin 3.80 g/dL      ALT (SGPT) 41 U/L      AST (SGOT) 39 U/L      Comment: Specimen hemolyzed.  Results may be affected.        Alkaline Phosphatase 73 U/L      Total Bilirubin 0.5 mg/dL      eGFR Non African Amer 46 mL/min/1.73      Globulin 3.1 gm/dL      A/G Ratio 1.2 g/dL      BUN/Creatinine Ratio 25.2     Anion Gap 17.0 mmol/L     Narrative:       Hemolyzed specimen. Testing performed per physician request.    GFR Normal >60    Chronic Kidney Disease <60  Kidney Failure <15    Light Blue Top [[id number]] Collected:  07/07/19 1141    Specimen:  Blood Updated:  07/07/19 1245     Extra Tube hold for add-on     Comment: Auto resulted       Lavender Top [[id number]] Collected:  07/07/19 1141    Specimen:  Blood Updated:  07/07/19 1245     Extra Tube hold for add-on     Comment: Auto resulted       Gold Top - SST [[id number]] Updated:  07/07/19 1204    Specimen:  Blood     CBC & Differential [[id number]] Collected:  07/07/19 1141    Specimen:  Blood Updated:  07/07/19 1153    Narrative:       The following orders were created for panel order CBC & Differential.  Procedure                                Abnormality         Status                     ---------                               -----------         ------                     CBC Auto Differential[[id number]]        Abnormal            Final result                 Please view results for these tests on the individual orders.    CBC Auto Differential [[id number]]  (Abnormal) Collected:  07/07/19 1141    Specimen:  Blood Updated:  07/07/19 1153     WBC 5.76 10*3/mm3      RBC 4.35 10*6/mm3      Hemoglobin 12.0 g/dL      Hematocrit 37.9 %      MCV 87.1 fL      MCH 27.6 pg      MCHC 31.7 g/dL      RDW 14.8 %      RDW-SD 45.9 fl      MPV 11.1 fL      Platelets 258 10*3/mm3      Neutrophil % 84.2 %      Lymphocyte % 10.4 %      Monocyte % 4.5 %      Eosinophil % 0.2 %      Basophil % 0.2 %      Immature Grans % 0.5 %      Neutrophils, Absolute 4.85 10*3/mm3      Lymphocytes, Absolute 0.60 10*3/mm3      Monocytes, Absolute 0.26 10*3/mm3      Eosinophils, Absolute 0.01 10*3/mm3      Basophils, Absolute 0.01 10*3/mm3      Immature Grans, Absolute 0.03 10*3/mm3      nRBC 0.0 /100 WBC         Operative/Procedure Notes (last 24 hours) (Notes from 7/7/2019  9:00 AM through 7/8/2019  9:00 AM)     No notes of this type exist for this encounter.        Physician Progress Notes (last 24 hours) (Notes from 7/7/2019  9:00 AM through 7/8/2019  9:00 AM)     No notes of this type exist for this encounter.           Consult Notes (last 24 hours) (Notes from 7/7/2019  9:00 AM through 7/8/2019  9:00 AM)      Angel Fatima MD at 7/7/2019  1:34 PM          Consults    Patient Care Team:  Mariia Del Real DO as PCP - General  Mariia Del Real DO as PCP - Family Medicine  Filemon Mayberry IV, MD as Cardiologist (Cardiology)  Jennifer Richards MD as Consulting Physician (Endocrinology)    Chief complaint: right sided weakness    Subjective     History of Present Illness    79 y.o. female referred by Dr Rasmussen for acute onset of right sided weakness.  Pt LKW at 10 pm 7/6/19.   This am Dtr reports pt has slurred speech, right facial droop and right sided weakness.  Recently discharged on 7/1/19 for R LE DVT on Eliquis.  Taking Plavix for two cardiac stents in May 2019.  Pt also has spinal cord stimulator.      Sx are improving over last few hours but continues to have right sided weakness and slurred speech of moderate intensity.     Gluc 35 at 1337        CTA/CTP/HCT, my review of films, extensive calcifications, no perfusion defect, mild atrophy    Review of Systems   Constitutional: Positive for fatigue. Negative for activity change and unexpected weight change.   HENT: Negative for tinnitus and trouble swallowing.    Eyes: Negative for photophobia and visual disturbance.   Respiratory: Negative for apnea, cough and choking.    Cardiovascular: Negative for leg swelling.   Gastrointestinal: Negative for nausea and vomiting.   Endocrine: Negative for cold intolerance and heat intolerance.   Genitourinary: Negative for difficulty urinating, frequency, menstrual problem and urgency.   Musculoskeletal: Positive for arthralgias, back pain and gait problem. Negative for myalgias and neck pain.   Skin: Negative for color change and rash.   Allergic/Immunologic: Negative for immunocompromised state.   Neurological: Positive for speech difficulty and weakness. Negative for dizziness, tremors, seizures, syncope, facial asymmetry, light-headedness, numbness and headaches.   Hematological: Negative for adenopathy. Does not bruise/bleed easily.   Psychiatric/Behavioral: Positive for decreased concentration. Negative for behavioral problems, confusion, hallucinations and sleep disturbance.        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   • Chronic diastolic congestive heart failure (CMS/HCC)    • Chronic low back pain    • Chronic venous insufficiency    • CKD (chronic kidney disease) stage 3, GFR 30-59 ml/min (CMS/HCC)    • Diabetic  gastroparesis (CMS/[facility])    • Diverticulosis    • DJD (degenerative joint disease), lumbar    • DM2 (diabetes mellitus, type 2) (CMS/[facility])    • GERD (gastroesophageal reflux disease)    • Hiatal hernia    • History of hyperparathyroidism    • HLD (hyperlipidemia)    • HTN (hypertension)    • Lumbar stenosis 11/15/2018   • DELFINA (obstructive sleep apnea)    • Osteoarthritis 7/29/2016   • PAD (peripheral artery disease) (CMS/[facility])    • Postherpetic neuralgia    • Vitamin B12 deficiency 7/29/2016   ,   Past Surgical History:   Procedure Laterality Date   • CARDIAC CATHETERIZATION N/A 5/23/2019    Procedure: LEFT HEART CATH;  Surgeon: Filemon Mayberry IV, MD;  Location:  [facility];  Service: Cardiovascular   • CARDIAC CATHETERIZATION N/A 5/29/2019    Procedure: Atherectomy-coronary;  Surgeon: Filemon Mayberry IV, MD;  Location:  [facility];  Service: Cardiovascular   • CARDIAC CATHETERIZATION N/A 5/29/2019    Procedure: Stent PATRICIA coronary;  Surgeon: Filemon Mayberry IV, MD;  Location:  [facility];  Service: Cardiovascular   • CARDIAC ELECTROPHYSIOLOGY PROCEDURE N/A 5/28/2019    Procedure: TEMPORARY PACEMAKER;  Surgeon: Jeffrey Reyes MD;  Location:  [facility];  Service: Cardiovascular   • CATARACT EXTRACTION Bilateral    • CHOLECYSTECTOMY     • COLONOSCOPY     • CYSTOSCOPY W/ URETERAL STENT PLACEMENT  2014   • ENDOSCOPY     • HYSTERECTOMY     • LUMBAR LAMINECTOMY DISCECTOMY DECOMPRESSION N/A 11/15/2018    Procedure: LUMBAR LAMINECTOMY L2-3 L 3-4,;  Surgeon: Zachary Key MD;  Location:  [facility];  Service: Orthopedic Spine   • PARATHYROIDECTOMY     • SPINAL CORD STIMULATOR IMPLANT     • TONSILLECTOMY     ,   Family History   Problem Relation Age of Onset   • Kidney disease Mother    • Diabetes Father    ,   Social History     Tobacco Use   • Smoking status: Never Smoker   • Smokeless tobacco: Never Used   Substance Use  Topics   • Alcohol use: No   • Drug use: No   ,   (Not in a hospital admission), Scheduled Meds:    aspirin 300 mg Rectal Once   , Continuous Infusions:    dextrose 100 mL/hr    sodium chloride 125 mL/hr Last Rate: 125 mL/hr (07/07/19 1336)   , PRN Meds:  sodium chloride and Allergies:  Codeine    Objective      Vital Signs  Temp:  [98.2 °F (36.8 °C)] 98.2 °F (36.8 °C)  Heart Rate:  [89] 89  Resp:  [18] 18  BP: (153)/(63) 153/63    Physical Exam   Constitutional: She is oriented to person, place, and time. Vital signs are normal. She appears well-developed and well-nourished. No distress.   HENT:   Head: Normocephalic and atraumatic.   Eyes: EOM and lids are normal. Pupils are equal, round, and reactive to light.   Fundoscopic exam:       The right eye shows no exudate, no hemorrhage and no papilledema. The right eye shows venous pulsations.        The left eye shows no exudate, no hemorrhage and no papilledema. The left eye shows venous pulsations.   Neck: Normal range of motion and phonation normal. Normal carotid pulses present. Carotid bruit is not present. No thyroid mass and no thyromegaly present.   Cardiovascular: Normal rate, regular rhythm and normal heart sounds.   Pulmonary/Chest: Effort normal.   Neurological: She is oriented to person, place, and time. She has an abnormal Heel to Shin Test. She has a normal Finger-Nose-Finger Test.   Skin: Skin is warm and dry.   Psychiatric: She has a normal mood and affect. Her behavior is normal. Her speech is slurred.   Nursing note and vitals reviewed.    Neurologic Exam     Mental Status   Oriented to person, place, and time.   Follows 2 step commands.   Attention: decreased. Concentration: decreased.   Speech: slurred   Level of consciousness: alert  Knowledge: poor.   Unable to name object. Unable to repeat. Abnormal comprehension.     Cranial Nerves     CN II   Visual fields full to confrontation.   Visual acuity: normal  Right visual field deficit: none  Left  visual field deficit: none     CN III, IV, VI   Pupils are equal, round, and reactive to light.  Extraocular motions are normal.   Right pupil: Shape: regular. Reactivity: brisk. Consensual response: intact.   Left pupil: Shape: regular. Reactivity: brisk. Consensual response: intact.   Nystagmus: none   Diplopia: none  Ophthalmoparesis: none  Upgaze: normal  Downgaze: normal  Conjugate gaze: present  Vestibulo-ocular reflex: present    CN V   Right facial sensation deficit: complete  Left facial sensation deficit: none  Right corneal reflex: normal  Left corneal reflex: normal    CN VII   Right facial weakness: central  Left facial weakness: none    CN VIII   Hearing: intact    CN IX, X   Palate: symmetric  Right gag reflex: normal  Left gag reflex: normal    CN XI   Right sternocleidomastoid strength: normal  Left sternocleidomastoid strength: normal    CN XII   Tongue: not atrophic  Fasciculations: absent  Tongue deviation: none    Motor Exam   Muscle bulk: normal  Overall muscle tone: normal  Right arm tone: normal  Left arm tone: normal  Right leg tone: normal  Left leg tone: normal    Strength   Strength 5/5 except as noted.   Right deltoid: 3/5  Right biceps: 3/5  Right triceps: 3/5  Right wrist flexion: 3/5  Right wrist extension: 3/5  Right iliopsoas: 3/5  Right quadriceps: 3/5  Right hamstring: 3/5  Right glutei: 3/5  Right anterior tibial: 3/5  Right posterior tibial: 3/5  Right peroneal: 3/5  Right gastroc: 3/5    Sensory Exam   Light touch normal.   Vibration normal.   Proprioception normal.   Pinprick normal.     Gait, Coordination, and Reflexes     Gait  Gait: circumduction (right )    Coordination   Finger to nose coordination: normal  Heel to shin coordination: abnormal    Tremor   Resting tremor: absent  Intention tremor: absent  Action tremor: absent    Reflexes   Reflexes 2+ except as noted.   Right plantar: upgoing    Results Review:    I reviewed the patient's new clinical results.  I reviewed  the patient's new imaging results and agree with the interpretation.  I reviewed the patient's other test results and agree with the interpretation  Discussed with Dr Rasmussen and Dr Harmon        Assessment/Plan       Acute cerebrovascular accident (CVA) of cerebellum (CMS/[facility])    Peripheral arterial disease (CMS/[facility])    Uncontrolled type 2 diabetes mellitus with complication, with long-term current use of insulin (CMS/[facility])    Diabetic peripheral neuropathy (CMS/[facility])    CKD (chronic kidney disease) stage 3, GFR 30-59 ml/min (CMS/[facility])    Acute deep vein thrombosis (DVT) of distal vein of right lower extremity (CMS/[facility])    CVA (cerebral vascular accident) (CMS/[facility])    1.  Right sided weakness - continue Eliquis, Plavix, add ASA, possibly secondary to hypoglycemia.  MRI Brain when stimulator can be turned off.  Echo.       I discussed the patients findings and my recommendations with patient, family, primary care team and consulting provider    Angel Fatima MD  07/07/19  1:34 PM       Electronically signed by Angel Fatima MD at 7/7/2019  1:46 PM

## 2019-07-08 NOTE — PROGRESS NOTES
"Neurology       Patient Care Team:  Mariia Del Real DO as PCP - General  Mariia Del Real DO as PCP - Family Medicine  Filemon Mayberry IV, MD as Cardiologist (Cardiology)  Jennifer Richards MD as Consulting Physician (Endocrinology)    Chief complaint right-sided weakness and slurred speech      Subjective .     History: Speech is back to normal, feels right side is still little weak.  Daughter at bedside notes she had discussed patient's insulin dose with her just a few days ago, but plans to fill the syringes herself from now on.  Daughter has phoned [employer] rep regarding magnet for stimulator as they cannot find theirs at home.  Patient has had MRIs in the past with his stimulator turned off.    ROS: No fever or chest pain    Objective     Vital Signs   Blood pressure 118/53, pulse 85, temperature 98.3 °F (36.8 °C), temperature source Oral, resp. rate 16, height 167.6 cm (66\"), weight 76.7 kg (169 lb), SpO2 95 %, not currently breastfeeding.    Physical Exam:              Neuro: Well-developed elderly white woman in bedside chair, alert, fluent and appropriate.  No dysarthria.  EOMI face symmetric TML  Motor: Mild right  weakness, otherwise symmetric, no distal lower extremity weakness appreciated    Results Review:              MRI pending  A1c 6.3  Triglycerides 189, HDL 25 LDL 27 total cholesterol 90    Assessment/Plan     Right-sided weakness- unclear whether very small stroke or due to hypoglycemia.  Hoping for MRI today, useful prognostically.  Low risk for hemorrhagic transformation given minimal symptoms/signs on exam indicating a small stroke if indeed ischemic.    I discussed the patients findings and my recommendations with patient and family    Camryn Wesley MD  07/08/19  12:30 PM        "

## 2019-07-08 NOTE — THERAPY DISCHARGE NOTE
Acute Care - Speech Language Pathology Initial Evaluation  [facility]   Cognitive-Communication Evaluation  & Swallow Treatment     Patient Name: Narcisa Cotto  : 1940  MRN: [id number]  Today's Date: 2019  Onset of Illness/Injury or Date of Surgery: 19     Referring Physician: MD ZAKI      Admit Date: 2019     Visit Dx:    ICD-10-CM ICD-9-CM   1. Cerebrovascular accident (CVA), unspecified mechanism (CMS/[facility]) I63.9 434.91   2. Dysarthria R47.1 784.51   3. Weakness R53.1 780.79   4. Hypoglycemia E16.2 251.2   5. Dysphagia, unspecified type R13.10 787.20   6. Impaired mobility and ADLs Z74.09 799.89     Patient Active Problem List   Diagnosis   • Hyperlipidemia LDL goal <70   • Diabetic gastroparesis (CMS/[facility])   • Peripheral arterial disease (CMS/[facility])   • Uncontrolled type 2 diabetes mellitus with complication, with long-term current use of insulin (CMS/[facility])   • Sleep apnea   • Diabetic peripheral neuropathy (CMS/[facility])   • Coronary artery disease involving native coronary artery of native heart with angina pectoris (CMS/[facility])   • GERD (gastroesophageal reflux disease)   • Essential hypertension   • Iron deficiency anemia   • Complete heart block (CMS/[facility])   • CKD (chronic kidney disease) stage 3, GFR 30-59 ml/min (CMS/[facility])   • Shortness of breath   • Acute deep vein thrombosis (DVT) of distal vein of right lower extremity (CMS/[facility])   • CVA (cerebral vascular accident) (CMS/[facility])   • Hypoglycemia     Past Medical History:   Diagnosis Date   • CAD (coronary artery disease)    • Choledochal cyst 2019   • Chronic diastolic congestive heart failure (CMS/[facility])    • Chronic low back pain    • Chronic venous insufficiency    • CKD (chronic kidney disease) stage 3, GFR 30-59 ml/min (CMS/[facility])    • Diabetic gastroparesis (CMS/[facility])    • Diverticulosis    • DJD (degenerative joint disease), lumbar    • DM2 (diabetes mellitus, type 2) (CMS/[facility])    • GERD (gastroesophageal reflux disease)    • Hiatal  hernia    • History of hyperparathyroidism    • HLD (hyperlipidemia)    • HTN (hypertension)    • Lumbar stenosis 11/15/2018   • DELFINA (obstructive sleep apnea)    • Osteoarthritis 7/29/2016   • PAD (peripheral artery disease) (CMS/[facility])    • Postherpetic neuralgia    • Vitamin B12 deficiency 7/29/2016     Past Surgical History:   Procedure Laterality Date   • CARDIAC CATHETERIZATION N/A 5/23/2019    Procedure: LEFT HEART CATH;  Surgeon: Filemon Mayberry IV, MD;  Location:  [facility]VASIVE [facility];  Service: Cardiovascular   • CARDIAC CATHETERIZATION N/A 5/29/2019    Procedure: Atherectomy-coronary;  Surgeon: Filemon Mayberry IV, MD;  Location:  [facility] CATH [facility]VASIVE [facility];  Service: Cardiovascular   • CARDIAC CATHETERIZATION N/A 5/29/2019    Procedure: Stent PATRICIA coronary;  Surgeon: Filemon Mayberry IV, MD;  Location:  [facility] CATH INVASIVE LOCATION;  Service: Cardiovascular   • CARDIAC ELECTROPHYSIOLOGY PROCEDURE N/A 5/28/2019    Procedure: TEMPORARY PACEMAKER;  Surgeon: Jeffrey Reyes MD;  Location:  [facility] CATH INVASIVE LOC[facility];  Service: Cardiovascular   • CATARACT EXTRACTION Bilateral    • CHOLECYSTECTOMY     • COLONOSCOPY     • CYSTOSCOPY W/ URETERAL STENT PLACEMENT  2014   • ENDOSCOPY     • HYSTERECTOMY     • LUMBAR LAMINECTOMY DISCECTOMY DECOMPRESSION N/A 11/15/2018    Procedure: LUMBAR LAMINECTOMY L2-3 L 3-4,;  Surgeon: Zachary Key MD;  Location:  [facility] OR;  Service: Orthopedic Spine   • PARATHYROIDECTOMY     • SPINAL CORD STIMULATOR IMPLANT     • TONSILLECTOMY          SLP EVALUATION (last 72 hours)      SLP SLC Evaluation     Row Name 07/08/19 1023                   Communication Assessment/Intervention    Document Type  evaluation  -AC        Subjective Information  no complaints  -AC        Patient Observations  alert;cooperative  -AC        Patient/Family Observations  No family present.  -AC        Patient Effort  excellent  -AC           General Information     Patient Profile Reviewed  yes  -AC        Pertinent History Of Current Problem  80yo adm w/ R wknss, on CVA pathway. MRI pending. ? very small stroke vs hypoglycemic event per chart.   -AC        Precautions/Limitations, Vision  WFL with corrective lenses;for purposes of eval  -AC        Precautions/Limitations, Hearing  WFL;for purposes of eval  -AC        Patient Level of Education  Recently retired .  -AC        Prior Level of Function-Communication  WFL  -AC        Plans/Goals Discussed with  patient;agreed upon  -AC        Barriers to Rehab  none identified  -AC        Patient's Goals for Discharge  return to home  -AC           Pain Scale: FACES Pre/Post-Treatment    Pain: FACES Scale, Pretreatment  0-->no hurt  -AC        Pain: FACES Scale, Post-Treatment  0-->no hurt  -AC           Comprehension Assessment/Intervention    Comprehension Assessment/Intervention  Auditory Comprehension;Reading Comprehension  -AC           Auditory Comprehension Assessment/Intervention    Auditory Comprehension (Communication)  WFL  -AC        Answers Questions (Communication)  WFL;complex;yes/no;wh questions;personal  -AC        Able to Follow Commands (Communication)  WFL;2-step  -AC        Narrative Discourse  WFL;conversational level  -AC           Reading Comprehension Assessment/Intervention    Reading Comprehension (Communication)  WFL  -AC        Paragraph Level  WFL  -AC        Reading Comprehension, Comment  Able to answer open-ended ?s after reading paragraph w/ 90% acc.  -AC           Expression Assessment/Intervention    Expression Assessment/Intervention  verbal expression;graphic expression  -AC           Verbal Expression Assessment/Intervention    Verbal Expression  WFL  -AC        Automatic Speech (Communication)  WFL;response to greeting  -AC        Repetition  WFL;words  -AC        Responsive Naming  WFL;complex  -AC        Confrontational Naming  WFL;high frequency  -AC         "Conversational Discourse/Fluency  WFL  -        Verbal Expression, Comment  Pt reported occasional difficulty w/ word finding, but that it's not an acute problem.  -           Graphic Expression Assessment/Intervention    Graphic Expression  WFL;dominant hand  -        Graphic Expression, Comment  Pt's R hand is weak and affecting her handwriting to some degree, but graphic expression was WFL when pt asked to generate complex sentence using words \"if\" and \"call.\"  -           Oral Motor Structure and Function    Oral Motor Structure and Function  WFL  -        Dentition Assessment  upper dentures/partial in place;lower dentures/partial in place  -           Motor Speech Assessment/Intervention    Motor Speech Function  WFL;unfamiliar listener  -        Verbal Repetition (Communication)  WFL;polysyllabic words  -        Conversational Speech (Communication)  WFL;moderate complexity  -        Motor Speech, Comment  Pt 100% intelligible. She reported speech had returned to baseline.  -           Cognitive Assessment Intervention- SLP    Cognitive Function (Cognition)  WFL  -        Orientation Status (Cognition)  WFL;person;place;time;situation  -        Memory (Cognitive)  WFL;immediate;delayed;short-term;other (see comments) immediate/2-min delay: 5/5 words  -        Attention (Cognitive)  WFL;sustained;quiet environment  -        Thought Organization (Cognitive)  WFL;concrete convergent;drawing conclusions  -        Reasoning (Cognitive)  WFL;mod-complex;deductive  -        Problem Solving (Cognitive)  WFL;temporal  -AC           SLP Clinical Impressions    SLP Diagnosis  Functional cognitive-communication skills. Motor speech has returned to baseline/WFL.  -        SLC Criteria for Skilled Therapy Interventions Met  no problems identified which require skilled intervention  -           Recommendations    Anticipated Dischage Disposition  unknown  -          User Key  (r) = " Recorded By, (t) = Taken By, (c) = Cosigned By    Initials Name Effective Dates    AC Sommer Chatman, MS CCC-SLP 07/27/17 -              EDUCATION  The patient has been educated in the following areas:     Cognitive Impairment Communication Impairment.    SLP Recommendation and Plan  SLP Diagnosis: Functional cognitive-communication skills. Motor speech has returned to baseline/WFL.     SLC Criteria for Skilled Therapy Interventions Met: no problems identified which require skilled intervention  SLP Diagnosis: Functional cognitive-communication skills. Motor speech has returned to baseline/WFL.  Anticipated Dischage Disposition: unknown          Plan of Care Reviewed With: patient  Plan of Care Review  Plan of Care Reviewed With: patient  Daily Summary of Progress (SLP): progress toward functional goals as expected      SLP GOALS     Row Name 07/08/19 1550 07/07/19 1600          Oral Nutrition/Hydration Goal 1 (SLP)    Oral Nutrition/Hydration Goal 1, SLP  LTG: Patient will tolerate regular diet and thin liquids without difficulty or s/sx of aspiration  -AC  LTG: Patient will tolerate regular diet and thin liquids without difficulty or s/sx of aspiration  -CH     Time Frame (Oral Nutrition/Hydration Goal 1, SLP)  by discharge  -AC  by discharge  -CH     Progress/Outcomes (Oral Nutrition/Hydration Goal 1, SLP)  goal met  -AC  --        Oral Nutrition/Hydration Goal 2 (SLP)    Oral Nutrition/Hydration Goal 2, SLP  STG: Patient will tolerate soft whole diet consistency and thin liquids without difficulty or s/sx of aspiration  -AC  STG: Patient will tolerate soft whole diet consistency and thin liquids without difficulty or s/sx of aspiration  -CH     Time Frame (Oral Nutrition/Hydration Goal 2, SLP)  by discharge  -AC  by discharge  -CH     Barriers (Oral Nutrition/Hydration Goal 2, SLP)  Pt's dtr brought dentures. Pt asking if can upgrade diet. Trialed turkey sandwich, pasta salad, and thin via straw. Mastication was  adequate, no significant oral residue. No overt clinical s/sxs aspiration appreciated.   -AC  --     Progress/Outcomes (Oral Nutrition/Hydration Goal 2, SLP)  goal met  -AC  --       User Key  (r) = Recorded By, (t) = Taken By, (c) = Cosigned By    Initials Name Provider Type    Sommer Reis MS CCC-SLP Speech and Language Pathologist    Aylin Lozoya MS CCC-SLP Speech and Language Pathologist                  Time Calculation:     Time Calculation- SLP     Row Name 19 1625             Time Calculation- SLP    SLP Start Time  1530  -      SLP Received On  19  -        User Key  (r) = Recorded By, (t) = Taken By, (c) = Cosigned By    Initials Name Provider Type    Sommer Reis MS CCC-SLP Speech and Language Pathologist          Therapy Charges for Today     Code Description Service Date Service Provider Modifiers Qty    33742203039 HC ST EVAL SPEECH AND PROD W LANG  2 2019 Sommer Chatman MS CCC-SLP GN 1    19929440038 HC ST TREATMENT SWALLOW 2 2019 Sommer Chatman MS CCC-SLP GN 1                     Sommer Chatman MS CCC-SLP  2019 and Acute Care - Speech Language Pathology   Swallow Treatment Note  [facility]     Patient Name: Narcisa Cotto  : 1940  MRN: [id number]  Today's Date: 2019  Onset of Illness/Injury or Date of Surgery: 19     Referring Physician: MD ZAKI      Admit Date: 2019    Visit Dx:      ICD-10-CM ICD-9-CM   1. Cerebrovascular accident (CVA), unspecified mechanism (CMS/[facility]) I63.9 434.91   2. Dysarthria R47.1 784.51   3. Weakness R53.1 780.79   4. Hypoglycemia E16.2 251.2   5. Dysphagia, unspecified type R13.10 787.20   6. Impaired mobility and ADLs Z74.09 799.89     Patient Active Problem List   Diagnosis   • Hyperlipidemia LDL goal <70   • Diabetic gastroparesis (CMS/[facility])   • Peripheral arterial disease (CMS/[facility])   • Uncontrolled type 2 diabetes mellitus with complication, with long-term current use of insulin (CMS/[facility])   •  Sleep apnea   • Diabetic peripheral neuropathy (CMS/[facility])   • Coronary artery disease involving native coronary artery of native heart with angina pectoris (CMS/[facility])   • GERD (gastroesophageal reflux disease)   • Essential hypertension   • Iron deficiency anemia   • Complete heart block (CMS/[facility])   • CKD (chronic kidney disease) stage 3, GFR 30-59 ml/min (CMS/[facility])   • Shortness of breath   • Acute deep vein thrombosis (DVT) of distal vein of right lower extremity (CMS/[facility])   • CVA (cerebral vascular accident) (CMS/[facility])   • Hypoglycemia       Therapy Treatment  Rehabilitation Treatment Summary     Row Name 07/08/19 1550             Treatment Time/Intention    Discipline  speech language pathologist  -AC      Document Type  therapy note (daily note)  -AC      Subjective Information  no complaints  -AC      Care Plan Review  evaluation/treatment results reviewed;care plan/treatment goals reviewed;risks/benefits reviewed;current/potential barriers reviewed;patient/other agree to care plan  -AC      Patient Effort  excellent  -AC      Recorded by [AC] Sommer Chatman MS CCC-SLP 07/08/19 1621      Row Name 07/08/19 1550             Pain Scale: FACES Pre/Post-Treatment    Pain: FACES Scale, Pretreatment  0-->no hurt  -AC      Pain: FACES Scale, Post-Treatment  0-->no hurt  -AC      Recorded by [AC] Sommer Chatman, MS CCC-SLP 07/08/19 1621      Row Name 07/08/19 1550             Outcome Summary/Treatment Plan (SLP)    Daily Summary of Progress (SLP)  progress toward functional goals as expected  -AC      Plan for Continued Treatment (SLP)  Will upgrade diet to regular and cont thin liquids. No further needs warranting skilled SLP services for swallowing identified. Will sign-off.  -AC2      Anticipated Dischage Disposition  unknown  -AC2      Recorded by [AC] Sommer Chatman, MS CCC-SLP 07/08/19 1621  [AC2] Sommer Chatman MS CCC-SLP 07/08/19 1622        User Key  (r) = Recorded By, (t) = Taken By, (c) = Cosigned By     Initials Name Effective Dates Discipline    AC Sommer Chatman, MS CCC-SLP 07/27/17 -  SLP          Outcome Summary  Outcome Summary/Treatment Plan (SLP)  Daily Summary of Progress (SLP): progress toward functional goals as expected (07/08/19 1550 : oSmmer Chatman, MS CCC-SLP)  Plan for Continued Treatment (SLP): Will upgrade diet to regular and cont thin liquids. No further needs warranting skilled SLP services for swallowing identified. Will sign-off. (07/08/19 1550 : Sommer Chatman, MS CCC-SLP)  Anticipated Dischage Disposition: unknown (07/08/19 1550 : Sommer Chatman, MS CCC-SLP)  Reason for Discharge: all goals and outcomes met, no further needs identified (07/08/19 1625 : Sommer Chatman, MS CCC-SLP)      SLP GOALS     Row Name 07/08/19 1550 07/07/19 1600          Oral Nutrition/Hydration Goal 1 (SLP)    Oral Nutrition/Hydration Goal 1, SLP  LTG: Patient will tolerate regular diet and thin liquids without difficulty or s/sx of aspiration  -AC  LTG: Patient will tolerate regular diet and thin liquids without difficulty or s/sx of aspiration  -CH     Time Frame (Oral Nutrition/Hydration Goal 1, SLP)  by discharge  -AC  by discharge  -CH     Progress/Outcomes (Oral Nutrition/Hydration Goal 1, SLP)  goal met  -AC  --        Oral Nutrition/Hydration Goal 2 (SLP)    Oral Nutrition/Hydration Goal 2, SLP  STG: Patient will tolerate soft whole diet consistency and thin liquids without difficulty or s/sx of aspiration  -AC  STG: Patient will tolerate soft whole diet consistency and thin liquids without difficulty or s/sx of aspiration  -CH     Time Frame (Oral Nutrition/Hydration Goal 2, SLP)  by discharge  -AC  by discharge  -CH     Barriers (Oral Nutrition/Hydration Goal 2, SLP)  Pt's dtr brought dentures. Pt asking if can upgrade diet. Trialed turkey sandwich, pasta salad, and thin via straw. Mastication was adequate, no significant oral residue. No overt clinical s/sxs aspiration appreciated.   -AC  --      Progress/Outcomes (Oral Nutrition/Hydration Goal 2, SLP)  goal met  -  --       User Key  (r) = Recorded By, (t) = Taken By, (c) = Cosigned By    Initials Name Provider Type    Sommer Reis MS CCC-SLP Speech and Language Pathologist    Aylin Lozoya MS CCC-SLP Speech and Language Pathologist          EDUCATION  The patient has been educated in the following areas:   Oral Care/Hydration.    SLP Recommendation and Plan  Daily Summary of Progress (SLP): progress toward functional goals as expected     Plan for Continued Treatment (SLP): Will upgrade diet to regular and cont thin liquids. No further needs warranting skilled SLP services for swallowing identified. Will sign-off.  Anticipated Dischage Disposition: unknown        Reason for Discharge: all goals and outcomes met, no further needs identified           Time Calculation:   Time Calculation- SLP     Row Name 07/08/19 1625             Time Calculation- SLP    SLP Start Time  1530  -      SLP Received On  07/08/19  -        User Key  (r) = Recorded By, (t) = Taken By, (c) = Cosigned By    Initials Name Provider Type     Sommer Chatman MS CCC-SLP Speech and Language Pathologist          Therapy Charges for Today     Code Description Service Date Service Provider Modifiers Qty    17190695837  ST EVAL SPEECH AND PROD W LANG  2 7/8/2019 Sommer Chatman MS CCC-SLP GN 1    31327679512 HC ST TREATMENT SWALLOW 2 7/8/2019 Sommer Chatman MS CCC-SLP GN 1                 Sommer Chatman MS CCC-SLP  7/8/2019

## 2019-07-09 ENCOUNTER — TELEPHONE (OUTPATIENT)
Dept: INTERNAL MEDICINE | Facility: CLINIC | Age: 79
End: 2019-07-09

## 2019-07-09 ENCOUNTER — APPOINTMENT (OUTPATIENT)
Dept: MRI IMAGING | Facility: HOSPITAL | Age: 79
End: 2019-07-09

## 2019-07-09 ENCOUNTER — APPOINTMENT (OUTPATIENT)
Dept: CARDIOLOGY | Facility: HOSPITAL | Age: 79
End: 2019-07-09

## 2019-07-09 LAB
ANION GAP SERPL CALCULATED.3IONS-SCNC: 12 MMOL/L (ref 5–15)
BH CV ECHO MEAS - BSA(HAYCOCK): 1.9 M^2
BH CV ECHO MEAS - BSA: 1.9 M^2
BH CV ECHO MEAS - BZI_BMI: 27.3 KILOGRAMS/M^2
BH CV ECHO MEAS - BZI_METRIC_HEIGHT: 167.6 CM
BH CV ECHO MEAS - BZI_METRIC_WEIGHT: 76.7 KG
BH CV XLRA MEAS LEFT CCA RATIO VEL: 93.6 CM/SEC
BH CV XLRA MEAS LEFT DIST CCA EDV: 14.7 CM/SEC
BH CV XLRA MEAS LEFT DIST CCA PSV: 94.3 CM/SEC
BH CV XLRA MEAS LEFT DIST ICA EDV: 27.2 CM/SEC
BH CV XLRA MEAS LEFT DIST ICA PSV: 122.9 CM/SEC
BH CV XLRA MEAS LEFT ICA RATIO VEL: 130 CM/SEC
BH CV XLRA MEAS LEFT ICA/CCA RATIO: 1.4
BH CV XLRA MEAS LEFT MID CCA EDV: 11.9 CM/SEC
BH CV XLRA MEAS LEFT MID CCA PSV: 106.1 CM/SEC
BH CV XLRA MEAS LEFT MID ICA EDV: 19.6 CM/SEC
BH CV XLRA MEAS LEFT MID ICA PSV: 106.1 CM/SEC
BH CV XLRA MEAS LEFT PROX CCA EDV: 11.9 CM/SEC
BH CV XLRA MEAS LEFT PROX CCA PSV: 123.6 CM/SEC
BH CV XLRA MEAS LEFT PROX ECA PSV: 197.4 CM/SEC
BH CV XLRA MEAS LEFT PROX ICA EDV: 17.5 CM/SEC
BH CV XLRA MEAS LEFT PROX ICA PSV: 114.5 CM/SEC
BH CV XLRA MEAS LEFT PROX SCLA PSV: 323 CM/SEC
BH CV XLRA MEAS LEFT VERTEBRAL A EDV: 24.4 CM/SEC
BH CV XLRA MEAS LEFT VERTEBRAL A PSV: 118.7 CM/SEC
BH CV XLRA MEAS RIGHT CCA RATIO VEL: 94.3 CM/SEC
BH CV XLRA MEAS RIGHT DIST CCA EDV: 18.1 CM/SEC
BH CV XLRA MEAS RIGHT DIST CCA PSV: 95.1 CM/SEC
BH CV XLRA MEAS RIGHT DIST ICA EDV: 35.1 CM/SEC
BH CV XLRA MEAS RIGHT DIST ICA PSV: 167 CM/SEC
BH CV XLRA MEAS RIGHT ICA RATIO VEL: 254 CM/SEC
BH CV XLRA MEAS RIGHT ICA/CCA RATIO: 2.7
BH CV XLRA MEAS RIGHT MID CCA EDV: 17.3 CM/SEC
BH CV XLRA MEAS RIGHT MID CCA PSV: 95.1 CM/SEC
BH CV XLRA MEAS RIGHT MID ICA EDV: 39.3 CM/SEC
BH CV XLRA MEAS RIGHT MID ICA PSV: 255.4 CM/SEC
BH CV XLRA MEAS RIGHT PROX CCA EDV: 12.6 CM/SEC
BH CV XLRA MEAS RIGHT PROX CCA PSV: 111.6 CM/SEC
BH CV XLRA MEAS RIGHT PROX ECA PSV: 331.8 CM/SEC
BH CV XLRA MEAS RIGHT PROX ICA EDV: 42.1 CM/SEC
BH CV XLRA MEAS RIGHT PROX ICA PSV: 225.9 CM/SEC
BH CV XLRA MEAS RIGHT PROX SCLA PSV: 133.6 CM/SEC
BH CV XLRA MEAS RIGHT VERTEBRAL A EDV: 10.5 CM/SEC
BH CV XLRA MEAS RIGHT VERTEBRAL A PSV: 76.8 CM/SEC
BUN BLD-MCNC: 15 MG/DL (ref 8–23)
BUN/CREAT SERPL: 14 (ref 7–25)
CALCIUM SPEC-SCNC: 9.6 MG/DL (ref 8.6–10.5)
CHLORIDE SERPL-SCNC: 107 MMOL/L (ref 98–107)
CO2 SERPL-SCNC: 21 MMOL/L (ref 22–29)
CREAT BLD-MCNC: 1.07 MG/DL (ref 0.57–1)
GFR SERPL CREATININE-BSD FRML MDRD: 49 ML/MIN/1.73
GLUCOSE BLD-MCNC: 134 MG/DL (ref 65–99)
GLUCOSE BLDC GLUCOMTR-MCNC: 137 MG/DL (ref 70–130)
GLUCOSE BLDC GLUCOMTR-MCNC: 151 MG/DL (ref 70–130)
GLUCOSE BLDC GLUCOMTR-MCNC: 196 MG/DL (ref 70–130)
GLUCOSE BLDC GLUCOMTR-MCNC: 221 MG/DL (ref 70–130)
POTASSIUM BLD-SCNC: 4.2 MMOL/L (ref 3.5–5.2)
RIGHT ARM BP: NORMAL MMHG
SODIUM BLD-SCNC: 140 MMOL/L (ref 136–145)

## 2019-07-09 PROCEDURE — 99232 SBSQ HOSP IP/OBS MODERATE 35: CPT | Performed by: PSYCHIATRY & NEUROLOGY

## 2019-07-09 PROCEDURE — 70551 MRI BRAIN STEM W/O DYE: CPT

## 2019-07-09 PROCEDURE — 93880 EXTRACRANIAL BILAT STUDY: CPT | Performed by: INTERNAL MEDICINE

## 2019-07-09 PROCEDURE — 93880 EXTRACRANIAL BILAT STUDY: CPT

## 2019-07-09 PROCEDURE — 82962 GLUCOSE BLOOD TEST: CPT

## 2019-07-09 PROCEDURE — 80048 BASIC METABOLIC PNL TOTAL CA: CPT | Performed by: INTERNAL MEDICINE

## 2019-07-09 PROCEDURE — 63710000001 INSULIN LISPRO (HUMAN) PER 5 UNITS: Performed by: INTERNAL MEDICINE

## 2019-07-09 PROCEDURE — 99232 SBSQ HOSP IP/OBS MODERATE 35: CPT | Performed by: INTERNAL MEDICINE

## 2019-07-09 RX ADMIN — CLOPIDOGREL BISULFATE 75 MG: 75 TABLET ORAL at 08:22

## 2019-07-09 RX ADMIN — AMLODIPINE BESYLATE 5 MG: 5 TABLET ORAL at 08:22

## 2019-07-09 RX ADMIN — GABAPENTIN 600 MG: 300 CAPSULE ORAL at 20:27

## 2019-07-09 RX ADMIN — SODIUM CHLORIDE, PRESERVATIVE FREE 3 ML: 5 INJECTION INTRAVENOUS at 08:27

## 2019-07-09 RX ADMIN — INSULIN LISPRO 2 UNITS: 100 INJECTION, SOLUTION INTRAVENOUS; SUBCUTANEOUS at 20:27

## 2019-07-09 RX ADMIN — APIXABAN 5 MG: 5 TABLET, FILM COATED ORAL at 08:22

## 2019-07-09 RX ADMIN — INSULIN LISPRO 3 UNITS: 100 INJECTION, SOLUTION INTRAVENOUS; SUBCUTANEOUS at 12:31

## 2019-07-09 RX ADMIN — ACETAMINOPHEN 650 MG: 325 TABLET, FILM COATED ORAL at 23:04

## 2019-07-09 RX ADMIN — INSULIN LISPRO 2 UNITS: 100 INJECTION, SOLUTION INTRAVENOUS; SUBCUTANEOUS at 16:47

## 2019-07-09 RX ADMIN — METOPROLOL TARTRATE 50 MG: 50 TABLET ORAL at 08:22

## 2019-07-09 RX ADMIN — APIXABAN 5 MG: 5 TABLET, FILM COATED ORAL at 20:27

## 2019-07-09 RX ADMIN — ATORVASTATIN CALCIUM 80 MG: 40 TABLET, FILM COATED ORAL at 20:27

## 2019-07-09 RX ADMIN — METOPROLOL TARTRATE 50 MG: 50 TABLET ORAL at 20:27

## 2019-07-09 RX ADMIN — GABAPENTIN 600 MG: 300 CAPSULE ORAL at 08:22

## 2019-07-09 NOTE — TELEPHONE ENCOUNTER
PATIENTS DAUGHTER WOULD LIKE TO GET A CALL BACK IN REGARDS TO HER MOTHER AND WOULD LIKE TO GET A CALL BACK [phone]

## 2019-07-09 NOTE — PROGRESS NOTES
"Neurology       Patient Care Team:  Mariia Del Real DO as PCP - General  Mariia Del Real DO as PCP - Family Medicine  Filemon Mayberry IV, MD as Cardiologist (Cardiology)  Jennifer Richards MD as Consulting Physician (Endocrinology)    Chief complaint right sided weakness      Subjective .     History: Strength continues to improve but hand still a bit weak.  Has been told that MRI should be done this afternoon.    ROS: No fever or chest pain    Objective     Vital Signs   Blood pressure 142/60, pulse 78, temperature 98 °F (36.7 °C), temperature source Oral, resp. rate 16, height 167.6 cm (66\"), weight 76.7 kg (169 lb), SpO2 97 %, not currently breastfeeding.    Physical Exam:              Neuro: elderly white woman, nontoxic appearing, in no acute distress.  Alert, fluent and appropriate with no dysarthria  EOMI face symmetric  Mild right upper extremity weakness    Results Review:              BMP with creatinine 1.07 glucose 134 CO2 21 otherwise unremarkable    Assessment/Plan     Right-sided weakness-small stroke versus effect of hypoglycemia.  MRI apparently can be done this afternoon.  Given questionable carotid stenosis on CTA (due to calcifications), have ordered carotid ultrasound.  Pending these tests, may be able to be discharged tomorrow.    I discussed the patients findings and my recommendations with patient    Camryn eWsley MD  07/09/19  1:50 PM    "

## 2019-07-09 NOTE — TELEPHONE ENCOUNTER
PATIENT WAS RETURNING A CALL SHE RECEIVED FROM OUR OFFICE AND WOULD LIKE TO GET A CALL BACK [phone]

## 2019-07-09 NOTE — PLAN OF CARE
"Problem: Patient Care Overview  Goal: Plan of Care Review   07/09/19 0243   Coping/Psychosocial   Plan of Care Reviewed With patient   Plan of Care Review   Progress improving   OTHER   Outcome Summary NIH 3 r/t right sided weakness and decreased sensation, VSS, alert and oriented x4, speech back to basline. Patient up with 1 gait belt and walker to bathroom, refused to ambulate in fink stating \"my back gives out and I can't walk that far.\" Working with PT/OT ambulated 44ft. CM following probably home with family. F/u with rep regarding magnet to turn off spinal cord stimulator in order to get MRI. Dr Fatima and hospitalist following. Glucose 205 at HS.         "

## 2019-07-09 NOTE — PROGRESS NOTES
[facility] Medicine Services  PROGRESS NOTE    Patient Name: Narcisa Cotto  : 1940  MRN: [id number]    Date of Admission: 2019  Length of Stay: 2  Primary Care Physician: Mariia Del Real DO    Subjective   Subjective     CC:  Slurred speech, weakness     HPI:  Patient doing well, symptoms improving. Family brought magnet today to get MRI, planning for this afternoon.    Review of Systems      Otherwise ROS is negative except as mentioned in the HPI.    Objective   Objective     Vital Signs:   Temp:  [97.7 °F (36.5 °C)-98.5 °F (36.9 °C)] 98.4 °F (36.9 °C)  Heart Rate:  [75-88] 88  Resp:  [14-16] 16  BP: (113-136)/(46-53) 131/52  Total (NIH Stroke Scale): 3     Physical Exam:  GEN- no acute distress noted, resting in bed, awake  HEENT- atraumatic, normocephlic, eomi  NECK- supple, trachea midline, no masses  RESP: ctab, normal effort  CV: no murmurs, s1/s2, rrr  MSK: no edema noted, spontaneous movement of all extremities  NEURO: alert, oriented, right sided LE weakness noted-slightly improved in comparison to yesterday  SKIN: no rashes  PSYCH: appropriate mood and affect       Results Reviewed:  I have personally reviewed current lab, radiology, and data and agree.    Results from last 7 days   Lab Units 19  1141   WBC 10*3/mm3 5.76   HEMOGLOBIN g/dL 12.0   HEMATOCRIT % 37.9   PLATELETS 10*3/mm3 258     Results from last 7 days   Lab Units 19  0755 19  1246   SODIUM mmol/L 140 136   POTASSIUM mmol/L 4.2 4.4   CHLORIDE mmol/L 107 102   CO2 mmol/L 21.0* 17.0*   BUN mg/dL 15 29*   CREATININE mg/dL 1.07* 1.15*   GLUCOSE mg/dL 134* 35*   CALCIUM mg/dL 9.6 10.2   ALT (SGPT) U/L  --  41*   AST (SGOT) U/L  --  39*   TROPONIN T ng/mL  --  <0.010     Estimated Creatinine Clearance: 44.6 mL/min (A) (by C-G formula based on SCr of 1.07 mg/dL (H)).    Microbiology Results Abnormal     None          Imaging Results (last 24 hours)     ** No results found for the last  24 hours. **          Results for orders placed during the hospital encounter of 07/07/19   Adult Transthoracic Echo Complete W/ Cont if Necessary Per Protocol (With Agitated Saline)    Narrative · Left ventricular systolic function is normal. Calculated EF = 60%  · Left ventricular diastolic dysfunction is noted (grade I) consistent   with impaired relaxation.  · The mitral valve is abnormal in structure. Moderate MAC is present.   There is mild anterior mitral leaflet thickening present. Trace mitral   valve regurgitation is present. No significant mitral valve stenosis is   present  · Mild tricuspid valve regurgitation is present.  · Estimated right ventricular systolic pressure from tricuspid   regurgitation is mildly elevated (35-45 mmHg).  · No evidence of a patent foramen ovale. Saline test results are negative.          I have reviewed the medications:  Scheduled Meds:    amLODIPine 5 mg Oral Daily   apixaban 5 mg Oral Q12H   atorvastatin 80 mg Oral Nightly   clopidogrel 75 mg Oral Daily   gabapentin 600 mg Oral Q12H   insulin lispro 0-7 Units Subcutaneous 4x Daily With Meals & Nightly   metoprolol tartrate 50 mg Oral BID   sodium chloride 3 mL Intravenous Q12H     Continuous Infusions:   PRN Meds:.•  acetaminophen  •  dextrose  •  dextrose  •  glucagon (human recombinant)  •  sodium chloride  •  sodium chloride      Assessment/Plan   Assessment / Plan     Active Hospital Problems    Diagnosis  POA   • **CVA (cerebral vascular accident) (CMS/[facility]) [I63.9]  Yes   • Hypoglycemia [E16.2]  Yes   • Acute deep vein thrombosis (DVT) of distal vein of right lower extremity (CMS/[facility]) [I82.4Z1]  Yes   • CKD (chronic kidney disease) stage 3, GFR 30-59 ml/min (CMS/[facility]) [N18.3]  Yes   • Essential hypertension [I10]  Yes   • Coronary artery disease involving native coronary artery of native heart with angina pectoris (CMS/[facility]) [I25.119]  Yes   • Diabetic peripheral neuropathy (CMS/[facility]) [E11.42]  Yes   • Peripheral arterial  disease (CMS/[facility]) [I73.9]  Yes   • Uncontrolled type 2 diabetes mellitus with complication, with long-term current use of insulin (CMS/[facility]) [E11.8, E11.65, Z79.4]  Not Applicable      Resolved Hospital Problems   No resolved problems to display.        Brief Hospital Course to date:  Narcisa Cotto is a 79 y.o. female with hx of CAD s/p recent stent to RCA on Plavix, well controlled T2DM A1C 6.1%, CKD stage 3, hypertension, recent admit and d/c for here 7/1 with RLE DVT on Eliquis.Patient presents to ED with right sided weakness and slurred speech, admitted with concerns for CVA.     Right sided weakness and slurred speech, improving  ---symptoms improving, stroke workup ongoing- thus far have obtained negative CT H, CT perfusion, CTA H&N does show extensive calcifications at carotid bifurcations but difficult to measure. Working on obtaining MRI, patient has spinal stimulator and family brought magnet today- will need rep to be present during MRI. Some concern that symptoms may be due to hypoglycemia, as patient woke up with severe hypoglycemia, improving and symptoms resolving.  ---ECHO with moderate anterior mitral leaflet thickening, normal EF, no PFO     History of T2DM with symptomatic hypoglycemia this admission  ---A1C 6.3, well controlled  ---hold home insulin, diabetes educator to see regarding education with low blood sugars  ---continue to monitor, off D10 now.    Recent diagnosis of RLE DVT  ---continue eliquis    CKD III  ---monitor, labs in AM     DVT Prophylaxis:  eliquis     Disposition: I expect the patient to be discharged to rehab at [facility], pending completion of stroke workup     CODE STATUS:   Code Status and Medical Interventions:   Ordered at: 07/07/19 1340     Level Of Support Discussed With:    Patient     Code Status:    CPR     Medical Interventions (Level of Support Prior to Arrest):    Full         Electronically signed by Kati Garvin MD, 07/09/19, 10:56 AM.

## 2019-07-10 LAB
ANION GAP SERPL CALCULATED.3IONS-SCNC: 12 MMOL/L (ref 5–15)
BUN BLD-MCNC: 11 MG/DL (ref 8–23)
BUN/CREAT SERPL: 11.6 (ref 7–25)
CALCIUM SPEC-SCNC: 9.6 MG/DL (ref 8.6–10.5)
CHLORIDE SERPL-SCNC: 106 MMOL/L (ref 98–107)
CO2 SERPL-SCNC: 23 MMOL/L (ref 22–29)
CREAT BLD-MCNC: 0.95 MG/DL (ref 0.57–1)
GFR SERPL CREATININE-BSD FRML MDRD: 57 ML/MIN/1.73
GLUCOSE BLD-MCNC: 129 MG/DL (ref 65–99)
GLUCOSE BLDC GLUCOMTR-MCNC: 129 MG/DL (ref 70–130)
GLUCOSE BLDC GLUCOMTR-MCNC: 189 MG/DL (ref 70–130)
GLUCOSE BLDC GLUCOMTR-MCNC: 194 MG/DL (ref 70–130)
GLUCOSE BLDC GLUCOMTR-MCNC: 263 MG/DL (ref 70–130)
POTASSIUM BLD-SCNC: 4 MMOL/L (ref 3.5–5.2)
SODIUM BLD-SCNC: 141 MMOL/L (ref 136–145)

## 2019-07-10 PROCEDURE — 99232 SBSQ HOSP IP/OBS MODERATE 35: CPT | Performed by: INTERNAL MEDICINE

## 2019-07-10 PROCEDURE — 82962 GLUCOSE BLOOD TEST: CPT

## 2019-07-10 PROCEDURE — 97116 GAIT TRAINING THERAPY: CPT | Performed by: PHYSICAL THERAPIST

## 2019-07-10 PROCEDURE — 63710000001 INSULIN LISPRO (HUMAN) PER 5 UNITS: Performed by: INTERNAL MEDICINE

## 2019-07-10 PROCEDURE — 99232 SBSQ HOSP IP/OBS MODERATE 35: CPT | Performed by: PSYCHIATRY & NEUROLOGY

## 2019-07-10 PROCEDURE — 80048 BASIC METABOLIC PNL TOTAL CA: CPT | Performed by: INTERNAL MEDICINE

## 2019-07-10 PROCEDURE — 97110 THERAPEUTIC EXERCISES: CPT | Performed by: PHYSICAL THERAPIST

## 2019-07-10 RX ORDER — PANTOPRAZOLE SODIUM 40 MG/1
40 TABLET, DELAYED RELEASE ORAL ONCE
Status: COMPLETED | OUTPATIENT
Start: 2019-07-10 | End: 2019-07-10

## 2019-07-10 RX ORDER — PANTOPRAZOLE SODIUM 40 MG/1
40 TABLET, DELAYED RELEASE ORAL
Status: DISCONTINUED | OUTPATIENT
Start: 2019-07-11 | End: 2019-07-12 | Stop reason: HOSPADM

## 2019-07-10 RX ADMIN — APIXABAN 5 MG: 5 TABLET, FILM COATED ORAL at 20:57

## 2019-07-10 RX ADMIN — ACETAMINOPHEN 650 MG: 325 TABLET, FILM COATED ORAL at 21:49

## 2019-07-10 RX ADMIN — SODIUM CHLORIDE, PRESERVATIVE FREE 3 ML: 5 INJECTION INTRAVENOUS at 09:19

## 2019-07-10 RX ADMIN — APIXABAN 5 MG: 5 TABLET, FILM COATED ORAL at 09:18

## 2019-07-10 RX ADMIN — INSULIN LISPRO 2 UNITS: 100 INJECTION, SOLUTION INTRAVENOUS; SUBCUTANEOUS at 20:58

## 2019-07-10 RX ADMIN — GABAPENTIN 600 MG: 300 CAPSULE ORAL at 09:18

## 2019-07-10 RX ADMIN — GABAPENTIN 600 MG: 300 CAPSULE ORAL at 20:58

## 2019-07-10 RX ADMIN — SODIUM CHLORIDE, PRESERVATIVE FREE 3 ML: 5 INJECTION INTRAVENOUS at 20:57

## 2019-07-10 RX ADMIN — PANTOPRAZOLE SODIUM 40 MG: 40 TABLET, DELAYED RELEASE ORAL at 16:59

## 2019-07-10 RX ADMIN — INSULIN LISPRO 4 UNITS: 100 INJECTION, SOLUTION INTRAVENOUS; SUBCUTANEOUS at 17:00

## 2019-07-10 RX ADMIN — ATORVASTATIN CALCIUM 80 MG: 40 TABLET, FILM COATED ORAL at 20:57

## 2019-07-10 RX ADMIN — METOPROLOL TARTRATE 50 MG: 50 TABLET ORAL at 09:18

## 2019-07-10 RX ADMIN — METOPROLOL TARTRATE 50 MG: 50 TABLET ORAL at 20:57

## 2019-07-10 RX ADMIN — INSULIN LISPRO 2 UNITS: 100 INJECTION, SOLUTION INTRAVENOUS; SUBCUTANEOUS at 13:10

## 2019-07-10 RX ADMIN — AMLODIPINE BESYLATE 5 MG: 5 TABLET ORAL at 09:18

## 2019-07-10 RX ADMIN — CLOPIDOGREL BISULFATE 75 MG: 75 TABLET ORAL at 09:18

## 2019-07-10 NOTE — THERAPY TREATMENT NOTE
Acute Care - Physical Therapy Treatment Note  [facility]     Patient Name: Narcisa Cotto  : 1940  MRN: [id number]  Today's Date: 7/10/2019  Onset of Illness/Injury or Date of Surgery: 19  Date of Referral to PT: 19  Referring Physician: MD ZAKI    Admit Date: 2019    Visit Dx:    ICD-10-CM ICD-9-CM   1. Cerebrovascular accident (CVA), unspecified mechanism (CMS/[facility]) I63.9 434.91   2. Dysarthria R47.1 784.51   3. Weakness R53.1 780.79   4. Hypoglycemia E16.2 251.2   5. Dysphagia, unspecified type R13.10 787.20   6. Impaired mobility and ADLs Z74.09 799.89     Patient Active Problem List   Diagnosis   • Hyperlipidemia LDL goal <70   • Diabetic gastroparesis (CMS/[facility])   • Peripheral arterial disease (CMS/[facility])   • Uncontrolled type 2 diabetes mellitus with complication, with long-term current use of insulin (CMS/[facility])   • Sleep apnea   • Diabetic peripheral neuropathy (CMS/[facility])   • Coronary artery disease involving native coronary artery of native heart with angina pectoris (CMS/[facility])   • GERD (gastroesophageal reflux disease)   • Essential hypertension   • Iron deficiency anemia   • Complete heart block (CMS/[facility])   • CKD (chronic kidney disease) stage 3, GFR 30-59 ml/min (CMS/[facility])   • Shortness of breath   • Acute deep vein thrombosis (DVT) of distal vein of right lower extremity (CMS/[facility])   • CVA (cerebral vascular accident) (CMS/[facility])   • Hypoglycemia       Therapy Treatment    Rehabilitation Treatment Summary     Row Name 07/10/19 1112             Treatment Time/Intention    Discipline  physical therapist  -      Document Type  therapy note (daily note)  -LM      Subjective Information  no complaints  -LM      Mode of Treatment  individual therapy;physical therapy  -LM      Patient/Family Observations  Pt lying in bed.  Very pleasant and agreeable to PT tx.  No family present.  -      Care Plan Review  care plan/treatment goals reviewed;risks/benefits reviewed;patient/other agree to  care plan  -LM      Patient Effort  excellent  -LM      Existing Precautions/Restrictions  fall  -LM      Recorded by [LM] Maritza Albert, PT 07/10/19 1158      Row Name 07/10/19 1112             Vital Signs    Pre Systolic BP Rehab  141  -LM      Pre Treatment Diastolic BP  58  -LM      Pretreatment Heart Rate (beats/min)  76  -LM      Posttreatment Heart Rate (beats/min)  69  -LM      Pre SpO2 (%)  -- Not hooked up upon entering room  -LM      Post SpO2 (%)  97  -LM      O2 Delivery Post Treatment  room air  -LM      Pre Patient Position  Supine  -LM      Intra Patient Position  Standing  -LM      Post Patient Position  Sitting  -LM      Recorded by [LM] Maritza Albert, PT 07/10/19 1158      Row Name 07/10/19 1112             Cognitive Assessment/Intervention    Additional Documentation  Cognitive Assessment/Intervention (Group)  -LM      Recorded by [LM] Maritza Albert, PT 07/10/19 1158      Row Name 07/10/19 1112             Cognitive Assessment/Intervention- PT/OT    Affect/Mental Status (Cognitive)  WFL  -LM      Orientation Status (Cognition)  oriented x 4  -LM      Follows Commands (Cognition)  WFL  -LM      Safety Deficit (Cognitive)  mild deficit;safety precautions awareness;safety precautions follow-through/compliance  -LM      Personal Safety Interventions  fall prevention program maintained;gait belt;muscle strengthening facilitated;nonskid shoes/slippers when out of bed  -LM      Recorded by [LM] Maritza Albert, PT 07/10/19 1158      Row Name 07/10/19 1112             Bed Mobility Assessment/Treatment    Bed Mobility Assessment/Treatment  supine-sit  -LM      Supine-Sit [place] (Bed Mobility)  contact guard  -LM      Assistive Device (Bed Mobility)  bed rails;head of bed elevated  -LM      Comment (Bed Mobility)  Increased time needed.  Pt slightly SOA post t/f - educated on PLB.  -LM      Recorded by [LM] Maritza Albert, PT 07/10/19 1158      Row Name 07/10/19 1112             Transfer  Assessment/Treatment    Transfer Assessment/Treatment  sit-stand transfer;stand-sit transfer  -LM      Recorded by [LM] Maritza Albert, PT 07/10/19 1158      Row Name 07/10/19 1112             Sit-Stand Transfer    Sit-Stand [place] (Transfers)  contact guard;verbal cues  -LM      Assistive Device (Sit-Stand Transfers)  walker, front-wheeled  -LM      Recorded by [LM] Maritza Albert, PT 07/10/19 1158      Row Name 07/10/19 1112             Stand-Sit Transfer    Stand-Sit [place] (Transfers)  contact guard;verbal cues  -LM      Assistive Device (Stand-Sit Transfers)  walker, front-wheeled  -LM      Recorded by [LM] Maritza Albert, PT 07/10/19 1158      Row Name 07/10/19 1112             Gait/Stairs Assessment/Training    82295 - Gait Training Minutes   15  -LM      Gait/Stairs Assessment/Training  gait/ambulation independence;gait/ambulation assistive device;distance ambulated  -LM      [place] Level (Gait)  contact guard  -LM      Assistive Device (Gait)  walker, front-wheeled  -LM      Distance in Feet (Gait)  144 feet  -LM      Deviations/Abnormal Patterns (Gait)  jose l decreased;other (see comments) Dec step length  -LM      Bilateral Gait Deviations  forward flexed posture;heel strike decreased  -LM      Comment (Gait/Stairs)  Vc's to stay inside walker and increase step length.  -LM      Recorded by [LM] Maritza Albert, PT 07/10/19 1158      Row Name 07/10/19 1112             Motor Skills Assessment/Interventions    Additional Documentation  Therapeutic Exercise (Group)  -LM      Recorded by [LM] Maritza Albert, PT 07/10/19 1158      Row Name 07/10/19 1112             Therapeutic Exercise    Therapeutic Exercise  seated, lower extremities;supine, lower extremities  -LM      Additional Documentation  Therapeutic Exercise (Row)  -LM      23297 - PT Therapeutic Exercise Minutes  8  -LM      Recorded by [LM] Maritza Albert, PT 07/10/19 1158      Row Name 07/10/19 1112             Lower Extremity Seated  Therapeutic Exercise    Performed, Seated Lower Extremity (Therapeutic Exercise)  LAQ (long arc quad), knee extension  -LM      Exercise Type, Seated Lower Extremity (Therapeutic Exercise)  AROM (active range of motion)  -LM      Sets/Reps Detail, Seated Lower Extremity (Therapeutic Exercise)  x20 bilaterally  -LM      Recorded by [LM] Maritza Albert, PT 07/10/19 1158      Row Name 07/10/19 1112             Lower Extremity Supine Therapeutic Exercise    Performed, Supine Lower Extremity (Therapeutic Exercise)  hip abduction/adduction;SLR (straight leg raise);ankle pumps;heel slides  -LM      Exercise Type, Supine Lower Extremity (Therapeutic Exercise)  AROM (active range of motion)  -LM      Sets/Reps Detail, Supine Lower Extremity (Therapeutic Exercise)  x20 bilaterally with exception of only 15 SLR on the R  -LM      Recorded by [LM] Maritza Albert, PT 07/10/19 1158      Row Name 07/10/19 1112             Positioning and Restraints    Pre-Treatment Position  in bed  -LM      Post Treatment Position  chair  -LM      In Chair  reclined;call light within reach;encouraged to call for assist;exit alarm on;notified nsg  -LM      Recorded by [LM] Martiza Albert, PT 07/10/19 1158      Row Name 07/10/19 1112             Pain Assessment    Additional Documentation  Pain Scale: Numbers Pre/Post-Treatment (Group)  -LM      Recorded by [LM] Maritza Albert, PT 07/10/19 1158      Row Name 07/10/19 1112             Pain Scale: Numbers Pre/Post-Treatment    Pain Scale: Numbers, Pretreatment  0/10 - no pain  -LM      Pain Scale: Numbers, Post-Treatment  0/10 - no pain  -LM      Recorded by [LM] Maritza Albert, PT 07/10/19 1158      Row Name 07/10/19 1112             Plan of Care Review    Plan of Care Reviewed With  patient  -LM      Recorded by [LM] Maritza Albert, PT 07/10/19 1158      Row Name 07/10/19 1112             Outcome Summary/Treatment Plan (PT)    Daily Summary of Progress (PT)  progress toward functional goals is good  -LM       Recorded by [] Maritza Albert, PT 07/10/19 1158        User Key  (r) = Recorded By, (t) = Taken By, (c) = Cosigned By    Initials Name Effective Dates Discipline     Maritza Albert, PT 06/15/16 -  PT                   Physical Therapy Education     Title: PT OT SLP Therapies (Done)     Topic: Physical Therapy (Done)     Point: Mobility training (Done)     Learning Progress Summary           Patient Acceptance, E, VU,NR by CD at 7/8/2019  2:45 PM    Comment:  BENEFITS OF OOB ACTIVITY, SAFETY WITH MOBILITY, PROGRESSION OF POC, D/C PLANNING,                   Point: Home exercise program (Done)     Learning Progress Summary           Patient Acceptance, E, VU,NR by CD at 7/8/2019  2:45 PM    Comment:  BENEFITS OF OOB ACTIVITY, SAFETY WITH MOBILITY, PROGRESSION OF POC, D/C PLANNING,                   Point: Body mechanics (Done)     Learning Progress Summary           Patient Acceptance, E, VU,NR by CD at 7/8/2019  2:45 PM    Comment:  BENEFITS OF OOB ACTIVITY, SAFETY WITH MOBILITY, PROGRESSION OF POC, D/C PLANNING,                   Point: Precautions (Done)     Learning Progress Summary           Patient Acceptance, E, VU,NR by CD at 7/8/2019  2:45 PM    Comment:  BENEFITS OF OOB ACTIVITY, SAFETY WITH MOBILITY, PROGRESSION OF POC, D/C PLANNING,                               User Key     Initials Effective Dates Name Provider Type Discipline     06/19/15 -  Marysol Wells, PT Physical Therapist PT                PT Recommendation and Plan     Outcome Summary/Treatment Plan (PT)  Daily Summary of Progress (PT): progress toward functional goals is good  Plan of Care Reviewed With: patient  Outcome Summary: Pt progressing well towards skilled PT goals.  Pt improved with all assist needed today as compared to eval.  Pt transferred supine-->sit with CGA, stood with CGA, and ambulated 144 feet using rw with CGAx1.  Pt tolerated BLE ther ex well without complaint.  Continue with skilled PT to improve mobility and  safety prior to d/c.  Outcome Measures     Row Name 07/10/19 1112 07/08/19 0925 07/08/19 0859       How much help from another person do you currently need...    Turning from your back to your side while in flat bed without using bedrails?  3  -LM  3  -CD  --    Moving from lying on back to sitting on the side of a flat bed without bedrails?  3  -LM  3  -CD  --    Moving to and from a bed to a chair (including a wheelchair)?  3  -LM  3  -CD  --    Standing up from a chair using your arms (e.g., wheelchair, bedside chair)?  3  -LM  3  -CD  --    Climbing 3-5 steps with a railing?  3  -LM  2  -CD  --    To walk in hospital room?  3  -LM  3  -CD  --    AM-PAC 6 Clicks Score (PT)  18  -LM  17  -CD  --       How much help from another is currently needed...    Putting on and taking off regular lower body clothing?  --  --  2  -AC    Bathing (including washing, rinsing, and drying)  --  --  2  -AC    Toileting (which includes using toilet bed pan or urinal)  --  --  2  -AC    Putting on and taking off regular upper body clothing  --  --  3  -AC    Taking care of personal grooming (such as brushing teeth)  --  --  3  -AC    Eating meals  --  --  3  -AC    AM-PAC 6 Clicks Score (OT)  --  --  15  -AC       Modified [place] Scale    Pre-Stroke Modified [place] Scale  --  --  4 - Moderately severe disability.  Unable to walk without assistance, and unable to attend to own bodily needs without assistance.  -AC    Modified [place] Scale  3 - Moderate disability.  Requiring some help, but able to walk without assistance.  -LM  4 - Moderately severe disability.  Unable to walk without assistance, and unable to attend to own bodily needs without assistance.  -CD  --       Functional Assessment    Outcome Measure Options  AM-PAC 6 Clicks Basic Mobility (PT);Modified [place]  -LM  AM-PAC 6 Clicks Basic Mobility (PT);Modified [place]  -CD  AM-PAC 6 Clicks Daily Activity (OT);Modified [place]  -AC      User Key  (r) = Recorded By, (t) =  Taken By, (c) = Cosigned By    Initials Name Provider Type    AC Monik Child, OT Occupational Therapist    CD Marysol Wells, PT Physical Therapist    LM Maritza Ablert, LUIS Physical Therapist         Time Calculation:   PT Charges     Row Name 07/10/19 1112             Time Calculation    Start Time  1112  -LM      PT Received On  07/10/19  -LM      PT Goal Re-Cert Due Date  07/18/19  -LM         Timed Charges    97028 - PT Therapeutic Exercise Minutes  8  -LM      41105 - Gait Training Minutes   15  -LM        User Key  (r) = Recorded By, (t) = Taken By, (c) = Cosigned By    Initials Name Provider Type    LM Maritza Albert, PT Physical Therapist        Therapy Charges for Today     Code Description Service Date Service Provider Modifiers Qty    52886595849 HC GAIT TRAINING EA 15 MIN 7/10/2019 Maritza Albert, PT GP 1    08332178482 HC PT THER PROC EA 15 MIN 7/10/2019 Maritza Albert, PT GP 1          PT G-Codes  Outcome Measure Options: AM-PAC 6 Clicks Basic Mobility (PT), Modified [place]  AM-PAC 6 Clicks Score (PT): 18  AM-PAC 6 Clicks Score (OT): 15  Modified [place] Scale: 3 - Moderate disability.  Requiring some help, but able to walk without assistance.    Maritza Albert PT  7/10/2019

## 2019-07-10 NOTE — PROGRESS NOTES
[facility]  PROGRESS NOTE    Patient Name: Narcisa Cotto  : 1940  MRN: [id number]    Date of Admission: 2019  Length of Stay: 3  Primary Care Physician: Mariia Del Real DO    Subjective   Subjective     CC:  Slurred speech, weakness     HPI:  Patient doing well, symptoms improving. Still complaining of some residual RUE weakness. Anxious to hear about rehab bed in [facility]. No other issues.     Review of Systems      Otherwise ROS is negative except as mentioned in the HPI.    Objective   Objective     Vital Signs:   Temp:  [97.8 °F (36.6 °C)-98.7 °F (37.1 °C)] 98.3 °F (36.8 °C)  Heart Rate:  [71-87] 87  Resp:  [16] 16  BP: (122-151)/(46-78) 142/58  Total (NIH Stroke Scale): 3     Physical Exam:  GEN- no acute distress noted, resting in bed, awake  HEENT- atraumatic, normocephlic, eomi  NECK- supple, trachea midline, no masses  RESP: ctab, normal effort  CV: no murmurs, s1/s2, rrr  MSK: no edema noted, spontaneous movement of all extremities  NEURO: alert, oriented, right sided LE weakness noted-signigifcantly improved in comparison to yesterday, some very mild 4/5 RUE weakness   SKIN: no rashes  PSYCH: appropriate mood and affect       Results Reviewed:  I have personally reviewed current lab, radiology, and data and agree.    Results from last 7 days   Lab Units 19  1141   WBC 10*3/mm3 5.76   HEMOGLOBIN g/dL 12.0   HEMATOCRIT % 37.9   PLATELETS 10*3/mm3 258     Results from last 7 days   Lab Units 07/10/19  0541 19  0755 19  1246   SODIUM mmol/L 141 140 136   POTASSIUM mmol/L 4.0 4.2 4.4   CHLORIDE mmol/L 106 107 102   CO2 mmol/L 23.0 21.0* 17.0*   BUN mg/dL 11 15 29*   CREATININE mg/dL 0.95 1.07* 1.15*   GLUCOSE mg/dL 129* 134* 35*   CALCIUM mg/dL 9.6 9.6 10.2   ALT (SGPT) U/L  --   --  41*   AST (SGOT) U/L  --   --  39*   TROPONIN T ng/mL  --   --  <0.010     Estimated Creatinine Clearance: 50.3 mL/min (by C-G formula based on  SCr of 0.95 mg/dL).    Microbiology Results Abnormal     None          Imaging Results (last 24 hours)     Procedure Component Value Units Date/Time    MRI Brain Without Contrast [[id number]] Collected:  07/09/19 1832     Updated:  07/10/19 1033    Narrative:       EXAMINATION: MRI BRAIN WO CONTRAST-     INDICATION: Confusion/delirium, altered LOC, unexplained; I63.9-Cerebral  infarction, unspecified; R47.1-Dysarthria and anarthria; R53.1-Weakness;  E16.2-Hypoglycemia, unspecified; R13.10-Dysphagia, unspecified;  Z74.09-Other reduced mobility.      TECHNIQUE: Multiplanar MRI of the brain without intravenous contrast.     COMPARISON: CT cerebral perfusion 07/07/2019.     FINDINGS: No restriction on diffusion-weighted sequences. Midline  structures are symmetric without evidence for mass, mass effect or  midline shift. Moderate T2 and FLAIR increased signal findings within  the periventricular and deep white matter fairly confluent in appearance  suggesting advanced chronic small vessel ischemic disease. Pituitary and  sella within normal limits. Cervicomedullary junction widely patent.  Globes and orbits retain normal T2 signal characteristics.  Visualized  paranasal sinuses and mastoid air cells demonstrate mild to moderate  circumferential mucosal thickening of the left maxillary sinus without  significant air-fluid level along with mild prominence of the ethmoid  air cells mucosal otherwise grossly clear and well pneumatized. No  cerebellopontine angle mass lesion with grossly normal signal flow voids  of the distal internal carotid and vertebrobasilar arteries.       Impression:       1. No acute intracranial abnormality specifically no acute infarction.  Moderate increased signal findings in the periventricular and deep white  matter suggesting moderate chronic small vessel ischemic disease.  2. Mild to moderate circumferential mucosal thickening of the left  maxillary sinus without significant air-fluid level  may represent  sinusitis in the appropriate setting.     D:  07/09/2019  E:  07/10/2019             Results for orders placed during the hospital encounter of 07/07/19   Adult Transthoracic Echo Complete W/ Cont if Necessary Per Protocol (With Agitated Saline)    Narrative · Left ventricular systolic function is normal. Calculated EF = 60%  · Left ventricular diastolic dysfunction is noted (grade I) consistent   with impaired relaxation.  · The mitral valve is abnormal in structure. Moderate MAC is present.   There is mild anterior mitral leaflet thickening present. Trace mitral   valve regurgitation is present. No significant mitral valve stenosis is   present  · Mild tricuspid valve regurgitation is present.  · Estimated right ventricular systolic pressure from tricuspid   regurgitation is mildly elevated (35-45 mmHg).  · No evidence of a patent foramen ovale. Saline test results are negative.          I have reviewed the medications:  Scheduled Meds:    amLODIPine 5 mg Oral Daily   apixaban 5 mg Oral Q12H   atorvastatin 80 mg Oral Nightly   clopidogrel 75 mg Oral Daily   gabapentin 600 mg Oral Q12H   insulin lispro 0-7 Units Subcutaneous 4x Daily With Meals & Nightly   metoprolol tartrate 50 mg Oral BID   sodium chloride 3 mL Intravenous Q12H     Continuous Infusions:   PRN Meds:.•  acetaminophen  •  dextrose  •  dextrose  •  glucagon (human recombinant)  •  sodium chloride  •  sodium chloride      Assessment/Plan   Assessment / Plan     Active Hospital Problems    Diagnosis  POA   • **CVA (cerebral vascular accident) (CMS/[facility]) [I63.9]  Yes   • Hypoglycemia [E16.2]  Yes   • Acute deep vein thrombosis (DVT) of distal vein of right lower extremity (CMS/[facility]) [I82.4Z1]  Yes   • CKD (chronic kidney disease) stage 3, GFR 30-59 ml/min (CMS/[facility]) [N18.3]  Yes   • Essential hypertension [I10]  Yes   • Coronary artery disease involving native coronary artery of native heart with angina pectoris (CMS/[facility]) [I25.119]  Yes   •  Diabetic peripheral neuropathy (CMS/[facility]) [E11.42]  Yes   • Peripheral arterial disease (CMS/[facility]) [I73.9]  Yes   • Uncontrolled type 2 diabetes mellitus with complication, with long-term current use of insulin (CMS/[facility]) [E11.8, E11.65, Z79.4]  Not Applicable      Resolved Hospital Problems   No resolved problems to display.        Brief Hospital Course to date:  Narcisa Cotto is a 79 y.o. female with hx of CAD s/p recent stent to RCA on Plavix, well controlled T2DM A1C 6.1%, CKD stage 3, hypertension, recent admit and d/c for here 7/1 with RLE DVT on Eliquis.Patient presents to ED with right sided weakness and slurred speech, admitted with concerns for CVA.     Right sided weakness and slurred speech, improving- secondary to TIA vs hypoglycemia  ---symptoms improving, stroke workup complete- noted negative CT H, CT perfusion, MRI, CTA H&N does show extensive calcifications at carotid bifurcations but difficult to measure. Carotid duplex with 50-69% stenosis on right.   Some concern that symptoms may be due to hypoglycemia, as patient woke up with severe hypoglycemia, improving and symptoms resolving.  ---ECHO with moderate anterior mitral leaflet thickening, normal EF, no PFO     History of T2DM with symptomatic hypoglycemia this admission  ---A1C 6.3, well controlled  ---hold home insulin, diabetes educator has seen regarding low blood sugars  ---continue to monitor, off D10 now.    Recent diagnosis of RLE DVT  ---continue eliquis    CKD III  ---monitor, labs in AM     DVT Prophylaxis:  eliquis     Disposition: I expect the patient to be discharged to rehab at [facility], awaiting insurance approval    CODE STATUS:   Code Status and Medical Interventions:   Ordered at: 07/07/19 1340     Level Of Support Discussed With:    Patient     Code Status:    CPR     Medical Interventions (Level of Support Prior to Arrest):    Full         Electronically signed by Kati Garvin MD, 07/10/19, 11:05 AM.

## 2019-07-10 NOTE — PLAN OF CARE
Problem: Patient Care Overview  Goal: Plan of Care Review   07/09/19 2030   OTHER   Outcome Summary MRI completed this evening, NIH 3, Right sided weakness and decreased sensation. No complaints of pain. Hoping to go home with . VSS

## 2019-07-10 NOTE — PLAN OF CARE
Problem: Patient Care Overview  Goal: Plan of Care Review  Outcome: Ongoing (interventions implemented as appropriate)      Problem: Stroke (Ischemic) (Adult)  Goal: Signs and Symptoms of Listed Potential Problems Will be Absent, Minimized or Managed (Stroke)  Outcome: Ongoing (interventions implemented as appropriate)   07/09/19 2030   Goal/Outcome Evaluation   Problems Assessed (Stroke (Ischemic)) all   Problems Assessed (Stroke (Ischemic)) motor/sensory impairment       Problem: Skin Injury Risk (Adult)  Goal: Identify Related Risk Factors and Signs and Symptoms  Outcome: Ongoing (interventions implemented as appropriate)   07/09/19 2030   Skin Injury Risk (Adult)   Related Risk Factors (Skin Injury Risk) mobility impaired;hospitalization prolonged

## 2019-07-10 NOTE — TELEPHONE ENCOUNTER
Pt is in the hospital again but was concerned about labs for the tsh and calcium, advised we would take care of it at her hospital follow up or she has an appointment on 7/31

## 2019-07-10 NOTE — PROGRESS NOTES
Continued Stay Note  [facility]     Patient Name: Narcisa Cotto  MRN: [id number]  Today's Date: 7/10/2019    Admit Date: 7/7/2019    Discharge Plan     Row Name 07/10/19 1210       Plan    Plan Comments  CM spoke with Marie at [facility] and reiniere can offer pt a bed pending insurance prior auth. CM updated pt on rehab status and she remains agreeable. Pt reports she will have private transportation via one of her daughters at time of discharge. CM will continue to follow.         Discharge Codes    No documentation.       Expected Discharge Date and Time     Expected Discharge Date Expected Discharge Time    Jul 10, 2019             Renuka Inman

## 2019-07-10 NOTE — PROGRESS NOTES
"Neurology       Patient Care Team:  Mariia Del Real DO as PCP - General  Mariia Del Real DO as PCP - Family Medicine  Filemon Mayberry IV, MD as Cardiologist (Cardiology)  Jennifer Richards MD as Consulting Physician (Endocrinology)    Chief complaint right-sided weakness and slurred speech      Subjective .     History: Walking with PT, feels she is doing well.  No new weakness or numbness, no speech difficulty    ROS: No fever or chest pain    Objective     Vital Signs   Blood pressure 142/58, pulse 87, temperature 98.3 °F (36.8 °C), temperature source Oral, resp. rate 16, height 167.6 cm (66\"), weight 76.7 kg (169 lb), SpO2 94 %, not currently breastfeeding.    Physical Exam:              Neuro: Well-developed elderly white woman in no acute distress, alert, fluent and appropriate.  No dysarthria.  EOMI face symmetric TML  Gait wide-based but steady with walker    Results Review:              Brain MRI images reviewed, agree no signs of acute ischemia.  However fairly widespread deep white matter signal consistent with chronic ischemic gliotic change.  Carotids 0-49% on the left, 50 to 69% on the right    Assessment/Plan     1.  Right-sided weakness and slurred speech- given right hand weakness lasted days, normal MRI effectively rules out acute stroke.  Would attribute symptoms to hypoglycemia.  2.  Carotid stenosis-asymptomatic; white matter changes on MRI support chronic cerebral vascular disease.  On Plavix, high-dose statin and Eliquis.  Needs good risk factor control and should have follow-up carotid ultrasound in 6 to 12 months.  Awaiting [facility] rehab.  Will sign off, call if needed.    I discussed the patients findings and my recommendations with patient    Camryn Wesley MD  07/10/19  11:29 AM      "

## 2019-07-10 NOTE — PLAN OF CARE
Problem: Patient Care Overview  Goal: Plan of Care Review  Outcome: Ongoing (interventions implemented as appropriate)   07/10/19 1112   Coping/Psychosocial   Plan of Care Reviewed With patient   OTHER   Outcome Summary Pt progressing well towards skilled PT goals. Pt improved with all assist needed today as compared to eval. Pt transferred supine-->sit with CGA, stood with CGA, and ambulated 144 feet using rw with CGAx1. Pt tolerated BLE ther ex well without complaint. Continue with skilled PT to improve mobility and safety prior to d/c.       Problem: Stroke (Ischemic) (Adult)  Goal: Signs and Symptoms of Listed Potential Problems Will be Absent, Minimized or Managed (Stroke)  Outcome: Ongoing (interventions implemented as appropriate)   07/10/19 1112   Goal/Outcome Evaluation   Problems Assessed (Stroke (Ischemic)) cognitive impairment;communication impairment;motor/sensory impairment;muscle tone abnormal   Problems Assessed (Stroke (Ischemic)) motor/sensory impairment

## 2019-07-10 NOTE — PLAN OF CARE
Problem: Patient Care Overview  Goal: Plan of Care Review  Outcome: Ongoing (interventions implemented as appropriate)   07/10/19 2099   Coping/Psychosocial   Plan of Care Reviewed With patient   OTHER   Outcome Summary Patient alert and oriented and has denied pain throughout this shift. Needs assistance with ambulation and transfers. Skin CDI. COntinuing to improve.

## 2019-07-11 LAB
GLUCOSE BLDC GLUCOMTR-MCNC: 169 MG/DL (ref 70–130)
GLUCOSE BLDC GLUCOMTR-MCNC: 178 MG/DL (ref 70–130)
GLUCOSE BLDC GLUCOMTR-MCNC: 212 MG/DL (ref 70–130)
GLUCOSE BLDC GLUCOMTR-MCNC: 215 MG/DL (ref 70–130)

## 2019-07-11 PROCEDURE — 82962 GLUCOSE BLOOD TEST: CPT

## 2019-07-11 PROCEDURE — 99233 SBSQ HOSP IP/OBS HIGH 50: CPT | Performed by: HOSPITALIST

## 2019-07-11 PROCEDURE — 97535 SELF CARE MNGMENT TRAINING: CPT

## 2019-07-11 PROCEDURE — 97116 GAIT TRAINING THERAPY: CPT

## 2019-07-11 RX ADMIN — INSULIN LISPRO 3 UNITS: 100 INJECTION, SOLUTION INTRAVENOUS; SUBCUTANEOUS at 11:47

## 2019-07-11 RX ADMIN — INSULIN LISPRO 3 UNITS: 100 INJECTION, SOLUTION INTRAVENOUS; SUBCUTANEOUS at 20:22

## 2019-07-11 RX ADMIN — SODIUM CHLORIDE, PRESERVATIVE FREE 3 ML: 5 INJECTION INTRAVENOUS at 08:03

## 2019-07-11 RX ADMIN — INSULIN LISPRO 2 UNITS: 100 INJECTION, SOLUTION INTRAVENOUS; SUBCUTANEOUS at 08:02

## 2019-07-11 RX ADMIN — CLOPIDOGREL BISULFATE 75 MG: 75 TABLET ORAL at 08:02

## 2019-07-11 RX ADMIN — GABAPENTIN 600 MG: 300 CAPSULE ORAL at 20:22

## 2019-07-11 RX ADMIN — APIXABAN 5 MG: 5 TABLET, FILM COATED ORAL at 20:22

## 2019-07-11 RX ADMIN — INSULIN LISPRO 2 UNITS: 100 INJECTION, SOLUTION INTRAVENOUS; SUBCUTANEOUS at 16:45

## 2019-07-11 RX ADMIN — APIXABAN 5 MG: 5 TABLET, FILM COATED ORAL at 08:02

## 2019-07-11 RX ADMIN — ACETAMINOPHEN 650 MG: 325 TABLET, FILM COATED ORAL at 21:46

## 2019-07-11 RX ADMIN — PANTOPRAZOLE SODIUM 40 MG: 40 TABLET, DELAYED RELEASE ORAL at 05:12

## 2019-07-11 RX ADMIN — METOPROLOL TARTRATE 50 MG: 50 TABLET ORAL at 20:22

## 2019-07-11 RX ADMIN — AMLODIPINE BESYLATE 5 MG: 5 TABLET ORAL at 08:03

## 2019-07-11 RX ADMIN — GABAPENTIN 600 MG: 300 CAPSULE ORAL at 08:02

## 2019-07-11 RX ADMIN — ATORVASTATIN CALCIUM 80 MG: 40 TABLET, FILM COATED ORAL at 20:22

## 2019-07-11 RX ADMIN — METOPROLOL TARTRATE 50 MG: 50 TABLET ORAL at 08:03

## 2019-07-11 NOTE — THERAPY TREATMENT NOTE
Acute Care - Physical Therapy Treatment Note  [facility]     Patient Name: Narcisa Cotto  : 1940  MRN: [id number]  Today's Date: 2019  Onset of Illness/Injury or Date of Surgery: 19  Date of Referral to PT: 19  Referring Physician: MD ZAKI    Admit Date: 2019    Visit Dx:    ICD-10-CM ICD-9-CM   1. Cerebrovascular accident (CVA), unspecified mechanism (CMS/[facility]) I63.9 434.91   2. Dysarthria R47.1 784.51   3. Weakness R53.1 780.79   4. Hypoglycemia E16.2 251.2   5. Dysphagia, unspecified type R13.10 787.20   6. Impaired mobility and ADLs Z74.09 799.89     Patient Active Problem List   Diagnosis   • Hyperlipidemia LDL goal <70   • Diabetic gastroparesis (CMS/[facility])   • Peripheral arterial disease (CMS/[facility])   • Uncontrolled type 2 diabetes mellitus with complication, with long-term current use of insulin (CMS/[facility])   • Sleep apnea   • Diabetic peripheral neuropathy (CMS/[facility])   • Coronary artery disease involving native coronary artery of native heart with angina pectoris (CMS/[facility])   • GERD (gastroesophageal reflux disease)   • Essential hypertension   • Iron deficiency anemia   • Complete heart block (CMS/[facility])   • CKD (chronic kidney disease) stage 3, GFR 30-59 ml/min (CMS/[facility])   • Shortness of breath   • Acute deep vein thrombosis (DVT) of distal vein of right lower extremity (CMS/[facility])   • CVA (cerebral vascular accident) (CMS/[facility])   • Hypoglycemia       Therapy Treatment    Rehabilitation Treatment Summary     Row Name 19 1102             Treatment Time/Intention    Discipline  physical therapist  -CD      Document Type  therapy note (daily note)  -CD      Subjective Information  no complaints HOPING TO GO TO REHAB TODAY.   -CD      Mode of Treatment  physical therapy  -CD      Patient/Family Observations  PT UIC UPON ARRIVAL ON .   -CD      Therapy Frequency (PT Clinical Impression)  daily  -CD      Recorded by [CD] Marysol Wells, PT 19 1126      Row Name 19 1108              Vital Signs    Pre Systolic BP Rehab  126  -CD      Pre Treatment Diastolic BP  62  -CD      Post Systolic BP Rehab  110  -CD      Post Treatment Diastolic BP  53  -CD      Pretreatment Heart Rate (beats/min)  82  -CD      Posttreatment Heart Rate (beats/min)  72  -CD      Pre SpO2 (%)  97  -CD      O2 Delivery Pre Treatment  room air  -CD      Post SpO2 (%)  97  -CD      O2 Delivery Post Treatment  room air  -CD      Pre Patient Position  Sitting  -CD      Intra Patient Position  Standing  -CD      Post Patient Position  Sitting  -CD      Recorded by [CD] Marysol Wells, PT 07/11/19 1126      Row Name 07/11/19 1102             Cognitive Assessment/Intervention- PT/OT    Orientation Status (Cognition)  oriented x 4  -CD      Safety Deficit (Cognitive)  safety precautions awareness  -CD      Personal Safety Interventions  fall prevention program maintained;gait belt;muscle strengthening facilitated;nonskid shoes/slippers when out of bed;supervised activity  -CD      Recorded by [CD] Marysol Wells, PT 07/11/19 1126      Row Name 07/11/19 1102             Bed Mobility Assessment/Treatment    Comment (Bed Mobility)  PT [facility].   -CD      Recorded by [CD] Marysol Wells, PT 07/11/19 1126      Row Name 07/11/19 1102             Transfer Assessment/Treatment    Transfer Assessment/Treatment  sit-stand transfer;stand-sit transfer  -CD      Comment (Transfers)  CUES FOR HAND PLACEMENT.   -CD      Recorded by [CD] Marysol Wells, PT 07/11/19 1126      Row Name 07/11/19 1102             Sit-Stand Transfer    Sit-Stand Elizabeth (Transfers)  supervision  -CD      Assistive Device (Sit-Stand Transfers)  walker, front-wheeled  -CD      Recorded by [CD] Marysol Wells, PT 07/11/19 1126      Row Name 07/11/19 1102             Stand-Sit Transfer    Stand-Sit Elizabeth (Transfers)  supervision  -CD      Assistive Device (Stand-Sit Transfers)  walker, front-wheeled  -CD      Recorded by [CD] Marysol Wells, PT 07/11/19  1126      Row Name 07/11/19 1102             Gait/Stairs Assessment/Training    45502 - Gait Training Minutes   10  -CD      Gait/Stairs Assessment/Training  gait/ambulation independence;distance ambulated;gait/ambulation assistive device  -CD      [place] Level (Gait)  contact guard  -CD      Assistive Device (Gait)  walker, front-wheeled  -CD      Distance in Feet (Gait)  200  -CD      Deviations/Abnormal Patterns (Gait)  stride length decreased  -CD      Bilateral Gait Deviations  forward flexed posture  -CD      Comment (Gait/Stairs)  CUES FOR UPRIGHT POSTURE AND WALKER PLACEMENT. DISTANCE LIMITED BY FATIGUE AND B SHOULDER DISCOMFORT.   -CD      Recorded by [CD] Marysol Wells, PT 07/11/19 1126      Row Name 07/11/19 1102             Therapeutic Exercise    62554 - PT Therapeutic Exercise Minutes  5  -CD      Recorded by [CD] Marysol Wells, PT 07/11/19 1126      Row Name 07/11/19 1102             Therapeutic Exercise    Lower Extremity (Therapeutic Exercise)  LAQ (long arc quad), bilateral;marching while seated AP'S   -CD      Sets/Reps (Therapeutic Exercise)  1 SET OF 10 REPS.   -CD      Recorded by [CD] Marysol Wells, PT 07/11/19 1126      Row Name 07/11/19 1102             Gross Motor Coordination    Gross Motor Impairments  balance  -CD      Recorded by [CD] Marysol Wells, PT 07/11/19 1126      Row Name 07/11/19 1102             Pain Scale: Numbers Pre/Post-Treatment    Pain Scale: Numbers, Pretreatment  0/10 - no pain  -CD      Pain Scale: Numbers, Post-Treatment  2/10  -CD      Pain Location - Side  Bilateral  -CD      Pain Location  shoulder  -CD      Pain Intervention(s)  Repositioned;Rest  -CD      Recorded by [CD] Marysol Wells, PT 07/11/19 1126      Row Name 07/11/19 1102             Plan of Care Review    Plan of Care Reviewed With  patient  -CD      Recorded by [CD] Marysol Wells, PT 07/11/19 1126      Row Name 07/11/19 1102             Outcome Summary/Treatment Plan (PT)    Daily Summary  of Progress (PT)  progress toward functional goals is good  -CD      Anticipated Discharge Disposition (PT)  inpatient rehabilitation facility  -CD      Recorded by [CD] Marysol Wells, PT 07/11/19 1126        User Key  (r) = Recorded By, (t) = Taken By, (c) = Cosigned By    Initials Name Effective Dates Discipline    CD Marysol Wells, PT 06/19/15 -  PT                   Physical Therapy Education     Title: PT OT SLP Therapies (Done)     Topic: Physical Therapy (Done)     Point: Mobility training (Done)     Learning Progress Summary           Patient Acceptance, E, VU by CD at 7/11/2019 11:26 AM    Comment:  SEE FLOWSHEET    Acceptance, E, VU,NR by CD at 7/8/2019  2:45 PM    Comment:  BENEFITS OF OOB ACTIVITY, SAFETY WITH MOBILITY, PROGRESSION OF POC, D/C PLANNING,                   Point: Home exercise program (Done)     Learning Progress Summary           Patient Acceptance, E, VU by CD at 7/11/2019 11:26 AM    Comment:  SEE FLOWSHEET    Acceptance, E, VU,NR by CD at 7/8/2019  2:45 PM    Comment:  BENEFITS OF OOB ACTIVITY, SAFETY WITH MOBILITY, PROGRESSION OF POC, D/C PLANNING,                   Point: Body mechanics (Done)     Learning Progress Summary           Patient Acceptance, E, VU by CD at 7/11/2019 11:26 AM    Comment:  SEE FLOWSHEET    Acceptance, E, VU,NR by CD at 7/8/2019  2:45 PM    Comment:  BENEFITS OF OOB ACTIVITY, SAFETY WITH MOBILITY, PROGRESSION OF POC, D/C PLANNING,                   Point: Precautions (Done)     Learning Progress Summary           Patient Acceptance, E, VU by CD at 7/11/2019 11:26 AM    Comment:  SEE FLOWSHEET    Acceptance, E, VU,NR by CD at 7/8/2019  2:45 PM    Comment:  BENEFITS OF OOB ACTIVITY, SAFETY WITH MOBILITY, PROGRESSION OF POC, D/C PLANNING,                               User Key     Initials Effective Dates Name Provider Type Discipline    CD 06/19/15 -  Marysol Wells, PT Physical Therapist PT                PT Recommendation and Plan  Anticipated Discharge  Disposition (PT): inpatient rehabilitation facility  Planned Therapy Interventions (PT Eval): balance training, bed mobility training, gait training, home exercise program, patient/family education, strengthening, transfer training  Therapy Frequency (PT Clinical Impression): daily  Outcome Summary/Treatment Plan (PT)  Daily Summary of Progress (PT): progress toward functional goals is good  Anticipated Discharge Disposition (PT): inpatient rehabilitation facility  Plan of Care Reviewed With: patient  Progress: improving  Outcome Summary: STEADY PROGRESS WITH MOBILITY. PAIN WELL CONTROLLED. INCREASED DISTANCE AMBULATED  FEET WITH R WALKER AND CGA. CUES FOR SAFETY WITH TRANSFERS. RECOMMEND IRF AT D/C.   Outcome Measures     Row Name 07/11/19 1102 07/10/19 1112          How much help from another person do you currently need...    Turning from your back to your side while in flat bed without using bedrails?  3  -CD  3  -LM     Moving from lying on back to sitting on the side of a flat bed without bedrails?  3  -CD  3  -LM     Moving to and from a bed to a chair (including a wheelchair)?  3  -CD  3  -LM     Standing up from a chair using your arms (e.g., wheelchair, bedside chair)?  4  -CD  3  -LM     Climbing 3-5 steps with a railing?  3  -CD  3  -LM     To walk in hospital room?  3  -CD  3  -LM     AM-PAC 6 Clicks Score (PT)  19  -CD  18  -LM        Modified [place] Scale    Modified [place] Scale  3 - Moderate disability.  Requiring some help, but able to walk without assistance.  -CD  3 - Moderate disability.  Requiring some help, but able to walk without assistance.  -LM        Functional Assessment    Outcome Measure Options  AM-PAC 6 Clicks Basic Mobility (PT);Modified [place]  -CD  AM-PAC 6 Clicks Basic Mobility (PT);Modified Santa  -LM       User Key  (r) = Recorded By, (t) = Taken By, (c) = Cosigned By    Initials Name Provider Type    CD Marysol Wells, PT Physical Therapist    Maritza Babin, LUIS  Physical Therapist         Time Calculation:   PT Charges     Row Name 07/11/19 1102             Time Calculation    Start Time  1102  -CD      PT Received On  07/11/19  -CD      PT Goal Re-Cert Due Date  07/18/19  -CD         Time Calculation- PT    Total Timed Code Minutes- PT  15 minute(s)  -CD         Timed Charges    93881 - PT Therapeutic Exercise Minutes  5  -CD      64460 - Gait Training Minutes   10  -CD        User Key  (r) = Recorded By, (t) = Taken By, (c) = Cosigned By    Initials Name Provider Type    CD Marysol Wells, PT Physical Therapist        Therapy Charges for Today     Code Description Service Date Service Provider Modifiers Qty    55652490026 HC GAIT TRAINING EA 15 MIN 7/11/2019 Marysol Wells, PT GP 1          PT G-Codes  Outcome Measure Options: AM-PAC 6 Clicks Basic Mobility (PT), Modified Santa  AM-PAC 6 Clicks Score (PT): 19  AM-PAC 6 Clicks Score (OT): 15  Modified [place] Scale: 3 - Moderate disability.  Requiring some help, but able to walk without assistance.    Marysol Wells, PT  7/11/2019

## 2019-07-11 NOTE — PROGRESS NOTES
Adult Nutrition  Assessment/PES    Patient Name:  Narcisa Cotto  YOB: 1940  MRN: [id number]  Admit Date:  7/7/2019    Assessment Date:  7/11/2019      Reason for Assessment     Row Name 07/11/19 1212          Reason for Assessment    Reason For Assessment  -- LOS; 20 mins     Diagnosis  -- acute stroke has been ruled out per neuro notes 7/10. carotid stenosis,. H/o DVT, CKD, CAD, DM, HTN, PAD            Anthropometrics     Row Name 07/11/19 1213          Anthropometrics    Weight  -- ht=66in, gp=171ru per Epic data (no wt method documented); BMI=27.2         Labs/Tests/Procedures/Meds     Row Name 07/11/19 1213          Labs/Procedures/Meds    Lab Results Reviewed  reviewed             Nutrition Prescription Ordered     Row Name 07/11/19 1214          Nutrition Prescription PO    Common Modifiers  Consistent Carbohydrate         Evaluation of Received Nutrient/Fluid Intake     Row Name 07/11/19 1214          PO Evaluation    Number of Meals  4 at past 4 meals recorded     % PO Intake  94               Problem/Interventions:  Problem 1     Row Name 07/11/19 1215          Nutrition Diagnoses Problem 1    Problem 1  Nutrition Appropriate for Condition at this Time     Etiology (related to)  MNT for Treatment/Condition     Signs/Symptoms (evidenced by)  PO Intake     Percent (%) intake recorded  94 %     Over number of meals  4                 Intervention Goal     Row Name 07/11/19 1214          Intervention Goal    PO  Maintain intake         Nutrition Intervention     Row Name 07/11/19 1214          Nutrition Intervention    RD/Tech Action  Follow Tx progress           Education/Evaluation     Row Name 07/11/19 1215          Monitor/Evaluation    Monitor  Per protocol           Electronically signed by:  Carissa Hough, MS,RD,LD  07/11/19 12:15 PM

## 2019-07-11 NOTE — PLAN OF CARE
Problem: Patient Care Overview  Goal: Plan of Care Review  Outcome: Ongoing (interventions implemented as appropriate)   07/11/19 1126   Coping/Psychosocial   Plan of Care Reviewed With patient   Plan of Care Review   Progress improving   OTHER   Outcome Summary STEADY PROGRESS WITH MOBILITY. PAIN WELL CONTROLLED. INCREASED DISTANCE AMBULATED  FEET WITH R WALKER AND CGA. CUES FOR SAFETY WITH TRANSFERS. RECOMMEND IRF AT D/C.        Problem: Stroke (Ischemic) (Adult)  Goal: Signs and Symptoms of Listed Potential Problems Will be Absent, Minimized or Managed (Stroke)  Outcome: Ongoing (interventions implemented as appropriate)   07/11/19 1126   Goal/Outcome Evaluation   Problems Assessed (Stroke (Ischemic)) cognitive impairment;communication impairment;motor/sensory impairment

## 2019-07-11 NOTE — PLAN OF CARE
Problem: Patient Care Overview  Goal: Plan of Care Review  Outcome: Ongoing (interventions implemented as appropriate)   07/11/19 1030   Coping/Psychosocial   Plan of Care Reviewed With patient   Plan of Care Review   Progress improving   OTHER   Outcome Summary Pt with improvements in balance this session. Pt requires Shira for bed mobility and completes sit to stand with SBA. Pt ambulates with CGA and RW. Pt reports desire to return home this date with assist from her daugther. If pt returns home with daughter will need HH OT.

## 2019-07-11 NOTE — PLAN OF CARE
Problem: Patient Care Overview  Goal: Plan of Care Review   07/11/19 1621   OTHER   Outcome Summary Pt alert and oriented, cooperative with care. Pt has been up to chair, [facility] 1. Rt sided weakness, blood glucose elevated, SSI scheduled. Denies pain. Pt assist x1, waiting insurance approval for rehab. VSS

## 2019-07-11 NOTE — THERAPY TREATMENT NOTE
Acute Care - Occupational Therapy Treatment Note  [facility]     Patient Name: Narcisa Cotto  : 1940  MRN: [id number]  Today's Date: 2019  Onset of Illness/Injury or Date of Surgery: 19  Date of Referral to OT: 19  Referring Physician: MD ZAKI    Admit Date: 2019       ICD-10-CM ICD-9-CM   1. Cerebrovascular accident (CVA), unspecified mechanism (CMS/[facility]) I63.9 434.91   2. Dysarthria R47.1 784.51   3. Weakness R53.1 780.79   4. Hypoglycemia E16.2 251.2   5. Dysphagia, unspecified type R13.10 787.20   6. Impaired mobility and ADLs Z74.09 799.89     Patient Active Problem List   Diagnosis   • Hyperlipidemia LDL goal <70   • Diabetic gastroparesis (CMS/[facility])   • Peripheral arterial disease (CMS/[facility])   • Uncontrolled type 2 diabetes mellitus with complication, with long-term current use of insulin (CMS/[facility])   • Sleep apnea   • Diabetic peripheral neuropathy (CMS/[facility])   • Coronary artery disease involving native coronary artery of native heart with angina pectoris (CMS/[facility])   • GERD (gastroesophageal reflux disease)   • Essential hypertension   • Iron deficiency anemia   • Complete heart block (CMS/[facility])   • CKD (chronic kidney disease) stage 3, GFR 30-59 ml/min (CMS/[facility])   • Shortness of breath   • Acute deep vein thrombosis (DVT) of distal vein of right lower extremity (CMS/[facility])   • CVA (cerebral vascular accident) (CMS/[facility])   • Hypoglycemia     Past Medical History:   Diagnosis Date   • CAD (coronary artery disease)    • Choledochal cyst 2019   • Chronic diastolic congestive heart failure (CMS/[facility])    • Chronic low back pain    • Chronic venous insufficiency    • CKD (chronic kidney disease) stage 3, GFR 30-59 ml/min (CMS/[facility])    • Diabetic gastroparesis (CMS/[facility])    • Diverticulosis    • DJD (degenerative joint disease), lumbar    • DM2 (diabetes mellitus, type 2) (CMS/[facility])    • GERD (gastroesophageal reflux disease)    • Hiatal hernia    • History of hyperparathyroidism    •  HLD (hyperlipidemia)    • HTN (hypertension)    • Lumbar stenosis 11/15/2018   • DELFINA (obstructive sleep apnea)    • Osteoarthritis 7/29/2016   • PAD (peripheral artery disease) (CMS/[facility])    • Postherpetic neuralgia    • Vitamin B12 deficiency 7/29/2016     Past Surgical History:   Procedure Laterality Date   • CARDIAC CATHETERIZATION N/A 5/23/2019    Procedure: LEFT HEART CATH;  Surgeon: Filemon Mayberry IV, MD;  Location:  [facility];  Service: Cardiovascular   • CARDIAC CATHETERIZATION N/A 5/29/2019    Procedure: Atherectomy-coronary;  Surgeon: Filemon Mayberry IV, MD;  Location: [facility];  Service: Cardiovascular   • CARDIAC CATHETERIZATION N/A 5/29/2019    Procedure: Stent PATRICIA coronary;  Surgeon: Filemon Mayberry IV, MD;  Location: [facility];  Service: Cardiovascular   • CARDIAC ELECTROPHYSIOLOGY PROCEDURE N/A 5/28/2019    Procedure: TEMPORARY PACEMAKER;  Surgeon: Jeffrey Reyes MD;  Location:  [facility];  Service: Cardiovascular   • CATARACT EXTRACTION Bilateral    • CHOLECYSTECTOMY     • COLONOSCOPY     • CYSTOSCOPY W/ URETERAL STENT PLACEMENT  2014   • ENDOSCOPY     • HYSTERECTOMY     • LUMBAR LAMINECTOMY DISCECTOMY DECOMPRESSION N/A 11/15/2018    Procedure: LUMBAR LAMINECTOMY L2-3 L 3-4,;  Surgeon: Zachary Key MD;  Location:  [facility] OR;  Service: Orthopedic Spine   • PARATHYROIDECTOMY     • SPINAL CORD STIMULATOR IMPLANT     • TONSILLECTOMY         Therapy Treatment    Rehabilitation Treatment Summary     Row Name 07/11/19 1102 07/11/19 1030          Treatment Time/Intention    Discipline  physical therapist  -CD  --     Document Type  therapy note (daily note)  -CD  --     Subjective Information  no complaints HOPING TO GO TO REHAB TODAY.   -CD  --     Mode of Treatment  physical therapy  -CD  --     Patient/Family Observations  PT UIC UPON ARRIVAL ON RA.   -CD  Pt receieved in bed with IV  heplocked and no visitors at bedside.  -HK     Care Plan Review  --  care plan/treatment goals reviewed;risks/benefits reviewed;patient/other agree to care plan  -HK     Therapy Frequency (PT Clinical Impression)  daily  -CD  --     Existing Precautions/Restrictions  --  fall  -HK     Recorded by [CD] Marysol Wells, PT 07/11/19 1126 [HK] Edelmira Cifuentes, OT 07/11/19 1406     Row Name 07/11/19 1102 07/11/19 1030          Vital Signs    Pre Systolic BP Rehab  126  -CD  112  -HK     Pre Treatment Diastolic BP  62  -CD  75  -HK     Post Systolic BP Rehab  110  -CD  126  -HK     Post Treatment Diastolic BP  53  -CD  62  -HK     Pretreatment Heart Rate (beats/min)  82  -CD  --     Posttreatment Heart Rate (beats/min)  72  -CD  --     Pre SpO2 (%)  97  -CD  --     O2 Delivery Pre Treatment  room air  -CD  --     Post SpO2 (%)  97  -CD  --     O2 Delivery Post Treatment  room air  -CD  --     Pre Patient Position  Sitting  -CD  Supine  -HK     Intra Patient Position  Standing  -CD  Standing  -HK     Post Patient Position  Sitting  -CD  Sitting  -HK     Recorded by [CD] Marysol Wells, PT 07/11/19 1126 [HK] Edelmira Cifuentes, OT 07/11/19 1406     Row Name 07/11/19 1030             Cognitive Assessment/Intervention    Additional Documentation  Cognitive Assessment/Intervention (Group)  -HK      Recorded by [HK] Edelmira Cifuentes, OT 07/11/19 1406      Row Name 07/11/19 1102 07/11/19 1030          Cognitive Assessment/Intervention- PT/OT    Affect/Mental Status (Cognitive)  --  WFL  -HK     Orientation Status (Cognition)  oriented x 4  -CD  oriented x 4  -HK     Follows Commands (Cognition)  --  follows one step commands;over 90% accuracy  -HK     Cognitive Function (Cognitive)  --  safety deficit  -HK     Safety Deficit (Cognitive)  safety precautions awareness  -CD  mild deficit;safety precautions awareness;safety precautions follow-through/compliance;insight into deficits/self awareness;awareness of need for assistance  -HK      Personal Safety Interventions  fall prevention program maintained;gait belt;muscle strengthening facilitated;nonskid shoes/slippers when out of bed;supervised activity  -CD  fall prevention program maintained;gait belt;nonskid shoes/slippers when out of bed  -HK     Recorded by [CD] Marysol Wells, PT 07/11/19 1126 [HK] Edelmira Cifuentes, OT 07/11/19 1406     Row Name 07/11/19 1030             Safety Issues, Functional Mobility    Safety Issues Affecting Function (Mobility)  safety precautions follow-through/compliance;safety precaution awareness  -HK      Impairments Affecting Function (Mobility)  strength;endurance/activity tolerance;pain  -HK      Recorded by [HK] Edelmira Cifuentes, OT 07/11/19 1406      Row Name 07/11/19 1102 07/11/19 1030          Bed Mobility Assessment/Treatment    Bed Mobility Assessment/Treatment  --  supine-sit;scooting/bridging  -HK     Scooting/Bridging [place] (Bed Mobility)  --  supervision  -HK     Supine-Sit [place] (Bed Mobility)  --  minimum assist (75% patient effort);verbal cues  -HK     Bed Mobility, Safety Issues  --  decreased use of arms for pushing/pulling;decreased use of legs for bridging/pushing  -HK     Assistive Device (Bed Mobility)  --  head of bed elevated  -HK     Comment (Bed Mobility)  PT UI.   -CD  Pt required Shira to sit trunk upright.   -HK     Recorded by [CD] Marysol Wells, PT 07/11/19 1126 [HK] Edelmira Cifuentes, OT 07/11/19 1406     Row Name 07/11/19 1030             Functional Mobility    Functional Mobility- Ind. Level  contact guard assist;verbal cues required  -HK      Functional Mobility- Device  rolling walker  -HK      Functional Mobility-Distance (Feet)  35  -HK      Functional Mobility- Comment  Pt ambulated from  bed to bathroom and back to chair.   -HK      Recorded by [HK] Edelmira Cifuentes, OT 07/11/19 1406      Row Name 07/11/19 1102 07/11/19 1030          Transfer Assessment/Treatment    Transfer Assessment/Treatment  sit-stand transfer;stand-sit  transfer  -CD  stand-sit transfer;sit-stand transfer  -HK     Comment (Transfers)  CUES FOR HAND PLACEMENT.   -CD  Verbal cues for safe hand placement and sequencing.   -HK     Recorded by [CD] Marysol Wells, PT 07/11/19 1126 [HK] Edelmira Cifuentes, OT 07/11/19 1406     Row Name 07/11/19 1102 07/11/19 1030          Sit-Stand Transfer    Sit-Stand [place] (Transfers)  supervision  -CD  stand by assist  -HK     Assistive Device (Sit-Stand Transfers)  walker, front-wheeled  -CD  walker, front-wheeled  -HK     Recorded by [CD] Marysol Wells, PT 07/11/19 1126 [HK] Edelmira Cifuentes, OT 07/11/19 1406     Row Name 07/11/19 1102 07/11/19 1030          Stand-Sit Transfer    Stand-Sit [place] (Transfers)  supervision  -CD  stand by assist  -HK     Assistive Device (Stand-Sit Transfers)  walker, front-wheeled  -CD  walker, front-wheeled  -HK     Recorded by [CD] Marysol Wells, PT 07/11/19 1126 [HK] Edelmira Cifuentes, OT 07/11/19 1406     Row Name 07/11/19 1102             Gait/Stairs Assessment/Training    19765 - Gait Training Minutes   10  -CD      Gait/Stairs Assessment/Training  gait/ambulation independence;distance ambulated;gait/ambulation assistive device  -CD      [place] Level (Gait)  contact guard  -CD      Assistive Device (Gait)  walker, front-wheeled  -CD      Distance in Feet (Gait)  200  -CD      Deviations/Abnormal Patterns (Gait)  stride length decreased  -CD      Bilateral Gait Deviations  forward flexed posture  -CD      Comment (Gait/Stairs)  CUES FOR UPRIGHT POSTURE AND WALKER PLACEMENT. DISTANCE LIMITED BY FATIGUE AND B SHOULDER DISCOMFORT.   -CD      Recorded by [CD] Marysol Wells, PT 07/11/19 1126      Row Name 07/11/19 1030             ADL Assessment/Intervention    BADL Assessment/Intervention  grooming  -HK      Recorded by [HK] Edelmira Cifuentes, OT 07/11/19 1406      Row Name 07/11/19 1030             Grooming Assessment/Training    [place] Level (Grooming)  hair care, combing/brushing;wash  face, hands;oral care regimen;set up  -HK      Grooming Position  supported standing;supported sitting  -HK      Comment (Grooming)  Pt stood at sink with RW to wash face and complete oral care. Pt became fatigued and complained of back pain. Returned to sitting. Pt combed hair supported in chair.   -HK      Recorded by [HK] Edelmira Cifuentes, OT 07/11/19 1406      Row Name 07/11/19 1030             BADL Safety/Performance    Impairments, BADL Safety/Performance  pain;strength;endurance/activity tolerance  -HK      Skilled BADL Treatment/Intervention  BADL process/adaptation training  -HK      Recorded by [HK] Edelmira Cifuentes, OT 07/11/19 1406      Row Name 07/11/19 1030             Motor Skills Assessment/Interventions    Additional Documentation  Balance (Group)  -HK      Recorded by [HK] Edelmira Cifuentes, OT 07/11/19 1406      Row Name 07/11/19 1102             Therapeutic Exercise    80175 - PT Therapeutic Exercise Minutes  5  -CD      Recorded by [CD] Marysol Wells, PT 07/11/19 1126      Row Name 07/11/19 1102             Therapeutic Exercise    Lower Extremity (Therapeutic Exercise)  LAQ (long arc quad), bilateral;marching while seated AP'S   -CD      Sets/Reps (Therapeutic Exercise)  1 SET OF 10 REPS.   -CD      Recorded by [CD] Marysol Wells, PT 07/11/19 1126      Row Name 07/11/19 1102             Gross Motor Coordination    Gross Motor Impairments  balance  -CD      Recorded by [CD] Marysol Wells, PT 07/11/19 1126      Row Name 07/11/19 1030             Balance    Balance  static sitting balance;static standing balance;dynamic standing balance  -HK      Recorded by [HK] Edelmira Cifuentes, OT 07/11/19 1406      Row Name 07/11/19 1030             Static Sitting Balance    Level of [place] (Unsupported Sitting, Static Balance)  independent  -HK      Sitting Position (Unsupported Sitting, Static Balance)  sitting on edge of bed  -HK      Time Able to Maintain Position (Unsupported Sitting, Static Balance)  1 to 2  minutes  -HK      Recorded by [HK] Edelmira Cifuentes, OT 07/11/19 1406      Row Name 07/11/19 1030             Static Standing Balance    Level of [place] (Supported Standing, Static Balance)  supervision  -HK      Time Able to Maintain Position (Supported Standing, Static Balance)  1 to 2 minutes  -HK      Assistive Device Utilized (Supported Standing, Static Balance)  walker, rolling  -HK      Recorded by [HK] Edelmira Cifuentes, OT 07/11/19 1406      Row Name 07/11/19 1030             Dynamic Standing Balance    Level of [place], Reaches Outside Midline (Standing, Dynamic Balance)  supervision  -HK      Time Able to Maintain Position, Reaches Outside Midline (Standing, Dynamic Balance)  4 to 5 minutes  -HK      Assistive Device Utilized (Supported Standing, Dynamic Balance)  walker, rolling  -HK      Comment, Reaches Outside Midline (Standing, Dynamic Balance)  at sink completing grooming.   -HK      Recorded by [HK] Edelmira Cifuentes, OT 07/11/19 1406      Row Name 07/11/19 1030             Positioning and Restraints    Pre-Treatment Position  in bed  -HK      Post Treatment Position  chair  -HK      In Chair  notified nsg;reclined;call light within reach;encouraged to call for assist;exit alarm on  -HK      Recorded by [HK] Edelmira Cifuentes, OT 07/11/19 1406      Row Name 07/11/19 1030             Pain Assessment    Additional Documentation  Pain Scale: Numbers Pre/Post-Treatment (Group)  -HK      Recorded by [HK] Edelmira Cifuentes, OT 07/11/19 1406      Row Name 07/11/19 1102 07/11/19 1030          Pain Scale: Numbers Pre/Post-Treatment    Pain Scale: Numbers, Pretreatment  0/10 - no pain  -CD  0/10 - no pain  -HK     Pain Scale: Numbers, Post-Treatment  2/10  -CD  2/10  -HK     Pain Location - Side  Bilateral  -CD  Bilateral  -HK     Pain Location - Orientation  --  lower  -HK     Pain Location  shoulder  -CD  back  -HK     Pain Intervention(s)  Repositioned;Rest  -CD  Repositioned;Ambulation/increased activity  -HK      Recorded by [CD] Marysol Wells, PT 07/11/19 1126 [HK] Edelmira Cifuentes, OT 07/11/19 1406     Row Name 07/11/19 1030             Sensory Assessment/Intervention    Sensory General Assessment  no sensation deficits identified  -HK      Recorded by [HK] Edelmira Cifuentes, OT 07/11/19 1406      Row Name 07/11/19 1030             Coping    Observed Emotional State  accepting;calm;cooperative  -HK      Recorded by [HK] Edelmira Cifuentes, OT 07/11/19 1406      Row Name 07/11/19 1102 07/11/19 1030          Plan of Care Review    Plan of Care Reviewed With  patient  -CD  patient  -HK     Recorded by [CD] Marysol Wells, PT 07/11/19 1126 [HK] Edelmira Cifuentes, OT 07/11/19 1406     Row Name 07/11/19 1030             Outcome Summary/Treatment Plan (OT)    Daily Summary of Progress (OT)  progress toward functional goals as expected  -HK      Recorded by [HK] Edelmira Cifuentes, OT 07/11/19 1406      Row Name 07/11/19 1102             Outcome Summary/Treatment Plan (PT)    Daily Summary of Progress (PT)  progress toward functional goals is good  -CD      Anticipated Discharge Disposition (PT)  inpatient rehabilitation facility  -CD      Recorded by [CD] Marysol Wells, PT 07/11/19 1126        User Key  (r) = Recorded By, (t) = Taken By, (c) = Cosigned By    Initials Name Effective Dates Discipline    CD Marysol Wells, PT 06/19/15 -  PT    HK Edelmira Cifuentes, OT 03/07/18 -  OT             Occupational Therapy Education     Title: PT OT SLP Therapies (In Progress)     Topic: Occupational Therapy (In Progress)     Point: ADL training (Done)     Description: Instruct learner(s) on proper safety adaptation and remediation techniques during self care or transfers.   Instruct in proper use of assistive devices.    Learning Progress Summary           Patient Kaci, E VU by  at 7/11/2019 10:30 AM    Comment:  Pt educated on ADL retraining with grooming, safety precautions, and appropriate body mechanics.                   Point: Precautions  (Done)     Description: Instruct learner(s) on prescribed precautions during self-care and functional transfers.    Learning Progress Summary           Patient Acceptance, E, VU by  at 7/11/2019 10:30 AM    Comment:  Pt educated on ADL retraining with grooming, safety precautions, and appropriate body mechanics.                   Point: Body mechanics (Done)     Description: Instruct learner(s) on proper positioning and spine alignment during self-care, functional mobility activities and/or exercises.    Learning Progress Summary           Patient Acceptance, E, VU by  at 7/11/2019 10:30 AM    Comment:  Pt educated on ADL retraining with grooming, safety precautions, and appropriate body mechanics.                               User Key     Initials Effective Dates Name Provider Type Discipline     03/07/18 -  Edelmira Cifuentes, OT Occupational Therapist OT                OT Recommendation and Plan  Outcome Summary/Treatment Plan (OT)  Daily Summary of Progress (OT): progress toward functional goals as expected  Daily Summary of Progress (OT): progress toward functional goals as expected  Plan of Care Review  Plan of Care Reviewed With: patient  Plan of Care Reviewed With: patient  Outcome Summary: Pt with improvements in balance this session. Pt requires Shira  for bed mobility and completes sit to stand with SBA. Pt ambulates with CGA and RW. Pt reports desire to return home this date with assist from her daugther. If pt returns home with daughter will need  OT.   Outcome Measures     Row Name 07/11/19 1102 07/11/19 1030 07/10/19 1112       How much help from another person do you currently need...    Turning from your back to your side while in flat bed without using bedrails?  3  -CD  --  3  -LM    Moving from lying on back to sitting on the side of a flat bed without bedrails?  3  -CD  --  3  -LM    Moving to and from a bed to a chair (including a wheelchair)?  3  -CD  --  3  -LM    Standing up from a chair  using your arms (e.g., wheelchair, bedside chair)?  4  -CD  --  3  -LM    Climbing 3-5 steps with a railing?  3  -CD  --  3  -LM    To walk in hospital room?  3  -CD  --  3  -LM    AM-PAC 6 Clicks Score (PT)  19  -CD  --  18  -LM       How much help from another is currently needed...    Putting on and taking off regular lower body clothing?  --  2  -HK  --    Bathing (including washing, rinsing, and drying)  --  3  -HK  --    Toileting (which includes using toilet bed pan or urinal)  --  3  -HK  --    Putting on and taking off regular upper body clothing  --  4  -HK  --    Taking care of personal grooming (such as brushing teeth)  --  4  -HK  --    Eating meals  --  4  -HK  --    AM-PAC 6 Clicks Score (OT)  --  20  -HK  --       Modified [place] Scale    Modified [place] Scale  3 - Moderate disability.  Requiring some help, but able to walk without assistance.  -CD  2 - Slight disability.  Unable to carry out all previous activities but able to look after own affairs without assistance.  -HK  3 - Moderate disability.  Requiring some help, but able to walk without assistance.  -LM       Functional Assessment    Outcome Measure Options  AM-PAC 6 Clicks Basic Mobility (PT);Modified [place]  -CD  AM-PAC 6 Clicks Daily Activity (OT)  -HK  AM-PAC 6 Clicks Basic Mobility (PT);Modified [place]  -LM      User Key  (r) = Recorded By, (t) = Taken By, (c) = Cosigned By    Initials Name Provider Type    CD Marysol Wells, PT Physical Therapist     Maritza Albert, PT Physical Therapist    HK Edelmira Cifuentes, OT Occupational Therapist           Time Calculation:   Time Calculation- OT     Row Name 07/11/19 1102 07/11/19 1030          Time Calculation- OT    OT Start Time  --  1030  -HK     OT Received On  --  07/11/19  -HK        Timed Charges    40123 - Gait Training Minutes   10  -CD  --     16399 - OT Self Care/Mgmt Minutes  --  23  -HK       User Key  (r) = Recorded By, (t) = Taken By, (c) = Cosigned By    Initials Name Provider  Type    CD Marysol Wells, PT Physical Therapist    HK Edelmira Cifuentes, OT Occupational Therapist        Therapy Charges for Today     Code Description Service Date Service Provider Modifiers Qty    25970162062 HC OT SELF CARE/MGMT/TRAIN EA 15 MIN 7/11/2019 Edelmira Cifuentes, OT GO 2               Edelmira Cifuentes OT  7/11/2019

## 2019-07-11 NOTE — PROGRESS NOTES
[facility] Medicine Services  PROGRESS NOTE    Patient Name: Narcisa Cotto  : 1940  MRN: [id number]    Date of Admission: 2019  Length of Stay: 4  Primary Care Physician: Mariia Del Real DO    Subjective   Subjective     CC:  Slurred speech, weakness     HPI:    Up in bed. R LE still feels numb, denies weakness. RUE resolved. No f/c. No n/v. No dyspnea.    Review of Systems      Otherwise ROS is negative except as mentioned in the HPI.    Objective   Objective     Vital Signs:   Temp:  [97.6 °F (36.4 °C)-98.9 °F (37.2 °C)] 97.6 °F (36.4 °C)  Heart Rate:  [71-87] 73  Resp:  [16-18] 16  BP: (106-142)/(50-85) 125/56  Total (NIH Stroke Scale): 2     Physical Exam:    NAD, alert and oriented  OP clear, MMM  Neck supple  No LAD  RRR, no m/r/g  CTAB  +BS, ND, NT  JUDGE, no obvious weakness on my exam  No rashes  Normal affect    Results Reviewed:  I have personally reviewed current lab, radiology, and data and agree.    Results from last 7 days   Lab Units 19  1141   WBC 10*3/mm3 5.76   HEMOGLOBIN g/dL 12.0   HEMATOCRIT % 37.9   PLATELETS 10*3/mm3 258     Results from last 7 days   Lab Units 07/10/19  0541 19  0755 19  1246   SODIUM mmol/L 141 140 136   POTASSIUM mmol/L 4.0 4.2 4.4   CHLORIDE mmol/L 106 107 102   CO2 mmol/L 23.0 21.0* 17.0*   BUN mg/dL 11 15 29*   CREATININE mg/dL 0.95 1.07* 1.15*   GLUCOSE mg/dL 129* 134* 35*   CALCIUM mg/dL 9.6 9.6 10.2   ALT (SGPT) U/L  --   --  41*   AST (SGOT) U/L  --   --  39*   TROPONIN T ng/mL  --   --  <0.010     Estimated Creatinine Clearance: 50.3 mL/min (by C-G formula based on SCr of 0.95 mg/dL).    Microbiology Results Abnormal     None          Imaging Results (last 24 hours)     Procedure Component Value Units Date/Time    MRI Brain Without Contrast [[id number]] Collected:  19 1832     Updated:  07/10/19 1533    Narrative:       EXAMINATION: MRI BRAIN WO CONTRAST-     INDICATION: Confusion/delirium, altered  LOC, unexplained; I63.9-Cerebral  infarction, unspecified; R47.1-Dysarthria and anarthria; R53.1-Weakness;  E16.2-Hypoglycemia, unspecified; R13.10-Dysphagia, unspecified;  Z74.09-Other reduced mobility.      TECHNIQUE: Multiplanar MRI of the brain without intravenous contrast.     COMPARISON: CT cerebral perfusion 07/07/2019.     FINDINGS: No restriction on diffusion-weighted sequences. Midline  structures are symmetric without evidence for mass, mass effect or  midline shift. Moderate T2 and FLAIR increased signal findings within  the periventricular and deep white matter fairly confluent in appearance  suggesting advanced chronic small vessel ischemic disease. Pituitary and  sella within normal limits. Cervicomedullary junction widely patent.  Globes and orbits retain normal T2 signal characteristics.  Visualized  paranasal sinuses and mastoid air cells demonstrate mild to moderate  circumferential mucosal thickening of the left maxillary sinus without  significant air-fluid level along with mild prominence of the ethmoid  air cells mucosal otherwise grossly clear and well pneumatized. No  cerebellopontine angle mass lesion with grossly normal signal flow voids  of the distal internal carotid and vertebrobasilar arteries.       Impression:       1. No acute intracranial abnormality specifically no acute infarction.  Moderate increased signal findings in the periventricular and deep white  matter suggesting moderate chronic small vessel ischemic disease.  2. Mild to moderate circumferential mucosal thickening of the left  maxillary sinus without significant air-fluid level may represent  sinusitis in the appropriate setting.     D:  07/09/2019  E:  07/10/2019     This report was finalized on 7/10/2019 3:30 PM by Dr. Stu Schroeder.             Results for orders placed during the hospital encounter of 07/07/19   Adult Transthoracic Echo Complete W/ Cont if Necessary Per Protocol (With Agitated Saline)    Narrative ·  Left ventricular systolic function is normal. Calculated EF = 60%  · Left ventricular diastolic dysfunction is noted (grade I) consistent   with impaired relaxation.  · The mitral valve is abnormal in structure. Moderate MAC is present.   There is mild anterior mitral leaflet thickening present. Trace mitral   valve regurgitation is present. No significant mitral valve stenosis is   present  · Mild tricuspid valve regurgitation is present.  · Estimated right ventricular systolic pressure from tricuspid   regurgitation is mildly elevated (35-45 mmHg).  · No evidence of a patent foramen ovale. Saline test results are negative.          I have reviewed the medications:  Scheduled Meds:    amLODIPine 5 mg Oral Daily   apixaban 5 mg Oral Q12H   atorvastatin 80 mg Oral Nightly   clopidogrel 75 mg Oral Daily   gabapentin 600 mg Oral Q12H   insulin lispro 0-7 Units Subcutaneous 4x Daily With Meals & Nightly   metoprolol tartrate 50 mg Oral BID   pantoprazole 40 mg Oral Q AM   sodium chloride 3 mL Intravenous Q12H     Continuous Infusions:   PRN Meds:.•  acetaminophen  •  dextrose  •  dextrose  •  glucagon (human recombinant)  •  sodium chloride  •  sodium chloride      Assessment/Plan   Assessment / Plan     Active Hospital Problems    Diagnosis  POA   • **CVA (cerebral vascular accident) (CMS/[facility]) [I63.9]  Yes   • Hypoglycemia [E16.2]  Yes   • Acute deep vein thrombosis (DVT) of distal vein of right lower extremity (CMS/[facility]) [I82.4Z1]  Yes   • CKD (chronic kidney disease) stage 3, GFR 30-59 ml/min (CMS/[facility]) [N18.3]  Yes   • Essential hypertension [I10]  Yes   • Coronary artery disease involving native coronary artery of native heart with angina pectoris (CMS/[facility]) [I25.119]  Yes   • Diabetic peripheral neuropathy (CMS/[facility]) [E11.42]  Yes   • Peripheral arterial disease (CMS/[facility]) [I73.9]  Yes   • Uncontrolled type 2 diabetes mellitus with complication, with long-term current use of insulin (CMS/[facility]) [E11.8, E11.65, Z79.4]   Not Applicable      Resolved Hospital Problems   No resolved problems to display.        Brief Hospital Course to date:  Narcisa Cotto is a 79 y.o. female with hx of CAD s/p recent stent to RCA on Plavix, well controlled T2DM A1C 6.1%, CKD stage 3, hypertension, recent admit and d/c for here 7/1 with RLE DVT on Eliquis.Patient presents to ED with right sided weakness and slurred speech, admitted with concerns for CVA.     Right sided weakness and slurred speech, improving- secondary to TIA vs hypoglycemia  ---symptoms improving, stroke workup complete- noted negative CT H, CT perfusion, MRI, CTA H&N does show extensive calcifications at carotid bifurcations but difficult to measure. Carotid duplex with 50-69% stenosis on right.   Some concern that symptoms may be due to hypoglycemia, as patient woke up with severe hypoglycemia, improving and symptoms resolving.  ---ECHO with moderate anterior mitral leaflet thickening, normal EF, no PFO    Carotid stenosis  --on high dose statin, plavix, eliquis  --needs surveillance imaging in 6-12 months per neurology     History of T2DM with symptomatic hypoglycemia this admission  ---A1C 6.3, well controlled  ---hold home insulin, diabetes educator has seen regarding low blood sugars  ---continue to monitor, off D10 now, seemingly stable now.    Recent diagnosis of RLE DVT  ---continue eliquis    CKD III  ---monitor, labs pending    DVT Prophylaxis:  eliquis     Disposition: I expect the patient to be discharged to rehab at [facility], awaiting insurance approval    CODE STATUS:   Code Status and Medical Interventions:   Ordered at: 07/07/19 1340     Level Of Support Discussed With:    Patient     Code Status:    CPR     Medical Interventions (Level of Support Prior to Arrest):    Full         Electronically signed by Fly Mukherjee MD, 07/11/19, 7:46 AM.

## 2019-07-11 NOTE — PLAN OF CARE
Problem: Patient Care Overview  Goal: Plan of Care Review  Outcome: Ongoing (interventions implemented as appropriate)   07/11/19 0414   Coping/Psychosocial   Plan of Care Reviewed With patient   Plan of Care Review   Progress improving   OTHER   Outcome Summary A&O x4, minimal c/o pain controlled with prn tylenol. VSS on room air. Pt has rested well most of shift. NIH of 2 this shift with mild sensory impairment on R side and slight drift in R leg. Ambulating well with assist x1, voiding well. No significant issues noted overnight, awaiting rehab placement. CM following.

## 2019-07-12 ENCOUNTER — TELEPHONE (OUTPATIENT)
Dept: INTERNAL MEDICINE | Facility: CLINIC | Age: 79
End: 2019-07-12

## 2019-07-12 VITALS
DIASTOLIC BLOOD PRESSURE: 75 MMHG | SYSTOLIC BLOOD PRESSURE: 139 MMHG | HEART RATE: 71 BPM | HEIGHT: 66 IN | OXYGEN SATURATION: 94 % | WEIGHT: 169 LBS | TEMPERATURE: 97.9 F | BODY MASS INDEX: 27.16 KG/M2 | RESPIRATION RATE: 16 BRPM

## 2019-07-12 PROBLEM — E16.2 ACUTE METABOLIC ENCEPHALOPATHY DUE TO HYPOGLYCEMIA: Status: ACTIVE | Noted: 2019-07-12

## 2019-07-12 PROBLEM — G93.41 ACUTE METABOLIC ENCEPHALOPATHY DUE TO HYPOGLYCEMIA: Status: ACTIVE | Noted: 2019-07-12

## 2019-07-12 PROBLEM — I51.89 DIASTOLIC DYSFUNCTION: Status: ACTIVE | Noted: 2019-07-12

## 2019-07-12 PROBLEM — E16.2 HYPOGLYCEMIA: Status: RESOLVED | Noted: 2019-07-07 | Resolved: 2019-07-12

## 2019-07-12 LAB
GLUCOSE BLDC GLUCOMTR-MCNC: 195 MG/DL (ref 70–130)
GLUCOSE BLDC GLUCOMTR-MCNC: 247 MG/DL (ref 70–130)

## 2019-07-12 PROCEDURE — 99239 HOSP IP/OBS DSCHRG MGMT >30: CPT | Performed by: PHYSICIAN ASSISTANT

## 2019-07-12 PROCEDURE — 82962 GLUCOSE BLOOD TEST: CPT

## 2019-07-12 RX ORDER — PANTOPRAZOLE SODIUM 40 MG/1
40 TABLET, DELAYED RELEASE ORAL DAILY
Start: 2019-07-12 | End: 2019-08-06 | Stop reason: SDUPTHER

## 2019-07-12 RX ADMIN — GABAPENTIN 600 MG: 300 CAPSULE ORAL at 08:06

## 2019-07-12 RX ADMIN — METOPROLOL TARTRATE 50 MG: 50 TABLET ORAL at 08:06

## 2019-07-12 RX ADMIN — AMLODIPINE BESYLATE 5 MG: 5 TABLET ORAL at 08:06

## 2019-07-12 RX ADMIN — CLOPIDOGREL BISULFATE 75 MG: 75 TABLET ORAL at 08:06

## 2019-07-12 RX ADMIN — PANTOPRAZOLE SODIUM 40 MG: 40 TABLET, DELAYED RELEASE ORAL at 05:23

## 2019-07-12 RX ADMIN — APIXABAN 5 MG: 5 TABLET, FILM COATED ORAL at 08:06

## 2019-07-12 RX ADMIN — INSULIN LISPRO 2 UNITS: 100 INJECTION, SOLUTION INTRAVENOUS; SUBCUTANEOUS at 08:06

## 2019-07-12 NOTE — PROGRESS NOTES
Case Management Discharge Note    Final Note: Ms. Cotto has decided to return home with her daughter's assistance and [facility].  Cancelled referral to SNF rehab facility.  Contacted and faxed home health orders to Herlinda at [facility].  No other needs identified.  Ms. Cotto is being transported home by her son in law today.    Destination      No service has been selected for the patient.      Durable Medical Equipment      No service has been selected for the patient.      Dialysis/Infusion      No service has been selected for the patient.      Home Medical Care - Selection Complete      Service Provider Request Status Selected Services Address Phone Number Fax Number    [facility] [address], [facility] [address] [phone]      Therapy      No service has been selected for the patient.      Community Resources      No service has been selected for the patient.             Final Discharge Disposition Code: 06 - home with home health care

## 2019-07-12 NOTE — PLAN OF CARE
Problem: Patient Care Overview  Goal: Plan of Care Review  Outcome: Ongoing (interventions implemented as appropriate)   07/12/19 1746   Coping/Psychosocial   Plan of Care Reviewed With patient   Plan of Care Review   Progress improving   OTHER   Outcome Summary Pt has rested well all shift. Reporting increased strength on right side this shift and reports r sided numbness back to baseline. Blood glucose remains elevated, covered with SSI. Assist x1, voiding well, BM 7/11. Awaiting insurance approval for rehab placement.

## 2019-07-12 NOTE — NURSING NOTE
[facility]    Patient Name:  Narcisa Cotto  :  1940  DOS:  19    Active Hospital Problems    Diagnosis   • **Acute metabolic encephalopathy due to hypoglycemia   • Diastolic dysfunction   • Acute deep vein thrombosis (DVT) of distal vein of right lower extremity (CMS/[facility])     · Venous duplex (2019): Right peroneal DVT     • CKD (chronic kidney disease) stage 3, GFR 30-59 ml/min (CMS/HCC)   • Essential hypertension   • GERD (gastroesophageal reflux disease)   • Coronary artery disease involving native coronary artery of native heart with angina pectoris (CMS/[facility])     · Echo (2016): EF > 70%. Moderate thickening of the noncoronary cusp of the aortic valve.  · Echo (2018): LVEF 60%. Grade I diastolic dysfunction.   Cardiac valves are functionally normal.  · Nuclear stress test (2018): No focal areas of ischemia or infarct.  Transient ischemic dilatation present raising possibility of balanced ischemia/multivessel CAD  · Cardiac catheterization (2019): Severe multivessel CAD.  Culprit for NSTEMI is a highly calcified ostial RCA 99% stenosis.    · Staged rotational atherectomy and PCI of the RCA using a PATRICIA, 2019     • Diabetic peripheral neuropathy (CMS/HCC)   • Peripheral arterial disease (CMS/HCC)     · BONNIE (2013):  At rest right 0.98, left 0.85.  After 2 minutes of exercise right 0.51, left 0.68.  · High-grade right common iliac stenosis on cardiac catheterization, 2019     • Controlled type 2 diabetes mellitus with stage 3 chronic kidney disease, with long-term current use of insulin (CMS/HCC)       80 yo female with hx of CAD s/p stent to RCA on Plavix. DMII, CKD III, HTN.  Recent admit for RLE DVT on Eliquis.  Pt presented with righ sided weakness and slurred speech.  CVA r/o.  concered s/s r/t to hypoglycemia.  Pt continued on Eliquis.  NO hx of known Afib.   Medical records have been reviewed.     Pt to f/u with cardiology as scheduled. F/u  with PCP 1 week.     Discussed role of [facility] and when to call as a resource.

## 2019-07-12 NOTE — DISCHARGE PLACEMENT REQUEST
"Narcisa Garcia (79 y.o. Female)  Home Health orders.  From Ana Gunn RN BSN, Case Management,  [phone]    Date of Birth Social Security Number Address Home Phone MRN    1940  [id number]  [facility] [id number] [phone] [id number]    Yazidi Marital Status          Religious        Admission Date Admission Type Admitting Provider Attending Provider Department, Room/Bed    19 Emergency Fly Mukherjee MD Russell, Marc P, MD [facility] 3G, S359/1    Discharge Date Discharge Disposition Discharge Destination         Home or Self Care              Attending Provider:  Fly Mukherjee MD    Allergies:  Codeine    Isolation:  None   Infection:  None   Code Status:  CPR    Ht:  167.6 cm (66\")   Wt:  76.7 kg (169 lb)    Admission Cmt:  None   Principal Problem:  Acute metabolic encephalopathy due to hypoglycemia [G93.41,E16.2]                 Active Insurance as of 2019     Primary Coverage     Payor Plan Insurance Group Employer/Plan Group    ANTH MEDICARE REPLACEMENT ANTHEM MEDICARE ADVANTAGE [id number]     Payor Plan Address Payor Plan Phone Number Payor Plan Fax Number Effective Dates    [address] [phone]  2019 - None Entered    [facility] [id number]       Subscriber Name Subscriber Birth Date Member ID       NARCISA GARCIA 1940 [id number]                 Emergency Contacts      (Rel.) Home Phone Work Phone Mobile Phone    Shanna Loja (Daughter) [phone] -- --    CalebBarb (Daughter) [phone] -- [phone]               History & Physical      Bailee Harmon MD at 2019  1:10 PM              [facility] Medicine Services  HISTORY AND PHYSICAL    Patient Name: Narcisa Garcia  : 1940  MRN: [id number]  Primary Care Physician: Mariia Del Real DO  Date of admission: 2019      Subjective   Subjective     Chief Complaint:Right sided weakness    HPI:  Narcisa SIDDIQI " Yadiel is a 79 y.o. female with hx of CAD s/p recent stent to RCA on Plavix, well controlled T2DM A1C 6.1%, CKD stage 3, hypertension, recent admit and d/c for here 7/1 with RLE DVT on Eliquis.Patient presents to ED with complaints of right sided weakness and slurred speech, Patient was last seen normal last night at 11pm, daughter states that she woke up this morning around 9 AM to sounds of moaning and groaning, she was found to have slurred speech and was listless on the right side such EMS was called and patient was brought to the ED.  Of note daughter states that last night patient took 70 units of regular insulin at once.  On arrival to the ED patient was hemodynamically stable, initial work-up did show glucose of 35, she underwent CT head that was negative, CT cerebral perfusion was normal, CTA head and neck, that showed excess calcification at the carotid bifurcations bilaterally however narrowing was extremely difficult to measure.  Patient was given aspirin, neurology and neurosurgery were consulted and hospital medicine was asked to admit.    Review of Systems   Gen- No fevers, chills  CV- No chest pain, palpitations  Resp- No cough, dyspnea  GI- No N/V/D, abd pain    Otherwise complete ROS reviewed and is negative except as mentioned in the HPI.    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   • Chronic diastolic congestive heart failure (CMS/HCC)    • Chronic low back pain    • Chronic venous insufficiency    • CKD (chronic kidney disease) stage 3, GFR 30-59 ml/min (CMS/HCC)    • Diabetic gastroparesis (CMS/HCC)    • Diverticulosis    • DJD (degenerative joint disease), lumbar    • DM2 (diabetes mellitus, type 2) (CMS/HCC)    • GERD (gastroesophageal reflux disease)    • Hiatal hernia    • History of hyperparathyroidism    • HLD (hyperlipidemia)    • HTN (hypertension)    • Lumbar stenosis 11/15/2018   • DELFINA (obstructive  sleep apnea)    • Osteoarthritis 7/29/2016   • PAD (peripheral artery disease) (CMS/[facility])    • Postherpetic neuralgia    • Vitamin B12 deficiency 7/29/2016       Past Surgical History:   Procedure Laterality Date   • CARDIAC CATHETERIZATION N/A 5/23/2019    Procedure: LEFT HEART CATH;  Surgeon: Filemon Mayberry IV, MD;  Location:  [facility];  Service: Cardiovascular   • CARDIAC CATHETERIZATION N/A 5/29/2019    Procedure: Atherectomy-coronary;  Surgeon: Filemon Mayberry IV, MD;  Location:  [facility];  Service: Cardiovascular   • CARDIAC CATHETERIZATION N/A 5/29/2019    Procedure: Stent PATRICIA coronary;  Surgeon: Filemon Mayberry IV, MD;  Location:  [facility];  Service: Cardiovascular   • CARDIAC ELECTROPHYSIOLOGY PROCEDURE N/A 5/28/2019    Procedure: TEMPORARY PACEMAKER;  Surgeon: Jeffrey Reyes MD;  Location:  [facility];  Service: Cardiovascular   • CATARACT EXTRACTION Bilateral    • CHOLECYSTECTOMY     • COLONOSCOPY     • CYSTOSCOPY W/ URETERAL STENT PLACEMENT  2014   • ENDOSCOPY     • HYSTERECTOMY     • LUMBAR LAMINECTOMY DISCECTOMY DECOMPRESSION N/A 11/15/2018    Procedure: LUMBAR LAMINECTOMY L2-3 L 3-4,;  Surgeon: Zachary Key MD;  Location:  [facility];  Service: Orthopedic Spine   • PARATHYROIDECTOMY     • SPINAL CORD STIMULATOR IMPLANT     • TONSILLECTOMY         Family History: family history includes Diabetes in her father; Kidney disease in her mother. Otherwise pertinent FHx was reviewed and unremarkable.     Social History:  reports that she has never smoked. She has never used smokeless tobacco. She reports that she does not drink alcohol or use drugs.  Social History     Social History Narrative    Friend Ruth Nuno, with pt today       Medications:    Available home medication information reviewed.    (Not in a hospital admission)    Allergies   Allergen Reactions   • Codeine Nausea And Vomiting        Objective   Objective     Vital Signs:   Temp:  [98.2 °F (36.8 °C)] 98.2 °F (36.8 °C)  Heart Rate:  [89-95] 95  Resp:  [18] 18  BP: (137-153)/(63-65) 137/65   Total (NIH Stroke Scale): 13    Physical Exam   Constitutional: Chronic ill appearing elderly female awake, alert, restless  Eyes: PERRLA, sclerae anicteric, no conjunctival injection  HENT: NCAT, mucous membranes moist  Neck: Supple, no thyromegaly, no lymphadenopathy, trachea midline  Respiratory: Clear to auscultation bilaterally, nonlabored respirations   Cardiovascular: RRR, no murmurs, rubs, or gallops, palpable pedal pulses bilaterally  Gastrointestinal: Obese positive bowel sounds, soft, nontender, nondistended  Musculoskeletal: No bilateral ankle edema, no clubbing or cyanosis to extremities  Psychiatric: Appropriate affect, cooperative  Neurologic: Oriented x 3, right sided weakness, dysarthria  Skin: No rashes    Results Reviewed:  I have personally reviewed current lab, radiology, and data and agree.    Results from last 7 days   Lab Units 07/07/19  1141   WBC 10*3/mm3 5.76   HEMOGLOBIN g/dL 12.0   HEMATOCRIT % 37.9   PLATELETS 10*3/mm3 258     Results from last 7 days   Lab Units 07/07/19  1246   SODIUM mmol/L 136   POTASSIUM mmol/L 4.4   CHLORIDE mmol/L 102   CO2 mmol/L 17.0*   BUN mg/dL 29*   CREATININE mg/dL 1.15*   GLUCOSE mg/dL 35*   CALCIUM mg/dL 10.2   ALT (SGPT) U/L 41*   AST (SGOT) U/L 39*   TROPONIN T ng/mL <0.010     Estimated Creatinine Clearance: 41.5 mL/min (A) (by C-G formula based on SCr of 1.15 mg/dL (H)).  Brief Urine Lab Results  (Last result in the past 365 days)      Color   Clarity   Blood   Leuk Est   Nitrite   Protein   CREAT   Urine HCG        06/21/19 1715 Yellow Cloudy Trace Moderate (2+) Negative 100 mg/dL (2+)             Imaging Results (last 24 hours)     Procedure Component Value Units Date/Time    CT Angiogram Head With & Without Contrast [[id number]] Collected:  07/07/19 1237     Updated:  07/07/19 1241     Narrative:       EXAMINATION: CT ANGIOGRAM NECK W WO CONTRAST-, CT ANGIOGRAM HEAD W WO  CONTRAST-      INDICATION: Stroke right-sided weakness     TECHNIQUE: Multiple axial CT imaging is obtained of the head and neck  following the ministration of intravenous contrast.     Stenosis measurement was performed by the NASCET or similar method.     The radiation dose reduction device was turned on for each scan per the  ALARA (As Low as Reasonably Achievable) protocol.     COMPARISON: NONE     FINDINGS: Lung apices are grossly clear with vascular calcification seen  within the thoracic aortic arch. Atherosclerotic disease seen at the  origin the great vessels with no significant stenosis. The thyroid is  heterogeneous in appearance with multiple small nodules identified.  Degenerative changes identified within the spine. No gross abnormality  identified within the soft tissues of the neck. There is no bulky  adenopathy identified. Musculature is intact. No abnormal mass or fluid  collections identified.     The common carotid arteries are without significant stenosis with  extensive calcification seen at the carotid bifurcations bilaterally.  There is somewhat difficulty measuring the area of narrowing of the  distal common carotid artery and internal carotid arteries right greater  than left due to the extensive calcification. There is at least moderate  stenosis identified of the proximal right internal carotid artery. There  is mild stenosis identified of the left internal carotid artery. The  intracranial vessels reveal atherosclerotic disease seen in the  cavernous portions the internal carotid arteries. There is no aneurysmal  dilatation of the vascular formation or significant stenosis seen of  either the anterior posterior circulation. Anterior middle cerebral  arteries are unremarkable. Posterior circulation is intact. There is a  dominant left vertebral artery. Degenerative changes seen within the  spine              Impression:       Extensive calcification seen at the carotid bifurcations  bilaterally making measurements of the narrowing extremely difficult  however there is at least a moderate stenosis identified of the proximal  right internal carotid artery and mild stenosis of the proximal left  internal carotid artery. No definite significant stenosis is identified.  The intracranial vessels are unremarkable.          CT Angiogram Neck With & Without Contrast [[id number]] Collected:  07/07/19 1237     Updated:  07/07/19 1241    Narrative:       EXAMINATION: CT ANGIOGRAM NECK W WO CONTRAST-, CT ANGIOGRAM HEAD W WO  CONTRAST-      INDICATION: Stroke right-sided weakness     TECHNIQUE: Multiple axial CT imaging is obtained of the head and neck  following the ministration of intravenous contrast.     Stenosis measurement was performed by the NASCET or similar method.     The radiation dose reduction device was turned on for each scan per the  ALARA (As Low as Reasonably Achievable) protocol.     COMPARISON: NONE     FINDINGS: Lung apices are grossly clear with vascular calcification seen  within the thoracic aortic arch. Atherosclerotic disease seen at the  origin the great vessels with no significant stenosis. The thyroid is  heterogeneous in appearance with multiple small nodules identified.  Degenerative changes identified within the spine. No gross abnormality  identified within the soft tissues of the neck. There is no bulky  adenopathy identified. Musculature is intact. No abnormal mass or fluid  collections identified.     The common carotid arteries are without significant stenosis with  extensive calcification seen at the carotid bifurcations bilaterally.  There is somewhat difficulty measuring the area of narrowing of the  distal common carotid artery and internal carotid arteries right greater  than left due to the extensive calcification. There is at least moderate  stenosis identified of the proximal right  internal carotid artery. There  is mild stenosis identified of the left internal carotid artery. The  intracranial vessels reveal atherosclerotic disease seen in the  cavernous portions the internal carotid arteries. There is no aneurysmal  dilatation of the vascular formation or significant stenosis seen of  either the anterior posterior circulation. Anterior middle cerebral  arteries are unremarkable. Posterior circulation is intact. There is a  dominant left vertebral artery. Degenerative changes seen within the  spine             Impression:       Extensive calcification seen at the carotid bifurcations  bilaterally making measurements of the narrowing extremely difficult  however there is at least a moderate stenosis identified of the proximal  right internal carotid artery and mild stenosis of the proximal left  internal carotid artery. No definite significant stenosis is identified.  The intracranial vessels are unremarkable.          XR Chest 1 View [[id number]] Collected:  07/07/19 1228     Updated:  07/07/19 1229    Narrative:          EXAMINATION: XR CHEST 1 VW-      INDICATION: Stroke Protocol (Onset > 12 Hrs)      COMPARISON: 06/21/2019     FINDINGS: Portable chest reveals heart to be enlarged. Underlying  chronic and emphysematous changes in with the lung fields bilaterally.  Degenerative changes seen within the spine. A pleural effusion or  pneumothorax. Pulmonary vascularity is within normal limits.           Impression:       Chronic changes no acute parenchymal disease          CT Cerebral Perfusion With & Without Contrast [[id number]] Collected:  07/07/19 1223     Updated:  07/07/19 1227    Narrative:       EXAMINATION: CT CEREBRAL PERFUSION W WO CONTRAST-      INDICATION: TIA, initial screening stroke symptoms     TECHNIQUE: Cerebral perfusion analysis was performed using computed  tomography with contrast administration, including post processing of  parametric maps with determination of cerebral  blood flow, cerebral  blood volume, and mean transit time.      The radiation dose reduction device was turned on for each scan per the  ALARA (As Low as Reasonably Achievable) protocol.     COMPARISON: NONE     FINDINGS: No evidence of abnormality within cerebral blood flow or  cerebral blood volume. No evidence of reversible ischemia. No increased  mean transit time to suggest evidence of an occlusion or stenosis.             Impression:       No large perfusion abnormality to suggest evidence of  reversible ischemia                   CT Head Without Contrast Stroke Protocol [[id number]] Collected:  07/07/19 1113     Updated:  07/07/19 1158    Narrative:       EXAMINATION: CT HEAD WO CONTRAST  - 07/07/2019     INDICATION: Stroke symptoms, right-sided weakness     TECHNIQUE: Multiple axial CT imaging is obtained of the head from skull  base to skull vertex without the administration of intravenous contrast.     The radiation dose reduction device was turned on for each scan per the  ALARA (As Low as Reasonably Achievable) protocol.     COMPARISON: 05/24/2019     FINDINGS: Parenchyma is grossly unremarkable in appearance with minimal  low-density area seen in the periventricular and subcortical white  matter. No hemorrhage or hydrocephalus. No mass, mass effect, or midline  shift. No abnormal extra-axial fluid collection identified. The bony  structures reveal no evidence of osseous abnormality with minimal  mucosal thickening seen of the left maxillary sinus. Globes and orbits  are intact. The mastoid air cells are patent.       Impression:       Stable chronic changes seen within the brain with no acute  intracranial abnormality identified.     Examination was performed 07/07/2019 at 1053 hours and examination  results were given to the ER physician at time of performance of the  examination at the scanner.     DICTATED:   07/07/2019  EDITED/ls :   07/07/2019               Results for orders placed during the  hospital encounter of 06/21/19   Adult Transthoracic Echo Limited W/ Cont if Necessary Per Protocol    Narrative · Normal left ventricular systolic function  · MAC with trace MR  · Mild aortic valve sclerosis          Assessment/Plan   Assessment / Plan     Active Hospital Problems    Diagnosis POA   • CVA (cerebral vascular accident) (CMS/[facility]) [I63.9] Yes   • Hypoglycemia [E16.2] Yes   • Acute deep vein thrombosis (DVT) of distal vein of right lower extremity (CMS/[facility]) [I82.4Z1] Yes     · Venous duplex (6/2019): Right peroneal DVT     • CKD (chronic kidney disease) stage 3, GFR 30-59 ml/min (CMS/[facility]) [N18.3] Yes   • Essential hypertension [I10] Yes   • Coronary artery disease involving native coronary artery of native heart with angina pectoris (CMS/[facility]) [I25.119] Yes     · Echo (8/31/2016): EF > 70%. Moderate thickening of the noncoronary cusp of the aortic valve.  · Echo (04/23/2018): LVEF 60%. Grade I diastolic dysfunction.   Cardiac valves are functionally normal.  · Nuclear stress test (04/23/2018): No focal areas of ischemia or infarct.  Transient ischemic dilatation present raising possibility of balanced ischemia/multivessel CAD  · Cardiac catheterization (5/23/2019): Severe multivessel CAD.  Culprit for NSTEMI is a highly calcified ostial RCA 99% stenosis.    · Staged rotational atherectomy and PCI of the RCA using a PATRICIA, 5/29/2019     • Diabetic peripheral neuropathy (CMS/[facility]) [E11.42] Yes   • Peripheral arterial disease (CMS/[facility]) [I73.9] Yes     · BONNIE (08/22/2013):  At rest right 0.98, left 0.85.  After 2 minutes of exercise right 0.51, left 0.68.  · High-grade right common iliac stenosis on cardiac catheterization, 5/23/2019     • Uncontrolled type 2 diabetes mellitus with complication, with long-term current use of insulin (CMS/[facility]) [E11.8, E11.65, Z79.4] Not Applicable        79 y.o. female with hx of CAD s/p recent stent to RCA on Plavix, well controlled T2DM A1C 6.1%, CKD stage 3, hypertension,  recent admit and d/c for here 7/1 with RLE DVT on Eliquis.Patient presents to ED with right sided weakness and slurred speech, admitted with concerns for CVA.     Plan  - CT head is negative, CT cerebral perfusion is normal, CTA head and neck, that shows excess calcification at the carotid bifurcations bilaterally however narrowing was extremely difficult to measure.  Unable to obtain MRI at this time as patient has a spinal stimulator patient was given aspirin, neurology and neurosurgery have been consulted.  -Stroke work-up per protocol, get echo, discussed with Dr. Fatima, will attempt to obtain MRI if spinal stimulator can be turned off, okay to continue aspirin, statin Plavix and Eliquis.  -History of type 2 diabetes previously well controlled with A1c of 6.1%, however, patient woke up with severe hypoglycemia could have contributed to above symptoms, continue D10 infusion, hold home insulin at this time.  Will need re-education on her home insulin regimen  -Recently diagnosed right lower extremity DVT continue home Eliquis  -CKD stage III, creatinine seems to be stable continue to monitor at this time.  -Continue home meds for chronic medical conditions as listed on problem list above  -PT/OT/CM    DVT prophylaxis:  Eliquis    CODE STATUS:    Code Status and Medical Interventions:   Ordered at: 07/07/19 1340     Level Of Support Discussed With:    Patient     Code Status:    CPR     Medical Interventions (Level of Support Prior to Arrest):    Full       Admission Status:  I believe this patient meets INPATIENT status due to the need for care which can only be reasonably provided in an hospital setting . Patient has suspected acute CVA requiring ongoing w/u .  In such, I feel patient’s risk for adverse outcomes and need for care warrant INPATIENT evaluation and predict the patient’s care encounter to likely last beyond 2 midnights.      Electronically signed by Bailee Harmon MD, 07/07/19, 1:10  PM.        Electronically signed by Bailee Harmon MD at 2019  1:59 PM       Physical Therapy Notes (last 24 hours) (Notes from 2019 11:46 AM through 2019 11:46 AM)     No notes of this type exist for this encounter.           Discharge Summary      Daiana Lao PA-C at 2019  8:57 AM                [facility] Medicine Services  DISCHARGE SUMMARY    Patient Name: Narcisa Cotto  : 1940  MRN: [id number]    Date of Admission: 2019  Date of Discharge: 2019  Primary Care Physician: Mariia Del Real DO    Consults     Date and Time Order Name Status Description    2019 1632 Inpatient Neurology Consult Stroke      2019 2114 Inpatient Cardiology Consult Completed         Hospital Course     Presenting Problem:   CVA (cerebral vascular accident) (CMS/[facility]) [I63.9]    Active Hospital Problems    Diagnosis  POA   • **Acute metabolic encephalopathy due to hypoglycemia [G93.41, E16.2]  Yes   • Acute deep vein thrombosis (DVT) of distal vein of right lower extremity (CMS/[facility]) [I82.4Z1]  Yes   • CKD (chronic kidney disease) stage 3, GFR 30-59 ml/min (CMS/[facility]) [N18.3]  Yes   • Essential hypertension [I10]  Yes   • GERD (gastroesophageal reflux disease) [K21.9]  Yes   • Coronary artery disease involving native coronary artery of native heart with angina pectoris (CMS/[facility]) [I25.119]  Yes   • Diabetic peripheral neuropathy (CMS/[facility]) [E11.42]  Yes   • Peripheral arterial disease (CMS/[facility]) [I73.9]  Yes   • Controlled type 2 diabetes mellitus with stage 3 chronic kidney disease, with long-term current use of insulin (CMS/[facility]) [E11.22, N18.3, Z79.4]  Not Applicable      Resolved Hospital Problems    Diagnosis Date Resolved POA   • Hypoglycemia [E16.2] 2019 Yes      Hospital Course:  Ms. Narcisa Cotto is a 79yoF with PMH significant for CAD (s/p RCA stent 19 on Plavix, well-controlled insulin-dependent DMII, CKD III and HTN. She was recently  admitted to [facility] 6/21-7/1/2019 for Klebsiella UTI and RLE DVT. A CT chest was negative for PE and VQ scan was low probability. She was started on Eliquis. Plavix was continued and ASA was stopped. She discharged home with home health and home PT.     She presented to [facility] ED on 7/7/2019 with complaints of right sided weakness and slurred speech. Last known well was 11pm on 7/6. Daughter reports that she woke around 0900 on 7/7 to sounds of moaning and groaning. She found Ms. Cotto laying in bed with right sided weakness and slurred speech. EMS was called and she was brought to [facility] ED. Of note, Ms. Cotto did take extra insulin (70 units) the night prior to presentation (usually takes humuLIN 70/30 20 units QAM, 20 units with lunch and 40 units with dinner).     Initial glucose on ED arrival was 35.   CT cerebral perfusion was negative.   CT angiogram head and neck notable for extensive calcification seen at the carotid bifurcations bilaterally - at least moderate stenosis identified at the proximal R ICA and mild stenosis of the proximal L ICA.   Carotid ultrasound showed R ICA stenosis 50-69% and L ICA stenosis 0-49% - neurology recommends repeat duplex US in 6-12 months   MRI brain negative for CVA.     Ms. Cotto' neurologic symptoms have resolved. Neurology team evaluated the patient and felt that her symptoms were most likely related to hypoglycemia. Hgb A1c 6.3% - discussed glucose management with Ms. Cotto and for now, will recommend decreased doses of insulin at home. She has follow up arranged with her endocrinologist on 7/31/19. Encouraged her to keep this appointment.     Ms. Cotto will return home on 7/12/2019  Follow up with PCP next week.     Day of Discharge     HPI:   Laying in bed. She is feeling well and has no complaints. She is tired of waiting for insurance to approve rehab and she'd like to go home. No new neurologic symptoms  and previous symptoms have resolved.     Review of Systems  Gen- No fevers, chills  CV- No chest pain, palpitations  Resp- No cough, dyspnea  GI- No N/V/D, abd pain    Otherwise ROS is negative except as mentioned in the HPI.    Vital Signs:   Temp:  [97.2 °F (36.2 °C)-98.4 °F (36.9 °C)] 98 °F (36.7 °C)  Heart Rate:  [72-85] 85  Resp:  [16-18] 16  BP: (106-136)/(41-72) 126/64     Physical Exam:  Constitutional: No acute distress, awake, alert  HENT: NCAT, mucous membranes moist  Respiratory: Clear to auscultation bilaterally, respiratory effort normal   Cardiovascular: RRR, no murmurs, rubs, or gallops, palpable pedal pulses bilaterally  Gastrointestinal: Positive bowel sounds, soft, nontender, nondistended  Musculoskeletal: No bilateral ankle edema  Psychiatric: Appropriate affect, cooperative  Neurologic: Oriented x 3, strength symmetric in all extremities, Cranial Nerves grossly intact to confrontation, speech clear  Skin: No rashes    Pertinent  and/or Most Recent Results     Results from last 7 days   Lab Units 07/10/19  0541 07/09/19  0755 07/07/19  1246 07/07/19  1141   WBC 10*3/mm3  --   --   --  5.76   HEMOGLOBIN g/dL  --   --   --  12.0   HEMATOCRIT %  --   --   --  37.9   PLATELETS 10*3/mm3  --   --   --  258   SODIUM mmol/L 141 140 136  --    POTASSIUM mmol/L 4.0 4.2 4.4  --    CHLORIDE mmol/L 106 107 102  --    CO2 mmol/L 23.0 21.0* 17.0*  --    BUN mg/dL 11 15 29*  --    CREATININE mg/dL 0.95 1.07* 1.15*  --    GLUCOSE mg/dL 129* 134* 35*  --    CALCIUM mg/dL 9.6 9.6 10.2  --      Results from last 7 days   Lab Units 07/07/19  1246   BILIRUBIN mg/dL 0.5   ALK PHOS U/L 73   ALT (SGPT) U/L 41*   AST (SGOT) U/L 39*     Results from last 7 days   Lab Units 07/08/19  0656   CHOLESTEROL mg/dL 90   TRIGLYCERIDES mg/dL 189*   HDL CHOL mg/dL 25*     Results from last 7 days   Lab Units 07/08/19  0656 07/07/19  1246   HEMOGLOBIN A1C % 6.30*  --    TROPONIN T ng/mL  --  <0.010         Brief Urine Lab Results   (Last result in the past 365 days)      Color   Clarity   Blood   Leuk Est   Nitrite   Protein   CREAT   Urine HCG        06/21/19 1715 Yellow Cloudy Trace Moderate (2+) Negative 100 mg/dL (2+)             Microbiology Results Abnormal     None        Imaging Results (all)     Procedure Component Value Units Date/Time    MRI Brain Without Contrast [[id number]] Collected:  07/09/19 1832     Updated:  07/10/19 1533    Narrative:       EXAMINATION: MRI BRAIN WO CONTRAST-     INDICATION: Confusion/delirium, altered LOC, unexplained; I63.9-Cerebral  infarction, unspecified; R47.1-Dysarthria and anarthria; R53.1-Weakness;  E16.2-Hypoglycemia, unspecified; R13.10-Dysphagia, unspecified;  Z74.09-Other reduced mobility.      TECHNIQUE: Multiplanar MRI of the brain without intravenous contrast.     COMPARISON: CT cerebral perfusion 07/07/2019.     FINDINGS: No restriction on diffusion-weighted sequences. Midline  structures are symmetric without evidence for mass, mass effect or  midline shift. Moderate T2 and FLAIR increased signal findings within  the periventricular and deep white matter fairly confluent in appearance  suggesting advanced chronic small vessel ischemic disease. Pituitary and  sella within normal limits. Cervicomedullary junction widely patent.  Globes and orbits retain normal T2 signal characteristics.  Visualized  paranasal sinuses and mastoid air cells demonstrate mild to moderate  circumferential mucosal thickening of the left maxillary sinus without  significant air-fluid level along with mild prominence of the ethmoid  air cells mucosal otherwise grossly clear and well pneumatized. No  cerebellopontine angle mass lesion with grossly normal signal flow voids  of the distal internal carotid and vertebrobasilar arteries.       Impression:       1. No acute intracranial abnormality specifically no acute infarction.  Moderate increased signal findings in the periventricular and deep white  matter suggesting  moderate chronic small vessel ischemic disease.  2. Mild to moderate circumferential mucosal thickening of the left  maxillary sinus without significant air-fluid level may represent  sinusitis in the appropriate setting.     D:  07/09/2019  E:  07/10/2019     This report was finalized on 7/10/2019 3:30 PM by Dr. Stu Schroeder.       XR Chest 1 View [[id number]] Collected:  07/07/19 1228     Updated:  07/08/19 0915    Narrative:          EXAMINATION: XR CHEST 1 VW - 07/07/2019     INDICATION: Evaluate for stroke.     COMPARISON: 06/21/2019     FINDINGS: Portable chest reveals the heart to be enlarged. Underlying  chronic and emphysematous change is seen in the lung fields bilaterally.  Degenerative change is  seen within the spine. No pleural effusion or  pneumothorax. Pulmonary vascularity is within normal limits.           Impression:       Chronic changes. No acute parenchymal disease.     DICTATED:   07/07/2019  EDITED/ls :   07/07/2019      This report was finalized on 7/8/2019 9:12 AM by Dr. Delphine Slaughter MD.       CT Angiogram Head With & Without Contrast [[id number]] Collected:  07/07/19 1237     Updated:  07/08/19 0915    Narrative:       EXAMINATION: CT ANGIOGRAM NECK WWO CONTRAST, CT ANGIOGRAM HEAD WWO  CONTRAST - 07/07/2019      INDICATION: Right-sided weakness, evaluate for stroke.     TECHNIQUE: Multiple axial CT imaging is obtained of the head and neck  following the ministration of intravenous contrast.     Stenosis measurement was performed by the NASCET or similar method.     The radiation dose reduction device was turned on for each scan per the  ALARA (As Low as Reasonably Achievable) protocol.     COMPARISON: NONE     FINDINGS: Lung apices are grossly clear with vascular calcification seen  within the thoracic aortic arch. Atherosclerotic disease seen at the  origin the great vessels with no significant stenosis. The thyroid is  heterogeneous in appearance with multiple small nodules  identified.  Degenerative changes identified within the spine. No gross abnormality  identified within the soft tissues of the neck. There is no bulky  adenopathy identified. Musculature is intact. No abnormal mass or fluid  collections identified.     The common carotid arteries are without significant stenosis with  extensive calcification seen at the carotid bifurcations bilaterally.  There is somewhat difficulty measuring the area of narrowing of the  distal common carotid artery and internal carotid arteries right greater  than left due to the extensive calcification. There is at least moderate  stenosis identified of the proximal right internal carotid artery. There  is mild stenosis identified of the left internal carotid artery. The  intracranial vessels reveal atherosclerotic disease seen in the  cavernous portions of the internal carotid arteries. There is no  aneurysmal dilatation or vascular malformation or significant stenosis  seen of either the anterior or posterior circulation. Anterior and  middle cerebral arteries are unremarkable. Posterior circulation is  intact. There is a dominant left vertebral artery. Degenerative changes  seen within the spine.       Impression:       Extensive calcification seen at the carotid bifurcations  bilaterally making measurements of the narrowing extremely difficult  however there is at least a moderate stenosis identified of the proximal  right internal carotid artery and mild stenosis of the proximal left  internal carotid artery. No definite significant stenosis is identified.  The intracranial vessels are unremarkable.     DICTATED:   07/07/2019  EDITED/ls :   07/07/2019      This report was finalized on 7/8/2019 9:12 AM by Dr. Delphine Slaughter MD.       CT Angiogram Neck With & Without Contrast [[id number]] Collected:  07/07/19 1237     Updated:  07/08/19 0915    Narrative:       EXAMINATION: CT ANGIOGRAM NECK WWO CONTRAST, CT ANGIOGRAM HEAD WWO  CONTRAST -  07/07/2019      INDICATION: Right-sided weakness, evaluate for stroke.     TECHNIQUE: Multiple axial CT imaging is obtained of the head and neck  following the ministration of intravenous contrast.     Stenosis measurement was performed by the NASCET or similar method.     The radiation dose reduction device was turned on for each scan per the  ALARA (As Low as Reasonably Achievable) protocol.     COMPARISON: NONE     FINDINGS: Lung apices are grossly clear with vascular calcification seen  within the thoracic aortic arch. Atherosclerotic disease seen at the  origin the great vessels with no significant stenosis. The thyroid is  heterogeneous in appearance with multiple small nodules identified.  Degenerative changes identified within the spine. No gross abnormality  identified within the soft tissues of the neck. There is no bulky  adenopathy identified. Musculature is intact. No abnormal mass or fluid  collections identified.     The common carotid arteries are without significant stenosis with  extensive calcification seen at the carotid bifurcations bilaterally.  There is somewhat difficulty measuring the area of narrowing of the  distal common carotid artery and internal carotid arteries right greater  than left due to the extensive calcification. There is at least moderate  stenosis identified of the proximal right internal carotid artery. There  is mild stenosis identified of the left internal carotid artery. The  intracranial vessels reveal atherosclerotic disease seen in the  cavernous portions of the internal carotid arteries. There is no  aneurysmal dilatation or vascular malformation or significant stenosis  seen of either the anterior or posterior circulation. Anterior and  middle cerebral arteries are unremarkable. Posterior circulation is  intact. There is a dominant left vertebral artery. Degenerative changes  seen within the spine.       Impression:       Extensive calcification seen at the carotid  bifurcations  bilaterally making measurements of the narrowing extremely difficult  however there is at least a moderate stenosis identified of the proximal  right internal carotid artery and mild stenosis of the proximal left  internal carotid artery. No definite significant stenosis is identified.  The intracranial vessels are unremarkable.     DICTATED:   07/07/2019  EDITED/ls :   07/07/2019      This report was finalized on 7/8/2019 9:12 AM by Dr. Delphine Slaughter MD.       CT Cerebral Perfusion With & Without Contrast [[id number]] Collected:  07/07/19 1223     Updated:  07/08/19 0915    Narrative:       EXAMINATION: CT CEREBRAL PERFUSION WWO CONTRAST - 07/07/2019      INDICATION: TIA, stroke symptoms     TECHNIQUE: Cerebral perfusion analysis was performed using computed  tomography with contrast administration, including post processing of  parametric maps with determination of cerebral blood flow, cerebral  blood volume, and mean transit time.      The radiation dose reduction device was turned on for each scan per the  ALARA (As Low as Reasonably Achievable) protocol.     COMPARISON: NONE     FINDINGS: No evidence of abnormality within cerebral blood flow or  cerebral blood volume. No evidence of reversible ischemia. No increased  mean transit time to suggest evidence of an occlusion or stenosis.       Impression:       No large perfusion abnormality to suggest evidence of  reversible ischemia.     DICTATED:   07/07/2019  EDITED/ls :   07/07/2019      This report was finalized on 7/8/2019 9:12 AM by Dr. Delphine Slaughter MD.       CT Head Without Contrast Stroke Protocol [[id number]] Collected:  07/07/19 1113     Updated:  07/08/19 0915    Narrative:       EXAMINATION: CT HEAD WO CONTRAST  - 07/07/2019     INDICATION: Stroke symptoms, right-sided weakness     TECHNIQUE: Multiple axial CT imaging is obtained of the head from skull  base to skull vertex without the administration of intravenous contrast.      The radiation dose reduction device was turned on for each scan per the  ALARA (As Low as Reasonably Achievable) protocol.     COMPARISON: 05/24/2019     FINDINGS: Parenchyma is grossly unremarkable in appearance with minimal  low-density area seen in the periventricular and subcortical white  matter. No hemorrhage or hydrocephalus. No mass, mass effect, or midline  shift. No abnormal extra-axial fluid collection identified. The bony  structures reveal no evidence of osseous abnormality with minimal  mucosal thickening seen of the left maxillary sinus. Globes and orbits  are intact. The mastoid air cells are patent.       Impression:       Stable chronic changes seen within the brain with no acute  intracranial abnormality identified.     Examination was performed 07/07/2019 at 1053 hours and examination  results were given to the ER physician at time of performance of the  examination at the scanner.     DICTATED:   07/07/2019  EDITED/ls :   07/07/2019      This report was finalized on 7/8/2019 9:12 AM by Dr. Delphine Slaughter MD.        Results for orders placed during the hospital encounter of 07/07/19   Duplex Carotid Ultrasound CAR    Narrative · Right internal carotid artery stenosis of 50-69%.  · Left internal carotid artery stenosis of 0-49%.        Results for orders placed during the hospital encounter of 07/07/19   Adult Transthoracic Echo Complete W/ Cont if Necessary Per Protocol (With Agitated Saline)    Narrative · Left ventricular systolic function is normal. Calculated EF = 60%  · Left ventricular diastolic dysfunction is noted (grade I) consistent   with impaired relaxation.  · The mitral valve is abnormal in structure. Moderate MAC is present.   There is mild anterior mitral leaflet thickening present. Trace mitral   valve regurgitation is present. No significant mitral valve stenosis is   present  · Mild tricuspid valve regurgitation is present.  · Estimated right ventricular systolic pressure  from tricuspid   regurgitation is mildly elevated (35-45 mmHg).  · No evidence of a patent foramen ovale. Saline test results are negative.         Order Current Status    [facility] Draw In process        Discharge Details        Discharge Medications      Changes to Medications      Instructions Start Date   insulin NPH-insulin regular (70-30) 100 UNIT/ML injection  Commonly known as:  humuLIN 70/30,novoLIN 70/30  What changed:    · how much to take  · how to take this  · when to take this  · additional instructions   10 units with breakfast, lunch and dinner. Increase insulin slowly as needed for high blood sugars         Continue These Medications      Instructions Start Date   amLODIPine 5 MG tablet  Commonly known as:  NORVASC   5 mg, Oral, Daily      apixaban 5 MG tablet tablet  Commonly known as:  ELIQUIS   5 mg, Oral, Every 12 Hours Scheduled      cholecalciferol 1000 units tablet  Commonly known as:  VITAMIN D3   1,000 Units, Oral, Daily      clopidogrel 75 MG tablet  Commonly known as:  PLAVIX   75 mg, Oral, Daily, Resume Sunday      [employer] VITAMIN B-12 1000 MCG tablet  Generic drug:  cyanocobalamin   1 tablet, Oral, Daily      gabapentin 600 MG tablet  Commonly known as:  NEURONTIN   600 mg, Oral, 2 Times Daily      meclizine 25 MG tablet  Commonly known as:  ANTIVERT   25 mg, Oral, 3 Times Daily PRN      metoprolol tartrate 50 MG tablet  Commonly known as:  LOPRESSOR   50 mg, Oral, 2 Times Daily      nitroglycerin 0.4 MG SL tablet  Commonly known as:  NITROSTAT   0.4 mg, Sublingual, Every 5 Minutes PRN, Take no more than 3 doses in 15 minutes.      ondansetron ODT 8 MG disintegrating tablet  Commonly known as:  ZOFRAN-ODT   8 mg, Oral, Every 8 Hours PRN      pantoprazole 40 MG EC tablet  Commonly known as:  PROTONIX   40 mg, Oral, Daily      rosuvastatin 20 MG tablet  Commonly known as:  CRESTOR   20 mg, Oral, Daily           Allergies   Allergen Reactions   • Codeine Nausea And Vomiting     Discharge  Disposition:  Home or Self Care    Discharge Diet:  Diet Order   Procedures   • Diet Regular; Thin; Consistent Carbohydrate     Discharge Activity:   Activity Instructions     Activity as Tolerated          CODE STATUS:    Code Status and Medical Interventions:   Ordered at: 07/07/19 1340     Level Of Support Discussed With:    Patient     Code Status:    CPR     Medical Interventions (Level of Support Prior to Arrest):    Full     Future Appointments   Date Time Provider Department Center   7/30/2019 11:20 AM Leanna Parikh APRN [facility] None   7/31/2019  3:30 PM Mariia Del Real DO [facility] None   7/31/2019  4:00 PM Jennifer Richards MD [facility] None     Time Spent on Discharge: 40 minutes    Electronically signed by Daiana Lao PA-C, 07/12/19, 9:16 AM.          Electronically signed by Fly Mukherjee MD at 7/12/2019 10:00 AM

## 2019-07-12 NOTE — DISCHARGE SUMMARY
[facility] Hospital Medicine Services  DISCHARGE SUMMARY    Patient Name: Narcisa Cotto  : 1940  MRN: [id number]    Date of Admission: 2019  Date of Discharge: 2019  Primary Care Physician: Mariia Del Real DO    Consults     Date and Time Order Name Status Description    2019 1632 Inpatient Neurology Consult Stroke      2019 2114 Inpatient Cardiology Consult Completed         Hospital Course     Presenting Problem:   CVA (cerebral vascular accident) (CMS/Formerly [facility]) [I63.9]    Active Hospital Problems    Diagnosis  POA   • **Acute metabolic encephalopathy due to hypoglycemia [G93.41, E16.2]  Yes   • Acute deep vein thrombosis (DVT) of distal vein of right lower extremity (CMS/Formerly [facility]) [I82.4Z1]  Yes   • CKD (chronic kidney disease) stage 3, GFR 30-59 ml/min (CMS/Formerly [facility]) [N18.3]  Yes   • Essential hypertension [I10]  Yes   • GERD (gastroesophageal reflux disease) [K21.9]  Yes   • Coronary artery disease involving native coronary artery of native heart with angina pectoris (CMS/Formerly [facility]) [I25.119]  Yes   • Diabetic peripheral neuropathy (CMS/Formerly [facility]) [E11.42]  Yes   • Peripheral arterial disease (CMS/Formerly [facility]) [I73.9]  Yes   • Controlled type 2 diabetes mellitus with stage 3 chronic kidney disease, with long-term current use of insulin (CMS/Formerly [facility]) [E11.22, N18.3, Z79.4]  Not Applicable      Resolved Hospital Problems    Diagnosis Date Resolved POA   • Hypoglycemia [E16.2] 2019 Yes      Hospital Course:  Ms. Narcisa Cotto is a 79yoF with PMH significant for CAD (s/p RCA stent 19 on Plavix, well-controlled insulin-dependent DMII, CKD III and HTN. She was recently admitted to [facility] -2019 for Klebsiella UTI and RLE DVT. A CT chest was negative for PE and VQ scan was low probability. She was started on Eliquis. Plavix was continued and ASA was stopped. She discharged home with home health and home PT.     She presented to [facility] ED on  7/7/2019 with complaints of right sided weakness and slurred speech. Last known well was 11pm on 7/6. Daughter reports that she woke around 0900 on 7/7 to sounds of moaning and groaning. She found Ms. Cotto laying in bed with right sided weakness and slurred speech. EMS was called and she was brought to [facility] ED. Of note, Ms. Cotto did take extra insulin (70 units) the night prior to presentation (usually takes humuLIN 70/30 20 units QAM, 20 units with lunch and 40 units with dinner).     Initial glucose on ED arrival was 35.   CT cerebral perfusion was negative.   CT angiogram head and neck notable for extensive calcification seen at the carotid bifurcations bilaterally - at least moderate stenosis identified at the proximal R ICA and mild stenosis of the proximal L ICA.   Carotid ultrasound showed R ICA stenosis 50-69% and L ICA stenosis 0-49% - neurology recommends repeat duplex US in 6-12 months   MRI brain negative for CVA.     Ms. Cotto' neurologic symptoms have resolved. Neurology team evaluated the patient and felt that her symptoms were most likely related to hypoglycemia. Hgb A1c 6.3% - discussed glucose management with Ms. Cotto and for now, will recommend decreased doses of insulin at home. She has follow up arranged with her endocrinologist on 7/31/19. Encouraged her to keep this appointment.     Ms. Cotto will return home on 7/12/2019  Follow up with PCP next week.     Day of Discharge     HPI:   Laying in bed. She is feeling well and has no complaints. She is tired of waiting for insurance to approve rehab and she'd like to go home. No new neurologic symptoms and previous symptoms have resolved.     Review of Systems  Gen- No fevers, chills  CV- No chest pain, palpitations  Resp- No cough, dyspnea  GI- No N/V/D, abd pain    Otherwise ROS is negative except as mentioned in the HPI.    Vital Signs:   Temp:  [97.2 °F (36.2 °C)-98.4 °F (36.9 °C)] 98 °F (36.7 °C)  Heart Rate:   [72-85] 85  Resp:  [16-18] 16  BP: (106-136)/(41-72) 126/64     Physical Exam:  Constitutional: No acute distress, awake, alert  HENT: NCAT, mucous membranes moist  Respiratory: Clear to auscultation bilaterally, respiratory effort normal   Cardiovascular: RRR, no murmurs, rubs, or gallops, palpable pedal pulses bilaterally  Gastrointestinal: Positive bowel sounds, soft, nontender, nondistended  Musculoskeletal: No bilateral ankle edema  Psychiatric: Appropriate affect, cooperative  Neurologic: Oriented x 3, strength symmetric in all extremities, Cranial Nerves grossly intact to confrontation, speech clear  Skin: No rashes    Pertinent  and/or Most Recent Results     Results from last 7 days   Lab Units 07/10/19  0541 07/09/19  0755 07/07/19  1246 07/07/19  1141   WBC 10*3/mm3  --   --   --  5.76   HEMOGLOBIN g/dL  --   --   --  12.0   HEMATOCRIT %  --   --   --  37.9   PLATELETS 10*3/mm3  --   --   --  258   SODIUM mmol/L 141 140 136  --    POTASSIUM mmol/L 4.0 4.2 4.4  --    CHLORIDE mmol/L 106 107 102  --    CO2 mmol/L 23.0 21.0* 17.0*  --    BUN mg/dL 11 15 29*  --    CREATININE mg/dL 0.95 1.07* 1.15*  --    GLUCOSE mg/dL 129* 134* 35*  --    CALCIUM mg/dL 9.6 9.6 10.2  --      Results from last 7 days   Lab Units 07/07/19  1246   BILIRUBIN mg/dL 0.5   ALK PHOS U/L 73   ALT (SGPT) U/L 41*   AST (SGOT) U/L 39*     Results from last 7 days   Lab Units 07/08/19  0656   CHOLESTEROL mg/dL 90   TRIGLYCERIDES mg/dL 189*   HDL CHOL mg/dL 25*     Results from last 7 days   Lab Units 07/08/19  0656 07/07/19  1246   HEMOGLOBIN A1C % 6.30*  --    TROPONIN T ng/mL  --  <0.010         Brief Urine Lab Results  (Last result in the past 365 days)      Color   Clarity   Blood   Leuk Est   Nitrite   Protein   CREAT   Urine HCG        06/21/19 1715 Yellow Cloudy Trace Moderate (2+) Negative 100 mg/dL (2+)             Microbiology Results Abnormal     None        Imaging Results (all)     Procedure Component Value Units Date/Time     MRI Brain Without Contrast [[id number]] Collected:  07/09/19 1832     Updated:  07/10/19 1533    Narrative:       EXAMINATION: MRI BRAIN WO CONTRAST-     INDICATION: Confusion/delirium, altered LOC, unexplained; I63.9-Cerebral  infarction, unspecified; R47.1-Dysarthria and anarthria; R53.1-Weakness;  E16.2-Hypoglycemia, unspecified; R13.10-Dysphagia, unspecified;  Z74.09-Other reduced mobility.      TECHNIQUE: Multiplanar MRI of the brain without intravenous contrast.     COMPARISON: CT cerebral perfusion 07/07/2019.     FINDINGS: No restriction on diffusion-weighted sequences. Midline  structures are symmetric without evidence for mass, mass effect or  midline shift. Moderate T2 and FLAIR increased signal findings within  the periventricular and deep white matter fairly confluent in appearance  suggesting advanced chronic small vessel ischemic disease. Pituitary and  sella within normal limits. Cervicomedullary junction widely patent.  Globes and orbits retain normal T2 signal characteristics.  Visualized  paranasal sinuses and mastoid air cells demonstrate mild to moderate  circumferential mucosal thickening of the left maxillary sinus without  significant air-fluid level along with mild prominence of the ethmoid  air cells mucosal otherwise grossly clear and well pneumatized. No  cerebellopontine angle mass lesion with grossly normal signal flow voids  of the distal internal carotid and vertebrobasilar arteries.       Impression:       1. No acute intracranial abnormality specifically no acute infarction.  Moderate increased signal findings in the periventricular and deep white  matter suggesting moderate chronic small vessel ischemic disease.  2. Mild to moderate circumferential mucosal thickening of the left  maxillary sinus without significant air-fluid level may represent  sinusitis in the appropriate setting.     D:  07/09/2019  E:  07/10/2019     This report was finalized on 7/10/2019 3:30 PM by Dr. Peraza  Key.       XR Chest 1 View [[id number]] Collected:  07/07/19 1228     Updated:  07/08/19 0915    Narrative:          EXAMINATION: XR CHEST 1 VW - 07/07/2019     INDICATION: Evaluate for stroke.     COMPARISON: 06/21/2019     FINDINGS: Portable chest reveals the heart to be enlarged. Underlying  chronic and emphysematous change is seen in the lung fields bilaterally.  Degenerative change is  seen within the spine. No pleural effusion or  pneumothorax. Pulmonary vascularity is within normal limits.           Impression:       Chronic changes. No acute parenchymal disease.     DICTATED:   07/07/2019  EDITED/ls :   07/07/2019      This report was finalized on 7/8/2019 9:12 AM by Dr. Delphine Slaughter MD.       CT Angiogram Head With & Without Contrast [[id number]] Collected:  07/07/19 1237     Updated:  07/08/19 0915    Narrative:       EXAMINATION: CT ANGIOGRAM NECK WWO CONTRAST, CT ANGIOGRAM HEAD WWO  CONTRAST - 07/07/2019      INDICATION: Right-sided weakness, evaluate for stroke.     TECHNIQUE: Multiple axial CT imaging is obtained of the head and neck  following the ministration of intravenous contrast.     Stenosis measurement was performed by the NASCET or similar method.     The radiation dose reduction device was turned on for each scan per the  ALARA (As Low as Reasonably Achievable) protocol.     COMPARISON: NONE     FINDINGS: Lung apices are grossly clear with vascular calcification seen  within the thoracic aortic arch. Atherosclerotic disease seen at the  origin the great vessels with no significant stenosis. The thyroid is  heterogeneous in appearance with multiple small nodules identified.  Degenerative changes identified within the spine. No gross abnormality  identified within the soft tissues of the neck. There is no bulky  adenopathy identified. Musculature is intact. No abnormal mass or fluid  collections identified.     The common carotid arteries are without significant stenosis with  extensive  calcification seen at the carotid bifurcations bilaterally.  There is somewhat difficulty measuring the area of narrowing of the  distal common carotid artery and internal carotid arteries right greater  than left due to the extensive calcification. There is at least moderate  stenosis identified of the proximal right internal carotid artery. There  is mild stenosis identified of the left internal carotid artery. The  intracranial vessels reveal atherosclerotic disease seen in the  cavernous portions of the internal carotid arteries. There is no  aneurysmal dilatation or vascular malformation or significant stenosis  seen of either the anterior or posterior circulation. Anterior and  middle cerebral arteries are unremarkable. Posterior circulation is  intact. There is a dominant left vertebral artery. Degenerative changes  seen within the spine.       Impression:       Extensive calcification seen at the carotid bifurcations  bilaterally making measurements of the narrowing extremely difficult  however there is at least a moderate stenosis identified of the proximal  right internal carotid artery and mild stenosis of the proximal left  internal carotid artery. No definite significant stenosis is identified.  The intracranial vessels are unremarkable.     DICTATED:   07/07/2019  EDITED/ls :   07/07/2019      This report was finalized on 7/8/2019 9:12 AM by Dr. Delphine Slaughter MD.       CT Angiogram Neck With & Without Contrast [[id number]] Collected:  07/07/19 1237     Updated:  07/08/19 0915    Narrative:       EXAMINATION: CT ANGIOGRAM NECK WWO CONTRAST, CT ANGIOGRAM HEAD WWO  CONTRAST - 07/07/2019      INDICATION: Right-sided weakness, evaluate for stroke.     TECHNIQUE: Multiple axial CT imaging is obtained of the head and neck  following the ministration of intravenous contrast.     Stenosis measurement was performed by the NASCET or similar method.     The radiation dose reduction device was turned on for  each scan per the  ALARA (As Low as Reasonably Achievable) protocol.     COMPARISON: NONE     FINDINGS: Lung apices are grossly clear with vascular calcification seen  within the thoracic aortic arch. Atherosclerotic disease seen at the  origin the great vessels with no significant stenosis. The thyroid is  heterogeneous in appearance with multiple small nodules identified.  Degenerative changes identified within the spine. No gross abnormality  identified within the soft tissues of the neck. There is no bulky  adenopathy identified. Musculature is intact. No abnormal mass or fluid  collections identified.     The common carotid arteries are without significant stenosis with  extensive calcification seen at the carotid bifurcations bilaterally.  There is somewhat difficulty measuring the area of narrowing of the  distal common carotid artery and internal carotid arteries right greater  than left due to the extensive calcification. There is at least moderate  stenosis identified of the proximal right internal carotid artery. There  is mild stenosis identified of the left internal carotid artery. The  intracranial vessels reveal atherosclerotic disease seen in the  cavernous portions of the internal carotid arteries. There is no  aneurysmal dilatation or vascular malformation or significant stenosis  seen of either the anterior or posterior circulation. Anterior and  middle cerebral arteries are unremarkable. Posterior circulation is  intact. There is a dominant left vertebral artery. Degenerative changes  seen within the spine.       Impression:       Extensive calcification seen at the carotid bifurcations  bilaterally making measurements of the narrowing extremely difficult  however there is at least a moderate stenosis identified of the proximal  right internal carotid artery and mild stenosis of the proximal left  internal carotid artery. No definite significant stenosis is identified.  The intracranial vessels  are unremarkable.     DICTATED:   07/07/2019  EDITED/ls :   07/07/2019      This report was finalized on 7/8/2019 9:12 AM by Dr. Delphine Slaughter MD.       CT Cerebral Perfusion With & Without Contrast [[id number]] Collected:  07/07/19 1223     Updated:  07/08/19 0915    Narrative:       EXAMINATION: CT CEREBRAL PERFUSION WWO CONTRAST - 07/07/2019      INDICATION: TIA, stroke symptoms     TECHNIQUE: Cerebral perfusion analysis was performed using computed  tomography with contrast administration, including post processing of  parametric maps with determination of cerebral blood flow, cerebral  blood volume, and mean transit time.      The radiation dose reduction device was turned on for each scan per the  ALARA (As Low as Reasonably Achievable) protocol.     COMPARISON: NONE     FINDINGS: No evidence of abnormality within cerebral blood flow or  cerebral blood volume. No evidence of reversible ischemia. No increased  mean transit time to suggest evidence of an occlusion or stenosis.       Impression:       No large perfusion abnormality to suggest evidence of  reversible ischemia.     DICTATED:   07/07/2019  EDITED/ls :   07/07/2019      This report was finalized on 7/8/2019 9:12 AM by Dr. Delphine Slaughter MD.       CT Head Without Contrast Stroke Protocol [[id number]] Collected:  07/07/19 1113     Updated:  07/08/19 0915    Narrative:       EXAMINATION: CT HEAD WO CONTRAST  - 07/07/2019     INDICATION: Stroke symptoms, right-sided weakness     TECHNIQUE: Multiple axial CT imaging is obtained of the head from skull  base to skull vertex without the administration of intravenous contrast.     The radiation dose reduction device was turned on for each scan per the  ALARA (As Low as Reasonably Achievable) protocol.     COMPARISON: 05/24/2019     FINDINGS: Parenchyma is grossly unremarkable in appearance with minimal  low-density area seen in the periventricular and subcortical white  matter. No hemorrhage or  hydrocephalus. No mass, mass effect, or midline  shift. No abnormal extra-axial fluid collection identified. The bony  structures reveal no evidence of osseous abnormality with minimal  mucosal thickening seen of the left maxillary sinus. Globes and orbits  are intact. The mastoid air cells are patent.       Impression:       Stable chronic changes seen within the brain with no acute  intracranial abnormality identified.     Examination was performed 07/07/2019 at 1053 hours and examination  results were given to the ER physician at time of performance of the  examination at the scanner.     DICTATED:   07/07/2019  EDITED/ls :   07/07/2019      This report was finalized on 7/8/2019 9:12 AM by Dr. Delphine Slaughter MD.        Results for orders placed during the hospital encounter of 07/07/19   Duplex Carotid Ultrasound CAR    Narrative · Right internal carotid artery stenosis of 50-69%.  · Left internal carotid artery stenosis of 0-49%.        Results for orders placed during the hospital encounter of 07/07/19   Adult Transthoracic Echo Complete W/ Cont if Necessary Per Protocol (With Agitated Saline)    Narrative · Left ventricular systolic function is normal. Calculated EF = 60%  · Left ventricular diastolic dysfunction is noted (grade I) consistent   with impaired relaxation.  · The mitral valve is abnormal in structure. Moderate MAC is present.   There is mild anterior mitral leaflet thickening present. Trace mitral   valve regurgitation is present. No significant mitral valve stenosis is   present  · Mild tricuspid valve regurgitation is present.  · Estimated right ventricular systolic pressure from tricuspid   regurgitation is mildly elevated (35-45 mmHg).  · No evidence of a patent foramen ovale. Saline test results are negative.         Order Current Status    [place] Draw In process        Discharge Details        Discharge Medications      Changes to Medications      Instructions Start Date   insulin  NPH-insulin regular (70-30) 100 UNIT/ML injection  Commonly known as:  humuLIN 70/30,novoLIN 70/30  What changed:    · how much to take  · how to take this  · when to take this  · additional instructions   10 units with breakfast, lunch and dinner. Increase insulin slowly as needed for high blood sugars         Continue These Medications      Instructions Start Date   amLODIPine 5 MG tablet  Commonly known as:  NORVASC   5 mg, Oral, Daily      apixaban 5 MG tablet tablet  Commonly known as:  ELIQUIS   5 mg, Oral, Every 12 Hours Scheduled      cholecalciferol 1000 units tablet  Commonly known as:  VITAMIN D3   1,000 Units, Oral, Daily      clopidogrel 75 MG tablet  Commonly known as:  PLAVIX   75 mg, Oral, Daily, Resume Sunday      [employer] VITAMIN B-12 1000 MCG tablet  Generic drug:  cyanocobalamin   1 tablet, Oral, Daily      gabapentin 600 MG tablet  Commonly known as:  NEURONTIN   600 mg, Oral, 2 Times Daily      meclizine 25 MG tablet  Commonly known as:  ANTIVERT   25 mg, Oral, 3 Times Daily PRN      metoprolol tartrate 50 MG tablet  Commonly known as:  LOPRESSOR   50 mg, Oral, 2 Times Daily      nitroglycerin 0.4 MG SL tablet  Commonly known as:  NITROSTAT   0.4 mg, Sublingual, Every 5 Minutes PRN, Take no more than 3 doses in 15 minutes.      ondansetron ODT 8 MG disintegrating tablet  Commonly known as:  ZOFRAN-ODT   8 mg, Oral, Every 8 Hours PRN      pantoprazole 40 MG EC tablet  Commonly known as:  PROTONIX   40 mg, Oral, Daily      rosuvastatin 20 MG tablet  Commonly known as:  CRESTOR   20 mg, Oral, Daily           Allergies   Allergen Reactions   • Codeine Nausea And Vomiting     Discharge Disposition:  Home or Self Care    Discharge Diet:  Diet Order   Procedures   • Diet Regular; Thin; Consistent Carbohydrate     Discharge Activity:   Activity Instructions     Activity as Tolerated          CODE STATUS:    Code Status and Medical Interventions:   Ordered at: 07/07/19 1340     Level Of Support Discussed  With:    Patient     Code Status:    CPR     Medical Interventions (Level of Support Prior to Arrest):    Full     Future Appointments   Date Time Provider Department Center   7/30/2019 11:20 AM Leanna Parikh APRN [facility] None   7/31/2019  3:30 PM Mariia Del Real DO [facility] None   7/31/2019  4:00 PM Jennifer Richards MD [facility] None     Time Spent on Discharge: 40 minutes    Electronically signed by Daiana Lao PA-C, 07/12/19, 9:16 AM.

## 2019-07-13 ENCOUNTER — READMISSION MANAGEMENT (OUTPATIENT)
Dept: CALL CENTER | Facility: HOSPITAL | Age: 79
End: 2019-07-13

## 2019-07-13 NOTE — OUTREACH NOTE
Prep Survey      Responses   Facility patient discharged from?  [facility]   Is patient eligible?  Yes   Discharge diagnosis  CVA   Does the patient have one of the following disease processes/diagnoses(primary or secondary)?  Stroke (TIA)   Does the patient have Home health ordered?  Yes   What is the Home health agency?   [facility]   Is there a DME ordered?  No   Prep survey completed?  Yes          Tisha Givens RN

## 2019-07-15 ENCOUNTER — READMISSION MANAGEMENT (OUTPATIENT)
Dept: CALL CENTER | Facility: HOSPITAL | Age: 79
End: 2019-07-15

## 2019-07-15 ENCOUNTER — TRANSITIONAL CARE MANAGEMENT TELEPHONE ENCOUNTER (OUTPATIENT)
Dept: INTERNAL MEDICINE | Facility: CLINIC | Age: 79
End: 2019-07-15

## 2019-07-15 NOTE — OUTREACH NOTE
Stroke Week 1 Survey      Responses   Facility patient discharged from?  [facility]   Does the patient have one of the following disease processes/diagnoses(primary or secondary)?  Stroke (TIA)   Is there a successful TCM telephone encounter documented?  No   Week 1 attempt successful?  Yes   Call start time  1229   Call end time  1233   Discharge diagnosis  CVA   Meds reviewed with patient/caregiver?  Yes   Is the patient having any side effects they believe may be caused by any medication additions or changes?  No   Does the patient have all medications ordered at discharge?  Yes   Is the patient taking all medications as directed (includes completed medication regime)?  Yes   Does the patient have a primary care provider?   Yes   Does the patient have an appointment with their PCP within 7 days of discharge?  Yes   Has the patient kept scheduled appointments due by today?  Yes   What is the Home health agency?   [facility]   Has home health visited the patient within 72 hours of discharge?  Yes   Psychosocial issues?  No   Does the patient require any assistance with activities of daily living such as eating, bathing, dressing, walking, etc.?  No   Does the patient have any residual symptoms from stroke/TIA?  No   Does the patient understand the diet ordered at discharge?  Yes   Did the patient receive a copy of their discharge instructions?  Yes   Nursing interventions  Reviewed instructions with patient   What is the patient's perception of their health status since discharge?  Improving   Nursing interventions  Nurse provided patient education   Is the patient able to teach back FAST for Stroke?  Yes   Is the patient/caregiver able to teach back the risk factors for a stroke?  High blood pressure-goal below 120/80, Smoking, Diabetes, High Cholesterol, Physical inactivity and obesity   Is the patient/caregiver able to teach back signs and symptoms related to disease process for when to call PCP?  Yes   Is the  patient/caregiver able to teach back signs and symptoms related to disease process for when to call 911?  Yes   Is the patient/caregiver able to teach back the hierarchy of who to call/visit for symptoms/problems? PCP, Specialist, Home health nurse, Urgent Care, ED, 911  Yes   Week 1 call completed?  Yes          Royce Long RN

## 2019-07-16 ENCOUNTER — TELEPHONE (OUTPATIENT)
Dept: INTERNAL MEDICINE | Facility: CLINIC | Age: 79
End: 2019-07-16

## 2019-07-16 NOTE — TELEPHONE ENCOUNTER
PATIENTS DAUGHTER CALLED TO CURRENTLY CANCEL HER UPCOMING APPOINTMENT ON 07/18/2019. PATIENT DOES NOT KNOW YET SINCE SHE IS SLEEPING. IF PATIENT CALLS, PLEASE LET HER KNOW.

## 2019-07-16 NOTE — OUTREACH NOTE
"RIVERA call completed.  Please refer to TCM call flowsheet for call documentation.    The pt states \"I feel a whole lot better\" and \"I have done so much better this time around.\" Pt states PT visited yesterday. OT/HH nurse scheduled today. She states \"appetite is whole lot better.\" Eating and drinking well. Bowels are moving. No difficulty urinating. Reports monitoring glucose closely at home. Daughter helps in her care and home needs. She notes mild SOB however states she feels this is at her baselines and she says \"I actually feel less SOB.\" Pt notes dry cough. She plans to have HH nurse evaluate today during visit. She denies any fever, chills, chest pain, n/v/d, LE edema, or pain. She denies any questions, concerns, or needs at time of call. Pt r/s PCP appt for 7/31/19. Encouraged pt to return to ED for any persistent, worsening, or new sx's and she voiced understanding. Call PCP office if she would like to r/s earlier appt.   "

## 2019-07-17 ENCOUNTER — TELEPHONE (OUTPATIENT)
Dept: INTERNAL MEDICINE | Facility: CLINIC | Age: 79
End: 2019-07-17

## 2019-07-17 NOTE — TELEPHONE ENCOUNTER
IAM/ANA [facility]. HE IS NEEDING A VERBAL ORDER FOR OCCUPATIONAL THERAPY STARTING NEXT WEEK,  1 X WEEKLY FOR 1 WEEK, THEN 2 X WEEKLY FOR 2 WEEKS. IAM CAN BE REACHED [phone].

## 2019-07-24 ENCOUNTER — READMISSION MANAGEMENT (OUTPATIENT)
Dept: CALL CENTER | Facility: HOSPITAL | Age: 79
End: 2019-07-24

## 2019-07-24 PROBLEM — I44.2 COMPLETE HEART BLOCK (HCC): Status: RESOLVED | Noted: 2019-06-17 | Resolved: 2019-07-24

## 2019-07-24 LAB
BUN BLDA-MCNC: 41 MG/DL (ref 8–26)
CA-I BLDA-SCNC: 1.31 MMOL/L (ref 1.2–1.32)
CHLORIDE BLDA-SCNC: 107 MMOL/L (ref 98–109)
CO2 BLDA-SCNC: 26 MMOL/L (ref 24–29)
CREAT BLDA-MCNC: 1.4 MG/DL (ref 0.6–1.3)
GLUCOSE BLDC GLUCOMTR-MCNC: 50 MG/DL (ref 70–130)
HCT VFR BLDA CALC: 35 % (ref 38–51)
HGB BLDA-MCNC: 11.9 G/DL (ref 12–17)
INR PPP: 1.3 (ref 0.8–1.2)
POTASSIUM BLDA-SCNC: 5.2 MMOL/L (ref 3.5–4.9)
PROTHROMBIN TIME: 15.4 SECONDS (ref 12.8–15.2)
SODIUM BLDA-SCNC: 141 MMOL/L (ref 138–146)

## 2019-07-24 NOTE — OUTREACH NOTE
Stroke Week 2 Survey      Responses   Facility patient discharged from?  [place]   Does the patient have one of the following disease processes/diagnoses(primary or secondary)?  Stroke (TIA)   Week 2 attempt successful?  Yes   Call start time  0934   Call end time  0945   Meds reviewed with patient/caregiver?  Yes   Is the patient having any side effects they believe may be caused by any medication additions or changes?  No   Does the patient have all medications ordered at discharge?  Yes   Is the patient taking all medications as directed (includes completed medication regime)?  Yes   Does the patient have a primary care provider?   Yes   Does the patient have an appointment with their PCP within 7 days of discharge?  No   What is preventing the patient from scheduling follow up appointments within 7 days of discharge?  Earlier appointment not available   Nursing Interventions  Verified appointment date/time/provider   Has the patient kept scheduled appointments due by today?  N/A   Comments  Initial visit with PCP was changed to 07/31/19 by PCP office.   Has home health visited the patient within 72 hours of discharge?  Yes   Home health comments  nurse and PT, OT also   Psychosocial issues?  No   Does the patient require any assistance with activities of daily living such as eating, bathing, dressing, walking, etc.?  No   Does the patient have any residual symptoms from stroke/TIA?  Yes   Residual symptoms comments  States is still weak on right side, mostly in leg.   Does the patient understand the diet ordered at discharge?  Yes   Did the patient receive a copy of their discharge instructions?  Yes   Nursing interventions  Reviewed instructions with patient, Educated on MyChart [uses MYChart]   What is the patient's perception of their health status since discharge?  Improving   Nursing interventions  Nurse provided patient education   Is the patient able to teach back FAST for Stroke?  Yes   Is the  patient/caregiver able to teach back the risk factors for a stroke?  High blood pressure-goal below 120/80, Diabetes, Physical inactivity and obesity, High Cholesterol, Carotid or other artery disease   Is the patient/caregiver able to teach back signs and symptoms related to disease process for when to call PCP?  Yes   Is the patient/caregiver able to teach back signs and symptoms related to disease process for when to call 911?  Yes   If the patient is a current smoker, are they able to teach back resources for cessation?  -- [nonsmoker]   Is the patient/caregiver able to teach back the hierarchy of who to call/visit for symptoms/problems? PCP, Specialist, Home health nurse, Urgent Care, ED, 911  Yes   Week 2 call completed?  Yes   Wrap up additional comments  States is feeling better-continues with PT, OT, and speech therapy. States /68,  this morning. Denies any problems today.          Brittany Saldana, RN

## 2019-07-24 NOTE — PROGRESS NOTES
Encounter Date:07/30/2019    Patient ID: Narcisa Cotto is a 79 y.o. female who resides in [place], [place]    CC/Reason for visit:  Coronary Artery Disease and Hypertension           Problem List Items Addressed This Visit        Cardiovascular and Mediastinum    Coronary artery disease involving native coronary artery of native heart with angina pectoris (CMS/[facility])    Overview     · Echo (8/31/2016): EF > 70%. Moderate thickening of the noncoronary cusp of the aortic valve.  · Echo (04/23/2018): LVEF 60%. Grade I diastolic dysfunction.   Cardiac valves are functionally normal.  · Nuclear stress test (04/23/2018): No focal areas of ischemia or infarct.  Transient ischemic dilatation present raising possibility of balanced ischemia/multivessel CAD  · Cardiac catheterization (5/23/2019): Severe multivessel CAD.  Culprit for NSTEMI is a highly calcified ostial RCA 99% stenosis.    · Staged rotational atherectomy and PCI of the RCA using a PATRICIA, 5/29/2019         Current Assessment & Plan     · Continue Plavix 75 mg daily.  Defer aspirin due to chronic anticoagulation with Eliquis  · Continue metoprolol tartrate 50 mg twice daily  · Sublingual nitroglycerin for any episodes of angina         RESOLVED: Complete heart block (CMS/[facility]) - Primary    Hyperlipidemia LDL goal <70    Overview     · High intensity statin therapy is recommended given the presence of peripheral arterial disease and diabetes         Current Assessment & Plan     · Continue Crestor 20 mg daily         Peripheral arterial disease (CMS/[facility])    Overview     · BONNIE (08/22/2013):  At rest right 0.98, left 0.85.  After 2 minutes of exercise right 0.51, left 0.68.  · High-grade right common iliac stenosis on cardiac catheterization, 5/23/2019         Current Assessment & Plan     · Patient reports discomfort in her right hip  · Continue Plavix and statin therapy  · We will follow-up in 3 months and consider intervention of right common iliac stenosis if  pain continues         Essential hypertension    Current Assessment & Plan     · Continue amlodipine 5 mg daily  · Continue metoprolol tartrate 50 mg twice daily         Relevant Medications    furosemide (LASIX) 20 MG tablet    Acute deep vein thrombosis (DVT) of distal vein of right lower extremity (CMS/[facility])    Overview     · Venous duplex (6/2019): Right peroneal DVT         Current Assessment & Plan     · Continue Eliquis 5 mg twice daily  · Lasix 20 mg daily for 3 days for left leg swelling         Diastolic dysfunction    Overview     · Echo (5/22/2019):LVEF = 65%.  Grade 2 diastolic dysfunction.  Mild MR and AI.           Current Assessment & Plan     · Continue metoprolol tartrate 50 mg twice daily               On physical exam the patient did have bilateral edema that was worse in the left leg. I am not concerned about a DVT as the patient is taking Eliquis and Plavix and has not missed any doses.  I will prescribe her Lasix 20 mg to take for 3 days to see if there is some improvement.  I have also written her prescription for compression stockings and she should elevate her extremities as much as possible.  In regards to her right hip pain it could be related to her right iliac stenosis but the patient has had multiple hospitalizations and is only been home for a couple of weeks.  We discussed continuing Plavix and statin therapy and reevaluating her discomfort in a few months to consider intervention of her iliac stenosis.  She is agreeable to this.  If she was to develop anginal symptoms she is to contact us and we can also consider setting up staged intervention of the LAD and circumflex arteries.  For now we will continue her current medications and she will follow-up in 2 months or sooner if needed       · Lasix 20 mg x 3 days for lower extremity edema.  If edema not improved after 3 days she is to contact us  · Medium grade compression stockings  · Elevate lower extremities  · Continue Eliquis and  Plavix  · If anginal symptoms return will consider staged intervention of LAD and circumflex  · Will reassess in 2 months and if continues to have right hip pain will consider intervention of the right iliac stenosis.  She should continue her Plavix and statin therapy for now  Return in about 2 months (around 9/30/2019), or if symptoms worsen or fail to improve.            Narcisa Cotto returns today for follow-up after multiple recent hospitalizations.  Since her last visit she contacted our office in November requesting cardiac clearance and to hold Plavix to undergo a laminectomy.  She underwent the procedure in November and actually did well.  However starting in May she had a decline in her physical health.  She was admitted to [facility] in May with increased shortness of breath and ruled in for non-ST elevation MI.  She has chronic kidney disease and during hospitalization her creatinine karie  to greater than 2.0.  All nephrotoxic medications were held and eventually cardiac cath was performed after creatinine improved and renal function stabilized.  Coronary angiography revealed severe multivessel disease and the culprit of her non-STEMI was noted to be a highly calcified ostial stenosis of the RCA. Initially PCI was attempted but aborted due to poor guide support.  A staged PCI to the RCA was performed several days later using rotational atherectomy.  CT surgery was initially consulted but patient was deemed not to be a surgical candidate.  She was also noted to have stenosis in the LAD and circumflex and recommendations for future staged PCI would be considered if patient became symptomatic and it was felt to be clinically indicated.  While hospitalized she did develop complete heart block prior to her RCA intervention and required a temporary pacemaker placement but it was eventually discontinued.  She was eventually discharged to [facility] for rehabilitation but several weeks later  returned back to [facility] with complaints of increased dyspnea and was found to be hypoxic with O2 saturations in the 80s. Patient also complained of leg pain and was found to have a right peroneal DVT for which Eliquis was started.  Her aspirin was discontinued and she remained on Plavix and Eliquis.  She was also found to have a UTI with Klebsiella that required IV antibiotic.  She also complained of intermittent claudication symptoms and a high-grade right common iliac stenosis was noted on initial catheterization with plans for consideration of future intervention.  After discharge several days later the patient presented back to [facility] once again with right-sided weakness.  She ruled out for an acute CVA/TIA but was found to be severely hypoglycemic with a glucose level of 35.  For the last 2 weeks the patient has done fairly well.  She has not experienced any further anginal symptoms and denies chest pain/pressure/tightness.  She does report having some right hip pain particularly when she is trying to do chores around her home.  She is tolerating Plavix and Eliquis without any reports of active or overt bleeding.  She denies any increased shortness of breath.  Her main complaint is of right hip pain and left leg swelling.    Review of Systems   Constitution: Positive for weakness and malaise/fatigue.   Eyes: Negative for vision loss in left eye and vision loss in right eye.   Cardiovascular: Positive for leg swelling. Negative for chest pain, dyspnea on exertion, near-syncope, orthopnea, palpitations, paroxysmal nocturnal dyspnea and syncope.   Skin: Positive for dry skin.   Musculoskeletal: Positive for back pain and stiffness. Negative for myalgias.   Genitourinary: Positive for bladder incontinence.   Neurological: Negative for brief paralysis, excessive daytime sleepiness, focal weakness, numbness and paresthesias.   All other systems reviewed and are negative.      The  "patient's past medical, social, family history and ROS reviewed in the patient's electronic medical record.    Allergies  Codeine    Outpatient Medications Marked as Taking for the 7/30/19 encounter (Office Visit) with Leanna Parikh APRN   Medication Sig Dispense Refill   • amLODIPine (NORVASC) 5 MG tablet Take 5 mg by mouth Daily.     • apixaban (ELIQUIS) 5 MG tablet tablet Take 1 tablet by mouth Every 12 (Twelve) Hours. Bottle #2 60 tablet 0   • cholecalciferol (VITAMIN D3) 1000 units tablet Take 1,000 Units by mouth Daily.     • clopidogrel (PLAVIX) 75 MG tablet Take 1 tablet by mouth Daily for 360 days. Resume Sunday 90 tablet 3   • cyanocobalamin ([employer] VITAMIN B-12) 1000 MCG tablet Take 1 tablet by mouth daily.     • gabapentin (NEURONTIN) 600 MG tablet Take 1 tablet by mouth 2 (Two) Times a Day. 180 tablet 1   • insulin NPH-insulin regular (humuLIN 70/30,novoLIN 70/30) (70-30) 100 UNIT/ML injection 10 units with breakfast, lunch and dinner. Increase insulin slowly as needed for high blood sugars  12   • meclizine (ANTIVERT) 25 MG tablet Take 25 mg by mouth 3 (Three) Times a Day As Needed for dizziness.     • metoprolol tartrate (LOPRESSOR) 50 MG tablet Take 1 tablet by mouth 2 (Two) Times a Day. 180 tablet 0   • nitroglycerin (NITROSTAT) 0.4 MG SL tablet Place 1 tablet under the tongue Every 5 (Five) Minutes As Needed for Chest Pain. Take no more than 3 doses in 15 minutes. 60 tablet 1   • ondansetron ODT (ZOFRAN-ODT) 8 MG disintegrating tablet Take 1 tablet by mouth Every 8 (Eight) Hours As Needed for Nausea or Vomiting. 180 tablet 1   • pantoprazole (PROTONIX) 40 MG EC tablet Take 1 tablet by mouth Daily.     • rosuvastatin (CRESTOR) 20 MG tablet Take 1 tablet by mouth Daily for 360 days. 90 tablet 1           Blood pressure 120/60, pulse 80, height 167.6 cm (66\"), weight 72.9 kg (160 lb 12.8 oz), not currently breastfeeding.  Body mass index is 25.95 kg/m².  Vitals:    07/30/19 1134   Patient Position: " Sitting       Physical Exam   Constitutional: She is oriented to person, place, and time. She appears well-developed and well-nourished.   HENT:   Head: Normocephalic and atraumatic.   Eyes: Pupils are equal, round, and reactive to light. No scleral icterus.   Neck: No JVD present. Carotid bruit is not present. No thyromegaly present.   Cardiovascular: Normal rate and regular rhythm. Exam reveals no gallop.   No murmur heard.  Pulmonary/Chest: Effort normal and breath sounds normal.   Abdominal: Soft. She exhibits no distension. There is no hepatosplenomegaly.   Musculoskeletal: She exhibits no edema.   Neurological: She is alert and oriented to person, place, and time.   Skin: Skin is warm and dry.   Psychiatric: She has a normal mood and affect. Her behavior is normal.       Data Review (reviewed with patient):     Procedures    Lab Results   Component Value Date    CHOL 90 07/08/2019    TRIG 189 (H) 07/08/2019    HDL 25 (L) 07/08/2019    LDL 27 07/08/2019    AST 39 (H) 07/07/2019    ALT 41 (H) 07/07/2019       Lab Results   Component Value Date    HGBA1C 6.30 (H) 07/08/2019           Leanna Parikh, APRN  7/30/2019

## 2019-07-30 ENCOUNTER — OFFICE VISIT (OUTPATIENT)
Dept: CARDIOLOGY | Facility: CLINIC | Age: 79
End: 2019-07-30

## 2019-07-30 VITALS
HEIGHT: 66 IN | HEART RATE: 80 BPM | BODY MASS INDEX: 25.84 KG/M2 | WEIGHT: 160.8 LBS | DIASTOLIC BLOOD PRESSURE: 60 MMHG | SYSTOLIC BLOOD PRESSURE: 120 MMHG

## 2019-07-30 DIAGNOSIS — I73.9 PERIPHERAL ARTERIAL DISEASE (HCC): ICD-10-CM

## 2019-07-30 DIAGNOSIS — I82.4Z1 ACUTE DEEP VEIN THROMBOSIS (DVT) OF DISTAL VEIN OF RIGHT LOWER EXTREMITY (HCC): ICD-10-CM

## 2019-07-30 DIAGNOSIS — I44.2 COMPLETE HEART BLOCK (HCC): Primary | ICD-10-CM

## 2019-07-30 DIAGNOSIS — I25.119 CORONARY ARTERY DISEASE INVOLVING NATIVE CORONARY ARTERY OF NATIVE HEART WITH ANGINA PECTORIS (HCC): ICD-10-CM

## 2019-07-30 DIAGNOSIS — I51.89 DIASTOLIC DYSFUNCTION: ICD-10-CM

## 2019-07-30 DIAGNOSIS — E78.5 HYPERLIPIDEMIA LDL GOAL <70: ICD-10-CM

## 2019-07-30 DIAGNOSIS — I10 ESSENTIAL HYPERTENSION: ICD-10-CM

## 2019-07-30 PROCEDURE — 99214 OFFICE O/P EST MOD 30 MIN: CPT | Performed by: NURSE PRACTITIONER

## 2019-07-30 RX ORDER — FUROSEMIDE 20 MG/1
20 TABLET ORAL DAILY
Qty: 30 TABLET | Refills: 11 | Status: SHIPPED | OUTPATIENT
Start: 2019-07-30 | End: 2020-03-10

## 2019-07-30 NOTE — ASSESSMENT & PLAN NOTE
· Continue Plavix 75 mg daily.  Defer aspirin due to chronic anticoagulation with Eliquis  · Continue metoprolol tartrate 50 mg twice daily  · Sublingual nitroglycerin for any episodes of angina

## 2019-07-30 NOTE — ASSESSMENT & PLAN NOTE
· Patient reports discomfort in her right hip  · Continue Plavix and statin therapy  · We will follow-up in 3 months and consider intervention of right common iliac stenosis if pain continues

## 2019-08-01 ENCOUNTER — READMISSION MANAGEMENT (OUTPATIENT)
Dept: CALL CENTER | Facility: HOSPITAL | Age: 79
End: 2019-08-01

## 2019-08-01 NOTE — OUTREACH NOTE
Stroke Week 3 Survey      Responses   Facility patient discharged from?  [facility]   Does the patient have one of the following disease processes/diagnoses(primary or secondary)?  Stroke (TIA)   Week 3 attempt successful?  Yes   Call start time  0929   Call end time  0934   Discharge diagnosis  CVA   Meds reviewed with patient/caregiver?  Yes   Is the patient taking all medications as directed (includes completed medication regime)?  Yes   Medication comments  Lasix--for fluid   Has the patient kept scheduled appointments due by today?  Yes   Comments  PCP was sick yesterday has been to cards   What is the Home health agency?   [facility]    Does the patient require any assistance with activities of daily living such as eating, bathing, dressing, walking, etc.?  No   Does the patient have any residual symptoms from stroke/TIA?  No   Residual symptoms comments  having no issues   Does the patient understand the diet ordered at discharge?  Yes   Comments  Watching Na+in diet and weighing daily. has RX for support hose   What is the patient's perception of their health status since discharge?  Improving   Nursing interventions  Nurse provided patient education   Is the patient able to teach back FAST for Stroke?  Yes   Is the patient/caregiver able to teach back the risk factors for a stroke?  High blood pressure-goal below 120/80, Diabetes, Physical inactivity and obesity   Is the patient/caregiver able to teach back signs and symptoms related to disease process for when to call PCP?  Yes   Is the patient/caregiver able to teach back signs and symptoms related to disease process for when to call 911?  Yes   Is the patient/caregiver able to teach back the hierarchy of who to call/visit for symptoms/problems? PCP, Specialist, Home health nurse, Urgent Care, ED, 911  Yes   Additional teach back comments  pt doing well. has some LE edema but PCP following   Week 3 call completed?  Yes          Kelli Trotter RN

## 2019-08-02 ENCOUNTER — TELEPHONE (OUTPATIENT)
Dept: CARDIOLOGY | Facility: CLINIC | Age: 79
End: 2019-08-02

## 2019-08-02 NOTE — TELEPHONE ENCOUNTER
Patient called requesting a call back about a prescription. Unable to reach her or leave a message as her mailbox was full. Will attempt again later.

## 2019-08-05 ENCOUNTER — TELEPHONE (OUTPATIENT)
Dept: CARDIOLOGY | Facility: CLINIC | Age: 79
End: 2019-08-05

## 2019-08-05 NOTE — TELEPHONE ENCOUNTER
Patient called reporting she was still having leg swelling. I advised her to contact her nephrologist, Dr. Muniz for direction of diuretics. /68 before medications but has not rechecked it. She verbalized understanding.

## 2019-08-06 ENCOUNTER — OFFICE VISIT (OUTPATIENT)
Dept: ENDOCRINOLOGY | Facility: CLINIC | Age: 79
End: 2019-08-06

## 2019-08-06 ENCOUNTER — OFFICE VISIT (OUTPATIENT)
Dept: INTERNAL MEDICINE | Facility: CLINIC | Age: 79
End: 2019-08-06

## 2019-08-06 VITALS
DIASTOLIC BLOOD PRESSURE: 70 MMHG | OXYGEN SATURATION: 98 % | SYSTOLIC BLOOD PRESSURE: 110 MMHG | WEIGHT: 161.6 LBS | HEART RATE: 79 BPM | BODY MASS INDEX: 26.08 KG/M2

## 2019-08-06 VITALS
OXYGEN SATURATION: 92 % | WEIGHT: 161 LBS | BODY MASS INDEX: 25.88 KG/M2 | DIASTOLIC BLOOD PRESSURE: 60 MMHG | SYSTOLIC BLOOD PRESSURE: 116 MMHG | HEIGHT: 66 IN | HEART RATE: 80 BPM

## 2019-08-06 DIAGNOSIS — M54.9 CHRONIC BACK PAIN GREATER THAN 3 MONTHS DURATION: ICD-10-CM

## 2019-08-06 DIAGNOSIS — I25.119 CORONARY ARTERY DISEASE INVOLVING NATIVE CORONARY ARTERY OF NATIVE HEART WITH ANGINA PECTORIS (HCC): ICD-10-CM

## 2019-08-06 DIAGNOSIS — I73.9 PERIPHERAL ARTERIAL DISEASE (HCC): ICD-10-CM

## 2019-08-06 DIAGNOSIS — F51.01 PRIMARY INSOMNIA: ICD-10-CM

## 2019-08-06 DIAGNOSIS — R94.6 ABNORMAL THYROID FUNCTION TEST: ICD-10-CM

## 2019-08-06 DIAGNOSIS — G89.29 CHRONIC BACK PAIN GREATER THAN 3 MONTHS DURATION: ICD-10-CM

## 2019-08-06 DIAGNOSIS — I82.4Z1 ACUTE DEEP VEIN THROMBOSIS (DVT) OF DISTAL VEIN OF RIGHT LOWER EXTREMITY (HCC): ICD-10-CM

## 2019-08-06 DIAGNOSIS — E11.8 TYPE 2 DIABETES MELLITUS WITH COMPLICATION, WITH LONG-TERM CURRENT USE OF INSULIN (HCC): Primary | ICD-10-CM

## 2019-08-06 DIAGNOSIS — Z79.4 TYPE 2 DIABETES MELLITUS WITH COMPLICATION, WITH LONG-TERM CURRENT USE OF INSULIN (HCC): Primary | ICD-10-CM

## 2019-08-06 DIAGNOSIS — N18.30 CKD (CHRONIC KIDNEY DISEASE) STAGE 3, GFR 30-59 ML/MIN (HCC): ICD-10-CM

## 2019-08-06 DIAGNOSIS — I10 ESSENTIAL HYPERTENSION: ICD-10-CM

## 2019-08-06 DIAGNOSIS — K21.9 GASTROESOPHAGEAL REFLUX DISEASE, ESOPHAGITIS PRESENCE NOT SPECIFIED: Primary | ICD-10-CM

## 2019-08-06 DIAGNOSIS — E16.2 HYPOGLYCEMIA: ICD-10-CM

## 2019-08-06 LAB
GLUCOSE BLDC GLUCOMTR-MCNC: 163 MG/DL (ref 70–130)
T4 FREE SERPL-MCNC: 1.59 NG/DL (ref 0.93–1.7)
TSH SERPL DL<=0.05 MIU/L-ACNC: 0.02 MIU/ML (ref 0.27–4.2)

## 2019-08-06 PROCEDURE — 82947 ASSAY GLUCOSE BLOOD QUANT: CPT | Performed by: INTERNAL MEDICINE

## 2019-08-06 PROCEDURE — 84443 ASSAY THYROID STIM HORMONE: CPT | Performed by: INTERNAL MEDICINE

## 2019-08-06 PROCEDURE — 99214 OFFICE O/P EST MOD 30 MIN: CPT | Performed by: INTERNAL MEDICINE

## 2019-08-06 PROCEDURE — 99213 OFFICE O/P EST LOW 20 MIN: CPT | Performed by: INTERNAL MEDICINE

## 2019-08-06 PROCEDURE — 84439 ASSAY OF FREE THYROXINE: CPT | Performed by: INTERNAL MEDICINE

## 2019-08-06 RX ORDER — PANTOPRAZOLE SODIUM 40 MG/1
40 TABLET, DELAYED RELEASE ORAL DAILY
Qty: 90 TABLET | Refills: 1 | Status: SHIPPED | OUTPATIENT
Start: 2019-08-06 | End: 2020-02-17 | Stop reason: SDUPTHER

## 2019-08-06 RX ORDER — CLOPIDOGREL BISULFATE 75 MG/1
75 TABLET ORAL DAILY
Qty: 90 TABLET | Refills: 1 | Status: SHIPPED | OUTPATIENT
Start: 2019-08-06 | End: 2020-03-09 | Stop reason: SDUPTHER

## 2019-08-06 NOTE — PROGRESS NOTES
Subjective   Narcisa Cotto is a 79 y.o. female.   Chief Complaint   Patient presents with   • Heartburn     Follow Up   • Hyperlipidemia   • Chronic Kidney Disease       Hyperlipidemia   This is a chronic problem. The current episode started more than 1 year ago. The problem is controlled. Pertinent negatives include no chest pain, myalgias or shortness of breath. Risk factors for coronary artery disease include diabetes mellitus, dyslipidemia and hypertension.   Hypertension   This is a chronic problem. The current episode started more than 1 year ago. The problem is controlled. Pertinent negatives include no chest pain, headaches, neck pain, palpitations or shortness of breath. Risk factors for coronary artery disease include diabetes mellitus and dyslipidemia.   Insomnia   This is a chronic problem. The current episode started more than 1 year ago. Associated symptoms include fatigue. Pertinent negatives include no abdominal pain, arthralgias, chest pain, chills, congestion, coughing, diaphoresis, fever, headaches, myalgias, nausea, neck pain, numbness, rash, sore throat, swollen glands, urinary symptoms, vertigo, visual change or vomiting. The treatment provided significant relief.   Heartburn   She reports no abdominal pain, no chest pain, no coughing, no nausea, no sore throat or no wheezing. This is a chronic problem. The current episode started more than 1 year ago. Associated symptoms include fatigue.   Chronic Kidney Disease   This is a chronic problem. The current episode started more than 1 year ago. Associated symptoms include fatigue. Pertinent negatives include no abdominal pain, arthralgias, chest pain, chills, congestion, coughing, diaphoresis, fever, headaches, myalgias, nausea, neck pain, numbness, rash, sore throat, swollen glands, urinary symptoms, vertigo, visual change or vomiting. The treatment provided mild relief.   chronic back pain s/p laminectomy Went back to ED on 7/7/19 with  complaints of right sided weakness and slurred speech.Her daughter reported she took 70 units of regular insulin at once (managed by Noemi Richards). Glucose was 35. Underwent CT head that was negaqtive. CTA angio head and neck showed excess calcification at carotid bifurications. She was given ASA, neuro and neurosurgery consulted.CArotid showed 50-69% Right iCA stenosis and 0-49% L ICA stenosis. Will need repeat doppler in 6m. They felt sxs related to hypoglycemia.  She has f/u with Dr. Richards on 7/31  Prior to this visit went in for leg swelling and dx with DVT. On Eliquiis  The following portions of the patient's history were reviewed and updated as appropriate: allergies, current medications, past family history, past medical history, past social history, past surgical history and problem list.    Review of Systems   Constitutional: Positive for activity change and fatigue. Negative for appetite change, chills, diaphoresis, fever and unexpected weight change.   HENT: Negative for congestion, ear discharge, ear pain, mouth sores, nosebleeds, sinus pressure, sneezing and sore throat.    Eyes: Negative for pain, discharge and itching.   Respiratory: Negative for cough, chest tightness, shortness of breath and wheezing.    Cardiovascular: Negative for chest pain, palpitations and leg swelling.   Gastrointestinal: Negative for abdominal pain, constipation, diarrhea, nausea and vomiting.   Endocrine: Negative for cold intolerance, heat intolerance, polydipsia and polyphagia.   Genitourinary: Negative for dysuria, flank pain, frequency, hematuria and urgency.   Musculoskeletal: Positive for back pain. Negative for arthralgias, gait problem, myalgias, neck pain and neck stiffness.   Skin: Negative for color change, pallor and rash.   Neurological: Negative for vertigo, seizures, speech difficulty, numbness and headaches.   Psychiatric/Behavioral: Negative for agitation, confusion, decreased concentration and sleep  disturbance. The patient has insomnia. The patient is not nervous/anxious.      /70   Pulse 79   Wt 73.3 kg (161 lb 9.6 oz)   SpO2 98%   BMI 26.08 kg/m²     Objective   Physical Exam   Constitutional: She is oriented to person, place, and time. She appears well-developed.   HENT:   Head: Normocephalic.   Right Ear: External ear normal.   Left Ear: External ear normal.   Nose: Nose normal.   Mouth/Throat: Oropharynx is clear and moist.   Eyes: Conjunctivae are normal. Pupils are equal, round, and reactive to light.   Neck: No JVD present. No thyromegaly present.   Cardiovascular: Normal rate, regular rhythm and normal heart sounds. Exam reveals no friction rub.   No murmur heard.  Pulmonary/Chest: Effort normal and breath sounds normal. No respiratory distress. She has no wheezes. She has no rales.   Abdominal: Soft. Bowel sounds are normal. She exhibits no distension. There is no tenderness. There is no guarding.   Musculoskeletal: She exhibits no edema or tenderness.   Lymphadenopathy:     She has no cervical adenopathy.   Neurological: She is alert and oriented to person, place, and time. She has normal reflexes. She displays normal reflexes. No cranial nerve deficit.   Skin: No rash noted.   Psychiatric: She has a normal mood and affect. Her behavior is normal.   Nursing note and vitals reviewed.      Assessment/Plan   Narcisa was seen today for chronic kidney disease, hypertension, hyperlipidemia and vitamin b12 def.    Diagnoses and all orders for this visit:    Insomnia, unspecified type  -     traZODone (DESYREL) 50 MG tablet; Take 1 tablet by mouth Every Night.  Stable with trazadone that was refilled today  Essential hypertension  -     Comprehensive Metabolic Panel  -     Lipid Panel  stable  Hyperlipidemia, unspecified hyperlipidemia type  -     Comprehensive Metabolic Panel  -     Lipid Panel  stable  Gastroesophageal reflux disease, esophagitis presence not specified  Stable.  Chronic kidney  disease, stage 3 (moderate)  Labs today. Still following with nephro.      Chronic back pain   S/p recent laminectomy    DVT right lower ext  On Eliquis f/u in 3 months and will need rpeat venous doppler in 3 m  Hypoglycemia  Was on 30, 30, and 70 units prior to ER. Now they dropped it to 19 units, 10 units, and 40 units. Has f/u with Dr. Richards today  Carotid steniosis  Will need repeat doppler in 6 m

## 2019-08-08 ENCOUNTER — READMISSION MANAGEMENT (OUTPATIENT)
Dept: CALL CENTER | Facility: HOSPITAL | Age: 79
End: 2019-08-08

## 2019-08-08 NOTE — OUTREACH NOTE
Stroke Week 4 Survey      Responses   Facility patient discharged from?  [facility]   Does the patient have one of the following disease processes/diagnoses(primary or secondary)?  Stroke (TIA)   Week 4 attempt successful?  Yes   Call start time  1039   Call end time  1041   Discharge diagnosis  CVA   Meds reviewed with patient/caregiver?  Yes   Is the patient having any side effects they believe may be caused by any medication additions or changes?  No   Is the patient taking all medications as directed (includes completed medication regime)?  Yes   Has the patient kept scheduled appointments due by today?  Yes   Is the patient still receiving Home Health Services?  N/A   Home health comments  nurse and PT, OT also   Psychosocial issues?  No   Does the patient require any assistance with activities of daily living such as eating, bathing, dressing, walking, etc.?  No   Does the patient have any residual symptoms from stroke/TIA?  No   Does the patient understand the diet ordered at discharge?  Yes   What is the patient's perception of their health status since discharge?  Improving   Nursing interventions  Nurse provided patient education   Is the patient able to teach back FAST for Stroke?  Yes   Is the patient/caregiver able to teach back the risk factors for a stroke?  High blood pressure-goal below 120/80, Diabetes, Physical inactivity and obesity   Is the patient/caregiver able to teach back signs and symptoms related to disease process for when to call PCP?  Yes   Is the patient/caregiver able to teach back signs and symptoms related to disease process for when to call 911?  Yes   Is the patient/caregiver able to teach back the hierarchy of who to call/visit for symptoms/problems? PCP, Specialist, Home health nurse, Urgent Care, ED, 911  Yes   Additional teach back comments  pt doing well. has some LE edema but PCP following   Week 4 Call Completed?  Yes   Would the patient like one additional call?  No    Graduated  Yes   Did the patient feel the follow up calls were helpful during their recovery period?  Yes   Was the number of calls appropriate?  Yes          Jef Ovalle RN

## 2019-08-12 ENCOUNTER — OUTSIDE FACILITY SERVICE (OUTPATIENT)
Dept: INTERNAL MEDICINE | Facility: CLINIC | Age: 79
End: 2019-08-12

## 2019-08-12 ENCOUNTER — TELEPHONE (OUTPATIENT)
Dept: INTERNAL MEDICINE | Facility: CLINIC | Age: 79
End: 2019-08-12

## 2019-08-12 PROCEDURE — G0179 MD RECERTIFICATION HHA PT: HCPCS | Performed by: INTERNAL MEDICINE

## 2019-09-12 ENCOUNTER — TELEPHONE (OUTPATIENT)
Dept: CARDIOLOGY | Facility: CLINIC | Age: 79
End: 2019-09-12

## 2019-09-12 NOTE — TELEPHONE ENCOUNTER
Patient called to ask for cardiac clearance to hold Eliquis and Plavix for an upcoming back injection with Dr. Hylton. Is she OK to clear? If so, does she need to start ASA 81 mg in the meantime?

## 2019-09-13 NOTE — TELEPHONE ENCOUNTER
Patient cleared from a cardiac standpoint. Can hold Eliquis 3 days and clopidogrel 5 days but MUST start ASA 81 mg daily in the meantime. Once procedure completed, restart Eliquis and clopidogrel and stop ASA.

## 2019-10-15 ENCOUNTER — TELEPHONE (OUTPATIENT)
Dept: CARDIOLOGY | Facility: CLINIC | Age: 79
End: 2019-10-15

## 2019-10-15 NOTE — TELEPHONE ENCOUNTER
Patient calling to report that Dr. Hylton's office will not proceed with spinal injections without holding Plavix 7 days and Eliquis 3 days. You had cleared her previously to hold for 5 days, not 7. OK to hold Plavix 7 days, Eliquis 3 days and start ASA 81 mg daily in the meantime?

## 2019-10-21 PROBLEM — R29.90 STROKE-LIKE SYMPTOMS: Status: ACTIVE | Noted: 2019-10-21

## 2019-10-21 PROBLEM — I38 VALVULAR HEART DISEASE: Status: ACTIVE | Noted: 2019-10-21

## 2019-11-06 ENCOUNTER — OFFICE VISIT (OUTPATIENT)
Dept: ENDOCRINOLOGY | Facility: CLINIC | Age: 79
End: 2019-11-06

## 2019-11-06 ENCOUNTER — OFFICE VISIT (OUTPATIENT)
Dept: INTERNAL MEDICINE | Facility: CLINIC | Age: 79
End: 2019-11-06

## 2019-11-06 VITALS
HEIGHT: 66 IN | OXYGEN SATURATION: 93 % | WEIGHT: 164 LBS | HEART RATE: 73 BPM | SYSTOLIC BLOOD PRESSURE: 100 MMHG | BODY MASS INDEX: 26.36 KG/M2 | DIASTOLIC BLOOD PRESSURE: 56 MMHG

## 2019-11-06 VITALS
BODY MASS INDEX: 26.5 KG/M2 | WEIGHT: 164.2 LBS | HEART RATE: 73 BPM | SYSTOLIC BLOOD PRESSURE: 100 MMHG | DIASTOLIC BLOOD PRESSURE: 56 MMHG | OXYGEN SATURATION: 93 %

## 2019-11-06 DIAGNOSIS — I10 ESSENTIAL HYPERTENSION: ICD-10-CM

## 2019-11-06 DIAGNOSIS — G89.29 CHRONIC BACK PAIN GREATER THAN 3 MONTHS DURATION: ICD-10-CM

## 2019-11-06 DIAGNOSIS — R11.0 NAUSEA: ICD-10-CM

## 2019-11-06 DIAGNOSIS — Z79.4 TYPE 2 DIABETES MELLITUS WITH COMPLICATION, WITH LONG-TERM CURRENT USE OF INSULIN (HCC): Primary | ICD-10-CM

## 2019-11-06 DIAGNOSIS — Z23 NEED FOR VACCINATION: ICD-10-CM

## 2019-11-06 DIAGNOSIS — E11.8 TYPE 2 DIABETES MELLITUS WITH COMPLICATION, WITH LONG-TERM CURRENT USE OF INSULIN (HCC): Primary | ICD-10-CM

## 2019-11-06 DIAGNOSIS — E78.5 HYPERLIPIDEMIA LDL GOAL <70: ICD-10-CM

## 2019-11-06 DIAGNOSIS — N18.30 CKD (CHRONIC KIDNEY DISEASE) STAGE 3, GFR 30-59 ML/MIN (HCC): ICD-10-CM

## 2019-11-06 DIAGNOSIS — IMO0002 UNCONTROLLED TYPE 2 DIABETES MELLITUS WITH COMPLICATION, WITH LONG-TERM CURRENT USE OF INSULIN: Primary | ICD-10-CM

## 2019-11-06 DIAGNOSIS — R53.1 WEAKNESS: ICD-10-CM

## 2019-11-06 DIAGNOSIS — M54.9 CHRONIC BACK PAIN GREATER THAN 3 MONTHS DURATION: ICD-10-CM

## 2019-11-06 DIAGNOSIS — F51.01 PRIMARY INSOMNIA: ICD-10-CM

## 2019-11-06 DIAGNOSIS — K21.9 GASTROESOPHAGEAL REFLUX DISEASE WITHOUT ESOPHAGITIS: ICD-10-CM

## 2019-11-06 DIAGNOSIS — R79.89 ABNORMAL TSH: ICD-10-CM

## 2019-11-06 LAB
GLUCOSE BLDC GLUCOMTR-MCNC: 294 MG/DL (ref 70–130)
HBA1C MFR BLD: 8.9 %
T4 FREE SERPL-MCNC: 1.28 NG/DL (ref 0.93–1.7)
TSH SERPL DL<=0.05 MIU/L-ACNC: 1.16 UIU/ML (ref 0.27–4.2)

## 2019-11-06 PROCEDURE — 84443 ASSAY THYROID STIM HORMONE: CPT | Performed by: INTERNAL MEDICINE

## 2019-11-06 PROCEDURE — 83036 HEMOGLOBIN GLYCOSYLATED A1C: CPT | Performed by: INTERNAL MEDICINE

## 2019-11-06 PROCEDURE — 90653 IIV ADJUVANT VACCINE IM: CPT | Performed by: INTERNAL MEDICINE

## 2019-11-06 PROCEDURE — 82947 ASSAY GLUCOSE BLOOD QUANT: CPT | Performed by: INTERNAL MEDICINE

## 2019-11-06 PROCEDURE — 99214 OFFICE O/P EST MOD 30 MIN: CPT | Performed by: INTERNAL MEDICINE

## 2019-11-06 PROCEDURE — 84439 ASSAY OF FREE THYROXINE: CPT | Performed by: INTERNAL MEDICINE

## 2019-11-06 PROCEDURE — 99213 OFFICE O/P EST LOW 20 MIN: CPT | Performed by: INTERNAL MEDICINE

## 2019-11-06 PROCEDURE — G0008 ADMIN INFLUENZA VIRUS VAC: HCPCS | Performed by: INTERNAL MEDICINE

## 2019-11-06 RX ORDER — ONDANSETRON 8 MG/1
8 TABLET, ORALLY DISINTEGRATING ORAL EVERY 8 HOURS PRN
Qty: 180 TABLET | Refills: 1 | Status: SHIPPED | OUTPATIENT
Start: 2019-11-06 | End: 2020-06-19 | Stop reason: SDUPTHER

## 2019-11-06 NOTE — PROGRESS NOTES
Chief Complaint   Patient presents with   • Diabetes     DM 2 f/u        HPI:   Narcisa Cotto is a 79 y.o.female who [facility] for f/u evaluation of Type 2 diabetes. Last clinic visit 06/17/2019. Her history is as follows:    Interim Events:  - hospitalized in 05/2018 for NSTEMI. Pt reports she received 3 units of PRBCs  - hospitalized again in 06/2019 for DVT, SOA  - hospitalized again in 07/2019 for possible CVA. Pt had taken extra insulin by mistake and took 70 units instead of 40 units. Pt's symptoms were thought to be due to the hypoglycemia and not CVA. Pt was discharged in Novolin 70/30, 10 unit TID AC  - in the interim pt has increased her doses of insulin back to her usual doses on 20 with BK, 20 with LN, and 40 U with DN    1) Type 2 diabetes with associated complications of peripheral neuropathy, CKD, PAD, retinopathy, and CAD:  - diagnosed in 2000  - was on basal-bolus regimen with Levemir and Humalog. Changed to Novolin 70/30 in (01/2018) due to high cost of the analogue insulins.     Current DM Medications:  - Novolin 70/30: 20 units with BK, 20 units with lunch, and 40 units with supper    Glucometer/ BG log Review:   - forgot meter today  - denies hypoglycemia since hospital discharge on this dosing    DM Health Maintenance:  Ophtho: Last visit - (5/2018), With Dr. Chicas, [facility], + retinopathy, getting injections both eyes  Podiatry: Last visit - no recent visit  Monofilament / foot exam: (09/2018)  Lipids: (07/2019) TChol 189, , HDL 25, LDL 27 - on crestor 20 mg daily  Urine Microalb/Cr ratio: (01/2019) 69.9, CKD stage 3, on ace-I  TSH: 0.019 (08/2019)  Aspirin: 81 mg daily  CBC: (06/2019) Hgb 11.6  BMP: (07/2019) Cr 0.95, GFR 57    2) diabetic peripheral neuropathy:  - pt did not start Lyrica due to cost  - On Gabapentin 600 mg BID - renal dosing    3) abnormal Thyroid function tests:   - first noted to have low TSH of 0.008 in 06/2019 during  "hospitalization  - has not had repeat TFT's since    Review of Systems   Constitutional: Positive for fatigue.        Weight loss   HENT: Negative.    Eyes: Negative.  Negative for visual disturbance.   Respiratory: Negative.    Cardiovascular: Positive for leg swelling. Negative for chest pain.   Gastrointestinal: Negative.  Negative for constipation and diarrhea.   Endocrine: Negative for cold intolerance, polydipsia and polyuria.        See HPI   Genitourinary:        Stress urinary incontinence   Musculoskeletal: Positive for arthralgias (knees & hands). Negative for back pain, joint swelling and myalgias.   Skin: Negative.  Negative for rash and wound.   Neurological: Positive for numbness (pain, Bilat  feet). Negative for headaches.   Hematological: Negative.    Psychiatric/Behavioral: Negative.      The following portions of the patient's history were reviewed and updated as appropriate: allergies, current medications, past family history, past medical history, past social history, past surgical history and problem list.    /56 (BP Location: Right arm, Patient Position: Sitting, Cuff Size: Adult)   Pulse 73   Ht 167.6 cm (66\")   Wt 74.4 kg (164 lb)   SpO2 93%   Breastfeeding? No   BMI 26.47 kg/m²   Physical Exam   Constitutional: She is oriented to person, place, and time. She appears well-developed. No distress.   HENT:   Head: Normocephalic.   Mouth/Throat: Oropharynx is clear and moist.   Eyes: Conjunctivae and EOM are normal. Pupils are equal, round, and reactive to light.   Neck: No tracheal deviation present. No thyromegaly present.   No palpable thyroid nodules     Cardiovascular: Normal rate, regular rhythm and normal heart sounds.   No murmur heard.  Pulmonary/Chest: Effort normal and breath sounds normal. No respiratory distress.   Abdominal: Soft. Bowel sounds are normal.   No scarring at insulin injection sites   Lymphadenopathy:     She has no cervical adenopathy.   Neurological: She " is alert and oriented to person, place, and time. No cranial nerve deficit.   Skin: Skin is warm and dry. She is not diaphoretic. No erythema.   Psychiatric: She has a normal mood and affect. Her behavior is normal.   Vitals reviewed.    LABS/IMAGING:   No visits with results within 1 Day(s) from this visit.   Latest known visit with results is:   Office Visit on 08/06/2019   Component Date Value Ref Range Status   • Glucose 08/06/2019 163* 70 - 130 mg/dL Final   • TSH 08/06/2019 0.019* 0.270 - 4.200 mIU/mL Final   • Free T4 08/06/2019 1.59  0.93 - 1.70 ng/dL Final     Lab Results   Component Value Date    HGBA1C 6.30 (H) 07/08/2019     Lab Results   Component Value Date    WBC 5.76 07/07/2019    HGB 12.0 07/07/2019    HCT 37.9 07/07/2019    MCV 87.1 07/07/2019     07/07/2019       ASSESSMENT/PLAN:  1)  Type 2 diabetes with associated complications of peripheral neuropathy, CKD, PAD, retinopathy: fair control, A1C% 6.3 in July 2019. A1C% 6.9 today in clinic.   - discussed with patient that glycemic targets are generally set somewhat higher (eg, <8 percent) for older adult patients and those with comorbidities. Therefore a goal A1C% of 7-8% is reasonable for Ms. Cotto.    - Continue Novolin 70/30: 20 units before BK, 20 units before lunch and 40 units AC before supper.     2) diabetic peripheral neuropathy  - pt on zakia 600 mg BID: renal dosing    3) abnormal thyroid function tests:  - likely due to nonthyroidal illness  - repeat TFT's today show slight improvement  - Will continue to monitor. Will check TFT's next visit    RTC 3 months.

## 2019-11-06 NOTE — PROGRESS NOTES
Subjective   Narcisa Cotto is a 79 y.o. female.   Chief Complaint   Patient presents with   • Heartburn     Follow up   • Hypertension   • Coronary Artery Disease   • fell a month ago       Heartburn   She reports no abdominal pain, no chest pain, no coughing, no nausea, no sore throat or no wheezing. This is a chronic problem. The current episode started more than 1 year ago. Pertinent negatives include no fatigue.   Hyperlipidemia   This is a chronic problem. The current episode started more than 1 year ago. The problem is controlled. Pertinent negatives include no chest pain, myalgias or shortness of breath. Risk factors for coronary artery disease include diabetes mellitus, dyslipidemia and hypertension.   Chronic Kidney Disease   This is a chronic problem. The current episode started more than 1 year ago. Associated symptoms include weakness. Pertinent negatives include no abdominal pain, arthralgias, chest pain, chills, congestion, coughing, diaphoresis, fatigue, fever, headaches, myalgias, nausea, neck pain, numbness, rash, sore throat, swollen glands, urinary symptoms, vertigo, visual change or vomiting. The treatment provided mild relief.   Hypertension   This is a chronic problem. The current episode started more than 1 year ago. The problem is controlled. Pertinent negatives include no chest pain, headaches, neck pain, palpitations or shortness of breath. Risk factors for coronary artery disease include diabetes mellitus and dyslipidemia.   Insomnia   This is a chronic problem. The current episode started more than 1 year ago. Associated symptoms include weakness. Pertinent negatives include no abdominal pain, arthralgias, chest pain, chills, congestion, coughing, diaphoresis, fatigue, fever, headaches, myalgias, nausea, neck pain, numbness, rash, sore throat, swollen glands, urinary symptoms, vertigo, visual change or vomiting. The treatment provided significant relief.   chronic back pain s/p  laminectomy and now getting injections  The following portions of the patient's history were reviewed and updated as appropriate: allergies, current medications, past family history, past medical history, past social history, past surgical history and problem list.    Review of Systems   Constitutional: Negative for activity change, appetite change, chills, diaphoresis, fatigue, fever and unexpected weight change.   HENT: Negative for congestion, ear discharge, ear pain, mouth sores, nosebleeds, sinus pressure, sneezing and sore throat.    Eyes: Negative for pain, discharge and itching.   Respiratory: Negative for cough, chest tightness, shortness of breath and wheezing.    Cardiovascular: Negative for chest pain, palpitations and leg swelling.   Gastrointestinal: Negative for abdominal pain, constipation, diarrhea, nausea and vomiting.   Endocrine: Negative for cold intolerance, heat intolerance, polydipsia and polyphagia.   Genitourinary: Negative for dysuria, flank pain, frequency, hematuria and urgency.   Musculoskeletal: Positive for back pain. Negative for arthralgias, gait problem, myalgias, neck pain and neck stiffness.   Skin: Negative for color change, pallor and rash.   Neurological: Positive for weakness. Negative for vertigo, seizures, speech difficulty, numbness and headaches.   Psychiatric/Behavioral: Negative for agitation, confusion, decreased concentration and sleep disturbance. The patient has insomnia. The patient is not nervous/anxious.      /56   Pulse 73   Wt 74.5 kg (164 lb 3.2 oz)   SpO2 93%   BMI 26.50 kg/m²     Objective   Physical Exam   Constitutional: She is oriented to person, place, and time. She appears well-developed.   HENT:   Head: Normocephalic.   Right Ear: External ear normal.   Left Ear: External ear normal.   Nose: Nose normal.   Mouth/Throat: Oropharynx is clear and moist.   Eyes: Conjunctivae are normal. Pupils are equal, round, and reactive to light.   Neck: No  JVD present. No thyromegaly present.   Cardiovascular: Normal rate, regular rhythm and normal heart sounds. Exam reveals no friction rub.   No murmur heard.  Pulmonary/Chest: Effort normal and breath sounds normal. No respiratory distress. She has no wheezes. She has no rales.   Abdominal: Soft. Bowel sounds are normal. She exhibits no distension. There is no tenderness. There is no guarding.   Musculoskeletal: She exhibits no edema or tenderness.   Lymphadenopathy:     She has no cervical adenopathy.   Neurological: She is alert and oriented to person, place, and time. She has normal reflexes. She displays normal reflexes. No cranial nerve deficit.   Skin: No rash noted.   Psychiatric: She has a normal mood and affect. Her behavior is normal.   Nursing note and vitals reviewed.      Assessment/Plan   Narcisa was seen today for chronic kidney disease, hypertension, hyperlipidemia and vitamin b12 def.    Diagnoses and all orders for this visit:    Insomnia, unspecified type  -     traZODone (DESYREL) 50 MG tablet; Take 1 tablet by mouth Every Night.  Stable with trazadone that was refilled today  Essential hypertension  -     Comprehensive Metabolic Panel  -     Lipid Panel  stable  Hyperlipidemia, unspecified hyperlipidemia type  -     Comprehensive Metabolic Panel  -     Lipid Panel  stable  Gastroesophageal reflux disease, esophagitis presence not specified  Stable.  Chronic kidney disease, stage 3 (moderate)  Labs today. Still following with nephro.      Chronic back pain   S/p recent laminectomy  Injections are being given by Dr. Hylton

## 2019-11-06 NOTE — PROGRESS NOTES
Chief Complaint   Patient presents with   • Diabetes     DM 2 f/u        HPI:   Narcisa Cotto is a 79 y.o.female who [facility] for f/u evaluation of Type 2 diabetes. Last clinic visit 08/06/2019. Her history is as follows:    Interim Events:  - has been sleeping late and not eating breakfast, First meal often around 11A - 1P, second meal late often after 7P  - Therefor missing the AM dose of insulin    1) Type 2 diabetes with associated complications of peripheral neuropathy, CKD, PAD, retinopathy, and CAD:  - diagnosed in 2000  - was on basal-bolus regimen with Levemir and Humalog. Changed to Novolin 70/30 in (01/2018) due to high cost of the analogue insulins.     Current DM Medications:  - Novolin 70/30: 20 units with BK, 20 units with lunch, and 40 units with supper: but missing breakfast dose often as she is not eating BK    Glucometer/ BG log Review:   - denies hypoglycemia since hospital discharge on this dosing  - AM BGs <150  - midday BG's: 200's  - PM BGs few readings, 300, 509     DM Health Maintenance:  Ophtho: Last visit - (5/2018), With Dr. Chicas, [facility], + retinopathy, getting injections both eyes  Podiatry: Last visit - no recent visit  Monofilament / foot exam: (09/2018)  Lipids: (07/2019) TChol 189, , HDL 25, LDL 27 - on crestor 20 mg daily  Urine Microalb/Cr ratio: (01/2019) 69.9, CKD stage 3, on ace-I  TSH: 0.019 (08/2019)  Aspirin: 81 mg daily  CBC: (06/2019) Hgb 11.6  BMP: (07/2019) Cr 0.95, GFR 57    2) diabetic peripheral neuropathy:  - pt did not start Lyrica due to cost  - On Gabapentin 600 mg BID - renal dosing    3) abnormal Thyroid function tests:   - first noted to have low TSH of 0.008 in 06/2019 during hospitalization  - TSH today normal at 1.160    Other history:   - hospitalized in 05/2018 for NSTEMI. Pt reports she received 3 units of PRBCs  - hospitalized again in 06/2019 for DVT, SOA  - hospitalized again in 07/2019 for possible CVA. Pt  "had taken extra insulin by mistake and took 70 units instead of 40 units. Pt's symptoms were thought to be due to the hypoglycemia and not CVA      .Review of Systems   Constitutional: Positive for fatigue.        Weight stable   HENT: Negative.    Eyes: Negative.  Negative for visual disturbance.   Respiratory: Negative.    Cardiovascular: Positive for leg swelling. Negative for chest pain.   Gastrointestinal: Negative.  Negative for constipation and diarrhea.   Endocrine: Negative for cold intolerance, polydipsia and polyuria.        See HPI   Genitourinary:        Stress urinary incontinence   Musculoskeletal: Positive for arthralgias (knees & hands). Negative for back pain, joint swelling and myalgias.        Leg pain   Skin: Negative.  Negative for rash and wound.   Neurological: Negative for headaches. Numbness: pain, Bilat  feet.   Hematological: Negative.    Psychiatric/Behavioral: Negative.      The following portions of the patient's history were reviewed and updated as appropriate: allergies, current medications, past family history, past medical history, past social history, past surgical history and problem list.    /56 (BP Location: Right arm, Patient Position: Sitting, Cuff Size: Adult)   Pulse 73   Ht 167.6 cm (66\")   Wt 74.4 kg (164 lb)   SpO2 93%   Breastfeeding? No   BMI 26.47 kg/m²   Physical Exam   Constitutional: She is oriented to person, place, and time. She appears well-developed. No distress.   HENT:   Head: Normocephalic.   Mouth/Throat: Oropharynx is clear and moist.   Eyes: Conjunctivae and EOM are normal. Pupils are equal, round, and reactive to light.   Neck: No tracheal deviation present. No thyromegaly present.   No palpable thyroid nodules     Cardiovascular: Normal rate, regular rhythm and normal heart sounds.   No murmur heard.  Pulmonary/Chest: Effort normal and breath sounds normal. No respiratory distress.   Abdominal: Soft. Bowel sounds are normal.   No scarring at " insulin injection sites   Lymphadenopathy:     She has no cervical adenopathy.   Neurological: She is alert and oriented to person, place, and time. No cranial nerve deficit.   Skin: Skin is warm and dry. She is not diaphoretic. No erythema.   Psychiatric: She has a normal mood and affect. Her behavior is normal.   Vitals reviewed.    LABS/IMAGING:   Office Visit on 11/06/2019   Component Date Value Ref Range Status   • Glucose 11/06/2019 294* 70 - 130 mg/dL Final   • Hemoglobin A1C 11/06/2019 8.9  % Final   Office Visit on 11/06/2019   Component Date Value Ref Range Status   • TSH 11/06/2019 1.160  0.270 - 4.200 uIU/mL Final   • Free T4 11/06/2019 1.28  0.93 - 1.70 ng/dL Final       ASSESSMENT/PLAN:  1)  Type 2 diabetes with associated complications of peripheral neuropathy, CKD, PAD, retinopathy: uncontrolled, A1C% 8.9 today.   - discussed with patient that glycemic targets are generally set somewhat higher (eg, <8 percent) for older adult patients and those with comorbidities. Therefore a goal A1C% of 7-8% is reasonable for Ms. Cotto.    Because pt is not waking up early and eating breakfast, she is missing some of her insulin. Will change to:  - Novolin 70/30: 40 units before first meal (midday) and 40 units AC before supper.     2) diabetic peripheral neuropathy  - pt on gabapentin 600 mg BID: renal dosing  - Rx refilled    3) abnormal thyroid function tests:  - due to nonthyroidal illness  - repeat TFT's today were normal    RTC 3 months.

## 2019-11-22 DIAGNOSIS — E78.5 HYPERLIPIDEMIA LDL GOAL <70: ICD-10-CM

## 2019-11-25 RX ORDER — ROSUVASTATIN CALCIUM 20 MG/1
TABLET, COATED ORAL
Qty: 90 TABLET | Refills: 1 | Status: SHIPPED | OUTPATIENT
Start: 2019-11-25 | End: 2020-07-15 | Stop reason: SDUPTHER

## 2019-11-25 RX ORDER — AMLODIPINE BESYLATE 5 MG/1
TABLET ORAL
Qty: 90 TABLET | Refills: 1 | Status: SHIPPED | OUTPATIENT
Start: 2019-11-25 | End: 2020-04-30 | Stop reason: SDUPTHER

## 2019-11-25 RX ORDER — MECLIZINE HYDROCHLORIDE 25 MG/1
TABLET ORAL
Qty: 270 TABLET | Refills: 1 | Status: SHIPPED | OUTPATIENT
Start: 2019-11-25 | End: 2021-01-01 | Stop reason: SDUPTHER

## 2019-11-26 DIAGNOSIS — E11.42 DIABETIC PERIPHERAL NEUROPATHY (HCC): Primary | ICD-10-CM

## 2019-11-26 RX ORDER — GABAPENTIN 600 MG/1
600 TABLET ORAL 2 TIMES DAILY
Qty: 180 TABLET | Refills: 1 | Status: SHIPPED | OUTPATIENT
Start: 2019-11-26 | End: 2020-05-04 | Stop reason: SDUPTHER

## 2020-01-01 ENCOUNTER — TELEPHONE (OUTPATIENT)
Dept: ENDOCRINOLOGY | Facility: CLINIC | Age: 80
End: 2020-01-01

## 2020-01-01 ENCOUNTER — OFFICE VISIT (OUTPATIENT)
Dept: INTERNAL MEDICINE | Facility: CLINIC | Age: 80
End: 2020-01-01

## 2020-01-01 ENCOUNTER — OFFICE VISIT (OUTPATIENT)
Dept: CARDIOLOGY | Facility: CLINIC | Age: 80
End: 2020-01-01

## 2020-01-01 ENCOUNTER — OFFICE VISIT (OUTPATIENT)
Dept: ENDOCRINOLOGY | Facility: CLINIC | Age: 80
End: 2020-01-01

## 2020-01-01 ENCOUNTER — OFFICE VISIT (OUTPATIENT)
Dept: NEUROLOGY | Facility: CLINIC | Age: 80
End: 2020-01-01

## 2020-01-01 VITALS — HEIGHT: 66 IN | WEIGHT: 174 LBS | BODY MASS INDEX: 27.97 KG/M2

## 2020-01-01 VITALS
TEMPERATURE: 98 F | WEIGHT: 174.4 LBS | HEART RATE: 57 BPM | OXYGEN SATURATION: 98 % | BODY MASS INDEX: 28.03 KG/M2 | SYSTOLIC BLOOD PRESSURE: 138 MMHG | HEIGHT: 66 IN | DIASTOLIC BLOOD PRESSURE: 66 MMHG

## 2020-01-01 VITALS
WEIGHT: 170 LBS | OXYGEN SATURATION: 96 % | HEART RATE: 60 BPM | SYSTOLIC BLOOD PRESSURE: 132 MMHG | HEIGHT: 66 IN | DIASTOLIC BLOOD PRESSURE: 64 MMHG | BODY MASS INDEX: 27.32 KG/M2

## 2020-01-01 DIAGNOSIS — E11.8 TYPE 2 DIABETES MELLITUS WITH COMPLICATION, WITH LONG-TERM CURRENT USE OF INSULIN (HCC): ICD-10-CM

## 2020-01-01 DIAGNOSIS — Z79.4 TYPE 2 DIABETES MELLITUS WITH COMPLICATION, WITH LONG-TERM CURRENT USE OF INSULIN (HCC): ICD-10-CM

## 2020-01-01 DIAGNOSIS — I25.119 CORONARY ARTERY DISEASE INVOLVING NATIVE CORONARY ARTERY OF NATIVE HEART WITH ANGINA PECTORIS (HCC): Primary | ICD-10-CM

## 2020-01-01 DIAGNOSIS — R25.1 TREMOR: Primary | ICD-10-CM

## 2020-01-01 DIAGNOSIS — I10 ESSENTIAL HYPERTENSION: ICD-10-CM

## 2020-01-01 DIAGNOSIS — Z79.4 TYPE 2 DIABETES MELLITUS WITH COMPLICATION, WITH LONG-TERM CURRENT USE OF INSULIN (HCC): Primary | ICD-10-CM

## 2020-01-01 DIAGNOSIS — I10 ESSENTIAL HYPERTENSION: Primary | ICD-10-CM

## 2020-01-01 DIAGNOSIS — I38 VALVULAR HEART DISEASE: ICD-10-CM

## 2020-01-01 DIAGNOSIS — E78.5 HYPERLIPIDEMIA LDL GOAL <70: ICD-10-CM

## 2020-01-01 DIAGNOSIS — E11.8 TYPE 2 DIABETES MELLITUS WITH COMPLICATION, WITH LONG-TERM CURRENT USE OF INSULIN (HCC): Primary | ICD-10-CM

## 2020-01-01 DIAGNOSIS — I73.9 PERIPHERAL ARTERIAL DISEASE (HCC): ICD-10-CM

## 2020-01-01 DIAGNOSIS — K21.00 GASTROESOPHAGEAL REFLUX DISEASE WITH ESOPHAGITIS WITHOUT HEMORRHAGE: ICD-10-CM

## 2020-01-01 DIAGNOSIS — N18.4 CHRONIC KIDNEY DISEASE, STAGE IV (SEVERE) (HCC): ICD-10-CM

## 2020-01-01 PROCEDURE — 99442 PR PHYS/QHP TELEPHONE EVALUATION 11-20 MIN: CPT | Performed by: INTERNAL MEDICINE

## 2020-01-01 PROCEDURE — 99214 OFFICE O/P EST MOD 30 MIN: CPT | Performed by: NURSE PRACTITIONER

## 2020-01-01 PROCEDURE — 99204 OFFICE O/P NEW MOD 45 MIN: CPT | Performed by: PSYCHIATRY & NEUROLOGY

## 2020-01-01 RX ORDER — PREGABALIN 75 MG/1
75 CAPSULE ORAL 2 TIMES DAILY
COMMUNITY
End: 2020-01-01

## 2020-01-01 RX ORDER — ROSUVASTATIN CALCIUM 20 MG/1
20 TABLET, COATED ORAL DAILY
Qty: 90 TABLET | Refills: 1 | Status: SHIPPED | OUTPATIENT
Start: 2020-01-01 | End: 2021-01-01

## 2020-01-01 RX ORDER — FUROSEMIDE 20 MG/1
20 TABLET ORAL DAILY
COMMUNITY
End: 2020-01-01

## 2020-02-17 DIAGNOSIS — K21.9 GASTROESOPHAGEAL REFLUX DISEASE, ESOPHAGITIS PRESENCE NOT SPECIFIED: ICD-10-CM

## 2020-02-17 RX ORDER — PANTOPRAZOLE SODIUM 40 MG/1
40 TABLET, DELAYED RELEASE ORAL DAILY
Qty: 90 TABLET | Refills: 1 | Status: SHIPPED | OUTPATIENT
Start: 2020-02-17 | End: 2020-08-28

## 2020-02-17 NOTE — TELEPHONE ENCOUNTER
Last appt 11/6/2019  Next appt 2/26/2020  Refill for Rx Pantoprazole 40 mg   Sent to [facility]

## 2020-03-05 NOTE — PROGRESS NOTES
[facility]  Office Visit Note    Encounter Date:03/10/2020    Patient ID: Narcisa Cotto is a 79 y.o. female who resides in [place], [place]    CC/Reason for visit:    • Coronary artery disease  • Hypertension  • Hyperlipidemia  • Valvular heart disease         Problem List Items Addressed This Visit        Cardiovascular and Mediastinum    Coronary artery disease involving native coronary artery of native heart with angina pectoris (CMS/HCC) - Primary    Overview     · Echo (8/31/2016): EF > 70%. Moderate thickening of the noncoronary cusp of the aortic valve.  · Echo (04/23/2018): LVEF 60%. Grade I diastolic dysfunction.   Cardiac valves are functionally normal.  · Nuclear stress test (04/23/2018): No focal areas of ischemia or infarct.  Transient ischemic dilatation present raising possibility of balanced ischemia/multivessel CAD  · Cardiac catheterization (5/23/2019): Severe multivessel CAD.  Culprit for NSTEMI is a highly calcified ostial RCA 99% stenosis.    · Staged rotational atherectomy and PCI of the RCA using a PATRICIA, 5/29/2019         Current Assessment & Plan     · Patient reports progressive NYHA class III symptoms which was anginal equivalent in the past  · Increase metoprolol tartrate 100 mg twice daily  · Schedule myocardial perfusion study  · Continue continue Plavix but defer aspirin due to chronic anticoagulation with Eliquis         Relevant Medications    metoprolol tartrate (LOPRESSOR) 100 MG tablet    Other Relevant Orders    Stress Test With Myocardial Perfusion (1 Day)    Hyperlipidemia LDL goal <70    Overview     · High intensity statin therapy is recommended given the presence of peripheral arterial disease and diabetes         Current Assessment & Plan     · Continue Crestor 20 mg daily  · LDL at goal with result of 27         Peripheral arterial disease (CMS/HCC)    Overview     · BONNIE (08/22/2013):  At rest right 0.98, left 0.85.  After 2  minutes of exercise right 0.51, left 0.68.  · High-grade right common iliac stenosis on cardiac catheterization, 5/23/2019         Current Assessment & Plan     · Continue Plavix 75 mg daily  · [place] class II-III symptoms  · Consider peripheral intervention of right iliac stenosis.         Essential hypertension    Current Assessment & Plan     · Hypertension is controlled  · Continue amlodipine 5 mg daily         Relevant Medications    metoprolol tartrate (LOPRESSOR) 100 MG tablet    Acute deep vein thrombosis (DVT) of distal vein of right lower extremity (CMS/HCC)    Overview     · Venous duplex (6/22/2019): Isolated DVT in one of the right peroneal veins.         Current Assessment & Plan     · Continue Eliquis 5 mg twice daily               The patient's anginal equivalent in the past was shortness of breath and she is starting to have the same symptoms she had prior to her staged rotational arthrectomy last year.  I will increase her metoprolol for better anginal relief.  I will also schedule her for a myocardial perfusion study as she did have disease in the LAD and circumflex and this will allow us to see its significance.  If stress test is abnormal she would require cardiac catheterization.  Once we determined the results of the stress test we could consider peripheral intervention of her iliac stenosis.  I will schedule her to return in 6 weeks for reassessment of her symptoms and to discuss the results of her test.     · Increase metoprolol tartrate 100 mg twice daily  · Schedule myocardial perfusion study.  If abnormal will require cardiac catheterization as could consider staged PCI of the LAD and left circumflex if clinically indicated  · Consider peripheral intervention of right iliac stenosis as well.  Return in about 6 weeks (around 4/21/2020), or if symptoms worsen or fail to improve, for Follow-up with Dr. Mayberry next visit.              Narcisa Cotto returns today for follow-up for  coronary artery disease, hypertension, hyperlipidemia, valvular heart disease and peripheral arterial disease.  Since her last visit the patient reports worsening shortness of breath particularly over the past couple of months.  After her PCI was performed last May the patient's breathing greatly improved.  She feels like she is starting to go back downhill again.  She becomes winded sometimes just eating or.  Walking from our waiting area to the exam room.  Her blood pressures have stayed fairly well controlled.  She has a history of DVTs for which she takes Eliquis.  She is tolerating without reports of active or overt bleeding.  Patient also reports increased fatigue.  She has arterial disease with a high-grade stenosis of the right common iliac artery noted on last cardiac cath.  She reports having right leg pain particularly in the upper thigh area and oftentimes it will feel numb.    Review of Systems   Constitution: Positive for malaise/fatigue and weight gain.   Eyes: Negative for vision loss in left eye and vision loss in right eye.   Cardiovascular: Positive for dyspnea on exertion. Negative for chest pain, near-syncope, orthopnea, palpitations, paroxysmal nocturnal dyspnea and syncope.   Skin: Positive for dry skin.   Musculoskeletal: Positive for back pain. Negative for myalgias.   Gastrointestinal: Positive for flatus and heartburn.   Genitourinary: Positive for bladder incontinence.   Neurological: Negative for brief paralysis, excessive daytime sleepiness, focal weakness, numbness, paresthesias and weakness.   All other systems reviewed and are negative.      The patient's past medical, social, family history and ROS reviewed in the patient's electronic medical record.    Allergies   Allergen Reactions   • Codeine Nausea And Vomiting         Current Outpatient Medications:   •  amLODIPine (NORVASC) 5 MG tablet, TAKE 1 TABLET DAILY, Disp: 90 tablet, Rfl: 1  •  apixaban (ELIQUIS) 5 MG tablet tablet,  Take 1 tablet by mouth Every 12 (Twelve) Hours. Bottle #2, Disp: 180 tablet, Rfl: 3  •  cholecalciferol (VITAMIN D3) 1000 units tablet, Take 1,000 Units by mouth Daily., Disp: , Rfl:   •  clopidogrel (PLAVIX) 75 MG tablet, Take 1 tablet by mouth Daily for 90 days. Resume Sunday, Disp: 90 tablet, Rfl: 0  •  cyanocobalamin ([employer] VITAMIN B-12) 1000 MCG tablet, Take 1 tablet by mouth daily., Disp: , Rfl:   •  gabapentin (NEURONTIN) 600 MG tablet, Take 1 tablet by mouth 2 (Two) Times a Day., Disp: 180 tablet, Rfl: 1  •  insulin NPH-insulin regular (humuLIN 70/30,novoLIN 70/30) (70-30) 100 UNIT/ML injection, 20 units with breakfast, 20 units with lunch and 40 units with dinner, Disp: 80 mL, Rfl: 3  •  meclizine (ANTIVERT) 25 MG tablet, TAKE 1 TABLET THREE TIMES A DAY AS NEEDED, Disp: 270 tablet, Rfl: 1  •  nitroglycerin (NITROSTAT) 0.4 MG SL tablet, Place 1 tablet under the tongue Every 5 (Five) Minutes As Needed for Chest Pain. Take no more than 3 doses in 15 minutes., Disp: 60 tablet, Rfl: 1  •  ondansetron ODT (ZOFRAN-ODT) 8 MG disintegrating tablet, Take 1 tablet by mouth Every 8 (Eight) Hours As Needed for Nausea or Vomiting., Disp: 180 tablet, Rfl: 1  •  pantoprazole (PROTONIX) 40 MG EC tablet, Take 1 tablet by mouth Daily., Disp: 90 tablet, Rfl: 1  •  rosuvastatin (CRESTOR) 20 MG tablet, TAKE 1 TABLET DAILY, Disp: 90 tablet, Rfl: 1  •  metoprolol tartrate (LOPRESSOR) 100 MG tablet, Take 1 tablet by mouth 2 (Two) Times a Day., Disp: 180 tablet, Rfl: 2    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   • Chronic diastolic congestive heart failure (CMS/HCC)    • Chronic low back pain    • Chronic venous insufficiency    • CKD (chronic kidney disease) stage 3, GFR 30-59 ml/min (CMS/HCC)    • Diabetic gastroparesis (CMS/HCC)    • Diverticulosis    • DJD (degenerative joint disease), lumbar    • DM2 (diabetes mellitus, type 2) (CMS/HCC)    • GERD (gastroesophageal  "reflux disease)    • Hiatal hernia    • History of hyperparathyroidism    • HLD (hyperlipidemia)    • HTN (hypertension)    • Lumbar stenosis 11/15/2018   • DELFINA (obstructive sleep apnea)    • Osteoarthritis 7/29/2016   • PAD (peripheral artery disease) (CMS/[facility])    • Postherpetic neuralgia    • Vitamin B12 deficiency 7/29/2016       Past Surgical History:   Procedure Laterality Date   • CARDIAC CATHETERIZATION N/A 5/23/2019    Procedure: LEFT HEART CATH;  Surgeon: Filemon Mayberry IV, MD;  Location: [facility] CATH INVASIVE LOCATION;  Service: Cardiovascular   • CARDIAC CATHETERIZATION N/A 5/29/2019    Procedure: Atherectomy-coronary;  Surgeon: Filemon Mayberry IV, MD;  Location: [facility] CATH INVASIVE LOCATION;  Service: Cardiovascular   • CARDIAC CATHETERIZATION N/A 5/29/2019    Procedure: Stent PATRICIA coronary;  Surgeon: Filemon Mayberry IV, MD;  Location: [facility] CATH INVASIVE LOCATION;  Service: Cardiovascular   • CARDIAC ELECTROPHYSIOLOGY PROCEDURE N/A 5/28/2019    Procedure: TEMPORARY PACEMAKER;  Surgeon: Jeffrey Reyes MD;  Location: [facility] CATH INVASIVE LOCATION;  Service: Cardiovascular   • CATARACT EXTRACTION Bilateral    • CHOLECYSTECTOMY     • COLONOSCOPY     • CYSTOSCOPY W/ URETERAL STENT PLACEMENT  2014   • ENDOSCOPY     • HYSTERECTOMY     • LUMBAR LAMINECTOMY DISCECTOMY DECOMPRESSION N/A 11/15/2018    Procedure: LUMBAR LAMINECTOMY L2-3 L 3-4,;  Surgeon: Zachary Key MD;  Location:  [facility];  Service: Orthopedic Spine   • PARATHYROIDECTOMY     • SPINAL CORD STIMULATOR IMPLANT     • TONSILLECTOMY         Family History   Problem Relation Age of Onset   • Kidney disease Mother    • Diabetes Father        Social History     Tobacco Use   • Smoking status: Never Smoker   • Smokeless tobacco: Never Used   Substance Use Topics   • Alcohol use: No           Height 167.6 cm (66\"), weight 78.9 kg (174 lb), SpO2 98 %, not currently breastfeeding.  Body mass index is 28.08 " kg/m².  Vitals:    03/10/20 0947   Orthostatic BP: 130/60   Orthostatic Pulse: 78   Patient Position: Sitting       Physical Exam   Constitutional: She is oriented to person, place, and time. She appears well-developed and well-nourished.   HENT:   Head: Normocephalic and atraumatic.   Eyes: Conjunctivae are normal. No scleral icterus.   Neck: Normal range of motion. No JVD present. Carotid bruit is not present. No thyromegaly present.   Cardiovascular: Normal rate and regular rhythm. Exam reveals no gallop.   No murmur heard.  Pulmonary/Chest: Effort normal and breath sounds normal.   Abdominal: Soft. She exhibits no distension and no mass. There is no hepatosplenomegaly.   Musculoskeletal: She exhibits no edema.   Neurological: She is alert and oriented to person, place, and time.   Skin: Skin is warm and dry. No rash noted.   Psychiatric: She has a normal mood and affect. Her behavior is normal.       Data Review (reviewed with patient):     Procedures    Lab Results   Component Value Date    CHOL 90 07/08/2019    TRIG 189 (H) 07/08/2019    HDL 25 (L) 07/08/2019    LDL 27 07/08/2019    AST 39 (H) 07/07/2019    ALT 41 (H) 07/07/2019       Lab Results   Component Value Date    HGBA1C 8.9 11/06/2019           NHAN Ochoa    3/10/2020

## 2020-03-09 DIAGNOSIS — I25.119 CORONARY ARTERY DISEASE INVOLVING NATIVE CORONARY ARTERY OF NATIVE HEART WITH ANGINA PECTORIS (HCC): ICD-10-CM

## 2020-03-09 DIAGNOSIS — I73.9 PERIPHERAL ARTERIAL DISEASE (HCC): ICD-10-CM

## 2020-03-09 RX ORDER — CLOPIDOGREL BISULFATE 75 MG/1
75 TABLET ORAL DAILY
Qty: 90 TABLET | Refills: 0 | Status: SHIPPED | OUTPATIENT
Start: 2020-03-09 | End: 2020-04-30 | Stop reason: SDUPTHER

## 2020-03-10 ENCOUNTER — OFFICE VISIT (OUTPATIENT)
Dept: CARDIOLOGY | Facility: CLINIC | Age: 80
End: 2020-03-10

## 2020-03-10 VITALS — OXYGEN SATURATION: 98 % | BODY MASS INDEX: 27.97 KG/M2 | HEIGHT: 66 IN | WEIGHT: 174 LBS

## 2020-03-10 DIAGNOSIS — I10 ESSENTIAL HYPERTENSION: ICD-10-CM

## 2020-03-10 DIAGNOSIS — E78.5 HYPERLIPIDEMIA LDL GOAL <70: ICD-10-CM

## 2020-03-10 DIAGNOSIS — I82.4Z1 ACUTE DEEP VEIN THROMBOSIS (DVT) OF DISTAL VEIN OF RIGHT LOWER EXTREMITY (HCC): ICD-10-CM

## 2020-03-10 DIAGNOSIS — I73.9 PERIPHERAL ARTERIAL DISEASE (HCC): ICD-10-CM

## 2020-03-10 DIAGNOSIS — I25.119 CORONARY ARTERY DISEASE INVOLVING NATIVE CORONARY ARTERY OF NATIVE HEART WITH ANGINA PECTORIS (HCC): Primary | ICD-10-CM

## 2020-03-10 PROBLEM — I44.2 COMPLETE HEART BLOCK (HCC): Status: RESOLVED | Noted: 2019-06-17 | Resolved: 2020-03-10

## 2020-03-10 PROBLEM — N18.4 CHRONIC KIDNEY DISEASE, STAGE IV (SEVERE) (HCC): Status: ACTIVE | Noted: 2020-03-10

## 2020-03-10 PROCEDURE — 99214 OFFICE O/P EST MOD 30 MIN: CPT | Performed by: NURSE PRACTITIONER

## 2020-03-10 RX ORDER — METOPROLOL TARTRATE 100 MG/1
100 TABLET ORAL 2 TIMES DAILY
Qty: 180 TABLET | Refills: 2 | Status: SHIPPED | OUTPATIENT
Start: 2020-03-10 | End: 2020-04-30 | Stop reason: SDUPTHER

## 2020-03-10 NOTE — ASSESSMENT & PLAN NOTE
· Patient reports progressive NYHA class III symptoms which was anginal equivalent in the past  · Increase metoprolol tartrate 100 mg twice daily  · Schedule myocardial perfusion study  · Continue continue Plavix but defer aspirin due to chronic anticoagulation with Eliquis

## 2020-03-10 NOTE — ASSESSMENT & PLAN NOTE
· Continue Plavix 75 mg daily  · [place] class II-III symptoms  · Consider peripheral intervention of right iliac stenosis.

## 2020-03-19 ENCOUNTER — DOCUMENTATION (OUTPATIENT)
Dept: CARDIOLOGY | Facility: CLINIC | Age: 80
End: 2020-03-19

## 2020-03-19 NOTE — PROGRESS NOTES
In light of coronavirus scheduling issues, I have reviewed her chart and do feel she deserves nuclear stress testing with follow-up in the next several weeks.    JOSE Mayberry MD FACC, [facility]  Interventional and General Cardiology

## 2020-03-26 ENCOUNTER — TELEPHONE (OUTPATIENT)
Dept: ENDOCRINOLOGY | Facility: CLINIC | Age: 80
End: 2020-03-26

## 2020-03-26 DIAGNOSIS — Z79.4 TYPE 2 DIABETES MELLITUS WITH COMPLICATION, WITH LONG-TERM CURRENT USE OF INSULIN (HCC): Primary | ICD-10-CM

## 2020-03-26 DIAGNOSIS — E11.8 TYPE 2 DIABETES MELLITUS WITH COMPLICATION, WITH LONG-TERM CURRENT USE OF INSULIN (HCC): Primary | ICD-10-CM

## 2020-03-26 NOTE — TELEPHONE ENCOUNTER
PATIENT CALLED TO REQUEST REFILL ON HER 70/30 INSULIN. SHE IS OUT OF TODAY. PLEASE SEND TO [facility] IN [facility]

## 2020-04-06 ENCOUNTER — HOSPITAL ENCOUNTER (OUTPATIENT)
Dept: CARDIOLOGY | Facility: HOSPITAL | Age: 80
End: 2020-04-06

## 2020-04-10 ENCOUNTER — TELEPHONE (OUTPATIENT)
Dept: INTERNAL MEDICINE | Facility: CLINIC | Age: 80
End: 2020-04-10

## 2020-04-10 NOTE — TELEPHONE ENCOUNTER
Attempted to call patient but her mailbox was full so I called her daughter and left a message to set up video visit or reschedule until June/july

## 2020-04-13 ENCOUNTER — OFFICE VISIT (OUTPATIENT)
Dept: INTERNAL MEDICINE | Facility: CLINIC | Age: 80
End: 2020-04-13

## 2020-04-13 DIAGNOSIS — E78.5 HYPERLIPIDEMIA LDL GOAL <70: ICD-10-CM

## 2020-04-13 DIAGNOSIS — N18.4 CHRONIC KIDNEY DISEASE, STAGE IV (SEVERE) (HCC): ICD-10-CM

## 2020-04-13 DIAGNOSIS — R53.83 OTHER FATIGUE: Primary | ICD-10-CM

## 2020-04-13 PROCEDURE — 99441 PR PHYS/QHP TELEPHONE EVALUATION 5-10 MIN: CPT | Performed by: INTERNAL MEDICINE

## 2020-04-13 NOTE — PROGRESS NOTES
Subjective   Narcisa Cotto is a 79 y.o. female.   Chief Complaint   Patient presents with   • Fatigue   • Hyperlipidemia       History of Present Illness   Fatigue for a few weeks. No CP. No nausea or vomiting. Nothing makes it better or worst.  HLP x yrs that has been well controlled.  CKD has been stable. Sees Noemi for her diabetes.   The following portions of the patient's history were reviewed and updated as appropriate: allergies, current medications, past family history, past medical history, past social history, past surgical history and problem list.    Review of Systems   Constitutional: Positive for fatigue. Negative for activity change, appetite change, chills, diaphoresis, fever and unexpected weight change.   HENT: Negative for congestion, ear discharge, ear pain, mouth sores, nosebleeds, sinus pressure, sneezing and sore throat.    Eyes: Negative for pain, discharge and itching.   Respiratory: Negative for cough, chest tightness, shortness of breath and wheezing.    Cardiovascular: Negative for chest pain, palpitations and leg swelling.   Gastrointestinal: Negative for abdominal pain, constipation, diarrhea, nausea and vomiting.   Endocrine: Negative for cold intolerance, heat intolerance, polydipsia and polyphagia.   Genitourinary: Negative for dysuria, flank pain, frequency, hematuria and urgency.   Musculoskeletal: Negative for arthralgias, back pain, gait problem, myalgias, neck pain and neck stiffness.   Skin: Negative for color change, pallor and rash.   Neurological: Negative for seizures, speech difficulty, numbness and headaches.   Psychiatric/Behavioral: Negative for agitation, confusion, decreased concentration and sleep disturbance. The patient is not nervous/anxious.        Objective   Physical Exam   Constitutional: She is oriented to person, place, and time.   Neurological: She is alert and oriented to person, place, and time.   Psychiatric: She has a normal mood and affect. Her  behavior is normal.       Assessment/Plan   Narcisa was seen today for fatigue and hyperlipidemia.    Diagnoses and all orders for this visit:    Other fatigue  -     CBC & Differential; Future  -     TSH; Future    Hyperlipidemia LDL goal <70  -     Comprehensive Metabolic Panel; Future  -     Lipid Panel; Future    Chronic kidney disease, stage IV (severe) (CMS/HCC)  -     Comprehensive Metabolic Panel; Future        This visit has been rescheduled as a phone visit to comply with patient safety concerns in accordance with CDC recommendations. Total time of discussion was 5 minutes.

## 2020-04-14 ENCOUNTER — TELEPHONE (OUTPATIENT)
Dept: INTERNAL MEDICINE | Facility: CLINIC | Age: 80
End: 2020-04-14

## 2020-04-14 DIAGNOSIS — N18.4 CHRONIC KIDNEY DISEASE, STAGE IV (SEVERE) (HCC): Primary | ICD-10-CM

## 2020-04-14 NOTE — TELEPHONE ENCOUNTER
Patient's daughter states that they would like an order faxed to [facility] for Urine to rule out a UTI.  She had a telephone visit yesterday.

## 2020-04-14 NOTE — TELEPHONE ENCOUNTER
PT CALLED TO PROVIDE FAX NUMBER TO SEND ORDER FOR LAB AND URINALYSIS TEST FOR UTI.  FAX:[phone]    PLEASE ADVISE.  CALL BACK:[phone]

## 2020-04-16 ENCOUNTER — TELEPHONE (OUTPATIENT)
Dept: INTERNAL MEDICINE | Facility: CLINIC | Age: 80
End: 2020-04-16

## 2020-04-16 NOTE — TELEPHONE ENCOUNTER
Faxed orders for blood work to [facility].  Called daughter and explained instructions for fasting bloodwork.

## 2020-04-16 NOTE — TELEPHONE ENCOUNTER
Patient's daughter, Sharon called on behalf of the patient, requesting a call back from the provider/nurse. She stated the patient went to the lab yesterday and the only order the lab received was for a urinalysis and thought that Dr. Del Real also wanted blood work that included thyroid check.     Please call patient back and advise @ [phone] or daughter @[phone]

## 2020-04-17 ENCOUNTER — TELEPHONE (OUTPATIENT)
Dept: INTERNAL MEDICINE | Facility: CLINIC | Age: 80
End: 2020-04-17

## 2020-04-17 ENCOUNTER — MEDICATION THERAPY MANAGEMENT (OUTPATIENT)
Dept: INTERNAL MEDICINE | Facility: CLINIC | Age: 80
End: 2020-04-17

## 2020-04-17 RX ORDER — SULFAMETHOXAZOLE AND TRIMETHOPRIM 400; 80 MG/1; MG/1
1 TABLET ORAL 2 TIMES DAILY
Qty: 6 TABLET | Refills: 0 | Status: SHIPPED | OUTPATIENT
Start: 2020-04-17 | End: 2020-06-19

## 2020-04-17 RX ORDER — CIPROFLOXACIN 250 MG/1
250 TABLET, FILM COATED ORAL 2 TIMES DAILY
Qty: 14 TABLET | Refills: 0 | Status: SHIPPED | OUTPATIENT
Start: 2020-04-17 | End: 2020-06-19

## 2020-04-20 ENCOUNTER — TELEPHONE (OUTPATIENT)
Dept: INTERNAL MEDICINE | Facility: CLINIC | Age: 80
End: 2020-04-20

## 2020-04-20 NOTE — TELEPHONE ENCOUNTER
Results called to patient's daughter.  Has an appointment with Dr. Richards on 04/29/20.  Verbalized understanding.

## 2020-04-23 ENCOUNTER — APPOINTMENT (OUTPATIENT)
Dept: CARDIOLOGY | Facility: HOSPITAL | Age: 80
End: 2020-04-23

## 2020-04-28 ENCOUNTER — TELEPHONE (OUTPATIENT)
Dept: INTERNAL MEDICINE | Facility: CLINIC | Age: 80
End: 2020-04-28

## 2020-04-28 DIAGNOSIS — N18.4 STAGE 4 CHRONIC KIDNEY DISEASE (HCC): Primary | ICD-10-CM

## 2020-04-28 RX ORDER — CIPROFLOXACIN 250 MG/1
250 TABLET, FILM COATED ORAL 2 TIMES DAILY
Qty: 14 TABLET | Refills: 0 | Status: SHIPPED | OUTPATIENT
Start: 2020-04-28 | End: 2020-06-19

## 2020-04-28 NOTE — TELEPHONE ENCOUNTER
----- Message from Mariia Del Real, DO sent at 4/28/2020  9:26 AM EDT -----  Let know trace of leuk esterase on urine, I dont think they sent it for culture. Start cipro 250 mg po bid x 7 days.   Calcium a little high.No calcium supplements. Will need 6 week f/u in office. CKD when does she see kidney doctor again?

## 2020-04-29 ENCOUNTER — HOSPITAL ENCOUNTER (OUTPATIENT)
Dept: CARDIOLOGY | Facility: HOSPITAL | Age: 80
Discharge: HOME | End: 2020-04-29
Admitting: NURSE PRACTITIONER

## 2020-04-29 VITALS — BODY MASS INDEX: 26.52 KG/M2 | WEIGHT: 165 LBS | HEIGHT: 66 IN

## 2020-04-29 DIAGNOSIS — I25.119 CORONARY ARTERY DISEASE INVOLVING NATIVE CORONARY ARTERY OF NATIVE HEART WITH ANGINA PECTORIS (HCC): ICD-10-CM

## 2020-04-29 LAB
BH CV STRESS BP STAGE 2: NORMAL
BH CV STRESS BP STAGE 4: NORMAL
BH CV STRESS COMMENTS STAGE 1: NORMAL
BH CV STRESS DOSE REGADENOSON STAGE 1: 0.4
BH CV STRESS DURATION MIN STAGE 1: 1
BH CV STRESS DURATION MIN STAGE 2: 1
BH CV STRESS DURATION MIN STAGE 3: 1
BH CV STRESS DURATION MIN STAGE 4: 1
BH CV STRESS DURATION SEC STAGE 1: 0
BH CV STRESS DURATION SEC STAGE 2: 0
BH CV STRESS DURATION SEC STAGE 3: 0
BH CV STRESS DURATION SEC STAGE 4: 0
BH CV STRESS HR STAGE 1: 67
BH CV STRESS HR STAGE 2: 64
BH CV STRESS HR STAGE 3: 64
BH CV STRESS HR STAGE 4: 63
BH CV STRESS O2 STAGE 1: 98
BH CV STRESS O2 STAGE 2: 100
BH CV STRESS O2 STAGE 3: 99
BH CV STRESS O2 STAGE 4: 98
BH CV STRESS PROTOCOL 1: NORMAL
BH CV STRESS RECOVERY BP: NORMAL MMHG
BH CV STRESS RECOVERY HR: 62 BPM
BH CV STRESS RECOVERY O2: 96 %
BH CV STRESS STAGE 1: 1
BH CV STRESS STAGE 2: 2
BH CV STRESS STAGE 3: 3
BH CV STRESS STAGE 4: 4
LV EF NUC BP: 70 %
MAXIMAL PREDICTED HEART RATE: 141 BPM
PERCENT MAX PREDICTED HR: 47.52 %
STRESS BASELINE BP: NORMAL MMHG
STRESS BASELINE HR: 60 BPM
STRESS O2 SAT REST: 97 %
STRESS PERCENT HR: 56 %
STRESS POST ESTIMATED WORKLOAD: 1 METS
STRESS POST EXERCISE DUR MIN: 4 MIN
STRESS POST EXERCISE DUR SEC: 0 SEC
STRESS POST O2 SAT PEAK: 100 %
STRESS POST PEAK BP: NORMAL MMHG
STRESS POST PEAK HR: 67 BPM
STRESS TARGET HR: 120 BPM

## 2020-04-29 PROCEDURE — 78452 HT MUSCLE IMAGE SPECT MULT: CPT | Performed by: INTERNAL MEDICINE

## 2020-04-29 PROCEDURE — A9500 TC99M SESTAMIBI: HCPCS | Performed by: NURSE PRACTITIONER

## 2020-04-29 PROCEDURE — 93018 CV STRESS TEST I&R ONLY: CPT | Performed by: INTERNAL MEDICINE

## 2020-04-29 PROCEDURE — 78452 HT MUSCLE IMAGE SPECT MULT: CPT

## 2020-04-29 PROCEDURE — 0 TECHNETIUM SESTAMIBI: Performed by: NURSE PRACTITIONER

## 2020-04-29 PROCEDURE — 93017 CV STRESS TEST TRACING ONLY: CPT

## 2020-04-29 PROCEDURE — 25010000002 REGADENOSON 0.4 MG/5ML SOLUTION: Performed by: NURSE PRACTITIONER

## 2020-04-29 RX ADMIN — TECHNETIUM TC 99M SESTAMIBI 1 DOSE: 1 INJECTION INTRAVENOUS at 12:50

## 2020-04-29 RX ADMIN — TECHNETIUM TC 99M SESTAMIBI 1 DOSE: 1 INJECTION INTRAVENOUS at 11:05

## 2020-04-29 RX ADMIN — REGADENOSON 0.4 MG: 0.08 INJECTION, SOLUTION INTRAVENOUS at 12:51

## 2020-04-29 NOTE — PROGRESS NOTES
[facility]  TeleHealth Visit    Encounter Date:04/30/2020    Patient ID: Narcisa Cotto is a 79 y.o. female who resides in [place], [place].    CC/Reason for visit:    • Coronary artery disease  • Follow-up stress test results            Narcisa Cotto has a telephone visit today for a 6 week follow-up of her coronary artery disease, peripheral arterial disease and cardiac risk factors.  The patient was seen in my office in March and saw NHAN Sandoval.  At that time, the patient was experiencing shortness of breath and fatigue symptoms which were felt to be possible anginal equivalent.    Yesterday, the patient underwent nuclear stress testing which did not suggest ischemia.  The patient denies chest pressure.  She reports shortness of breath and generalized fatigue symptoms which have progressed over the last several months.  She had lab work performed on 4/20/2020 which showed her creatinine to be 2.  No CBC was reported.    The patient reports she will be seeing Dr. Richards on Monday regarding her diabetes and can have labs performed if needed.    Review of Systems   Constitution: Negative for malaise/fatigue.   Eyes: Negative for vision loss in left eye and vision loss in right eye.   Cardiovascular: Negative for chest pain, dyspnea on exertion, near-syncope, orthopnea, palpitations, paroxysmal nocturnal dyspnea and syncope.   Musculoskeletal: Negative for myalgias.   Neurological: Negative for brief paralysis, excessive daytime sleepiness, focal weakness, numbness, paresthesias and weakness.   All other systems reviewed and are negative.      The patient's past medical, social, family history and ROS reviewed in the patient's electronic medical record.    Allergies   Allergen Reactions   • Bactrim [Sulfamethoxazole-Trimethoprim] Urinary Retention   • Codeine Nausea And Vomiting         Current Outpatient Medications:   •  amLODIPine (NORVASC) 5 MG tablet, Take  1 tablet by mouth Daily., Disp: 90 tablet, Rfl: 3  •  cholecalciferol (VITAMIN D3) 1000 units tablet, Take 1,000 Units by mouth Daily., Disp: , Rfl:   •  ciprofloxacin (Cipro) 250 MG tablet, Take 1 tablet by mouth 2 (Two) Times a Day., Disp: 14 tablet, Rfl: 0  •  ciprofloxacin (Cipro) 250 MG tablet, Take 1 tablet by mouth 2 (Two) Times a Day., Disp: 14 tablet, Rfl: 0  •  clopidogrel (PLAVIX) 75 MG tablet, Take 1 tablet by mouth Daily for 90 days. Resume Sunday, Disp: 90 tablet, Rfl: 0  •  cyanocobalamin ([employer] VITAMIN B-12) 1000 MCG tablet, Take 1 tablet by mouth daily., Disp: , Rfl:   •  gabapentin (NEURONTIN) 600 MG tablet, Take 1 tablet by mouth 2 (Two) Times a Day., Disp: 180 tablet, Rfl: 1  •  insulin NPH-insulin regular (humuLIN 70/30,novoLIN 70/30) (70-30) 100 UNIT/ML injection, 20 units with breakfast, 20 units with lunch and 40 units with dinner, Disp: 80 mL, Rfl: 3  •  meclizine (ANTIVERT) 25 MG tablet, TAKE 1 TABLET THREE TIMES A DAY AS NEEDED, Disp: 270 tablet, Rfl: 1  •  metoprolol tartrate (LOPRESSOR) 100 MG tablet, Take 1 tablet by mouth 2 (Two) Times a Day., Disp: 180 tablet, Rfl: 3  •  nitroglycerin (NITROSTAT) 0.4 MG SL tablet, Place 1 tablet under the tongue Every 5 (Five) Minutes As Needed for Chest Pain. Take no more than 3 doses in 15 minutes., Disp: 25 tablet, Rfl: 5  •  ondansetron ODT (ZOFRAN-ODT) 8 MG disintegrating tablet, Take 1 tablet by mouth Every 8 (Eight) Hours As Needed for Nausea or Vomiting., Disp: 180 tablet, Rfl: 1  •  pantoprazole (PROTONIX) 40 MG EC tablet, Take 1 tablet by mouth Daily., Disp: 90 tablet, Rfl: 1  •  rosuvastatin (CRESTOR) 20 MG tablet, TAKE 1 TABLET DAILY, Disp: 90 tablet, Rfl: 1  •  sulfamethoxazole-trimethoprim (Bactrim) 400-80 MG tablet, Take 1 tablet by mouth 2 (Two) Times a Day., Disp: 6 tablet, Rfl: 0    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   • Chronic diastolic congestive heart failure  (CMS/[facility])    • Chronic low back pain    • Chronic venous insufficiency    • CKD (chronic kidney disease) stage 3, GFR 30-59 ml/min (CMS/[facility])    • CVA (cerebral vascular accident) (CMS/HCC) 7/7/2019   • Diabetic gastroparesis (CMS/HCC)    • Diverticulosis    • DJD (degenerative joint disease), lumbar    • DM2 (diabetes mellitus, type 2) (CMS/[facility])    • GERD (gastroesophageal reflux disease)    • Hiatal hernia    • History of hyperparathyroidism    • HLD (hyperlipidemia)    • HTN (hypertension)    • Lumbar stenosis 11/15/2018   • DELFINA (obstructive sleep apnea)    • Osteoarthritis 7/29/2016   • PAD (peripheral artery disease) (CMS/[facility])    • Postherpetic neuralgia    • Vitamin B12 deficiency 7/29/2016       Past Surgical History:   Procedure Laterality Date   • CARDIAC CATHETERIZATION N/A 5/23/2019    Procedure: LEFT HEART CATH;  Surgeon: Filemon Mayberry IV, MD;  Location:  [facility];  Service: Cardiovascular   • CARDIAC CATHETERIZATION N/A 5/29/2019    Procedure: Atherectomy-coronary;  Surgeon: Filemon Mayberry IV, MD;  Location:  [facility];  Service: Cardiovascular   • CARDIAC CATHETERIZATION N/A 5/29/2019    Procedure: Stent PATRICIA coronary;  Surgeon: Filemon Mayberry IV, MD;  Location:  [facility];  Service: Cardiovascular   • CARDIAC ELECTROPHYSIOLOGY PROCEDURE N/A 5/28/2019    Procedure: TEMPORARY PACEMAKER;  Surgeon: Jeffrey Reyes MD;  Location:  [facility];  Service: Cardiovascular   • CATARACT EXTRACTION Bilateral    • CHOLECYSTECTOMY     • COLONOSCOPY     • CYSTOSCOPY W/ URETERAL STENT PLACEMENT  2014   • ENDOSCOPY     • HYSTERECTOMY     • LUMBAR LAMINECTOMY DISCECTOMY DECOMPRESSION N/A 11/15/2018    Procedure: LUMBAR LAMINECTOMY L2-3 L 3-4,;  Surgeon: Zachary Key MD;  Location:  [facility];  Service: Orthopedic Spine   • PARATHYROIDECTOMY     • SPINAL CORD STIMULATOR IMPLANT     • TONSILLECTOMY    "      Family History   Problem Relation Age of Onset   • Kidney disease Mother    • Diabetes Father        Social History     Tobacco Use   • Smoking status: Never Smoker   • Smokeless tobacco: Never Used   Substance Use Topics   • Alcohol use: No           Blood pressure 135/62, pulse 67, height 167.6 cm (66\"), weight 74.8 kg (165 lb), not currently breastfeeding.  Body mass index is 26.63 kg/m².  Vitals:    04/30/20 1411   Patient Position: Sitting       Physical Exam   Constitutional: She is oriented to person, place, and time.   Neurological: She is alert and oriented to person, place, and time.   Skin: Skin is warm and dry.   Psychiatric: She has a normal mood and affect. Her behavior is normal.       Data Review (reviewed with patient):     Lab (4/16/2020):  · Glucose 141, BUN 29, creatinine 2 (EGFR 26) sodium 138, potassium 4.5  • , , HDL 44, LDL 62  • Alk Phos 72, AST 22, ALT 14           Problem List Items Addressed This Visit        Cardiology Problems    Acute deep vein thrombosis (DVT) of lower extremity (CMS/HCC)    Overview     · Venous duplex (6/22/2019): Isolated DVT in one of the right peroneal veins.         Current Assessment & Plan     · Anticoagulation therapy complete  · Stop Eliquis         Coronary artery disease involving native coronary artery of native heart with angina pectoris (CMS/HCC) - Primary    Overview     · Echo (8/31/2016): EF > 70%. Moderate thickening of the noncoronary cusp of the aortic valve.  · Echo (04/23/2018): LVEF 60%. Grade I diastolic dysfunction.   Cardiac valves are functionally normal.  · Nuclear stress test (04/23/2018): No focal areas of ischemia or infarct.  Transient ischemic dilatation present raising possibility of balanced ischemia/multivessel CAD  · Cardiac catheterization (5/23/2019): Severe multivessel CAD.  Culprit for NSTEMI is a highly calcified ostial RCA 99% stenosis.    · Staged rotational atherectomy and PCI of the RCA using a PATRICIA, " 5/29/2019  · Pharmacologic nuclear stress (4/29/2020): Normal perfusion.  LVEF 70%         Current Assessment & Plan     · Worsening fatigue symptoms were concerning for anginal equivalent, but nuclear stress testing performed yesterday suggests no ischemia or infarct.  · Continue antiplatelet, beta-blocker, and high intensity statin  · If patient's fatigue symptoms persist without alternate explanation, may consider repeat cardiac catheterization.         Relevant Medications    metoprolol tartrate (LOPRESSOR) 100 MG tablet    nitroglycerin (NITROSTAT) 0.4 MG SL tablet    amLODIPine (NORVASC) 5 MG tablet    clopidogrel (PLAVIX) 75 MG tablet    Other Relevant Orders    CBC (No Diff)    proBNP    Essential hypertension    Overview     • Target blood pressure <130/80 mmHg         Current Assessment & Plan     · Controlled  · Continue present medical therapy         Relevant Medications    metoprolol tartrate (LOPRESSOR) 100 MG tablet    amLODIPine (NORVASC) 5 MG tablet    Hyperlipidemia LDL goal <70    Overview     · High intensity statin therapy is recommended given the presence of peripheral arterial disease and diabetes         Current Assessment & Plan     · Continue rosuvastatin         Relevant Orders    Comprehensive Metabolic Panel    Lipid Panel    Peripheral arterial disease (CMS/[facility])    Overview     · BONNIE (08/22/2013):  At rest right 0.98, left 0.85.  After 2 minutes of exercise right 0.51, left 0.68.  · High-grade right common iliac stenosis on cardiac catheterization, 5/23/2019         Current Assessment & Plan     · No claudication currently         Relevant Medications    clopidogrel (PLAVIX) 75 MG tablet       Other    Type 2 diabetes mellitus with complication, with long-term current use of insulin (CMS/[facility])    Relevant Orders    Hemoglobin A1c    Chronic kidney disease, stage IV (severe) (CMS/[facility])    Current Assessment & Plan     · Recent creatinine of 2, which is substantially increased from 6  months prior (1), although she has had creatinine levels in the 2 range previously  · I recommended she follow-up with [facility] (previously saw Cristy)           Other Visit Diagnoses     Fatigue, unspecified type        Relevant Orders    CBC (No Diff)    proBNP    TSH    Hypertensive heart disease with heart failure (CMS/HCC)         Relevant Medications    metoprolol tartrate (LOPRESSOR) 100 MG tablet    nitroglycerin (NITROSTAT) 0.4 MG SL tablet    amLODIPine (NORVASC) 5 MG tablet    clopidogrel (PLAVIX) 75 MG tablet    Other Relevant Orders    proBNP        Patient experiencing fatigue symptoms of unclear etiology.  Stress testing performed yesterday does not suggest ischemia.  She had recent blood work which did not include H&H.  Patient has a history of bleeding and has been on Eliquis and clopidogrel up to this point.  The recent blood work did show a creatinine of 2, which has worsened since 7/2019.       · Discontinue Eliquis  · Obtain blood work on Monday, 5/4/2020 including CBC, proBNP, lipids, and TSH  · Follow-up with [facility] regarding CKD  Return in about 3 months (around 7/30/2020).      JOSE Mayberry MD [facility]  Interventional and General Cardiology    04/30/2020     This patient consented to a telehealth visit via telephone.  I spent 25 minutes in total including but not limited to the direct conversation with this patient. All vitals recorded within this visit are reported by the patient.

## 2020-04-30 ENCOUNTER — DOCUMENTATION (OUTPATIENT)
Dept: CARDIOLOGY | Facility: CLINIC | Age: 80
End: 2020-04-30

## 2020-04-30 ENCOUNTER — OFFICE VISIT (OUTPATIENT)
Dept: CARDIOLOGY | Facility: CLINIC | Age: 80
End: 2020-04-30

## 2020-04-30 VITALS
SYSTOLIC BLOOD PRESSURE: 135 MMHG | DIASTOLIC BLOOD PRESSURE: 62 MMHG | HEART RATE: 67 BPM | WEIGHT: 165 LBS | HEIGHT: 66 IN | BODY MASS INDEX: 26.52 KG/M2

## 2020-04-30 DIAGNOSIS — N18.4 CHRONIC KIDNEY DISEASE, STAGE IV (SEVERE) (HCC): ICD-10-CM

## 2020-04-30 DIAGNOSIS — E78.5 HYPERLIPIDEMIA LDL GOAL <70: ICD-10-CM

## 2020-04-30 DIAGNOSIS — I11.0 HYPERTENSIVE HEART DISEASE WITH HEART FAILURE (HCC): ICD-10-CM

## 2020-04-30 DIAGNOSIS — I25.119 CORONARY ARTERY DISEASE INVOLVING NATIVE CORONARY ARTERY OF NATIVE HEART WITH ANGINA PECTORIS (HCC): Primary | ICD-10-CM

## 2020-04-30 DIAGNOSIS — R53.83 FATIGUE, UNSPECIFIED TYPE: ICD-10-CM

## 2020-04-30 DIAGNOSIS — Z79.4 TYPE 2 DIABETES MELLITUS WITH COMPLICATION, WITH LONG-TERM CURRENT USE OF INSULIN (HCC): ICD-10-CM

## 2020-04-30 DIAGNOSIS — I82.4Z1 ACUTE DEEP VEIN THROMBOSIS (DVT) OF DISTAL VEIN OF RIGHT LOWER EXTREMITY (HCC): ICD-10-CM

## 2020-04-30 DIAGNOSIS — I10 ESSENTIAL HYPERTENSION: ICD-10-CM

## 2020-04-30 DIAGNOSIS — E11.8 TYPE 2 DIABETES MELLITUS WITH COMPLICATION, WITH LONG-TERM CURRENT USE OF INSULIN (HCC): ICD-10-CM

## 2020-04-30 DIAGNOSIS — I73.9 PERIPHERAL ARTERIAL DISEASE (HCC): ICD-10-CM

## 2020-04-30 PROBLEM — E16.2 ACUTE METABOLIC ENCEPHALOPATHY DUE TO HYPOGLYCEMIA: Status: RESOLVED | Noted: 2019-07-12 | Resolved: 2020-04-30

## 2020-04-30 PROBLEM — R29.90 STROKE-LIKE SYMPTOMS: Status: RESOLVED | Noted: 2019-10-21 | Resolved: 2020-04-30

## 2020-04-30 PROBLEM — G93.41 ACUTE METABOLIC ENCEPHALOPATHY DUE TO HYPOGLYCEMIA: Status: RESOLVED | Noted: 2019-07-12 | Resolved: 2020-04-30

## 2020-04-30 PROBLEM — I82.409 ACUTE DEEP VEIN THROMBOSIS (DVT) OF LOWER EXTREMITY: Status: ACTIVE | Noted: 2019-06-25

## 2020-04-30 PROBLEM — R06.02 SHORTNESS OF BREATH: Status: RESOLVED | Noted: 2019-06-22 | Resolved: 2020-04-30

## 2020-04-30 PROBLEM — I63.9 CVA (CEREBRAL VASCULAR ACCIDENT): Status: RESOLVED | Noted: 2019-07-07 | Resolved: 2020-04-30

## 2020-04-30 PROCEDURE — 99443 PR PHYS/QHP TELEPHONE EVALUATION 21-30 MIN: CPT | Performed by: INTERNAL MEDICINE

## 2020-04-30 RX ORDER — AMLODIPINE BESYLATE 5 MG/1
5 TABLET ORAL DAILY
Qty: 90 TABLET | Refills: 3 | Status: SHIPPED | OUTPATIENT
Start: 2020-04-30 | End: 2020-06-19 | Stop reason: SDUPTHER

## 2020-04-30 RX ORDER — NITROGLYCERIN 0.4 MG/1
0.4 TABLET SUBLINGUAL
Qty: 25 TABLET | Refills: 5 | Status: SHIPPED | OUTPATIENT
Start: 2020-04-30 | End: 2021-01-01 | Stop reason: SDUPTHER

## 2020-04-30 RX ORDER — METOPROLOL TARTRATE 100 MG/1
100 TABLET ORAL 2 TIMES DAILY
Qty: 180 TABLET | Refills: 3 | Status: SHIPPED | OUTPATIENT
Start: 2020-04-30 | End: 2021-01-01 | Stop reason: SDUPTHER

## 2020-04-30 RX ORDER — CLOPIDOGREL BISULFATE 75 MG/1
75 TABLET ORAL DAILY
Qty: 90 TABLET | Refills: 0 | Status: SHIPPED | OUTPATIENT
Start: 2020-04-30 | End: 2020-06-19 | Stop reason: SDUPTHER

## 2020-04-30 NOTE — ASSESSMENT & PLAN NOTE
· Recent creatinine of 2, which is substantially increased from 6 months prior (1), although she has had creatinine levels in the 2 range previously  · I recommended she follow-up with [facility] (previously saw Cristy)

## 2020-04-30 NOTE — ASSESSMENT & PLAN NOTE
· Worsening fatigue symptoms were concerning for anginal equivalent, but nuclear stress testing performed yesterday suggests no ischemia or infarct.  · Continue antiplatelet, beta-blocker, and high intensity statin  · If patient's fatigue symptoms persist without alternate explanation, may consider repeat cardiac catheterization.

## 2020-05-04 ENCOUNTER — OFFICE VISIT (OUTPATIENT)
Dept: ENDOCRINOLOGY | Facility: CLINIC | Age: 80
End: 2020-05-04

## 2020-05-04 VITALS
SYSTOLIC BLOOD PRESSURE: 110 MMHG | WEIGHT: 167.4 LBS | BODY MASS INDEX: 26.9 KG/M2 | DIASTOLIC BLOOD PRESSURE: 60 MMHG | HEART RATE: 62 BPM | HEIGHT: 66 IN | OXYGEN SATURATION: 99 %

## 2020-05-04 DIAGNOSIS — E11.42 DIABETIC PERIPHERAL NEUROPATHY (HCC): ICD-10-CM

## 2020-05-04 DIAGNOSIS — N18.4 STAGE 4 CHRONIC KIDNEY DISEASE (HCC): ICD-10-CM

## 2020-05-04 DIAGNOSIS — IMO0002 UNCONTROLLED TYPE 2 DIABETES MELLITUS WITH COMPLICATION, WITH LONG-TERM CURRENT USE OF INSULIN: Primary | ICD-10-CM

## 2020-05-04 LAB
GLUCOSE BLDC GLUCOMTR-MCNC: 146 MG/DL (ref 70–130)
HBA1C MFR BLD: 9.6 %

## 2020-05-04 PROCEDURE — 99214 OFFICE O/P EST MOD 30 MIN: CPT | Performed by: INTERNAL MEDICINE

## 2020-05-04 PROCEDURE — 83036 HEMOGLOBIN GLYCOSYLATED A1C: CPT | Performed by: INTERNAL MEDICINE

## 2020-05-04 PROCEDURE — 82947 ASSAY GLUCOSE BLOOD QUANT: CPT | Performed by: INTERNAL MEDICINE

## 2020-05-04 RX ORDER — GABAPENTIN 600 MG/1
600 TABLET ORAL 2 TIMES DAILY
Qty: 180 TABLET | Refills: 1 | Status: SHIPPED | OUTPATIENT
Start: 2020-05-04 | End: 2021-01-01 | Stop reason: SDUPTHER

## 2020-05-04 NOTE — PROGRESS NOTES
Chief Complaint   Patient presents with   • Follow-up   • Diabetes     pt forgot her meter at home       HPI:   Narcisa Cotto is a 79 y.o.female who [facility] f/u evaluation of her Type 2 diabetes. Last clinic visit 11/06/2019. Her history is as follows:    Interim Events:  - has been sleeping late and not eating breakfast.. Therefore missing the AM dose of insulin. First meal often around 11A - 1P  - reports decreased appetite. Having variable meal times.  - Had stress test on 04/29/2020: EF 70%, interpretation: normal study  - Dr. Dubon following for hemorrhage in left eye   - Labs from 05/06/2020 shows decrease in her GFR (needs to re-establish care with nephrology)    1) Type 2 diabetes with associated complications of peripheral neuropathy, CKD, PAD, retinopathy, and CAD:  - diagnosed in 2000  - was on basal-bolus regimen with Levemir and Humalog. Changed to Novolin 70/30 in (01/2018) due to high cost of the analogue insulins.     Current DM Medications:  - Novolin 70/30: 20 units with BK, 20 units with lunch, and 60 units with supper: but missing breakfast dose often as she is not eating BK    Glucometer/ BG log Review: pt forgot her meter for review   - denies hypoglycemia on this dosing    DM Health Maintenance:  Ophtho: Last visit - (5/2018), With Dr. Chicas, [facility], + retinopathy, getting injections both eyes  Podiatry: Last visit - no recent visit  Monofilament / foot exam: (09/2018)  Lipids: (05/2020) TChol 155, , HDL 43, LDL 40 - on crestor 20 mg daily  Urine Microalb/Cr ratio: (01/2019) 69.9, CKD stage 3, on ace-I  TSH: (05/2020) 1.68  Aspirin: 81 mg daily  CBC: (05/2020) Hgb 12.8 - WNL  CMP: (05/2020) Cr 2.20, GFR 23, LFTs WNL    2) diabetic peripheral neuropathy:  - pt did not start Lyrica due to cost  - On Gabapentin 600 mg BID - renal dosing    Other history:   - hospitalized in 05/2018 for NSTEMI. Pt reports she received 3 units of PRBCs  -  "hospitalized again in 06/2019 for DVT, SOA  - hospitalized again in 07/2019 for possible CVA. Pt had taken extra insulin by mistake and took 70 units instead of 40 units. Pt's symptoms were thought to be due to the hypoglycemia and not CVA    .Review of Systems   Constitutional: Positive for fatigue.        Weight stable   HENT: Negative.    Eyes: Negative.  Negative for visual disturbance.   Respiratory: Negative.    Cardiovascular: Positive for leg swelling. Negative for chest pain.   Gastrointestinal: Negative.  Negative for constipation and diarrhea.   Endocrine: Negative for cold intolerance, polydipsia and polyuria.        See HPI   Genitourinary:        Stress urinary incontinence   Musculoskeletal: Positive for arthralgias (knees & hands). Negative for back pain, joint swelling and myalgias.        Leg pain   Skin: Negative.  Negative for rash and wound.   Neurological: Negative for headaches. Numbness: pain, Bilat  feet.   Hematological: Negative.    Psychiatric/Behavioral: Negative.      The following portions of the patient's history were reviewed and updated as appropriate: allergies, current medications, past family history, past medical history, past social history, past surgical history and problem list.    /60   Pulse 62   Ht 167.6 cm (66\")   Wt 75.9 kg (167 lb 6.4 oz)   SpO2 99%   BMI 27.02 kg/m²   Physical Exam   Constitutional: She is oriented to person, place, and time. She appears well-developed. No distress.   HENT:   Head: Normocephalic.   Mouth/Throat: Oropharynx is clear and moist.   Eyes: Pupils are equal, round, and reactive to light. Conjunctivae and EOM are normal.   Neck: No tracheal deviation present. No thyromegaly present.   No palpable thyroid nodules     Cardiovascular: Normal rate, regular rhythm and normal heart sounds.   No murmur heard.  Pulmonary/Chest: Effort normal and breath sounds normal. No respiratory distress.   Abdominal: Soft. Bowel sounds are normal.   No " scarring at insulin injection sites   Lymphadenopathy:     She has no cervical adenopathy.   Neurological: She is alert and oriented to person, place, and time. No cranial nerve deficit.   Skin: Skin is warm and dry. She is not diaphoretic. No erythema.   Psychiatric: She has a normal mood and affect. Her behavior is normal.   Vitals reviewed.    LABS/IMAGING:   Office Visit on 05/04/2020   Component Date Value Ref Range Status   • Glucose 05/04/2020 146* 70 - 130 mg/dL Final   • Hemoglobin A1C 05/04/2020 9.6  % Final       ASSESSMENT/PLAN:  1)  Type 2 diabetes with associated complications of peripheral neuropathy, CKD, PAD, retinopathy: uncontrolled, A1C% 9.6 today.   - Cost of diabetic medications limiting treatment options  - Because of her variable meal schedule, she is missing some of her insulin. Pre-mixed insulin is no longer a good treatment option for her.    - discussed with patient that glycemic targets are generally set somewhat higher (eg, <8 percent) for older adult patients and those with comorbidities. Therefore a goal A1C% of 7-8% is reasonable for Ms. Cotto.    -Will D/C Novolin 70/30    Will change to insulin regimen to the following:   - NPH: 30 units qAM  -  Regular: 20 units + CF of 2 units: 50 > 150 Before Dinner. If eating a snack after dinner (> 30 mgs carbs) to take 2 units only of Reg   - NPH: 30 units nightly    Written instructions reviewed with patient.     2) diabetic peripheral neuropathy  - pt on gabapentin 600 mg BID: renal dosing  - Rx refilled    3) CKD, stage 4 per recent labs:  - - Labs from 05/06/2020 shows decrease in her GFR from her baseline (needs to re-establish care with nephrology)  - pt states she needs another referral to Dr. Muniz.    RTC 3 months.    Counseling was given to patient for the following topics:  instructions for management, see details in assessment/plan. Total face to face time of the encounter was 35 minutes and 25 minutes was spent  counseling.

## 2020-05-06 ENCOUNTER — TELEPHONE (OUTPATIENT)
Dept: ENDOCRINOLOGY | Facility: CLINIC | Age: 80
End: 2020-05-06

## 2020-05-06 NOTE — TELEPHONE ENCOUNTER
PATIENT IS CALLING NEEDING A REFERRAL TO BE SENT TO HER KIDNEY DOCTOR. DR. MILLER'S OFFICE PHONE NUMBER -[phone]. SHE IS NOT SURE THE FAX NUMBER. DR. FENTON OFFICE NEEDS REFERRAL SO PATIENT CAN SCHEDULE APPOINTMENT.

## 2020-05-15 NOTE — TELEPHONE ENCOUNTER
Janeth,     Note is finished and referral order in chart.  Patient had outside labs from [facility] that are scanned in chart.    Thanks  MD

## 2020-06-02 ENCOUNTER — TELEPHONE (OUTPATIENT)
Dept: ENDOCRINOLOGY | Facility: CLINIC | Age: 80
End: 2020-06-02

## 2020-06-02 NOTE — TELEPHONE ENCOUNTER
LVM for patient to return the call concerning medication refills for gabapentin. We sent In a  rx on 5/4/2020 with 1 refill. Dr. Richards wanted to know if patient actually need those refilled and if she is receiving those at her local pharmacy- [facility] . Request received came from  mail order ( [facility]) . Office number given.       Spoke with patient concerning medication refills for her gabapentin with [facility]. Pt stated that she does not need them at this time that she Is getting them from her local pharmacy. Informed patient to call us with any further needs, Pt voiced understanding.

## 2020-06-19 ENCOUNTER — OFFICE VISIT (OUTPATIENT)
Dept: INTERNAL MEDICINE | Facility: CLINIC | Age: 80
End: 2020-06-19

## 2020-06-19 VITALS
DIASTOLIC BLOOD PRESSURE: 81 MMHG | TEMPERATURE: 98 F | HEIGHT: 66 IN | HEART RATE: 67 BPM | SYSTOLIC BLOOD PRESSURE: 121 MMHG | BODY MASS INDEX: 27.55 KG/M2 | WEIGHT: 171.4 LBS | OXYGEN SATURATION: 97 %

## 2020-06-19 DIAGNOSIS — I10 ESSENTIAL HYPERTENSION: ICD-10-CM

## 2020-06-19 DIAGNOSIS — R11.0 NAUSEA: ICD-10-CM

## 2020-06-19 DIAGNOSIS — I25.119 CORONARY ARTERY DISEASE INVOLVING NATIVE CORONARY ARTERY OF NATIVE HEART WITH ANGINA PECTORIS (HCC): ICD-10-CM

## 2020-06-19 DIAGNOSIS — I73.9 PERIPHERAL ARTERIAL DISEASE (HCC): ICD-10-CM

## 2020-06-19 DIAGNOSIS — G47.09 OTHER INSOMNIA: Primary | ICD-10-CM

## 2020-06-19 PROCEDURE — 99213 OFFICE O/P EST LOW 20 MIN: CPT | Performed by: INTERNAL MEDICINE

## 2020-06-19 RX ORDER — CLOPIDOGREL BISULFATE 75 MG/1
TABLET ORAL
Qty: 90 TABLET | Refills: 1 | Status: SHIPPED | OUTPATIENT
Start: 2020-06-19 | End: 2021-01-01

## 2020-06-19 RX ORDER — ONDANSETRON 8 MG/1
8 TABLET, ORALLY DISINTEGRATING ORAL EVERY 8 HOURS PRN
Qty: 30 TABLET | Refills: 1 | Status: SHIPPED | OUTPATIENT
Start: 2020-06-19 | End: 2021-01-01 | Stop reason: SDUPTHER

## 2020-06-19 RX ORDER — AMLODIPINE BESYLATE 5 MG/1
5 TABLET ORAL DAILY
Qty: 90 TABLET | Refills: 1 | Status: SHIPPED | OUTPATIENT
Start: 2020-06-19 | End: 2021-01-01 | Stop reason: SDUPTHER

## 2020-06-19 NOTE — PROGRESS NOTES
Subjective   Narcisa Cotto is a 79 y.o. female.   Chief Complaint   Patient presents with   • Follow-up       Heartburn   She reports no abdominal pain, no chest pain, no coughing, no nausea, no sore throat or no wheezing. This is a chronic problem. The current episode started more than 1 year ago. Pertinent negatives include no fatigue.   Hyperlipidemia   This is a chronic problem. The current episode started more than 1 year ago. The problem is controlled. Pertinent negatives include no chest pain, myalgias or shortness of breath. Risk factors for coronary artery disease include diabetes mellitus, dyslipidemia and hypertension.   Chronic Kidney Disease   This is a chronic problem. The current episode started more than 1 year ago. Pertinent negatives include no abdominal pain, arthralgias, chest pain, chills, congestion, coughing, diaphoresis, fatigue, fever, headaches, myalgias, nausea, neck pain, numbness, rash, sore throat, swollen glands, urinary symptoms, vertigo, visual change, vomiting or weakness. The treatment provided mild relief.   Hypertension   This is a chronic problem. The current episode started more than 1 year ago. The problem is controlled. Pertinent negatives include no chest pain, headaches, neck pain, palpitations or shortness of breath. Risk factors for coronary artery disease include diabetes mellitus and dyslipidemia.   Insomnia   This is a chronic problem. The current episode started more than 1 year ago. Pertinent negatives include no abdominal pain, arthralgias, chest pain, chills, congestion, coughing, diaphoresis, fatigue, fever, headaches, myalgias, nausea, neck pain, numbness, rash, sore throat, swollen glands, urinary symptoms, vertigo, visual change, vomiting or weakness. The treatment provided significant relief.     The following portions of the patient's history were reviewed and updated as appropriate: allergies, current medications, past family history, past medical  "history, past social history, past surgical history and problem list.    Review of Systems   Constitutional: Negative for activity change, appetite change, chills, diaphoresis, fatigue, fever and unexpected weight change.   HENT: Negative for congestion, ear discharge, ear pain, mouth sores, nosebleeds, sinus pressure, sneezing and sore throat.    Eyes: Negative for pain, discharge and itching.   Respiratory: Negative for cough, chest tightness, shortness of breath and wheezing.    Cardiovascular: Negative for chest pain, palpitations and leg swelling.   Gastrointestinal: Negative for abdominal pain, constipation, diarrhea, nausea and vomiting.   Endocrine: Negative for cold intolerance, heat intolerance, polydipsia and polyphagia.   Genitourinary: Negative for dysuria, flank pain, frequency, hematuria and urgency.   Musculoskeletal: Positive for back pain (chronic). Negative for arthralgias, gait problem, myalgias, neck pain and neck stiffness.   Skin: Negative for color change, pallor and rash.   Neurological: Negative for vertigo, seizures, speech difficulty, weakness, numbness and headaches.   Psychiatric/Behavioral: Negative for agitation, confusion, decreased concentration and sleep disturbance. The patient has insomnia. The patient is not nervous/anxious.      /90   Pulse 67   Temp 98 °F (36.7 °C)   Ht 167.6 cm (66\")   Wt 77.7 kg (171 lb 6.4 oz)   SpO2 97%   BMI 27.66 kg/m²     Objective   Physical Exam   Constitutional: She is oriented to person, place, and time. She appears well-developed.   HENT:   Head: Normocephalic.   Right Ear: External ear normal.   Left Ear: External ear normal.   Nose: Nose normal.   Mouth/Throat: Oropharynx is clear and moist.   Eyes: Pupils are equal, round, and reactive to light. Conjunctivae are normal.   Neck: No JVD present. No thyromegaly present.   Cardiovascular: Normal rate, regular rhythm and normal heart sounds. Exam reveals no friction rub.   No murmur " heard.  Pulmonary/Chest: Effort normal and breath sounds normal. No respiratory distress. She has no wheezes. She has no rales.   Abdominal: Soft. Bowel sounds are normal. She exhibits no distension. There is no tenderness. There is no guarding.   Musculoskeletal: She exhibits no edema or tenderness.   Lymphadenopathy:     She has no cervical adenopathy.   Neurological: She is alert and oriented to person, place, and time. She has normal reflexes. She displays normal reflexes. No cranial nerve deficit.   Skin: No rash noted.   Psychiatric: She has a normal mood and affect. Her behavior is normal.   Nursing note and vitals reviewed.      Assessment/Plan   Narcisa was seen today for chronic kidney disease, hypertension, hyperlipidemia and vitamin b12 def.    Diagnoses and all orders for this visit:    Insomnia, unspecified type  -     traZODone (DESYREL) 50 MG tablet; Take 1 tablet by mouth Every Night.  stable  Essential hypertension  -     Comprehensive Metabolic Panel  -     Lipid Panel  stable  Hyperlipidemia, unspecified hyperlipidemia type  -     Comprehensive Metabolic Panel  -     Lipid Panel  Stable Reviewed labs from 5/20  Gastroesophageal reflux disease, esophagitis presence not specified  Stable.  Chronic kidney disease, stage 3 (moderate)  stable

## 2020-07-15 ENCOUNTER — OFFICE VISIT (OUTPATIENT)
Dept: INTERNAL MEDICINE | Facility: CLINIC | Age: 80
End: 2020-07-15

## 2020-07-15 VITALS
HEIGHT: 66 IN | DIASTOLIC BLOOD PRESSURE: 72 MMHG | SYSTOLIC BLOOD PRESSURE: 120 MMHG | BODY MASS INDEX: 28 KG/M2 | OXYGEN SATURATION: 95 % | WEIGHT: 174.2 LBS | HEART RATE: 64 BPM | TEMPERATURE: 98.4 F

## 2020-07-15 DIAGNOSIS — E78.5 HYPERLIPIDEMIA LDL GOAL <70: ICD-10-CM

## 2020-07-15 DIAGNOSIS — R25.1 TREMOR OF LEFT HAND: Primary | ICD-10-CM

## 2020-07-15 PROCEDURE — 99213 OFFICE O/P EST LOW 20 MIN: CPT | Performed by: INTERNAL MEDICINE

## 2020-07-15 RX ORDER — ROSUVASTATIN CALCIUM 20 MG/1
20 TABLET, COATED ORAL DAILY
Qty: 90 TABLET | Refills: 1 | Status: SHIPPED | OUTPATIENT
Start: 2020-07-15 | End: 2020-01-01 | Stop reason: SDUPTHER

## 2020-07-15 NOTE — PROGRESS NOTES
"Subjective   Narcisa Cotto is a 80 y.o. female.   Chief Complaint   Patient presents with   • Hyperlipidemia     History of Present Illness   Here for f/u on  Hlp.  Needs refill on Crestor. Tolerating medication with no muscle pain.  Sees  Nephrology for her CKD. Noticed over last year tremor on her left, occurs at rest and with use. Her Dad had Parkinson.   The following portions of the patient's history were reviewed and updated as appropriate: allergies, current medications, past family history, past medical history, past social history, past surgical history and problem list.    Review of Systems   Constitutional: Negative for activity change, appetite change, chills, diaphoresis, fatigue, fever and unexpected weight change.   HENT: Negative for congestion, ear discharge, ear pain, mouth sores, nosebleeds, sinus pressure, sneezing and sore throat.    Eyes: Negative for pain, discharge and itching.   Respiratory: Negative for cough, chest tightness, shortness of breath and wheezing.    Cardiovascular: Negative for chest pain, palpitations and leg swelling.   Gastrointestinal: Negative for abdominal pain, constipation, diarrhea, nausea and vomiting.   Endocrine: Negative for cold intolerance, heat intolerance, polydipsia and polyphagia.   Genitourinary: Negative for dysuria, flank pain, frequency, hematuria and urgency.   Musculoskeletal: Negative for arthralgias, back pain, gait problem, myalgias, neck pain and neck stiffness.   Skin: Negative for color change, pallor and rash.   Neurological: Negative for seizures, speech difficulty, numbness and headaches.   Psychiatric/Behavioral: Negative for agitation, confusion, decreased concentration and sleep disturbance. The patient is not nervous/anxious.      /72   Pulse 64   Temp 98.4 °F (36.9 °C)   Ht 167.6 cm (66\")   Wt 79 kg (174 lb 3.2 oz)   SpO2 95%   BMI 28.12 kg/m²     Objective   Physical Exam   Constitutional: She is oriented to person, " place, and time. She appears well-developed.   HENT:   Head: Normocephalic.   Right Ear: External ear normal.   Left Ear: External ear normal.   Nose: Nose normal.   Mouth/Throat: Oropharynx is clear and moist.   Eyes: Pupils are equal, round, and reactive to light. Conjunctivae are normal.   Neck: No JVD present. No thyromegaly present.   Cardiovascular: Normal rate, regular rhythm and normal heart sounds. Exam reveals no friction rub.   No murmur heard.  Pulmonary/Chest: Effort normal and breath sounds normal. No respiratory distress. She has no wheezes. She has no rales.   Abdominal: Soft. Bowel sounds are normal. She exhibits no distension. There is no tenderness. There is no guarding.   Musculoskeletal: She exhibits no edema or tenderness.   Lymphadenopathy:     She has no cervical adenopathy.   Neurological: She is alert and oriented to person, place, and time. She displays normal reflexes. No cranial nerve deficit.   Resting tremor on the left hand   Skin: No rash noted.   Psychiatric: Her behavior is normal.   Nursing note and vitals reviewed.      Assessment/Plan   Narcisa was seen today for hyperlipidemia.    Diagnoses and all orders for this visit:    Tremor of left hand  -     Ambulatory Referral to Neurology    Hyperlipidemia LDL goal <70  -     rosuvastatin (CRESTOR) 20 MG tablet; Take 1 tablet by mouth Daily.

## 2020-08-24 ENCOUNTER — OFFICE VISIT (OUTPATIENT)
Dept: ENDOCRINOLOGY | Facility: CLINIC | Age: 80
End: 2020-08-24

## 2020-08-24 VITALS
SYSTOLIC BLOOD PRESSURE: 124 MMHG | HEART RATE: 59 BPM | BODY MASS INDEX: 28.03 KG/M2 | WEIGHT: 174.4 LBS | RESPIRATION RATE: 16 BRPM | DIASTOLIC BLOOD PRESSURE: 60 MMHG | OXYGEN SATURATION: 98 % | HEIGHT: 66 IN

## 2020-08-24 DIAGNOSIS — IMO0002 UNCONTROLLED TYPE 2 DIABETES MELLITUS WITH COMPLICATION, WITH LONG-TERM CURRENT USE OF INSULIN: Primary | ICD-10-CM

## 2020-08-24 LAB
GLUCOSE BLDC GLUCOMTR-MCNC: 209 MG/DL (ref 70–130)
HBA1C MFR BLD: 9.2 %

## 2020-08-24 PROCEDURE — 99214 OFFICE O/P EST MOD 30 MIN: CPT | Performed by: INTERNAL MEDICINE

## 2020-08-24 PROCEDURE — 83036 HEMOGLOBIN GLYCOSYLATED A1C: CPT | Performed by: INTERNAL MEDICINE

## 2020-08-24 PROCEDURE — 82947 ASSAY GLUCOSE BLOOD QUANT: CPT | Performed by: INTERNAL MEDICINE

## 2020-08-25 NOTE — PLAN OF CARE
Problem: Patient Care Overview  Goal: Plan of Care Review  Outcome: Ongoing (interventions implemented as appropriate)   07/07/19 2103   Coping/Psychosocial   Plan of Care Reviewed With patient   SLP re-evaluation, treatment and caregiver instruction completed. Will address dysphagia with education and adjustments to diet consistency as needed. Please see note for further details and recommendations.         Two spiculated nodules in RUL with concern for primary lung lesion  -oncology referral as above  -currently afebrile, in no respiratory distress Recent x-ray with lytic lesion in acetabulum on CT A/P involvement of T11 and 6th rib, concern for MM vs other cancerous process    Plan:  -f/u MRI hip, and abdomen with contrast   -MRI brain /spine with multiple lesions   -family history of malignancy in 2 sisters (CRC)  -appreciate oncology recs: MRI, IR biopsy of t11 lesion  -appreciate ortho recs: NWB R LE, MRI hip, neurosurg c/s  -appreciate neuro surg recs: no intervention, proceed with ortho surgery. consider rad onc consult  -appreciate palliative recs re pain control

## 2020-08-28 DIAGNOSIS — K21.9 GASTROESOPHAGEAL REFLUX DISEASE, ESOPHAGITIS PRESENCE NOT SPECIFIED: ICD-10-CM

## 2020-08-28 RX ORDER — PANTOPRAZOLE SODIUM 40 MG/1
TABLET, DELAYED RELEASE ORAL
Qty: 90 TABLET | Refills: 1 | Status: SHIPPED | OUTPATIENT
Start: 2020-08-28 | End: 2021-01-01

## 2020-09-16 NOTE — PROGRESS NOTES
[facility]  Office Visit Note    DATE: 09/16/2020    IDENTIFICATION: Narcisa Cotto is a 80 y.o. female who resides in [place], [place]    REASON FOR VISIT:  · Coronary artery disease  · Hypertension  · Peripheral arterial disease  · Valvular heart disease            Narcisa Cotto returns today for follow-up for coronary artery disease, peripheral arterial disease, valvular heart disease, history of DVTs and cardiac risk factors.  She historically been on Eliquis for her DVT but it was discontinued at her last visit due to rising creatinine up to 2 as well as a history of bleeding.  She is currently maintained on Plavix.  Earlier in the spring the patient was having shortness of breath and stress test was performed which did not suggest any ischemia.  The patient has continued to have some shortness of breath and fatigue but nothing that has changed or worsened since her last visit.  Her main complaint is of chronic back pain.  She reports having a history of spinal issues and even has a nerve stimulator in place.  They made some setting adjustments but it has not particularly helped.  She has been seeing Dr. Hylton who says she is not a surgical candidate.  He was receiving back injections at the pain clinic but it is not really helping.  She also reports of having right hip discomfort where it feels as if her hip goes out of socket.  She also has right leg discomfort.  She says anytime she is doing things around her house she has to take frequent breaks due to the pain in her back and legs.  She denies chest pain/pressure or tightness, orthopnea, palpitations or syncope.  She is on statin therapy and tolerating without myalgias.  Recent blood work showed an excellent LDL.  She has chronic kidney disease and most recent creatinine was 2.2.  She follows nephrology.  She has type 2 diabetes mellitus and hemoglobin A1c is been in the 8 range.  She has multiple  questions on what a good diet to control her sugar consists of.    Review of Systems   Constitution: Positive for malaise/fatigue.   Eyes: Negative for vision loss in left eye and vision loss in right eye.   Cardiovascular: Positive for dyspnea on exertion. Negative for chest pain, near-syncope, orthopnea, palpitations, paroxysmal nocturnal dyspnea and syncope.   Musculoskeletal: Negative for myalgias.   Neurological: Negative for brief paralysis, excessive daytime sleepiness, focal weakness, numbness, paresthesias and weakness.   All other systems reviewed and are negative.      The patient's past medical, social, family history and ROS reviewed in the patient's electronic medical record.    Allergies   Allergen Reactions   • Bactrim [Sulfamethoxazole-Trimethoprim] Urinary Retention   • Codeine Nausea And Vomiting         Current Outpatient Medications:   •  amLODIPine (NORVASC) 5 MG tablet, Take 1 tablet by mouth Daily., Disp: 90 tablet, Rfl: 1  •  cholecalciferol (VITAMIN D3) 1000 units tablet, Take 1,000 Units by mouth Daily., Disp: , Rfl:   •  clopidogrel (PLAVIX) 75 MG tablet, Take 1 tablet daily, Disp: 90 tablet, Rfl: 1  •  cyanocobalamin ([employer] VITAMIN B-12) 1000 MCG tablet, Take 1 tablet by mouth daily., Disp: , Rfl:   •  gabapentin (NEURONTIN) 600 MG tablet, Take 1 tablet by mouth 2 (Two) Times a Day., Disp: 180 tablet, Rfl: 1  •  insulin NPH (humuLIN N,novoLIN N) 100 UNIT/ML injection, Inject 30 units in AM and 30 units at bedtime, Disp: 60 mL, Rfl: 3  •  insulin regular (HumuLIN R,NovoLIN R) 100 UNIT/ML injection, Inject 20 units plus extra as directed before supper. Up to 30 units per day, Disp: 30 mL, Rfl: 3  •  meclizine (ANTIVERT) 25 MG tablet, TAKE 1 TABLET THREE TIMES A DAY AS NEEDED, Disp: 270 tablet, Rfl: 1  •  metoprolol tartrate (LOPRESSOR) 100 MG tablet, Take 1 tablet by mouth 2 (Two) Times a Day., Disp: 180 tablet, Rfl: 3  •  nitroglycerin (NITROSTAT) 0.4 MG SL tablet, Place 1 tablet under the  tongue Every 5 (Five) Minutes As Needed for Chest Pain. 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   • Chronic diastolic congestive heart failure (CMS/[facility])    • Chronic low back pain    • Chronic venous insufficiency    • CKD (chronic kidney disease) stage 3, GFR 30-59 ml/min (CMS/[facility])    • CVA (cerebral vascular accident) (CMS/[facility]) 7/7/2019   • Diabetic gastroparesis (CMS/[facility])    • Diverticulosis    • DJD (degenerative joint disease), lumbar    • DM2 (diabetes mellitus, type 2) (CMS/[facility])    • GERD (gastroesophageal reflux disease)    • Hiatal hernia    • History of hyperparathyroidism    • HLD (hyperlipidemia)    • HTN (hypertension)    • Lumbar stenosis 11/15/2018   • DELFINA (obstructive sleep apnea)    • Osteoarthritis 7/29/2016   • PAD (peripheral artery disease) (CMS/[facility])    • Postherpetic neuralgia    • Vitamin B12 deficiency 7/29/2016       Past Surgical History:   Procedure Laterality Date   • CARDIAC CATHETERIZATION N/A 5/23/2019    Procedure: LEFT HEART CATH;  Surgeon: Filemon Mayberry IV, MD;  Location:  [facility];  Service: Cardiovascular   • CARDIAC CATHETERIZATION N/A 5/29/2019    Procedure: Atherectomy-coronary;  Surgeon: Filemon Mayberry IV, MD;  Location:  [facility];  Service: Cardiovascular   • CARDIAC CATHETERIZATION N/A 5/29/2019    Procedure: Stent PATRICIA coronary;  Surgeon: Filemon Mayberry IV, MD;  Location:  [facility];   "Service: Cardiovascular   • CARDIAC ELECTROPHYSIOLOGY PROCEDURE N/A 5/28/2019    Procedure: TEMPORARY PACEMAKER;  Surgeon: Jeffrey Reyes MD;  Location: [facility] CATH INVASIVE LOCATION;  Service: Cardiovascular   • CATARACT EXTRACTION Bilateral    • CHOLECYSTECTOMY     • COLONOSCOPY     • CYSTOSCOPY W/ URETERAL STENT PLACEMENT  2014   • ENDOSCOPY     • HYSTERECTOMY     • LUMBAR LAMINECTOMY DISCECTOMY DECOMPRESSION N/A 11/15/2018    Procedure: LUMBAR LAMINECTOMY L2-3 L 3-4,;  Surgeon: Zachary Key MD;  Location: [facility] OR;  Service: Orthopedic Spine   • PARATHYROIDECTOMY     • SPINAL CORD STIMULATOR IMPLANT     • TONSILLECTOMY         Family History   Problem Relation Age of Onset   • Kidney disease Mother    • Diabetes Father        Social History     Tobacco Use   • Smoking status: Never Smoker   • Smokeless tobacco: Never Used   Substance Use Topics   • Alcohol use: No           Blood pressure 132/64, pulse 60, height 167.6 cm (66\"), weight 77.1 kg (170 lb), SpO2 96 %, not currently breastfeeding.  Body mass index is 27.44 kg/m².  Vitals:    09/16/20 1111   Patient Position: Sitting       Constitutional:       Appearance: Healthy appearance. Well-developed.   Eyes:      General: Lids are normal. No scleral icterus.     Conjunctiva/sclera: Conjunctivae normal.   HENT:      Head: Normocephalic and atraumatic.   Neck:      Musculoskeletal: Normal range of motion.      Thyroid: No thyromegaly.      Vascular: No carotid bruit or JVD.   Pulmonary:      Effort: Pulmonary effort is normal.      Breath sounds: Normal breath sounds. No wheezing. No rhonchi. No rales.   Cardiovascular:      Normal rate. Regular rhythm.      Murmurs: There is no murmur.      No gallop. No rub.   Pulses:     Intact distal pulses.   Edema:     Peripheral edema absent.   Abdominal:      General: There is no distension.      Palpations: Abdomen is soft. There is no abdominal mass.   Skin:     General: Skin is warm and dry.      Findings: No " rash.   Neurological:      General: No focal deficit present.      Mental Status: Alert and oriented to person, place, and time.      Gait: Gait is intact.   Psychiatric:         Attention and Perception: Attention normal.         Mood and Affect: Mood normal.         Behavior: Behavior normal.         Data Review (reviewed with patient):     Procedures    Lab Results   Component Value Date    GLUCOSE 129 (H) 07/10/2019    BUN 11 07/10/2019    CREATININE 0.95 07/10/2019    EGFRIFNONA 57 (L) 07/10/2019    EGFRIFAFRI 31 (L) 09/19/2018    BCR 11.6 07/10/2019    K 4.0 07/10/2019    CO2 23.0 07/10/2019    CALCIUM 9.6 07/10/2019    ALBUMIN 3.80 07/07/2019    ALKPHOS 73 07/07/2019    AST 39 (H) 07/07/2019    ALT 41 (H) 07/07/2019      Lab Results   Component Value Date    CHOL 90 07/08/2019    CHLPL 147 09/19/2018    TRIG 189 (H) 07/08/2019    HDL 25 (L) 07/08/2019    LDL 27 07/08/2019     Lab Results   Component Value Date    HGBA1C 9.2 08/24/2020     Lab Results   Component Value Date    WBC 5.76 07/07/2019    HGB 12.0 07/07/2019    HCT 37.9 07/07/2019    MCV 87.1 07/07/2019     07/07/2019     Lab Results   Component Value Date    TSH 1.160 11/06/2019            Problem List Items Addressed This Visit        Cardiovascular and Mediastinum    Coronary artery disease involving native coronary artery of native heart with angina pectoris (CMS/HCC) - Primary    Overview     · Echo (8/31/2016): EF > 70%. Moderate thickening of the noncoronary cusp of the aortic valve.  · Echo (04/23/2018): LVEF 60%. Grade I diastolic dysfunction.   Cardiac valves are functionally normal.  · Nuclear stress test (04/23/2018): No focal areas of ischemia or infarct.  Transient ischemic dilatation present raising possibility of balanced ischemia/multivessel CAD  · Cardiac catheterization (5/23/2019): Severe multivessel CAD.  Culprit for NSTEMI is a highly calcified ostial RCA 99% stenosis.    · Staged rotational atherectomy and PCI of the  RCA using a PATRICIA, 5/29/2019  · Pharmacologic nuclear stress (4/29/2020): Normal perfusion.  LVEF 70%         Current Assessment & Plan     · No signs or symptoms of angina  · Continue Plavix 75 mg daily  · Continue metoprolol tartrate 100 mg twice daily         Essential hypertension    Overview     • Target blood pressure <130/80 mmHg         Current Assessment & Plan     · Hypertension is controlled  · Continue amlodipine 5 mg daily  · Continue metoprolol tartrate 100 mg twice daily         Hyperlipidemia LDL goal <70    Overview     · High intensity statin therapy is recommended given the presence of peripheral arterial disease and diabetes         Current Assessment & Plan     · Continue Crestor 20 mg daily         Peripheral arterial disease (CMS/[facility])    Overview     · BONNIE (08/22/2013):  At rest right 0.98, left 0.85.  After 2 minutes of exercise right 0.51, left 0.68.  · High-grade right common iliac stenosis on cardiac catheterization, 5/23/2019         Current Assessment & Plan     · Continue Plavix and statin therapy  · [place] class II-III symptoms  · If MRI of the back does not resolve right hip and leg discomfort she should contact us may need to consider a CT angiogram with runoffs for further evaluation of right iliac stenosis         Valvular heart disease    Overview     · Echocardiogram (6/22/2019): Normal LVEF. MAC with trace MR. Mild aortic stenosis.  · Echocardiogram (7/07/2019): Normal LVEF. Moderate MAC with mild anterior mitral leaflet thickening and trace MR. No MS. Mild TR with RVSP 35-45 mmHg.         Current Assessment & Plan     · Stable NYHA class II symptoms            Endocrine    Type 2 diabetes mellitus with complication, with long-term current use of insulin (CMS/[facility])    Current Assessment & Plan     · Follow a consistent low carbohydrate diet to assist with management of glucose levels  · Hemoglobin A1c goal is to be 7.0 or less             The patient has no anginal symptoms.   She is still having some shortness of breath and fatigue but stress testing is normal.  I believe a lot of her discomfort is related to her chronic back issues and she has a MRI scheduled.  I am concerned about the right leg pain which could be related to the right iliac stenosis however we will see what her MRI shows.  If her right leg discomfort continues and there is no reasoning on the MRI to account for her symptoms she should contact us and we can consider CT angiogram of the abdomen with runoffs.  We had a long discussion on proper diet to control glucose levels which consist of a consistent low carbohydrate diet.  We discussed an exchange system.  We also discussed a hemoglobin A1c should be around 7 or less to prevent further cardiovascular disease.  We will continue her current medication she will follow-up 6 months or sooner if needed.       · Continue current medications  · If right leg discomfort continues consider a CT angiogram with runoffs for further assessment of right iliac stenosis noted on cardiac cath last year  · Consistent low carbohydrate diet to improve hemoglobin A1c levels.  Goal is to be 7.0 or less.  Follow-up with PCP for medical management of her DM  Return in about 6 months (around 3/16/2021), or if symptoms worsen or fail to improve.      Leanna Parikh, APRN  9/16/2020

## 2020-09-16 NOTE — ASSESSMENT & PLAN NOTE
· Hypertension is controlled  · Continue amlodipine 5 mg daily  · Continue metoprolol tartrate 100 mg twice daily

## 2020-09-16 NOTE — ASSESSMENT & PLAN NOTE
· No signs or symptoms of angina  · Continue Plavix 75 mg daily  · Continue metoprolol tartrate 100 mg twice daily

## 2020-09-16 NOTE — ASSESSMENT & PLAN NOTE
· Continue Plavix and statin therapy  · [place] class II-III symptoms  · If MRI of the back does not resolve right hip and leg discomfort she should contact us may need to consider a CT angiogram with runoffs for further evaluation of right iliac stenosis

## 2020-09-16 NOTE — ASSESSMENT & PLAN NOTE
· Follow a consistent low carbohydrate diet to assist with management of glucose levels  · Hemoglobin A1c goal is to be 7.0 or less

## 2020-10-14 NOTE — PROGRESS NOTES
Subjective:    CC: Narcisa Cotto is seen today in consultation at the request of Mariia Del Real DO for tremors     HPI:  80-year-old female with a history of chronic low back pain, diabetes mellitus type 2, hypertension, DVT, CAD status post stents, CKD, sleep apnea noncompliant with CPAP presents with tremors.  As per patient she has had a tremor mainly in her left hand since the past 6 years.  She occasionally notices it at rest but mainly has it while carrying out tasks or holding objects in her hands.  She denies having any problems with her balance although has difficulty walking recently due to right hip pain and low back pain.  Will be going for MRI of the lumbar spine next week.  She denies having any anosmia, constipation or acting out dreams.  There is a family history of Parkinson's disease in her father.  Of note-she had a MRI brain in 2019 that I personally reviewed today that showed chronic ischemic changes but no acute intracranial abnormalities.  Her last TSH was normal.    The following portions of the patient's history were reviewed today and updated as of 10/14/2020  : allergies, current medications, past family history, past medical history, past social history, past surgical history and problem list  These document will be scanned to patient's chart.      Current Outpatient Medications:   •  amLODIPine (NORVASC) 5 MG tablet, Take 1 tablet by mouth Daily., Disp: 90 tablet, Rfl: 1  •  cholecalciferol (VITAMIN D3) 1000 units tablet, Take 1,000 Units by mouth Daily., Disp: , Rfl:   •  clopidogrel (PLAVIX) 75 MG tablet, Take 1 tablet daily, Disp: 90 tablet, Rfl: 1  •  cyanocobalamin (CVS VITAMIN B-12) 1000 MCG tablet, Take 1 tablet by mouth daily., Disp: , Rfl:   •  gabapentin (NEURONTIN) 600 MG tablet, Take 1 tablet by mouth 2 (Two) Times a Day., Disp: 180 tablet, Rfl: 1  •  insulin NPH (humuLIN N,novoLIN N) 100 UNIT/ML injection, Inject 30 units in AM and 30 units at bedtime, Disp: 60  mL, Rfl: 3  •  meclizine (ANTIVERT) 25 MG tablet, TAKE 1 TABLET THREE TIMES A DAY AS NEEDED, Disp: 270 tablet, Rfl: 1  •  metoprolol tartrate (LOPRESSOR) 100 MG tablet, Take 1 tablet by mouth 2 (Two) Times a Day. (Patient taking differently: Take 25 mg by mouth 2 (Two) Times a Day.), Disp: 180 tablet, Rfl: 3  •  nitroglycerin (NITROSTAT) 0.4 MG SL tablet, Place 1 tablet under the tongue Every 5 (Five) Minutes As Needed for Chest Pain. 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   • Chronic diastolic congestive heart failure (CMS/HCC)    • Chronic low back pain    • Chronic venous insufficiency    • CKD (chronic kidney disease) stage 3, GFR 30-59 ml/min    • CVA (cerebral vascular accident) (CMS/HCC) 7/7/2019   • Diabetic gastroparesis (CMS/HCC)    • Diverticulosis    • DJD (degenerative joint disease), lumbar    • DM2 (diabetes mellitus, type 2) (CMS/HCC)    • GERD (gastroesophageal reflux disease)    • Hiatal hernia    • History of hyperparathyroidism    • HLD (hyperlipidemia)    • HTN (hypertension)    • Lumbar stenosis 11/15/2018   • DELFINA (obstructive sleep apnea)    • Osteoarthritis 7/29/2016   • PAD (peripheral artery disease) (CMS/HCC)    • Postherpetic neuralgia    • Vitamin B12 deficiency 7/29/2016      Past Surgical History:   Procedure Laterality Date   • CARDIAC CATHETERIZATION N/A 5/23/2019    Procedure: LEFT HEART CATH;  Surgeon: Filemon Mayberry IV, MD;  Location: [facility] CATH INVASIVE LOCATION;   "Service: Cardiovascular   • CARDIAC CATHETERIZATION N/A 5/29/2019    Procedure: Atherectomy-coronary;  Surgeon: Filemon Mayberry IV, MD;  Location:  [facility] CATH INVASIVE LOCATION;  Service: Cardiovascular   • CARDIAC CATHETERIZATION N/A 5/29/2019    Procedure: Stent PATRICIA coronary;  Surgeon: Filemon Mayberry IV, MD;  Location:  [facility] CATH INVASIVE LOCATION;  Service: Cardiovascular   • CARDIAC ELECTROPHYSIOLOGY PROCEDURE N/A 5/28/2019    Procedure: TEMPORARY PACEMAKER;  Surgeon: Jeffrey Reyes MD;  Location:  [facility] CATH INVASIVE LOCATION;  Service: Cardiovascular   • CATARACT EXTRACTION Bilateral    • CHOLECYSTECTOMY     • COLONOSCOPY     • CYSTOSCOPY W/ URETERAL STENT PLACEMENT  2014   • ENDOSCOPY     • HYSTERECTOMY     • LUMBAR LAMINECTOMY DISCECTOMY DECOMPRESSION N/A 11/15/2018    Procedure: LUMBAR LAMINECTOMY L2-3 L 3-4,;  Surgeon: Zachary Key MD;  Location:  [facility] OR;  Service: Orthopedic Spine   • PARATHYROIDECTOMY     • SPINAL CORD STIMULATOR IMPLANT     • TONSILLECTOMY        Family History   Problem Relation Age of Onset   • Kidney disease Mother    • Diabetes Father       Social History     Socioeconomic History   • Marital status:      Spouse name: N/A   • Number of children: 4   • Years of education: H.S.   • Highest education level: High school graduate   Occupational History   • Occupation:      Employer: RETIRED   Tobacco Use   • Smoking status: Never Smoker   • Smokeless tobacco: Never Used   Substance and Sexual Activity   • Alcohol use: No   • Drug use: No   • Sexual activity: Defer   Social History Narrative    Friend Ruth Nuno, with pt today     Review of Systems   Musculoskeletal: Positive for arthralgias and back pain.   Neurological: Positive for tremors.   All other systems reviewed and are negative.      Objective:    /66   Pulse 57   Temp 98 °F (36.7 °C) (Temporal)   Ht 167.6 cm (65.98\")   Wt 79.1 kg (174 lb 6.4 oz)   SpO2 98% "   BMI 28.16 kg/m²     Neurology Exam:    General apperance: NAD.     Mental status: Alert, awake and oriented to time place and person.    Recent and Remote memory: Intact.    Attention span and Concentration: Normal.     Language and Speech: Intact- No dysarthria.    Fluency, Naming , Repitition and Comprehension:  Intact    Cranial Nerves:   CN II: Visual fields are full. Intact. Fundi - Normal, No papillederma, Pupils - LUPILLO  CN III, IV and VI: Extraocular movements are intact. Normal saccades.   CN V: Facial sensation is intact.   CN VII: Muscles of facial expression reveal no asymmetry. Intact.   CN VIII: Hearing is intact. Whispered voice intact.   CN IX and X: Palate elevates symmetrically. Intact  CN XI: Shoulder shrug is intact.   CN XII: Tongue is midline without evidence of atrophy or fasciculation.     Ophthalmoscopic exam of optic disc-normal    Motor: No resting, postural or kinetic tremor noted today.  She also did not have a tremor while drawing a Middleton's wheel or writing.  No micrographia noted    Right UE muscle strength 5/5. Normal tone.     Left UE muscle strength 5/5. Normal tone.      Right LE muscle strength5/5. Normal tone.     Left LE muscle strength 5/5. Normal tone.      Sensory: Normal light touch, vibration and pinprick sensation bilaterally.    DTRs: 2+ bilaterally in upper and lower extremities.    Babinski: Negative bilaterally.    Co-ordination: Normal finger-to-nose, heel to shin B/L.    Rhomberg: Negative.    Gait: Had difficulty getting up from a seated position due to right hip pain.  Mildly antalgic gait with normal arm swing    Cardiovascular: Regular rate and rhythm without murmur, gallop or rub.    Assessment and Plan:  1. Tremor  Patient could have mild essential tremors.  No tremors noted on examination today  I will defer starting her on medications yet but if her tremors worsen I will start her on low doses of primidone.  She is also on high doses of metoprolol for her  blood pressure       Return in about 3 months (around 1/14/2021).     I spent over 45 minutes with the patient face to face out of which over 50% (30 minutes) was spent in management, instructions and education.     Ariana Burden MD

## 2020-11-16 NOTE — PROGRESS NOTES
Subjective   Narcisa Cotto is a 80 y.o. female.   Chief Complaint   Patient presents with   • Heartburn   • Hyperlipidemia   • Hypertension   Agrees to telephone visit    Heartburn  She reports no abdominal pain, no chest pain, no coughing, no nausea, no sore throat or no wheezing. This is a chronic problem. The current episode started more than 1 year ago. Pertinent negatives include no fatigue.   Hyperlipidemia  This is a chronic problem. The current episode started more than 1 year ago. The problem is controlled. Pertinent negatives include no chest pain, myalgias or shortness of breath. Risk factors for coronary artery disease include diabetes mellitus, dyslipidemia and hypertension.   Chronic Kidney Disease  This is a chronic problem. The current episode started more than 1 year ago. Pertinent negatives include no abdominal pain, arthralgias, chest pain, chills, congestion, coughing, diaphoresis, fatigue, fever, headaches, myalgias, nausea, neck pain, numbness, rash, sore throat, swollen glands, urinary symptoms, vertigo, visual change, vomiting or weakness. The treatment provided mild relief.   Hypertension  This is a chronic problem. The current episode started more than 1 year ago. The problem is controlled. Pertinent negatives include no chest pain, headaches, neck pain, palpitations or shortness of breath. Risk factors for coronary artery disease include diabetes mellitus and dyslipidemia.   Insomnia  This is a chronic problem. The current episode started more than 1 year ago. Pertinent negatives include no abdominal pain, arthralgias, chest pain, chills, congestion, coughing, diaphoresis, fatigue, fever, headaches, myalgias, nausea, neck pain, numbness, rash, sore throat, swollen glands, urinary symptoms, vertigo, visual change, vomiting or weakness. The treatment provided significant relief.     The following portions of the patient's history were reviewed and updated as appropriate: allergies,  current medications, past family history, past medical history, past social history, past surgical history and problem list.    Review of Systems   Constitutional: Negative for activity change, appetite change, chills, diaphoresis, fatigue, fever and unexpected weight change.   HENT: Negative for congestion, ear discharge, ear pain, mouth sores, nosebleeds, sinus pressure, sneezing and sore throat.    Eyes: Negative for pain, discharge and itching.   Respiratory: Negative for cough, chest tightness, shortness of breath and wheezing.    Cardiovascular: Negative for chest pain, palpitations and leg swelling.   Gastrointestinal: Negative for abdominal pain, constipation, diarrhea, nausea and vomiting.   Endocrine: Negative for cold intolerance, heat intolerance, polydipsia and polyphagia.   Genitourinary: Negative for dysuria, flank pain, frequency, hematuria and urgency.   Musculoskeletal: Positive for back pain (chronic). Negative for arthralgias, gait problem, myalgias, neck pain and neck stiffness.   Skin: Negative for color change, pallor and rash.   Neurological: Negative for vertigo, seizures, speech difficulty, weakness, numbness and headaches.   Psychiatric/Behavioral: Negative for agitation, confusion, decreased concentration and sleep disturbance. The patient has insomnia. The patient is not nervous/anxious.      There were no vitals taken for this visit.    Objective   Physical Exam   Constitutional: She is oriented to person, place, and time. She appears well-developed. No distress.   Neck: No JVD present. No thyromegaly present.   Pulmonary/Chest: No respiratory distress.   Neurological: She is alert and oriented to person, place, and time. She has normal reflexes.   Psychiatric: Her behavior is normal. Mood normal.       Assessment/Plan   Narcisa was seen today for chronic kidney disease, hypertension, hyperlipidemia and vitamin b12 def.    Diagnoses and all orders for this visit:    Insomnia,  unspecified type  -     traZODone (DESYREL) 50 MG tablet; Take 1 tablet by mouth Every Night.  stable  Essential hypertension  -     Comprehensive Metabolic Panel  -     Lipid Panel  stable  Hyperlipidemia, unspecified hyperlipidemia type  -     Comprehensive Metabolic Panel  -     Lipid Panel  Stable Reviewed labs from 5/20  Gastroesophageal reflux disease, esophagitis presence not specified  Stable.  Chronic kidney disease, stage 4   stable      This visit has been rescheduled as a phone visit to comply with patient safety concerns in accordance with CDC recommendations. Total time of discussion was 15 minutes.

## 2020-12-21 NOTE — TELEPHONE ENCOUNTER
[facility] called regarding the recent order from Dr. Richards. Says that her insurance is inactive and they cannot fill the order without active insurance. Please advise.

## 2021-01-01 ENCOUNTER — LAB (OUTPATIENT)
Dept: LAB | Facility: HOSPITAL | Age: 81
End: 2021-01-01

## 2021-01-01 ENCOUNTER — TELEPHONE (OUTPATIENT)
Dept: ENDOCRINOLOGY | Facility: CLINIC | Age: 81
End: 2021-01-01

## 2021-01-01 ENCOUNTER — OFFICE VISIT (OUTPATIENT)
Dept: ENDOCRINOLOGY | Facility: CLINIC | Age: 81
End: 2021-01-01

## 2021-01-01 ENCOUNTER — OFFICE VISIT (OUTPATIENT)
Dept: INTERNAL MEDICINE | Facility: CLINIC | Age: 81
End: 2021-01-01

## 2021-01-01 VITALS
HEART RATE: 74 BPM | BODY MASS INDEX: 27.97 KG/M2 | DIASTOLIC BLOOD PRESSURE: 62 MMHG | OXYGEN SATURATION: 96 % | HEIGHT: 66 IN | SYSTOLIC BLOOD PRESSURE: 132 MMHG | WEIGHT: 174 LBS

## 2021-01-01 VITALS
DIASTOLIC BLOOD PRESSURE: 72 MMHG | BODY MASS INDEX: 27.97 KG/M2 | OXYGEN SATURATION: 99 % | WEIGHT: 174 LBS | HEIGHT: 66 IN | SYSTOLIC BLOOD PRESSURE: 132 MMHG | HEART RATE: 81 BPM

## 2021-01-01 VITALS
TEMPERATURE: 96.9 F | WEIGHT: 175 LBS | DIASTOLIC BLOOD PRESSURE: 64 MMHG | HEART RATE: 51 BPM | BODY MASS INDEX: 28.12 KG/M2 | OXYGEN SATURATION: 98 % | HEIGHT: 66 IN | SYSTOLIC BLOOD PRESSURE: 116 MMHG

## 2021-01-01 DIAGNOSIS — E78.5 HYPERLIPIDEMIA LDL GOAL <70: ICD-10-CM

## 2021-01-01 DIAGNOSIS — K31.84 DIABETIC GASTROPARESIS (HCC): ICD-10-CM

## 2021-01-01 DIAGNOSIS — K21.9 GASTROESOPHAGEAL REFLUX DISEASE: ICD-10-CM

## 2021-01-01 DIAGNOSIS — N18.4 CHRONIC KIDNEY DISEASE, STAGE IV (SEVERE) (HCC): ICD-10-CM

## 2021-01-01 DIAGNOSIS — I10 ESSENTIAL HYPERTENSION: Primary | ICD-10-CM

## 2021-01-01 DIAGNOSIS — N18.4 STAGE 4 CHRONIC KIDNEY DISEASE (HCC): ICD-10-CM

## 2021-01-01 DIAGNOSIS — Z78.0 POSTMENOPAUSE: ICD-10-CM

## 2021-01-01 DIAGNOSIS — I25.119 CORONARY ARTERY DISEASE INVOLVING NATIVE CORONARY ARTERY OF NATIVE HEART WITH ANGINA PECTORIS (HCC): ICD-10-CM

## 2021-01-01 DIAGNOSIS — E11.42 DIABETIC PERIPHERAL NEUROPATHY (HCC): ICD-10-CM

## 2021-01-01 DIAGNOSIS — R53.83 OTHER FATIGUE: ICD-10-CM

## 2021-01-01 DIAGNOSIS — Z12.31 ENCOUNTER FOR SCREENING MAMMOGRAM FOR BREAST CANCER: ICD-10-CM

## 2021-01-01 DIAGNOSIS — E11.43 DIABETIC GASTROPARESIS (HCC): ICD-10-CM

## 2021-01-01 DIAGNOSIS — K21.00 GASTROESOPHAGEAL REFLUX DISEASE WITH ESOPHAGITIS WITHOUT HEMORRHAGE: ICD-10-CM

## 2021-01-01 DIAGNOSIS — I73.9 PERIPHERAL ARTERIAL DISEASE (HCC): ICD-10-CM

## 2021-01-01 DIAGNOSIS — D50.8 OTHER IRON DEFICIENCY ANEMIA: ICD-10-CM

## 2021-01-01 DIAGNOSIS — E11.8 TYPE 2 DIABETES MELLITUS WITH COMPLICATION, WITH LONG-TERM CURRENT USE OF INSULIN (HCC): ICD-10-CM

## 2021-01-01 DIAGNOSIS — Z79.4 TYPE 2 DIABETES MELLITUS WITH COMPLICATION, WITH LONG-TERM CURRENT USE OF INSULIN (HCC): Primary | ICD-10-CM

## 2021-01-01 DIAGNOSIS — I10 ESSENTIAL HYPERTENSION: ICD-10-CM

## 2021-01-01 DIAGNOSIS — I38 VALVULAR HEART DISEASE: ICD-10-CM

## 2021-01-01 DIAGNOSIS — IMO0002 UNCONTROLLED TYPE 2 DIABETES MELLITUS WITH COMPLICATION, WITH LONG-TERM CURRENT USE OF INSULIN: Primary | ICD-10-CM

## 2021-01-01 DIAGNOSIS — Z79.4 TYPE 2 DIABETES MELLITUS WITH COMPLICATION, WITH LONG-TERM CURRENT USE OF INSULIN (HCC): ICD-10-CM

## 2021-01-01 DIAGNOSIS — E11.8 TYPE 2 DIABETES MELLITUS WITH COMPLICATION, WITH LONG-TERM CURRENT USE OF INSULIN (HCC): Primary | ICD-10-CM

## 2021-01-01 DIAGNOSIS — IMO0002 UNCONTROLLED TYPE 2 DIABETES MELLITUS WITH COMPLICATION, WITH LONG-TERM CURRENT USE OF INSULIN: ICD-10-CM

## 2021-01-01 DIAGNOSIS — Z00.00 MEDICARE ANNUAL WELLNESS VISIT, SUBSEQUENT: Primary | ICD-10-CM

## 2021-01-01 DIAGNOSIS — R11.0 NAUSEA: ICD-10-CM

## 2021-01-01 DIAGNOSIS — G47.39 OTHER SLEEP APNEA: ICD-10-CM

## 2021-01-01 LAB
ALBUMIN SERPL-MCNC: 4.3 G/DL (ref 3.5–5.2)
ALBUMIN SERPL-MCNC: 4.4 G/DL (ref 3.5–5.2)
ALBUMIN UR-MCNC: 29.3 MG/DL
ALBUMIN/GLOB SERPL: 1.3 G/DL
ALBUMIN/GLOB SERPL: 1.6 G/DL
ALP SERPL-CCNC: 64 U/L (ref 39–117)
ALP SERPL-CCNC: 68 U/L (ref 39–117)
ALT SERPL W P-5'-P-CCNC: 11 U/L (ref 1–33)
ALT SERPL W P-5'-P-CCNC: 8 U/L (ref 1–33)
ANION GAP SERPL CALCULATED.3IONS-SCNC: 14.2 MMOL/L (ref 5–15)
ANION GAP SERPL CALCULATED.3IONS-SCNC: 15 MMOL/L (ref 5–15)
AST SERPL-CCNC: 12 U/L (ref 1–32)
AST SERPL-CCNC: 14 U/L (ref 1–32)
BASOPHILS # BLD AUTO: 0.02 10*3/MM3 (ref 0–0.2)
BASOPHILS # BLD AUTO: 0.03 10*3/MM3 (ref 0–0.2)
BASOPHILS NFR BLD AUTO: 0.4 % (ref 0–1.5)
BASOPHILS NFR BLD AUTO: 0.5 % (ref 0–1.5)
BILIRUB SERPL-MCNC: 0.3 MG/DL (ref 0–1.2)
BILIRUB SERPL-MCNC: 0.4 MG/DL (ref 0–1.2)
BUN SERPL-MCNC: 19 MG/DL (ref 8–23)
BUN SERPL-MCNC: 22 MG/DL (ref 8–23)
BUN/CREAT SERPL: 11 (ref 7–25)
BUN/CREAT SERPL: 11.6 (ref 7–25)
CALCIUM SPEC-SCNC: 10 MG/DL (ref 8.6–10.5)
CALCIUM SPEC-SCNC: 10.1 MG/DL (ref 8.6–10.5)
CHLORIDE SERPL-SCNC: 100 MMOL/L (ref 98–107)
CHLORIDE SERPL-SCNC: 99 MMOL/L (ref 98–107)
CHOLEST SERPL-MCNC: 118 MG/DL (ref 0–200)
CHOLEST SERPL-MCNC: 130 MG/DL (ref 0–200)
CO2 SERPL-SCNC: 23 MMOL/L (ref 22–29)
CO2 SERPL-SCNC: 23.8 MMOL/L (ref 22–29)
CREAT SERPL-MCNC: 1.72 MG/DL (ref 0.57–1)
CREAT SERPL-MCNC: 1.89 MG/DL (ref 0.57–1)
CREAT UR-MCNC: 124.3 MG/DL
DEPRECATED RDW RBC AUTO: 41.8 FL (ref 37–54)
DEPRECATED RDW RBC AUTO: 43.3 FL (ref 37–54)
EOSINOPHIL # BLD AUTO: 0.07 10*3/MM3 (ref 0–0.4)
EOSINOPHIL # BLD AUTO: 0.11 10*3/MM3 (ref 0–0.4)
EOSINOPHIL NFR BLD AUTO: 1.3 % (ref 0.3–6.2)
EOSINOPHIL NFR BLD AUTO: 1.9 % (ref 0.3–6.2)
ERYTHROCYTE [DISTWIDTH] IN BLOOD BY AUTOMATED COUNT: 13.8 % (ref 12.3–15.4)
ERYTHROCYTE [DISTWIDTH] IN BLOOD BY AUTOMATED COUNT: 14.4 % (ref 12.3–15.4)
GFR SERPL CREATININE-BSD FRML MDRD: 26 ML/MIN/1.73
GFR SERPL CREATININE-BSD FRML MDRD: 29 ML/MIN/1.73
GLOBULIN UR ELPH-MCNC: 2.8 GM/DL
GLOBULIN UR ELPH-MCNC: 3.3 GM/DL
GLUCOSE BLDC GLUCOMTR-MCNC: 187 MG/DL (ref 70–130)
GLUCOSE SERPL-MCNC: 133 MG/DL (ref 65–99)
GLUCOSE SERPL-MCNC: 291 MG/DL (ref 65–99)
HBA1C MFR BLD: 8.4 %
HCT VFR BLD AUTO: 37.8 % (ref 34–46.6)
HCT VFR BLD AUTO: 38.9 % (ref 34–46.6)
HDLC SERPL-MCNC: 33 MG/DL (ref 40–60)
HDLC SERPL-MCNC: 38 MG/DL (ref 40–60)
HGB BLD-MCNC: 12.4 G/DL (ref 12–15.9)
HGB BLD-MCNC: 12.7 G/DL (ref 12–15.9)
IMM GRANULOCYTES # BLD AUTO: 0.02 10*3/MM3 (ref 0–0.05)
IMM GRANULOCYTES # BLD AUTO: 0.04 10*3/MM3 (ref 0–0.05)
IMM GRANULOCYTES NFR BLD AUTO: 0.3 % (ref 0–0.5)
IMM GRANULOCYTES NFR BLD AUTO: 0.7 % (ref 0–0.5)
LDLC SERPL CALC-MCNC: 56 MG/DL (ref 0–100)
LDLC SERPL CALC-MCNC: 63 MG/DL (ref 0–100)
LDLC/HDLC SERPL: 1.39 {RATIO}
LDLC/HDLC SERPL: 1.7 {RATIO}
LYMPHOCYTES # BLD AUTO: 0.65 10*3/MM3 (ref 0.7–3.1)
LYMPHOCYTES # BLD AUTO: 0.78 10*3/MM3 (ref 0.7–3.1)
LYMPHOCYTES NFR BLD AUTO: 12.1 % (ref 19.6–45.3)
LYMPHOCYTES NFR BLD AUTO: 13.2 % (ref 19.6–45.3)
MCH RBC QN AUTO: 27.3 PG (ref 26.6–33)
MCH RBC QN AUTO: 27.7 PG (ref 26.6–33)
MCHC RBC AUTO-ENTMCNC: 32.6 G/DL (ref 31.5–35.7)
MCHC RBC AUTO-ENTMCNC: 32.8 G/DL (ref 31.5–35.7)
MCV RBC AUTO: 83.1 FL (ref 79–97)
MCV RBC AUTO: 84.7 FL (ref 79–97)
MICROALBUMIN/CREAT UR: 235.7 MG/G
MONOCYTES # BLD AUTO: 0.31 10*3/MM3 (ref 0.1–0.9)
MONOCYTES # BLD AUTO: 0.34 10*3/MM3 (ref 0.1–0.9)
MONOCYTES NFR BLD AUTO: 5.8 % (ref 5–12)
MONOCYTES NFR BLD AUTO: 5.8 % (ref 5–12)
NEUTROPHILS NFR BLD AUTO: 4.29 10*3/MM3 (ref 1.7–7)
NEUTROPHILS NFR BLD AUTO: 4.61 10*3/MM3 (ref 1.7–7)
NEUTROPHILS NFR BLD AUTO: 78.3 % (ref 42.7–76)
NEUTROPHILS NFR BLD AUTO: 79.7 % (ref 42.7–76)
NRBC BLD AUTO-RTO: 0 /100 WBC (ref 0–0.2)
NRBC BLD AUTO-RTO: 0 /100 WBC (ref 0–0.2)
PLATELET # BLD AUTO: 149 10*3/MM3 (ref 140–450)
PLATELET # BLD AUTO: 203 10*3/MM3 (ref 140–450)
PMV BLD AUTO: 11.2 FL (ref 6–12)
PMV BLD AUTO: 11.3 FL (ref 6–12)
POTASSIUM SERPL-SCNC: 4.7 MMOL/L (ref 3.5–5.2)
POTASSIUM SERPL-SCNC: 4.8 MMOL/L (ref 3.5–5.2)
PROT SERPL-MCNC: 7.2 G/DL (ref 6–8.5)
PROT SERPL-MCNC: 7.6 G/DL (ref 6–8.5)
RBC # BLD AUTO: 4.55 10*6/MM3 (ref 3.77–5.28)
RBC # BLD AUTO: 4.59 10*6/MM3 (ref 3.77–5.28)
SODIUM SERPL-SCNC: 137 MMOL/L (ref 136–145)
SODIUM SERPL-SCNC: 138 MMOL/L (ref 136–145)
TRIGL SERPL-MCNC: 136 MG/DL (ref 0–150)
TRIGL SERPL-MCNC: 205 MG/DL (ref 0–150)
TSH SERPL DL<=0.05 MIU/L-ACNC: 2.9 UIU/ML (ref 0.27–4.2)
VLDLC SERPL-MCNC: 24 MG/DL (ref 5–40)
VLDLC SERPL-MCNC: 34 MG/DL (ref 5–40)
WBC # BLD AUTO: 5.38 10*3/MM3 (ref 3.4–10.8)
WBC # BLD AUTO: 5.89 10*3/MM3 (ref 3.4–10.8)

## 2021-01-01 PROCEDURE — 85025 COMPLETE CBC W/AUTO DIFF WBC: CPT

## 2021-01-01 PROCEDURE — 80053 COMPREHEN METABOLIC PANEL: CPT

## 2021-01-01 PROCEDURE — G0439 PPPS, SUBSEQ VISIT: HCPCS | Performed by: INTERNAL MEDICINE

## 2021-01-01 PROCEDURE — 82947 ASSAY GLUCOSE BLOOD QUANT: CPT | Performed by: INTERNAL MEDICINE

## 2021-01-01 PROCEDURE — 84443 ASSAY THYROID STIM HORMONE: CPT

## 2021-01-01 PROCEDURE — 99213 OFFICE O/P EST LOW 20 MIN: CPT | Performed by: INTERNAL MEDICINE

## 2021-01-01 PROCEDURE — 99214 OFFICE O/P EST MOD 30 MIN: CPT | Performed by: INTERNAL MEDICINE

## 2021-01-01 PROCEDURE — 83036 HEMOGLOBIN GLYCOSYLATED A1C: CPT | Performed by: INTERNAL MEDICINE

## 2021-01-01 PROCEDURE — 1170F FXNL STATUS ASSESSED: CPT | Performed by: INTERNAL MEDICINE

## 2021-01-01 PROCEDURE — 82043 UR ALBUMIN QUANTITATIVE: CPT | Performed by: INTERNAL MEDICINE

## 2021-01-01 PROCEDURE — 36415 COLL VENOUS BLD VENIPUNCTURE: CPT

## 2021-01-01 PROCEDURE — 80061 LIPID PANEL: CPT

## 2021-01-01 PROCEDURE — 1160F RVW MEDS BY RX/DR IN RCRD: CPT | Performed by: INTERNAL MEDICINE

## 2021-01-01 PROCEDURE — 1126F AMNT PAIN NOTED NONE PRSNT: CPT | Performed by: INTERNAL MEDICINE

## 2021-01-01 PROCEDURE — 82570 ASSAY OF URINE CREATININE: CPT | Performed by: INTERNAL MEDICINE

## 2021-01-01 RX ORDER — PANTOPRAZOLE SODIUM 40 MG/1
TABLET, DELAYED RELEASE ORAL
Qty: 90 TABLET | Refills: 1 | Status: SHIPPED | OUTPATIENT
Start: 2021-01-01 | End: 2021-01-01

## 2021-01-01 RX ORDER — AMLODIPINE BESYLATE 5 MG/1
5 TABLET ORAL DAILY
Qty: 90 TABLET | Refills: 1 | Status: SHIPPED | OUTPATIENT
Start: 2021-01-01

## 2021-01-01 RX ORDER — ROSUVASTATIN CALCIUM 20 MG/1
20 TABLET, COATED ORAL DAILY
Qty: 90 TABLET | Refills: 1 | Status: SHIPPED | OUTPATIENT
Start: 2021-01-01

## 2021-01-01 RX ORDER — NAPROXEN SODIUM 220 MG
1 TABLET ORAL 3 TIMES DAILY
Qty: 300 EACH | Refills: 3 | Status: SHIPPED | OUTPATIENT
Start: 2021-01-01

## 2021-01-01 RX ORDER — ONDANSETRON 8 MG/1
8 TABLET, ORALLY DISINTEGRATING ORAL EVERY 8 HOURS PRN
Qty: 30 TABLET | Refills: 1 | Status: SHIPPED | OUTPATIENT
Start: 2021-01-01

## 2021-01-01 RX ORDER — METOPROLOL TARTRATE 100 MG/1
100 TABLET ORAL 2 TIMES DAILY
Qty: 180 TABLET | Refills: 3 | Status: SHIPPED | OUTPATIENT
Start: 2021-01-01

## 2021-01-01 RX ORDER — NITROGLYCERIN 0.4 MG/1
0.4 TABLET SUBLINGUAL
Qty: 25 TABLET | Refills: 3 | Status: SHIPPED | OUTPATIENT
Start: 2021-01-01 | End: 2021-01-01 | Stop reason: SDUPTHER

## 2021-01-01 RX ORDER — INSULIN HUMAN 100 [IU]/ML
INJECTION, SUSPENSION SUBCUTANEOUS
Qty: 60 ML | Refills: 3 | Status: SHIPPED | OUTPATIENT
Start: 2021-01-01

## 2021-01-01 RX ORDER — CLOPIDOGREL BISULFATE 75 MG/1
TABLET ORAL
Qty: 90 TABLET | Refills: 1 | OUTPATIENT
Start: 2021-01-01

## 2021-01-01 RX ORDER — GABAPENTIN 600 MG/1
600 TABLET ORAL 2 TIMES DAILY
Qty: 180 TABLET | Refills: 1 | Status: SHIPPED | OUTPATIENT
Start: 2021-01-01

## 2021-01-01 RX ORDER — CLOPIDOGREL BISULFATE 75 MG/1
TABLET ORAL
Qty: 90 TABLET | Refills: 1 | Status: SHIPPED | OUTPATIENT
Start: 2021-01-01

## 2021-01-01 RX ORDER — NITROGLYCERIN 0.4 MG/1
0.4 TABLET SUBLINGUAL
Qty: 25 TABLET | Refills: 3 | Status: SHIPPED | OUTPATIENT
Start: 2021-01-01

## 2021-01-01 RX ORDER — PANTOPRAZOLE SODIUM 40 MG/1
TABLET, DELAYED RELEASE ORAL
Qty: 90 TABLET | Refills: 1 | Status: SHIPPED | OUTPATIENT
Start: 2021-01-01

## 2021-01-01 RX ORDER — MECLIZINE HYDROCHLORIDE 25 MG/1
25 TABLET ORAL 3 TIMES DAILY PRN
Qty: 270 TABLET | Refills: 1 | Status: SHIPPED | OUTPATIENT
Start: 2021-01-01

## 2021-01-20 NOTE — TELEPHONE ENCOUNTER
Pt called to say that she needs her clear insulin ( she said you would know the name of it) called into [facility] in [place]    pts number if you have questions [phone]

## 2021-03-02 NOTE — TELEPHONE ENCOUNTER
Last Office Visit: 11/16/20  Next Office Visit:03/05/21    Labs completed in past 6 months? yes  Labs completed in past year? yes    Last Refill Date: 08/28/20  Quantity:90  Refills:1    Pharmacy:

## 2021-03-05 NOTE — PROGRESS NOTES
Subjective   Narcisa Cotto is a 80 y.o. female.   Chief Complaint   Patient presents with   • Hypertension   • Hyperlipidemia   • Heartburn   • Chronic Kidney Disease       History of Present Illness   Here for f/u on chronic conditions: htn, hlp, gerd, nausea, and ckd. HTN has been controlled with lopressor and  Norvasc. No side effect. No CP or HA. No heart palpitations. HLP has been controlled with Crestor. No muscle weakness. Gerd controlled with Protonix. Nausea controlled with zofran  The following portions of the patient's history were reviewed and updated as appropriate: allergies, current medications, past family history, past medical history, past social history, past surgical history and problem list.  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)    • Chronic low back pain    • Chronic venous insufficiency    • CKD (chronic kidney disease) stage 3, GFR 30-59 ml/min (CMS/[facility])    • CVA (cerebral vascular accident) (CMS/[facility]) 7/7/2019   • Diabetic gastroparesis (CMS/[facility])    • Diverticulosis    • DJD (degenerative joint disease), lumbar    • DM2 (diabetes mellitus, type 2) (CMS/[facility])    • GERD (gastroesophageal reflux disease)    • Hiatal hernia    • History of hyperparathyroidism    • HLD (hyperlipidemia)    • HTN (hypertension)    • Lumbar stenosis 11/15/2018   • DELFINA (obstructive sleep apnea)    • Osteoarthritis 7/29/2016   • PAD (peripheral artery disease) (CMS/[facility])    • Postherpetic neuralgia    • Vitamin B12 deficiency 7/29/2016     Past Surgical History:   Procedure Laterality Date   • CARDIAC CATHETERIZATION N/A 5/23/2019    Procedure: LEFT HEART CATH;  Surgeon: Filemon Mayberry IV, MD;  Location: [facility] CATH INVASIVE LOCATION;  Service: Cardiovascular   • CARDIAC CATHETERIZATION N/A 5/29/2019    Procedure: Atherectomy-coronary;  Surgeon: Filemon Mayberry IV, MD;   Location:  [facility] CATH INVASIVE LOCATION;  Service: Cardiovascular   • CARDIAC CATHETERIZATION N/A 5/29/2019    Procedure: Stent PATRICIA coronary;  Surgeon: Filemon Mayberry IV, MD;  Location:  [facility] CATH INVASIVE LOCATION;  Service: Cardiovascular   • CARDIAC ELECTROPHYSIOLOGY PROCEDURE N/A 5/28/2019    Procedure: TEMPORARY PACEMAKER;  Surgeon: Jeffrey Reyes MD;  Location:  [facility] CATH INVASIVE LOCATION;  Service: Cardiovascular   • CATARACT EXTRACTION Bilateral    • CHOLECYSTECTOMY     • COLONOSCOPY     • CYSTOSCOPY W/ URETERAL STENT PLACEMENT  2014   • ENDOSCOPY     • HYSTERECTOMY     • LUMBAR LAMINECTOMY DISCECTOMY DECOMPRESSION N/A 11/15/2018    Procedure: LUMBAR LAMINECTOMY L2-3 L 3-4,;  Surgeon: Zachary Key MD;  Location:  [facility] OR;  Service: Orthopedic Spine   • PARATHYROIDECTOMY     • SPINAL CORD STIMULATOR IMPLANT     • TONSILLECTOMY       Family History   Problem Relation Age of Onset   • Kidney disease Mother    • Diabetes Father      Social History     Socioeconomic History   • Marital status:      Spouse name: N/A   • Number of children: 4   • Years of education: H.S.   • Highest education level: High school graduate   Occupational History   • Occupation:      Employer: RETIRED   Tobacco Use   • Smoking status: Never Smoker   • Smokeless tobacco: Never Used   Substance and Sexual Activity   • Alcohol use: No   • Drug use: No   • Sexual activity: Defer   Social History Narrative    Friend Ruth Nuno, with pt today     Current Outpatient Medications on File Prior to Visit   Medication Sig Dispense Refill   • amLODIPine (NORVASC) 5 MG tablet Take 1 tablet by mouth Daily. 90 tablet 1   • cholecalciferol (VITAMIN D3) 1000 units tablet Take 1,000 Units by mouth Daily.     • clopidogrel (PLAVIX) 75 MG tablet Take 1 tablet daily 90 tablet 1   • cyanocobalamin ([employer]S VITAMIN B-12) 1000 MCG tablet Take 1 tablet by mouth daily.     • gabapentin (NEURONTIN) 600 MG tablet  Take 1 tablet by mouth 2 (Two) Times a Day. 180 tablet 1   • insulin NPH (humuLIN N,novoLIN N) 100 UNIT/ML injection Inject 35 units in AM and 30 units at bedtime 60 mL 3   • metoprolol tartrate (LOPRESSOR) 100 MG tablet Take 1 tablet by mouth 2 (Two) Times a Day. (Patient taking differently: Take 25 mg by mouth 2 (Two) Times a Day.) 180 tablet 3   • nitroglycerin (NITROSTAT) 0.4 MG SL tablet Place 1 tablet under the tongue Every 5 (Five) Minutes As Needed for Chest Pain. Take no more than 3 doses in 15 minutes. 25 tablet 3   • pantoprazole (PROTONIX) 40 MG EC tablet TAKE 1 TABLET BY MOUTH EVERY DAY 90 tablet 1   • rosuvastatin (CRESTOR) 20 MG tablet Take 1 tablet by mouth Daily. 90 tablet 1   • [DISCONTINUED] meclizine (ANTIVERT) 25 MG tablet TAKE 1 TABLET THREE TIMES A DAY AS NEEDED 270 tablet 1   • [DISCONTINUED] ondansetron ODT (ZOFRAN-ODT) 8 MG disintegrating tablet Place 1 tablet on the tongue Every 8 (Eight) Hours As Needed for Nausea or Vomiting. 30 tablet 1   • insulin regular (humuLIN R,novoLIN R) 100 UNIT/ML injection Take 20 units before supper daily plus extra as directed up to 30 units per day. 30 mL 3     No current facility-administered medications on file prior to visit.        Review of Systems   Constitutional: Negative for activity change, appetite change, chills, diaphoresis, fatigue, fever and unexpected weight change.   HENT: Negative for congestion, ear discharge, ear pain, mouth sores, nosebleeds, sinus pressure, sneezing and sore throat.    Eyes: Negative for pain, discharge and itching.   Respiratory: Negative for cough, chest tightness, shortness of breath and wheezing.    Cardiovascular: Negative for chest pain, palpitations and leg swelling.   Gastrointestinal: Negative for abdominal pain, constipation, diarrhea, nausea and vomiting.   Endocrine: Negative for cold intolerance, heat intolerance, polydipsia and polyphagia.   Genitourinary: Negative for dysuria, flank pain, frequency,  "hematuria and urgency.   Musculoskeletal: Negative for arthralgias, back pain, gait problem, myalgias, neck pain and neck stiffness.   Skin: Negative for color change, pallor and rash.   Neurological: Negative for seizures, speech difficulty, numbness and headaches.   Psychiatric/Behavioral: Negative for agitation, confusion, decreased concentration and sleep disturbance. The patient is not nervous/anxious.      /64   Pulse 51   Temp 96.9 °F (36.1 °C)   Ht 167.6 cm (66\")   Wt 79.4 kg (175 lb)   SpO2 98%   BMI 28.25 kg/m²     Objective   Physical Exam  Vitals signs and nursing note reviewed.   Constitutional:       Appearance: Normal appearance. She is well-developed.   HENT:      Head: Normocephalic.      Right Ear: Tympanic membrane, ear canal and external ear normal.      Left Ear: Tympanic membrane and external ear normal.      Nose: Nose normal.   Eyes:      Conjunctiva/sclera: Conjunctivae normal.      Pupils: Pupils are equal, round, and reactive to light.   Neck:      Thyroid: No thyromegaly.      Vascular: No JVD.   Cardiovascular:      Rate and Rhythm: Normal rate and regular rhythm.      Heart sounds: Normal heart sounds. No murmur. No friction rub.   Pulmonary:      Effort: No respiratory distress.      Breath sounds: No wheezing or rales.   Abdominal:      General: There is no distension.      Tenderness: There is no abdominal tenderness.   Musculoskeletal:         General: No tenderness.   Lymphadenopathy:      Cervical: No cervical adenopathy.   Skin:     Findings: No rash.   Neurological:      General: No focal deficit present.      Mental Status: She is alert and oriented to person, place, and time.      Cranial Nerves: No cranial nerve deficit.      Deep Tendon Reflexes: Reflexes normal.   Psychiatric:         Mood and Affect: Mood normal.         Behavior: Behavior normal.         Assessment/Plan   Diagnoses and all orders for this visit:    1. Essential hypertension (Primary)  -     " CBC & Differential; Future  -     Comprehensive Metabolic Panel; Future  -     Lipid Panel; Future  Stable on norvasc 5 m gpo qd and lopressor 100 mg po bid  2. Hyperlipidemia LDL goal <70  -     Comprehensive Metabolic Panel; Future  -     Lipid Panel; Future  Controlled with Crestor 20 mg po qhs.   3. Gastroesophageal reflux disease with esophagitis without hemorrhage  Protonix 40 mg po qd.   4. Chronic kidney disease, stage IV (severe) (CMS/HCC)  Continue with nephrology  5. Nausea  -     ondansetron ODT (ZOFRAN-ODT) 8 MG disintegrating tablet; Place 1 tablet on the tongue Every 8 (Eight) Hours As Needed for Nausea or Vomiting.  Dispense: 30 tablet; Refill: 1    6. Postmenopause  -     DEXA Bone Density Axial; Future    7. Encounter for screening mammogram for breast cancer  -     Mammo screening digital tomosynthesis bilateral w CAD; Future

## 2021-03-12 NOTE — TELEPHONE ENCOUNTER
Plavix Protocol Passed    Last Office Visit: 3/5/21  Next Office Visit: 8/4/21    Labs completed in past 6 months? yes  Labs completed in past year? yes    Last Refill Date: 6/19/20  Quantity: 90  Refills: 1    [facility]: [id number] [place], [place] - [address] [place] [address] [address] AT [facility] - [phone] PH - [phone]    [address] [facility] [facility] [address]

## 2021-04-01 NOTE — TELEPHONE ENCOUNTER
Caller: Narcisa Cotto    Relationship: Self    Best call back number: [phone]     Medication needed:   Requested Prescriptions     Pending Prescriptions Disp Refills   • gabapentin (NEURONTIN) 600 MG tablet 180 tablet 1     Sig: Take 1 tablet by mouth 2 (Two) Times a Day.       When do you need the refill by: TODAY    What additional details did the patient provide when requesting the medication: PATIENT IS OUR OF MEDICATION    Does the patient have less than a 3 day supply:  [x] Yes  [] No    What is the patient's preferred pharmacy: [facility] #[id number] [address] AT [facility] - [phone] [facility] [phone] FX

## 2021-04-01 NOTE — TELEPHONE ENCOUNTER
Last appointment: 03/05/21  Next appointment: 08/04/21  Cesar:   ALKAS:  CSA:     Last Refill: 05/04/20  Quantity: 90  Refills: 1

## 2021-04-09 PROBLEM — Z86.718 HISTORY OF DVT (DEEP VEIN THROMBOSIS): Status: ACTIVE | Noted: 2019-06-25

## 2021-04-28 NOTE — TELEPHONE ENCOUNTER
Caller: Narcisa Cotto    Relationship: Self    Best call back number: [phone]    Medication needed:   Requested Prescriptions     Pending Prescriptions Disp Refills   • clopidogrel (PLAVIX) 75 MG tablet 90 tablet 1     Sig: TAKE 1 TABLET BY MOUTH DAILY       When do you need the refill by: ASAP    What additional details did the patient provide when requesting the medication: PATIENT IS COMPLETELY OUT     Does the patient have less than a 3 day supply:  [x] Yes  [] No    What is the patient's preferred pharmacy: [facility] #[id number] [address] AT [facility] [phone] [facility] [phone]

## 2021-04-28 NOTE — TELEPHONE ENCOUNTER
Last Office Visit:  03/05/21  Next Office Visit:08/04/21    Labs completed in past 6 months? yes  Labs completed in past year? yes    Last Refill Date:    Quantity:  Refills:    Pharmacy:     Please review pended refill request for any changes needed on refills or quantities. Thank you!

## 2021-05-10 NOTE — TELEPHONE ENCOUNTER
LOV 11/30/20    Spoke with daughter and she was transferred to the front to schedule a follow up appt for Ms. Cotto

## 2021-05-15 NOTE — TELEPHONE ENCOUNTER
Last Office Visit: 03/05/21  Next Office Visit:  08/04/21    Labs completed in past 6 months? yes  Labs completed in past year? yes    Last Refill Date: 11/16/20  Quantity:90  Refills:1    Pharmacy:     Please review pended refill request for any changes needed on refills or quantities. Thank you!

## 2021-05-25 NOTE — TELEPHONE ENCOUNTER
Patient called and asked for a refill of her freestyle janelle 2 sensors to [facility] in [place]. Please advise.

## 2021-06-30 NOTE — TELEPHONE ENCOUNTER
Ask her if any of these times/dates work?  07/07/2021 at 4:30 pm  07/12/2021 at 8am or 4:30 pm    If those dont work, you can overbook any available 4:30 pm

## 2021-06-30 NOTE — TELEPHONE ENCOUNTER
Pt needs an appt soon to be able to get her freestyle janelle. When and where could I put her on the [facility]?

## 2021-07-07 NOTE — PROGRESS NOTES
Chief Complaint   Patient presents with   • Diabetes     follow up       HPI:   Narcisa Cotto is a 81 y.o.female who presents to [facility] for f/u evaluation of her Type 2 diabetes. Last visit (telehealth) 11/30/2020. Her history is as follows:    Interim Events:  - was not able to f/u in clinic since 11/2020 due to multiple deaths in her family, including one daughter, and other family illness.  - she has had no new medical problems.   - Continues to have dyspnea on exertion.  - Currently lives alone. Daughter that lived with her recently passed away.  - Has recently started using Freestyle Ino 2 CGM.      1) Type 2 diabetes with associated complications of peripheral neuropathy, CKD, PAD, retinopathy, and CAD:  - diagnosed in 2000  - was on basal-bolus regimen with Levemir and Humalog. Changed to Novolin 70/30 in (01/2018) due to high cost of the analogue insulins.   - changed to NPH and Regular insulin in 05/2020 due to variable meal schedule    Current DM Medications:   - NPH: 30 units qAM  -  Regular: 20 units ac dinner before sinner  (is not using the CF of 2 units: 50 > 150 as we had discussed at prior visits)   - NPH: 30 units nightly    On no oral agents or GLP-1 agonist due to low GFR    Glucometer/CGM Review: pt checking SG 4 times a day  - CGM data is limited due to improper placement of sensor on her arm. Time of CGM activity was only 9%.  - of the limited data available, pt appeared to have overnight hyperglycemia with fair glycemic control from 6AM to 6PM  - no hypoglycemia     DM Health Maintenance:  Ophtho: Last visit - 12/2020), PDR w/ ME OU, h/o left eye vitreous hemorrhage. Dr. Chicas, [facility],   Podiatry: Last visit - no recent visit  Monofilament / foot exam: (09/2018)  Lipids: (03/2021) TChol 118, , HDL 38, LDL 56 - on crestor 20 mg daily  Urine Microalb/Cr ratio: (01/2019) 69.9, CKD stage 4, on ace-I  TSH: (03/2021) 2.900  Aspirin: 81 mg daily  CBC: (03/2021)  "Hgb 12.4 - WNL  CMP: (03/2021) Cr 1.72, GFR 29, LFTs WNL    2) diabetic peripheral neuropathy:  - pt did not start Lyrica due to cost  - On Gabapentin 600 mg BID - renal dosing    Other history:   - hospitalized in 05/2018 for NSTEMI. Pt reports she received 3 units of PRBCs  - hospitalized again in 06/2019 for DVT, SOA  - hospitalized again in 07/2019 for possible CVA. Pt had taken extra insulin by mistake and took 70 units of novolin 70/30 instead of 40 units. Pt's symptoms were thought to be due to the hypoglycemia and not CVA    .Review of Systems   Constitutional: Positive for fatigue.        Weight stable   HENT: Negative.    Eyes: Negative.  Negative for visual disturbance.   Respiratory: Positive for shortness of breath (on exertion).    Cardiovascular: Positive for leg swelling (mild). Negative for chest pain.   Gastrointestinal: Negative.  Negative for constipation and diarrhea.   Endocrine: Negative for cold intolerance, polydipsia and polyuria.        See HPI   Genitourinary:        Stress urinary incontinence   Musculoskeletal: Positive for arthralgias (knees & hands). Negative for back pain, joint swelling and myalgias.        Leg pain   Skin: Negative.  Negative for rash and wound.   Neurological: Positive for numbness (neuropathy pain controlled with gabapentin). Negative for headaches.   Hematological: Negative.    Psychiatric/Behavioral: Negative.      /72   Pulse 81   Ht 167.6 cm (66\")   Wt 78.9 kg (174 lb)   SpO2 99%   BMI 28.08 kg/m²   Physical Exam   Constitutional: She is oriented to person, place, and time. She appears well-developed. No distress.   HENT:   Head: Normocephalic.   Eyes: Pupils are equal, round, and reactive to light. Conjunctivae are normal.   Neck: No tracheal deviation present. No thyromegaly present.   No palpable thyroid nodules     Cardiovascular: Normal rate, regular rhythm and normal heart sounds.   No murmur heard.  Pulmonary/Chest: Effort normal and breath " sounds normal. No respiratory distress.   Abdominal: Soft. Bowel sounds are normal. She exhibits no mass. There is no abdominal tenderness.   No scarring at insulin injection sites   Lymphadenopathy:     She has no cervical adenopathy.   Neurological: She is alert and oriented to person, place, and time. No cranial nerve deficit.   Skin: Skin is warm and dry. She is not diaphoretic. No erythema.   Psychiatric: Her behavior is normal. Mood normal.   Vitals reviewed.      LABS/IMAGING:   Office Visit on 07/07/2021   Component Date Value Ref Range Status   • Glucose 07/07/2021 187* 70 - 130 mg/dL Final   • Hemoglobin A1C 07/07/2021 8.4  % Final     Ordered TSH, A1C%: results pending    ASSESSMENT/PLAN:  1)  Type 2 diabetes with associated complications of peripheral neuropathy, CKD, PAD, PDR w/ ME OU: with mild hyperglycemia. Fair control based on her age and comorbidities  - Cost of diabetic medications limiting treatment options  - discussed with patient that glycemic targets are generally set somewhat higher (eg, <8 percent) for older adult patients and those with comorbidities. Therefore a goal A1C% of 7-8.5% is reasonable for Ms. Cotto.  - On no oral agents or GLP-1 agonist due to low GFR    - CGM data is limited due to improper placement of sensor on her arm. Time of CGM activity was only 9%. Could not make full Interpretation & Report.  -  Had pt Apply new sensor (sample) today. Reviewed with pt proper placement on posterior arm.     Will make the following changes to her insulin regimen:   - continue: NPH 30 units qAM  -  Regular: 20 units + CF of 2 units: 50 > 150  - NPH: 30 units nightly    Insulin Instructions reviewed with pt    Pt meets medicare criteria for CGM:  - pt checking FSBG 4 times a day  - Pt treated with 3 injections of insulin per day  - Pt's insulin regimen requires frequent adjustment by the patient on the basis of CGM results     2) diabetic peripheral neuropathy  - continue on gabapentin  600 mg BID: renal dosing    RTC 4 months.    Signed: Jennifer Richards MD    Counseling was given to patient for the following topics:  instructions for management, see details in assessment/plan. Total face to face time of the encounter was 20  minutes and 15 minutes was spent counseling.

## 2021-08-04 PROBLEM — M54.16 LUMBAR RADICULOPATHY: Status: ACTIVE | Noted: 2020-09-11

## 2021-08-04 PROBLEM — M54.50 LOW BACK PAIN: Status: ACTIVE | Noted: 2020-08-12

## 2021-08-04 NOTE — PROGRESS NOTES
The ABCs of the Annual Wellness Visit  Subsequent Medicare Wellness Visit    Chief Complaint   Patient presents with   • Medicare Wellness-subsequent       Subjective   History of Present Illness:  Narcisa Cotto is a 81 y.o. female who presents for a Subsequent Medicare Wellness Visit.    HEALTH RISK ASSESSMENT    Recent Hospitalizations:  No hospitalization(s) within the last year.    Current Medical Providers:  Patient Care Team:  Mariia Del Real DO as PCP - General  Mariia Del Real DO as PCP - Family Medicine  Filemon Mayberry IV, MD as Cardiologist (Cardiology)  Jennifer Richards MD as Consulting Physician (Endocrinology)  Zachary Key MD as Consulting Physician (Orthopedic Surgery)    Smoking Status:  Social History     Tobacco Use   Smoking Status Never Smoker   Smokeless Tobacco Never Used       Alcohol Consumption:  Social History     Substance and Sexual Activity   Alcohol Use No       Depression Screen:   PHQ-2/PHQ-9 Depression Screening 8/4/2021   Little interest or pleasure in doing things 0   Feeling down, depressed, or hopeless 0   Trouble falling or staying asleep, or sleeping too much -   Feeling tired or having little energy -   Poor appetite or overeating -   Feeling bad about yourself - or that you are a failure or have let yourself or your family down -   Trouble concentrating on things, such as reading the newspaper or watching television -   Moving or speaking so slowly that other people could have noticed. Or the opposite - being so fidgety or restless that you have been moving around a lot more than usual -   Thoughts that you would be better off dead, or of hurting yourself in some way -   Total Score 0       Fall Risk Screen:  STEADI Fall Risk Assessment was completed, and patient is at MODERATE risk for falls. Assessment completed on:8/4/2021    Health Habits and Functional and Cognitive Screening:  Functional & Cognitive Status 8/4/2021   Do you have difficulty  preparing food and eating? No   Do you have difficulty bathing yourself, getting dressed or grooming yourself? No   Do you have difficulty using the toilet? No   Do you have difficulty moving around from place to place? No   Do you have trouble with steps or getting out of a bed or a chair? Yes   Current Diet Unhealthy Diet   Dental Exam Up to date   Eye Exam Up to date   Exercise (times per week) 2 times per week   Current Exercises Include Walking   Do you need help using the phone?  No   Are you deaf or do you have serious difficulty hearing?  No   Do you need help with transportation? Yes   Do you need help shopping? Yes   Do you need help preparing meals?  No   Do you need help with housework?  Yes   Do you need help with laundry? Yes   Do you need help taking your medications? No   Do you need help managing money? No   Do you ever drive or ride in a car without wearing a seat belt? No   Have you felt unusual stress, anger or loneliness in the last month? Yes   Who do you live with? Alone   If you need help, do you have trouble finding someone available to you? No   Have you been bothered in the last four weeks by sexual problems? No   Do you have difficulty concentrating, remembering or making decisions? No         Does the patient have evidence of cognitive impairment? No    Asprin use counseling:Does not need ASA (and currently is not on it)    Age-appropriate Screening Schedule:  Refer to the list below for future screening recommendations based on patient's age, sex and/or medical conditions. Orders for these recommended tests are listed in the plan section. The patient has been provided with a written plan.    Health Maintenance   Topic Date Due   • DXA SCAN  Never done   • ZOSTER VACCINE (1 of 2) Never done   • MAMMOGRAM  05/20/2018   • INFLUENZA VACCINE  10/01/2021   • DIABETIC EYE EXAM  12/07/2021   • HEMOGLOBIN A1C  01/07/2022   • LIPID PANEL  03/05/2022   • URINE MICROALBUMIN  08/04/2022   • TDAP/TD  "VACCINES (2 - Tdap) 02/02/2027          The following portions of the patient's history were reviewed and updated as appropriate: allergies, current medications, past family history, past medical history, past social history, past surgical history and problem list.    Outpatient Medications Prior to Visit   Medication Sig Dispense Refill   • amLODIPine (NORVASC) 5 MG tablet Take 1 tablet by mouth Daily. 90 tablet 1   • cholecalciferol (VITAMIN D3) 1000 units tablet Take 1,000 Units by mouth Daily.     • clopidogrel (PLAVIX) 75 MG tablet TAKE 1 TABLET BY MOUTH DAILY 90 tablet 1   • Continuous Blood Gluc Sensor (FreeStyle Ino 2 Sensor) misc 1 each Every 14 (Fourteen) Days. 2 each 5   • cyanocobalamin ([employer] VITAMIN B-12) 1000 MCG tablet Take 1 tablet by mouth daily.     • gabapentin (NEURONTIN) 600 MG tablet Take 1 tablet by mouth 2 (Two) Times a Day. 180 tablet 1   • insulin NPH (HumuLIN N) 100 UNIT/ML injection INJECT 30 UNITS EVERY MONRING AND 30 UNITS AT BEDTIME 60 mL 3   • Insulin Syringe 31G X 5/16\" 0.5 ML misc 1 each 3 (Three) Times a Day. 300 each 3   • meclizine (ANTIVERT) 25 MG tablet Take 1 tablet by mouth 3 (Three) Times a Day As Needed for Dizziness. 270 tablet 1   • metoprolol tartrate (LOPRESSOR) 100 MG tablet Take 1 tablet by mouth 2 (Two) Times a Day. 180 tablet 3   • ondansetron ODT (ZOFRAN-ODT) 8 MG disintegrating tablet Place 1 tablet on the tongue Every 8 (Eight) Hours As Needed for Nausea or Vomiting. 30 tablet 1   • pantoprazole (PROTONIX) 40 MG EC tablet TAKE 1 TABLET BY MOUTH EVERY DAY 90 tablet 1   • rosuvastatin (CRESTOR) 20 MG tablet TAKE 1 TABLET BY MOUTH DAILY 90 tablet 1   • nitroglycerin (NITROSTAT) 0.4 MG SL tablet Place 1 tablet under the tongue Every 5 (Five) Minutes As Needed for Chest Pain. Take no more than 3 doses in 15 minutes. 25 tablet 3   • insulin regular (humuLIN R,novoLIN R) 100 UNIT/ML injection Take 20 units before supper daily plus extra as directed up to 30 units per " day. 30 mL 3     No facility-administered medications prior to visit.       Patient Active Problem List   Diagnosis   • Hyperlipidemia LDL goal <70   • Diabetic gastroparesis (CMS/[facility])   • Peripheral arterial disease (CMS/[facility])   • Type 2 diabetes mellitus with complication, with long-term current use of insulin (CMS/[facility])   • Sleep apnea   • Diabetic peripheral neuropathy (CMS/[facility])   • Coronary artery disease involving native coronary artery of native heart with angina pectoris (CMS/[facility])   • GERD (gastroesophageal reflux disease)   • Essential hypertension   • Iron deficiency anemia   • History of DVT (deep vein thrombosis)   • Valvular heart disease   • Chronic kidney disease, stage IV (severe) (CMS/[facility])       Advanced Care Planning:  ACP discussion was held with the patient during this visit. Patient does not have an advance directive, information provided.    Review of Systems   Constitutional: Negative for activity change, appetite change, chills, diaphoresis, fatigue, fever and unexpected weight change.   HENT: Negative for congestion, ear discharge, ear pain, mouth sores, nosebleeds, sinus pressure, sneezing and sore throat.    Eyes: Negative for pain, discharge and itching.   Respiratory: Negative for cough, chest tightness, shortness of breath and wheezing.    Cardiovascular: Negative for chest pain, palpitations and leg swelling.   Gastrointestinal: Negative for abdominal pain, constipation, diarrhea, nausea and vomiting.   Endocrine: Negative for cold intolerance, heat intolerance, polydipsia and polyphagia.   Genitourinary: Negative for dysuria, flank pain, frequency, hematuria and urgency.   Musculoskeletal: Positive for arthralgias, back pain and myalgias. Negative for gait problem, neck pain and neck stiffness.   Skin: Negative for color change, pallor and rash.   Neurological: Positive for numbness. Negative for seizures, speech difficulty and headaches.   Psychiatric/Behavioral: Negative for  "agitation, confusion, decreased concentration and sleep disturbance. The patient is not nervous/anxious.        Compared to one year ago, the patient feels her physical health is the same.  Compared to one year ago, the patient feels her mental health is the same.    Reviewed chart for potential of high risk medication in the elderly: yes  Reviewed chart for potential of harmful drug interactions in the elderly:yes    Objective         Vitals:    08/04/21 1427   BP: 132/62   Pulse: 74   SpO2: 96%   Weight: 78.9 kg (174 lb)   Height: 167.6 cm (66\")   PainSc: 0-No pain       Body mass index is 28.08 kg/m².  Discussed the patient's BMI with her. The BMI is above average; BMI management plan is completed.    Physical Exam  Vitals and nursing note reviewed.   Constitutional:       Appearance: She is well-developed.   HENT:      Head: Normocephalic.      Right Ear: External ear normal.      Left Ear: External ear normal.      Nose: Nose normal.   Eyes:      Pupils: Pupils are equal, round, and reactive to light.   Neck:      Thyroid: No thyromegaly.      Vascular: No JVD.   Cardiovascular:      Rate and Rhythm: Normal rate and regular rhythm.      Heart sounds: Normal heart sounds. No murmur heard.   No friction rub.   Pulmonary:      Effort: No respiratory distress.      Breath sounds: No wheezing or rales.   Abdominal:      General: There is no distension.      Tenderness: There is no abdominal tenderness.   Musculoskeletal:         General: No tenderness.   Lymphadenopathy:      Cervical: No cervical adenopathy.   Skin:     Findings: No rash.   Neurological:      General: No focal deficit present.      Mental Status: She is alert and oriented to person, place, and time.      Cranial Nerves: No cranial nerve deficit.      Deep Tendon Reflexes: Reflexes normal.   Psychiatric:         Mood and Affect: Mood normal.         Behavior: Behavior normal.         Lab Results   Component Value Date    HGBA1C 8.4 07/07/2021    "     Assessment/Plan   Medicare Risks and Personalized Health Plan  CMS Preventative Services Quick Reference  Obesity/Overweight     The above risks/problems have been discussed with the patient.  Pertinent information has been shared with the patient in the After Visit Summary.  Follow up plans and orders are seen below in the Assessment/Plan Section.    Diagnoses and all orders for this visit:    1. Medicare annual wellness visit, subsequent (Primary)  Done today  2. Coronary artery disease involving native coronary artery of native heart with angina pectoris (CMS/[facility])  -     nitroglycerin (NITROSTAT) 0.4 MG SL tablet; Place 1 tablet under the tongue Every 5 (Five) Minutes As Needed for Chest Pain. Take no more than 3 doses in 15 minutes.  Dispense: 25 tablet; Refill: 3  Continue crestor 20 mg po qhs and plavix 75 mg po qd  3. Essential hypertension  -     CBC & Differential; Future  Continue norvasc 5 mg po qd, lopressor 100 mg bid  4. Hyperlipidemia LDL goal <70  -     Comprehensive Metabolic Panel; Future  -     Lipid Panel; Future  Continue crestor 20 mg po qhs  5. Peripheral arterial disease (CMS/HCC)  stable  6. Valvular heart disease  Continue lopressor 100 mg po bid, echo done 2019 EF 60%  7. Diabetic gastroparesis (CMS/HCC)  Continue zofran prn  8. Type 2 diabetes mellitus with complication, with long-term current use of insulin (CMS/HCC)  -     Microalbumin / Creatinine Urine Ratio - Urine, Clean Catch  Continue Humlin and follow up with Endo  9. Gastroesophageal reflux disease with esophagitis without hemorrhage  Continue Protonix 40 mg po qd  10. Chronic kidney disease, stage IV (severe) (CMS/[facility])  Continue to follow with nephrolog  11. Other iron deficiency anemia  Repeat cbc  12. Diabetic peripheral neuropathy (CMS/HCC)  Continue gabapentin 600 mg po bid  13. Other sleep apnea  Did not use her cpap  Enough and no longer has    Follow Up:  No follow-ups on file.     An After Visit Summary and PPPS  were given to the patient.

## 2021-10-12 NOTE — TELEPHONE ENCOUNTER
Jonathan Ace is a 29 y.o. female  Chief Complaint   Patient presents with    Follow-up    Memory Loss    Migraine    Other     needs emgality shot     Health Maintenance Due   Topic Date Due    Hepatitis C Screening  Never done    MICROALBUMIN Q1  Never done    Eye Exam Retinal or Dilated  Never done    COVID-19 Vaccine (1) Never done    Lipid Screen  08/12/2021    Flu Vaccine (1) 09/01/2021     There were no vitals taken for this visit.     Patient-Reported Vitals 10/12/2021   Patient-Reported Weight 234lb       Patient Phone Number/Email:  [phone] Tried calling patient several times with no answer and VM is full. Per Dr. Richards wants patient to be seen when she comes in on 7/31/19 @ 3:30 to see Dr. Del Real to see her at 4:00 after her visit. Per Dr. Richards ok to change insulin to Humulin 70/30 taking 40 units before breakfast , 40 units before lunch and 70 units before supper. Patient will do not dosage that was sent from other provider.  will go over medication when patient come in to be seen.

## 2021-11-11 DIAGNOSIS — I25.119 CORONARY ARTERY DISEASE INVOLVING NATIVE CORONARY ARTERY OF NATIVE HEART WITH ANGINA PECTORIS (HCC): ICD-10-CM

## 2021-11-11 DIAGNOSIS — E11.42 DIABETIC PERIPHERAL NEUROPATHY (HCC): ICD-10-CM

## 2021-11-11 DIAGNOSIS — I10 ESSENTIAL HYPERTENSION: ICD-10-CM

## 2021-11-11 DIAGNOSIS — E78.5 HYPERLIPIDEMIA LDL GOAL <70: ICD-10-CM

## 2021-11-11 DIAGNOSIS — I73.9 PERIPHERAL ARTERIAL DISEASE (HCC): ICD-10-CM

## 2021-11-11 RX ORDER — ROSUVASTATIN CALCIUM 20 MG/1
20 TABLET, COATED ORAL DAILY
Qty: 90 TABLET | Refills: 1 | OUTPATIENT
Start: 2021-11-11

## 2021-11-11 RX ORDER — CLOPIDOGREL BISULFATE 75 MG/1
TABLET ORAL
Qty: 90 TABLET | Refills: 1 | OUTPATIENT
Start: 2021-11-11

## 2021-11-11 RX ORDER — AMLODIPINE BESYLATE 5 MG/1
5 TABLET ORAL DAILY
Qty: 90 TABLET | Refills: 1 | OUTPATIENT
Start: 2021-11-11

## 2021-11-11 RX ORDER — GABAPENTIN 600 MG/1
TABLET ORAL
Qty: 180 TABLET | OUTPATIENT
Start: 2021-11-11

## 2022-01-24 NOTE — TELEPHONE ENCOUNTER
BRENDA WITH [facility] IS CALLING WANTING TO INFORM DR. DURHAM PATIENT WILL HAVE PHYSICAL THERAPY 1 FOR 1 WEEK THEN 2 TIMES A WEEK FOR 5 WEEKS.    DISCHARGE

## 2022-04-21 NOTE — PROGRESS NOTES
[facility] Medicine Services  PROGRESS NOTE    Patient Name: Narcisa Cotto  : 1940  MRN: [id number]    Date of Admission: 2019  Length of Stay: 15  Primary Care Physician: Mariia Del Real DO    Subjective   Subjective     CC:  Constipation, chest pain     HPI:  No significant acute events reported from nursing staff.  Patient is up in chair and reported that she is feeling great.  She also reported that she is ambulated about 10 feet.  Denies any chest pain or shortness of air.  Lower extremity edema is improving.  Denies any other complaints.    Review of Systems  Gen- No fevers, chills  CV- No chest pain, palpitations  Resp- As above  GI- as above     Otherwise ROS is negative except as mentioned in the HPI.    Objective   Objective     Vital Signs:   Temp:  [98.4 °F (36.9 °C)] 98.4 °F (36.9 °C)  Heart Rate:  [67-79] 78  Resp:  [18-20] 20  BP: (133-149)/(58-64) 142/62      Physical Exam:  Constitutional: awake, alert, not in any acute distress  HENT: NCAT, mucous membranes moist  Neck: right IJ bandage, left IJ venous sheath has been discontinued and dressing is intact without any signs of bleeding or expanding hematoma.  Respiratory: Clear to auscultation superiorly but poor expansion at bases, normal respiratory effort, respiration nonlabored  Cardiovascular: RRR, no murmurs  Gastrointestinal: Positive bowel sounds, soft, nontender, nondistended, obese   Musculoskeletal: Anasarca with pitting edema extending to thigh-improving  Psychiatric: Appropriate affect, cooperative  Neurologic: Oriented x 3, strength symmetric in all extremities but weak, speech clear  Skin: Bilateral groin hematomas are stable    Results Reviewed:  I have personally reviewed current lab, radiology, and data and agree.    Results from last 7 days   Lab Units 19  0513 19  0620 19  0626  19  0948   WBC 10*3/mm3 3.10* 4.52 5.89   < > 6.46   HEMOGLOBIN g/dL 9.6* 10.2*  ----- Message from Aracely Claros sent at 4/20/2022  5:51 PM CDT -----  Type: Requesting to speak with nurse         Who Called: PT  Regarding: pt isnt feeling well and would like to reschedule please advise   Would the patient rather a call back or a response via [employer]? Call back  Best Call Back Number: [phone]  Additional Information: n/a         9.5*   < > 8.2*   HEMATOCRIT % 30.5* 33.1* 30.8*   < > 27.1*   PLATELETS 10*3/mm3 181 180 161   < > 169   INR   --   --   --   --  1.22*    < > = values in this interval not displayed.     Results from last 7 days   Lab Units 06/02/19  0513 06/01/19  0620 05/31/19  0626  05/29/19  0733 05/28/19  0946 05/28/19  0422  05/27/19  1344   SODIUM mmol/L 136 135* 136   < > 133*  132*  --  133*   < > 133*   POTASSIUM mmol/L 4.4 4.3 4.6   < > 5.4*  5.4*  --  4.9   < > 5.1   CHLORIDE mmol/L 96* 97* 100   < > 95*  94*  --  94*   < > 93*   CO2 mmol/L 30.0* 26.0 25.0   < > 17.0*  25.0  --  30.0*   < > 25.0   BUN mg/dL 26* 28* 37*   < > 67*  65*  --  67*   < > 68*   CREATININE mg/dL 1.28* 1.39* 1.46*   < > 1.94*  2.01*  --  1.86*   < > 2.10*   GLUCOSE mg/dL 149* 59* 48*   < > 146*  157*  --  93   < > 239*   CALCIUM mg/dL 9.1 9.1 9.0   < > 9.0  9.0  --  9.4   < > 8.9   ALT (SGPT) U/L  --   --   --   --  29  --  33  --  35*   AST (SGOT) U/L  --   --   --   --  15  --  20  --  31   TROPONIN T ng/mL  --   --   --   --   --  0.320*  --   --  0.345*    < > = values in this interval not displayed.     Estimated Creatinine Clearance: 40.5 mL/min (A) (by C-G formula based on SCr of 1.28 mg/dL (H)).    Microbiology Results Abnormal     Procedure Component Value - Date/Time    Eosinophil Smear - Urine, Urine, Clean Catch [[id number]]  (Normal) Collected:  05/21/19 0157    Lab Status:  Final result Specimen:  Urine, Clean Catch Updated:  05/21/19 0732     Eosinophil Smear 0 % EOS/100 Cells     Narrative:       No eosinophil seen          Imaging Results (last 24 hours)     ** No results found for the last 24 hours. **          Results for orders placed during the hospital encounter of 05/18/19   Adult Transthoracic Echo Complete W/ Cont if Necessary Per Protocol    Narrative · Left ventricular systolic function is normal. Estimated EF = 65%.  · Left ventricular diastolic dysfunction (grade II) consistent with   pseudonormalization.  ·  Mild mitral valve regurgitation is present  · Mild aortic valve regurgitation is present.  · Right ventricular cavity is dilated.  · Estimated right ventricular systolic pressure from tricuspid   regurgitation is normal (<35 mmHg).          I have reviewed the medications:    Current Facility-Administered Medications:   •  amLODIPine (NORVASC) tablet 5 mg, 5 mg, Oral, Q24H, Krzysztof Torrez MD, 5 mg at 06/02/19 0930  •  aspirin chewable tablet 81 mg, 81 mg, Oral, Daily, Regan Benjamin MD, 81 mg at 06/02/19 0932  •  atropine sulfate injection 0.5-1 mg, 0.5-1 mg, Intravenous, Q5 Min PRN, Filemon Mayberry IV, MD, 0.5 mg at 05/28/19 0920  •  bisacodyl (DULCOLAX) suppository 10 mg, 10 mg, Rectal, Daily PRN, Krzysztof Torrez MD  •  [DISCONTINUED] clopidogrel (PLAVIX) tablet 600 mg, 600 mg, Oral, Daily **OR** clopidogrel (PLAVIX) tablet 75 mg, 75 mg, Oral, Daily, Filemon Mayberry IV, MD, 75 mg at 06/02/19 0932  •  dextrose (D50W) 25 g/ 50mL Intravenous Solution 25 g, 25 g, Intravenous, Q15 Min PRN, Janice Peguero APRN  •  dextrose (GLUTOSE) oral gel 15 g, 15 g, Oral, Q15 Min PRN, Janice Peguero APRN  •  docusate sodium (COLACE) capsule 100 mg, 100 mg, Oral, BID, Mikala Shah DO, 100 mg at 06/02/19 0934  •  gabapentin (NEURONTIN) capsule 300 mg, 300 mg, Oral, Q12H, Ariana Burden MD, 300 mg at 06/02/19 0932  •  glucagon (human recombinant) (GLUCAGEN DIAGNOSTIC) injection 1 mg, 1 mg, Subcutaneous, Q15 Min PRN, Janice Peguero APRN  •  heparin (porcine) 5000 UNIT/ML injection 5,000 Units, 5,000 Units, Subcutaneous, Q12H, Enedelia Macias MD, 5,000 Units at 06/02/19 0932  •  HYDROcodone-acetaminophen (NORCO) 5-325 MG per tablet 1 tablet, 1 tablet, Oral, Q6H PRN, Mikala Shah DO, 1 tablet at 06/02/19 0937  •  insulin lispro (humaLOG) injection 0-9 Units, 0-9 Units, Subcutaneous, 4x Daily With Meals & Nightly, Le Claudio MD, 4 Units at 06/02/19 1500  •  ipratropium-albuterol  (DUO-NEB) nebulizer solution 3 mL, 3 mL, Nebulization, Q4H PRN, Le Claudio MD, 3 mL at 05/26/19 1109  •  melatonin tablet 5 mg, 5 mg, Oral, Nightly, Ailyn Smart MD, 5 mg at 06/01/19 2128  •  metoprolol tartrate (LOPRESSOR) tablet 25 mg, 25 mg, Oral, Q12H, Filemon Mayberry IV, MD, 25 mg at 06/02/19 0930  •  [DISCONTINUED] morphine injection 1 mg, 1 mg, Intravenous, Q4H PRN, 1 mg at 05/26/19 0124 **AND** naloxone (NARCAN) injection 0.4 mg, 0.4 mg, Intravenous, Q5 Min PRN, Regan Benjamin MD  •  nitroglycerin (NITROSTAT) SL tablet 0.4 mg, 0.4 mg, Sublingual, Q5 Min PRN, Le Claudio MD, 0.4 mg at 05/21/19 1309  •  ondansetron (ZOFRAN) injection 4 mg, 4 mg, Intravenous, Q6H PRN, Regan Benjamin MD, 4 mg at 05/27/19 1409  •  ondansetron (ZOFRAN) tablet 8 mg, 8 mg, Oral, Q6H PRN, Enedelia Macias MD  •  pantoprazole (PROTONIX) EC tablet 40 mg, 40 mg, Oral, BID, Regan Benjamin MD, 40 mg at 06/02/19 0930  •  Pharmacy Consult - [place], , Does not apply, Daily, Ailyn Rosales, [facility]  •  polyethylene glycol 3350 powder (packet), 17 g, Oral, BID, Krzysztof Torrez MD, 17 g at 06/02/19 0929  •  promethazine (PHENERGAN) injection 12.5 mg, 12.5 mg, Intravenous, Q6H PRN, Camryn Echevarria APRN  •  QUEtiapine (SEROquel) tablet 12.5 mg, 12.5 mg, Oral, Nightly, Ailyn Smart MD, 12.5 mg at 06/01/19 2128  •  rosuvastatin (CRESTOR) tablet 20 mg, 20 mg, Oral, Nightly, AslamTayo MD, 20 mg at 06/01/19 2128  •  sodium chloride 0.9 % flush 3-10 mL, 3-10 mL, Intravenous, PRN, Regan Benjamin MD, 10 mL at 06/01/19 2129      Assessment/Plan   Assessment / Plan     Active Hospital Problems    Diagnosis POA   • **NSTEMI (non-ST elevated myocardial infarction) (CMS/[facility]) [I21.4] Yes     · Mild troponin elevation in the setting of chronic kidney disease, hypotension, and intractable nausea and vomiting, 5/18/2019  · Cardiac catheterization (5/23/2019): Severe multivessel CAD.  Culprit for NSTEMI is a  highly calcified ostial RCA 99% stenosis.    · Turned down for surgery due to poor functional status  · Staged PCI of the ostial to mid RCA using Xience PATRICIA, 5/29/2019     • Iron deficiency anemia [D50.9] Yes   • Acute renal failure with acute tubular necrosis superimposed on stage 3 chronic kidney disease (CMS/[facility]) [N17.0, N18.3] No   • Essential hypertension [I10] Yes   • GERD (gastroesophageal reflux disease) [K21.9] Yes   • Sleep apnea [G47.30] Yes   • Uncontrolled type 2 diabetes mellitus with complication, with long-term current use of insulin (CMS/[facility]) [E11.8, E11.65, Z79.4] Not Applicable   • Peripheral arterial disease (CMS/[facility]) [I73.9] Yes     · BONNIE (08/22/2013):  At rest right 0.98, left 0.85.  After 2 minutes of exercise right 0.51, left 0.68.  · Venous duplex LE (7/27/2016): All veins visiualized appear patent with distal augmentation bilaterally.  · High-grade right common iliac stenosis on cardiac catheterization, 5/23/2019     • Diabetic gastroparesis (CMS/[facility]) [E11.43, K31.84] Yes   • Hyperlipidemia LDL goal <70 [E78.5] Yes     · High intensity statin therapy is recommended given the presence of peripheral arterial disease and diabetes            Brief Hospital Course to date:  Mrs. Cotto is a 78 year-old F with a past hx of uncontrolled T2DM complicated by gastroparesis, diabetic nephropathy, and peripheral neuropathy, GERD, HTN, CKD III, anemia, DELFINA, and HLP who was admitted on 5/18 with NSTEMI after presenting to the [facility] ER with nausea/vomiting x 48 hours and chest pain.  Evaluation in the ER was suggestive of NSTEMI and pt underwent a LHC on 5/23 that revealed severe MV CAD. CT surgery evaluated the patient and considered CABG but felt that pt was too debilitated to safely proceed with surgery. She had an unstable bradycardia due to CHB vs. Asystole on 5/27, requiring emergent temporary PPM, and underwent PATRICIA x 2 to RCA on 5/29.     NSTEMI due to calcific atherosclerosis of ostia of  RCA  Severe MV CAD (99% ostial RCA stenosis s/p PCI, remaining 60% LAD, 70% LCCx)  S/p [facility] 5/23, PCI aborted   S/p [facility] with PATRICIA x 2 to RCA 5/29  - continue aspirin, plavix, crestor   - possible role for staged intervention of LAD and LCCx in the future  - monitor groin hematomas    Acute on Chronic LV Diastolic HF  - Pt has marked anasarca with significant volume overload, cardiology diuresing    Unstable Bradycardia due to CHB, Possible Asystole on 5/27 with ROSC after CPR, no meds   S/p Atropine/Dopamine Temporary PPM 5/28 by Dr. Reyes   - sinus rhythm on last ECG from 5/30 and telemetry monitor, continue to monitor on tele     Nonoliguric LUKASZ due to ATN on CKD III due to diabetic nephropathy  Associated Metabolic Acidosis    Minimal Proteinuria with P/Cr ratio of 0.11  - Peak Cr 2.64, baseline around 1.8  - Nephrology following  - diurese as able and avoid nephrotoxic medications when possible    Uncontrolled Insulin-dependent T2DM with microvascular complications of gastroparesis, neuropathy, nephropathy and macrovascular complications of CAD  - FSBS reviewed, patient has ongoing morning and late afternoon hypoglycemia for several days, improved after scheduled basal and short acting insulin were discontinued.  - Continue on low-moderate sliding scale insulin for now   - HgbA1c 8.5%    Hypertension  -Blood pressure is suboptimally controlled, but has improved since amlodipine has been resumed.  Continue close monitoring.      PAD with 90% severe right common iliac stenosis, noted incidentally during 5/23 [facility]  - medical management     Acute Delirium / Metabolic Encephalopathy, resolved   - Neuro has seen this admit     Normocytic Anemia of Chronic Renal Disease  - requiring transfusion of 1 unit on 5/28. No evidence of active hemorrhage   - Hgb stable     Leukopenia   -New, etiology unclear, monitor for now    constipation  - bowel regimen, has successful bowel movement today with nurse reporting possible  melena.  -H&H is stable, patient may need to follow-up in outpatient for further work-up.    Hyponatremia, resolved  Hyperkalemia, resolved   Hyperlipidemia   DELFINA     DVT Prophylaxis:  SQ heparin  Disposition: I expect the patient to be discharged to SNF/rehab facility next week.     CODE STATUS:   Code Status and Medical Interventions:   Ordered at: 05/18/19 2051     Level Of Support Discussed With:    Patient     Code Status:    CPR     Medical Interventions (Level of Support Prior to Arrest):    Full         Electronically signed by Ce Merino MD, 06/02/19, 3:41 PM.

## 2022-11-06 DIAGNOSIS — K21.9 GASTROESOPHAGEAL REFLUX DISEASE: ICD-10-CM

## 2022-11-06 RX ORDER — PANTOPRAZOLE SODIUM 40 MG/1
TABLET, DELAYED RELEASE ORAL
Qty: 90 TABLET | Refills: 1 | OUTPATIENT
Start: 2022-11-06

## (undated) DEVICE — DRSNG TELFA PAD NONADH STR 1S 3X8IN

## (undated) DEVICE — CATH PACE PACEL BIPOL 5F110CM

## (undated) DEVICE — POSTN ARM CRDL LAMIN

## (undated) DEVICE — GW AMPLATZ  XSTIFF CVD .032 3MM 180CM

## (undated) DEVICE — Device

## (undated) DEVICE — MINI TREK CORONARY DILATATION CATHETER 1.50 MM X 12 MM / RAPID-EXCHANGE: Brand: MINI TREK

## (undated) DEVICE — CABL PACE ATRIAL PT BLU

## (undated) DEVICE — PRE-CONNECTED EXCHANGEABLE BURR CATHETER AND BURR ADVANCING DEVICE: Brand: ROTALINK™ PLUS

## (undated) DEVICE — PAD ARMBRD SURG CONVOL 7.5X20X2IN

## (undated) DEVICE — GLV SURG SENSICARE W/ALOE PF LF 9 STRL

## (undated) DEVICE — CVR HNDL LT SURG ACCSSRY BLU STRL

## (undated) DEVICE — CATH PACE PACEL FLO DIRECTED 5F

## (undated) DEVICE — FINECROSS MG CORONARY MICRO-GUIDE CATHETER: Brand: FINECROSS

## (undated) DEVICE — CATH DIAG EXPO M/ PK 6FR FL4/FR4 PIG 3PK

## (undated) DEVICE — DIFFUSER: Brand: CORE, MAESTRO

## (undated) DEVICE — CABL PACE BIPOL THRSHLD SHROUD PIN 8FT

## (undated) DEVICE — SUCTION CANISTER, 2500CC, RIGID: Brand: DEROYAL

## (undated) DEVICE — ANTIBACTERIAL UNDYED BRAIDED (POLYGLACTIN 910), SYNTHETIC ABSORBABLE SUTURE: Brand: COATED VICRYL

## (undated) DEVICE — GW FIX CORE J .063

## (undated) DEVICE — RADIFOCUS GLIDEWIRE: Brand: GLIDEWIRE

## (undated) DEVICE — 3M™ MEDIPORE™ H SOFT CLOTH SURGICAL TAPE 2862, 2 INCH X 10 YARD (5CM X 9,1M), 12 ROLLS/CASE: Brand: 3M™ MEDIPORE™

## (undated) DEVICE — MODEL AT P65, P/N 701554-001KIT CONTENTS: HAND CONTROLLER, 3-WAY HIGH-PRESSURE STOPCOCK WITH ROTATING END AND PREMIUM HIGH-PRESSURE TUBING: Brand: ANGIOTOUCH® KIT

## (undated) DEVICE — 3M™ STERI-STRIP™ ANTIMICROBIAL SKIN CLOSURES 1/2 INCH X 4 INCHES 50/CARTON 4 CARTONS/CASE A1847: Brand: 3M™ STERI-STRIP™

## (undated) DEVICE — PINNACLE INTRODUCER SHEATH: Brand: PINNACLE

## (undated) DEVICE — SLV REPOSTNG CATH STRL 60CM

## (undated) DEVICE — 450 ML BOTTLE OF 0.05% CHLORHEXIDINE GLUCONATE IN 99.95% STERILE WATER FOR IRRIGATION, USP AND APPLICATOR.: Brand: IRRISEPT ANTIMICROBIAL WOUND LAVAGE

## (undated) DEVICE — NC TREK CORONARY DILATATION CATHETER 3.0 MM X 15 MM / RAPID-EXCHANGE: Brand: NC TREK

## (undated) DEVICE — WR ROTOBLATOR 3.25MM

## (undated) DEVICE — DEV COMP RAD PRELUDESYNC 24CM

## (undated) DEVICE — GUIDE CATHETER: Brand: MACH1™

## (undated) DEVICE — SPETZLER/CLAW TIP, UNIVERSAL

## (undated) DEVICE — OIL CARTRIDGE: Brand: CORE, MAESTRO

## (undated) DEVICE — BANDAGE,GAUZE,BULKEE II,4.5"X4.1YD,STRL: Brand: MEDLINE

## (undated) DEVICE — DEV INFL MONARCH 25W

## (undated) DEVICE — DISPOSABLE TUBING SET AND EXTENDER FILTER TUBING

## (undated) DEVICE — ST INF PRI SMRTSTE 20DRP 2VLV 24ML 117

## (undated) DEVICE — PK SPINE ORTHO 10

## (undated) DEVICE — NC TREK CORONARY DILATATION CATHETER 3.5 MM X 20 MM / RAPID-EXCHANGE: Brand: NC TREK

## (undated) DEVICE — MINI TREK CORONARY DILATATION CATHETER 2.0 MM X 12 MM / RAPID-EXCHANGE: Brand: MINI TREK

## (undated) DEVICE — SPNG GZ STRL 2S 4X4 12PLY

## (undated) DEVICE — HI-TORQUE VERSATURN F GUIDE WIRE FULLY COATED .014 STRAIGHT TIP 190 CM: Brand: HI-TORQUE VERSATURN

## (undated) DEVICE — CATH DIAG EXPO .052 PIG 6F 110CM

## (undated) DEVICE — PK CATH CARD 10

## (undated) DEVICE — NDL HYPO ECLPS SFTY 22G 1 1/2IN

## (undated) DEVICE — ANGIO-SEAL VIP VASCULAR CLOSURE DEVICE: Brand: ANGIO-SEAL

## (undated) DEVICE — ST EXT IV SMARTSITE 2VLV SP M LL 5ML IV1

## (undated) DEVICE — GLIDESHEATH BASIC HYDROPHILIC COATED INTRODUCER SHEATH: Brand: GLIDESHEATH

## (undated) DEVICE — CATH DIAG EXPO .056 FL3.5 6F 100CM

## (undated) DEVICE — MODEL BT2000 P/N 700287-012KIT CONTENTS: MANIFOLD WITH SALINE AND CONTRAST PORTS, SALINE TUBING WITH SPIKE AND HAND SYRINGE, TRANSDUCER: Brand: BT2000 AUTOMATED MANIFOLD KIT

## (undated) DEVICE — HDRST INTUB GENTLETOUCH SLOT 7IN RT

## (undated) DEVICE — Device: Brand: MEDEX

## (undated) DEVICE — LUER-LOK 360°: Brand: CONNECTA, LUER-LOK

## (undated) DEVICE — ST ACC MICROPUNCTURE .018 TRANSLSS/SS/TP 5F/10CM 21G/7CM

## (undated) DEVICE — PRESSURE MONITORING SET: Brand: TRUWAVE, VAMP

## (undated) DEVICE — SPNG GZ WOVN 4X4IN 12PLY 10/BX STRL